# Patient Record
Sex: FEMALE | Race: WHITE | NOT HISPANIC OR LATINO | Employment: FULL TIME | URBAN - METROPOLITAN AREA
[De-identification: names, ages, dates, MRNs, and addresses within clinical notes are randomized per-mention and may not be internally consistent; named-entity substitution may affect disease eponyms.]

---

## 2017-08-28 ENCOUNTER — OFFICE VISIT (OUTPATIENT)
Dept: FAMILY MEDICINE | Facility: CLINIC | Age: 32
End: 2017-08-28
Payer: COMMERCIAL

## 2017-08-28 VITALS
DIASTOLIC BLOOD PRESSURE: 80 MMHG | HEIGHT: 69 IN | SYSTOLIC BLOOD PRESSURE: 119 MMHG | WEIGHT: 197 LBS | BODY MASS INDEX: 29.18 KG/M2 | TEMPERATURE: 98.1 F | OXYGEN SATURATION: 98 % | HEART RATE: 59 BPM

## 2017-08-28 DIAGNOSIS — E66.3 OVERWEIGHT (BMI 25.0-29.9): Primary | ICD-10-CM

## 2017-08-28 DIAGNOSIS — F51.02 ADJUSTMENT INSOMNIA: ICD-10-CM

## 2017-08-28 LAB — TSH SERPL DL<=0.005 MIU/L-ACNC: 2.24 MU/L (ref 0.4–4)

## 2017-08-28 PROCEDURE — 99203 OFFICE O/P NEW LOW 30 MIN: CPT | Performed by: FAMILY MEDICINE

## 2017-08-28 PROCEDURE — 36415 COLL VENOUS BLD VENIPUNCTURE: CPT | Performed by: FAMILY MEDICINE

## 2017-08-28 PROCEDURE — 84443 ASSAY THYROID STIM HORMONE: CPT | Performed by: FAMILY MEDICINE

## 2017-08-28 RX ORDER — TRAZODONE HYDROCHLORIDE 50 MG/1
50 TABLET, FILM COATED ORAL
Qty: 90 TABLET | Refills: 1 | Status: SHIPPED | OUTPATIENT
Start: 2017-08-28 | End: 2018-03-02

## 2017-08-28 RX ORDER — TRAZODONE HYDROCHLORIDE 50 MG/1
TABLET, FILM COATED ORAL AT BEDTIME
COMMUNITY
End: 2017-09-28

## 2017-08-28 ASSESSMENT — PAIN SCALES - GENERAL: PAINLEVEL: NO PAIN (0)

## 2017-08-28 NOTE — NURSING NOTE
"Chief Complaint   Patient presents with     Patient Request     Weight loss, discuss medication       Initial /80 (BP Location: Left arm, Patient Position: Chair, Cuff Size: Adult Large)  Pulse 59  Temp 98.1  F (36.7  C) (Oral)  Ht 5' 9\" (1.753 m)  Wt 197 lb (89.4 kg)  LMP 08/25/2017 (Approximate)  SpO2 98%  BMI 29.09 kg/m2 Estimated body mass index is 29.09 kg/(m^2) as calculated from the following:    Height as of this encounter: 5' 9\" (1.753 m).    Weight as of this encounter: 197 lb (89.4 kg).  Medication Reconciliation: complete   Elizabeth Parr MA      "

## 2017-08-28 NOTE — MR AVS SNAPSHOT
After Visit Summary   8/28/2017    Scott Stafford    MRN: [id number]           Patient Information     Date Of Birth          1985        Visit Information        Provider Department      8/28/2017 3:20 PM Engelmann, Lauren Anneliese, MD [facility]        Today's Diagnoses     Overweight (BMI 25.0-29.9)    -  1    Adjustment insomnia          Care Instructions    Come back in 3 months to check in with your weight, or sooner if needed. I will call you with your lab results.          Follow-ups after your visit        Who to contact     If you have questions or need follow up information about today's clinic visit or your schedule please contact [facility] directly at [phone].  Normal or non-critical lab and imaging results will be communicated to you by MyChart, letter or phone within 4 business days after the clinic has received the results. If you do not hear from us within 7 days, please contact the clinic through [facility] or phone. If you have a critical or abnormal lab result, we will notify you by phone as soon as possible.  Submit refill requests through [employer] or call your pharmacy and they will forward the refill request to us. Please allow 3 business days for your refill to be completed.          Additional Information About Your Visit        MyChart Information     [employer] gives you secure access to your electronic health record. If you see a primary care provider, you can also send messages to your care team and make appointments. If you have questions, please call your primary care clinic.  If you do not have a primary care provider, please call [phone] and they will assist you.        Care EveryWhere ID     This is your Care EveryWhere ID. This could be used by other organizations to access your [facility] medical records  [id number]        Your Vitals Were     Pulse Temperature Height Last Period Pulse Oximetry BMI  "(Body Mass Index)    59 98.1  F (36.7  C) (Oral) 5' 9\" (1.753 m) 08/25/2017 (Approximate) 98% 29.09 kg/m2       Blood Pressure from Last 3 Encounters:   08/28/17 119/80    Weight from Last 3 Encounters:   08/28/17 197 lb (89.4 kg)              We Performed the Following     TSH with free T4 reflex          Today's Medication Changes          These changes are accurate as of: 8/28/17  4:19 PM.  If you have any questions, ask your nurse or doctor.               These medicines have changed or have updated prescriptions.        Dose/Directions    * traZODone 50 MG tablet   Commonly known as:  DESYREL   This may have changed:  Another medication with the same name was added. Make sure you understand how and when to take each.   Changed by:  Engelmann, Lauren Anneliese, MD        Take by mouth At Bedtime   Refills:  0       * traZODone 50 MG tablet   Commonly known as:  DESYREL   This may have changed:  You were already taking a medication with the same name, and this prescription was added. Make sure you understand how and when to take each.   Used for:  Adjustment insomnia   Changed by:  Engelmann, Lauren Anneliese, MD        Dose:  50 mg   Take 1 tablet (50 mg) by mouth nightly as needed for sleep   Quantity:  90 tablet   Refills:  1       * Notice:  This list has 2 medication(s) that are the same as other medications prescribed for you. Read the directions carefully, and ask your doctor or other care provider to review them with you.         Where to get your medicines      These medications were sent to [facility] #[id number] - [place], [place]  [address] [place], [place] [address] [facility] [id number]     Phone:  [phone]     traZODone 50 MG tablet                Primary Care Provider    None Specified       No primary provider on file.        Equal Access to Services     [facility] AH: Wayne Bolanos, wabetsyda luaryaadaha, qaybta kaalmaannita naranjo, saad hood. So " [facility] [phone].    ATENCIÓN: Si tomy rosas, tiene a jeffries disposición servicios gratuitos de asistencia lingüística. Angel martinez [phone].    We comply with applicable federal civil rights laws and [place] laws. We do not discriminate on the basis of race, color, national origin, age, disability sex, sexual orientation or gender identity.            Thank you!     Thank you for choosing [facility]  for your care. Our goal is always to provide you with excellent care. Hearing back from our patients is one way we can continue to improve our services. Please take a few minutes to complete the written survey that you may receive in the mail after your visit with us. Thank you!             Your Updated Medication List - Protect others around you: Learn how to safely use, store and throw away your medicines at www.disposemymeds.org.          This list is accurate as of: 8/28/17  4:19 PM.  Always use your most recent med list.                   Brand Name Dispense Instructions for use Diagnosis    ALLEGRA PO           fluticasone 27.5 MCG/SPRAY spray    VERAMYST     Spray 2 sprays into both nostrils daily        * traZODone 50 MG tablet    DESYREL     Take by mouth At Bedtime        * traZODone 50 MG tablet    DESYREL    90 tablet    Take 1 tablet (50 mg) by mouth nightly as needed for sleep    Adjustment insomnia       * Notice:  This list has 2 medication(s) that are the same as other medications prescribed for you. Read the directions carefully, and ask your doctor or other care provider to review them with you.

## 2017-08-28 NOTE — PROGRESS NOTES
SUBJECTIVE:   Scott Stafford is a 32 year old female who presents to clinic today for the following health issues:    Weight Issue      Duration: June 2015 started weight watchers    Description (location/character/radiation): Almost to goal weight    Intensity: n/a    Accompanying signs and symptoms: None    History (similar episodes/previous evaluation): None    Precipitating or alleviating factors: None    Therapies tried and outcome: None    Wants to know what a goal weight should be for her.     Patient is here to establish care and to discuss her weight loss. She has lost about 50lbs (20% of body weight) since starting weight watchers 2 years ago. She has his a plateau and wants to know what an ideal body weight is for her.   Had body fat assessment a few months ago which showed 34%.   Very active, does roller derby and runs.   Mom has a history of thyroid disease, as do several other female maternal relatives.     She also notes a history of insomnia, well controlled on trazodone. She is requesting refills of that.       Problem list and histories reviewed & adjusted, as indicated.  Additional history: as documented    Patient Active Problem List   Diagnosis     Adjustment insomnia     Overweight (BMI 25.0-29.9)     History reviewed. No pertinent surgical history.    Social History   Substance Use Topics     Smoking status: Current Some Day Smoker     Smokeless tobacco: Not on file     Alcohol use Yes     History reviewed. No pertinent family history.          Reviewed and updated as needed this visit by clinical staffTobacco  Allergies  Meds  Med Hx  Surg Hx  Fam Hx  Soc Hx      Reviewed and updated as needed this visit by Provider         ROS:  Constitutional, HEENT, cardiovascular, pulmonary, gi and gu systems are negative, except as otherwise noted.      OBJECTIVE:   /80 (BP Location: Left arm, Patient Position: Chair, Cuff Size: Adult Large)  Pulse 59  Temp 98.1  F (36.7  C)  "(Oral)  Ht 5' 9\" (1.753 m)  Wt 197 lb (89.4 kg)  LMP 08/25/2017 (Approximate)  SpO2 98%  BMI 29.09 kg/m2  Body mass index is 29.09 kg/(m^2).  GENERAL: healthy, alert, no distress, fit and over weight  EYES: Eyes grossly normal to inspection, PERRL and conjunctivae and sclerae normal  NECK: no adenopathy, no asymmetry, masses, or scars and thyroid normal to palpation  RESP: lungs clear to auscultation - no rales, rhonchi or wheezes  CV: regular rate and rhythm, normal S1 S2, no S3 or S4, no murmur, click or rub, no peripheral edema and peripheral pulses strong  ABDOMEN: soft, nontender, no hepatosplenomegaly, no masses and bowel sounds normal  MS: no gross musculoskeletal defects noted, no edema  NEURO: Normal strength and tone, mentation intact and speech normal  PSYCH: mentation appears normal, affect normal/bright    Diagnostic Test Results:  No results found for this or any previous visit (from the past 24 hour(s)).    ASSESSMENT/PLAN:       ICD-10-CM    1. Overweight (BMI 25.0-29.9) E66.3 TSH with free T4 reflex   2. Adjustment insomnia F51.02 traZODone (DESYREL) 50 MG tablet     Discussed BMI, body fat percentage, and level of fitness in terms of goal weight.   Advised that a weight of about 170lbs would put her at a BMI in the normal range, and also stressed healthy eating, decreased beer intake.   Also discussed level of fitness and addition of weight-bearing exercise to her regimen.   return to clinic in 3 months for weight check, or as needed.     See Patient Instructions    Lauren A. Engelmann, MD  [facility]    "

## 2017-08-28 NOTE — PATIENT INSTRUCTIONS
Come back in 3 months to check in with your weight, or sooner if needed. I will call you with your lab results.

## 2017-08-30 PROBLEM — F51.02 ADJUSTMENT INSOMNIA: Status: ACTIVE | Noted: 2017-08-30

## 2017-08-30 PROBLEM — E66.3 OVERWEIGHT (BMI 25.0-29.9): Status: ACTIVE | Noted: 2017-08-30

## 2017-09-02 ENCOUNTER — HEALTH MAINTENANCE LETTER (OUTPATIENT)
Age: 32
End: 2017-09-02

## 2017-09-27 ENCOUNTER — NURSE TRIAGE (OUTPATIENT)
Dept: NURSING | Facility: CLINIC | Age: 32
End: 2017-09-27

## 2017-09-27 NOTE — TELEPHONE ENCOUNTER
"  Reason for Disposition    [1] After 72 hours AND [2] headache persists    Additional Information    Negative: [1] ACUTE NEURO SYMPTOM AND [2] present now  (DEFINITION: difficult to awaken OR confused thinking and talking OR slurred speech OR weakness of arms OR unsteady walking)    Negative: Knocked out (unconscious) > 1 minute    Negative: Seizure (convulsion) occurred  (Exception: prior history of seizures and now alert and without Acute Neuro Symptoms)    Negative: Penetrating head injury (e.g., knife, gun shot wound, metal object)    Negative: [1] Major bleeding (e.g., actively dripping or spurting) AND [2] can't be stopped    Negative: [1] Dangerous mechanism of injury (e.g., MVA, diving, trampoline, contact sports, fall > 10 feet or 3 meters) AND [2] NECK pain AND [3] began < 1 hour after injury    Negative: Sounds like a life-threatening emergency to the triager    Negative: [1] Recently examined and diagnosed with a concussion by a healthcare provider AND [2] questions about concussion symptoms    Negative: Can't remember what happened (amnesia)    Negative: Vomiting once or more    Negative: [1] Loss of vision or double vision AND [2] present now    Negative: Watery or blood-tinged fluid dripping from the NOSE or EARS now  (Exception: tears from crying or nosebleed from nasal trauma)    Negative: One or two \"black eyes\" (bruising, purple color of eyelids)    Negative: [1] Large swelling AND [2] size > palm of person's hand    Negative: Skin is split open or gaping  (or length > 1/2 inch or 12 mm)    Negative: [1] Bleeding AND [2] won't stop after 10 minutes of direct pressure (using correct technique)    Negative: Sounds like a serious injury to the triager    Negative: [1] ACUTE NEURO SYMPTOM AND [2] now fine  (DEFINITION: difficult to awaken OR confused thinking and talking OR slurred speech OR weakness of arms OR unsteady walking)    Negative: [1] Knocked out (unconscious) < 1 minute AND [2] now " fine    Negative: [1] SEVERE headache AND [2]  not improved 2 hours after pain medicine/ice packs    Negative: Dangerous injury (e.g., MVA, diving, trampoline, contact sports, fall > 10 feet or 3 meters) or severe blow from hard object (e.g., golf club or baseball bat)    Negative: Taking Coumadin (warfarin) or other strong blood thinner, or known bleeding disorder (e.g., thrombocytopenia)    Negative: Suspicious history for the injury    Negative: Patient is confused or is an unreliable provider of information (e.g., dementia, profound mental retardation, alcohol intoxication)    Negative: [1] Last tetanus shot > 5 years ago AND [2] DIRTY cut or scrape    Protocols used: HEAD INJURY-ADULT-      Advised that patient be seen by a primary care provider within 3 days per guideline.  Transferred caller to scheduling to obtain an appointment.    Karlee Salas RN  [facility]

## 2017-09-28 ENCOUNTER — OFFICE VISIT (OUTPATIENT)
Dept: FAMILY MEDICINE | Facility: CLINIC | Age: 32
End: 2017-09-28
Payer: COMMERCIAL

## 2017-09-28 VITALS
OXYGEN SATURATION: 98 % | SYSTOLIC BLOOD PRESSURE: 134 MMHG | WEIGHT: 197 LBS | BODY MASS INDEX: 29.09 KG/M2 | TEMPERATURE: 98.3 F | HEART RATE: 77 BPM | DIASTOLIC BLOOD PRESSURE: 85 MMHG

## 2017-09-28 DIAGNOSIS — S06.0X0A CONCUSSION WITHOUT LOSS OF CONSCIOUSNESS, INITIAL ENCOUNTER: Primary | ICD-10-CM

## 2017-09-28 PROCEDURE — 99213 OFFICE O/P EST LOW 20 MIN: CPT | Performed by: NURSE PRACTITIONER

## 2017-09-28 ASSESSMENT — PAIN SCALES - GENERAL: PAINLEVEL: MILD PAIN (3)

## 2017-09-28 NOTE — PROGRESS NOTES
SUBJECTIVE:   Scott Stafford is a 32 year old female who presents to clinic today for the following health issues:      Concern - Poss. Concussion    Onset: Last Monday she was a practice for roller derby,     Description:     fell and whiplashed hereself, heads daily, but no dizziness, vomiting.      Intensity: moderate headaches    Progression of Symptoms:  same    Accompanying Signs & Symptoms:  none    Previous history of similar problem:   Has had concussions in the past.    Precipitating factors:   Worsened by:     Alleviating factors:  Improved by:     Therapies Tried and outcome:     Whiplash injury 4 days ago playing roller derby  Did not hit head  Did not lose consciousness  Complains of headaches  Right temporal headaches  Worsened with activity  Worse with screen time  Denies vision change, dizziness, nausea  Rates at 3/10  She does have history of migraines  Has not taken anything for headache  Does have neck stiffness  Denies upper extremity weakness, paresthesias  Does have sensitivity to lights and sounds though it's not as bad as when she has a migraine  She works at the [employer]  Has been at work this past week which she mostly tolerated  Headache is unchanged since initial injury  Headache worsened with screen time, exercise  Denies nausea, dizziness, confusion, drowsiness, fatigue    She has been at work  Worked 6 hours today which she tolerated      Problem list and histories reviewed & adjusted, as indicated.  Additional history: none    Patient Active Problem List   Diagnosis     Adjustment insomnia     Overweight (BMI 25.0-29.9)     History reviewed. No pertinent surgical history.    Social History   Substance Use Topics     Smoking status: Current Some Day Smoker     Smokeless tobacco: Never Used     Alcohol use Yes     History reviewed. No pertinent family history.          Reviewed and updated as needed this visit by clinical staffTobacco  Allergies  Meds  Med Hx  Surg Hx   Fam Hx  Soc Hx      Reviewed and updated as needed this visit by Provider         ROS:  Constitutional, HEENT, cardiovascular, pulmonary, gi and gu systems are negative, except as otherwise noted.      OBJECTIVE:   /85 (BP Location: Right arm, Patient Position: Chair, Cuff Size: Adult Large)  Pulse 77  Temp 98.3  F (36.8  C) (Oral)  Wt 197 lb (89.4 kg)  LMP 09/22/2017  SpO2 98%  Breastfeeding? No  BMI 29.09 kg/m2  Body mass index is 29.09 kg/(m^2).  GENERAL: healthy, alert and no distress  EYES: Eyes grossly normal to inspection, PERRL and conjunctivae and sclerae normal  RESP: lungs clear to auscultation - no rales, rhonchi or wheezes  CV: regular rate and rhythm, normal S1 S2, no S3 or S4, no murmur, click or rub, no peripheral edema and peripheral pulses strong  MS: No pain to palpation cervical spinous processes or paraspinal region. Full ROM cervical spine without pain. Negative spurling bilateral. Upper extremity strength 5/5 and symmetric  SKIN: no suspicious lesions or rashes  NEURO: Normal strength and tone, mentation intact and speech normal, sensation intact, CN II-XII intact    Diagnostic Test Results:  none     ASSESSMENT/PLAN:       ICD-10-CM    1. Concussion without loss of consciousness, initial encounter S06.0X0A      No neuro or MSK symptoms to warrant imaging  Recommend NSAID as needed for pain. Advised that while brain rest is important, it's also important to be participating in regular daily activities. Recommend continue working, but did write for reduced hours for 1 week. If symptoms worsening, notify clinic and consider referral to our concussion clinic  Handout reviewed on lifestyle modifications    ELIN Herndon [facility]

## 2017-09-28 NOTE — LETTER
[address] [address]  Phone: [phone]  Fax: [phone]    September 28, 2017        Scott Stafford  [facility] [address]          To whom it may concern:    RE: Scott Stafford    Patient was seen and treated today at our clinic. I recommend limiting work hours to 6 hours/week through 10/6/17 and trying to limit screen time.     Please contact me for questions or concerns.      Sincerely,        ELIN Herndon CNP

## 2017-09-28 NOTE — MR AVS SNAPSHOT
"              After Visit Summary   9/28/2017    Scott Stafford    MRN: [id number]           Patient Information     Date Of Birth          1985        Visit Information        Provider Department      9/28/2017 3:00 PM Flor Church APRN [facility]        Care Instructions    If your symptoms are worsening, or you don't see improvement by next week, let me know and I'll refer you to our concussion clinic  Concussion    A concussion can be caused by a direct blow to the head, neck, face, or somewhere else on the body with the force being transmitted to the head. This may cause you to lose consciousness - be \"knocked out\" - but not always. Depending on the severity of the blow, it will take from a few hours up to a few days to get better. Sometimes symptoms may last a few months or longer. This is called post-concussion syndrome.  At first, you may have a headache, nausea, vomiting, or dizziness. You may also have problems concentrating or remembering things. This is normal.  Symptoms should get better as the hours and days go by. Symptoms that get worse could be a sign of a more serious injury. This might be a bruise or bleeding in the brain. That s why it s important to watch for the warning signs listed below.  Home care  If your injury is mild and there are no serious signs or symptoms, your healthcare provider may recommend that you be monitored at home. If there is evidence that the injury is more serious, you will be monitored in the hospital. Follow these tips to help care for yourself at home:    After a concussion, your healthcare provider may recommend that a family member or friend monitor you for 12 to 24 hours. They may be told to wake you every few hours during sleep to check for the signs below.    If your face or scalp swells, apply an ice pack for 20 minutes every 1 to 2 hours. Do this until the swelling starts to go down. You can make an ice " pack by putting ice cubes in a plastic bag and wrapping the bag in a towel.    You may use acetaminophen to control pain, unless another pain medicine was prescribed. Do not use aspirin or ibuprofen after a head injury. If you have chronic liver or kidney disease, talk with your doctor before using these medicines. Also talk with your doctor if you ever had a stomach ulcer or gastrointestinal bleeding.    For the next 24 hours:    Don t drink alcohol or take sedatives or medicines that make you sleepy.    Don t drive or operate machinery.    Avoid doing anything strenuous. Don t lift or strain.    Don t return to sports or any activity that could cause you to hit your head until all symptoms are gone and you have been cleared by your doctor. A second head injury before fully recovering from the first one can lead to serious brain injury.    Avoid doing activities that require a lot of concentration or a lot of attention. This will allow your brain to rest and heal quicker.  Follow-up care  Follow up with your doctor in 1 week, or as directed.  Note: A radiologist will review any X-rays or CT scans that were taken. You will be told of any new findings that may affect your care.  When to seek medical advice  Call your healthcare provider right away if any of these occur:    Repeated vomiting    Headache or dizziness that is severe or gets worse    Loss of consciousness    Unusual drowsiness, or unable to wake up as usual    Weakness or decreased ability to walk or move any limb    Confusion, agitation, or change in behavior or speech, or memory loss    Blurred vision    Convulsion (seizure)    Swelling on the scalp or face that gets worse    Changes in pupil size (the black part of the eye)    Redness, warmth, or pus from the swollen area    Fluid draining from or bleeding from the nose or ears     Date Last Reviewed: 8/14/2015 2000-2017 [employer]. [address] [place] [address], [place] [address]. All  rights reserved. This information is not intended as a substitute for professional medical care. Always follow your healthcare professional's instructions.        Treatment for Mild Traumatic Brain Injury (Concussion)  A mild traumatic brain injury (TBI) is a sudden jolt to your head that causes a temporary change in the way your brain works. It could be caused by a blow to your head, a blast, or a sudden and severe movement of your head that bounces your brain inside your skull. Falls, fights, sports, and car accidents also can cause TBIs.  Treatment of mild TBI   Having a mild TBI can change the way you feel, act, move, and think. Even though you may look fine, a mild TBI can have a big impact on many areas of your life. A mild TBI can cause headaches, fatigue, memory problems, mood swings, and inability to focus your thoughts.  Treatment for mild TBI may be different, depending on symptoms and other unrelated medical issues; therefore, no 2 TBIs are the same. You may need to work with a TBI team -- a group of healthcare providers who help people recover from TBI. For example, you might work with a physical therapist to help with your balance and movement problems. Or you might work with an occupational therapist to help you function better at home and at work. Other medical experts may help you with emotional and thinking problems.  In some cases, your doctor may use medicine to ease symptoms while you recover. These may include pain relievers, antidepressants, antianxiety medicine, sleep aids, and muscle relaxants. Although medicines can help, they are not a main part of treatment. You should not take any medicines unless discussed and approved by your healthcare provider. Things that you can do for yourself are usually as important as the medicines you are prescribed. This part of your treatment is called self-management.  Self-management for mild TBI  Most people with mild TBI recover completely, but it may  take weeks or months. For some people, symptoms may continue for years. Because of this, self-management may continue long after you leave the hospital. Many lifestyle changes that help your brain recover are good habits that you should keep up even after you have recovered. Here are some tips:      Learn as much as you can about TBI. Share what you learn with friends and family.    Let your health care team know about all your symptoms.    Eat a healthy diet and drink plenty of fluids.    Get plenty of sleep.    Don t overexert yourself mentally or physically.    Don t smoke, take drugs, or drink alcohol.    Don t use caffeine or energy drinks as a way to make you feel less tired.    Avoid activities that could cause another jolt to your head. Don't return to sports or any activity that could cause you to hit your head until all symptoms are gone and you have been cleared by your doctor. A second head injury before fully recovering from the first one can lead to serious brain injury. Ask your health care provider what types of activities are safe.    Avoid mental stress. Learn some relaxation techniques like deep breathing.    Keep a pad and pencil handy. Write things down if you are having trouble concentrating or remembering.  Let your healthcare provider know if your symptoms are getting worse. And look out for other important mental symptoms. These include memory loss, having a hard time thinking clearly, having trouble controlling your emotions, and depression. Also be sure to report physical symptoms such as worsening headaches, loss of balance, changes in vision, seizures, and vomiting.  Recovery from a mild TBI takes time. Be patient and give your brain time to heal. Be sure to rely on your support system, which includes friends and family members who understand what you are going through. You might also want to join a support group and share your feelings with others who have had a TBI.    Date Last  Reviewed: 8/17/2015 2000-2017 [employer]. [address] [place] [address], [place] [address]. All rights reserved. This information is not intended as a substitute for professional medical care. Always follow your healthcare professional's instructions.                Follow-ups after your visit        Who to contact     If you have questions or need follow up information about today's clinic visit or your schedule please contact [facility] directly at [phone].  Normal or non-critical lab and imaging results will be communicated to you by Fluoresentrichart, letter or phone within 4 business days after the clinic has received the results. If you do not hear from us within 7 days, please contact the clinic through Fluoresentrichart or phone. If you have a critical or abnormal lab result, we will notify you by phone as soon as possible.  Submit refill requests through [employer] or call your pharmacy and they will forward the refill request to us. Please allow 3 business days for your refill to be completed.          Additional Information About Your Visit        [facility] Medical Information     [employer] gives you secure access to your electronic health record. If you see a primary care provider, you can also send messages to your care team and make appointments. If you have questions, please call your primary care clinic.  If you do not have a primary care provider, please call [phone] and they will assist you.        Care EveryWhere ID     This is your Care EveryWhere ID. This could be used by other organizations to access your [place] medical records  [id number]        Your Vitals Were     Pulse Temperature Last Period Pulse Oximetry Breastfeeding? BMI (Body Mass Index)    77 98.3  F (36.8  C) (Oral) 09/22/2017 98% No 29.09 kg/m2       Blood Pressure from Last 3 Encounters:   09/28/17 134/85   08/28/17 119/80    Weight from Last 3 Encounters:   09/28/17 197 lb (89.4 kg)   08/28/17 197 lb (89.4 kg)               Today, you had the following     No orders found for display       Primary Care Provider    None Specified       No primary provider on file.        Equal Access to Services     ABNER DELEON : Hadii aad ku hadbarryhilary Bolanos, eliseannita cravennickha, hermelindo ryliejuddannita ortizumuannita, saad barkley zaydaenrique fuchsrenaldorachel hood. So [facility] [phone].    ATENCIÓN: Si habla español, tiene a jeffries disposición servicios gratuitos de asistencia lingüística. Llame al [phone].    We comply with applicable federal civil rights laws and [place] laws. We do not discriminate on the basis of race, color, national origin, age, disability sex, sexual orientation or gender identity.            Thank you!     Thank you for choosing [facility]  for your care. Our goal is always to provide you with excellent care. Hearing back from our patients is one way we can continue to improve our services. Please take a few minutes to complete the written survey that you may receive in the mail after your visit with us. Thank you!             Your Updated Medication List - Protect others around you: Learn how to safely use, store and throw away your medicines at www.disposemymeds.org.          This list is accurate as of: 9/28/17  3:38 PM.  Always use your most recent med list.                   Brand Name Dispense Instructions for use Diagnosis    ALLEGRA PO           fluticasone 27.5 MCG/SPRAY spray    VERAMYST     Spray 2 sprays into both nostrils daily        traZODone 50 MG tablet    DESYREL    90 tablet    Take 1 tablet (50 mg) by mouth nightly as needed for sleep    Adjustment insomnia

## 2017-09-28 NOTE — PATIENT INSTRUCTIONS
"If your symptoms are worsening, or you don't see improvement by next week, let me know and I'll refer you to our concussion clinic  Concussion    A concussion can be caused by a direct blow to the head, neck, face, or somewhere else on the body with the force being transmitted to the head. This may cause you to lose consciousness - be \"knocked out\" - but not always. Depending on the severity of the blow, it will take from a few hours up to a few days to get better. Sometimes symptoms may last a few months or longer. This is called post-concussion syndrome.  At first, you may have a headache, nausea, vomiting, or dizziness. You may also have problems concentrating or remembering things. This is normal.  Symptoms should get better as the hours and days go by. Symptoms that get worse could be a sign of a more serious injury. This might be a bruise or bleeding in the brain. That s why it s important to watch for the warning signs listed below.  Home care  If your injury is mild and there are no serious signs or symptoms, your healthcare provider may recommend that you be monitored at home. If there is evidence that the injury is more serious, you will be monitored in the hospital. Follow these tips to help care for yourself at home:    After a concussion, your healthcare provider may recommend that a family member or friend monitor you for 12 to 24 hours. They may be told to wake you every few hours during sleep to check for the signs below.    If your face or scalp swells, apply an ice pack for 20 minutes every 1 to 2 hours. Do this until the swelling starts to go down. You can make an ice pack by putting ice cubes in a plastic bag and wrapping the bag in a towel.    You may use acetaminophen to control pain, unless another pain medicine was prescribed. Do not use aspirin or ibuprofen after a head injury. If you have chronic liver or kidney disease, talk with your doctor before using these medicines. Also talk with " your doctor if you ever had a stomach ulcer or gastrointestinal bleeding.    For the next 24 hours:    Don t drink alcohol or take sedatives or medicines that make you sleepy.    Don t drive or operate machinery.    Avoid doing anything strenuous. Don t lift or strain.    Don t return to sports or any activity that could cause you to hit your head until all symptoms are gone and you have been cleared by your doctor. A second head injury before fully recovering from the first one can lead to serious brain injury.    Avoid doing activities that require a lot of concentration or a lot of attention. This will allow your brain to rest and heal quicker.  Follow-up care  Follow up with your doctor in 1 week, or as directed.  Note: A radiologist will review any X-rays or CT scans that were taken. You will be told of any new findings that may affect your care.  When to seek medical advice  Call your healthcare provider right away if any of these occur:    Repeated vomiting    Headache or dizziness that is severe or gets worse    Loss of consciousness    Unusual drowsiness, or unable to wake up as usual    Weakness or decreased ability to walk or move any limb    Confusion, agitation, or change in behavior or speech, or memory loss    Blurred vision    Convulsion (seizure)    Swelling on the scalp or face that gets worse    Changes in pupil size (the black part of the eye)    Redness, warmth, or pus from the swollen area    Fluid draining from or bleeding from the nose or ears     Date Last Reviewed: 8/14/2015 2000-2017 [employer]. [address] [place], [place] [id number]. All rights reserved. This information is not intended as a substitute for professional medical care. Always follow your healthcare professional's instructions.        Treatment for Mild Traumatic Brain Injury (Concussion)  A mild traumatic brain injury (TBI) is a sudden jolt to your head that causes a temporary change in the way your  brain works. It could be caused by a blow to your head, a blast, or a sudden and severe movement of your head that bounces your brain inside your skull. Falls, fights, sports, and car accidents also can cause TBIs.  Treatment of mild TBI   Having a mild TBI can change the way you feel, act, move, and think. Even though you may look fine, a mild TBI can have a big impact on many areas of your life. A mild TBI can cause headaches, fatigue, memory problems, mood swings, and inability to focus your thoughts.  Treatment for mild TBI may be different, depending on symptoms and other unrelated medical issues; therefore, no 2 TBIs are the same. You may need to work with a TBI team -- a group of healthcare providers who help people recover from TBI. For example, you might work with a physical therapist to help with your balance and movement problems. Or you might work with an occupational therapist to help you function better at home and at work. Other medical experts may help you with emotional and thinking problems.  In some cases, your doctor may use medicine to ease symptoms while you recover. These may include pain relievers, antidepressants, antianxiety medicine, sleep aids, and muscle relaxants. Although medicines can help, they are not a main part of treatment. You should not take any medicines unless discussed and approved by your healthcare provider. Things that you can do for yourself are usually as important as the medicines you are prescribed. This part of your treatment is called self-management.  Self-management for mild TBI  Most people with mild TBI recover completely, but it may take weeks or months. For some people, symptoms may continue for years. Because of this, self-management may continue long after you leave the hospital. Many lifestyle changes that help your brain recover are good habits that you should keep up even after you have recovered. Here are some tips:      Learn as much as you can about  TBI. Share what you learn with friends and family.    Let your health care team know about all your symptoms.    Eat a healthy diet and drink plenty of fluids.    Get plenty of sleep.    Don t overexert yourself mentally or physically.    Don t smoke, take drugs, or drink alcohol.    Don t use caffeine or energy drinks as a way to make you feel less tired.    Avoid activities that could cause another jolt to your head. Don't return to sports or any activity that could cause you to hit your head until all symptoms are gone and you have been cleared by your doctor. A second head injury before fully recovering from the first one can lead to serious brain injury. Ask your health care provider what types of activities are safe.    Avoid mental stress. Learn some relaxation techniques like deep breathing.    Keep a pad and pencil handy. Write things down if you are having trouble concentrating or remembering.  Let your healthcare provider know if your symptoms are getting worse. And look out for other important mental symptoms. These include memory loss, having a hard time thinking clearly, having trouble controlling your emotions, and depression. Also be sure to report physical symptoms such as worsening headaches, loss of balance, changes in vision, seizures, and vomiting.  Recovery from a mild TBI takes time. Be patient and give your brain time to heal. Be sure to rely on your support system, which includes friends and family members who understand what you are going through. You might also want to join a support group and share your feelings with others who have had a TBI.    Date Last Reviewed: 8/17/2015 2000-2017 [employer]. [address] [place] [address], [place] [address]. All rights reserved. This information is not intended as a substitute for professional medical care. Always follow your healthcare professional's instructions.

## 2017-12-15 ENCOUNTER — OFFICE VISIT (OUTPATIENT)
Dept: FAMILY MEDICINE | Facility: CLINIC | Age: 32
End: 2017-12-15
Payer: COMMERCIAL

## 2017-12-15 VITALS
OXYGEN SATURATION: 96 % | WEIGHT: 199 LBS | HEART RATE: 70 BPM | SYSTOLIC BLOOD PRESSURE: 117 MMHG | DIASTOLIC BLOOD PRESSURE: 75 MMHG | TEMPERATURE: 98.1 F | BODY MASS INDEX: 29.39 KG/M2

## 2017-12-15 DIAGNOSIS — J06.9 VIRAL URI WITH COUGH: Primary | ICD-10-CM

## 2017-12-15 PROCEDURE — 99213 OFFICE O/P EST LOW 20 MIN: CPT | Performed by: FAMILY MEDICINE

## 2017-12-15 RX ORDER — CODEINE PHOSPHATE AND GUAIFENESIN 10; 100 MG/5ML; MG/5ML
1-2 SOLUTION ORAL EVERY 6 HOURS PRN
Qty: 120 ML | Refills: 0 | Status: SHIPPED | OUTPATIENT
Start: 2017-12-15 | End: 2018-03-19

## 2017-12-15 NOTE — MR AVS SNAPSHOT
After Visit Summary   12/15/2017    Scott Stafford    MRN: [id number]           Patient Information     Date Of Birth          1985        Visit Information        Provider Department      12/15/2017 1:40 PM Lisandro Stroud MD [facility]        Today's Diagnoses     Viral URI with cough    -  1       Follow-ups after your visit        Follow-up notes from your care team     Return if symptoms worsen or fail to improve.      Who to contact     If you have questions or need follow up information about today's clinic visit or your schedule please contact [facility] directly at [phone].  Normal or non-critical lab and imaging results will be communicated to you by IGAWorkshart, letter or phone within 4 business days after the clinic has received the results. If you do not hear from us within 7 days, please contact the clinic through [employer]GAWorkshart or phone. If you have a critical or abnormal lab result, we will notify you by phone as soon as possible.  Submit refill requests through [employer] or call your pharmacy and they will forward the refill request to us. Please allow 3 business days for your refill to be completed.          Additional Information About Your Visit        MyChart Information     [employer] gives you secure access to your electronic health record. If you see a primary care provider, you can also send messages to your care team and make appointments. If you have questions, please call your primary care clinic.  If you do not have a primary care provider, please call [phone] and they will assist you.        Care EveryWhere ID     This is your Care EveryWhere ID. This could be used by other organizations to access your [place] medical records  [id number]        Your Vitals Were     Pulse Temperature Pulse Oximetry BMI (Body Mass Index)          70 98.1  F (36.7  C) (Oral) 96% 29.39 kg/m2         Blood Pressure from Last 3  Encounters:   12/15/17 117/75   09/28/17 134/85   08/28/17 119/80    Weight from Last 3 Encounters:   12/15/17 199 lb (90.3 kg)   09/28/17 197 lb (89.4 kg)   08/28/17 197 lb (89.4 kg)              Today, you had the following     No orders found for display         Today's Medication Changes          These changes are accurate as of: 12/15/17  2:10 PM.  If you have any questions, ask your nurse or doctor.               Start taking these medicines.        Dose/Directions    guaiFENesin-codeine 100-10 MG/5ML Soln solution   Commonly known as:  ROBITUSSIN AC   Used for:  Viral URI with cough   Started by:  Lisandro Stroud MD        Dose:  1-2 tsp.   Take 5-10 mLs by mouth every 6 hours as needed   Quantity:  120 mL   Refills:  0            Where to get your medicines      Some of these will need a paper prescription and others can be bought over the counter.  Ask your nurse if you have questions.     Bring a paper prescription for each of these medications     guaiFENesin-codeine 100-10 MG/5ML Soln solution                Primary Care Provider Fax #    Physician No Ref-Primary [phone]       No address on file        Equal Access to Services     ABNER DELEON AH: Wayne carias Sobernadine, waaxda luqadaha, qaybta kaalmada adeegyada, saad hood. So [facility] [phone].    ATENCIÓN: Si habla español, tiene a jeffries disposición servicios gratuitos de asistencia lingüística. Llame al [phone].    We comply with applicable federal civil rights laws and [place] laws. We do not discriminate on the basis of race, color, national origin, age, disability, sex, sexual orientation, or gender identity.            Thank you!     Thank you for choosing [facility]  for your care. Our goal is always to provide you with excellent care. Hearing back from our patients is one way we can continue to improve our services. Please take a few minutes to complete the written survey  that you may receive in the mail after your visit with us. Thank you!             Your Updated Medication List - Protect others around you: Learn how to safely use, store and throw away your medicines at www.disposemymeds.org.          This list is accurate as of: 12/15/17  2:10 PM.  Always use your most recent med list.                   Brand Name Dispense Instructions for use Diagnosis    ALLEGRA PO           fluticasone 27.5 MCG/SPRAY spray    VERAMYST     Spray 2 sprays into both nostrils daily        guaiFENesin-codeine 100-10 MG/5ML Soln solution    ROBITUSSIN AC    120 mL    Take 5-10 mLs by mouth every 6 hours as needed    Viral URI with cough       traZODone 50 MG tablet    DESYREL    90 tablet    Take 1 tablet (50 mg) by mouth nightly as needed for sleep    Adjustment insomnia

## 2017-12-15 NOTE — NURSING NOTE
"Chief Complaint   Patient presents with     Cough     x 10 days     Health Maintenance     Pap, Flu shot,        Initial /75 (BP Location: Right arm, Patient Position: Chair, Cuff Size: Adult Regular)  Pulse 70  Temp 98.1  F (36.7  C) (Oral)  Wt 199 lb (90.3 kg)  SpO2 96%  BMI 29.39 kg/m2 Estimated body mass index is 29.39 kg/(m^2) as calculated from the following:    Height as of 8/28/17: 5' 9\" (1.753 m).    Weight as of this encounter: 199 lb (90.3 kg).  Medication Reconciliation: complete   She will schedule her pap.  Zaynab Burdick CMA       "

## 2017-12-15 NOTE — PROGRESS NOTES
SUBJECTIVE:   Scott Stafford is a 32 year old female who presents to clinic today for the following health issues:      Acute Illness   Acute illness concerns: Cough  Onset: Early last week    Fever: no    Chills/Sweats: no    Headache (location?): no    Sinus Pressure:no    Conjunctivitis:  no    Ear Pain: no    Rhinorrhea: YES- Little bit    Congestion: YES- Little bit    Sore Throat: YES     Cough: YES-non-productive, with shortness of breath    Wheeze: no    Decreased Appetite: YES- Little bit    Nausea: no    Vomiting: no    Diarrhea:  no    Dysuria/Freq.: no    Fatigue/Achiness: YES    Sick/Strep Exposure: no     Therapies Tried and outcome: Day-quil, Tussin DM      She has a light smoker, but does not smoke regularly.  She has had no known fever.  The cough is especially bothersome at night.  She is not sleeping well because of it.  There    Problem list and histories reviewed & adjusted, as indicated.  Additional history: as documented    Patient Active Problem List   Diagnosis     Adjustment insomnia     Overweight (BMI 25.0-29.9)     History reviewed. No pertinent surgical history.    Social History   Substance Use Topics     Smoking status: Current Some Day Smoker     Smokeless tobacco: Never Used     Alcohol use Yes     History reviewed. No pertinent family history.      Current Outpatient Prescriptions   Medication Sig Dispense Refill       0     Fexofenadine HCl (ALLEGRA PO)        fluticasone (VERAMYST) 27.5 MCG/SPRAY spray Spray 2 sprays into both nostrils daily       traZODone (DESYREL) 50 MG tablet Take 1 tablet (50 mg) by mouth nightly as needed for sleep 90 tablet 1     No Known Allergies      Reviewed and updated as needed this visit by clinical staffTobacco  Allergies  Meds  Med Hx  Surg Hx  Fam Hx  Soc Hx      Reviewed and updated as needed this visit by Provider         ROS:  Mainly significant for the above    OBJECTIVE:     /75 (BP Location: Right arm, Patient  Position: Chair, Cuff Size: Adult Regular)  Pulse 70  Temp 98.1  F (36.7  C) (Oral)  Wt 199 lb (90.3 kg)  SpO2 96%  BMI 29.39 kg/m2  Body mass index is 29.39 kg/(m^2).  GENERAL: healthy, alert and no distress  EYES: Eyes grossly normal to inspection, PERRL and conjunctivae and sclerae normal  HENT: ear canals and TM's normal, nose and mouth without ulcers or lesions  NECK: no adenopathy, no asymmetry, masses, or scars and thyroid normal to palpation  RESP: lungs clear to auscultation - no rales, rhonchi or wheezes    Diagnostic Test Results:  none     ASSESSMENT/PLAN:       ICD-10-CM    1. Viral URI with cough J06.9 guaiFENesin-codeine (ROBITUSSIN AC) 100-10 MG/5ML SOLN solution    B97.89      She appears to have a viral upper respiratory infection  We discussed symptomatic treatment, including Robitussin-AC prn (to be used mainly at night to help with sleep)  Expectant management    If new, worsening or persistent symptoms, the patient is to call or return for a recheck.      Lisandro Stroud MD  [facility]

## 2017-12-21 ENCOUNTER — TELEPHONE (OUTPATIENT)
Dept: FAMILY MEDICINE | Facility: CLINIC | Age: 32
End: 2017-12-21

## 2017-12-21 NOTE — TELEPHONE ENCOUNTER
Panel Management Review      Patient has the following on her problem list: None      Composite cancer screening  Chart review shows that this patient is due/due soon for the following Pap Smear  Summary:    Patient is due/failing the following:   PAP    Action needed:   Patient was just in recently and MA then offered to schedule her physical with pap screen, patient declined and will schedule on her own later.    Type of outreach:    see above message    Questions for provider review:    None                                                                                                                                    HUGO Forbes MA       Chart routed to closing chart .

## 2018-03-02 DIAGNOSIS — F51.02 ADJUSTMENT INSOMNIA: ICD-10-CM

## 2018-03-02 RX ORDER — TRAZODONE HYDROCHLORIDE 50 MG/1
TABLET, FILM COATED ORAL
Qty: 90 TABLET | Refills: 1 | Status: SHIPPED | OUTPATIENT
Start: 2018-03-02 | End: 2018-09-08

## 2018-03-02 NOTE — TELEPHONE ENCOUNTER
"Requested Prescriptions   Pending Prescriptions Disp Refills     traZODone (DESYREL) 50 MG tablet [Pharmacy Med Name: TRAZODONE 50 MG TABLET] 90 tablet 1    Last Written Prescription Date:  8-28-17  Last Fill Quantity: 90,  # refills: 1   Last office visit: 12/15/2017 with prescribing provider:     Future Office Visit:     Sig: TAKE 1 TABLET (50 MG) BY MOUTH NIGHTLY AS NEEDED FOR SLEEP    Serotonin Modulators Passed    3/2/2018  1:14 AM       Passed - Recent or future visit with authorizing provider's specialty    Patient had office visit in the last year or has a visit in the next 30 days with authorizing provider.  See \"Patient Info\" tab in inbasket, or \"Choose Columns\" in Meds & Orders section of the refill encounter.            Passed - Patient is age 18 or older       Passed - No active pregnancy on record       Passed - No positive pregnancy test in past 12 months          "

## 2018-03-19 ENCOUNTER — OFFICE VISIT (OUTPATIENT)
Dept: FAMILY MEDICINE | Facility: CLINIC | Age: 33
End: 2018-03-19
Payer: COMMERCIAL

## 2018-03-19 VITALS
TEMPERATURE: 97.4 F | DIASTOLIC BLOOD PRESSURE: 85 MMHG | WEIGHT: 200.2 LBS | SYSTOLIC BLOOD PRESSURE: 131 MMHG | HEART RATE: 71 BPM | BODY MASS INDEX: 29.56 KG/M2

## 2018-03-19 DIAGNOSIS — Z00.00 ROUTINE GENERAL MEDICAL EXAMINATION AT A HEALTH CARE FACILITY: Primary | ICD-10-CM

## 2018-03-19 DIAGNOSIS — M79.644 THUMB PAIN, RIGHT: ICD-10-CM

## 2018-03-19 DIAGNOSIS — E66.3 OVERWEIGHT (BMI 25.0-29.9): ICD-10-CM

## 2018-03-19 DIAGNOSIS — Z12.4 SCREENING FOR MALIGNANT NEOPLASM OF CERVIX: ICD-10-CM

## 2018-03-19 PROCEDURE — 87624 HPV HI-RISK TYP POOLED RSLT: CPT | Performed by: PHYSICIAN ASSISTANT

## 2018-03-19 PROCEDURE — G0145 SCR C/V CYTO,THINLAYER,RESCR: HCPCS | Performed by: PHYSICIAN ASSISTANT

## 2018-03-19 PROCEDURE — 99395 PREV VISIT EST AGE 18-39: CPT | Performed by: PHYSICIAN ASSISTANT

## 2018-03-19 RX ORDER — COPPER 313.4 MG/1
1 INTRAUTERINE DEVICE INTRAUTERINE ONCE
COMMUNITY
End: 2019-10-02

## 2018-03-19 NOTE — PROGRESS NOTES
SUBJECTIVE:   CC: Scott Stafford is an 32 year old woman who presents for preventive health visit.     Physical   Annual:     Getting at least 3 servings of Calcium per day::  Yes    Bi-annual eye exam::  NO    Dental care twice a year::  Yes    Sleep apnea or symptoms of sleep apnea::  None    Diet::  Regular (no restrictions)    Frequency of exercise::  6-7 days/week    Duration of exercise::  45-60 minutes    Taking medications regularly::  Yes    Medication side effects::  None    Additional concerns today::  No            Patient plays roller derby and has wrist guards she has to wear.   Happened about 7 weeks ago. Feels like it might have got jammed. Ocassional when she hits it, it hurts. No pain with rest.   Initially it bruised. Now it brusies and swelling sometimes.   No numbness.tingling.     Today's PHQ-2 Score:   PHQ-2 ( 1999 [employer]) 3/19/2018   Q1: Little interest or pleasure in doing things 0   Q2: Feeling down, depressed or hopeless 0   PHQ-2 Score 0   Q1: Little interest or pleasure in doing things Not at all   Q2: Feeling down, depressed or hopeless Not at all   PHQ-2 Score 0       Abuse: Current or Past(Physical, Sexual or Emotional)- No  Do you feel safe in your environment - Yes    Social History   Substance Use Topics     Smoking status: Current Some Day Smoker     Smokeless tobacco: Never Used     Alcohol use Yes     Alcohol Use 3/19/2018   If you drink alcohol do you typically have greater than 3 drinks per day OR greater than 7 drinks per week? No     Reviewed orders with patient.  Reviewed health maintenance and updated orders accordingly - Yes  Labs reviewed in EPIC    Mammogram not appropriate for this patient based on age.    Pertinent mammograms are reviewed under the imaging tab.  History of abnormal Pap smear: NO - age 30-65 PAP every 5 years with negative HPV co-testing recommended    Reviewed and updated as needed this visit by clinical staff  Tobacco  Allergies   Meds  Problems  Med Hx  Surg Hx  Fam Hx  Soc Hx          Reviewed and updated as needed this visit by Provider  Allergies  Meds  Problems            Review of Systems  C: NEGATIVE for fever, chills, change in weight  I: NEGATIVE for worrisome rashes, moles or lesions  E: NEGATIVE for vision changes or irritation  ENT: NEGATIVE for ear, mouth and throat problems  R: NEGATIVE for significant cough or SOB  B: NEGATIVE for masses, tenderness or discharge  CV: NEGATIVE for chest pain, palpitations or peripheral edema  GI: NEGATIVE for nausea, abdominal pain, heartburn, or change in bowel habits  : NEGATIVE for unusual urinary or vaginal symptoms. Periods are regular.  M: NEGATIVE for significant arthralgias or myalgia  N: NEGATIVE for weakness, dizziness or paresthesias  P: NEGATIVE for changes in mood or affect     OBJECTIVE:   /85 (BP Location: Left arm, Patient Position: Chair, Cuff Size: Adult Large)  Pulse 71  Temp 97.4  F (36.3  C) (Oral)  Wt 200 lb 3.2 oz (90.8 kg)  LMP 03/04/2018  Breastfeeding? No  BMI 29.56 kg/m2  Physical Exam  GENERAL: healthy, alert and no distress  EYES: Eyes grossly normal to inspection, PERRL and conjunctivae and sclerae normal  HENT: ear canals and TM's normal, nose and mouth without ulcers or lesions  NECK: no adenopathy, no asymmetry, masses, or scars and thyroid normal to palpation  RESP: lungs clear to auscultation - no rales, rhonchi or wheezes  BREAST: normal without masses, tenderness or nipple discharge and no palpable axillary masses or adenopathy  CV: regular rate and rhythm, normal S1 S2, no S3 or S4, no murmur, click or rub, no peripheral edema and peripheral pulses strong  ABDOMEN: soft, nontender, no hepatosplenomegaly, no masses and bowel sounds normal   (female): normal female external genitalia, normal urethral meatus, vaginal mucosa pink, moist, well rugated, and normal cervix/adnexa/uterus without masses or discharge- IUD string in place.   MS:  "no gross musculoskeletal defects noted, no edema- full range of motion of the right thumb. No pain with palpation. Slight swelling noted at the DIP. No bruising.   SKIN: no suspicious lesions or rashes  NEURO: Normal strength and tone, mentation intact and speech normal  PSYCH: mentation appears normal, affect normal/bright    ASSESSMENT/PLAN:       ICD-10-CM    1. Routine general medical examination at a health care facility Z00.00    2. Screening for malignant neoplasm of cervix Z12.4 Pap imaged thin layer screen with HPV - recommended age 30 - 65 years (select HPV order below)     HPV High Risk Types DNA Cervical   3. Overweight (BMI 25.0-29.9) E66.3    4. Thumb pain, right M79.644    Thumb pain is improving, patient will monitor for now.   Pap smear completed.     COUNSELING:  Reviewed preventive health counseling, as reflected in patient instructions       Regular exercise       Healthy diet/nutrition       Contraception       Safe sex practices/STD prevention    BP Screening:   Last 3 BP Readings:    BP Readings from Last 3 Encounters:   03/19/18 131/85   12/15/17 117/75   09/28/17 134/85       The following was recommended to the patient:  Re-screen BP within a year and recommended lifestyle modifications     reports that she has been smoking.  She has never used smokeless tobacco.  Tobacco Cessation Action Plan: Information offered: Patient not interested at this time  Estimated body mass index is 29.56 kg/(m^2) as calculated from the following:    Height as of 8/28/17: 5' 9\" (1.753 m).    Weight as of this encounter: 200 lb 3.2 oz (90.8 kg).   Weight management plan: Discussed healthy diet and exercise guidelines and patient will follow up in 12 months in clinic to re-evaluate.    Counseling Resources:  ATP IV Guidelines  Pooled Cohorts Equation Calculator  Breast Cancer Risk Calculator  FRAX Risk Assessment  ICSI Preventive Guidelines  Dietary Guidelines for Americans, 2010  [employer]'s MyPlate  ASA " Prophylaxis  Lung CA Screening    Codie Murray PA-C  [facility]  Answers for HPI/ROS submitted by the patient on 3/19/2018   PHQ-2 Score: 0

## 2018-03-19 NOTE — MR AVS SNAPSHOT
After Visit Summary   3/19/2018    Scott Stafford    MRN: [id number]           Patient Information     Date Of Birth          1985        Visit Information        Provider Department      3/19/2018 4:20 PM Codie Murray PA-C [facility]        Today's Diagnoses     Routine general medical examination at a health care facility    -  1    Screening for malignant neoplasm of cervix        Overweight (BMI 25.0-29.9)          Care Instructions      Preventive Health Recommendations  Female Ages 26 - 39  Yearly exam:   See your health care provider every year in order to    Review health changes.     Discuss preventive care.      Review your medicines if you your doctor has prescribed any.    Until age 30: Get a Pap test every three years (more often if you have had an abnormal result).    After age 30: Talk to your doctor about whether you should have a Pap test every 3 years or have a Pap test with HPV screening every 5 years.   You do not need a Pap test if your uterus was removed (hysterectomy) and you have not had cancer.  You should be tested each year for STDs (sexually transmitted diseases), if you're at risk.   Talk to your provider about how often to have your cholesterol checked.  If you are at risk for diabetes, you should have a diabetes test (fasting glucose).  Shots: Get a flu shot each year. Get a tetanus shot every 10 years.   Nutrition:     Eat at least 5 servings of fruits and vegetables each day.    Eat whole-grain bread, whole-wheat pasta and brown rice instead of white grains and rice.    Talk to your provider about Calcium and Vitamin D.     Lifestyle    Exercise at least 150 minutes a week (30 minutes a day, 5 days of the week). This will help you control your weight and prevent disease.    Limit alcohol to one drink per day.    No smoking.     Wear sunscreen to prevent skin cancer.    See your dentist every six months for an exam and  cleaning.            Follow-ups after your visit        Who to contact     If you have questions or need follow up information about today's clinic visit or your schedule please contact [facility] directly at [phone].  Normal or non-critical lab and imaging results will be communicated to you by MyChart, letter or phone within 4 business days after the clinic has received the results. If you do not hear from us within 7 days, please contact the clinic through MyChart or phone. If you have a critical or abnormal lab result, we will notify you by phone as soon as possible.  Submit refill requests through [employer] or call your pharmacy and they will forward the refill request to us. Please allow 3 business days for your refill to be completed.          Additional Information About Your Visit        Kiddie Kisthart Information     [employer] gives you secure access to your electronic health record. If you see a primary care provider, you can also send messages to your care team and make appointments. If you have questions, please call your primary care clinic.  If you do not have a primary care provider, please call [phone] and they will assist you.        Care EveryWhere ID     This is your Care EveryWhere ID. This could be used by other organizations to access your [place] medical records  [id number]        Your Vitals Were     Pulse Temperature Last Period Breastfeeding? BMI (Body Mass Index)       71 97.4  F (36.3  C) (Oral) 03/04/2018 No 29.56 kg/m2        Blood Pressure from Last 3 Encounters:   03/19/18 131/85   12/15/17 117/75   09/28/17 134/85    Weight from Last 3 Encounters:   03/19/18 200 lb 3.2 oz (90.8 kg)   12/15/17 199 lb (90.3 kg)   09/28/17 197 lb (89.4 kg)              We Performed the Following     HPV High Risk Types DNA Cervical     Pap imaged thin layer screen with HPV - recommended age 30 - 65 years (select HPV order below)        Primary Care Provider Fax #    Physician  No Ref-Primary [phone]       No address on file        Equal Access to Services     ABNER PANCHO : Hadii aad ku hadbarryhilary Lucybernadine, wabetsyda herbernickha, katharinata ryliejuddannita ortizumuannita, saad barkley zaydaenrique fuchsrenaldorachel hood. So [facility] [phone].    ATENCIÓN: Si habla español, tiene a jeffries disposición servicios gratuitos de asistencia lingüística. Llame al [phone].    We comply with applicable federal civil rights laws and [place] laws. We do not discriminate on the basis of race, color, national origin, age, disability, sex, sexual orientation, or gender identity.            Thank you!     Thank you for choosing [facility]  for your care. Our goal is always to provide you with excellent care. Hearing back from our patients is one way we can continue to improve our services. Please take a few minutes to complete the written survey that you may receive in the mail after your visit with us. Thank you!             Your Updated Medication List - Protect others around you: Learn how to safely use, store and throw away your medicines at www.disposemymeds.org.          This list is accurate as of 3/19/18  4:50 PM.  Always use your most recent med list.                   Brand Name Dispense Instructions for use Diagnosis    ALLEGRA PO           fluticasone 27.5 MCG/SPRAY spray    VERAMYST     Spray 2 sprays into both nostrils daily        paragard intrauterine copper      1 each by Intrauterine route once        traZODone 50 MG tablet    DESYREL    90 tablet    TAKE 1 TABLET (50 MG) BY MOUTH NIGHTLY AS NEEDED FOR SLEEP    Adjustment insomnia

## 2018-03-19 NOTE — NURSING NOTE
"Chief Complaint   Patient presents with     Physical     Health Maintenance     pap       Initial /85 (BP Location: Left arm, Patient Position: Chair, Cuff Size: Adult Large)  Pulse 71  Temp 97.4  F (36.3  C) (Oral)  Wt 200 lb 3.2 oz (90.8 kg)  LMP 03/04/2018  Breastfeeding? No  BMI 29.56 kg/m2 Estimated body mass index is 29.56 kg/(m^2) as calculated from the following:    Height as of 8/28/17: 5' 9\" (1.753 m).    Weight as of this encounter: 200 lb 3.2 oz (90.8 kg).  Medication Reconciliation: complete   CHRIS Salter MA      "

## 2018-03-21 LAB
COPATH REPORT: NORMAL
PAP: NORMAL

## 2018-03-23 LAB
FINAL DIAGNOSIS: NORMAL
HPV HR 12 DNA CVX QL NAA+PROBE: NEGATIVE
HPV16 DNA SPEC QL NAA+PROBE: NEGATIVE
HPV18 DNA SPEC QL NAA+PROBE: NEGATIVE
SPECIMEN DESCRIPTION: NORMAL
SPECIMEN SOURCE CVX/VAG CYTO: NORMAL

## 2018-09-07 ENCOUNTER — OFFICE VISIT (OUTPATIENT)
Dept: FAMILY MEDICINE | Facility: CLINIC | Age: 33
End: 2018-09-07
Payer: COMMERCIAL

## 2018-09-07 VITALS
HEART RATE: 76 BPM | SYSTOLIC BLOOD PRESSURE: 122 MMHG | OXYGEN SATURATION: 98 % | DIASTOLIC BLOOD PRESSURE: 77 MMHG | TEMPERATURE: 98.2 F | BODY MASS INDEX: 31.16 KG/M2 | WEIGHT: 211 LBS

## 2018-09-07 DIAGNOSIS — M25.461 KNEE EFFUSION, RIGHT: Primary | ICD-10-CM

## 2018-09-07 DIAGNOSIS — M25.561 ACUTE PAIN OF RIGHT KNEE: ICD-10-CM

## 2018-09-07 PROCEDURE — 99213 OFFICE O/P EST LOW 20 MIN: CPT | Performed by: FAMILY MEDICINE

## 2018-09-07 ASSESSMENT — PAIN SCALES - GENERAL: PAINLEVEL: SEVERE PAIN (6)

## 2018-09-07 NOTE — PROGRESS NOTES
SUBJECTIVE:   Scott Stafford is a 33 year old female who presents to clinic today for the following health issues:    Musculoskeletal problem/pain      Duration: 7/30/18    Description  Location: Right knee    Intensity:  6/10    Accompanying signs and symptoms: radiation of pain to lower leg and swelling    History  Previous similar problem: no   Previous evaluation:  none    Precipitating or alleviating factors:  Trauma or overuse: YES- Roller derby, she fell and hit the top of knee on the ground  Aggravating factors include: Bending it and turning it, crossing legs    Therapies tried and outcome: Ibuprofen, helps some    Fell while doing roller derby. Anterior patella.  Has had a palpable hematoma (gets these a lot with derby) superior to patella and swelling.   Now doing a lot of exercise and knee pain is bad when doing loaded exercise like squats.     Problem list and histories reviewed & adjusted, as indicated.  Additional history: as documented    Patient Active Problem List   Diagnosis     Adjustment insomnia     Overweight (BMI 25.0-29.9)     History reviewed. No pertinent surgical history.    Social History   Substance Use Topics     Smoking status: Current Some Day Smoker     Smokeless tobacco: Never Used     Alcohol use Yes     Family History   Problem Relation Age of Onset     Breast Cancer Paternal Grandmother      stage 1 early 50's.            Reviewed and updated as needed this visit by clinical staff  Tobacco  Allergies  Meds  Med Hx  Surg Hx  Fam Hx  Soc Hx      Reviewed and updated as needed this visit by Provider         ROS:  Constitutional, HEENT, cardiovascular, pulmonary, gi and gu systems are negative, except as otherwise noted.    OBJECTIVE:     /77 (BP Location: Right arm, Patient Position: Chair, Cuff Size: Adult Regular)  Pulse 76  Temp 98.2  F (36.8  C) (Oral)  Wt 211 lb (95.7 kg)  LMP 08/19/2018  SpO2 98%  BMI 31.16 kg/m2  Body mass index is 31.16  kg/(m^2).  GENERAL: healthy, alert and no distress  NECK: no adenopathy, no asymmetry, masses, or scars and thyroid normal to palpation  RESP: lungs clear to auscultation - no rales, rhonchi or wheezes  CV: regular rate and rhythm, normal S1 S2, no S3 or S4, no murmur, click or rub, no peripheral edema and peripheral pulses strong  MS: palpable hematoma superior to patella. Anterior knee effusion over joint line. Pain with active flexion. No patella laxity.     Diagnostic Test Results:  none     ASSESSMENT/PLAN:       ICD-10-CM    1. Knee effusion, right M25.461 MR Knee Right w/o Contrast     [facility]     order for DME   2. Acute pain of right knee M25.561 MR Knee Right w/o Contrast     [facility]     order for DME     Low utility of x-ray given chronicity of pain, exam.   She will consider MRI.   [facility] for targeted exercise.     CONSULTATION/REFERRAL to ARELI   Regular exercise  See Patient Instructions    Lauren A. Engelmann, MD  [facility]

## 2018-09-07 NOTE — MR AVS SNAPSHOT
After Visit Summary   9/7/2018    Scott Stafford    MRN: [id number]           Patient Information     Date Of Birth          1985        Visit Information        Provider Department      9/7/2018 9:20 AM Engelmann, Lauren Anneliese, MD [facility]        Today's Diagnoses     Knee effusion, right    -  1    Acute pain of right knee           Follow-ups after your visit        Additional Services     [facility] PT, HAND, AND CHIROPRACTIC REFERRAL       **This order will print in the [facility] Scheduling Office**    Physical Therapy, Hand Therapy and Chiropractic Care are available through:    *[facility]  *[facility]  *[facility]    Call one number to schedule at any of the above locations: [phone].    Your provider has referred you to: Physical Therapy at [facility] or [facility]    Indication/Reason for Referral: Knee Pain  Onset of Illness: 7/30/18  Therapy Orders: Evaluate and Treat  Special Programs: None  Special Request: None    Manpreet Case      Additional Comments for the Therapist or Chiropractor: None    Please be aware that coverage of these services is subject to the terms and limitations of your health insurance plan.  Call member services at your health plan with any benefit or coverage questions.      Please bring the following to your appointment:    *Your personal calendar for scheduling future appointments  *Comfortable clothing                  Your next 10 appointments already scheduled     Sep 13, 2018  5:00 PM CDT   MR KNEE RIGHT W/O CONTRAST with UUMR2   [facility] [place], MRI ([facility] [place])    [address] [id number] [phone]           Take your medicines as usual, unless your doctor tells you not to. Bring a list of your current medicines to your exam (including vitamins, minerals and over-the-counter drugs). Also bring the  results of similar scans you may have had.  Please remove any body piercings and hair extensions before you arrive.  Follow your doctor s orders. If you do not, we may have to postpone your exam.  You may or may not receive IV contrast for this exam pending the discretion of the Radiologist.  You do not need to do anything special to prepare.  The MRI machine uses a strong magnet. Please wear clothes without metal (snaps, zippers). A sweatsuit works well, or we may give you a hospital gown.   **IMPORTANT** THE INSTRUCTIONS BELOW ARE ONLY FOR THOSE PATIENTS WHO HAVE BEEN PRESCRIBED SEDATION OR GENERAL ANESTHESIA DURING THEIR MRI PROCEDURE:  IF YOUR DOCTOR PRESCRIBED ORAL SEDATION (take medicine to help you relax during your exam):   You must get the medicine from your doctor (oral medication) before you arrive. Bring the medicine to the exam. Do not take it at home. You ll be told when to take it upon arriving for your exam.   Arrive one hour early. Bring someone who can take you home after the test. Your medicine will make you sleepy. After the exam, you may not drive, take a bus or take a taxi by yourself.  IF YOUR DOCTOR PRESCRIBED IV SEDATION:   Arrive one hour early. Bring someone who can take you home after the test. Your medicine will make you sleepy. After the exam, you may not drive, take a bus or take a taxi by yourself.   No eating 6 hours before your exam. You may have clear liquids up until 4 hours before your exam. (Clear liquids include water, clear tea, black coffee and fruit juice without pulp.)  IF YOUR DOCTOR PRESCRIBED ANESTHESIA (be asleep for your exam):   Arrive 1 1/2 hours early. Bring someone who can take you home after the test. You may not drive, take a bus or take a taxi by yourself.   No eating 8 hours before your exam. You may have clear liquids up until 4 hours before your exam. (Clear liquids include water, clear tea, black coffee and fruit juice without pulp.)   You will spend four to  five hours in the recovery room.  Please call the Imaging Department at your exam site with any questions.              Future tests that were ordered for you today     Open Future Orders        Priority Expected Expires Ordered    MR Knee Right w/o Contrast Routine  9/7/2019 9/7/2018            Who to contact     If you have questions or need follow up information about today's clinic visit or your schedule please contact [facility] directly at [phone].  Normal or non-critical lab and imaging results will be communicated to you by StreetFirehart, letter or phone within 4 business days after the clinic has received the results. If you do not hear from us within 7 days, please contact the clinic through [employer] or phone. If you have a critical or abnormal lab result, we will notify you by phone as soon as possible.  Submit refill requests through [employer] or call your pharmacy and they will forward the refill request to us. Please allow 3 business days for your refill to be completed.          Additional Information About Your Visit        [employer] Information     [employer] gives you secure access to your electronic health record. If you see a primary care provider, you can also send messages to your care team and make appointments. If you have questions, please call your primary care clinic.  If you do not have a primary care provider, please call [phone] and they will assist you.        Care EveryWhere ID     This is your Care EveryWhere ID. This could be used by other organizations to access your [place] medical records  [id number]        Your Vitals Were     Pulse Temperature Last Period Pulse Oximetry BMI (Body Mass Index)       76 98.2  F (36.8  C) (Oral) 08/19/2018 98% 31.16 kg/m2        Blood Pressure from Last 3 Encounters:   09/07/18 122/77   03/19/18 131/85   12/15/17 117/75    Weight from Last 3 Encounters:   09/07/18 211 lb (95.7 kg)   03/19/18 200 lb 3.2 oz (90.8 kg)   12/15/17  199 lb (90.3 kg)              We Performed the Following     [facility] PT, HAND, AND CHIROPRACTIC REFERRAL          Today's Medication Changes          These changes are accurate as of 9/7/18 10:13 AM.  If you have any questions, ask your nurse or doctor.               Start taking these medicines.        Dose/Directions    order for DME   Used for:  Acute pain of right knee, Knee effusion, right   Started by:  Engelmann, Lauren Anneliese, MD        Equipment being ordered: R knee compression brace   Quantity:  1 each   Refills:  0            Where to get your medicines      Some of these will need a paper prescription and others can be bought over the counter.  Ask your nurse if you have questions.     Bring a paper prescription for each of these medications     order for DME                Primary Care Provider Fax #    Physician No Ref-Primary [phone]       No address on file        Equal Access to Services     ABNER DELEON : Wayne carias Sobernadine, waaxda luqadaha, qaybta kaalmada adesonyayaannita, saad case . So [facility] [phone].    ATENCIÓN: Si habla español, tiene a jeffries disposición servicios gratuitos de asistencia lingüística. Llame al [phone].    We comply with applicable federal civil rights laws and [place] laws. We do not discriminate on the basis of race, color, national origin, age, disability, sex, sexual orientation, or gender identity.            Thank you!     Thank you for choosing [facility]  for your care. Our goal is always to provide you with excellent care. Hearing back from our patients is one way we can continue to improve our services. Please take a few minutes to complete the written survey that you may receive in the mail after your visit with us. Thank you!             Your Updated Medication List - Protect others around you: Learn how to safely use, store and throw away your medicines at www.disposemymeds.org.          This  list is accurate as of 9/7/18 10:13 AM.  Always use your most recent med list.                   Brand Name Dispense Instructions for use Diagnosis    ALLEGRA PO           fluticasone 27.5 MCG/SPRAY spray    VERAMYST     Spray 2 sprays into both nostrils daily        order for DME     1 each    Equipment being ordered: R knee compression brace    Acute pain of right knee, Knee effusion, right       paragard intrauterine copper      1 each by Intrauterine route once        traZODone 50 MG tablet    DESYREL    90 tablet    TAKE 1 TABLET (50 MG) BY MOUTH NIGHTLY AS NEEDED FOR SLEEP    Adjustment insomnia

## 2018-09-08 DIAGNOSIS — F51.02 ADJUSTMENT INSOMNIA: ICD-10-CM

## 2018-09-10 NOTE — TELEPHONE ENCOUNTER
"Requested Prescriptions   Pending Prescriptions Disp Refills     traZODone (DESYREL) 50 MG tablet [Pharmacy Med Name: TRAZODONE 50 MG TABLET] 90 tablet 1    Last Written Prescription Date:  3/2/18  Last Fill Quantity: 90,  # refills: 1   Last office visit: 9/7/2018 with prescribing provider:     Future Office Visit:     Sig: TAKE 1 TABLET (50 MG) BY MOUTH NIGHTLY AS NEEDED FOR SLEEP    Serotonin Modulators Passed    9/8/2018  1:42 AM       Passed - Recent (12 mo) or future (30 days) visit within the authorizing provider's specialty    Patient had office visit in the last 12 months or has a visit in the next 30 days with authorizing provider or within the authorizing provider's specialty.  See \"Patient Info\" tab in inbasket, or \"Choose Columns\" in Meds & Orders section of the refill encounter.           Passed - Patient is age 18 or older       Passed - No active pregnancy on record       Passed - No positive pregnancy test in past 12 months          "

## 2018-09-11 RX ORDER — TRAZODONE HYDROCHLORIDE 50 MG/1
TABLET, FILM COATED ORAL
Qty: 90 TABLET | Refills: 1 | Status: SHIPPED | OUTPATIENT
Start: 2018-09-11 | End: 2019-03-16

## 2018-09-11 NOTE — TELEPHONE ENCOUNTER
Prescription approved per [facility]ill Protocol.    Radha Phillips RN  [facility]

## 2018-09-13 ENCOUNTER — HOSPITAL ENCOUNTER (OUTPATIENT)
Dept: MRI IMAGING | Facility: CLINIC | Age: 33
Discharge: HOME | End: 2018-09-13
Attending: FAMILY MEDICINE | Admitting: FAMILY MEDICINE
Payer: COMMERCIAL

## 2018-09-13 DIAGNOSIS — M25.561 ACUTE PAIN OF RIGHT KNEE: ICD-10-CM

## 2018-09-13 DIAGNOSIS — M25.461 KNEE EFFUSION, RIGHT: ICD-10-CM

## 2018-09-13 PROCEDURE — 73721 MRI JNT OF LWR EXTRE W/O DYE: CPT | Mod: RT

## 2018-09-14 ENCOUNTER — THERAPY VISIT (OUTPATIENT)
Dept: PHYSICAL THERAPY | Facility: CLINIC | Age: 33
End: 2018-09-14
Payer: COMMERCIAL

## 2018-09-14 DIAGNOSIS — M25.561 ACUTE PAIN OF RIGHT KNEE: Primary | ICD-10-CM

## 2018-09-14 PROCEDURE — 97161 PT EVAL LOW COMPLEX 20 MIN: CPT | Mod: GP | Performed by: PHYSICAL THERAPIST

## 2018-09-14 PROCEDURE — 97110 THERAPEUTIC EXERCISES: CPT | Mod: GP | Performed by: PHYSICAL THERAPIST

## 2018-09-14 ASSESSMENT — ACTIVITIES OF DAILY LIVING (ADL)
RAW_SCORE: 45
WEAKNESS: I DO NOT HAVE THE SYMPTOM
WALK: ACTIVITY IS SOMEWHAT DIFFICULT
KNEE_ACTIVITY_OF_DAILY_LIVING_SUM: 45
KNEEL ON THE FRONT OF YOUR KNEE: ACTIVITY IS MINIMALLY DIFFICULT
SQUAT: ACTIVITY IS SOMEWHAT DIFFICULT
AS_A_RESULT_OF_YOUR_KNEE_INJURY,_HOW_WOULD_YOU_RATE_YOUR_CURRENT_LEVEL_OF_DAILY_ACTIVITY?: SEVERELY ABNORMAL
GIVING WAY, BUCKLING OR SHIFTING OF KNEE: I DO NOT HAVE THE SYMPTOM
GO DOWN STAIRS: ACTIVITY IS FAIRLY DIFFICULT
STIFFNESS: I HAVE THE SYMPTOM BUT IT DOES NOT AFFECT MY ACTIVITY
KNEE_ACTIVITY_OF_DAILY_LIVING_SCORE: 64.29
STAND: ACTIVITY IS VERY DIFFICULT
LIMPING: I DO NOT HAVE THE SYMPTOM
PAIN: THE SYMPTOM AFFECTS MY ACTIVITY SEVERELY
SWELLING: I HAVE THE SYMPTOM BUT IT DOES NOT AFFECT MY ACTIVITY
SIT WITH YOUR KNEE BENT: ACTIVITY IS FAIRLY DIFFICULT
RISE FROM A CHAIR: ACTIVITY IS MINIMALLY DIFFICULT
HOW_WOULD_YOU_RATE_THE_OVERALL_FUNCTION_OF_YOUR_KNEE_DURING_YOUR_USUAL_DAILY_ACTIVITIES?: ABNORMAL
GO UP STAIRS: ACTIVITY IS FAIRLY DIFFICULT
HOW_WOULD_YOU_RATE_THE_CURRENT_FUNCTION_OF_YOUR_KNEE_DURING_YOUR_USUAL_DAILY_ACTIVITIES_ON_A_SCALE_FROM_0_TO_100_WITH_100_BEING_YOUR_LEVEL_OF_KNEE_FUNCTION_PRIOR_TO_YOUR_INJURY_AND_0_BEING_THE_INABILITY_TO_PERFORM_ANY_OF_YOUR_USUAL_DAILY_ACTIVITIES?: 50

## 2018-09-14 NOTE — PROGRESS NOTES
[facility] Initial Evaluation  Subjective:  Patient is a 33 year old female presenting with rehab right knee hpi. The history is provided by the patient.   Scott Stafford is a 33 year old female with a right knee condition.  Condition occurred with:  A fall/slip.  Condition occurred: during recreation/sport (while playing roller derby).  This is a new condition  Fall ocurred 6-7 weeks ago. PT referral on 9/7/18..    Patient reports pain:  Anterior and sub patellar.    Pain is described as aching and is intermittent and reported as 6/10.  Associated symptoms:  Loss of strength. Worse during: no pattern.  Symptoms are exacerbated by bending/squatting, kneeling, sitting, descending stairs and ascending stairs and relieved by bracing/immobilizing and ice.  Since onset symptoms are gradually improving.  Special tests:  MRI (evidence of patellar maltracking with patella juan).      General health as reported by patient is good.  Pertinent medical history includes:  Smoking and overweight.  Medical allergies: yes (Latex, adhesive).  Other surgeries include:  None reported.  Current medications:  Sleep medication.  Current occupation is . 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  Patient is working in normal job without restrictions.  Primary job tasks include:  Prolonged sitting and lifting.    Barriers include:  None as reported by the patient.    Red flags:  None as reported by the patient.                        Objective:    Gait:    Gait Type:  Antalgic   Weight Bearing Status:  WBAT   Assistive Devices:  Brace      Flexibility/Screens:       Lower Extremity:      Decreased right lower extremity flexibility:  IT Band                                                 Hip Evaluation  HIP AROM:  AROM:    Left Hip:     Normal    Right Hip:   Normal                    Hip Strength:  Hip Strength:   Left:    Normal  Right:     Flexion:   Right: 4/5    Pain: weak/pain free                     Extension:  Right: 5/5    Pain:    Abduction:  Right: 5/5    Pain:  Adduction:  Right: 5/5   Pain:  Internal Rotation:  Right: 5/5   Pain:  External Rotation:  Right: 5/5   Pain:  Knee Flexion:  Right: 5/5   Pain:  Knee Extension:  Right: 4+/5    Pain:                 Knee Evaluation:  ROM:  AROM: normal            Strength:         Quad Set Left: WNL    Pain:   Quad Set Right: Fair and delayed    Pain:  Ligament Testing:  Normal                Special Tests:     Right knee positive for the following tests:  Patellar Compression; Patellar Tracking-Abduction Lateral and Letty's  Right knee negative for the following special tests:  Plica; Meniscal and IT Band Friction  Palpation:  Normal      Edema:  Normal    Mobility Testing:      Patellofemoral Medial:  Left: normal    Right: hypomobile  Patellofemoral Lateral:  Left: normal    Right: hypomobile      Functional Testing:          Quad:    Single Leg Squat:  Left:      Right:        Bilateral Leg Squat:   Normal control              General     ROS    Assessment/Plan:    Patient is a 33 year old female with right side knee complaints.    Patient has the following significant findings with corresponding treatment plan.                Diagnosis 1:  R knee pain  Pain -  hot/cold therapy, manual therapy, self management, education and home program  Decreased ROM/flexibility - manual therapy, therapeutic exercise and home program  Decreased strength - therapeutic exercise, therapeutic activities and home program    Therapy Evaluation Codes:   1) History comprised of:   Personal factors that impact the plan of care:      None.    Comorbidity factors that impact the plan of care are:      None.     Medications impacting care: None.  2) Examination of Body Systems comprised of:   Body structures and functions that impact the plan of care:      Ankle, Hip, Knee and Lumbar spine.   Activity limitations that impact the plan of care are:      Sitting, Sports,  Squatting/kneeling and Stairs.  3) Clinical presentation characteristics are:   Stable/Uncomplicated.  4) Decision-Making    Low complexity using standardized patient assessment instrument and/or measureable assessment of functional outcome.  Cumulative Therapy Evaluation is: Low complexity.    Previous and current functional limitations:  (See Goal Flow Sheet for this information)    Short term and Long term goals: (See Goal Flow Sheet for this information)     Communication ability:  Patient appears to be able to clearly communicate and understand verbal and written communication and follow directions correctly.  Treatment Explanation - The following has been discussed with the patient:   RX ordered/plan of care  Anticipated outcomes  Possible risks and side effects  This patient would benefit from PT intervention to resume normal activities.   Rehab potential is good.    Frequency:  1 X week, once daily  Duration:  for 8 weeks  Discharge Plan:  Achieve all LTG.  Independent in home treatment program.  Reach maximal therapeutic benefit.    Please refer to the daily flowsheet for treatment today, total treatment time and time spent performing 1:1 timed codes.

## 2018-09-14 NOTE — MR AVS SNAPSHOT
After Visit Summary   9/14/2018    Scott Stafford    MRN: [id number]           Patient Information     Date Of Birth          1985        Visit Information        Provider Department      9/14/2018 8:10 AM Nicolette Gilmore, PT [facility]        Today's Diagnoses     Acute pain of right knee    -  1       Follow-ups after your visit        Your next 10 appointments already scheduled     Sep 20, 2018  7:30 AM CDT   ARELI Extremity with Mejia Finney PT   [facility])    [address]  [facility] [address]   [phone]            Sep 27, 2018  4:40 PM CDT   ARELI Extremity with Nicolette Gilmore PT   [facility]    [address] [facility] [address] [phone]              Who to contact     If you have questions or need follow up information about today's clinic visit or your schedule please contact [facility] directly at [phone].  Normal or non-critical lab and imaging results will be communicated to you by [employer], letter or phone within 4 business days after the clinic has received the results. If you do not hear from us within 7 days, please contact the clinic through [employer] or phone. If you have a critical or abnormal lab result, we will notify you by phone as soon as possible.  Submit refill requests through [employer] or call your pharmacy and they will forward the refill request to us. Please allow 3 business days for your refill to be completed.          Additional Information About Your Visit        [employer] Information     [employer] gives you secure access to your electronic health record. If you see a primary care provider, you can also send messages to your care team and make appointments. If you have questions, please call your primary care  clinic.  If you do not have a primary care provider, please call [phone] and they will assist you.        Care EveryWhere ID     This is your Care EveryWhere ID. This could be used by other organizations to access your [place] medical records  [id number]        Your Vitals Were     Last Period                   08/19/2018            Blood Pressure from Last 3 Encounters:   09/07/18 122/77   03/19/18 131/85   12/15/17 117/75    Weight from Last 3 Encounters:   09/07/18 95.7 kg (211 lb)   03/19/18 90.8 kg (200 lb 3.2 oz)   12/15/17 90.3 kg (199 lb)              We Performed the Following     HC PT EVAL, LOW COMPLEXITY     THERAPEUTIC EXERCISES        Primary Care Provider Fax #    Physician No Ref-Primary [phone]       No address on file        Equal Access to Services     ABNER DELEON : Wayne Bolanos, waaxannita gomez, qaybelle kaaljoanna naranjo, saad case . So [facility] [phone].    ATENCIÓN: Si habla español, tiene a jeffries disposición servicios gratuitos de asistencia lingüística. Angel al [phone].    We comply with applicable federal civil rights laws and [place] laws. We do not discriminate on the basis of race, color, national origin, age, disability, sex, sexual orientation, or gender identity.            Thank you!     Thank you for choosing [facility]  for your care. Our goal is always to provide you with excellent care. Hearing back from our patients is one way we can continue to improve our services. Please take a few minutes to complete the written survey that you may receive in the mail after your visit with us. Thank you!             Your Updated Medication List - Protect others around you: Learn how to safely use, store and throw away your medicines at www.disposemymeds.org.          This list is accurate as of 9/14/18 11:59 PM.  Always use your most recent med list.                   Brand Name  Dispense Instructions for use Diagnosis    ALLEGRA PO           fluticasone 27.5 MCG/SPRAY spray    VERAMYST     Spray 2 sprays into both nostrils daily        order for DME     1 each    Equipment being ordered: R knee compression brace    Acute pain of right knee, Knee effusion, right       paragard intrauterine copper      1 each by Intrauterine route once        traZODone 50 MG tablet    DESYREL    90 tablet    TAKE 1 TABLET (50 MG) BY MOUTH NIGHTLY AS NEEDED FOR SLEEP    Adjustment insomnia

## 2018-09-18 PROBLEM — M25.561 ACUTE PAIN OF RIGHT KNEE: Status: ACTIVE | Noted: 2018-09-18

## 2018-09-20 ENCOUNTER — THERAPY VISIT (OUTPATIENT)
Dept: PHYSICAL THERAPY | Facility: CLINIC | Age: 33
End: 2018-09-20
Payer: COMMERCIAL

## 2018-09-20 DIAGNOSIS — M25.561 ACUTE PAIN OF RIGHT KNEE: ICD-10-CM

## 2018-09-20 PROCEDURE — 97110 THERAPEUTIC EXERCISES: CPT | Mod: GP | Performed by: PHYSICAL THERAPIST

## 2018-09-27 ENCOUNTER — THERAPY VISIT (OUTPATIENT)
Dept: PHYSICAL THERAPY | Facility: CLINIC | Age: 33
End: 2018-09-27
Payer: COMMERCIAL

## 2018-09-27 DIAGNOSIS — M25.561 ACUTE PAIN OF RIGHT KNEE: ICD-10-CM

## 2018-09-27 PROCEDURE — 97110 THERAPEUTIC EXERCISES: CPT | Mod: GP | Performed by: PHYSICAL THERAPIST

## 2018-09-27 PROCEDURE — 97112 NEUROMUSCULAR REEDUCATION: CPT | Mod: GP | Performed by: PHYSICAL THERAPIST

## 2018-10-11 ENCOUNTER — THERAPY VISIT (OUTPATIENT)
Dept: PHYSICAL THERAPY | Facility: CLINIC | Age: 33
End: 2018-10-11
Payer: COMMERCIAL

## 2018-10-11 DIAGNOSIS — M25.561 ACUTE PAIN OF RIGHT KNEE: ICD-10-CM

## 2018-10-11 PROCEDURE — 97110 THERAPEUTIC EXERCISES: CPT | Mod: GP | Performed by: PHYSICAL THERAPIST

## 2018-10-11 PROCEDURE — 97112 NEUROMUSCULAR REEDUCATION: CPT | Mod: GP | Performed by: PHYSICAL THERAPIST

## 2018-10-11 NOTE — MR AVS SNAPSHOT
After Visit Summary   10/11/2018    Scott Stafford    MRN: [id number]           Patient Information     Date Of Birth          1985        Visit Information        Provider Department      10/11/2018 5:20 PM Nicolette Gilmore, PT [facility]        Today's Diagnoses     Acute pain of right knee           Follow-ups after your visit        Who to contact     If you have questions or need follow up information about today's clinic visit or your schedule please contact [facility] directly at [phone].  Normal or non-critical lab and imaging results will be communicated to you by [employer], letter or phone within 4 business days after the clinic has received the results. If you do not hear from us within 7 days, please contact the clinic through [employer] or phone. If you have a critical or abnormal lab result, we will notify you by phone as soon as possible.  Submit refill requests through [employer] or call your pharmacy and they will forward the refill request to us. Please allow 3 business days for your refill to be completed.          Additional Information About Your Visit        MyChart Information     [employer] gives you secure access to your electronic health record. If you see a primary care provider, you can also send messages to your care team and make appointments. If you have questions, please call your primary care clinic.  If you do not have a primary care provider, please call [phone] and they will assist you.        Care EveryWhere ID     This is your Care EveryWhere ID. This could be used by other organizations to access your [place] medical records  [id number]         Blood Pressure from Last 3 Encounters:   09/07/18 122/77   03/19/18 131/85   12/15/17 117/75    Weight from Last 3 Encounters:   09/07/18 95.7 kg (211 lb)   03/19/18 90.8 kg (200 lb 3.2 oz)   12/15/17 90.3 kg (199 lb)               We Performed the Following     NEUROMUSCULAR RE-EDUCATION     THERAPEUTIC EXERCISES        Primary Care Provider Fax #    Physician No Ref-Primary [phone]       No address on file        Equal Access to Services     ABNER DELEON : Hadpaulino aad ku hadkatharina Bolanos, jerri herbernabor, hermelindo naranjo, saad hood. So [facility] [phone].    ATENCIÓN: Si habla español, tiene a jeffries disposición servicios gratuitos de asistencia lingüística. Llame al [phone].    We comply with applicable federal civil rights laws and [place] laws. We do not discriminate on the basis of race, color, national origin, age, disability, sex, sexual orientation, or gender identity.            Thank you!     Thank you for choosing [facility]  for your care. Our goal is always to provide you with excellent care. Hearing back from our patients is one way we can continue to improve our services. Please take a few minutes to complete the written survey that you may receive in the mail after your visit with us. Thank you!             Your Updated Medication List - Protect others around you: Learn how to safely use, store and throw away your medicines at www.disposemymeds.org.          This list is accurate as of 10/11/18 11:59 PM.  Always use your most recent med list.                   Brand Name Dispense Instructions for use Diagnosis    ALLEGRA PO           fluticasone 27.5 MCG/SPRAY spray    VERAMYST     Spray 2 sprays into both nostrils daily        order for DME     1 each    Equipment being ordered: R knee compression brace    Acute pain of right knee, Knee effusion, right       paragard intrauterine copper      1 each by Intrauterine route once        traZODone 50 MG tablet    DESYREL    90 tablet    TAKE 1 TABLET (50 MG) BY MOUTH NIGHTLY AS NEEDED FOR SLEEP    Adjustment insomnia

## 2018-12-06 DIAGNOSIS — F51.02 ADJUSTMENT INSOMNIA: ICD-10-CM

## 2018-12-06 RX ORDER — TRAZODONE HYDROCHLORIDE 50 MG/1
TABLET, FILM COATED ORAL
Qty: 90 TABLET | Refills: 1 | Status: CANCELLED | OUTPATIENT
Start: 2018-12-06

## 2018-12-06 NOTE — TELEPHONE ENCOUNTER
Requested Prescriptions   Pending Prescriptions Disp Refills     traZODone (DESYREL) 50 MG tablet 90 tablet 1    There is no refill protocol information for this order   Last Written Prescription Date:  9-11-18  Last Fill Quantity: 90,  # refills: 1   Last office visit: 9/7/2018 with prescribing provider:  9-7-18   Future Office Visit:

## 2018-12-07 NOTE — TELEPHONE ENCOUNTER
"Requested Prescriptions   Pending Prescriptions Disp Refills     traZODone (DESYREL) 50 MG tablet 90 tablet 1    Serotonin Modulators Passed    12/6/2018  8:19 AM       Passed - Recent (12 mo) or future (30 days) visit within the authorizing provider's specialty    Patient had office visit in the last 12 months or has a visit in the next 30 days with authorizing provider or within the authorizing provider's specialty.  See \"Patient Info\" tab in inbasket, or \"Choose Columns\" in Meds & Orders section of the refill encounter.             Passed - Patient is age 18 or older       Passed - No active pregnancy on record       Passed - No positive pregnancy test in past 12 months        It appears that 90 tabs with 1 refill was sent to [facility] on 9/11/18.  Should have a 90 day refill left.    I called [facility], they have a refill available.    Will fill for patient.   Can disregard request.    Radha Phillips RN  [facility]      "

## 2019-03-16 DIAGNOSIS — F51.02 ADJUSTMENT INSOMNIA: ICD-10-CM

## 2019-03-18 NOTE — TELEPHONE ENCOUNTER
"Requested Prescriptions   Pending Prescriptions Disp Refills     traZODone (DESYREL) 50 MG tablet [Pharmacy Med Name: TRAZODONE 50 MG TABLET] 90 tablet 1    Last Written Prescription Date:  9/11/18  Last Fill Quantity: 90,  # refills: 1   Last office visit: 9/7/2018 with prescribing provider:     Future Office Visit:     Sig: TAKE 1 TABLET (50 MG) BY MOUTH NIGHTLY AS NEEDED FOR SLEEP    Serotonin Modulators Passed - 3/16/2019 12:23 AM       Passed - Recent (12 mo) or future (30 days) visit within the authorizing provider's specialty    Patient had office visit in the last 12 months or has a visit in the next 30 days with authorizing provider or within the authorizing provider's specialty.  See \"Patient Info\" tab in inbasket, or \"Choose Columns\" in Meds & Orders section of the refill encounter.             Passed - Medication is active on med list       Passed - Patient is age 18 or older       Passed - No active pregnancy on record       Passed - No positive pregnancy test in past 12 months          "

## 2019-03-19 RX ORDER — TRAZODONE HYDROCHLORIDE 50 MG/1
TABLET, FILM COATED ORAL
Qty: 90 TABLET | Refills: 1 | Status: SHIPPED | OUTPATIENT
Start: 2019-03-19 | End: 2019-09-16

## 2019-08-19 PROBLEM — M25.561 ACUTE PAIN OF RIGHT KNEE: Status: RESOLVED | Noted: 2018-09-18 | Resolved: 2019-08-19

## 2019-08-19 NOTE — PROGRESS NOTES
Subjective:  HPI                    Objective:  System    Physical Exam    General     ROS    Assessment/Plan:    DISCHARGE REPORT    Progress reporting period is from eval date to 10/11/18.     SUBJECTIVE  Subjective: Did not do exercises this week. Feels about the same.    Current Pain level: 3/10   Initial Pain level: 8/10(at worst)   Changes in function: No changes noted in function since last SOAP note   Adverse reactions: None;   ,     Patient has failed to return to therapy so current objective findings are unknown.  The subjective and objective information are from the last SOAP note on this patient.    OBJECTIVE  Objective: No dynamic valgus during session today      ASSESSMENT/PLAN  Diagnosis 1:  R knee pain  Pain -  hot/cold therapy, manual therapy, self management, education and home program  Decreased ROM/flexibility - manual therapy, therapeutic exercise and home program  Decreased strength - therapeutic exercise, therapeutic activities and home program     STG/LTGs have been met or progress has been made towards goals:  Yes (See Goal flow sheet completed today.)  Assessment of Progress: The patient has not returned to therapy. Current status is unknown.  Self Management Plans:  Patient has been instructed in a home treatment program.  Patient  has been instructed in self management of symptoms.  Scott continues to require the following intervention to meet STG and LTG's: PT intervention is no longer required to meet STG/LTG.   The patient failed to complete scheduled/ordered appointments so current information is unknown.  We will discharge this patient from PT.    Recommendations:  This patient is ready to be discharged from therapy and continue their home treatment program.    Please refer to the daily flowsheet for treatment today, total treatment time and time spent performing 1:1 timed codes.

## 2019-09-12 ENCOUNTER — OFFICE VISIT (OUTPATIENT)
Dept: MIDWIFE SERVICES | Facility: CLINIC | Age: 34
End: 2019-09-12
Payer: COMMERCIAL

## 2019-09-12 VITALS — SYSTOLIC BLOOD PRESSURE: 129 MMHG | DIASTOLIC BLOOD PRESSURE: 83 MMHG | HEART RATE: 74 BPM

## 2019-09-12 DIAGNOSIS — Z30.432 ENCOUNTER FOR IUD REMOVAL: Primary | ICD-10-CM

## 2019-09-12 PROCEDURE — 58301 REMOVE INTRAUTERINE DEVICE: CPT | Performed by: ADVANCED PRACTICE MIDWIFE

## 2019-09-12 NOTE — PROGRESS NOTES
S:  Scott Dobbs is a 34 year old who presents today for IUD removal. She has the copper IUD. She has liked the way it worked and feels like it did not affect her menses. She is planning to have a child so wants to out to start trying for a pregnancy. She has no questions or concerns today. First blood pressure was slightly elevated today. Recheck after the visit was better. Patient reports she just came from the bus and feels a bit anxious about the visit. Has never had issues with blood pressure before.     O:  /83   Pulse 74   LMP 09/04/2019   IUD removed today. Patient in lithotomy position. Speculum used to visualize strings of IUD. Ring forceps used to grab remove IUD without complications. Patient tolerated procedure well. Minimal to no bleeding during procedure. Discussed with patient possibility of having some spotting or bleeding with the IUD removal.     A:  IUD removal    P:  (Z30.432) Encounter for IUD removal  (primary encounter diagnosis)  Plan: REMOVE INTRAUTERINE DEVICE  Start taking prenatal vitamins   Follow up PRN or with a pregnancy.     ELIN Messer CNM

## 2019-09-12 NOTE — NURSING NOTE
"Chief Complaint   Patient presents with     Consult       Initial BP (!) 131/91   Pulse 74   LMP 09/04/2019  Estimated body mass index is 31.16 kg/m  as calculated from the following:    Height as of 8/28/17: 1.753 m (5' 9\").    Weight as of 9/7/18: 95.7 kg (211 lb).  BP completed using cuff size: large    Questioned patient about current smoking habits.  Pt. currently smokes.  Advised about smoking cessation.      No obstetric history on file.    The following HM Due: NONE      The following patient reported/Care Every where data was sent to:  P ABSTRACT QUALITY INITIATIVES [70786]        N/a        Leticia Bill MA    "

## 2019-09-16 DIAGNOSIS — F51.02 ADJUSTMENT INSOMNIA: ICD-10-CM

## 2019-09-16 NOTE — TELEPHONE ENCOUNTER
"Requested Prescriptions   Pending Prescriptions Disp Refills     traZODone (DESYREL) 50 MG tablet [Pharmacy Med Name: TRAZODONE 50 MG TABLET] 90 tablet 1     Sig: TAKE 1 TABLET (50 MG) BY MOUTH NIGHTLY AS NEEDED FOR SLEEP   Last Written Prescription Date:  3/19/19  Last Fill Quantity: 90,  # refills: 1   Last office visit: 9/7/2018 with prescribing provider:     Future Office Visit:        Serotonin Modulators Failed - 9/16/2019  1:34 AM        Failed - Recent (12 mo) or future (30 days) visit within the authorizing provider's specialty     Patient had office visit in the last 12 months or has a visit in the next 30 days with authorizing provider or within the authorizing provider's specialty.  See \"Patient Info\" tab in inbasket, or \"Choose Columns\" in Meds & Orders section of the refill encounter.              Passed - Medication is active on med list        Passed - Patient is age 18 or older        Passed - No active pregnancy on record        Passed - No positive pregnancy test in past 12 months          "

## 2019-09-17 RX ORDER — TRAZODONE HYDROCHLORIDE 50 MG/1
TABLET, FILM COATED ORAL
Qty: 30 TABLET | Refills: 0 | Status: SHIPPED | OUTPATIENT
Start: 2019-09-17 | End: 2019-10-02

## 2019-09-17 NOTE — TELEPHONE ENCOUNTER
Patient due for fasting office visit- 30 days supply given.  Routing to team to schedule appointment     Patti GRAHAM RN  [facility]  [phone]

## 2019-09-29 ENCOUNTER — HEALTH MAINTENANCE LETTER (OUTPATIENT)
Age: 34
End: 2019-09-29

## 2019-10-02 ENCOUNTER — OFFICE VISIT (OUTPATIENT)
Dept: FAMILY MEDICINE | Facility: CLINIC | Age: 34
End: 2019-10-02
Payer: COMMERCIAL

## 2019-10-02 VITALS
HEART RATE: 76 BPM | DIASTOLIC BLOOD PRESSURE: 77 MMHG | TEMPERATURE: 98.5 F | HEIGHT: 69 IN | SYSTOLIC BLOOD PRESSURE: 113 MMHG | WEIGHT: 243 LBS | BODY MASS INDEX: 35.99 KG/M2

## 2019-10-02 DIAGNOSIS — Z00.00 ROUTINE GENERAL MEDICAL EXAMINATION AT A HEALTH CARE FACILITY: Primary | ICD-10-CM

## 2019-10-02 DIAGNOSIS — J30.2 SEASONAL ALLERGIC RHINITIS, UNSPECIFIED TRIGGER: ICD-10-CM

## 2019-10-02 DIAGNOSIS — F51.02 ADJUSTMENT INSOMNIA: ICD-10-CM

## 2019-10-02 LAB
CHOLEST SERPL-MCNC: 164 MG/DL
HDLC SERPL-MCNC: 64 MG/DL
HIV 1+2 AB+HIV1 P24 AG SERPL QL IA: NONREACTIVE
LDLC SERPL CALC-MCNC: 89 MG/DL
NONHDLC SERPL-MCNC: 100 MG/DL
TRIGL SERPL-MCNC: 57 MG/DL

## 2019-10-02 PROCEDURE — 80061 LIPID PANEL: CPT | Performed by: PHYSICIAN ASSISTANT

## 2019-10-02 PROCEDURE — 99395 PREV VISIT EST AGE 18-39: CPT | Performed by: PHYSICIAN ASSISTANT

## 2019-10-02 PROCEDURE — 87389 HIV-1 AG W/HIV-1&-2 AB AG IA: CPT | Performed by: PHYSICIAN ASSISTANT

## 2019-10-02 PROCEDURE — 36415 COLL VENOUS BLD VENIPUNCTURE: CPT | Performed by: PHYSICIAN ASSISTANT

## 2019-10-02 RX ORDER — PRENATAL VIT/IRON FUM/FOLIC AC 27MG-0.8MG
1 TABLET ORAL DAILY
COMMUNITY

## 2019-10-02 RX ORDER — TRAZODONE HYDROCHLORIDE 50 MG/1
TABLET, FILM COATED ORAL
Qty: 90 TABLET | Refills: 1 | Status: SHIPPED | OUTPATIENT
Start: 2019-10-02 | End: 2020-03-10

## 2019-10-02 ASSESSMENT — MIFFLIN-ST. JEOR: SCORE: 1875.58

## 2019-10-02 ASSESSMENT — ENCOUNTER SYMPTOMS
FREQUENCY: 0
SORE THROAT: 0
MYALGIAS: 1
EYE PAIN: 0
FEVER: 0
DYSURIA: 0
NERVOUS/ANXIOUS: 1
NAUSEA: 0
CHILLS: 0
HEMATURIA: 0
COUGH: 0
DIZZINESS: 0
DIARRHEA: 0
JOINT SWELLING: 0
ARTHRALGIAS: 0
HEARTBURN: 0
HEADACHES: 0
PARESTHESIAS: 0
ABDOMINAL PAIN: 0
PALPITATIONS: 0
BREAST MASS: 0
WEAKNESS: 0
HEMATOCHEZIA: 0
SHORTNESS OF BREATH: 0
CONSTIPATION: 0

## 2019-10-02 NOTE — PROGRESS NOTES
SUBJECTIVE:   CC: Scott Dobbs is an 34 year old woman who presents for preventive health visit.     Healthy Habits:     Getting at least 3 servings of Calcium per day:  Yes    Bi-annual eye exam:  Yes    Dental care twice a year:  Yes    Sleep apnea or symptoms of sleep apnea:  None    Diet:  Regular (no restrictions)    Frequency of exercise:  6-7 days/week    Duration of exercise:  15-30 minutes    Taking medications regularly:  Yes    Medication side effects:  None    PHQ-2 Total Score: 1    Additional concerns today:  Yes        Lab work -fasting and allergy testing discussion   Gets seasonal allergies.  Using over the counter medications and it helps but sometimes not enough.        Today's PHQ-2 Score:   PHQ-2 ( 1999 Pfizer) 10/2/2019   Q1: Little interest or pleasure in doing things 0   Q2: Feeling down, depressed or hopeless 0   PHQ-2 Score 0   Q1: Little interest or pleasure in doing things Several days   Q2: Feeling down, depressed or hopeless Not at all   PHQ-2 Score 1       Abuse: Current or Past(Physical, Sexual or Emotional)- No  Do you feel safe in your environment? Yes    Social History     Tobacco Use     Smoking status: Current Some Day Smoker     Packs/day: 0.00     Smokeless tobacco: Never Used   Substance Use Topics     Alcohol use: Yes     If you drink alcohol do you typically have >3 drinks per day or >7 drinks per week? No    Alcohol Use 10/2/2019   Prescreen: >3 drinks/day or >7 drinks/week? Yes   Prescreen: >3 drinks/day or >7 drinks/week? -   AUDIT SCORE  5       Reviewed orders with patient.  Reviewed health maintenance and updated orders accordingly - Yes  Labs reviewed in EPIC    Mammogram not appropriate for this patient based on age.    Pertinent mammograms are reviewed under the imaging tab.  History of abnormal Pap smear: NO - age 30-65 PAP every 5 years with negative HPV co-testing recommended  PAP / HPV Latest Ref Rng & Units 3/19/2018   PAP - NIL   HPV 16 DNA  "NEG:Negative Negative   HPV 18 DNA NEG:Negative Negative   OTHER HR HPV NEG:Negative Negative     Reviewed and updated as needed this visit by clinical staff  Tobacco  Allergies  Meds  Med Hx  Surg Hx  Fam Hx  Soc Hx        Reviewed and updated as needed this visit by Provider  Allergies  Meds            Review of Systems   Constitutional: Negative for chills and fever.   HENT: Positive for congestion. Negative for ear pain, hearing loss and sore throat.    Eyes: Negative for pain and visual disturbance.   Respiratory: Negative for cough and shortness of breath.    Cardiovascular: Negative for chest pain, palpitations and peripheral edema.   Gastrointestinal: Negative for abdominal pain, constipation, diarrhea, heartburn, hematochezia and nausea.   Breasts:  Negative for tenderness, breast mass and discharge.   Genitourinary: Negative for dysuria, frequency, genital sores, hematuria, pelvic pain, urgency, vaginal bleeding and vaginal discharge.   Musculoskeletal: Positive for myalgias (from working out ). Negative for arthralgias and joint swelling.   Skin: Negative for rash.   Neurological: Negative for dizziness, weakness, headaches and paresthesias.   Psychiatric/Behavioral: Negative for mood changes. The patient is nervous/anxious (ongoing ).           OBJECTIVE:   /77   Pulse 76   Temp 98.5  F (36.9  C) (Oral)   Ht 1.759 m (5' 9.25\")   Wt 110.2 kg (243 lb)   LMP 09/04/2019   BMI 35.63 kg/m    Physical Exam  Constitutional:       Appearance: She is well-developed.   HENT:      Right Ear: External ear normal.      Left Ear: External ear normal.      Mouth/Throat:      Pharynx: No oropharyngeal exudate.   Eyes:      Pupils: Pupils are equal, round, and reactive to light.   Neck:      Thyroid: No thyromegaly.   Cardiovascular:      Rate and Rhythm: Normal rate and regular rhythm.      Heart sounds: Normal heart sounds.   Pulmonary:      Effort: Pulmonary effort is normal.      Breath sounds: " "Normal breath sounds.   Chest:      Breasts:         Right: No mass, nipple discharge or skin change.         Left: No mass, nipple discharge or skin change.   Abdominal:      General: Bowel sounds are normal.      Palpations: Abdomen is soft.      Tenderness: There is no tenderness.   Lymphadenopathy:      Cervical: No cervical adenopathy.   Skin:     General: Skin is warm and dry.      Findings: No rash.   Neurological:      Mental Status: She is alert and oriented to person, place, and time.   Psychiatric:         Behavior: Behavior normal.           Diagnostic Test Results:  Labs reviewed in Epic    ASSESSMENT/PLAN:   1. Routine general medical examination at a health care facility  Follow up as needed  - Lipid panel reflex to direct LDL Fasting  - HIV Antigen Antibody Combo    2. Seasonal allergic rhinitis, unspecified trigger    - ALLERGY/ASTHMA ADULT REFERRAL    3. Adjustment insomnia  Ok to continue trazodone for now if needed.  She is trying to get pregnant so should try to wean off but ok to continue if not sleeping due to stopping the medication.    - traZODone (DESYREL) 50 MG tablet; TAKE 1 TABLET (50 MG) BY MOUTH NIGHTLY AS NEEDED FOR SLEEP  Dispense: 90 tablet; Refill: 1    COUNSELING:  Reviewed preventive health counseling, as reflected in patient instructions       Regular exercise       Healthy diet/nutrition       Family planning    Estimated body mass index is 35.63 kg/m  as calculated from the following:    Height as of this encounter: 1.759 m (5' 9.25\").    Weight as of this encounter: 110.2 kg (243 lb).         reports that she has been smoking. She has been smoking about 0.00 packs per day. She has never used smokeless tobacco.  Tobacco Cessation Action Plan: Information offered: Patient not interested at this time    Counseling Resources:  ATP IV Guidelines  Pooled Cohorts Equation Calculator  Breast Cancer Risk Calculator  FRAX Risk Assessment  ICSI Preventive Guidelines  Dietary Guidelines " for Americans, 2010  USDA's MyPlate  ASA Prophylaxis  Lung CA Screening    Anushka Canales PA-C  [facility]

## 2020-03-10 ENCOUNTER — PRENATAL OFFICE VISIT (OUTPATIENT)
Dept: NURSING | Facility: CLINIC | Age: 35
End: 2020-03-10
Payer: COMMERCIAL

## 2020-03-10 VITALS
HEART RATE: 78 BPM | DIASTOLIC BLOOD PRESSURE: 78 MMHG | SYSTOLIC BLOOD PRESSURE: 128 MMHG | BODY MASS INDEX: 39.1 KG/M2 | HEIGHT: 69 IN | WEIGHT: 264 LBS

## 2020-03-10 DIAGNOSIS — Z34.00 SUPERVISION OF NORMAL FIRST PREGNANCY: Primary | ICD-10-CM

## 2020-03-10 DIAGNOSIS — Z23 NEED FOR TDAP VACCINATION: ICD-10-CM

## 2020-03-10 LAB
ABO + RH BLD: NORMAL
ABO + RH BLD: NORMAL
ALBUMIN UR-MCNC: NEGATIVE MG/DL
APPEARANCE UR: CLEAR
BILIRUB UR QL STRIP: NEGATIVE
BLD GP AB SCN SERPL QL: NORMAL
BLOOD BANK CMNT PATIENT-IMP: NORMAL
COLOR UR AUTO: YELLOW
ERYTHROCYTE [DISTWIDTH] IN BLOOD BY AUTOMATED COUNT: 12.1 % (ref 10–15)
GLUCOSE UR STRIP-MCNC: NEGATIVE MG/DL
HCT VFR BLD AUTO: 42 % (ref 35–47)
HGB BLD-MCNC: 14.2 G/DL (ref 11.7–15.7)
HGB UR QL STRIP: ABNORMAL
KETONES UR STRIP-MCNC: NEGATIVE MG/DL
LEUKOCYTE ESTERASE UR QL STRIP: ABNORMAL
MCH RBC QN AUTO: 29.6 PG (ref 26.5–33)
MCHC RBC AUTO-ENTMCNC: 33.8 G/DL (ref 31.5–36.5)
MCV RBC AUTO: 88 FL (ref 78–100)
NITRATE UR QL: NEGATIVE
PH UR STRIP: 6.5 PH (ref 5–7)
PLATELET # BLD AUTO: 286 10E9/L (ref 150–450)
RBC # BLD AUTO: 4.79 10E12/L (ref 3.8–5.2)
SOURCE: ABNORMAL
SP GR UR STRIP: <=1.005 (ref 1–1.03)
SPECIMEN EXP DATE BLD: NORMAL
UROBILINOGEN UR STRIP-ACNC: 0.2 EU/DL (ref 0.2–1)
WBC # BLD AUTO: 8.6 10E9/L (ref 4–11)

## 2020-03-10 PROCEDURE — 87340 HEPATITIS B SURFACE AG IA: CPT | Performed by: OBSTETRICS & GYNECOLOGY

## 2020-03-10 PROCEDURE — 86850 RBC ANTIBODY SCREEN: CPT | Performed by: OBSTETRICS & GYNECOLOGY

## 2020-03-10 PROCEDURE — 87389 HIV-1 AG W/HIV-1&-2 AB AG IA: CPT | Performed by: OBSTETRICS & GYNECOLOGY

## 2020-03-10 PROCEDURE — 86900 BLOOD TYPING SEROLOGIC ABO: CPT | Performed by: OBSTETRICS & GYNECOLOGY

## 2020-03-10 PROCEDURE — 81003 URINALYSIS AUTO W/O SCOPE: CPT | Performed by: OBSTETRICS & GYNECOLOGY

## 2020-03-10 PROCEDURE — 85027 COMPLETE CBC AUTOMATED: CPT | Performed by: OBSTETRICS & GYNECOLOGY

## 2020-03-10 PROCEDURE — 86901 BLOOD TYPING SEROLOGIC RH(D): CPT | Performed by: OBSTETRICS & GYNECOLOGY

## 2020-03-10 PROCEDURE — 86780 TREPONEMA PALLIDUM: CPT | Performed by: OBSTETRICS & GYNECOLOGY

## 2020-03-10 PROCEDURE — 87086 URINE CULTURE/COLONY COUNT: CPT | Performed by: OBSTETRICS & GYNECOLOGY

## 2020-03-10 PROCEDURE — 86762 RUBELLA ANTIBODY: CPT | Performed by: OBSTETRICS & GYNECOLOGY

## 2020-03-10 PROCEDURE — 99207 ZZC NO CHARGE NURSE ONLY: CPT

## 2020-03-10 PROCEDURE — 84443 ASSAY THYROID STIM HORMONE: CPT | Performed by: OBSTETRICS & GYNECOLOGY

## 2020-03-10 PROCEDURE — 36415 COLL VENOUS BLD VENIPUNCTURE: CPT | Performed by: OBSTETRICS & GYNECOLOGY

## 2020-03-10 RX ORDER — TRAZODONE HYDROCHLORIDE 50 MG/1
50 TABLET, FILM COATED ORAL AT BEDTIME
COMMUNITY
End: 2020-04-06

## 2020-03-10 SDOH — HEALTH STABILITY: MENTAL HEALTH
STRESS IS WHEN SOMEONE FEELS TENSE, NERVOUS, ANXIOUS, OR CAN'T SLEEP AT NIGHT BECAUSE THEIR MIND IS TROUBLED. HOW STRESSED ARE YOU?: NOT AT ALL

## 2020-03-10 SDOH — SOCIAL STABILITY: SOCIAL INSECURITY: WITHIN THE LAST YEAR, HAVE YOU BEEN AFRAID OF YOUR PARTNER OR EX-PARTNER?: NO

## 2020-03-10 SDOH — SOCIAL STABILITY: SOCIAL INSECURITY
WITHIN THE LAST YEAR, HAVE TO BEEN RAPED OR FORCED TO HAVE ANY KIND OF SEXUAL ACTIVITY BY YOUR PARTNER OR EX-PARTNER?: NO

## 2020-03-10 SDOH — SOCIAL STABILITY: SOCIAL INSECURITY: WITHIN THE LAST YEAR, HAVE YOU BEEN HUMILIATED OR EMOTIONALLY ABUSED IN OTHER WAYS BY YOUR PARTNER OR EX-PARTNER?: NO

## 2020-03-10 SDOH — SOCIAL STABILITY: SOCIAL INSECURITY
WITHIN THE LAST YEAR, HAVE YOU BEEN KICKED, HIT, SLAPPED, OR OTHERWISE PHYSICALLY HURT BY YOUR PARTNER OR EX-PARTNER?: NO

## 2020-03-10 ASSESSMENT — MIFFLIN-ST. JEOR: SCORE: 1966.88

## 2020-03-10 NOTE — PROGRESS NOTES
Important Information for Provider:     Patient presents for new ob teaching and labs, first pregnancy. Discussed first trimester screening. Handouts reviewed and given. IUD(glenys) was removed in 9/2019. Patient states her LMP very light. Ultrasound scheduled for 3/24/2020 with Dr Lima to review. NOB is 4/09/2020    Caffeine intake/servings daily - 0  Calcium intake/servings daily - 3  Exercise 5 times weekly - describe ; cardio, precautions given  Sunscreen used - Yes  Seatbelts used - Yes  Guns stored in the home - Yes  Self Breast Exam - Yes  Pap test up to date -  Yes  Eye exam up to date -  Yes  Dental exam up to date -  Yes  Immunizations reviewed and up to date - Yes  Abuse: Current or Past (Physical, Sexual or Emotional) - No  Do you feel safe in your environment - Yes  Do you cope well with stress - Yes  Do you suffer from insomnia - Yes      Prenatal OB Questionnaire  Patient supplied answers from flow sheet for:  Prenatal OB Questionnaire.  Past Medical History  Diabetes?: No  Hypertension : No  Heart disease, mitral valve prolapse or rheumatic fever?: No  An autoimmune disease such as lupus or rheumatoid arthritis?: No  Kidney disease or urinary tract infection?: UTI's in the past  Epilepsy, seizures or spells?: No  Migraine headaches?: Yes  A stroke or loss of function or sensation?: No  Any other neurological problems?: No  Have you ever been treated for depression?: (!) Yes anxiety  Are you having problems with crying spells or loss of self-esteem?: No  Have you ever required psychiatric care?: No  Have you ever had hepatitis, liver disease or jaundice?: No  Have you been treated for blood clots in your veins, deep vein thromosis, inflammation in the veins, thrombosis, phlebitis, pulmonary embolism or varicosities?: No  Have you had excessive bleeding after surgery or dental work?: No  Do you bleed more than other women after a cut or scratch?: No  Do you have a history of anemia?: No  Have you  ever had thyroid problems or taken thyroid medication?: No   Do you have any endocrine problems?: No  Have you ever been in a major accident or suffered serious trauma?: No  Within the last year, has anyone hit, slapped, kicked or otherwise hurt you?: No  In the last year, has anyone forced you to have sex when you didn't want to?: No    Past Medical History 2   Have you ever received a blood transfusion?: No  Would you refuse a blood transfusion if a doctor judged it to be medically necessary?: No   If you answered Yes, would you rather die than receive a blood transfusion?: No  If you answered Yes, is this for Anglican reasons?: No  Does anyone in your home smoke?: No  Do you use tobacco products?: No  Do you drink beer, wine or hard liquor?: No  Do you use any of the following: marijuana, speed, cocaine, heroin, hallucinogens or other drugs?: No   Is your blood type Rh negative?: No  Have you ever had abnormal antibodies in your blood?: No  Have you ever had asthma?: Yes  Have you ever had tuberculosis?: No  Do you have any allergies to drugs or over-the-counter medications?: No  Allergies: Dust Mites, Aspartame, Ethanol, Venlafaxine, Hydrochloride, Sertraline: No  Have you had any breast problems?: No  Have you ever ?: No  Have you had any gynecological surgical procedures such as cervical conization, a LEEP procedure, laser treatment, cryosurgery of the cervix or a dilation and curettage, etc?: No  Have you ever had any other surgical procedures?: No  Have you been hospitalized for a nonsurgical reason excluding normal delivery?: No  Have you ever had any anesthetic complications?: No  Have you ever had an abnormal pap smear?: No    Past Medical History (Continued)  Do you have a history of abnormalities of the uterus?: No  Did your mother take CASSIE or any other hormones when she was pregnant with you?: No  Did it take you more than a year to become pregnant?: No  Have you ever been evaluated or  treated for infertility?: No  Is there a history of medical problems in your family, which you feel may be important to this pregnancy?: No  Do you have any other problems we have not asked about which you feel may be important to this pregnancy?: No    Symptoms since last menstrual period  Do you have any of the following symptoms: abdominal pain, blood in stools or urine, chest pain, shortness of breath, coughing or vomiting up blood, your heart racing or skipping beats, nausea and vomiting, pain on urination or vaginal discharge or bleed: (!) Yes  Will the patient be 35 years old or older at the time of delivery?: No    Has the patient, baby's father or anyone in either family had:  Thalassemia (Italian, Greek, Mediterranean or  background only) and an MCV result less than 80?: No  Neural tube defect such as meningomyelocele, spina bifida or anencephaly?: No  Congenital heart defect?: No  Down's Syndrome?: No  Gene-Sachs disease (Synagogue, Cajun, Nepali-Pacific)?: No  Sickle cell disease or trait ()?: No  Hemophilia or other inherited problems of blood?: No  Muscular dystrophy?: No  Cystic fibrosis?: No  Houston's chorea?: No  Mental retardation/autism?: No  If yes, was the person tested for fragile X?: No  Any other inherited genetic or chromosomal disorder?: No  Maternal metabolic disorder (e.g Insulin-dependent diabetes, PKU)?: No  A child with birth defects not listed above?: No  Recurrent pregnancy loss or stillbirth?: No   Has the patient had any medications/street drugs/alcohol since her last menstrual period?: No  Does the patient or baby's father have any other genetic risks?: No    Infection History   Do you object to being tested for Hepatitis B?: No  Do you object to being tested for HIV?: No   Do you feel that you are at high risk for coming in contact with the AIDS virus?: No  Have you ever been treated for tuberculosis?: No  Have you ever had a positive skin test for tuberculosis?:  No  Do you live with someone who has tuberculosis?: No  Have you ever been exposed to tuberculosis?: No  Do you have genital herpes?: No  Does your partner have genital herpes?: No  Have you had a viral illness since your last period?: No  Have you ever had gonorrhea, chlamydia, syphilis, venereal warts, trichomoniasis, pelvic inflammatory disease or any other sexually transmitted disease?: No  Do you know if you are a genital group B streptococcus carrier?: No  Have you had chicken pox/varicella?: (!) Yes   Have you been vaccinated against chicken Pox?: No  Have you had any other infectious diseases?: No      Allergies as of 3/10/2020:    Allergies as of 03/10/2020     (No Known Allergies)       Current medications are:  Current Outpatient Medications   Medication Sig Dispense Refill     Prenatal Vit-Fe Fumarate-FA (PRENATAL MULTIVITAMIN W/IRON) 27-0.8 MG tablet Take 1 tablet by mouth daily           Early ultrasound screening tool:    Does patient have irregular periods?  No  Did patient use hormonal birth control in the three months prior to positive urine pregnancy test? No  Is the patient breastfeeding?  No  Is the patient 10 weeks or greater at time of education visit?  No

## 2020-03-11 LAB
BACTERIA SPEC CULT: NORMAL
HBV SURFACE AG SERPL QL IA: NONREACTIVE
HIV 1+2 AB+HIV1 P24 AG SERPL QL IA: NONREACTIVE
RUBV IGG SERPL IA-ACNC: 30 IU/ML
SPECIMEN SOURCE: NORMAL
T PALLIDUM AB SER QL: NONREACTIVE
TSH SERPL DL<=0.005 MIU/L-ACNC: 2.33 MU/L (ref 0.4–4)

## 2020-03-23 ENCOUNTER — TELEPHONE (OUTPATIENT)
Dept: OBGYN | Facility: CLINIC | Age: 35
End: 2020-03-23

## 2020-03-23 NOTE — TELEPHONE ENCOUNTER
Patient called back and stated that she does not want to wait 2 more weeks for her viability ultrasound - feels that it would create too much anxiety between now and then. Adamant that it stays scheduled as is for tomorrow 3/24. Are you okay for her to still come to this appt or just do US and then call patient with results?   Alaina Bermudez RN

## 2020-03-30 ENCOUNTER — OFFICE VISIT (OUTPATIENT)
Dept: OBGYN | Facility: CLINIC | Age: 35
End: 2020-03-30
Attending: ADVANCED PRACTICE MIDWIFE
Payer: COMMERCIAL

## 2020-03-30 ENCOUNTER — ANCILLARY PROCEDURE (OUTPATIENT)
Dept: ULTRASOUND IMAGING | Facility: CLINIC | Age: 35
End: 2020-03-30
Attending: ADVANCED PRACTICE MIDWIFE
Payer: COMMERCIAL

## 2020-03-30 VITALS
HEART RATE: 88 BPM | BODY MASS INDEX: 39.37 KG/M2 | WEIGHT: 266.6 LBS | TEMPERATURE: 98 F | SYSTOLIC BLOOD PRESSURE: 132 MMHG | DIASTOLIC BLOOD PRESSURE: 82 MMHG

## 2020-03-30 DIAGNOSIS — Z34.01 ENCOUNTER FOR SUPERVISION OF NORMAL FIRST PREGNANCY IN FIRST TRIMESTER: Primary | ICD-10-CM

## 2020-03-30 DIAGNOSIS — Z34.00 SUPERVISION OF NORMAL FIRST PREGNANCY: ICD-10-CM

## 2020-03-30 PROCEDURE — 99207 ZZC FIRST OB VISIT: CPT | Performed by: OBSTETRICS & GYNECOLOGY

## 2020-03-30 PROCEDURE — 76815 OB US LIMITED FETUS(S): CPT | Performed by: OBSTETRICS & GYNECOLOGY

## 2020-03-30 RX ORDER — FLUTICASONE PROPIONATE 50 MCG
1 SPRAY, SUSPENSION (ML) NASAL DAILY
COMMUNITY

## 2020-03-30 NOTE — PROGRESS NOTES
CC: New Ob visit  HPI: Scott Dobbs is a 34 year old  here for new Ob visit.  Patient's last menstrual period was 2020..  She is 9w5d by LMP, giving her an EDC of 10/28/20.  The pregnancy was planned and she and her  are feeling excited.    This far the pregnancy has been uneventful other than mild nausea.  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    Past Medical History:   Diagnosis Date     Anxiety      Obesity (BMI 30-39.9)        Past Surgical History:   Procedure Laterality Date     PE TUBES       OB History    Para Term  AB Living   1 0 0 0 0 0   SAB TAB Ectopic Multiple Live Births   0 0 0 0 0      # Outcome Date GA Lbr Lele/2nd Weight Sex Delivery Anes PTL Lv   1 Current                    Current Outpatient Medications:      Fexofenadine HCl (ALLEGRA PO), , Disp: , Rfl:      fluticasone (FLONASE) 50 MCG/ACT nasal spray, Spray 1 spray into both nostrils daily, Disp: , Rfl:      Prenatal Vit-Fe Fumarate-FA (PRENATAL MULTIVITAMIN W/IRON) 27-0.8 MG tablet, Take 1 tablet by mouth daily, Disp: , Rfl:      traZODone (DESYREL) 50 MG tablet, Take 50 mg by mouth At Bedtime, Disp: , Rfl:     No Known Allergies    Family History   Problem Relation Age of Onset     Thyroid Disease Mother      Breast Cancer Paternal Grandmother         stage 1 early 50's.        Past medical, social, surgical and family history were reviewed and updated in EPIC.    ROS: No TIA's or unusual headaches, no dysphagia.  No prolonged cough. No dyspnea or chest pain on exertion.  No abdominal pain, change in bowel habits, black or bloody stools.  No urinary tract symptoms.  No new or unusual musculoskeletal symptoms.  Normal menses, no abnormal vaginal bleeding, discharge or unexpected pelvic pain. No new breast lumps, breast pain or nipple discharge.    PE: BP (!) 145/96   Pulse 88   Temp 98  F (36.7  C) (Oral)   Wt 120.9 kg (266 lb 9.6 oz)    LMP 01/27/2020   BMI 39.37 kg/m      Gen: Healthy appearing female in no acute distress  Heart: RRR  Lungs: CTAB  Abd: +BS, SNT  Ex: No C/C/E, no suspicious rashes or lesions    Us: single live IUP, CRL 10w 3d,  bpm    A/P:  1) IUP at 9w5d         PNL wnl, pap UTD        Reviewed anticipated course of prenatal care        Reviewed recommendations for weight gain, activity and diet        We discussed options for genetic screening and diagnosis including CVS/amnio, first trimester screen and quad screen.  We discussed a fetal survey will be performed around 18-20 weeks. She desires genetic screening, will order.        Discussed MD call schedule as well as role of residents and med students both in clinic and hospital.  She is ok with resident care          We discussed COVID risk in pregnancy and plans for altered prenatal care as appropriate.  We discussed continued work from home if feasible and letter can be given if needed.  Discussed new MD schedules and hospital rotation. We discussed current hospital visitor policy.         Will order FTS, plan for phone visit here in 5 weeks, then in office visit to coordinate with anatomy scan. Will order level 2 given obesity, but explained they may recommend doing survey here to limit multiple site visits.  Questions answered.       RTC 4 weeks    Alexa Pedroza MD

## 2020-03-31 ENCOUNTER — TRANSCRIBE ORDERS (OUTPATIENT)
Dept: MATERNAL FETAL MEDICINE | Facility: CLINIC | Age: 35
End: 2020-03-31

## 2020-03-31 DIAGNOSIS — O26.90 PREGNANCY RELATED CONDITION, ANTEPARTUM: Primary | ICD-10-CM

## 2020-04-02 ENCOUNTER — TELEPHONE (OUTPATIENT)
Dept: FAMILY MEDICINE | Facility: CLINIC | Age: 35
End: 2020-04-02

## 2020-04-02 NOTE — TELEPHONE ENCOUNTER
Reason for Call:  Other     Detailed comments: please call the patient to make an appointment for a med check was unable to make apt due to Epic messing up     Phone Number Patient can be reached at: Home number on file [phone] (home)    Best Time: any    Can we leave a detailed message on this number? YES    Call taken on 4/2/2020 at 1:23 PM by Beatriz Lima

## 2020-04-02 NOTE — TELEPHONE ENCOUNTER
Called and talked to pt. I was unable to schedule pt for telephone visit with Anushka Canales PA-C for a f/u on her Trazodone medication. I told pt that someone will be calling her back to schedule an appointment.       CHRIS Salter MA

## 2020-04-06 ENCOUNTER — VIRTUAL VISIT (OUTPATIENT)
Dept: FAMILY MEDICINE | Facility: CLINIC | Age: 35
End: 2020-04-06
Payer: COMMERCIAL

## 2020-04-06 DIAGNOSIS — F51.02 ADJUSTMENT INSOMNIA: Primary | ICD-10-CM

## 2020-04-06 PROCEDURE — 99213 OFFICE O/P EST LOW 20 MIN: CPT | Mod: 95 | Performed by: PHYSICIAN ASSISTANT

## 2020-04-06 NOTE — PROGRESS NOTES
"Subjective     Scott Dobbs is a 34 year old female who is being evaluated via a billable telephone visit.      The patient has been notified of following:     \"This telephone visit will be conducted via a call between you and your physician/provider. We have found that certain health care needs can be provided without the need for a physical exam.  This service lets us provide the care you need with a short phone conversation.  If a prescription is necessary we can send it directly to your pharmacy.  If lab work is needed we can place an order for that and you can then stop by our lab to have the test done at a later time.    If during the course of the call the physician/provider feels a telephone visit is not appropriate, you will not be charged for this service.\"     Patient has given verbal consent for Telephone visit?  Yes    Scott Dobbs complains of   Chief Complaint   Patient presents with     Recheck Medication     Trazodone        ALLERGIES  Patient has no known allergies.    Medication Followup of Trazodone -patient is pregnant and wants to stop taking     Taking Medication as prescribed: yes    Side Effects:  None    Medication Helping Symptoms:  Yes    Sleeping ok but waking up earlier than expected.  Is still taking the medications.  Is falling asleep ok.  Before that was the trouble.  Is going to be about 9:30-10pm.    OB wants her off it.  Morning nausea has improved.  Thinking about using unisom with b6.      Anxiety level has gone up but feeling she is managing it ok.                Patient Active Problem List   Diagnosis     Adjustment insomnia     Overweight (BMI 25.0-29.9)     Situational anxiety     Situational mixed anxiety and depressive disorder     Need for Tdap vaccination     Past Surgical History:   Procedure Laterality Date     PE TUBES         Social History     Tobacco Use     Smoking status: Former Smoker     Packs/day: 0.00     Smokeless tobacco: Never Used   Substance " Use Topics     Alcohol use: Not Currently     Family History   Problem Relation Age of Onset     Thyroid Disease Mother      Breast Cancer Paternal Grandmother         stage 1 early 50's.            Reviewed and updated as needed this visit by Provider         Review of Systems   As above       Objective   Reported vitals:  LMP 01/22/2020    healthy, alert and no distress  Psych: Alert and oriented times 3; coherent speech, normal   rate and volume, able to articulate logical thoughts, able   to abstract reason, no tangential thoughts, no hallucinations   or delusions  Her affect is bright     Diagnostic Test Results:  none         Assessment/Plan:  1. Adjustment insomnia  Ok to stop trazodone.  No need to taper.  Can try Unisom if she needs.  Reach out if she has more trouble with anxiety or worsening insomnia.        No follow-ups on file.      Phone call duration:  9:12-9:21  9 minutes    Anushka Canales PA-C

## 2020-04-08 ENCOUNTER — VIRTUAL VISIT (OUTPATIENT)
Dept: MATERNAL FETAL MEDICINE | Facility: CLINIC | Age: 35
End: 2020-04-08
Attending: OBSTETRICS & GYNECOLOGY
Payer: COMMERCIAL

## 2020-04-08 DIAGNOSIS — Z36.9 ENCOUNTER FOR ANTENATAL SCREENING: Primary | ICD-10-CM

## 2020-04-08 DIAGNOSIS — O26.90 PREGNANCY RELATED CONDITION, ANTEPARTUM: ICD-10-CM

## 2020-04-08 DIAGNOSIS — Z31.430 ENCOUNTER OF FEMALE FOR TESTING FOR GENETIC DISEASE CARRIER STATUS FOR PROCREATIVE MANAGEMENT: ICD-10-CM

## 2020-04-08 PROCEDURE — 40000072 ZZH STATISTIC GENETIC COUNSELING, < 16 MIN: Mod: TEL,ZF | Performed by: GENETIC COUNSELOR, MS

## 2020-04-08 NOTE — PROGRESS NOTES
[facility]  Genetic Counseling Consult    Patient: Scott Dobbs YOB: 1985   Date of Service: 4/08/20      Scott Dobbs was evaluated via a billable telephone visit at [facility] for genetic consultation for the indication of discuss the options for routine screening for fetal chromosome abnormalities. Scott's partner, Santiago was present on the phone for the entire visit.     The patient has been notified of the following:    This telephone visit will be conducted via a call between you and your physician/provider. We have found that certain health care needs can be provided without the need for a physical exam. This service lets us provide the care you need with a short phone conversation. If a prescription is necessary we can send it directly to your pharmacy. If lab work is needed we can place an order for that and you can then stop by our lab to have the test done at a later time.     If during the course of the call the provider feels a telephone visit is not appropriate, you will not be charged for this service.       Impression/Plan:   1.  Scott had a genetic counseling consultation today. She has elected to pursue NIPT (Innatal test through [facility]).  Results are expected within 7-10 days from draw, and will be available in [employer].  We will contact her to discuss the results, and a copy will be forwarded to the office of the referring OB provider.    2. Maternal serum AFP (single marker screen) is recommended after 15 weeks to screen for open neural tube defects. A quad screen should not be performed.    3. Scott and her partner, Santiago have elected to pursue expanded carrier screening (Foresight through [facility]). Results are expected within 7-14 days from draw, and will be available in [employer].  We will contact the couple to discuss the results, and a copy will be forwarded to the office  of the referring OB provider.    Pregnancy History:   /Parity:    Age at Delivery: 35 year old  CHRIS: 10/28/2020, by Last Menstrual Period  Gestational Age: 11w0d    No significant complications were reported in the current pregnancy.    Scott stated she has recently discontinued trazodone at the instruction of her primary OB.     Medical History:   Scott s reported medical history is not expected to impact pregnancy management or risks to fetal development.       Family History:   A three-generation pedigree was obtained, and is scanned under the  Media  tab.   The following significant findings were reported by Scott:    Scott's partner, Santiago is 42 and healthy.   It was reported that Scott's father was found to have head and neck cancer. Scott's paternal grandfather passed away in his 80's and had a history of what Scott believes was prostate cancer. Scott's paternal grandmother was reported to have a history of breast cancer. We discussed how most cancer seen in families occurs sporadically, but about 5-10% may be due to an underlying genetic etiology. Scott was encouraged to share this family history information with her primary care providers to ensure appropriate screening. She was also made aware of the [facility]'s cancer risk management clinic if they or their family members are interested in more information.  Scott's maternal first cousin was reported to have Autism. He does not have other health concerns and was described to look similar to other family members. Autism is a spectrum of developmental disorders characterized by impaired social interaction, problems with verbal and nonverbal communication, and unusual, repetitive, or severely limited activities and interests.  Autism is a heterogeneous disorder, including genetic and non-genetic causes. In a small percentage of individuals with ASD, it is a feature of a certain genetic condition such as Down syndrome,  fragile X syndrome, or Rett syndrome. However, in most isolated cases the cause may be multifactorial, meaning a combination of genetic and environmental factors. Carrier screening for fragile X syndrome is included in expanded carrier screening which Scott has elected to pursue.   It was reported that Santiago's mother was found to have Alzheimer disease first diagnosed in her 50's and passed away at age 67. Santiago's maternal uncle and maternal grandparents were reported to also have Alzheimer disease at an earlier age. We discussed that early Alzheimer disease can be passed through families in an autosomal dominant fashion. Santiago does not believe anyone is his family has undergone genetic testing. Santiago and Scott shared that Santiago has considered the option of genetic testing, however at this time is unsure of if or when he may want to pursue this. We reviewed that there are many important considerations when deciding to pursue predictive testing. He was made aware of our neurology clinic where we have a genetic counselor who specializes in this setting. He was encouraged to reach out if he is interested in additional information regarding this clinic.     Otherwise, the reported family history is negative for multiple miscarriages, stillbirths, birth defects, intellectual disability, known genetic conditions, and consanguinity.       Carrier Screening:   The patient reports that she and the father of the pregnancy have  ancestry:      Cystic fibrosis is an autosomal recessive genetic condition that occurs with increased frequency in individuals of  ancestry and carrier screening for this condition is available.  In addition,  screening in the [facility] includes cystic fibrosis.      Expanded carrier screening for mutations in a large panel of genes associated with autosomal recessive conditions including cystic fibrosis, spinal muscular atrophy, and others, is now  bladimir      Scott and her partner, Santiago have elected to pursue expanded carrier screening (Foresight through [facility]). Results are expected within 7-14 days from draw, and will be available in [place].  We will contact the couple to discuss the results, and a copy will be forwarded to the office of the referring OB provider. Sample ID: [id number]. We discussed that if the couple is both found to be carriers of the same condition, diagnostic testing would be necessary to determine if the pregnancy has the condition.        Risk Assessment for Chromosome Conditions:   We explained that the risk for fetal chromosome abnormalities increases with maternal age. We discussed specific features of common chromosome abnormalities, including Down syndrome, trisomy 13, trisomy 18, and sex chromosome trisomies.      - At age 35 at midtrimester, the risk to have a baby with Down syndrome is 1 in 342.     - At age 35 at midtrimester, the risk to have a baby with any chromosome abnormality is 1 in  172.     Testing Options:   We discussed the following options:   Non-invasive Prenatal Testing (NIPT)    Maternal plasma cell-free DNA testing; first trimester ultrasound with nuchal translucency and nasal bone assessment is recommended, when appropriate    Screens for fetal trisomy 21, trisomy 13, trisomy 18, and sex chromosome aneuploidy    Cannot screen for open neural tube defects; maternal serum AFP after 15 weeks is recommended     Chorionic villus sampling (CVS)    Invasive procedure typically performed in the first trimester by which placental villi are obtained for the purpose of chromosome analysis and/or other prenatal genetic analysis    Diagnostic results; >99% sensitivity for fetal chromosome abnormalities    Cannot test for open neural tube defects; maternal serum AFP after 15 weeks is recommended     Quad screen:     Maternal plasma AFP, hCG, estriol, and inhibin measurement between 15-24 weeks  gestation    Provides risk assessment for fetal Down syndrome, trisomy 18, and neural tube defects     Genetic Amniocentesis    Invasive procedure typically performed in the second trimester by which amniotic fluid is obtained for the purpose of chromosome analysis and/or other prenatal genetic analysis    Diagnostic results; >99% sensitivity for fetal chromosome abnormalities    AFAFP measurement tests for open neural tube defects     Comprehensive (Level II) ultrasound: Detailed ultrasound performed between 18-22 weeks gestation to screen for major birth defects and markers for aneuploidy.      We reviewed the benefits and limitations of this testing.  Screening tests provide a risk assessment specific to the pregnancy for certain fetal chromosome abnormalities, but cannot definitively diagnose or exclude a fetal chromosome abnormality.  Follow-up genetic counseling and consideration of diagnostic testing is recommended with any abnormal screening result.      It was a pleasure to be involved with Scott s care.     Phone call contact time  Call started at 1:30  Call ended at 2:27    Anusha Morales MS, [facility]  Maternal Fetal Medicine  [facility]  Ph: [phone]  [email or web address]

## 2020-04-09 ENCOUNTER — TELEPHONE (OUTPATIENT)
Dept: MATERNAL FETAL MEDICINE | Facility: CLINIC | Age: 35
End: 2020-04-09

## 2020-04-09 DIAGNOSIS — O26.90 PREGNANCY RELATED CONDITION, ANTEPARTUM: ICD-10-CM

## 2020-04-09 DIAGNOSIS — Z36.9 ENCOUNTER FOR ANTENATAL SCREENING: ICD-10-CM

## 2020-04-09 DIAGNOSIS — Z31.430 ENCOUNTER OF FEMALE FOR TESTING FOR GENETIC DISEASE CARRIER STATUS FOR PROCREATIVE MANAGEMENT: ICD-10-CM

## 2020-04-09 PROCEDURE — 40000791 ZZHCL STATISTIC VERIFI PRENATAL TRISOMY 21,18,13: Performed by: OBSTETRICS & GYNECOLOGY

## 2020-04-09 PROCEDURE — 36415 COLL VENOUS BLD VENIPUNCTURE: CPT | Performed by: OBSTETRICS & GYNECOLOGY

## 2020-04-09 PROCEDURE — 40000954 ZZHCL STATISTIC COUNSYL FAMILY PREP: Performed by: OBSTETRICS & GYNECOLOGY

## 2020-04-09 NOTE — TELEPHONE ENCOUNTER
April 9, 2020    Spoke with Scott regarding plan for testing. NIPT and carrier screen were drawn today. Informed pt that she will be receiving text message from [facility] with information regarding estimated cost and payment options. If she does not respond to this message, default is to run through insurance and she is aware. She requested that we contact her with all results, but that we do not leave them in her voicemail.     Anusha Morales MS, [facility]  Maternal Fetal Medicine  [facility]  Ph: [phone]  [email or web address]

## 2020-04-20 ENCOUNTER — TELEPHONE (OUTPATIENT)
Dept: MATERNAL FETAL MEDICINE | Facility: CLINIC | Age: 35
End: 2020-04-20

## 2020-04-20 LAB — LAB SCANNED RESULT: NORMAL

## 2020-04-20 NOTE — TELEPHONE ENCOUNTER
April 20, 2020    I spoke with Scott and Santiago to discuss the couple's expanded carrier screening results. Scott was found to be a carrier for one condition on the panel of 176 conditions, described below. Santiago was not found to be a carrier for any of the conditions on the panel. Overall, Scott and Santiago were not found to be a carrier for the same condition.     Most of the conditions on the carrier screening panel are inherited in an autosomal recessive fashion. Every individual has two copies of the gene that is responsible for this condition, and if someone has a change or mutation that impacts how one copy of the gene functions, they are called a carrier for the condition.  If someone has two copies of the gene that have a harmful change, they are affected with the condition.  If two people who are carriers for the same condition have children, there is a chance for their children to be affected.  Each parent has a 50% chance for passing on their copy of the gene with a mutation, so there is a 25% chance for each pregnancy to get two copies of the mutation and be affected, a 50% chance for each pregnancy to be an unaffected carrier, and a 25% chance to be unaffected with two normal copies of the gene.    For each condition, [employer] provides a reproductive risk. This risk is based on Scott's carrier status, Santiago's carrier status, and the 1 in 4 (25%) chance of both parents passing on the allele with a mutation or deletion.     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     No further screening or testing for the couple or a future pregnancy is indicated.  Again, while the chances of the  aforementioned conditions are low, because the detection rate of testing is not 100%, if there is ever concern for symptoms in the couple's children in the future, referral to an appropriate practitioner for evaluation is recommended.       The couple's children will have a 50% chance of being an unaffected carrier of the aforementioned conditions. Genetic counseling for the couple's children is recommended when they are of reproductive age. The couple's family members are at risk to be carriers of the aforementioned conditions.  It is recommended that screen results be shared with other family members so that they can be offered carrier screening.     The couple provided verbal permission for results to be released to her from [facility] via e-mail.     NIPT is still pending.     Anusha Morales MS, [facility]  Maternal Fetal Medicine  [facility]  Ph: [phone]  [email or web address]

## 2020-04-22 LAB — LAB SCANNED RESULT: NORMAL

## 2020-04-22 NOTE — TELEPHONE ENCOUNTER
April 22, 2020   I spoke with Scott and Santiago regarding NIPT results.     Results indicate NO ANEUPLOIDY DETECTED for chromosomes 21, 18, 13, or sex chromosomes (XX).     This puts her current pregnancy at low risk for Down syndrome, trisomy 18, trisomy 13 and sex chromosome abnormalities. This test is reported to have the following sensitivities: Down syndrome- 99%, trisomy 18- 98%, and trisomy 13- 98%. Although these results are reassuring, this does not replace a standard chromosome analysis from a chorionic villus sampling or amniocentesis.     MSAFP is the appropriate second trimester screening test for open neural tube defects; the maternal quad screen is not recommended.    Her results are available in her Epic chart for her primary OB to review.    The couple requested a copy of their expanded carrier screening results be sent to Scott's email.      Anusha Morales MS, [facility]  Genetic Counselor  Phone: [phone]  Pager: [phone]

## 2020-05-04 ENCOUNTER — PRENATAL OFFICE VISIT (OUTPATIENT)
Dept: OBGYN | Facility: CLINIC | Age: 35
End: 2020-05-04
Payer: COMMERCIAL

## 2020-05-04 VITALS
BODY MASS INDEX: 39.9 KG/M2 | WEIGHT: 269.4 LBS | DIASTOLIC BLOOD PRESSURE: 87 MMHG | SYSTOLIC BLOOD PRESSURE: 134 MMHG | HEIGHT: 69 IN

## 2020-05-04 DIAGNOSIS — Z34.02 ENCOUNTER FOR SUPERVISION OF NORMAL FIRST PREGNANCY IN SECOND TRIMESTER: Primary | ICD-10-CM

## 2020-05-04 DIAGNOSIS — E66.3 OVERWEIGHT (BMI 25.0-29.9): ICD-10-CM

## 2020-05-04 PROCEDURE — 99207 ZZC PRENATAL VISIT: CPT | Performed by: OBSTETRICS & GYNECOLOGY

## 2020-05-04 ASSESSMENT — MIFFLIN-ST. JEOR: SCORE: 1991.37

## 2020-05-04 NOTE — PROGRESS NOTES
Doing well.   Feeling pretty well overall.   Working from home still.  works for a [employer] firm, also working from home.   NIPT normal. Knows fetal sex but not telling anyone.  Fetal survey scheduled with [facility]. Will make office appointment for same day.   Answered questions about COVID, current hospital and clinic practices.   Exercising regularly. Recommended low dose aspirin for pre-eclampsia risk reduction. Sister had bad pre-eclampsia.  Discussed typical second trimester pregnancy questions.  Phone visit for modified prenatal care for COVID.  Time 27 minutes.

## 2020-05-20 DIAGNOSIS — R39.9 UTI SYMPTOMS: ICD-10-CM

## 2020-05-20 LAB
ALBUMIN UR-MCNC: NEGATIVE MG/DL
APPEARANCE UR: CLEAR
BILIRUB UR QL STRIP: NEGATIVE
COLOR UR AUTO: YELLOW
GLUCOSE UR STRIP-MCNC: NEGATIVE MG/DL
HGB UR QL STRIP: ABNORMAL
KETONES UR STRIP-MCNC: NEGATIVE MG/DL
LEUKOCYTE ESTERASE UR QL STRIP: NEGATIVE
NITRATE UR QL: NEGATIVE
NON-SQ EPI CELLS #/AREA URNS LPF: ABNORMAL /LPF
PH UR STRIP: 7 PH (ref 5–7)
RBC #/AREA URNS AUTO: ABNORMAL /HPF
SOURCE: ABNORMAL
SP GR UR STRIP: 1.02 (ref 1–1.03)
UROBILINOGEN UR STRIP-ACNC: 0.2 EU/DL (ref 0.2–1)
WBC #/AREA URNS AUTO: ABNORMAL /HPF

## 2020-05-20 PROCEDURE — 87086 URINE CULTURE/COLONY COUNT: CPT | Performed by: OBSTETRICS & GYNECOLOGY

## 2020-05-20 PROCEDURE — 81001 URINALYSIS AUTO W/SCOPE: CPT | Performed by: OBSTETRICS & GYNECOLOGY

## 2020-05-21 LAB
BACTERIA SPEC CULT: NORMAL
SPECIMEN SOURCE: NORMAL

## 2020-05-26 ENCOUNTER — PRENATAL OFFICE VISIT (OUTPATIENT)
Dept: OBGYN | Facility: CLINIC | Age: 35
End: 2020-05-26
Payer: COMMERCIAL

## 2020-05-26 DIAGNOSIS — F41.9 ANXIETY: ICD-10-CM

## 2020-05-26 DIAGNOSIS — Z34.02 ENCOUNTER FOR SUPERVISION OF NORMAL FIRST PREGNANCY IN SECOND TRIMESTER: Primary | ICD-10-CM

## 2020-05-26 PROCEDURE — 99207 ZZC PRENATAL VISIT: CPT | Performed by: OBSTETRICS & GYNECOLOGY

## 2020-05-26 NOTE — PROGRESS NOTES
Telephone Encounter     Phone visit for modified prenatal care for COVID.    Start time:1516  Stop time:1525    [facility]    CC: phone prenatal visit.    S:  Patient reports feeling anxious about not having an in person clinic visit.  She hasn't felt the baby move and hasn't had her pregnancy symptoms as much recently.  Patient denies any cramping or vaginal bleeding.      A&P: Scott Dobbs is a 34 year old  at 17w6d by LMP c/w 10w3d us who presents today for routine phone prenatal visit.    (Z34.02) Encounter for supervision of normal first pregnancy in second trimester  (primary encounter diagnosis)  Comment: Patient very anxious about only having phone visit today.  Is tearful over the phone and feels the anxiety of not hearing baby's heart beat is getting overwhelming.  Plan: in person clinic visit with sabrina this week.    (F41.9) Anxiety  Comment: Patient with anxiety and multiple stressors including pregnancy and COVID19.  Has previously seen counselor but that individual has moved.   Plan: Referred to Dawn Franks [facility].    Elisabeth Lima MD

## 2020-05-27 ENCOUNTER — PRENATAL OFFICE VISIT (OUTPATIENT)
Dept: MIDWIFE SERVICES | Facility: CLINIC | Age: 35
End: 2020-05-27
Payer: COMMERCIAL

## 2020-05-27 VITALS
WEIGHT: 284.3 LBS | HEART RATE: 80 BPM | TEMPERATURE: 98.1 F | SYSTOLIC BLOOD PRESSURE: 120 MMHG | DIASTOLIC BLOOD PRESSURE: 80 MMHG | BODY MASS INDEX: 41.98 KG/M2

## 2020-05-27 DIAGNOSIS — Z34.02 ENCOUNTER FOR SUPERVISION OF NORMAL FIRST PREGNANCY IN SECOND TRIMESTER: ICD-10-CM

## 2020-05-27 PROCEDURE — 99207 ZZC PRENATAL VISIT: CPT | Performed by: ADVANCED PRACTICE MIDWIFE

## 2020-05-27 NOTE — PROGRESS NOTES
18w0d  MD patient anxious about hearing FHT's.  Consider that patient not appropriate for telephone visits.  Patient very relieved to hear FHT's today, Christian () on phone, patient taking BP weekly at home usually 130/low 80's.  Enc. Watching weight and 6 fruits/veg daily, 8 glasses of water, as pt has some elevated BP enc. Slowing down weight gain.   rtc in 2  Weeks for MFM fetal survey U/S and prenatal appt.   mrb

## 2020-06-01 ENCOUNTER — VIRTUAL VISIT (OUTPATIENT)
Dept: PSYCHOLOGY | Facility: CLINIC | Age: 35
End: 2020-06-01
Payer: COMMERCIAL

## 2020-06-01 DIAGNOSIS — F41.1 GAD (GENERALIZED ANXIETY DISORDER): Primary | ICD-10-CM

## 2020-06-01 PROCEDURE — 90791 PSYCH DIAGNOSTIC EVALUATION: CPT | Mod: GT | Performed by: SOCIAL WORKER

## 2020-06-01 ASSESSMENT — COLUMBIA-SUICIDE SEVERITY RATING SCALE - C-SSRS
6. HAVE YOU EVER DONE ANYTHING, STARTED TO DO ANYTHING, OR PREPARED TO DO ANYTHING TO END YOUR LIFE?: NO
TOTAL  NUMBER OF ABORTED OR SELF INTERRUPTED ATTEMPTS PAST LIFETIME: NO
ATTEMPT LIFETIME: NO
TOTAL  NUMBER OF INTERRUPTED ATTEMPTS LIFETIME: NO
2. HAVE YOU ACTUALLY HAD ANY THOUGHTS OF KILLING YOURSELF LIFETIME?: NO
1. IN THE PAST MONTH, HAVE YOU WISHED YOU WERE DEAD OR WISHED YOU COULD GO TO SLEEP AND NOT WAKE UP?: NO

## 2020-06-01 NOTE — Clinical Note
"Hello!  I \"met\" with Scott yesterday, and she definitely has baseline anxiety, and has experienced notable anxiety, primarily related to pregnancy, and pregnancy during COVID. She has really good insight about her anxiety and her symptoms, and is highly motivated to participate in therapy to help learn more strategies and techniques to help improve symptoms. Please let me know if you have any questions! Thanks again for involving me in Scott's care!    Dawn"

## 2020-06-02 NOTE — PROGRESS NOTES
[facility]  Integrated Behavioral Health Services   Diagnostic Assessment      PATIENT'S NAME: Scott Dobbs  MRN:   [id number]  :   1985  DATE OF SERVICE: 2020  Visit Activities: [facility] Only    Telemedicine Visit: The patient's condition can be safely assessed and treated via synchronous audio and visual telemedicine encounter.      Reason for Telemedicine Visit: Patient has requested telehealth visit and Services only offered telehealth due to COVID-19    Originating Site (Patient Location): Patient's place of employment    Distant Site (Provider Location): Provider Remote Setting    Consent:  The patient/guardian has verbally consented to: the potential risks and benefits of telemedicine (video visit) versus in person care; bill my insurance or make self-payment for services provided; and responsibility for payment of non-covered services.     Mode of Communication:  Video Conference via [employer]    As the provider I attest to compliance with applicable laws and regulations related to telemedicine.    Start time 2:40 pm  End time: 3:30 pm    Identifying Information:  Patient is a 35 year old year old, ,  female.  Patient attended the session alone.      Referral:  Patient was referred for an assessment by [facility].   Reason for referral: clarify behavioral health diagnosis and determine behavioral health treatment options.       Patient's Statement of Presenting Concern:  Patient reports the following reason(s) for seeking an assessment at this time: Patient reported that she has a history of anxiety, and has noted anxiety and other mental health symptoms during pregnancy. She stated that she knows that many symptoms could also be due to pregnancy, and she is requesting feedback on what symptoms could be attributed to pregnancy and/or her mental health.  Patient stated that her symptoms have resulted in the following functional  "impairments: home life with  and management of the household and or completion of tasks      History of Presenting Concern:  Patient reports that these problem(s) began: Patient reported history of anxiety stemming from childhood. She stated that she recalls experiencing anxiety at school.  Patient reported that as an adult, she has experienced more intense symptoms of anxiety in 2015 than she is currently experiencing, but continues to note her anxiety and is interested in learning more strategies and techniques to help her manage symptoms.     Patient stated that she feels the baby is the source of many of her anxieties. She reported that since she is not yet able to detect fetal movement, she worries about how the baby is doing, and shared that she feels reassured during in-person prenatal appointments when she can hear the heart with the doppler. Patient stated that she e sometimes does not worry about something happening to the baby on a daily basis. She stated that she continues to worry about making preparations, how COVID-19 will impact her 's presence at appointments, her desire to have a , and what  may look like. She stated that she is beginning to note changes in how she feels about her body as it changes, and shared that it can be difficult time times since her body is changing in ways out of her control. She stated her anxiety is primarily cognitive, and she feels like her thoughts can go \"round and round, and then spiral\". Patient stated that she can replay past social situations, and then will worry about events in the future. She discussed muscle tension, feeling restless, and difficulties relaxing when she feels like there are tasks that need to be completed.     Patient stated that she feels tired \"all the time\". She stated that she has noted more apathy about work tasks and activities around the house.  Patient stated that she feels \"run down\" and low energy, but denied " "concerns about sleep, appetite. Denied feeling hopeless, helpless, worthless. Denied SI, denied history of SI, denied history of SIB.     . Patient has attempted to resolve these concerns in the past through: counseling. Patient reports that other professional(s) are not involved in providing support / services.       Social History:  Patient [place] lived in [place], [place] for 8 years. Patient stated that her parents  when she was 12 years old.  She stated that she has 4 older step siblings, 1 biological sibling, and 3 half siblings.   Patient reported that her childhood was \"interesting\" due to the frequent moves and attending numerous schools.  Patient reported a history of 1 committed relationships or marriages. Patient has been  for 5 years. Patient reported that she is currently 18 weeks pregnant, and stated that this is her first pregnancy.  Patient identified extensive stable and meaningful social connections.     Patient lives with her .  Patient is currently employed full time and reports they are able to function appropriately at work..  Patient stated that she currently works for the [employer], and has been working for them since July 2016. 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 She stated that she has been able to work from home during the pandemic, but occasionally goes to work. Patient stated that her  is also working from home, and they are adjusting to both being at home at the same time all the time.     Patient reported that she has not been involved with the legal system.  Patient's highest education level was college graduate. Patient did not identify any learning problems. There are no ethnic, cultural or Adventist factors that may be relevant for therapy.  Patient did not serve in the .     Mental Health History:  Patient reported the following biological family members or relatives with mental health " issues: Sister experienced Anxiety. Patient has received the following mental health services in the past: counseling. Patient is not currently receiving any mental health services.    Chemical Health History:  Patient reported the following biological family members or relatives with chemical health issues: Aunt reportedly used alcohol . Patient has not received chemical dependency treatment in the past. Patient is not currently receiving any chemical dependency treatment. Patient reports no problems as a result of their drinking / drug use.    Cage-AID score is: 0.  Based on Cage-Aid score and clinical interview there  are not indications of drug or alcohol abuse.      Discussed the general effects of drugs and alcohol on health and well-being.      Significant Losses / Trauma / Abuse / Neglect Issues:  There are no indications or report of: significant losses, trauma, abuse or neglect.    Issues of possible neglect are not present.      Medical History:   Patient Active Problem List   Diagnosis     Adjustment insomnia     Overweight (BMI 25.0-29.9)     Situational anxiety     Situational mixed anxiety and depressive disorder     Need for Tdap vaccination     Encounter for supervision of normal first pregnancy in second trimester       Medication Review:  Current Outpatient Medications   Medication     ASPIRIN PO     Fexofenadine HCl (ALLEGRA PO)     FIBER PO     fluticasone (FLONASE) 50 MCG/ACT nasal spray     Prenatal Vit-Fe Fumarate-FA (PRENATAL MULTIVITAMIN W/IRON) 27-0.8 MG tablet     No current facility-administered medications for this visit.        Patient was provided recommendation to follow-up with physician.    Mental Status Assessment:  Appearance:   Appropriate   Eye Contact:   Good   Psychomotor Behavior: Normal   Attitude:   Cooperative   Orientation:   All  Speech   Rate / Production: Normal    Volume:  Normal   Mood:    Normal  Affect:    Appropriate   Thought Content:  Clear   Thought  Form:  Coherent  Logical   Insight:    Good       Safety Assessment:    Patient denies a history of suicidal ideation, suicide attempts, self-injurious behavior, homicidal ideation, homicidal behavior and and other safety concerns  Patient denies current or recent suicidal ideation or behaviors.  Patient denies current or recent homicidal ideation or behaviors.  Patient denies current or recent self injurious behavior or ideation.  Patient denies other safety concerns.  Patient reports there are no firearms in the house  Protective Factors Sense of responsibility to family, Pregnancy, Life Satisfaction, Positive coping skills, Positive problem-solving skills and Positive social support   Risk Factors Rise or drop in anxiety that is sudden in nature      Plan for Safety and Risk Management:  A safety and risk management plan has not been developed at this time, however patient was encouraged to call [place] [phone] should there be a change in any of these risk factors.      Review of Symptoms:  Depression: Interest Energy  Suri:  No symptoms  Psychosis: No symptoms  Anxiety: Worries Nervousness  Panic:  No symptoms  Post Traumatic Stress Disorder: No symptoms  Obsessive Compulsive Disorder: No symptoms  Eating Disorder: No symptoms  Oppositional Defiant Disorder: No symptoms  ADD / ADHD: No symptoms  Conduct Disorder: No symptoms    Patient's Strengths and Limitations:  Patient identified the following strengths or resources that will help her succeed in counseling: commitment to health and well being, exercise routine, friends / good social support, family support, insight, intelligence, positive work environment, motivation and strong social skills. Patient identified the following supports: family and friends. Things that may interfere with the patien'ts success in behavioral health services include:no barriers identified.    Diagnostic Criteria:  A. Excessive anxiety and worry about a number of events or  activities (such as work or school performance).   B. The person finds it difficult to control the worry.  C. Select 3 or more symptoms (required for diagnosis). Only one item is required in children.   - Restlessness or feeling keyed up or on edge.    - Being easily fatigued.    - Muscle tension.   D. The focus of the anxiety and worry is not confined to features of an Axis I disorder.  E. The anxiety, worry, or physical symptoms cause clinically significant distress or impairment in social, occupational, or other important areas of functioning.   F. The disturbance is not due to the direct physiological effects of a substance (e.g., a drug of abuse, a medication) or a general medical condition (e.g., hyperthyroidism) and does not occur exclusively during a Mood Disorder, a Psychotic Disorder, or a Pervasive Developmental Disorder.      Functional Status:  Patient's symptoms are causing reduced functional status in the following areas: Activities of Daily Living - completing tasks, quality of life      DSM5 Diagnoses: (Sustained by DSM5 Criteria Listed Above)  Diagnoses: 300.02 (F41.1) Generalized Anxiety Disorder  Psychosocial & Contextual Factors: first pregnancy during COVID-19  WHODAS Score: deferred due to virtual visit    Preliminary Treatment Plan:    The client reports no currently identified Adventism, ethnic or cultural issues relevant to therapy.    Initial Treatment will focus on: Anxiety -  anxiety.    Chemical dependency recommendations: No indications of CD issues    As a preliminary treatment goal, patient will experience a reduction in anxiety, will develop more effective coping skills to manage anxiety symptoms, will develop healthy cognitive patterns and beliefs and will increase ability to function adaptively.    The focus of initial interventions will be to teach CBT skills, teach DBT skills and teach mindfulness skills.    The patient is receiving treatment / structured support from  the following professional(s) / service and treatment. Collaboration will be initiated with: OBGYN.    Referral to another professional/service is not indicated at this time. Symptoms of fatigue and low energy seem likely contributed to pregnancy as there are no other indications of depression at this time.  Patient does present with anxiety related to the pregnancy and the pandemic, along with baseline generalized anxiety. Patient was provided with education on anxiety, treatment of anxiety, and evidenced based practices that can help to improve symptoms. Patient expressed motivation and interest to participate in therapy with this provider to treat symptoms.     A Release of Information is not needed at this time.    Report to child or adult protection services was NA.    Dawn Franks [facility], Behavioral Health Clinician

## 2020-06-09 ENCOUNTER — PRE VISIT (OUTPATIENT)
Dept: MATERNAL FETAL MEDICINE | Facility: CLINIC | Age: 35
End: 2020-06-09

## 2020-06-09 ENCOUNTER — VIRTUAL VISIT (OUTPATIENT)
Dept: PSYCHOLOGY | Facility: CLINIC | Age: 35
End: 2020-06-09
Payer: COMMERCIAL

## 2020-06-09 DIAGNOSIS — F41.1 GAD (GENERALIZED ANXIETY DISORDER): Primary | ICD-10-CM

## 2020-06-09 PROCEDURE — 90834 PSYTX W PT 45 MINUTES: CPT | Mod: GT | Performed by: SOCIAL WORKER

## 2020-06-09 NOTE — PROGRESS NOTES
[facility]- Integrated Behavioral Health Services  June 9, 2020      Behavioral Health Clinician Progress Note    Patient Name: Scott Dobbs      Telemedicine Visit: The patient's condition can be safely assessed and treated via synchronous audio and visual telemedicine encounter.      Reason for Telemedicine Visit: Patient has requested telehealth visit and Services only offered telehealth due to COVID-19    Originating Site (Patient Location): Patient's home    Distant Site (Provider Location): Provider Remote Setting    Consent:  The patient/guardian has verbally consented to: the potential risks and benefits of telemedicine (video visit) versus in person care; bill my insurance or make self-payment for services provided; and responsibility for payment of non-covered services.     Mode of Communication:  Video Conference via [employer]    As the provider I attest to compliance with applicable laws and regulations related to telemedicine.         Service Type:  Individual     Session Start Time: 2:30 pm  Session End Time:       Session Length: 38 - 52      Attendees: Patient    Visit Activities (Refresh list every visit): [facility] Only    Diagnostic Assessment Date: 6/1/20  Treatment Plan Review Date: 6/9/20  See Flowsheets for today's PHQ-9 and BALA-7 results  Previous PHQ-9: No flowsheet data found.  Previous BALA-7: No flowsheet data found.    ED LEVEL:  No flowsheet data found.    DATA  Extended Session (60+ minutes): No  Interactive Complexity: No  Crisis: No  [facility] Patient: No    Treatment Objective(s) Addressed in This Session:  Target Behavior(s): Mental health management    Anxiety: will experience a reduction in anxiety, will develop more effective coping skills to manage anxiety symptoms, will develop healthy cognitive patterns and beliefs and will increase ability to function adaptively    Current Stressors / Issues:  Patient reported overall her anxiety has been well managed.  She stated that she has been experiencing some anxiety on social media as she is engaging in conversations with family members with different opinions about the Black Lives Matter. She stated that she can feel nervous about people's perceptions/reactions of her comments, but on the other hand, knows that it is important to have these difficult conversations with others, and it is okay if her family does not agree with her.     Explored with patient ability to detect her level of anxiety, including identifying low levels, medium levels, and high levels. Patient stated that she does not regularly identify the anxiety until she acts more irritable. She was introduced to the concept of a body scan, ways to incorporate into her daily routine, and then learning how to identify the early signs and symptoms, and matching regulation skills to the intensity of emotions. Identified how this information can be used to improve communication with , and try to defer difficult conversations until feeling better.  Introduced patient to a grounding technique through sensory engagement to help ruminating over the past and worrying about the future.     Progress on Treatment Objective(s) / Homework:  New Objective established this session - PREPARATION (Decided to change - considering how); Intervened by negotiating a change plan and determining options / strategies for behavior change, identifying triggers, exploring social supports, and working towards setting a date to begin behavior change    Motivational Interviewing    MI Intervention: Expressed Empathy/Understanding, Supported Autonomy, Collaboration, Evocation, Permission to raise concern or advise, Open-ended questions, Change talk (evoked) and Reframe     Change Talk Expressed by the Patient: Desire to change Ability to change Reasons to change Need to change Committment to change Activation    Provider Response to Change Talk: E - Evoked more info from patient about  behavior change, A - Affirmed patient's thoughts, decisions, or attempts at behavior change, R - Reflected patient's change talk and S - Summarized patient's change talk statements     Mindfulness-Based Strategies : Discussed skills based on development and application of mindfulness    Also provided psychoeducation about behavioral health condition, symptoms, and treatment options    Care Plan review completed: Yes    Medication Review:  No current psychiatric medications prescribed    Medication Compliance:  NA    Changes in Health Issues:   None reported    Chemical Use Review:   Substance Use: Chemical use reviewed, no active concerns identified      Tobacco Use: No current tobacco use.      Assessment: Current Emotional / Mental Status (status of significant symptoms):  Risk status (Self / Other harm or suicidal ideation)  Patient denies a history of suicidal ideation, suicide attempts, self-injurious behavior, homicidal ideation, homicidal behavior and and other safety concerns  Patient denies current fears or concerns for personal safety.  Patient denies current or recent suicidal ideation or behaviors.  Patient denies current or recent homicidal ideation or behaviors.  Patient denies current or recent self injurious behavior or ideation.  Patient denies other safety concerns.  A safety and risk management plan has not been developed at this time, however patient was encouraged to call [place] [phone] should there be a change in any of these risk factors.    Appearance:   Appropriate   Eye Contact:   Good   Psychomotor Behavior: Normal   Attitude:   Cooperative   Orientation:   All  Speech   Rate / Production: Normal    Volume:  Normal   Mood:    Normal  Affect:    Appropriate   Thought Content:  Clear   Thought Form:  Coherent  Logical   Insight:    Good     Diagnoses:  1. BALA (generalized anxiety disorder)        Collateral Reports Completed:  Not Applicable    Plan: (Homework, other):  Patient was  given information about behavioral services and encouraged to schedule a follow up appointment with the clinic [facility] [place] in 2 weeks.  She was also given a grounding technique and recommendation to incorporate a body scan into routine.  CD Recommendations: No indications of CD issues.  GEO Perez, [facility] [place]      ______________________________________________________________________    Integrated Primary Care Behavioral Health Treatment Plan    Patient's Name: Scott Dobbs  YOB: 1985    Date: 6/9/20    DSM-V Diagnoses: 300.02 (F41.1) Generalized Anxiety Disorder  Psychosocial / Contextual Factors: first pregnancy during COVID-19  WHODAS: collection in progress     Referral / Collaboration:  Referral to another professional/service is not indicated at this time..    Anticipated number of session or this episode of care: 10-12      MeasurableTreatment Goal(s) related to diagnosis / functional impairment(s)  Goal 1: Patient will reduce symptoms of anxiety as evidenced by decreased score on BALA 7.     I will know I've met my goal when I'm less irritable and it is easier to stop the spiral      Objective #A (Patient Action)    Patient will identify three distraction and diversion activities and use those activities to decrease level of anxiety  .  Status: New - Date: 6/9/20     Intervention(s)  [facility] will teach distress tolerance, mindfulness, and relaxation techniques.    Objective #B  Patient will use cognitive strategies identified in therapy to challenge anxious thoughts.  Status: New - Date: 6/9/20     Intervention(s)  [facility] will assign homework to practice cognitive restructuring and defusion techniques.    Objective #C  Patient will use relaxation strategies 2-3 times per day to reduce the physical symptoms of anxiety.  Status: New - Date: 6/9/20     Intervention(s)  [facility] will teach relaxation techniques.    Patient has reviewed and agreed to the above plan.      GEO Perez  June 9,  2020

## 2020-06-10 ENCOUNTER — PRENATAL OFFICE VISIT (OUTPATIENT)
Dept: OBGYN | Facility: CLINIC | Age: 35
End: 2020-06-10
Payer: COMMERCIAL

## 2020-06-10 ENCOUNTER — OFFICE VISIT (OUTPATIENT)
Dept: MATERNAL FETAL MEDICINE | Facility: CLINIC | Age: 35
End: 2020-06-10
Attending: OBSTETRICS & GYNECOLOGY
Payer: COMMERCIAL

## 2020-06-10 ENCOUNTER — HOSPITAL ENCOUNTER (OUTPATIENT)
Dept: ULTRASOUND IMAGING | Facility: CLINIC | Age: 35
End: 2020-06-10
Attending: OBSTETRICS & GYNECOLOGY
Payer: COMMERCIAL

## 2020-06-10 DIAGNOSIS — O26.90 PREGNANCY RELATED CONDITION, ANTEPARTUM: ICD-10-CM

## 2020-06-10 DIAGNOSIS — Z34.02 ENCOUNTER FOR SUPERVISION OF NORMAL FIRST PREGNANCY IN SECOND TRIMESTER: ICD-10-CM

## 2020-06-10 DIAGNOSIS — O99.210 OBESITY AFFECTING PREGNANCY, ANTEPARTUM: ICD-10-CM

## 2020-06-10 DIAGNOSIS — Z36.89 ENCOUNTER FOR FETAL ANATOMIC SURVEY: Primary | ICD-10-CM

## 2020-06-10 PROCEDURE — 99207 ZZC PRENATAL VISIT: CPT | Performed by: OBSTETRICS & GYNECOLOGY

## 2020-06-10 PROCEDURE — 76811 OB US DETAILED SNGL FETUS: CPT

## 2020-06-10 NOTE — PROGRESS NOTES
Phone visit for modified prenatal care for COVID.  Call time 10 minutes.  Had MFM ultrasound today, cardiac and other anatomy not well imaged, has followup in 3 weeks.  Will plan same day office visit with us.  Has been monitoring blood pressure at home, normal levels.  Looking into childbirth classes.  RTC 3 weeks.  AM

## 2020-06-26 ENCOUNTER — VIRTUAL VISIT (OUTPATIENT)
Dept: PSYCHOLOGY | Facility: CLINIC | Age: 35
End: 2020-06-26
Payer: COMMERCIAL

## 2020-06-26 DIAGNOSIS — F41.1 GAD (GENERALIZED ANXIETY DISORDER): Primary | ICD-10-CM

## 2020-06-26 PROCEDURE — 90834 PSYTX W PT 45 MINUTES: CPT | Mod: GT | Performed by: SOCIAL WORKER

## 2020-06-27 NOTE — PROGRESS NOTES
[facility]- Integrated Behavioral Health Services  June 26, 2020      Behavioral Health Clinician Progress Note    Patient Name: Scott Dobbs      Telemedicine Visit: The patient's condition can be safely assessed and treated via synchronous audio and visual telemedicine encounter.      Reason for Telemedicine Visit: Patient has requested telehealth visit and Services only offered telehealth due to COVID-19    Originating Site (Patient Location): Patient's home    Distant Site (Provider Location): Provider Remote Setting    Consent:  The patient/guardian has verbally consented to: the potential risks and benefits of telemedicine (video visit) versus in person care; bill my insurance or make self-payment for services provided; and responsibility for payment of non-covered services.     Mode of Communication:  Video Conference via [employer]    As the provider I attest to compliance with applicable laws and regulations related to telemedicine.         Service Type:  Individual     Session Start Time:  3:36 pm  Session End Time: 4: 28 pm      Session Length: 38 - 52      Attendees: Patient    Visit Activities (Refresh list every visit): [facility] Only    Diagnostic Assessment Date: 6/1/20  Treatment Plan Review Date: 6/9/20  See Flowsheets for today's PHQ-9 and BALA-7 results  Previous PHQ-9:   PHQ-9 SCORE 6/11/2020   PHQ-9 Total Score MyChart 3 (Minimal depression)   PHQ-9 Total Score 3     Previous BALA-7:   BALA-7 SCORE 6/11/2020   Total Score 5 (mild anxiety)   Total Score 5       ED LEVEL:  No flowsheet data found.    DATA  Extended Session (60+ minutes): No  Interactive Complexity: No  Crisis: No  [facility] Patient: No    Treatment Objective(s) Addressed in This Session:  Target Behavior(s): Mental health management    Anxiety: will experience a reduction in anxiety, will develop more effective coping skills to manage anxiety symptoms, will develop healthy cognitive patterns and beliefs and will  "increase ability to function adaptively    Current Stressors / Issues:  Patient reported overall she is doing \"okay\". She stated that the last few weeks have been \"up and down\". Patient reported heightened anxiety after their 20 week ultrasound due to needing a repeat ultrasound as a result of poor image/view of the baby's brain, spine, and heart. She stated that she is reminding herself that if there was something wrong, they would not allow her to wait as long as they are for the next appointment, and that they want to confirm that there are no concerns.      Patient stated that she is starting to feel better about her body image during this pregnancy. Patient reported that prior to pregnancy, she felt negatively about herself, in particular her weight. She stated that she is feeling better about the lack of control with her body than she anticipated, but has found herself feeling emotions about not have the traditional \"pregnancy belly\" that others have. Patient shared that people are not likely to know that she is pregnant by looking at her. She stated on one had she appreciates being not knowing since she does not have random people coming up to and talking to her about it, but on the other hand is sad because she had this vision for what she would look like at this point in her pregnancy.  [facility] and patient explored how to provide affirmations and respect to her body for what is doing, and continued to identify how to engage in body positivity.     Progress on Treatment Objective(s) / Homework:  Satisfactory progress - ACTION (Actively working towards change); Intervened by reinforcing change plan / affirming steps taken    Motivational Interviewing    MI Intervention: Expressed Empathy/Understanding, Supported Autonomy, Collaboration, Evocation, Permission to raise concern or advise, Open-ended questions, Change talk (evoked) and Reframe     Change Talk Expressed by the Patient: Desire to change Ability to " change Reasons to change Need to change Committment to change Activation    Provider Response to Change Talk: E - Evoked more info from patient about behavior change, A - Affirmed patient's thoughts, decisions, or attempts at behavior change, R - Reflected patient's change talk and S - Summarized patient's change talk statements     Mindfulness-Based Strategies : Discussed skills based on development and application of mindfulness    Also provided psychoeducation about behavioral health condition, symptoms, and treatment options    Care Plan review completed: Yes    Medication Review:  No current psychiatric medications prescribed    Medication Compliance:  NA    Changes in Health Issues:   None reported    Chemical Use Review:   Substance Use: Chemical use reviewed, no active concerns identified      Tobacco Use: No current tobacco use.      Assessment: Current Emotional / Mental Status (status of significant symptoms):  Risk status (Self / Other harm or suicidal ideation)  Patient denies a history of suicidal ideation, suicide attempts, self-injurious behavior, homicidal ideation, homicidal behavior and and other safety concerns  Patient denies current fears or concerns for personal safety.  Patient denies current or recent suicidal ideation or behaviors.  Patient denies current or recent homicidal ideation or behaviors.  Patient denies current or recent self injurious behavior or ideation.  Patient denies other safety concerns.  A safety and risk management plan has not been developed at this time, however patient was encouraged to call [place] [phone] should there be a change in any of these risk factors.    Appearance:   Appropriate   Eye Contact:   Good   Psychomotor Behavior: Normal   Attitude:   Cooperative   Orientation:   All  Speech   Rate / Production: Normal    Volume:  Normal   Mood:    Normal  Affect:    Appropriate   Thought Content:  Clear   Thought Form:  Coherent  Logical   Insight:    Good      Diagnoses:  1. BALA (generalized anxiety disorder)        Collateral Reports Completed:  Not Applicable    Plan: (Homework, other):  Patient was given information about behavioral services and encouraged to schedule a follow up appointment with the clinic [facility] in 2 weeks.  She was also given strategies to help with body positivity.  CD Recommendations: No indications of CD issues.  Dawn Franks, [facility], [facility]      ______________________________________________________________________    Integrated Primary Care Behavioral Health Treatment Plan    Patient's Name: Scott Dobbs  YOB: 1985    Date: 6/9/20    DSM-V Diagnoses: 300.02 (F41.1) Generalized Anxiety Disorder  Psychosocial / Contextual Factors: first pregnancy during COVID-19  WHODAS 2.0 Total Score 6/11/2020   Total Score 22   Total Score MyChart 22         Referral / Collaboration:  Referral to another professional/service is not indicated at this time..    Anticipated number of session or this episode of care: 10-12      MeasurableTreatment Goal(s) related to diagnosis / functional impairment(s)  Goal 1: Patient will reduce symptoms of anxiety as evidenced by decreased score on BALA 7.     I will know I've met my goal when I'm less irritable and it is easier to stop the spiral      Objective #A (Patient Action)    Patient will identify three distraction and diversion activities and use those activities to decrease level of anxiety  .  Status: New - Date: 6/9/20     Intervention(s)  [facility] will teach distress tolerance, mindfulness, and relaxation techniques.    Objective #B  Patient will use cognitive strategies identified in therapy to challenge anxious thoughts.  Status: New - Date: 6/9/20     Intervention(s)  [facility] will assign homework to practice cognitive restructuring and defusion techniques.    Objective #C  Patient will use relaxation strategies 2-3 times per day to reduce the physical symptoms of anxiety.  Status: New - Date: 6/9/20      Intervention(s)  [facility] will teach relaxation techniques.    Patient has reviewed and agreed to the above plan.      Dawn Franks, [facility]  June 9, 2020

## 2020-07-01 ENCOUNTER — PRENATAL OFFICE VISIT (OUTPATIENT)
Dept: OBGYN | Facility: CLINIC | Age: 35
End: 2020-07-01
Payer: COMMERCIAL

## 2020-07-01 ENCOUNTER — OFFICE VISIT (OUTPATIENT)
Dept: MATERNAL FETAL MEDICINE | Facility: CLINIC | Age: 35
End: 2020-07-01
Attending: OBSTETRICS & GYNECOLOGY
Payer: COMMERCIAL

## 2020-07-01 ENCOUNTER — HOSPITAL ENCOUNTER (OUTPATIENT)
Dept: ULTRASOUND IMAGING | Facility: CLINIC | Age: 35
End: 2020-07-01
Attending: OBSTETRICS & GYNECOLOGY
Payer: COMMERCIAL

## 2020-07-01 VITALS
TEMPERATURE: 98.1 F | HEART RATE: 75 BPM | SYSTOLIC BLOOD PRESSURE: 124 MMHG | DIASTOLIC BLOOD PRESSURE: 82 MMHG | BODY MASS INDEX: 42.68 KG/M2 | WEIGHT: 289 LBS

## 2020-07-01 DIAGNOSIS — O99.210 OBESITY AFFECTING PREGNANCY, ANTEPARTUM: ICD-10-CM

## 2020-07-01 DIAGNOSIS — Z34.02 ENCOUNTER FOR SUPERVISION OF NORMAL FIRST PREGNANCY IN SECOND TRIMESTER: Primary | ICD-10-CM

## 2020-07-01 DIAGNOSIS — O35.9XX0 SUSPECTED FETAL ANOMALY, ANTEPARTUM, SINGLE OR UNSPECIFIED FETUS: Primary | ICD-10-CM

## 2020-07-01 DIAGNOSIS — R03.0 ELEVATED BLOOD PRESSURE READING WITHOUT DIAGNOSIS OF HYPERTENSION: ICD-10-CM

## 2020-07-01 LAB
ALT SERPL W P-5'-P-CCNC: 20 U/L (ref 0–50)
AST SERPL W P-5'-P-CCNC: 13 U/L (ref 0–45)
CREAT SERPL-MCNC: 0.75 MG/DL (ref 0.52–1.04)
CREAT UR-MCNC: 33 MG/DL
ERYTHROCYTE [DISTWIDTH] IN BLOOD BY AUTOMATED COUNT: 12.8 % (ref 10–15)
GFR SERPL CREATININE-BSD FRML MDRD: >90 ML/MIN/{1.73_M2}
HCT VFR BLD AUTO: 39.2 % (ref 35–47)
HGB BLD-MCNC: 13.4 G/DL (ref 11.7–15.7)
MCH RBC QN AUTO: 28.8 PG (ref 26.5–33)
MCHC RBC AUTO-ENTMCNC: 34.2 G/DL (ref 31.5–36.5)
MCV RBC AUTO: 84 FL (ref 78–100)
PLATELET # BLD AUTO: 279 10E9/L (ref 150–450)
PROT UR-MCNC: <0.05 G/L
PROT/CREAT 24H UR: NORMAL G/G CR (ref 0–0.2)
RBC # BLD AUTO: 4.65 10E12/L (ref 3.8–5.2)
WBC # BLD AUTO: 12.3 10E9/L (ref 4–11)

## 2020-07-01 PROCEDURE — 76816 OB US FOLLOW-UP PER FETUS: CPT

## 2020-07-01 PROCEDURE — 36415 COLL VENOUS BLD VENIPUNCTURE: CPT | Performed by: OBSTETRICS & GYNECOLOGY

## 2020-07-01 PROCEDURE — 84460 ALANINE AMINO (ALT) (SGPT): CPT | Performed by: OBSTETRICS & GYNECOLOGY

## 2020-07-01 PROCEDURE — 99207 ZZC PRENATAL VISIT: CPT | Performed by: OBSTETRICS & GYNECOLOGY

## 2020-07-01 PROCEDURE — 84156 ASSAY OF PROTEIN URINE: CPT | Performed by: OBSTETRICS & GYNECOLOGY

## 2020-07-01 PROCEDURE — 85027 COMPLETE CBC AUTOMATED: CPT | Performed by: OBSTETRICS & GYNECOLOGY

## 2020-07-01 PROCEDURE — 84450 TRANSFERASE (AST) (SGOT): CPT | Performed by: OBSTETRICS & GYNECOLOGY

## 2020-07-01 PROCEDURE — 82565 ASSAY OF CREATININE: CPT | Performed by: OBSTETRICS & GYNECOLOGY

## 2020-07-01 NOTE — PROGRESS NOTES
"Please see \"Imaging\" tab under Chart Review for full details.    Macy Jacobson MD  Maternal Fetal Medicine    "

## 2020-07-01 NOTE — PROGRESS NOTES
23w0d  BP elevated today. Possible chronic htn. Will check baseline UPC and HELLP labs.   BPs at home 120s-135/80s.  +fetal movement. No contractions, no bleeding.   US today normal growth and anatomy but suboptimal view of LVOT, repeat US in 3 weeks.   RTC 3 weeks, sooner PRN.  Dawn Grant MD

## 2020-07-09 ENCOUNTER — VIRTUAL VISIT (OUTPATIENT)
Dept: PSYCHOLOGY | Facility: CLINIC | Age: 35
End: 2020-07-09
Payer: COMMERCIAL

## 2020-07-09 DIAGNOSIS — F41.1 GAD (GENERALIZED ANXIETY DISORDER): Primary | ICD-10-CM

## 2020-07-09 PROCEDURE — 90834 PSYTX W PT 45 MINUTES: CPT | Mod: GT | Performed by: SOCIAL WORKER

## 2020-07-09 NOTE — PROGRESS NOTES
[facility]- Integrated Behavioral Health Services  July 9, 2020      Behavioral Health Clinician Progress Note    Patient Name: Scott Dobbs      Telemedicine Visit: The patient's condition can be safely assessed and treated via synchronous audio and visual telemedicine encounter.      Reason for Telemedicine Visit: Patient has requested telehealth visit and Services only offered telehealth due to COVID-19    Originating Site (Patient Location): Patient's place of employment    Distant Site (Provider Location): Provider Remote Setting    Consent:  The patient/guardian has verbally consented to: the potential risks and benefits of telemedicine (video visit) versus in person care; bill my insurance or make self-payment for services provided; and responsibility for payment of non-covered services.     Mode of Communication:  Video Conference via [employer]    As the provider I attest to compliance with applicable laws and regulations related to telemedicine.         Service Type:  Individual     Session Start Time:  2:36 pm  Session End Time: 3: 28 pm      Session Length: 38 - 52      Attendees: Patient    Visit Activities (Refresh list every visit): [facility] Only    Diagnostic Assessment Date: 6/1/20  Treatment Plan Review Date: 6/9/20  See Flowsheets for today's PHQ-9 and BALA-7 results  Previous PHQ-9:   PHQ-9 SCORE 6/11/2020   PHQ-9 Total Score MyChart 3 (Minimal depression)   PHQ-9 Total Score 3     Previous BALA-7:   BALA-7 SCORE 6/11/2020   Total Score 5 (mild anxiety)   Total Score 5       ED LEVEL:  No flowsheet data found.    DATA  Extended Session (60+ minutes): No  Interactive Complexity: No  Crisis: No  [facility] Patient: No    Treatment Objective(s) Addressed in This Session:  Target Behavior(s): Mental health management    Anxiety: will experience a reduction in anxiety, will develop more effective coping skills to manage anxiety symptoms, will develop healthy cognitive patterns and  "beliefs and will increase ability to function adaptively    Current Stressors / Issues:  Patient discussed heightened stress this past week due to a co-worker related stressor. She stated that she has been trying to focus on self-care, sleep, and quiet time in the evenings to help her process how she feels and figure out how to best move forward. 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 Patient was also receptive to exploring how this could be a single event that may or may not indicate how her job tasks will be handled when she is on leave, and explored potential other outcomes based on the majority of other experiences with her co-worker.     Patient discussed additional stress and anxiety as she begins to prepare her home for the baby. She stated that there is a lot of cleaning and organizing to occur, and she is starting to feel overwhelmed by the amount of \"stuff\" that comes with a . Patient stated that she is trying to put what is actually needed in perspective, and giving herself permission to approach the preparations differently than others. Explored how to increase and maintain motivation to complete tasks that are less than desired related to home preparations. Patient stated that she is also starting to think about the previous vision she had for her maternity leave in comparison to the realty of what it may look like because of the pandemic.     Progress on Treatment Objective(s) / Homework:  Stable - MAINTENANCE (Working to maintain change, with risk of relapse); Intervened by continuing to positively reinforce healthy behavior choice     Motivational Interviewing    MI Intervention: Expressed Empathy/Understanding, Supported Autonomy, Collaboration, Evocation, Permission to raise concern or advise, Open-ended questions, Change talk (evoked) and Reframe     Change Talk Expressed by the Patient: Desire to change " Ability to change Reasons to change Need to change Committment to change Activation    Provider Response to Change Talk: E - Evoked more info from patient about behavior change, A - Affirmed patient's thoughts, decisions, or attempts at behavior change, R - Reflected patient's change talk and S - Summarized patient's change talk statements     Mindfulness-Based Strategies : Discussed skills based on development and application of mindfulness    Also provided psychoeducation about behavioral health condition, symptoms, and treatment options    Care Plan review completed: Yes    Medication Review:  No current psychiatric medications prescribed    Medication Compliance:  NA    Changes in Health Issues:   None reported    Chemical Use Review:   Substance Use: Chemical use reviewed, no active concerns identified      Tobacco Use: No current tobacco use.      Assessment: Current Emotional / Mental Status (status of significant symptoms):  Risk status (Self / Other harm or suicidal ideation)  Patient denies a history of suicidal ideation, suicide attempts, self-injurious behavior, homicidal ideation, homicidal behavior and and other safety concerns  Patient denies current fears or concerns for personal safety.  Patient denies current or recent suicidal ideation or behaviors.  Patient denies current or recent homicidal ideation or behaviors.  Patient denies current or recent self injurious behavior or ideation.  Patient denies other safety concerns.  A safety and risk management plan has not been developed at this time, however patient was encouraged to call [place] [phone] should there be a change in any of these risk factors.    Appearance:   Appropriate   Eye Contact:   Good   Psychomotor Behavior: Normal   Attitude:   Cooperative   Orientation:   All  Speech   Rate / Production: Normal    Volume:  Normal   Mood:    Normal  Affect:    Appropriate   Thought Content:  Clear   Thought Form:  Coherent  Logical    Insight:    Good     Diagnoses:  1. BALA (generalized anxiety disorder)        Collateral Reports Completed:  Not Applicable    Plan: (Homework, other):  Patient was given information about behavioral services and encouraged to schedule a follow up appointment with the clinic [facility] in 2 weeks.  She was also given strategies to increase interest and motivation to complete tasks at home.  CD Recommendations: No indications of CD issues.  Dawn Franks, [facility], [facility]      ______________________________________________________________________    Integrated Primary Care Behavioral Health Treatment Plan    Patient's Name: Scott Dobbs  YOB: 1985    Date: 6/9/20    DSM-V Diagnoses: 300.02 (F41.1) Generalized Anxiety Disorder  Psychosocial / Contextual Factors: first pregnancy during COVID-19  WHODAS 2.0 Total Score 6/11/2020   Total Score 22   Total Score MyChart 22         Referral / Collaboration:  Referral to another professional/service is not indicated at this time..    Anticipated number of session or this episode of care: 10-12      MeasurableTreatment Goal(s) related to diagnosis / functional impairment(s)  Goal 1: Patient will reduce symptoms of anxiety as evidenced by decreased score on BALA 7.     I will know I've met my goal when I'm less irritable and it is easier to stop the spiral      Objective #A (Patient Action)    Patient will identify three distraction and diversion activities and use those activities to decrease level of anxiety  .  Status: New - Date: 6/9/20     Intervention(s)  [facility] will teach distress tolerance, mindfulness, and relaxation techniques.    Objective #B  Patient will use cognitive strategies identified in therapy to challenge anxious thoughts.  Status: New - Date: 6/9/20     Intervention(s)  [facility] will assign homework to practice cognitive restructuring and defusion techniques.    Objective #C  Patient will use relaxation strategies 2-3 times per day to reduce the physical  symptoms of anxiety.  Status: New - Date: 6/9/20     Intervention(s)  [facility] will teach relaxation techniques.    Patient has reviewed and agreed to the above plan.      Dawn Franks, [employer]  June 9, 2020

## 2020-07-23 ENCOUNTER — PRENATAL OFFICE VISIT (OUTPATIENT)
Dept: OBGYN | Facility: CLINIC | Age: 35
End: 2020-07-23
Payer: COMMERCIAL

## 2020-07-23 ENCOUNTER — OFFICE VISIT (OUTPATIENT)
Dept: MATERNAL FETAL MEDICINE | Facility: CLINIC | Age: 35
End: 2020-07-23
Attending: OBSTETRICS & GYNECOLOGY
Payer: COMMERCIAL

## 2020-07-23 ENCOUNTER — HOSPITAL ENCOUNTER (OUTPATIENT)
Dept: ULTRASOUND IMAGING | Facility: CLINIC | Age: 35
End: 2020-07-23
Attending: OBSTETRICS & GYNECOLOGY
Payer: COMMERCIAL

## 2020-07-23 VITALS
DIASTOLIC BLOOD PRESSURE: 82 MMHG | BODY MASS INDEX: 42.97 KG/M2 | WEIGHT: 291 LBS | SYSTOLIC BLOOD PRESSURE: 130 MMHG | HEART RATE: 86 BPM | TEMPERATURE: 97.2 F

## 2020-07-23 DIAGNOSIS — O35.9XX0 SUSPECTED FETAL ANOMALY, ANTEPARTUM, SINGLE OR UNSPECIFIED FETUS: ICD-10-CM

## 2020-07-23 DIAGNOSIS — O40.9XX0 POLYHYDRAMNIOS AFFECTING PREGNANCY: Primary | ICD-10-CM

## 2020-07-23 DIAGNOSIS — Z34.02 ENCOUNTER FOR SUPERVISION OF NORMAL FIRST PREGNANCY IN SECOND TRIMESTER: Primary | ICD-10-CM

## 2020-07-23 DIAGNOSIS — F41.9 ANXIETY: ICD-10-CM

## 2020-07-23 DIAGNOSIS — R03.0 ELEVATED BLOOD PRESSURE READING WITHOUT DIAGNOSIS OF HYPERTENSION: ICD-10-CM

## 2020-07-23 DIAGNOSIS — O99.210 OBESITY AFFECTING PREGNANCY, ANTEPARTUM: ICD-10-CM

## 2020-07-23 LAB
GLUCOSE 1H P 50 G GLC PO SERPL-MCNC: 126 MG/DL (ref 60–129)
HGB BLD-MCNC: 12.9 G/DL (ref 11.7–15.7)

## 2020-07-23 PROCEDURE — 82950 GLUCOSE TEST: CPT | Performed by: OBSTETRICS & GYNECOLOGY

## 2020-07-23 PROCEDURE — 00000218 ZZHCL STATISTIC OBHBG - HEMOGLOBIN: Performed by: OBSTETRICS & GYNECOLOGY

## 2020-07-23 PROCEDURE — 99207 ZZC PRENATAL VISIT: CPT | Performed by: OBSTETRICS & GYNECOLOGY

## 2020-07-23 PROCEDURE — 36415 COLL VENOUS BLD VENIPUNCTURE: CPT | Performed by: OBSTETRICS & GYNECOLOGY

## 2020-07-23 PROCEDURE — 76816 OB US FOLLOW-UP PER FETUS: CPT

## 2020-07-23 ASSESSMENT — ANXIETY QUESTIONNAIRES
GAD7 TOTAL SCORE: 5
6. BECOMING EASILY ANNOYED OR IRRITABLE: MORE THAN HALF THE DAYS
3. WORRYING TOO MUCH ABOUT DIFFERENT THINGS: NOT AT ALL
1. FEELING NERVOUS, ANXIOUS, OR ON EDGE: SEVERAL DAYS
5. BEING SO RESTLESS THAT IT IS HARD TO SIT STILL: NOT AT ALL
7. FEELING AFRAID AS IF SOMETHING AWFUL MIGHT HAPPEN: SEVERAL DAYS
IF YOU CHECKED OFF ANY PROBLEMS ON THIS QUESTIONNAIRE, HOW DIFFICULT HAVE THESE PROBLEMS MADE IT FOR YOU TO DO YOUR WORK, TAKE CARE OF THINGS AT HOME, OR GET ALONG WITH OTHER PEOPLE: SOMEWHAT DIFFICULT
2. NOT BEING ABLE TO STOP OR CONTROL WORRYING: NOT AT ALL

## 2020-07-23 ASSESSMENT — PATIENT HEALTH QUESTIONNAIRE - PHQ9
5. POOR APPETITE OR OVEREATING: SEVERAL DAYS
SUM OF ALL RESPONSES TO PHQ QUESTIONS 1-9: 8

## 2020-07-23 NOTE — PROGRESS NOTES
Please see full imaging report from [employer] program under imaging tab.    Mild poly.   Having GCT today.   Follow up US 2 weeks.     Isma Sung MD  Maternal Fetal Medicine

## 2020-07-23 NOTE — PATIENT INSTRUCTIONS
Online resources and birth classes    Anju: [email or web address]  Birthly: [email or web address]  Motherboard: [email or web address]          Patient Education     Understanding Preeclampsia  Preeclampsia is high blood pressure (hypertension) that happens during pregnancy. It often shows up around the 20th week of pregnancy. It often goes back to normal by the 12th week after you give birth. It can lead to serious health risks for you and your baby. During your pregnancy, your healthcare provider will watch your blood pressure.    Symptoms  A common symptom of preeclampsia is high blood pressure. Other symptoms may include:    Rapid weight gain    Protein in your urine    Headache    Belly (abdominal) pain on your right side    Vision problems. These include flashes or spots.    Swelling (edema) in your face or hands. This also often happens near the end of normal pregnancies, even without preeclampsia.  Tests you may have  Your healthcare provider will want to check your blood pressure throughout your pregnancy. If your blood pressure is high, you may have the following tests:    Urine tests to look for protein    Blood tests to confirm preeclampsia    Fetal monitoring to make sure that your baby is healthy  Treating preeclampsia  You may need to take a daily low dose of aspirin if you are at risk for preeclampsia. Preeclampsia almost always ends soon after you give birth. Until then, your healthcare provider can help manage your condition. If your symptoms are mild, you may need activity limits at home, including bed rest and no heavy lifting. If your symptoms are severe, you will stay in the hospital. Hospital treatment includes:    Activity limits to help control blood pressure. This means no heavy lifting and 8 hours per day lying down with the feet up.    Magnesium IV (intravenous) drip during labor to prevent seizures    Induced labor or surgical delivery by   section. Delivery is considered the cure for preeclampsia.  When to call your healthcare provider  Call your healthcare provider if swelling, weight gain, or other symptoms come on quickly or are severe. Some cases of preeclampsia are more severe than others. Your symptoms also may change or get worse as you get closer to your due date.  Who s at risk?  No one knows what causes preeclampsia. Preeclampsia can happen in any pregnant woman. But it is more common in first-time pregnancies. Things that increase the risk include:    Previous pregnancies. You are at risk if you had preeclampsia, intrauterine growth retardation (IUGR),  birth, placental abruption, or fetal death in a past pregnancy.    Health history of mother. You are at risk if you have diabetes, high blood pressure, obesity, kidney disease, autoimmune disease such as lupus, or a family history of preeclampsia.    Current pregnancy. You are at risk if this is your first pregnancy, or if you have multiple fetuses, are younger than age 18 or older than 40, or used in vitro fertilization.    Race. You are at risk if you are black.  Dangers of preeclampsia  If not treated, preeclampsia can cause problems for you and your baby. The placenta is the organ that nourishes your baby. It may tear away from the uterine wall. This can put the baby at risk for health problems (fetal distress) and premature birth. Preeclampsia can also cause these health problems:    Kidney failure or other organ damage    Seizures    Stroke  Once you give birth  In most cases, preeclampsia goes away on its own soon after you give birth. This is often by the 12th week after you give deliver. Within days of delivery, your blood pressure, swelling, and other symptoms should get better. For some women, problems from preeclampsia can continue after delivery.   Postpartum preeclampsia that develops within the first 48 hours after delivery is rare. Another type of postpartum  preeclampsia that develops more than 48 hours after delivery is called late-onset preeclampsia. It is also rare. Contact your healthcare provider right away if you have symptoms of preeclampsia after you deliver.  Date Last Reviewed: 6/1/2016 2000-2019 [employer]. [address] [place] [address], [place] [address]. All rights reserved. This information is not intended as a substitute for professional medical care. Always follow your healthcare professional's instructions.

## 2020-07-23 NOTE — PROGRESS NOTES
26w1d  1h gct: 126  Hgb: 12.9  + fetal movement.   US today. Polyhydramnios. MVP 10.1 cm. LEE 26.8 cm. EFW 78%ile.  Checked BP at home yesterday 131/77.   BP initially at new OB visit was 145/96. Usually initial BP is elevated then repeat wnl.   Reviewed pre-pregnancy BPs  10/2/19: 113/77  9/12/19: 131/91  9/7/18: 122/77  3/19/18: 131/85    Discussed it is unclear/borderline if she has chronic htn.   HELLP labs and UPC done last week - wnl.  Discussed that if chronic htn we recommend growth US q4-6w, weekly BPP at 32wks, delivery at 38-39w. Today's US showed Polyhydramnios, discussed that monitoring and delivery recommendations are the same for chronic htn. Discussed if develops pre-eclampsia IOL would be indicated at 37 weeks, potentially earlier if severe.     Childbirth classes? Info given  Plan on breastfeeding? Yes  Birthcontrol? Undecided, had IUD before  Sex on ultrasound? girl  Circumsion? NA  Peds doc? 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 Many protocols and PPE in place. 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 Plans to work remote for last few weeks of pregnancy.     RTC 2 weeks.  Dawn Grant MD

## 2020-07-24 ASSESSMENT — ANXIETY QUESTIONNAIRES: GAD7 TOTAL SCORE: 5

## 2020-07-28 ENCOUNTER — VIRTUAL VISIT (OUTPATIENT)
Dept: PSYCHOLOGY | Facility: CLINIC | Age: 35
End: 2020-07-28
Payer: COMMERCIAL

## 2020-07-28 DIAGNOSIS — F41.1 GAD (GENERALIZED ANXIETY DISORDER): Primary | ICD-10-CM

## 2020-07-28 PROCEDURE — 90834 PSYTX W PT 45 MINUTES: CPT | Mod: GT | Performed by: SOCIAL WORKER

## 2020-07-28 NOTE — PROGRESS NOTES
[facility]  July 28, 2020      Behavioral Health Clinician Progress Note    Patient Name: Scott Dobbs      Telemedicine Visit: The patient's condition can be safely assessed and treated via synchronous audio and visual telemedicine encounter.      Reason for Telemedicine Visit: Patient has requested telehealth visit and Services only offered telehealth due to COVID-19    Originating Site (Patient Location): Patient's home    Distant Site (Provider Location): Provider Remote Setting    Consent:  The patient/guardian has verbally consented to: the potential risks and benefits of telemedicine (video visit) versus in person care; bill my insurance or make self-payment for services provided; and responsibility for payment of non-covered services.     Mode of Communication:  Video Conference via [employer]    As the provider I attest to compliance with applicable laws and regulations related to telemedicine.         Service Type:  Individual     Session Start Time:  2:09 pm  Session End Time: 2:56 pm      Session Length: 38 - 52      Attendees: Patient    Visit Activities (Refresh list every visit): [facility] [place] Only    Diagnostic Assessment Date: 6/1/20  Treatment Plan Review Date: 6/9/20  See Flowsheets for today's PHQ-9 and BALA-7 results  Previous PHQ-9:   PHQ-9 SCORE 6/11/2020 7/23/2020   PHQ-9 Total Score MyChart 3 (Minimal depression) -   PHQ-9 Total Score 3 8     Previous BLAA-7:   BALA-7 SCORE 6/11/2020 7/23/2020   Total Score 5 (mild anxiety) -   Total Score 5 5       ED LEVEL:  No flowsheet data found.    DATA  Extended Session (60+ minutes): No  Interactive Complexity: No  Crisis: No  [facility] Patient: No    Treatment Objective(s) Addressed in This Session:  Target Behavior(s): Mental health management    Anxiety: will experience a reduction in anxiety, will develop more effective coping skills to manage anxiety symptoms, will develop healthy cognitive  "patterns and beliefs and will increase ability to function adaptively    Current Stressors / Issues:  Patient reported that it has been a \"crazy\" couple of weeks. She stated that she has to have a crown placed, but was originally told that the dentist would prefer to wait until after the baby is born. She stated that the pain became intolerable, and requested that it be done sooner. Patient shared that the pain has impacted her sleep, her energy level, and amplified other pregnancy symptoms.  Patient shared that she is hopeful that she will be feeling better once the pain improves.     Patient stated that she was also told that she will need to be monitored for preeclampsia due to high blood pressure, and she was also diagnosed with mild polyhydraminos.   She shared that she received this information in back to back appointments, and discussed feeling overwhelmed as there is not yet enough information to know what to anticipate and expect. She stated that she will now have appointments every two weeks, and hopes to have more information next week. Patient shared that she may have to be induced, and discussed the feeling of loss associated with a chance of not being able to go into labor spontaneously. She stated that she is surprised by this emotion as she tends to plan and anticipated that she would have appreciated knowing when the induction would be scheduled.  Continued to explore management of anxiety related to these stressors as patient expressed concern about \"downward spiral of doom\".  Reviewed and applied CBT techniques.     Progress on Treatment Objective(s) / Homework:  Worsening - PREPARATION (Decided to change - considering how); Intervened by negotiating a change plan and determining options / strategies for behavior change, identifying triggers, exploring social supports, and working towards setting a date to begin behavior change    Motivational Interviewing    MI Intervention: Expressed " Empathy/Understanding, Supported Autonomy, Collaboration, Evocation, Permission to raise concern or advise, Open-ended questions, Change talk (evoked) and Reframe     Change Talk Expressed by the Patient: Desire to change Ability to change Reasons to change Need to change Committment to change Activation    Provider Response to Change Talk: E - Evoked more info from patient about behavior change, A - Affirmed patient's thoughts, decisions, or attempts at behavior change, R - Reflected patient's change talk and S - Summarized patient's change talk statements     Mindfulness-Based Strategies : Discussed skills based on development and application of mindfulness    Also provided psychoeducation about behavioral health condition, symptoms, and treatment options    Care Plan review completed: Yes    Medication Review:  No current psychiatric medications prescribed    Medication Compliance:  NA    Changes in Health Issues:   None reported    Chemical Use Review:   Substance Use: Chemical use reviewed, no active concerns identified      Tobacco Use: No current tobacco use.      Assessment: Current Emotional / Mental Status (status of significant symptoms):  Risk status (Self / Other harm or suicidal ideation)  Patient denies a history of suicidal ideation, suicide attempts, self-injurious behavior, homicidal ideation, homicidal behavior and and other safety concerns  Patient denies current fears or concerns for personal safety.  Patient denies current or recent suicidal ideation or behaviors.  Patient denies current or recent homicidal ideation or behaviors.  Patient denies current or recent self injurious behavior or ideation.  Patient denies other safety concerns.  A safety and risk management plan has not been developed at this time, however patient was encouraged to [facility] should there be a change in any of these risk factors.    Appearance:   Appropriate   Eye Contact:   Good   Psychomotor  Behavior: Normal   Attitude:   Cooperative   Orientation:   All  Speech   Rate / Production: Normal    Volume:  Normal   Mood:    Normal  Affect:    Appropriate   Thought Content:  Clear   Thought Form:  Coherent  Logical   Insight:    Good     Diagnoses:  1. BALA (generalized anxiety disorder)        Collateral Reports Completed:  Not Applicable    Plan: (Homework, other):  Patient was given information about behavioral services and encouraged to schedule a follow up appointment with the clinic [facility] in 2 weeks.  She was also given Cognitive Behavioral Therapy skills to practice when experiencing anxiety.  CD Recommendations: No indications of CD issues.  Dawn Franks, [facility], [facility] [place]      ______________________________________________________________________    Integrated Primary Care Behavioral Health Treatment Plan    Patient's Name: Scott Dobbs  YOB: 1985    Date: 6/9/20    DSM-V Diagnoses: 300.02 (F41.1) Generalized Anxiety Disorder  Psychosocial / Contextual Factors: first pregnancy during COVID-19  WHODAS 2.0 Total Score 6/11/2020   Total Score 22   Total Score MyChart 22         Referral / Collaboration:  Referral to another professional/service is not indicated at this time..    Anticipated number of session or this episode of care: 10-12      MeasurableTreatment Goal(s) related to diagnosis / functional impairment(s)  Goal 1: Patient will reduce symptoms of anxiety as evidenced by decreased score on BALA 7.     I will know I've met my goal when I'm less irritable and it is easier to stop the spiral      Objective #A (Patient Action)    Patient will identify three distraction and diversion activities and use those activities to decrease level of anxiety  .  Status: New - Date: 6/9/20     Intervention(s)  [facility] will teach distress tolerance, mindfulness, and relaxation techniques.    Objective #B  Patient will use cognitive strategies identified in therapy to challenge anxious thoughts.  Status:  New - Date: 6/9/20     Intervention(s)  [facility] will assign homework to practice cognitive restructuring and defusion techniques.    Objective #C  Patient will use relaxation strategies 2-3 times per day to reduce the physical symptoms of anxiety.  Status: New - Date: 6/9/20     Intervention(s)  [facility] will teach relaxation techniques.    Patient has reviewed and agreed to the above plan.      Dawn Franks, [facility]  June 9, 2020

## 2020-08-06 ENCOUNTER — OFFICE VISIT (OUTPATIENT)
Dept: MATERNAL FETAL MEDICINE | Facility: CLINIC | Age: 35
End: 2020-08-06
Attending: OBSTETRICS & GYNECOLOGY
Payer: COMMERCIAL

## 2020-08-06 ENCOUNTER — PRENATAL OFFICE VISIT (OUTPATIENT)
Dept: OBGYN | Facility: CLINIC | Age: 35
End: 2020-08-06
Payer: COMMERCIAL

## 2020-08-06 ENCOUNTER — HOSPITAL ENCOUNTER (OUTPATIENT)
Dept: ULTRASOUND IMAGING | Facility: CLINIC | Age: 35
End: 2020-08-06
Attending: OBSTETRICS & GYNECOLOGY
Payer: COMMERCIAL

## 2020-08-06 VITALS
SYSTOLIC BLOOD PRESSURE: 130 MMHG | WEIGHT: 292.9 LBS | TEMPERATURE: 98.3 F | BODY MASS INDEX: 43.25 KG/M2 | DIASTOLIC BLOOD PRESSURE: 72 MMHG

## 2020-08-06 DIAGNOSIS — Z23 NEED FOR TDAP VACCINATION: ICD-10-CM

## 2020-08-06 DIAGNOSIS — Z34.03 ENCOUNTER FOR SUPERVISION OF NORMAL FIRST PREGNANCY IN THIRD TRIMESTER: Primary | ICD-10-CM

## 2020-08-06 DIAGNOSIS — O40.9XX0 POLYHYDRAMNIOS AFFECTING PREGNANCY: Primary | ICD-10-CM

## 2020-08-06 DIAGNOSIS — O40.9XX0 POLYHYDRAMNIOS AFFECTING PREGNANCY: ICD-10-CM

## 2020-08-06 DIAGNOSIS — O99.210 OBESITY AFFECTING PREGNANCY, ANTEPARTUM: ICD-10-CM

## 2020-08-06 PROCEDURE — 90471 IMMUNIZATION ADMIN: CPT | Performed by: OBSTETRICS & GYNECOLOGY

## 2020-08-06 PROCEDURE — 90715 TDAP VACCINE 7 YRS/> IM: CPT | Performed by: OBSTETRICS & GYNECOLOGY

## 2020-08-06 PROCEDURE — 99207 ZZC PRENATAL VISIT: CPT | Performed by: OBSTETRICS & GYNECOLOGY

## 2020-08-06 PROCEDURE — 76815 OB US LIMITED FETUS(S): CPT

## 2020-08-06 NOTE — PROGRESS NOTES
"Please see \"Imaging\" tab under \"Chart Review\" for details of today's visit.    Juliann Huynh    "

## 2020-08-06 NOTE — PROGRESS NOTES
[facility]    CC: routine prenatal visit.    S:  Patient reports feeling some BH ctx's.  She also has tooth pain where she had a temporary crown placed, but she is working with her dentist to improve this.  Patient denies any vaginal bleeding, leakage of fluid.  She states she is feeling normal fetal movement.  She feels her mood is generally good.      Pregnancy notable for:  -polyhydramnios   -chronic hypertension    O:   Patient Vitals for the past 24 hrs:   BP Temp Temp src Weight   20 1155 130/72 98.3  F (36.8  C) Oral 132.9 kg (292 lb 14.4 oz)   ]  Exam:  General- awake, alert, answering questions appropriately, appears comfortable  CV- regular rate  Lung- breathing comfortably on room air  Ext- bilateral lower extremities with  edema    Imaging and Labs:  [facility] ultrasound   GENERAL EVALUATION  ---------------------------------------------------------------------------------------------------------  Cardiac activity present.  bpm.  Fetal movements present.  Presentation cephalic.  Placenta  Posterior, No Previa  Umbilical cord previously studied.  Amniotic fluid MVP 9.7 cm. LEE 26.4 cm. Q1 9.8 cm, Q2 4.2 cm, Q3 7.4 cm, Q4 4.9 cm.  IMPRESSION  ---------------------------------------------------------------------------------------------------------  1) Intrauterine pregnancy at 28 1/7 weeks gestational age.  2) Mild polyhydramios was again noted.         A&P: Scott Lu Merhar is a 35 year old  at 28w1d by LMP c/w 10w3d us who presents today for routine prenatal visit.    (Z34.03) Encounter for supervision of normal first pregnancy in third trimester  (primary encounter diagnosis)  Comment: Doing well.  Discussed Tdap in pregnancy.  Patient's mood is good and working with Dawn Franks.  BP normal today.  Persistent polyhydramnios.    Plan: Next apt by phone in 2 weeks.  In person in 4 weeks   Repeat MFM ultrasound scheduled for     (Z23) Need for Tdap  vaccination  Comment: due for Tdap  Plan: TDAP VACCINE, VACCINE ADMINISTRATION, INITIAL    Elisabeth Lima MD

## 2020-08-06 NOTE — PATIENT INSTRUCTIONS
Patient Education     Kick Counts    It s normal to worry about your baby s health. One way you can know your baby s doing well is to record the baby s movements once a day. This is called a kick count. Remember to take your kick count records to all your appointments with your healthcare provider.  How to count kicks  Time how long it takes you to feel 10 kicks, flutters, swishes, or rolls. Ideally, you want to feel at least 10 movements within 2 hours. You will likely feel 10 movements in less time than that.  Starting at 28 weeks, count your baby's movements daily. Follow your healthcare provider's instructions for kick counting. Here are tips for counting kicks:    Choose a time when the baby is active, such as after a meal.     Sit comfortably or lie on your side.     The first time the baby moves, write down the time.     Count each movement until the baby has moved 10 times. This can take from 20 minutes to 2 hours.     If you have not felt 10 kicks by the end of the second hour, wait a few hours. Then try again.    Try to do it at the same time each day.  When to call your healthcare provider  Call your healthcare provider right away if:    You do a couple sets of kick counts during the day and your baby moves fewer than 10 times in 2 hours    Your baby moves much less often than on the days before.    You have not felt your baby move all day.  Date Last Reviewed: 12/1/2017 2000-2019 [employer]. [address] [place] [address]. All rights reserved. This information is not intended as a substitute for professional medical care. Always follow your healthcare professional's instructions.           Patient Education     Pregnancy: Your Third Trimester Changes  As the baby grows, your body changes too. You may also see signs that your body is getting ready for labor. Be patient. Within a few more weeks, your baby will be born.  How you are changing  Your body is preparing for the  birth of your baby. Some of the most common changes are listed below. If you have any questions or concerns, ask your healthcare provider:    You ll gain more weight from fluids, extra blood, and fat deposits.    Your breasts will grow as your body gets ready to feed the baby. They may be more tender. You may also notice a slight yellow or white discharge from the nipples.    Discharge from your vagina may increase. This is normal.    You might see some skin color changes on your forehead, cheeks, or nose. Most of these will go away after you deliver.  How your baby is growing       Month 7  Your baby can open and close his or her eyes and weighs around 4 pounds. If born prematurely (too early), your baby would likely survive with special care. Month 8  Your baby is building up body fat and weighs around 6 pounds. Month 9  Your baby weighs nearly 7 pounds and is about 19 to 21 inches long. In other words, any day now...   Date Last Reviewed: 2/1/2018 2000-2019 [employer]. [address] [place] [address]. All rights reserved. This information is not intended as a substitute for professional medical care. Always follow your healthcare professional's instructions.           Patient Education     Adapting to Pregnancy: Third Trimester    Although common during pregnancy, some discomforts may seem worse in the final weeks. Simple lifestyle changes can help. Take care of yourself. And ask your partner to help out with small tasks.  Limiting leg problems  Ways to combat leg issues:    Wear support hose all day.    Avoid snug shoes and clothes that bind, like tight pants and socks with elastic tops.    Sit with your feet and legs raised often.  Caring for your breasts  Tips to follow include:    Wash with plain water. Avoid using harsh soaps or rubbing alcohol. They may cause dryness.    Wear a nursing bra for extra support. It can also hide any leaks from your nipples.  Controlling  hemorrhoids  Ways to avoid hemorrhoids include:    Eat foods that are high in fiber. Also, exercise and drink enough fluids. This will reduce constipation and hemorrhoids.    Sleep and nap on your side. This limits pressure on the veins of your rectum.    Try not to stand or sit for long periods.  Controlling back pain  As your body changes during pregnancy, your back must work in new ways. Back pain is due to many causes. Physical changes in your body can strain your back and its supporting muscles. Also, hormones (chemicals that carry messages throughout the body) increase during pregnancy. This can affect how your muscles and joints work together. All of these changes can lead to pain. Pain may be felt in the upper or lower back. Pain is also common in the pelvis. Some pregnant women have sciatica. This is pain caused by pressure on the sciatic nerve running down the back of the leg. Ask your healthcare provider for specific tips and exercises to help control your back pain.  Tips to help you rest  Good rest and sleep will help you feel better. Here are some ideas:    Ask your partner to massage your shoulders, neck, or back.    Limit the errands you do each day.    Lie down in the afternoon or after work for a few minutes.    Take a warm bath before you go to sleep.    Drink warm milk or teas without caffeine.    Avoid coffee, black tea, and cola.  Stopping heartburn    Avoid spicy, greasy, fried, or acidic foods.    Eat small amounts more often. Eat slowly.   Wait 2 hours after eating before lying down.    Sleep with your upper body raised 6 inches.   Managing mood swings  Ways to manage mood swings include:    Know that mood changes are normal.    Exercise often, but get plenty of rest.    Address any concerns and limit stress. Talking to your partner, other women, or your healthcare provider may help.  Dealing with urinary frequency  Tips to deal with having to urinate often include:    Drink plenty of water  all day. If you drink a lot in the evening, though, you may have to get up more in the night.    Limit coffee, black tea, and cola.  Date Last Reviewed: 2/1/2018 2000-2019 [employer]. [address] [place] [address], [place] [address]. All rights reserved. This information is not intended as a substitute for professional medical care. Always follow your healthcare professional's instructions.

## 2020-08-11 ENCOUNTER — VIRTUAL VISIT (OUTPATIENT)
Dept: PSYCHOLOGY | Facility: CLINIC | Age: 35
End: 2020-08-11
Payer: COMMERCIAL

## 2020-08-11 DIAGNOSIS — F41.1 GAD (GENERALIZED ANXIETY DISORDER): Primary | ICD-10-CM

## 2020-08-11 PROCEDURE — 90834 PSYTX W PT 45 MINUTES: CPT | Mod: GT | Performed by: SOCIAL WORKER

## 2020-08-11 NOTE — PROGRESS NOTES
[facility]  August 11, 2020    Behavioral Health Clinician Progress Note    Patient Name: Scott Dobbs      Telemedicine Visit: The patient's condition can be safely assessed and treated via synchronous audio and visual telemedicine encounter.      Reason for Telemedicine Visit: Patient has requested telehealth visit and Services only offered telehealth due to COVID-19    Originating Site (Patient Location): Patient's home    Distant Site (Provider Location): Provider Remote Setting    Consent:  The patient/guardian has verbally consented to: the potential risks and benefits of telemedicine (video visit) versus in person care; bill my insurance or make self-payment for services provided; and responsibility for payment of non-covered services.     Mode of Communication:  Video Conference via [employer]    As the provider I attest to compliance with applicable laws and regulations related to telemedicine.         Service Type:  Individual     Session Start Time:  3:31 pm  Session End Time: 4:23  pm      Session Length: 38 - 52      Attendees: Patient    Visit Activities (Refresh list every visit): [facility] Only    Diagnostic Assessment Date: 6/1/20  Treatment Plan Review Date: 6/9/20  See Flowsheets for today's PHQ-9 and BALA-7 results  Previous PHQ-9:   PHQ-9 SCORE 6/11/2020 7/23/2020   PHQ-9 Total Score MyChart 3 (Minimal depression) -   PHQ-9 Total Score 3 8     Previous BALA-7:   BALA-7 SCORE 6/11/2020 7/23/2020   Total Score 5 (mild anxiety) -   Total Score 5 5       ED LEVEL:  No flowsheet data found.    DATA  Extended Session (60+ minutes): No  Interactive Complexity: No  Crisis: No  [facility] Patient: No    Treatment Objective(s) Addressed in This Session:  Target Behavior(s): Mental health management    Anxiety: will experience a reduction in anxiety, will develop more effective coping skills to manage anxiety symptoms, will develop healthy cognitive  "patterns and beliefs and will increase ability to function adaptively    Current Stressors / Issues:  Patient reported that it has been a busy couple of weeks. She discussed concerns for recent insomnia, but insomnia that does not appear related to anxiety. Reviewed CBT-I strategies to assist with improving sleep.  Patient stated that work has been a significant source of stress as she continues to try to manage a co-worker that does not appear to be pulling her weight, and then holds up her work and projects. She stated that she feels minimally heard by her boss, and worries about how the final weeks before her leave unfold. She continued to discuss anxiety about being on her leave and people realizing that \"I'm not needed\".  Continued to explore these anxieties, the helpfulness and accuracy of thoughts, and how to continue to approach work as she starts to feel the burn out and the recognition that some of the co-workers behaviors may not change.     Progress on Treatment Objective(s) / Homework:  No improvement - PREPARATION (Decided to change - considering how); Intervened by negotiating a change plan and determining options / strategies for behavior change, identifying triggers, exploring social supports, and working towards setting a date to begin behavior change    Motivational Interviewing    MI Intervention: Expressed Empathy/Understanding, Supported Autonomy, Collaboration, Evocation, Permission to raise concern or advise, Open-ended questions, Change talk (evoked) and Reframe     Change Talk Expressed by the Patient: Desire to change Ability to change Reasons to change Need to change Committment to change Activation    Provider Response to Change Talk: E - Evoked more info from patient about behavior change, A - Affirmed patient's thoughts, decisions, or attempts at behavior change, R - Reflected patient's change talk and S - Summarized patient's change talk statements     Mindfulness-Based Strategies : " Discussed skills based on development and application of mindfulness    Also provided psychoeducation about behavioral health condition, symptoms, and treatment options    Care Plan review completed: Yes    Medication Review:  No current psychiatric medications prescribed    Medication Compliance:  NA    Changes in Health Issues:   None reported    Chemical Use Review:   Substance Use: Chemical use reviewed, no active concerns identified      Tobacco Use: No current tobacco use.      Assessment: Current Emotional / Mental Status (status of significant symptoms):  Risk status (Self / Other harm or suicidal ideation)  Patient denies a history of suicidal ideation, suicide attempts, self-injurious behavior, homicidal ideation, homicidal behavior and and other safety concerns  Patient denies current fears or concerns for personal safety.  Patient denies current or recent suicidal ideation or behaviors.  Patient denies current or recent homicidal ideation or behaviors.  Patient denies current or recent self injurious behavior or ideation.  Patient denies other safety concerns.  A safety and risk management plan has not been developed at this time, however patient was encouraged to call [facility] should there be a change in any of these risk factors.    Appearance:   Appropriate   Eye Contact:   Good   Psychomotor Behavior: Normal   Attitude:   Cooperative   Orientation:   All  Speech   Rate / Production: Normal    Volume:  Normal   Mood:    Normal  Affect:    Appropriate   Thought Content:  Clear   Thought Form:  Coherent  Logical   Insight:    Good     Diagnoses:  1. BALA (generalized anxiety disorder)        Collateral Reports Completed:  Not Applicable    Plan: (Homework, other):  Patient was given information about behavioral services and encouraged to schedule a follow up appointment with the clinic [facility] in 2 weeks.  She was also given Cognitive Behavioral Therapy skills to practice when experiencing  anxiety.  CD Recommendations: No indications of CD issues.  GEO Perez, [facility]      ______________________________________________________________________    Integrated Primary Care Behavioral Health Treatment Plan    Patient's Name: Scott Dobbs  YOB: 1985    Date: 6/9/20    DSM-V Diagnoses: 300.02 (F41.1) Generalized Anxiety Disorder  Psychosocial / Contextual Factors: first pregnancy during COVID-19  WHODAS 2.0 Total Score 6/11/2020   Total Score 22   Total Score MyChart 22         Referral / Collaboration:  Referral to another professional/service is not indicated at this time..    Anticipated number of session or this episode of care: 10-12      MeasurableTreatment Goal(s) related to diagnosis / functional impairment(s)  Goal 1: Patient will reduce symptoms of anxiety as evidenced by decreased score on BALA 7.     I will know I've met my goal when I'm less irritable and it is easier to stop the spiral      Objective #A (Patient Action)    Patient will identify three distraction and diversion activities and use those activities to decrease level of anxiety  .  Status: New - Date: 6/9/20     Intervention(s)  [facility] will teach distress tolerance, mindfulness, and relaxation techniques.    Objective #B  Patient will use cognitive strategies identified in therapy to challenge anxious thoughts.  Status: New - Date: 6/9/20     Intervention(s)  [facility] will assign homework to practice cognitive restructuring and defusion techniques.    Objective #C  Patient will use relaxation strategies 2-3 times per day to reduce the physical symptoms of anxiety.  Status: New - Date: 6/9/20     Intervention(s)  [facility] will teach relaxation techniques.    Patient has reviewed and agreed to the above plan.      GEO Perez  June 9, 2020

## 2020-08-21 ENCOUNTER — OFFICE VISIT (OUTPATIENT)
Dept: MATERNAL FETAL MEDICINE | Facility: CLINIC | Age: 35
End: 2020-08-21
Attending: OBSTETRICS & GYNECOLOGY
Payer: COMMERCIAL

## 2020-08-21 ENCOUNTER — HOSPITAL ENCOUNTER (OUTPATIENT)
Dept: ULTRASOUND IMAGING | Facility: CLINIC | Age: 35
End: 2020-08-21
Attending: OBSTETRICS & GYNECOLOGY
Payer: COMMERCIAL

## 2020-08-21 ENCOUNTER — PRENATAL OFFICE VISIT (OUTPATIENT)
Dept: OBGYN | Facility: CLINIC | Age: 35
End: 2020-08-21
Payer: COMMERCIAL

## 2020-08-21 VITALS
WEIGHT: 293 LBS | DIASTOLIC BLOOD PRESSURE: 82 MMHG | HEART RATE: 88 BPM | TEMPERATURE: 97.7 F | BODY MASS INDEX: 43.56 KG/M2 | SYSTOLIC BLOOD PRESSURE: 130 MMHG

## 2020-08-21 DIAGNOSIS — O40.9XX0 POLYHYDRAMNIOS AFFECTING PREGNANCY: ICD-10-CM

## 2020-08-21 DIAGNOSIS — O40.9XX0 POLYHYDRAMNIOS AFFECTING PREGNANCY: Primary | ICD-10-CM

## 2020-08-21 DIAGNOSIS — Z34.03 ENCOUNTER FOR SUPERVISION OF NORMAL FIRST PREGNANCY IN THIRD TRIMESTER: Primary | ICD-10-CM

## 2020-08-21 DIAGNOSIS — O99.213 OBESITY AFFECTING PREGNANCY IN THIRD TRIMESTER: ICD-10-CM

## 2020-08-21 PROCEDURE — 99207 ZZC PRENATAL VISIT: CPT | Performed by: OBSTETRICS & GYNECOLOGY

## 2020-08-21 PROCEDURE — 76816 OB US FOLLOW-UP PER FETUS: CPT

## 2020-08-21 NOTE — PROGRESS NOTES
Doing well.   Good fetal movement.   BPs normal at home.   Last US on 8/21:  EFW                                       1,913                  g                                     76%         Cole  EFW (lb,oz)                             4 lb 3                  oz  LEE 23 cm.   Cephalic.     Has US again on 10/2.  RTC 2 weeks

## 2020-08-21 NOTE — PROGRESS NOTES
Please see the imaging tab for details of the ultrasound performed today.    Kristen Smith MD  Specialist in Maternal-Fetal Medicine

## 2020-08-25 ENCOUNTER — VIRTUAL VISIT (OUTPATIENT)
Dept: PSYCHOLOGY | Facility: CLINIC | Age: 35
End: 2020-08-25
Payer: COMMERCIAL

## 2020-08-25 DIAGNOSIS — F41.1 GAD (GENERALIZED ANXIETY DISORDER): Primary | ICD-10-CM

## 2020-08-25 PROCEDURE — 90834 PSYTX W PT 45 MINUTES: CPT | Mod: GT | Performed by: SOCIAL WORKER

## 2020-08-25 NOTE — PROGRESS NOTES
[facility]  August 25, 2020    Behavioral Health Clinician Progress Note    Patient Name: Scott Dobbs      Telemedicine Visit: The patient's condition can be safely assessed and treated via synchronous audio and visual telemedicine encounter.      Reason for Telemedicine Visit: Patient has requested telehealth visit and Services only offered telehealth due to COVID-19    Originating Site (Patient Location): Patient's home    Distant Site (Provider Location): Provider Remote Setting    Consent:  The patient/guardian has verbally consented to: the potential risks and benefits of telemedicine (video visit) versus in person care; bill my insurance or make self-payment for services provided; and responsibility for payment of non-covered services.     Mode of Communication:  Video Conference via [employer]    As the provider I attest to compliance with applicable laws and regulations related to telemedicine.         Service Type:  Individual     Session Start Time:  3:36 pm  Session End Time: 4:28 pm      Session Length: 38 - 52      Attendees: Patient    Visit Activities (Refresh list every visit): [facility] Only    Diagnostic Assessment Date: 6/1/20  Treatment Plan Review Date: 6/9/20  See Flowsheets for today's PHQ-9 and BALA-7 results  Previous PHQ-9:   PHQ-9 SCORE 6/11/2020 7/23/2020   PHQ-9 Total Score MyChart 3 (Minimal depression) -   PHQ-9 Total Score 3 8     Previous BALA-7:   BALA-7 SCORE 6/11/2020 7/23/2020   Total Score 5 (mild anxiety) -   Total Score 5 5       ED LEVEL:  No flowsheet data found.    DATA  Extended Session (60+ minutes): No  Interactive Complexity: No  Crisis: No  [facility] Patient: No    Treatment Objective(s) Addressed in This Session:  Target Behavior(s): Mental health management    Anxiety: will experience a reduction in anxiety, will develop more effective coping skills to manage anxiety symptoms, will develop healthy cognitive  "patterns and beliefs and will increase ability to function adaptively    Current Stressors / Issues:  Patient reported ongoing heightened stress related to work. She stated that she had one co-worker that she thought she had good rapport with, recently confronted her with concerns about her creating a hostile work environment, that her postures were intimidating, and that she was \"too confrontational\". Patient processed emotions related to these accusations, and frustrations she feels because evidence was not provided to support their claims. She also discussed how she feels hurt because she has been actively trying to be flexible and taking on more work during the pandemic because she knows that her co-workers are balance small children at home. Patient stated that it is not the first time these accusations have been made about her, and she is actively working on adapting her language and approach. Patient discussed stress associated with an upcoming meeting with her boss to discuss these concerns with him.     Progress on Treatment Objective(s) / Homework:  No improvement - PREPARATION (Decided to change - considering how); Intervened by negotiating a change plan and determining options / strategies for behavior change, identifying triggers, exploring social supports, and working towards setting a date to begin behavior change    Motivational Interviewing    MI Intervention: Expressed Empathy/Understanding, Supported Autonomy, Collaboration, Evocation, Permission to raise concern or advise, Open-ended questions, Change talk (evoked) and Reframe     Change Talk Expressed by the Patient: Desire to change Ability to change Reasons to change Need to change Committment to change Activation    Provider Response to Change Talk: E - Evoked more info from patient about behavior change, A - Affirmed patient's thoughts, decisions, or attempts at behavior change, R - Reflected patient's change talk and S - Summarized " patient's change talk statements     Mindfulness-Based Strategies : Discussed skills based on development and application of mindfulness    Also provided psychoeducation about behavioral health condition, symptoms, and treatment options    Care Plan review completed: Yes    Medication Review:  No current psychiatric medications prescribed    Medication Compliance:  NA    Changes in Health Issues:   None reported    Chemical Use Review:   Substance Use: Chemical use reviewed, no active concerns identified      Tobacco Use: No current tobacco use.      Assessment: Current Emotional / Mental Status (status of significant symptoms):  Risk status (Self / Other harm or suicidal ideation)  Patient denies a history of suicidal ideation, suicide attempts, self-injurious behavior, homicidal ideation, homicidal behavior and and other safety concerns  Patient denies current fears or concerns for personal safety.  Patient denies current or recent suicidal ideation or behaviors.  Patient denies current or recent homicidal ideation or behaviors.  Patient denies current or recent self injurious behavior or ideation.  Patient denies other safety concerns.  A safety and risk management plan has not been developed at this time, however patient was encouraged to call [place] [phone] should there be a change in any of these risk factors.    Appearance:   Appropriate   Eye Contact:   Good   Psychomotor Behavior: Normal   Attitude:   Cooperative   Orientation:   All  Speech   Rate / Production: Normal    Volume:  Normal   Mood:    Normal  Affect:    Appropriate   Thought Content:  Clear   Thought Form:  Coherent  Logical   Insight:    Good     Diagnoses:  1. BALA (generalized anxiety disorder)        Collateral Reports Completed:  Not Applicable    Plan: (Homework, other):  Patient was given information about behavioral services and encouraged to schedule a follow up appointment with the clinic [facility] in 2 weeks.  She was also given  Cognitive Behavioral Therapy skills to practice when experiencing anxiety.  CD Recommendations: No indications of CD issues.  GEO Perez, [facility]      ______________________________________________________________________    Integrated Primary Care Behavioral Health Treatment Plan    Patient's Name: Scott Dobbs  YOB: 1985    Date: 6/9/20    DSM-V Diagnoses: 300.02 (F41.1) Generalized Anxiety Disorder  Psychosocial / Contextual Factors: first pregnancy during COVID-19  WHODAS 2.0 Total Score 6/11/2020   Total Score 22   Total Score MyChart 22         Referral / Collaboration:  Referral to another professional/service is not indicated at this time..    Anticipated number of session or this episode of care: 10-12      MeasurableTreatment Goal(s) related to diagnosis / functional impairment(s)  Goal 1: Patient will reduce symptoms of anxiety as evidenced by decreased score on BALA 7.     I will know I've met my goal when I'm less irritable and it is easier to stop the spiral      Objective #A (Patient Action)    Patient will identify three distraction and diversion activities and use those activities to decrease level of anxiety  .  Status: New - Date: 6/9/20     Intervention(s)  [facility] will teach distress tolerance, mindfulness, and relaxation techniques.    Objective #B  Patient will use cognitive strategies identified in therapy to challenge anxious thoughts.  Status: New - Date: 6/9/20     Intervention(s)  [facility] will assign homework to practice cognitive restructuring and defusion techniques.    Objective #C  Patient will use relaxation strategies 2-3 times per day to reduce the physical symptoms of anxiety.  Status: New - Date: 6/9/20     Intervention(s)  [facility] will teach relaxation techniques.    Patient has reviewed and agreed to the above plan.      GEO Perez  June 9, 2020

## 2020-09-04 ENCOUNTER — PRENATAL OFFICE VISIT (OUTPATIENT)
Dept: OBGYN | Facility: CLINIC | Age: 35
End: 2020-09-04
Payer: COMMERCIAL

## 2020-09-04 VITALS
SYSTOLIC BLOOD PRESSURE: 120 MMHG | TEMPERATURE: 98.2 F | BODY MASS INDEX: 43.71 KG/M2 | HEART RATE: 88 BPM | WEIGHT: 293 LBS | DIASTOLIC BLOOD PRESSURE: 74 MMHG

## 2020-09-04 DIAGNOSIS — Z34.03 ENCOUNTER FOR SUPERVISION OF NORMAL FIRST PREGNANCY IN THIRD TRIMESTER: Primary | ICD-10-CM

## 2020-09-04 PROCEDURE — 99207 ZZC PRENATAL VISIT: CPT | Performed by: OBSTETRICS & GYNECOLOGY

## 2020-09-04 NOTE — PROGRESS NOTES
32w2d feeling ok, no complaints.  Good fm.  Lots of normal pregnancy/labor/postpartum questions.  She is inquiring about waterbirth and I explained all rooms have tub/shower if she desires use during labor and appropriate.  If she desires actual waterbirth, she would need to consult with CNM team to see if she is appropriate candidate and then TRACI.  She is going to consider.  RTC 2 weeks  louisp

## 2020-09-08 ENCOUNTER — VIRTUAL VISIT (OUTPATIENT)
Dept: PSYCHOLOGY | Facility: CLINIC | Age: 35
End: 2020-09-08
Payer: COMMERCIAL

## 2020-09-08 DIAGNOSIS — F41.1 GAD (GENERALIZED ANXIETY DISORDER): Primary | ICD-10-CM

## 2020-09-08 PROCEDURE — 90834 PSYTX W PT 45 MINUTES: CPT | Mod: TEL | Performed by: SOCIAL WORKER

## 2020-09-08 NOTE — PROGRESS NOTES
[facility]- Integrated Behavioral Health Services  September 8, 2020    Behavioral Health Clinician Progress Note    Patient Name: Scott oDbbs      Telemedicine Visit: The patient's condition can be safely assessed and treated via synchronous audio and visual telemedicine encounter.      Reason for Telemedicine Visit: Patient has requested telehealth visit and Services only offered telehealth due to COVID-19    Originating Site (Patient Location): Patient's home    Distant Site (Provider Location): Provider Remote Setting    Consent:  The patient/guardian has verbally consented to: the potential risks and benefits of telemedicine (video visit) versus in person care; bill my insurance or make self-payment for services provided; and responsibility for payment of non-covered services.     Mode of Communication:  Video Conference via [email or web address]    As the provider I attest to compliance with applicable laws and regulations related to telemedicine.         Service Type:  Individual     Session Start Time:  3:33 pm  Session End Time: 4:25 pm      Session Length: 38 - 52      Attendees: Patient    Visit Activities (Refresh list every visit): [facility] Only    Diagnostic Assessment Date: 6/1/20  Treatment Plan Review Date: 6/9/20  See Flowsheets for today's PHQ-9 and BALA-7 results  Previous PHQ-9:   PHQ-9 SCORE 6/11/2020 7/23/2020   PHQ-9 Total Score MyChart 3 (Minimal depression) -   PHQ-9 Total Score 3 8     Previous BALA-7:   BALA-7 SCORE 6/11/2020 7/23/2020   Total Score 5 (mild anxiety) -   Total Score 5 5       ED LEVEL:  No flowsheet data found.    DATA  Extended Session (60+ minutes): No  Interactive Complexity: No  Crisis: No  [facility] Patient: No    Treatment Objective(s) Addressed in This Session:  Target Behavior(s): Mental health management    Anxiety: will experience a reduction in anxiety, will develop more effective coping skills to manage anxiety symptoms, will develop healthy cognitive  patterns and beliefs and will increase ability to function adaptively    Current Stressors / Issues:  Patient reported decreased anxiety about work. She stated that she felt validated and heard by a recent meeting at work with her manager. Patient expressed increased confidence in recognizing her role that she fulfills and what she can provide. She stated that it was also helpful for her to hear from her manager his recognition of all that she provides and does, and he does not have the same concerns that her co-workers have discussed. Patient discussed stress associated with other people projecting onto her how she will approach postpartum, and feeling like if she was not pregnant, these work related issues would not be present. Patient was encouraged to think about other explanations versus blaming herself/her pregnancy.  Processed through how patient feels as she approaches the 33rd week or pregnancy, including preparations, childbirth, transition postpartum. Patient discussed goal of flexible thinking, and expressed recognition that she and her partner will be able to problem sole through situations as they evolve.     Progress on Treatment Objective(s) / Homework:  Satisfactory progress - ACTION (Actively working towards change); Intervened by reinforcing change plan / affirming steps taken    Motivational Interviewing    MI Intervention: Expressed Empathy/Understanding, Supported Autonomy, Collaboration, Evocation, Permission to raise concern or advise, Open-ended questions, Change talk (evoked) and Reframe     Change Talk Expressed by the Patient: Desire to change Ability to change Reasons to change Need to change Committment to change Activation    Provider Response to Change Talk: E - Evoked more info from patient about behavior change, A - Affirmed patient's thoughts, decisions, or attempts at behavior change, R - Reflected patient's change talk and S - Summarized patient's change talk statements      Mindfulness-Based Strategies : Discussed skills based on development and application of mindfulness    Also provided psychoeducation about behavioral health condition, symptoms, and treatment options    Care Plan review completed: Yes    Medication Review:  No current psychiatric medications prescribed    Medication Compliance:  NA    Changes in Health Issues:   None reported    Chemical Use Review:   Substance Use: Chemical use reviewed, no active concerns identified      Tobacco Use: No current tobacco use.      Assessment: Current Emotional / Mental Status (status of significant symptoms):  Risk status (Self / Other harm or suicidal ideation)  Patient denies a history of suicidal ideation, suicide attempts, self-injurious behavior, homicidal ideation, homicidal behavior and and other safety concerns  Patient denies current fears or concerns for personal safety.  Patient denies current or recent suicidal ideation or behaviors.  Patient denies current or recent homicidal ideation or behaviors.  Patient denies current or recent self injurious behavior or ideation.  Patient denies other safety concerns.  A safety and risk management plan has not been developed at this time, however patient was encouraged to call [place] [phone] should there be a change in any of these risk factors.    Appearance:   Appropriate   Eye Contact:   Good   Psychomotor Behavior: Normal   Attitude:   Cooperative   Orientation:   All  Speech   Rate / Production: Normal    Volume:  Normal   Mood:    Normal  Affect:    Appropriate   Thought Content:  Clear   Thought Form:  Coherent  Logical   Insight:    Good     Diagnoses:  1. BALA (generalized anxiety disorder)        Collateral Reports Completed:  Not Applicable    Plan: (Homework, other):  Patient was given information about behavioral services and encouraged to schedule a follow up appointment with the clinic [facility] in 3 weeks.  She was also given Cognitive Behavioral Therapy skills to  practice when experiencing anxiety.  CD Recommendations: No indications of CD issues.  GEO Perez, [facility]      ______________________________________________________________________    Integrated Primary Care Behavioral Health Treatment Plan    Patient's Name: Scott Dobbs  YOB: 1985    Date: 6/9/20    DSM-V Diagnoses: 300.02 (F41.1) Generalized Anxiety Disorder  Psychosocial / Contextual Factors: first pregnancy during COVID-19  WHODAS 2.0 Total Score 6/11/2020   Total Score 22   Total Score MyChart 22         Referral / Collaboration:  Referral to another professional/service is not indicated at this time..    Anticipated number of session or this episode of care: 10-12      MeasurableTreatment Goal(s) related to diagnosis / functional impairment(s)  Goal 1: Patient will reduce symptoms of anxiety as evidenced by decreased score on BALA 7.     I will know I've met my goal when I'm less irritable and it is easier to stop the spiral      Objective #A (Patient Action)    Patient will identify three distraction and diversion activities and use those activities to decrease level of anxiety  .  Status: New - Date: 6/9/20     Intervention(s)  [facility] will teach distress tolerance, mindfulness, and relaxation techniques.    Objective #B  Patient will use cognitive strategies identified in therapy to challenge anxious thoughts.  Status: New - Date: 6/9/20     Intervention(s)  [facility] will assign homework to practice cognitive restructuring and defusion techniques.    Objective #C  Patient will use relaxation strategies 2-3 times per day to reduce the physical symptoms of anxiety.  Status: New - Date: 6/9/20     Intervention(s)  [facility] will teach relaxation techniques.    Patient has reviewed and agreed to the above plan.      GEO Perez  June 9, 2020

## 2020-09-18 ENCOUNTER — PRENATAL OFFICE VISIT (OUTPATIENT)
Dept: OBGYN | Facility: CLINIC | Age: 35
End: 2020-09-18
Payer: COMMERCIAL

## 2020-09-18 VITALS
TEMPERATURE: 98.3 F | HEART RATE: 97 BPM | BODY MASS INDEX: 43.4 KG/M2 | HEIGHT: 69 IN | DIASTOLIC BLOOD PRESSURE: 80 MMHG | WEIGHT: 293 LBS | SYSTOLIC BLOOD PRESSURE: 136 MMHG

## 2020-09-18 DIAGNOSIS — O09.90 SUPERVISION OF HIGH RISK PREGNANCY, ANTEPARTUM: Primary | ICD-10-CM

## 2020-09-18 PROCEDURE — 90471 IMMUNIZATION ADMIN: CPT | Performed by: OBSTETRICS & GYNECOLOGY

## 2020-09-18 PROCEDURE — 99207 ZZC PRENATAL VISIT: CPT | Performed by: OBSTETRICS & GYNECOLOGY

## 2020-09-18 PROCEDURE — 90686 IIV4 VACC NO PRSV 0.5 ML IM: CPT | Performed by: OBSTETRICS & GYNECOLOGY

## 2020-09-18 ASSESSMENT — PAIN SCALES - GENERAL: PAINLEVEL: NO PAIN (0)

## 2020-09-18 ASSESSMENT — MIFFLIN-ST. JEOR: SCORE: 2130.16

## 2020-09-18 NOTE — NURSING NOTE
"Chief Complaint   Patient presents with     Consult     ob viability check       Initial BP (!) 145/96   Pulse 88   Temp 98  F (36.7  C) (Oral)   Wt 120.9 kg (266 lb 9.6 oz)   LMP 2020   BMI 39.37 kg/m   Estimated body mass index is 39.37 kg/m  as calculated from the following:    Height as of 3/10/20: 1.753 m (5' 9\").    Weight as of this encounter: 120.9 kg (266 lb 9.6 oz).  BP completed using cuff size: large    Questioned patient about current smoking habits.  Pt. has never smoked.          The following HM Due: NONE      The following patient reported/Care Every where data was sent to:  P ABSTRACT QUALITY INITIATIVES [[id number]]        patient has appointment for today  Anjali Perez                "
Self

## 2020-09-18 NOTE — NURSING NOTE
Clinic Administered Medication Documentation      Injectable Medication Documentation    Patient was given Flu shot. Prior to medication administration, verified patients identity using patient s name and date of birth. Please see MAR and medication order for additional information. Patient instructed to remain in clinic for 15 minutes and report any adverse reaction to staff immediately .      Was entire vial of medication used? Yes  Vial/Syringe: Syringe  Expiration Date:  30JUN21  Was this medication supplied by the patient? No     Elda AREVALO

## 2020-09-18 NOTE — PROGRESS NOTES
Initial blood pressure elevated, repeat normal.  She continues to check at home, runs 130s/80s.  Feels well, no headache, s/sx pre-eclampsia, is well aware of warning signs.  Will call prn.  RTC 2 weeks, has MFM ultrasound that day.  AM

## 2020-09-29 ENCOUNTER — VIRTUAL VISIT (OUTPATIENT)
Dept: PSYCHOLOGY | Facility: CLINIC | Age: 35
End: 2020-09-29
Payer: COMMERCIAL

## 2020-09-29 DIAGNOSIS — F41.1 GAD (GENERALIZED ANXIETY DISORDER): Primary | ICD-10-CM

## 2020-09-29 PROCEDURE — 90834 PSYTX W PT 45 MINUTES: CPT | Mod: 95 | Performed by: SOCIAL WORKER

## 2020-09-29 NOTE — PROGRESS NOTES
[facility]- Integrated Behavioral Health Services  September 29, 2020    Behavioral Health Clinician Progress Note    Patient Name: Scott Dobbs      Telemedicine Visit: The patient's condition can be safely assessed and treated via synchronous audio and visual telemedicine encounter.      Reason for Telemedicine Visit: Patient has requested telehealth visit and Services only offered telehealth due to COVID-19    Originating Site (Patient Location): Patient's home    Distant Site (Provider Location): Provider Remote Setting    Consent:  The patient/guardian has verbally consented to: the potential risks and benefits of telemedicine (video visit) versus in person care; bill my insurance or make self-payment for services provided; and responsibility for payment of non-covered services.     Mode of Communication:  Video Conference via [employer]    As the provider I attest to compliance with applicable laws and regulations related to telemedicine.         Service Type:  Individual     Session Start Time:  3:33 pm  Session End Time: 4:25 pm      Session Length: 38 - 52      Attendees: Patient    Visit Activities (Refresh list every visit): [facility] Only    Diagnostic Assessment Date: 6/1/20  Treatment Plan Review Date: 9/29/20  See Flowsheets for today's PHQ-9 and BALA-7 results  Previous PHQ-9:   PHQ-9 SCORE 6/11/2020 7/23/2020   PHQ-9 Total Score MyChart 3 (Minimal depression) -   PHQ-9 Total Score 3 8     Previous BALA-7:   BALA-7 SCORE 6/11/2020 7/23/2020   Total Score 5 (mild anxiety) -   Total Score 5 5       ED LEVEL:  No flowsheet data found.    DATA  Extended Session (60+ minutes): No  Interactive Complexity: No  Crisis: No  [facility] Patient: No    Treatment Objective(s) Addressed in This Session:  Target Behavior(s): Mental health management    Anxiety: will experience a reduction in anxiety, will develop more effective coping skills to manage anxiety symptoms, will develop healthy  "cognitive patterns and beliefs and will increase ability to function adaptively    Current Stressors / Issues:  Patient reported she is doing well.  She stated that her last day of in person work is this week, and will be working 100% remotely October 13. Patient stated that she is accepting that there are going to be limits to what she can accomplish and achieve before her leave since it is contingent on others, and she is accepting that others will need to problem solve through work when she is off. Patient expressed belief that there will be minimal work for her to do once she is at home, and she is concerned about having \"too much time to think\". Patient stated that she is trying to not over think childbirth, birth plans, and the outcomes that may occur, and expressed goal to be focusing on finishing the baby's room, etc, but finds this difficult to do. Patient stated that she is recognizing her own pandemic fatigue in addition to the final months of pregnancy, but is trying to maintain her same work out routine and other activities. Patient expressed concern about ability to work out postpartum since it is her best way to cope with stress, but she also knows that it will be difficult to anticipate how she will feel until she is in the moment. Patient shared that she is trying her best to continue to be flexible in regards to her outcomes.     Progress on Treatment Objective(s) / Homework:  Stable - ACTION (Actively working towards change); Intervened by reinforcing change plan / affirming steps taken    Motivational Interviewing    MI Intervention: Expressed Empathy/Understanding, Supported Autonomy, Collaboration, Evocation, Permission to raise concern or advise, Open-ended questions, Change talk (evoked) and Reframe     Change Talk Expressed by the Patient: Desire to change Ability to change Reasons to change Need to change Committment to change Activation    Provider Response to Change Talk: E - Evoked more " info from patient about behavior change, A - Affirmed patient's thoughts, decisions, or attempts at behavior change, R - Reflected patient's change talk and S - Summarized patient's change talk statements     Mindfulness-Based Strategies : Discussed skills based on development and application of mindfulness    Also provided psychoeducation about behavioral health condition, symptoms, and treatment options    Care Plan review completed: Yes    Medication Review:  No current psychiatric medications prescribed    Medication Compliance:  NA    Changes in Health Issues:   None reported    Chemical Use Review:   Substance Use: Chemical use reviewed, no active concerns identified      Tobacco Use: No current tobacco use.      Assessment: Current Emotional / Mental Status (status of significant symptoms):  Risk status (Self / Other harm or suicidal ideation)  Patient denies a history of suicidal ideation, suicide attempts, self-injurious behavior, homicidal ideation, homicidal behavior and and other safety concerns  Patient denies current fears or concerns for personal safety.  Patient denies current or recent suicidal ideation or behaviors.  Patient denies current or recent homicidal ideation or behaviors.  Patient denies current or recent self injurious behavior or ideation.  Patient denies other safety concerns.  A safety and risk management plan has not been developed at this time, however patient was encouraged to call [place] [phone] should there be a change in any of these risk factors.    Appearance:   Appropriate   Eye Contact:   Good   Psychomotor Behavior: Normal   Attitude:   Cooperative   Orientation:   All  Speech   Rate / Production: Normal    Volume:  Normal   Mood:    Normal  Affect:    Appropriate   Thought Content:  Clear   Thought Form:  Coherent  Logical   Insight:    Good     Diagnoses:  1. BALA (generalized anxiety disorder)        Collateral Reports Completed:  Not Applicable    Plan: (Homework,  other):  Patient was given information about behavioral services and encouraged to schedule a follow up appointment with the clinic [facility] in 2 weeks.  She was also given Cognitive Behavioral Therapy skills to practice when experiencing anxiety.  CD Recommendations: No indications of CD issues.  Dawn Franks, GEO, [facility]      ______________________________________________________________________    Integrated Primary Care Behavioral Health Treatment Plan    Patient's Name: Scott Dobbs  YOB: 1985    Date: 9/29/20    DSM-V Diagnoses: 300.02 (F41.1) Generalized Anxiety Disorder  Psychosocial / Contextual Factors: first pregnancy during COVID-19  WHODAS 2.0 Total Score 6/11/2020   Total Score 22   Total Score MyChart 22         Referral / Collaboration:  Referral to another professional/service is not indicated at this time..    Anticipated number of session or this episode of care: 10-12      MeasurableTreatment Goal(s) related to diagnosis / functional impairment(s)  Goal 1: Patient will reduce symptoms of anxiety as evidenced by decreased score on BALA 7.     I will know I've met my goal when I'm less irritable and it is easier to stop the spiral      Objective #A (Patient Action)    Patient will identify three distraction and diversion activities and use those activities to decrease level of anxiety  .  Status: Continued - Date(s):9/29/20     Intervention(s)  [facility] will teach distress tolerance, mindfulness, and relaxation techniques.    Objective #B  Patient will use cognitive strategies identified in therapy to challenge anxious thoughts.  Status: Continued - Date(s):9/29/20     Intervention(s)  [facility] will assign homework to practice cognitive restructuring and defusion techniques.    Objective #C  Patient will use relaxation strategies 2-3 times per day to reduce the physical symptoms of anxiety.  Status: Continued - Date(s):9/29/20     Intervention(s)  [facility] will teach relaxation  techniques.    Patient has reviewed and agreed to the above plan.      Dawn Franks, [facility]  September 29, 2020

## 2020-09-30 NOTE — PROGRESS NOTES
36w2d   Doing fairly well since last visit.  Occ has COLEMAN, but it resolves quickly without meds.  No vision changes.  No contractions, vaginal bleeding or leakage of fluid.  +FM  BP mildly elevated on first check today.  Reports at home, she gets 130s/mid 80s.  When not pregnant, gets 120s/80s.  Multiple mildly elevated BP this pregnancy, so I would treat her as chronic hypertensive, no meds.  HELLP labs ordered today.  Discussed if abnormal, would indicate pre-eclampsia, thus delivery would be recommended at 37w.  Otherwise, I'd personally recommend delivery 39w-39w6d if BP doesn't worsen prior.  RTC weekly until delivery.   Dawn Tomas MD

## 2020-10-02 ENCOUNTER — PRENATAL OFFICE VISIT (OUTPATIENT)
Dept: OBGYN | Facility: CLINIC | Age: 35
End: 2020-10-02
Payer: COMMERCIAL

## 2020-10-02 ENCOUNTER — OFFICE VISIT (OUTPATIENT)
Dept: MATERNAL FETAL MEDICINE | Facility: CLINIC | Age: 35
End: 2020-10-02
Attending: OBSTETRICS & GYNECOLOGY
Payer: COMMERCIAL

## 2020-10-02 ENCOUNTER — HOSPITAL ENCOUNTER (OUTPATIENT)
Dept: ULTRASOUND IMAGING | Facility: CLINIC | Age: 35
End: 2020-10-02
Attending: OBSTETRICS & GYNECOLOGY
Payer: COMMERCIAL

## 2020-10-02 VITALS
WEIGHT: 293 LBS | DIASTOLIC BLOOD PRESSURE: 88 MMHG | TEMPERATURE: 97.9 F | HEART RATE: 84 BPM | SYSTOLIC BLOOD PRESSURE: 132 MMHG | BODY MASS INDEX: 44.15 KG/M2

## 2020-10-02 DIAGNOSIS — O99.210 OBESITY AFFECTING PREGNANCY, ANTEPARTUM: Primary | ICD-10-CM

## 2020-10-02 DIAGNOSIS — O40.9XX0 POLYHYDRAMNIOS AFFECTING PREGNANCY: ICD-10-CM

## 2020-10-02 DIAGNOSIS — O10.919 CHRONIC HYPERTENSION AFFECTING PREGNANCY: ICD-10-CM

## 2020-10-02 DIAGNOSIS — O09.90 SUPERVISION OF HIGH RISK PREGNANCY, ANTEPARTUM: Primary | ICD-10-CM

## 2020-10-02 LAB
ALT SERPL W P-5'-P-CCNC: 14 U/L (ref 0–50)
AST SERPL W P-5'-P-CCNC: 17 U/L (ref 0–45)
CREAT SERPL-MCNC: 0.77 MG/DL (ref 0.52–1.04)
CREAT UR-MCNC: 54 MG/DL
ERYTHROCYTE [DISTWIDTH] IN BLOOD BY AUTOMATED COUNT: 13.3 % (ref 10–15)
GFR SERPL CREATININE-BSD FRML MDRD: >90 ML/MIN/{1.73_M2}
HCT VFR BLD AUTO: 41.1 % (ref 35–47)
HGB BLD-MCNC: 14 G/DL (ref 11.7–15.7)
MCH RBC QN AUTO: 28.9 PG (ref 26.5–33)
MCHC RBC AUTO-ENTMCNC: 34.1 G/DL (ref 31.5–36.5)
MCV RBC AUTO: 85 FL (ref 78–100)
PLATELET # BLD AUTO: 268 10E9/L (ref 150–450)
PROT UR-MCNC: 0.09 G/L
PROT/CREAT 24H UR: 0.17 G/G CR (ref 0–0.2)
RBC # BLD AUTO: 4.85 10E12/L (ref 3.8–5.2)
WBC # BLD AUTO: 9.7 10E9/L (ref 4–11)

## 2020-10-02 PROCEDURE — 84450 TRANSFERASE (AST) (SGOT): CPT | Performed by: OBSTETRICS & GYNECOLOGY

## 2020-10-02 PROCEDURE — 84156 ASSAY OF PROTEIN URINE: CPT | Performed by: OBSTETRICS & GYNECOLOGY

## 2020-10-02 PROCEDURE — 82565 ASSAY OF CREATININE: CPT | Performed by: OBSTETRICS & GYNECOLOGY

## 2020-10-02 PROCEDURE — 87653 STREP B DNA AMP PROBE: CPT | Performed by: OBSTETRICS & GYNECOLOGY

## 2020-10-02 PROCEDURE — 36415 COLL VENOUS BLD VENIPUNCTURE: CPT | Performed by: OBSTETRICS & GYNECOLOGY

## 2020-10-02 PROCEDURE — 99207 PR PRENATAL VISIT: CPT | Performed by: OBSTETRICS & GYNECOLOGY

## 2020-10-02 PROCEDURE — 84460 ALANINE AMINO (ALT) (SGPT): CPT | Performed by: OBSTETRICS & GYNECOLOGY

## 2020-10-02 PROCEDURE — 76816 OB US FOLLOW-UP PER FETUS: CPT | Mod: 26 | Performed by: OBSTETRICS & GYNECOLOGY

## 2020-10-02 PROCEDURE — 85027 COMPLETE CBC AUTOMATED: CPT | Performed by: OBSTETRICS & GYNECOLOGY

## 2020-10-02 PROCEDURE — 76816 OB US FOLLOW-UP PER FETUS: CPT

## 2020-10-02 ASSESSMENT — ANXIETY QUESTIONNAIRES
7. FEELING AFRAID AS IF SOMETHING AWFUL MIGHT HAPPEN: SEVERAL DAYS
6. BECOMING EASILY ANNOYED OR IRRITABLE: SEVERAL DAYS
GAD7 TOTAL SCORE: 4
IF YOU CHECKED OFF ANY PROBLEMS ON THIS QUESTIONNAIRE, HOW DIFFICULT HAVE THESE PROBLEMS MADE IT FOR YOU TO DO YOUR WORK, TAKE CARE OF THINGS AT HOME, OR GET ALONG WITH OTHER PEOPLE: NOT DIFFICULT AT ALL
2. NOT BEING ABLE TO STOP OR CONTROL WORRYING: NOT AT ALL
1. FEELING NERVOUS, ANXIOUS, OR ON EDGE: SEVERAL DAYS
5. BEING SO RESTLESS THAT IT IS HARD TO SIT STILL: SEVERAL DAYS
3. WORRYING TOO MUCH ABOUT DIFFERENT THINGS: NOT AT ALL

## 2020-10-02 ASSESSMENT — PATIENT HEALTH QUESTIONNAIRE - PHQ9
5. POOR APPETITE OR OVEREATING: NOT AT ALL
SUM OF ALL RESPONSES TO PHQ QUESTIONS 1-9: 4

## 2020-10-02 NOTE — PROGRESS NOTES
"Please see \"Imaging\" tab under \"Chart Review\" for details of today's US.    Rosario Santillan, DO    "

## 2020-10-03 LAB
GP B STREP DNA SPEC QL NAA+PROBE: NEGATIVE
SPECIMEN SOURCE: NORMAL

## 2020-10-03 ASSESSMENT — ANXIETY QUESTIONNAIRES: GAD7 TOTAL SCORE: 4

## 2020-10-09 ENCOUNTER — MYC MEDICAL ADVICE (OUTPATIENT)
Dept: OBGYN | Facility: CLINIC | Age: 35
End: 2020-10-09

## 2020-10-09 ENCOUNTER — PRENATAL OFFICE VISIT (OUTPATIENT)
Dept: OBGYN | Facility: CLINIC | Age: 35
End: 2020-10-09
Payer: COMMERCIAL

## 2020-10-09 ENCOUNTER — TELEPHONE (OUTPATIENT)
Dept: OBGYN | Facility: CLINIC | Age: 35
End: 2020-10-09

## 2020-10-09 VITALS
HEIGHT: 69 IN | DIASTOLIC BLOOD PRESSURE: 94 MMHG | BODY MASS INDEX: 43.4 KG/M2 | HEART RATE: 104 BPM | WEIGHT: 293 LBS | SYSTOLIC BLOOD PRESSURE: 131 MMHG

## 2020-10-09 DIAGNOSIS — O09.90 SUPERVISION OF HIGH RISK PREGNANCY, ANTEPARTUM: ICD-10-CM

## 2020-10-09 DIAGNOSIS — O10.919 CHRONIC HYPERTENSION AFFECTING PREGNANCY: Primary | ICD-10-CM

## 2020-10-09 LAB
CREAT UR-MCNC: 155 MG/DL
PROT UR-MCNC: 0.32 G/L
PROT/CREAT 24H UR: 0.21 G/G CR (ref 0–0.2)

## 2020-10-09 PROCEDURE — 84156 ASSAY OF PROTEIN URINE: CPT | Performed by: OBSTETRICS & GYNECOLOGY

## 2020-10-09 PROCEDURE — 99207 PR PRENATAL VISIT: CPT | Performed by: OBSTETRICS & GYNECOLOGY

## 2020-10-09 ASSESSMENT — MIFFLIN-ST. JEOR: SCORE: 2127.44

## 2020-10-09 ASSESSMENT — PAIN SCALES - GENERAL: PAINLEVEL: NO PAIN (0)

## 2020-10-09 NOTE — PROGRESS NOTES
"Chief Complaint   Patient presents with     Prenatal Care     37+2       Initial LMP 2020  Estimated body mass index is 44.15 kg/m  as calculated from the following:    Height as of 20: 1.753 m (5' 9\").    Weight as of 10/2/20: 135.6 kg (299 lb).  BP completed using cuff size: large    Questioned patient about current smoking habits.  Pt. quit smoking some time ago.          The following HM Due: NONE      The following patient reported/Care Every where data was sent to:  P ABSTRACT QUALITY INITIATIVES [[id number]]  n/a      patient has appointment for today                "

## 2020-10-09 NOTE — TELEPHONE ENCOUNTER
Reason for call:  Other   Patient called regarding (reason for call): call back  Additional comments:     Patient is wanting to schedule an induction. Patient was advised to call today to make appointment.    Phone number to reach patient:  Cell number on file:    Telephone Information:   Mobile [phone]       Best Time:  Anytime    Can we leave a detailed message on this number?  YES    Travel screening: Not Applicable

## 2020-10-09 NOTE — TELEPHONE ENCOUNTER
Routing to SL. Pt would like to schedule IOL for the 19th or 20th. Thanks.   Osiris Baird RN-BSN

## 2020-10-09 NOTE — TELEPHONE ENCOUNTER
Pt sent [employer] message. Routed to  to schedule IOL. Will reply to [employer] message.   Osiris Baird RN-BSN

## 2020-10-09 NOTE — PROGRESS NOTES
37w2d  Active fetal movement. No leaking or bleeding. No contractions. Wrapping stuff up at work.  Discussed likely chronic htn. No s/sx pre-eclampsia.  UPC today 0.21  Plan BPP next week and IOL at 38-39 weeks - scheduled 10/19 at 5pm.   Discussed potential long process with likely need for cervical ripening. Discussed that bc of htn she cannot do outpatient jauregui cervical ripening.   Patient planning on having a  and her partner with her.   Reviewed s/sx pre-eclampsia, labor and kick counts.  Dawn Grant MD

## 2020-10-12 ENCOUNTER — MYC MEDICAL ADVICE (OUTPATIENT)
Dept: OBGYN | Facility: CLINIC | Age: 35
End: 2020-10-12

## 2020-10-12 ENCOUNTER — VIRTUAL VISIT (OUTPATIENT)
Dept: FAMILY MEDICINE | Facility: CLINIC | Age: 35
End: 2020-10-12
Payer: COMMERCIAL

## 2020-10-12 DIAGNOSIS — Z76.81 EXPECTANT PARENT PREBIRTH PEDIATRICIAN VISIT: Primary | ICD-10-CM

## 2020-10-12 PROCEDURE — 99207 PR NO BILLABLE SERVICE THIS VISIT: CPT | Performed by: INTERNAL MEDICINE

## 2020-10-12 NOTE — TELEPHONE ENCOUNTER
I called Scott to discuss induction.  Questions about pre-eclampsia and magnesium.  Her sister was induced at 35w for pre-eclampsia with severe features and needed magnesium.   Discussed induction process - misoprostol, jauregui balloon for cervical ripening. Once cervix is ready/favorable, plan to augment labor with pitocin or AROM. Question about if cervical ripening is not going well if she can go home. Discussed we don't typically recommend it.     Discussed timing of delivery for chronic htn, no meds as she asks if she can push IOL off to 10/26 Estimated Date of Delivery: Oct 28, 2020 . If BP wnl or mild on 10/16 and no proteinuria and no sx it would be an option. Discussed if proteinuria then IOL would be recommended at 37+ weeks for superimposed pre-eclampsia.     Dawn Grant MD

## 2020-10-12 NOTE — TELEPHONE ENCOUNTER
Patient sent a [employer] message. She has follow-up questions regarding the induction process. Patient said it was discussed that she would be starting with misoprostol. Would have to stay in hospital because of her HTN. She wants to get a general idea of steps involved in the induction process and also how frequent her monitoring would be. Would like to be mobile for at least the early part of the process. Osiris Fernandes RN

## 2020-10-12 NOTE — PROGRESS NOTES
"Scott Dobbs is a 35 year old female who is being evaluated via a billable telephone visit.      The patient has been notified of following:     \"This telephone visit will be conducted via a call between you and your physician/provider. We have found that certain health care needs can be provided without the need for a physical exam.  This service lets us provide the care you need with a short phone conversation.  If a prescription is necessary we can send it directly to your pharmacy.  If lab work is needed we can place an order for that and you can then stop by our lab to have the test done at a later time.    Telephone visits are billed at different rates depending on your insurance coverage. During this emergency period, for some insurers they may be billed the same as an in-person visit.  Please reach out to your insurance provider with any questions.    If during the course of the call the physician/provider feels a telephone visit is not appropriate, you will not be charged for this service.\"    Patient has given verbal consent for Telephone visit?  Yes    What phone number would you like to be contacted at? [phone]    How would you like to obtain your AVS? Tejindert    Subjective     Scott Dobbs is a 35 year old female who presents via phone visit today for the following health issues:    HPI     Meet and Greet. She is having a baby next week   having baby next week.  Things going well.  28th October   Slightly elevated.  [place].          20 minutes of introductions and answering questions                           "

## 2020-10-13 ENCOUNTER — VIRTUAL VISIT (OUTPATIENT)
Dept: PSYCHOLOGY | Facility: CLINIC | Age: 35
End: 2020-10-13
Payer: COMMERCIAL

## 2020-10-13 DIAGNOSIS — F41.1 GAD (GENERALIZED ANXIETY DISORDER): Primary | ICD-10-CM

## 2020-10-13 PROCEDURE — 90834 PSYTX W PT 45 MINUTES: CPT | Mod: 95 | Performed by: SOCIAL WORKER

## 2020-10-13 NOTE — PROGRESS NOTES
[facility]- Integrated Behavioral Health Services  October 13, 2020    Behavioral Health Clinician Progress Note    Patient Name: Scott Dobbs      Telemedicine Visit: The patient's condition can be safely assessed and treated via synchronous audio and visual telemedicine encounter.      Reason for Telemedicine Visit: Patient has requested telehealth visit and Services only offered telehealth due to COVID-19    Originating Site (Patient Location): Patient's home    Distant Site (Provider Location): Provider Remote Setting    Consent:  The patient/guardian has verbally consented to: the potential risks and benefits of telemedicine (video visit) versus in person care; bill my insurance or make self-payment for services provided; and responsibility for payment of non-covered services.     Mode of Communication:  Video Conference via [employer]    As the provider I attest to compliance with applicable laws and regulations related to telemedicine.         Service Type:  Individual     Session Start Time:  3:32 pm  Session End Time: 4:22 pm      Session Length: 38 - 52      Attendees: Patient    Visit Activities (Refresh list every visit): [facility] Only    Diagnostic Assessment Date: 6/1/20  Treatment Plan Review Date: 9/29/20  See Flowsheets for today's PHQ-9 and BALA-7 results  Previous PHQ-9:   PHQ-9 SCORE 6/11/2020 7/23/2020 10/2/2020   PHQ-9 Total Score MyChart 3 (Minimal depression) - -   PHQ-9 Total Score 3 8 4     Previous BALA-7:   BALA-7 SCORE 6/11/2020 7/23/2020 10/2/2020   Total Score 5 (mild anxiety) - -   Total Score 5 5 4       ED LEVEL:  No flowsheet data found.    DATA  Extended Session (60+ minutes): No  Interactive Complexity: No  Crisis: No  [facility] Patient: No    Treatment Objective(s) Addressed in This Session:  Target Behavior(s): Mental health management    Anxiety: will experience a reduction in anxiety, will develop more effective coping skills to manage anxiety symptoms,  will develop healthy cognitive patterns and beliefs and will increase ability to function adaptively    Current Stressors / Issues:  Patient reported she is doing well.  Patient stated that she has been experiencing heightened stress related to a potential induction due to borderline high blood pressure.  She shared that she originally scheduled the induction for the 19th after her most recent prenatal appointment, but is now considering the 26th depending on the outcome of her lab work. Patient expressed appreciation for knowing her risk of preeclampsia and wanting to induce to avoid potential complications, but she also discussed anxiety associated with an induction. Patient discussed fears of how it may impact the labor process, but also recognized that a spontaneous labor may lead to same fears and outcomes. Patient shared that it has been difficult for her to make a decision since there are many unknowns. Patient discussed additional challenges as she feels like it is acceptable and a good option for other women to have an induction, but she is realizing that she is having a more difficult time accepting that as an outcome for herself. Continued to normalize patient's recent experiences, and ways to remain grounded, focused on what matters to her, etc in regards to the childbirth experience.      Progress on Treatment Objective(s) / Homework:  Stable - MAINTENANCE (Working to maintain change, with risk of relapse); Intervened by continuing to positively reinforce healthy behavior choice     Motivational Interviewing    MI Intervention: Expressed Empathy/Understanding, Supported Autonomy, Collaboration, Evocation, Permission to raise concern or advise, Open-ended questions, Change talk (evoked) and Reframe     Change Talk Expressed by the Patient: Desire to change Ability to change Reasons to change Need to change Committment to change Activation    Provider Response to Change Talk: E - Evoked more info from  patient about behavior change, A - Affirmed patient's thoughts, decisions, or attempts at behavior change, R - Reflected patient's change talk and S - Summarized patient's change talk statements     Mindfulness-Based Strategies : Discussed skills based on development and application of mindfulness    Also provided psychoeducation about behavioral health condition, symptoms, and treatment options    Care Plan review completed: Yes    Medication Review:  No current psychiatric medications prescribed    Medication Compliance:  NA    Changes in Health Issues:   None reported    Chemical Use Review:   Substance Use: Chemical use reviewed, no active concerns identified      Tobacco Use: No current tobacco use.      Assessment: Current Emotional / Mental Status (status of significant symptoms):  Risk status (Self / Other harm or suicidal ideation)  Patient denies a history of suicidal ideation, suicide attempts, self-injurious behavior, homicidal ideation, homicidal behavior and and other safety concerns  Patient denies current fears or concerns for personal safety.  Patient denies current or recent suicidal ideation or behaviors.  Patient denies current or recent homicidal ideation or behaviors.  Patient denies current or recent self injurious behavior or ideation.  Patient denies other safety concerns.  A safety and risk management plan has not been developed at this time, however patient was encouraged to call [place] [phone] should there be a change in any of these risk factors.    Appearance:   Appropriate   Eye Contact:   Good   Psychomotor Behavior: Normal   Attitude:   Cooperative   Orientation:   All  Speech   Rate / Production: Normal    Volume:  Normal   Mood:    Normal  Affect:    Appropriate   Thought Content:  Clear   Thought Form:  Coherent  Logical   Insight:    Good     Diagnoses:  1. BALA (generalized anxiety disorder)        Collateral Reports Completed:  Not Applicable    Plan: (Homework,  other):  Patient was given information about behavioral services and encouraged to schedule a follow up appointment with the clinic [facility] 2-3 weeks postpartum. .  She was also given Cognitive Behavioral Therapy skills to practice when experiencing anxiety.  CD Recommendations: No indications of CD issues.  Dawn Franks, GEO, [facility]      ______________________________________________________________________    Integrated Primary Care Behavioral Health Treatment Plan    Patient's Name: Scott Dobbs  YOB: 1985    Date: 9/29/20    DSM-V Diagnoses: 300.02 (F41.1) Generalized Anxiety Disorder  Psychosocial / Contextual Factors: first pregnancy during COVID-19  WHODAS 2.0 Total Score 6/11/2020   Total Score 22   Total Score MyChart 22         Referral / Collaboration:  Referral to another professional/service is not indicated at this time..    Anticipated number of session or this episode of care: 10-12      MeasurableTreatment Goal(s) related to diagnosis / functional impairment(s)  Goal 1: Patient will reduce symptoms of anxiety as evidenced by decreased score on BALA 7.     I will know I've met my goal when I'm less irritable and it is easier to stop the spiral      Objective #A (Patient Action)    Patient will identify three distraction and diversion activities and use those activities to decrease level of anxiety  .  Status: Continued - Date(s):9/29/20     Intervention(s)  [facility] will teach distress tolerance, mindfulness, and relaxation techniques.    Objective #B  Patient will use cognitive strategies identified in therapy to challenge anxious thoughts.  Status: Continued - Date(s):9/29/20     Intervention(s)  [facility] will assign homework to practice cognitive restructuring and defusion techniques.    Objective #C  Patient will use relaxation strategies 2-3 times per day to reduce the physical symptoms of anxiety.  Status: Continued - Date(s):9/29/20     Intervention(s)  [facility] will teach relaxation  techniques.    Patient has reviewed and agreed to the above plan.      Dawn Franks, [facility]  September 29, 2020

## 2020-10-14 ENCOUNTER — HOSPITAL ENCOUNTER (OUTPATIENT)
Dept: ULTRASOUND IMAGING | Facility: CLINIC | Age: 35
End: 2020-10-14
Attending: OBSTETRICS & GYNECOLOGY
Payer: COMMERCIAL

## 2020-10-14 ENCOUNTER — OFFICE VISIT (OUTPATIENT)
Dept: MATERNAL FETAL MEDICINE | Facility: CLINIC | Age: 35
End: 2020-10-14
Attending: OBSTETRICS & GYNECOLOGY
Payer: COMMERCIAL

## 2020-10-14 DIAGNOSIS — O40.9XX0 POLYHYDRAMNIOS AFFECTING PREGNANCY: ICD-10-CM

## 2020-10-14 DIAGNOSIS — O26.90 PREGNANCY RELATED CONDITION, ANTEPARTUM: ICD-10-CM

## 2020-10-14 DIAGNOSIS — O10.919 CHRONIC HYPERTENSION DURING PREGNANCY, ANTEPARTUM: Primary | ICD-10-CM

## 2020-10-14 PROCEDURE — 76819 FETAL BIOPHYS PROFIL W/O NST: CPT | Mod: 26 | Performed by: OBSTETRICS & GYNECOLOGY

## 2020-10-14 PROCEDURE — 76819 FETAL BIOPHYS PROFIL W/O NST: CPT

## 2020-10-14 NOTE — PROGRESS NOTES
"Please see \"Imaging\" tab under \"Chart Review\" for details of today's visit.    Dawn Manley MD PhD  Maternal Fetal Medicine     "

## 2020-10-16 ENCOUNTER — PRENATAL OFFICE VISIT (OUTPATIENT)
Dept: OBGYN | Facility: CLINIC | Age: 35
End: 2020-10-16
Payer: COMMERCIAL

## 2020-10-16 VITALS
HEIGHT: 69 IN | HEART RATE: 89 BPM | SYSTOLIC BLOOD PRESSURE: 148 MMHG | DIASTOLIC BLOOD PRESSURE: 98 MMHG | BODY MASS INDEX: 44.38 KG/M2

## 2020-10-16 DIAGNOSIS — O09.90 SUPERVISION OF HIGH RISK PREGNANCY, ANTEPARTUM: ICD-10-CM

## 2020-10-16 LAB
LABORATORY COMMENT REPORT: NORMAL
SARS-COV-2 RNA SPEC QL NAA+PROBE: NEGATIVE
SARS-COV-2 RNA SPEC QL NAA+PROBE: NORMAL
SPECIMEN SOURCE: NORMAL
SPECIMEN SOURCE: NORMAL

## 2020-10-16 PROCEDURE — U0003 INFECTIOUS AGENT DETECTION BY NUCLEIC ACID (DNA OR RNA); SEVERE ACUTE RESPIRATORY SYNDROME CORONAVIRUS 2 (SARS-COV-2) (CORONAVIRUS DISEASE [COVID-19]), AMPLIFIED PROBE TECHNIQUE, MAKING USE OF HIGH THROUGHPUT TECHNOLOGIES AS DESCRIBED BY CMS-2020-01-R: HCPCS | Performed by: OBSTETRICS & GYNECOLOGY

## 2020-10-16 PROCEDURE — 99207 PR PRENATAL VISIT: CPT | Performed by: OBSTETRICS & GYNECOLOGY

## 2020-10-16 ASSESSMENT — PAIN SCALES - GENERAL: PAINLEVEL: NO PAIN (0)

## 2020-10-16 NOTE — PROGRESS NOTES
"Chief Complaint   Patient presents with     Prenatal Care     38+2       Initial BP (!) 148/98 (BP Location: Left arm, Patient Position: Sitting, Cuff Size: Adult Large)   Pulse 89   Ht 1.753 m (5' 9\")   LMP 2020   Breastfeeding No   BMI 44.38 kg/m   Estimated body mass index is 44.38 kg/m  as calculated from the following:    Height as of this encounter: 1.753 m (5' 9\").    Weight as of 10/9/20: 136.3 kg (300 lb 8 oz).  BP completed using cuff size: large    Questioned patient about current smoking habits.  Pt. has never smoked.          The following HM Due: NONE      The following patient reported/Care Every where data was sent to:  P ABSTRACT QUALITY INITIATIVES [20888]  n/a      patient has appointment for today              "

## 2020-10-17 NOTE — PROGRESS NOTES
Doing well.   BP stable x 2 today.   Previously was hoping to delay induction but I recommend IOL at scheduled time (in three days) due to chronic hypertension. Asymptomatic. Labons done earlier this week. BPP 8/8.   COVID test today.   Discussed possible methods of induction, timeline.  Precautions reviewed for the weekend.

## 2020-10-19 ENCOUNTER — HOSPITAL ENCOUNTER (INPATIENT)
Facility: CLINIC | Age: 35
LOS: 4 days | Discharge: HOME | End: 2020-10-23
Attending: OBSTETRICS & GYNECOLOGY | Admitting: OBSTETRICS & GYNECOLOGY
Payer: COMMERCIAL

## 2020-10-19 DIAGNOSIS — Z98.891 STATUS POST CESAREAN SECTION: Primary | ICD-10-CM

## 2020-10-19 PROBLEM — Z34.90 ENCOUNTER FOR INDUCTION OF LABOR: Status: ACTIVE | Noted: 2020-10-19

## 2020-10-19 LAB
ABO + RH BLD: NORMAL
ABO + RH BLD: NORMAL
ALT SERPL W P-5'-P-CCNC: 14 U/L (ref 0–50)
AST SERPL W P-5'-P-CCNC: 14 U/L (ref 0–45)
BLD GP AB SCN SERPL QL: NORMAL
BLOOD BANK CMNT PATIENT-IMP: NORMAL
CREAT SERPL-MCNC: 0.88 MG/DL (ref 0.52–1.04)
CREAT UR-MCNC: 51 MG/DL
GFR SERPL CREATININE-BSD FRML MDRD: 85 ML/MIN/{1.73_M2}
HGB BLD-MCNC: 14.3 G/DL (ref 11.7–15.7)
PLATELET # BLD AUTO: 308 10E9/L (ref 150–450)
PROT UR-MCNC: 0.06 G/L
PROT/CREAT 24H UR: 0.13 G/G CR (ref 0–0.2)
SPECIMEN EXP DATE BLD: NORMAL

## 2020-10-19 PROCEDURE — 99214 OFFICE O/P EST MOD 30 MIN: CPT | Mod: 25 | Performed by: OBSTETRICS & GYNECOLOGY

## 2020-10-19 PROCEDURE — 84450 TRANSFERASE (AST) (SGOT): CPT | Performed by: STUDENT IN AN ORGANIZED HEALTH CARE EDUCATION/TRAINING PROGRAM

## 2020-10-19 PROCEDURE — 59200 INSERT CERVICAL DILATOR: CPT | Mod: GC | Performed by: OBSTETRICS & GYNECOLOGY

## 2020-10-19 PROCEDURE — 86780 TREPONEMA PALLIDUM: CPT | Performed by: STUDENT IN AN ORGANIZED HEALTH CARE EDUCATION/TRAINING PROGRAM

## 2020-10-19 PROCEDURE — 250N000013 HC RX MED GY IP 250 OP 250 PS 637: Performed by: STUDENT IN AN ORGANIZED HEALTH CARE EDUCATION/TRAINING PROGRAM

## 2020-10-19 PROCEDURE — 82565 ASSAY OF CREATININE: CPT | Performed by: STUDENT IN AN ORGANIZED HEALTH CARE EDUCATION/TRAINING PROGRAM

## 2020-10-19 PROCEDURE — 84156 ASSAY OF PROTEIN URINE: CPT | Performed by: STUDENT IN AN ORGANIZED HEALTH CARE EDUCATION/TRAINING PROGRAM

## 2020-10-19 PROCEDURE — 85049 AUTOMATED PLATELET COUNT: CPT | Performed by: STUDENT IN AN ORGANIZED HEALTH CARE EDUCATION/TRAINING PROGRAM

## 2020-10-19 PROCEDURE — 86901 BLOOD TYPING SEROLOGIC RH(D): CPT | Performed by: STUDENT IN AN ORGANIZED HEALTH CARE EDUCATION/TRAINING PROGRAM

## 2020-10-19 PROCEDURE — 36415 COLL VENOUS BLD VENIPUNCTURE: CPT | Performed by: STUDENT IN AN ORGANIZED HEALTH CARE EDUCATION/TRAINING PROGRAM

## 2020-10-19 PROCEDURE — 85018 HEMOGLOBIN: CPT | Performed by: STUDENT IN AN ORGANIZED HEALTH CARE EDUCATION/TRAINING PROGRAM

## 2020-10-19 PROCEDURE — 120N000002 HC R&B MED SURG/OB UMMC

## 2020-10-19 PROCEDURE — 86900 BLOOD TYPING SEROLOGIC ABO: CPT | Performed by: STUDENT IN AN ORGANIZED HEALTH CARE EDUCATION/TRAINING PROGRAM

## 2020-10-19 PROCEDURE — 84460 ALANINE AMINO (ALT) (SGPT): CPT | Performed by: STUDENT IN AN ORGANIZED HEALTH CARE EDUCATION/TRAINING PROGRAM

## 2020-10-19 PROCEDURE — 3E0P7VZ INTRODUCTION OF HORMONE INTO FEMALE REPRODUCTIVE, VIA NATURAL OR ARTIFICIAL OPENING: ICD-10-PCS | Performed by: OBSTETRICS & GYNECOLOGY

## 2020-10-19 PROCEDURE — 59025 FETAL NON-STRESS TEST: CPT | Mod: 26 | Performed by: OBSTETRICS & GYNECOLOGY

## 2020-10-19 PROCEDURE — 86850 RBC ANTIBODY SCREEN: CPT | Performed by: STUDENT IN AN ORGANIZED HEALTH CARE EDUCATION/TRAINING PROGRAM

## 2020-10-19 RX ORDER — CARBOPROST TROMETHAMINE 250 UG/ML
250 INJECTION, SOLUTION INTRAMUSCULAR
Status: DISCONTINUED | OUTPATIENT
Start: 2020-10-19 | End: 2020-10-21

## 2020-10-19 RX ORDER — METHYLERGONOVINE MALEATE 0.2 MG/ML
200 INJECTION INTRAVENOUS
Status: DISCONTINUED | OUTPATIENT
Start: 2020-10-19 | End: 2020-10-21

## 2020-10-19 RX ORDER — OXYTOCIN 10 [USP'U]/ML
10 INJECTION, SOLUTION INTRAMUSCULAR; INTRAVENOUS
Status: DISCONTINUED | OUTPATIENT
Start: 2020-10-19 | End: 2020-10-21

## 2020-10-19 RX ORDER — NALOXONE HYDROCHLORIDE 0.4 MG/ML
.1-.4 INJECTION, SOLUTION INTRAMUSCULAR; INTRAVENOUS; SUBCUTANEOUS
Status: DISCONTINUED | OUTPATIENT
Start: 2020-10-19 | End: 2020-10-21

## 2020-10-19 RX ORDER — FLUTICASONE PROPIONATE 50 MCG
1 SPRAY, SUSPENSION (ML) NASAL DAILY
Status: DISCONTINUED | OUTPATIENT
Start: 2020-10-20 | End: 2020-10-21

## 2020-10-19 RX ORDER — NALOXONE HYDROCHLORIDE 0.4 MG/ML
.1-.4 INJECTION, SOLUTION INTRAMUSCULAR; INTRAVENOUS; SUBCUTANEOUS
Status: CANCELLED | OUTPATIENT
Start: 2020-10-19

## 2020-10-19 RX ORDER — MODIFIED LANOLIN
OINTMENT (GRAM) TOPICAL
Status: CANCELLED | OUTPATIENT
Start: 2020-10-19

## 2020-10-19 RX ORDER — OXYCODONE AND ACETAMINOPHEN 5; 325 MG/1; MG/1
1 TABLET ORAL
Status: DISCONTINUED | OUTPATIENT
Start: 2020-10-19 | End: 2020-10-21

## 2020-10-19 RX ORDER — OXYTOCIN/0.9 % SODIUM CHLORIDE 30/500 ML
100-340 PLASTIC BAG, INJECTION (ML) INTRAVENOUS CONTINUOUS PRN
Status: DISCONTINUED | OUTPATIENT
Start: 2020-10-19 | End: 2020-10-21

## 2020-10-19 RX ORDER — ONDANSETRON 2 MG/ML
4 INJECTION INTRAMUSCULAR; INTRAVENOUS EVERY 6 HOURS PRN
Status: DISCONTINUED | OUTPATIENT
Start: 2020-10-19 | End: 2020-10-21

## 2020-10-19 RX ORDER — OXYTOCIN/0.9 % SODIUM CHLORIDE 30/500 ML
340 PLASTIC BAG, INJECTION (ML) INTRAVENOUS CONTINUOUS PRN
Status: CANCELLED | OUTPATIENT
Start: 2020-10-19

## 2020-10-19 RX ORDER — SODIUM CHLORIDE, SODIUM LACTATE, POTASSIUM CHLORIDE, CALCIUM CHLORIDE 600; 310; 30; 20 MG/100ML; MG/100ML; MG/100ML; MG/100ML
INJECTION, SOLUTION INTRAVENOUS CONTINUOUS
Status: DISCONTINUED | OUTPATIENT
Start: 2020-10-19 | End: 2020-10-21

## 2020-10-19 RX ORDER — BISACODYL 10 MG
10 SUPPOSITORY, RECTAL RECTAL DAILY PRN
Status: CANCELLED | OUTPATIENT
Start: 2020-10-21

## 2020-10-19 RX ORDER — AMOXICILLIN 250 MG
2 CAPSULE ORAL 2 TIMES DAILY
Status: CANCELLED | OUTPATIENT
Start: 2020-10-20

## 2020-10-19 RX ORDER — HYDROCORTISONE 2.5 %
CREAM (GRAM) TOPICAL 3 TIMES DAILY PRN
Status: CANCELLED | OUTPATIENT
Start: 2020-10-19

## 2020-10-19 RX ORDER — AMOXICILLIN 250 MG
1 CAPSULE ORAL 2 TIMES DAILY
Status: CANCELLED | OUTPATIENT
Start: 2020-10-20

## 2020-10-19 RX ORDER — ACETAMINOPHEN 325 MG/1
650 TABLET ORAL EVERY 4 HOURS PRN
Status: DISCONTINUED | OUTPATIENT
Start: 2020-10-19 | End: 2020-10-21

## 2020-10-19 RX ORDER — OXYTOCIN/0.9 % SODIUM CHLORIDE 30/500 ML
100 PLASTIC BAG, INJECTION (ML) INTRAVENOUS CONTINUOUS
Status: CANCELLED | OUTPATIENT
Start: 2020-10-20

## 2020-10-19 RX ORDER — MISOPROSTOL 100 UG/1
25 TABLET ORAL EVERY 4 HOURS PRN
Status: DISCONTINUED | OUTPATIENT
Start: 2020-10-19 | End: 2020-10-20

## 2020-10-19 RX ORDER — OXYTOCIN 10 [USP'U]/ML
10 INJECTION, SOLUTION INTRAMUSCULAR; INTRAVENOUS
Status: CANCELLED | OUTPATIENT
Start: 2020-10-19

## 2020-10-19 RX ORDER — FENTANYL CITRATE 50 UG/ML
50-100 INJECTION, SOLUTION INTRAMUSCULAR; INTRAVENOUS
Status: DISCONTINUED | OUTPATIENT
Start: 2020-10-19 | End: 2020-10-21

## 2020-10-19 RX ORDER — IBUPROFEN 800 MG/1
800 TABLET, FILM COATED ORAL
Status: DISCONTINUED | OUTPATIENT
Start: 2020-10-19 | End: 2020-10-21

## 2020-10-19 RX ORDER — ACETAMINOPHEN 325 MG/1
650 TABLET ORAL EVERY 4 HOURS PRN
Status: CANCELLED | OUTPATIENT
Start: 2020-10-19

## 2020-10-19 RX ORDER — IBUPROFEN 800 MG/1
800 TABLET, FILM COATED ORAL EVERY 6 HOURS PRN
Status: CANCELLED | OUTPATIENT
Start: 2020-10-19

## 2020-10-19 RX ADMIN — Medication 25 MCG: at 20:09

## 2020-10-19 ASSESSMENT — ACTIVITIES OF DAILY LIVING (ADL)
FALL_HISTORY_WITHIN_LAST_SIX_MONTHS: NO
TOILETING_ISSUES: NO

## 2020-10-19 NOTE — PLAN OF CARE
Pt presents to L&D for planned induction for GHTN. BP currently 138/98. Dr Bates notified of arrival and blood pressure. Continue to monitor, await plan of induction.

## 2020-10-19 NOTE — H&P
L&D History and Physical   2020  Scott Dobbs  [id number]      HPI:   Scott Dobbs is a 35 year old  at 38w5d by LMP c/w 10w3d US who presents for IOL for cHTN. Pregnancy otherwise notable for mild polyhydramnios, obesity, asthma.    She states that she is feeling well today.  She denies headache, vision changes, chest pain, shortness of breath, RUQ abdominal pain, fever, chills, nausea, vomiting or other systemic complaints. She denies vaginal bleeding or loss of fluid and is feeling normal fetal movement. She is not feeling  regular contractions.      Her pregnancy has been complicated by:  - CHTN on ASA, no other meds, HELLP labs wnl  - Obesity, BMI 44  - Mild polyhydramnios, LEE 26.2 on 10/14  - Asthma, on fluticasone, allegra  - Anxiety,  no meds    ROS: No headaches, vision changes, nausea, vomiting, fevers, chills, chest pain, SOB,  abdominal pain, constipation, diarrhea, or edema in extremities noted.     OBHX:   OB History    Para Term  AB Living   1 0 0 0 0 0   SAB TAB Ectopic Multiple Live Births   0 0 0 0 0      # Outcome Date GA Lbr Lele/2nd Weight Sex Delivery Anes PTL Lv   1 Current                Past Medical History:   Diagnosis Date     Anxiety      Obesity (BMI 30-39.9)        Past Surgical History:   Procedure Laterality Date     PE TUBES         Medications:   No current facility-administered medications on file prior to encounter.        ASPIRIN PO, Take 81 mg by mouth       Fexofenadine HCl (ALLEGRA PO),        FIBER PO,        fluticasone (FLONASE) 50 MCG/ACT nasal spray, Spray 1 spray into both nostrils daily       Prenatal Vit-Fe Fumarate-FA (PRENATAL MULTIVITAMIN W/IRON) 27-0.8 MG tablet, Take 1 tablet by mouth daily        No Known Allergies    Family History   Problem Relation Age of Onset     Thyroid Disease Mother      Breast Cancer Paternal Grandmother         stage 1 early 50's.        SocialHX:   Social History     Tobacco Use     Smoking  "status: Former Smoker     Packs/day: 0.00     Smokeless tobacco: Never Used   Substance Use Topics     Alcohol use: Not Currently     Drug use: Never       ROS: 10-point ROS negative except as indicated in HPI.    Physical Exam:  Vitals:    10/19/20 1722 10/19/20 1730 10/19/20 1800   BP: (!) 139/98 (!) 139/98 132/87   BP Location: Right arm     Resp: 20     Temp: 98  F (36.7  C)     TempSrc: Oral     Height: 1.753 m (5' 9\")       General: alert, oriented female, resting in bed in NAD  CV: regular rate  Lungs: no increased work of breathing   Abdomen: soft, gravid, non-tender, Cephalic by BSUS, 8.5 lbs by [place]  Extremities: bilateral lower extremities non-tender with 1+ edema    SVE: 50/-3  Membranes: intact     Presentation: cephalic by BSUS    FHT: baseline 135, moderate variability, + accelerations, no decelerations, reassuring NST  [place]: 2-3 in 10 minutes, spontaneous ctx's that are mild    Prenatal ultrasounds:  10/14  Cephalic  Placenta posterior  LEE 26.2    10/2  Cephalic  EFW 86%ile    Prenatal Labs:   Lab Results   Component Value Date    ABO O 10/19/2020    RH Pos 10/19/2020    AS Neg 10/19/2020    HEPBANG Nonreactive 03/10/2020    HGB 14.3 10/19/2020       GBS Status:   Lab Results   Component Value Date    GBS Negative 10/02/2020       Lab Results   Component Value Date    PAP NIL 2018       Labs:   Results for orders placed or performed during the hospital encounter of 10/19/20 (from the past 24 hour(s))   ABO/Rh type and screen   Result Value Ref Range    ABO O     RH(D) Pos     Antibody Screen Neg     Test Valid Only At          [facility]    Specimen Expires 10/22/2020    Hemoglobin   Result Value Ref Range    Hemoglobin 14.3 11.7 - 15.7 g/dL   Platelet count   Result Value Ref Range    Platelet Count 308 150 - 450 10e9/L       Assessment:   Scott Dobbs is a 35 year old  at 38w5d by LMP c/w 10w3d US who presents for IOL for cHTN. " Pregnancy otherwise complicated as below:  - CHTN on ASA, no other meds, HELLP labs wnl  - Obesity, BMI 44  - Mild polyhydramnios, LEE 26.2 on 10/14  - Asthma, on fluticasone, allegra  - Anxiety,  no meds    Plan:    #IOL  - Admit to labor and delivery  - Cervix 1/50/-3  - GBS neg  - Pain management: Aware of options  - Plan: Cervical ripening with misoprostol PV q4h     #cHTN  - BP mild range on admission  - No signs/sxs of preeclampsia   - On ASA, no other meds  - Serial BP monitoring in labor, postpartum  - HELLP labs on admission  - Baseline HELLP labs wnl x2 (UPC x3)  - Fast acting anti-hypertensives for BP elevated over 160/110 sustained    #Asthma  - Fluticasone, Allegra,   - Avoid hemabate i/s/o of PPH    #Mild Polyhydramnios  - LEE 26.2    #Fetal well-being  - Category 1 FHT  - EFW 86%ile at 36w2d growth US     #PNC  - Posterior placenta  - BMI 44  - GCT wnl  - Rh pos- rhogam not indicated; Rubella immune    Patient seen and care plan discussed under supervision of Dr. Bates.    Joanne Lopez MD  Obstetrics & Gynecology, PGY-2  10/19/20 5:46 PM       Physician Attestation   I, Anamaria Bates MD, personally examined and evaluated this patient along with the resident.  I discussed the patient with the resident and care team, and agree with the assessment and plan of care as documented in the note above.   I personally reviewed vital signs, medications, labs, fetal heart tones and toco.  Key findings: Patient is here for scheduled IOL for chtn. Fetal status is reassuring with a reactive NST.   Spontaneous contractions noted but she is not feeling them. discussed IOL process with normal expectations and all questions answered.   Procedure:   Cervix as noted above.    cytotec placed in posterior fornix by the resident under my direct supervision at ~ 20:10  without complication.  Patient tolerated it well.      Anamaria Bates MD   October 20, 2020

## 2020-10-20 ENCOUNTER — ANESTHESIA EVENT (OUTPATIENT)
Dept: OBGYN | Facility: CLINIC | Age: 35
End: 2020-10-20

## 2020-10-20 ENCOUNTER — ANESTHESIA (OUTPATIENT)
Dept: OBGYN | Facility: CLINIC | Age: 35
End: 2020-10-20

## 2020-10-20 LAB — T PALLIDUM AB SER QL: NONREACTIVE

## 2020-10-20 PROCEDURE — 120N000002 HC R&B MED SURG/OB UMMC

## 2020-10-20 PROCEDURE — 258N000003 HC RX IP 258 OP 636: Performed by: STUDENT IN AN ORGANIZED HEALTH CARE EDUCATION/TRAINING PROGRAM

## 2020-10-20 PROCEDURE — 250N000011 HC RX IP 250 OP 636: Performed by: STUDENT IN AN ORGANIZED HEALTH CARE EDUCATION/TRAINING PROGRAM

## 2020-10-20 PROCEDURE — 250N000013 HC RX MED GY IP 250 OP 250 PS 637: Performed by: STUDENT IN AN ORGANIZED HEALTH CARE EDUCATION/TRAINING PROGRAM

## 2020-10-20 PROCEDURE — 59200 INSERT CERVICAL DILATOR: CPT | Mod: GC | Performed by: OBSTETRICS & GYNECOLOGY

## 2020-10-20 PROCEDURE — 250N000009 HC RX 250: Performed by: STUDENT IN AN ORGANIZED HEALTH CARE EDUCATION/TRAINING PROGRAM

## 2020-10-20 RX ORDER — HYDROXYZINE HYDROCHLORIDE 50 MG/1
200 TABLET, FILM COATED ORAL ONCE
Status: COMPLETED | OUTPATIENT
Start: 2020-10-20 | End: 2020-10-20

## 2020-10-20 RX ORDER — HYDROXYZINE HYDROCHLORIDE 50 MG/1
50 TABLET, FILM COATED ORAL
Status: COMPLETED | OUTPATIENT
Start: 2020-10-20 | End: 2020-10-20

## 2020-10-20 RX ORDER — MISOPROSTOL 200 UG/1
TABLET ORAL
Status: DISCONTINUED
Start: 2020-10-20 | End: 2020-10-21 | Stop reason: WASHOUT

## 2020-10-20 RX ORDER — OXYTOCIN 10 [USP'U]/ML
INJECTION, SOLUTION INTRAMUSCULAR; INTRAVENOUS
Status: DISCONTINUED
Start: 2020-10-20 | End: 2020-10-21 | Stop reason: WASHOUT

## 2020-10-20 RX ORDER — OXYTOCIN/0.9 % SODIUM CHLORIDE 30/500 ML
PLASTIC BAG, INJECTION (ML) INTRAVENOUS
Status: DISCONTINUED
Start: 2020-10-20 | End: 2020-10-21 | Stop reason: HOSPADM

## 2020-10-20 RX ORDER — OXYTOCIN/0.9 % SODIUM CHLORIDE 30/500 ML
1-24 PLASTIC BAG, INJECTION (ML) INTRAVENOUS CONTINUOUS
Status: DISCONTINUED | OUTPATIENT
Start: 2020-10-20 | End: 2020-10-21

## 2020-10-20 RX ORDER — LIDOCAINE HYDROCHLORIDE 10 MG/ML
INJECTION, SOLUTION EPIDURAL; INFILTRATION; INTRACAUDAL; PERINEURAL
Status: DISCONTINUED
Start: 2020-10-20 | End: 2020-10-21 | Stop reason: WASHOUT

## 2020-10-20 RX ORDER — TERBUTALINE SULFATE 1 MG/ML
0.25 INJECTION, SOLUTION SUBCUTANEOUS
Status: DISCONTINUED | OUTPATIENT
Start: 2020-10-20 | End: 2020-10-21

## 2020-10-20 RX ORDER — MORPHINE SULFATE 10 MG/ML
10 INJECTION, SOLUTION INTRAMUSCULAR; INTRAVENOUS
Status: DISCONTINUED | OUTPATIENT
Start: 2020-10-20 | End: 2020-10-21

## 2020-10-20 RX ADMIN — SODIUM CHLORIDE, POTASSIUM CHLORIDE, SODIUM LACTATE AND CALCIUM CHLORIDE: 600; 310; 30; 20 INJECTION, SOLUTION INTRAVENOUS at 17:30

## 2020-10-20 RX ADMIN — SODIUM CHLORIDE, POTASSIUM CHLORIDE, SODIUM LACTATE AND CALCIUM CHLORIDE 500 ML: 600; 310; 30; 20 INJECTION, SOLUTION INTRAVENOUS at 03:18

## 2020-10-20 RX ADMIN — SODIUM CHLORIDE, POTASSIUM CHLORIDE, SODIUM LACTATE AND CALCIUM CHLORIDE: 600; 310; 30; 20 INJECTION, SOLUTION INTRAVENOUS at 09:16

## 2020-10-20 RX ADMIN — SODIUM CHLORIDE, POTASSIUM CHLORIDE, SODIUM LACTATE AND CALCIUM CHLORIDE 500 ML: 600; 310; 30; 20 INJECTION, SOLUTION INTRAVENOUS at 08:41

## 2020-10-20 RX ADMIN — ONDANSETRON 4 MG: 2 INJECTION INTRAMUSCULAR; INTRAVENOUS at 17:23

## 2020-10-20 RX ADMIN — Medication 2 MILLI-UNITS/MIN: at 20:22

## 2020-10-20 RX ADMIN — FENTANYL CITRATE 50 MCG: 50 INJECTION, SOLUTION INTRAMUSCULAR; INTRAVENOUS at 17:32

## 2020-10-20 RX ADMIN — HYDROXYZINE HYDROCHLORIDE 50 MG: 50 TABLET, FILM COATED ORAL at 22:59

## 2020-10-20 RX ADMIN — HYDROXYZINE HYDROCHLORIDE 200 MG: 50 TABLET, FILM COATED ORAL at 00:34

## 2020-10-20 RX ADMIN — FLUTICASONE PROPIONATE 1 SPRAY: 50 SPRAY, METERED NASAL at 11:05

## 2020-10-20 RX ADMIN — Medication 25 MCG: at 00:19

## 2020-10-20 NOTE — PROVIDER NOTIFICATION
10/20/20 0510   Provider Notification   Provider Name/Title Dr. Bates   Method of Notification Phone   Notification Reason Labor Status;Uterine Activity;Status Update   Reviewed strip with Dr. Bates over phone. No more decels present, still minimal variability with no accels present. Pt is adequately eating, drinking, and voiding. Sleeping intermittently, but feeling more cramping in her lower abdomen. Per provider, hold miso dose at this time and consider possible jauregui bulb placement this AM if indicated. Will continue to monitor closely.

## 2020-10-20 NOTE — PLAN OF CARE
Labor is not progressing as expected. See flowsheet for fetal and uterine monitoring. FHR category two tracing. Provider aware of labor status. Patient still comfortable with jauregui bulb. Will give report to the next nurse for care continue.

## 2020-10-20 NOTE — PROGRESS NOTES
"Intrapartum Progress Note    S: Patient states she is doing ok. Having occasional contractions. Eating jello.    O:   Patient Vitals for the past 24 hrs:   BP Temp Temp src Resp Height   10/20/20 1122 (!) 140/90 -- -- -- --   10/20/20 1100 (!) 166/83 97.2  F (36.2  C) -- -- --   10/20/20 0606 (!) 145/86 97.9  F (36.6  C) Oral 16 --   10/20/20 0205 123/75 97.8  F (36.6  C) Oral 16 --   10/19/20 2157 (!) 142/99 -- -- 16 --   10/19/20 2000 -- 97.6  F (36.4  C) Oral -- --   10/19/20 1800 132/87 -- -- -- --   10/19/20 1730 (!) 139/98 -- -- -- --   10/19/20 1722 (!) 139/98 98  F (36.7  C) Oral 20 1.753 m (5' 9\")     Gen: awake, alert, answering questions appropriately, appears comfortable  Cervix: 1025 1/50/-3/ soft/ anterior  Jauregui balloon placed at 1025 with 60cc in balloon     FHT:   Baseline 130 bpm  Variability minimal  Accels absent  Decels: absent  [place]: 0 to 1 contractions in 10 min, with rare stretches of 5 in 10min    Membranes: intact    A/P: Scott Dobbs is a 35 year old  at 38w6d by LMP c/w 10w3d ultrasound who is being induced for chronic hypertension. Labor progress has been slow, with an intermittently Cat 2 tracing.    Induction of labor:  Patient is now s/p 2 doses of PV misoprostol.  Last dose was 0019.    Now with jauregui balloon in place with 60cc in balloon    Fetal Well Being:  Intermittently Cat 2  - Mod variability or ctxs: NO > Significant decels w/ 50% of ctx for 30min: NO > Will continue to observe for 1 hour  Reviewed fetal status and if Category 2 or repeat prolonged deceleration will assess for safety of continued IOL.    Chronic hypertension:  Single SR BP, otherwise BPs normal to mild range  Avoid methergine if PPH    Asthma:  Avoid hemabate if PPH    Ashwin Ambrosio, MD MPH  OB/Gyn PGY-2  10/20/20 2:44 PM  "

## 2020-10-20 NOTE — PROGRESS NOTES
S: I was notified by labor RN of persistent category II fetal heart rate tracing for 30 minutes.  Strip reviewed on unit.  I came to the the unit to review with the RN.     O: Baseline rate 130, normal  Variability moderate  Accelerations present  Decelerations present, deceleration type: late , deceleration frequency: intermittent    Assessment: EFM interpretation suggests absence of concern for fetal metabolic acidemia at this time due to moderate variability and accelerations present    Uterine Activity normal/regular.     The interventions currently taken to improve the fetal heart rate tracing and fetal oxygenation are: maternal positioning and IV fluid bolus     A: Category II tracing, now resolved with resolution of late decelerations.     P: Per the category II algorithm, the plan is to continue observe/conservative management. Reevaluate in 30 minutes.     Joanne Lopez MD  Obstetrics & Gynecology, PGY-2  10/20/20 3:42 AM

## 2020-10-20 NOTE — PROGRESS NOTES
Intrapartum Progress Note    S: Patient states that she continues to feel cramping.  After the fentanyl dose her pain is improved.      O:   Patient Vitals for the past 24 hrs:   BP Temp Temp src Pulse Resp   10/20/20 1600 (!) 138/95 98.6  F (37  C) Oral 79 20   10/20/20 1509 139/85 -- -- -- --   10/20/20 1122 (!) 140/90 -- -- -- --   10/20/20 1100 (!) 166/83 97.2  F (36.2  C) -- -- --   10/20/20 0606 (!) 145/86 97.9  F (36.6  C) Oral -- 16   10/20/20 0205 123/75 97.8  F (36.6  C) Oral -- 16   10/19/20 2157 (!) 142/99 -- -- -- 16   10/19/20 2000 -- 97.6  F (36.4  C) Oral -- --   ]   Gen: awake, alert, answering questions appropriately, appears comfortable  Cervix: 1025 1/50/-3/ soft/ anterior  Jauregui balloon placed at 1025 with 60cc in balloon     FHT:   Baseline 130 bpm  Variability minimal,  Prior to IV fentanyl moderate variability  Accels present   Decels: rare variable   [place]: 3 contractions in 10 min    Membranes: intact    A/P: Scott Dobbs is a 35 year old  at 38w6d by LMP c/w 10w3d ultrasound who presents today for induction of for chronic hypertension, currently with pain control s/p IV fentanyl    Induction of labor:  Patient is now s/p 2 doses of PV misoprostol.  Last dose was 0019.    Now with jauregui balloon in place with 60cc in balloon, placed at 1030.  On last check did not come out with downward traction.    -Plan to continue jauregui catheter for up to 24 hours per L&D mechanical cervical ripening protocol    Fetal Well Being:  Intermittent category 2 FHT, currently category 2 due to minimal variability and rare variable deceleration.  The variability decreased to minima following IV fentanyl administration.  Currently infusing 500ml of IV fluids.    Per Category 2 Algorith:  Moderate variability or accelerations: NO  Significant decelerations with >=50% of contractions for 30 minutes: NO  =Observe for 1 hour    Chronic hypertension:  BPs normal to mild range  Avoid methergine if  PPH    Asthma:  Avoid hemabate if PPH    Elisabeth Lima MD  [id number]

## 2020-10-20 NOTE — PROGRESS NOTES
[facility]  Labor Progress Note    S: The patient is feeling occasional cramping.     O:   Patient Vitals for the past 4 hrs:   BP Temp Temp src Resp   10/20/20 0205 123/75 97.8  F (36.6  C) Oral 16       Gen:  NAD    FHT: Baseline 130, minimal to moderate variability, periodic variable decelerations, no accelerations in last hour   [place]: Irregular, 2-4 contractions in 10 minutes    Assessment:   Scott Dobbs is a 35 year old  at 38w5d by LMP c/w 10w3d US who presents for IOL for cHTN. Currently having category 2 FHT.      Plan:     #IOL: Undergoing cervical ripening.   - Pain management: Aware of options; s/p vistaril 200 mg x 1 for sleep   - Plan: Hold misoprostol until strip returns to category 1      #Fetal well-being  - Category 2 due to minimal variability, variable deceleration, and lack of reactivity over the last 1 hour   - s/p IV bolus 500 mL, repositioning    - Given additional 500 mL IV fluid bolus    - Reevaluate in 30 minutes   - EFW 86%ile at 36w2d growth US   - GBS neg    #cHTN  - BP normal to mild range since admission   - No signs/sxs of preeclampsia   - Serial BP monitoring in labor, postpartum  - HELLP labs on admission WNL, UPC 0.13  - Fast acting anti-hypertensives for BP elevated over 160/110 sustained     #Asthma  - Avoid hemabate i/s/o of PPH  - Fluticasone, Allegra     #Mild Polyhydramnios  - LEE 26.2       Joanne Lopez MD  OBGYN PGY-2  10/20/2020 4:39 AM

## 2020-10-20 NOTE — PROGRESS NOTES
"Intrapartum Progress Note    S: Patient states that her cramping has decreased in intensity.     O:   Patient Vitals for the past 24 hrs:   BP Temp Temp src Resp Height   10/20/20 0606 (!) 145/86 97.9  F (36.6  C) Oral 16 --   10/20/20 0205 123/75 97.8  F (36.6  C) Oral 16 --   10/19/20 2157 (!) 142/99 -- -- 16 --   10/19/20 2000 -- 97.6  F (36.4  C) Oral -- --   10/19/20 1800 132/87 -- -- -- --   10/19/20 1730 (!) 139/98 -- -- -- --   10/19/20 1722 (!) 139/98 98  F (36.7  C) Oral 20 1.753 m (5' 9\")   ]   Gen: awake, alert, answering questions appropriately, appears comfortable  Cervix: 1025 1/50/-3/ soft/ anterior  Jauregui balloon placed at 1025 with 60cc in balloon.  I personally placed jauregui balloon at that time by palpation.  Patient tolerated procedure well.     FHT:   Baseline 140 bpm  Variability moderate  Accels present   Decels: rare variable deceleration, rare late deceleration  [place]: 3 contractions in 10 min    Membranes: intact    A/P: Scott Dobbs is a 35 year old  at 38w6d by LMP c/w 10w3d ultrasound who presents today for induction of for chronic hypertension, now with reassuring FHT.     Induction of labor:  Patient is now s/p 2 doses of PV misoprostol.  Last dose was 0019.    Currently with reassuring FHT.    Now with jauregui balloon in place with 60cc in balloon    Fetal Well Being:  Overall category 1, reactive at this time.  Again reviewed fetal status and if Category 2 or repeat prolonged deceleration will assess for safety of continued IOL.    Chronic hypertension:  BPs normal to mild range  Avoid methergine if PPH    Asthma:  Avoid hemabate if PPH    Elisabeth Lima MD  [id number]    "

## 2020-10-20 NOTE — PROVIDER NOTIFICATION
10/20/20 0612   Provider Notification   Provider Name/Title Dr. Bates   Method of Notification Phone   Per provider, continue to let patient rest at this time. Will encourage to have breakfast and get up this AM, and then assess for potential jauregui balloon placement. Will continue to monitor fetal heart rate strip closely, and notify providers if decelerations are noted.

## 2020-10-20 NOTE — PLAN OF CARE
VSS, afebrile. Mildly elevated blood pressures noted overnight, pt denies signs and symptoms of pre-e including headache, blurry vision, and RUQ pain. Blood pressure labs WNL. Vistaril given for early labor cramping and sleep. Laboring with vag miso, 2 doses given, last at 0020. Held 0420 dose due to minimal variability, no accelerations, and a couple late decelerations noted in fetal heart rate. 500mL fluid bolus administered, patient repositioned, and physicians notified. 0420 SVE per physician, unchanged (1/50%/-3). Fetal monitoring strip reviewed extensively with providers in department. Per Dr. Bates, plan this AM to have patient get up, eat breakfast, and then assess for potential jauregui balloon placement. Pt updated and educated about labor progress and concern in fetal heart rate at this time. Pt and spouse with many relevant questions concerning labor progress, they verbalize understanding with plan of care. Will continue to monitor closely.

## 2020-10-20 NOTE — PLAN OF CARE
Ambulating in room, using birth-ball and coping well with labor. See flow sheet for FHR and contraction pattern.  Intermittent Cat 2 tracing, huddle with Dr Lima and Dr Ambrosio. 1:1 RN care provided .Will continue to monitor and will notify provider if there is a change in status.

## 2020-10-20 NOTE — PROGRESS NOTES
"Intrapartum Progress Note    In to patient's room due to a decel, lasting ~1.5min.    S: Patient states she is doing ok. Found bouncing on ball.  Continues to have occasional contractions.    O:   Patient Vitals for the past 24 hrs:   BP Temp Temp src Pulse Resp Height   10/20/20 1600 (!) 138/95 98.6  F (37  C) Oral 79 20 --   10/20/20 1509 139/85 -- -- -- -- --   10/20/20 1122 (!) 140/90 -- -- -- -- --   10/20/20 1100 (!) 166/83 97.2  F (36.2  C) -- -- -- --   10/20/20 0606 (!) 145/86 97.9  F (36.6  C) Oral -- 16 --   10/20/20 0205 123/75 97.8  F (36.6  C) Oral -- 16 --   10/19/20 2157 (!) 142/99 -- -- -- 16 --   10/19/20 2000 -- 97.6  F (36.4  C) Oral -- -- --   10/19/20 1800 132/87 -- -- -- -- --   10/19/20 1730 (!) 139/98 -- -- -- -- --   10/19/20 1722 (!) 139/98 98  F (36.7  C) Oral -- 20 1.753 m (5' 9\")     Gen: awake, alert, answering questions appropriately, appears comfortable  Cervix: 1025 1/50/-3/ soft/ anterior  Jauregui balloon placed at 1025 with 60cc in balloon     FHT:   Baseline 140 bpm  Variability minimal with periods of moderate flanking  Accels absent  Decels: single decel lasting 1.5min while bouncing on ball  [place]: 2 to 3 contractions in 10 min    Membranes: intact    A/P: Scott Dobbs is a 35 year old  at 38w6d by LMP c/w 10w3d ultrasound who is being induced for chronic hypertension. Labor progress has been slow, with an intermittent Cat 2 tracing.    Induction of labor:  Patient is now s/p 2 doses of PV misoprostol.  Last dose was 0019.    Now with jauregui balloon in place with 60cc in balloon    Fetal Well Being:  Intermittently Cat 2  - Mod variability or ctxs: NO > Significant decels w/ 50% of ctx for 30min: NO > Will continue to observe for 1 hour  Reviewed fetal status and if Category 2 or repeat prolonged deceleration will assess for safety of continued IOL.    Chronic hypertension:  Single SR BP, otherwise BPs normal to mild range  Avoid methergine if PPH    Asthma:  Avoid hemabate " if PPH    Ashwin Ambrosio MD MPH  OB/Gyn PGY-2  10/20/20 5:04 PM

## 2020-10-20 NOTE — PROVIDER NOTIFICATION
10/20/20 0433   Provider Notification   Provider Name/Title Dr. Lopez    Method of Notification Phone   per provider, hold miso at this time and continue to monitor strip. Will watch closely.

## 2020-10-20 NOTE — PROVIDER NOTIFICATION
10/20/20 0325   Provider Notification   Provider Name/Title Dr. Lopez    Method of Notification In Department   reviewed strip with provider in department including late decelerations. Notified of interventions of repositioning and IV fluid bolus started. Plan per provider to continue to monitor closely and notify for signs of fetal distress.

## 2020-10-20 NOTE — PROGRESS NOTES
[facility]  Labor Progress Note    S: The patient is feeling occasional tightening, but no painful contractions. Otherwise feeling well.     O:   Patient Vitals for the past 4 hrs:   BP Resp   10/19/20 2157 (!) 142/99 16       Gen: NAD    FHT: Baseline 140, moderate variability, + accelerations, one spontaneous deceleration since admission  [place]: Irregular, 2-3 contractions in 10 minutes, palpate mild    Assessment:   Scott Dobbs is a 35 year old  at 38w5d by LMP c/w 10w3d US who presents for IOL for cHTN.      Plan:     #IOL: Undergoing cervical ripening.   - Pain management: Aware of options; plan for vistaril for sleep, morphine if begins to feel more uncomfortable.   - Plan: Continue cervical ripening with misoprostol PV q4h      #cHTN  - BP mild range since admission   - No signs/sxs of preeclampsia   - Serial BP monitoring in labor, postpartum  - HELLP labs on admission WNL, UPC 0.13  - Fast acting anti-hypertensives for BP elevated over 160/110 sustained     #Asthma  - Avoid hemabate i/s/o of PPH  - Fluticasone, Allegra     #Mild Polyhydramnios  - LEE 26.2     #Fetal well-being  - Predominately Category 1 FHT, reactive and reassuring   - EFW 86%ile at 36w2d growth US   - GBS neg     #PNC  - Posterior placenta  - BMI 44  - GCT wnl  - Rh pos- rhogam not indicated; Rubella immune    Joanne Lopez MD  OBGYN PGY-2  10/20/2020 12:58 AM

## 2020-10-20 NOTE — PLAN OF CARE
Pt alert and oriented this morning, supported by . Late decelerations noted on EFM. See flowsheet for fetal and uterine monitoring. Provider notified and assessed pt, discussed plan of care with pt. 500 mL bolus LR administered. IVF infusing at 125 mL/hr continuous following bolus. Rosa placed back on pt to allow for activity. Will continue to monitor.

## 2020-10-21 ENCOUNTER — ANESTHESIA (OUTPATIENT)
Dept: OBGYN | Facility: CLINIC | Age: 35
End: 2020-10-21
Payer: COMMERCIAL

## 2020-10-21 ENCOUNTER — ANCILLARY PROCEDURE (OUTPATIENT)
Dept: ULTRASOUND IMAGING | Facility: CLINIC | Age: 35
End: 2020-10-21
Attending: ANESTHESIOLOGY
Payer: COMMERCIAL

## 2020-10-21 ENCOUNTER — ANESTHESIA EVENT (OUTPATIENT)
Dept: OBGYN | Facility: CLINIC | Age: 35
End: 2020-10-21
Payer: COMMERCIAL

## 2020-10-21 PROBLEM — Z98.891 STATUS POST CESAREAN SECTION: Status: ACTIVE | Noted: 2020-10-21

## 2020-10-21 LAB
ALT SERPL W P-5'-P-CCNC: 13 U/L (ref 0–50)
AST SERPL W P-5'-P-CCNC: 19 U/L (ref 0–45)
CREAT SERPL-MCNC: 0.88 MG/DL (ref 0.52–1.04)
GFR SERPL CREATININE-BSD FRML MDRD: 86 ML/MIN/{1.73_M2}
HGB BLD-MCNC: 12.1 G/DL (ref 11.7–15.7)
PLATELET # BLD AUTO: 218 10E9/L (ref 150–450)

## 2020-10-21 PROCEDURE — 120N000002 HC R&B MED SURG/OB UMMC

## 2020-10-21 PROCEDURE — 250N000011 HC RX IP 250 OP 636: Performed by: STUDENT IN AN ORGANIZED HEALTH CARE EDUCATION/TRAINING PROGRAM

## 2020-10-21 PROCEDURE — 82565 ASSAY OF CREATININE: CPT | Performed by: STUDENT IN AN ORGANIZED HEALTH CARE EDUCATION/TRAINING PROGRAM

## 2020-10-21 PROCEDURE — 250N000011 HC RX IP 250 OP 636: Performed by: ANESTHESIOLOGY

## 2020-10-21 PROCEDURE — 250N000013 HC RX MED GY IP 250 OP 250 PS 637: Performed by: STUDENT IN AN ORGANIZED HEALTH CARE EDUCATION/TRAINING PROGRAM

## 2020-10-21 PROCEDURE — 360N000022 HC SURGERY LEVEL 3 1ST 30 MIN - UMMC: Performed by: OBSTETRICS & GYNECOLOGY

## 2020-10-21 PROCEDURE — 84460 ALANINE AMINO (ALT) (SGPT): CPT | Performed by: STUDENT IN AN ORGANIZED HEALTH CARE EDUCATION/TRAINING PROGRAM

## 2020-10-21 PROCEDURE — 250N000009 HC RX 250: Performed by: STUDENT IN AN ORGANIZED HEALTH CARE EDUCATION/TRAINING PROGRAM

## 2020-10-21 PROCEDURE — 370N000002 HC ANESTHESIA TECHNICAL FEE, EACH ADDTL 15 MIN: Performed by: OBSTETRICS & GYNECOLOGY

## 2020-10-21 PROCEDURE — 761N000003 HC RECOVERY PHASE 1 LEVEL 2 FIRST HR: Performed by: OBSTETRICS & GYNECOLOGY

## 2020-10-21 PROCEDURE — 258N000003 HC RX IP 258 OP 636: Performed by: STUDENT IN AN ORGANIZED HEALTH CARE EDUCATION/TRAINING PROGRAM

## 2020-10-21 PROCEDURE — 88307 TISSUE EXAM BY PATHOLOGIST: CPT | Mod: TC | Performed by: STUDENT IN AN ORGANIZED HEALTH CARE EDUCATION/TRAINING PROGRAM

## 2020-10-21 PROCEDURE — 84450 TRANSFERASE (AST) (SGOT): CPT | Performed by: STUDENT IN AN ORGANIZED HEALTH CARE EDUCATION/TRAINING PROGRAM

## 2020-10-21 PROCEDURE — 271N000001 HC OR GENERAL SUPPLY NON-STERILE: Performed by: OBSTETRICS & GYNECOLOGY

## 2020-10-21 PROCEDURE — 360N000023 HC SURGERY LEVEL 3 EA 15 ADDTL MIN UMMC: Performed by: OBSTETRICS & GYNECOLOGY

## 2020-10-21 PROCEDURE — 59510 CESAREAN DELIVERY: CPT | Mod: GC | Performed by: OBSTETRICS & GYNECOLOGY

## 2020-10-21 PROCEDURE — 85018 HEMOGLOBIN: CPT | Performed by: STUDENT IN AN ORGANIZED HEALTH CARE EDUCATION/TRAINING PROGRAM

## 2020-10-21 PROCEDURE — 36415 COLL VENOUS BLD VENIPUNCTURE: CPT | Performed by: STUDENT IN AN ORGANIZED HEALTH CARE EDUCATION/TRAINING PROGRAM

## 2020-10-21 PROCEDURE — 85049 AUTOMATED PLATELET COUNT: CPT | Performed by: STUDENT IN AN ORGANIZED HEALTH CARE EDUCATION/TRAINING PROGRAM

## 2020-10-21 PROCEDURE — 88307 TISSUE EXAM BY PATHOLOGIST: CPT | Mod: 26 | Performed by: PATHOLOGY

## 2020-10-21 PROCEDURE — 999N000139 HC STATISTIC PRE-PROCEDURE ASSESSMENT II: Performed by: OBSTETRICS & GYNECOLOGY

## 2020-10-21 PROCEDURE — 370N000001 HC ANESTHESIA TECHNICAL FEE, 1ST 30 MIN: Performed by: OBSTETRICS & GYNECOLOGY

## 2020-10-21 PROCEDURE — C9290 INJ, BUPIVACAINE LIPOSOME: HCPCS | Performed by: ANESTHESIOLOGY

## 2020-10-21 PROCEDURE — 272N000001 HC OR GENERAL SUPPLY STERILE: Performed by: OBSTETRICS & GYNECOLOGY

## 2020-10-21 RX ORDER — MISOPROSTOL 200 UG/1
TABLET ORAL
Status: DISCONTINUED
Start: 2020-10-21 | End: 2020-10-21 | Stop reason: WASHOUT

## 2020-10-21 RX ORDER — ONDANSETRON 2 MG/ML
INJECTION INTRAMUSCULAR; INTRAVENOUS PRN
Status: DISCONTINUED | OUTPATIENT
Start: 2020-10-21 | End: 2020-10-21

## 2020-10-21 RX ORDER — SODIUM CHLORIDE, SODIUM LACTATE, POTASSIUM CHLORIDE, CALCIUM CHLORIDE 600; 310; 30; 20 MG/100ML; MG/100ML; MG/100ML; MG/100ML
INJECTION, SOLUTION INTRAVENOUS CONTINUOUS
Status: DISCONTINUED | OUTPATIENT
Start: 2020-10-21 | End: 2020-10-21

## 2020-10-21 RX ORDER — FENTANYL CITRATE 50 UG/ML
INJECTION, SOLUTION INTRAMUSCULAR; INTRAVENOUS PRN
Status: DISCONTINUED | OUTPATIENT
Start: 2020-10-21 | End: 2020-10-21

## 2020-10-21 RX ORDER — ACETAMINOPHEN 325 MG/1
975 TABLET ORAL EVERY 6 HOURS
Status: DISCONTINUED | OUTPATIENT
Start: 2020-10-21 | End: 2020-10-23 | Stop reason: HOSPADM

## 2020-10-21 RX ORDER — AMOXICILLIN 250 MG
2 CAPSULE ORAL 2 TIMES DAILY
Status: DISCONTINUED | OUTPATIENT
Start: 2020-10-21 | End: 2020-10-23 | Stop reason: HOSPADM

## 2020-10-21 RX ORDER — NALOXONE HYDROCHLORIDE 0.4 MG/ML
.1-.4 INJECTION, SOLUTION INTRAMUSCULAR; INTRAVENOUS; SUBCUTANEOUS
Status: DISCONTINUED | OUTPATIENT
Start: 2020-10-21 | End: 2020-10-23 | Stop reason: HOSPADM

## 2020-10-21 RX ORDER — BUPIVACAINE HYDROCHLORIDE 2.5 MG/ML
INJECTION, SOLUTION EPIDURAL; INFILTRATION; INTRACAUDAL PRN
Status: DISCONTINUED | OUTPATIENT
Start: 2020-10-21 | End: 2020-10-21

## 2020-10-21 RX ORDER — AMOXICILLIN 250 MG
1 CAPSULE ORAL 2 TIMES DAILY
Status: DISCONTINUED | OUTPATIENT
Start: 2020-10-21 | End: 2020-10-23 | Stop reason: HOSPADM

## 2020-10-21 RX ORDER — MODIFIED LANOLIN
OINTMENT (GRAM) TOPICAL
Status: DISCONTINUED | OUTPATIENT
Start: 2020-10-21 | End: 2020-10-23 | Stop reason: HOSPADM

## 2020-10-21 RX ORDER — FENTANYL CITRATE-0.9 % NACL/PF 10 MCG/ML
PLASTIC BAG, INJECTION (ML) INTRAVENOUS CONTINUOUS PRN
Status: DISCONTINUED | OUTPATIENT
Start: 2020-10-21 | End: 2020-10-21

## 2020-10-21 RX ORDER — MORPHINE SULFATE 1 MG/ML
INJECTION, SOLUTION EPIDURAL; INTRATHECAL; INTRAVENOUS PRN
Status: DISCONTINUED | OUTPATIENT
Start: 2020-10-21 | End: 2020-10-21

## 2020-10-21 RX ORDER — DEXTROSE, SODIUM CHLORIDE, SODIUM LACTATE, POTASSIUM CHLORIDE, AND CALCIUM CHLORIDE 5; .6; .31; .03; .02 G/100ML; G/100ML; G/100ML; G/100ML; G/100ML
INJECTION, SOLUTION INTRAVENOUS CONTINUOUS
Status: DISCONTINUED | OUTPATIENT
Start: 2020-10-21 | End: 2020-10-23 | Stop reason: HOSPADM

## 2020-10-21 RX ORDER — FEXOFENADINE HCL 60 MG/1
60 TABLET, FILM COATED ORAL DAILY
Status: DISCONTINUED | OUTPATIENT
Start: 2020-10-21 | End: 2020-10-23 | Stop reason: HOSPADM

## 2020-10-21 RX ORDER — IBUPROFEN 800 MG/1
800 TABLET, FILM COATED ORAL EVERY 6 HOURS
Status: DISCONTINUED | OUTPATIENT
Start: 2020-10-22 | End: 2020-10-23 | Stop reason: HOSPADM

## 2020-10-21 RX ORDER — CEFAZOLIN SODIUM 1 G/3ML
1 INJECTION, POWDER, FOR SOLUTION INTRAMUSCULAR; INTRAVENOUS SEE ADMIN INSTRUCTIONS
Status: DISCONTINUED | OUTPATIENT
Start: 2020-10-21 | End: 2020-10-21

## 2020-10-21 RX ORDER — KETOROLAC TROMETHAMINE 30 MG/ML
30 INJECTION, SOLUTION INTRAMUSCULAR; INTRAVENOUS EVERY 6 HOURS
Status: COMPLETED | OUTPATIENT
Start: 2020-10-21 | End: 2020-10-21

## 2020-10-21 RX ORDER — HYDROCORTISONE 2.5 %
CREAM (GRAM) TOPICAL 3 TIMES DAILY PRN
Status: DISCONTINUED | OUTPATIENT
Start: 2020-10-21 | End: 2020-10-23 | Stop reason: HOSPADM

## 2020-10-21 RX ORDER — AZITHROMYCIN 500 MG/5ML
500 INJECTION, POWDER, LYOPHILIZED, FOR SOLUTION INTRAVENOUS
Status: COMPLETED | OUTPATIENT
Start: 2020-10-21 | End: 2020-10-21

## 2020-10-21 RX ORDER — SIMETHICONE 80 MG
80 TABLET,CHEWABLE ORAL 4 TIMES DAILY PRN
Status: DISCONTINUED | OUTPATIENT
Start: 2020-10-21 | End: 2020-10-23 | Stop reason: HOSPADM

## 2020-10-21 RX ORDER — OXYTOCIN 10 [USP'U]/ML
10 INJECTION, SOLUTION INTRAMUSCULAR; INTRAVENOUS
Status: DISCONTINUED | OUTPATIENT
Start: 2020-10-21 | End: 2020-10-23 | Stop reason: HOSPADM

## 2020-10-21 RX ORDER — OXYTOCIN/0.9 % SODIUM CHLORIDE 30/500 ML
100 PLASTIC BAG, INJECTION (ML) INTRAVENOUS CONTINUOUS
Status: DISCONTINUED | OUTPATIENT
Start: 2020-10-21 | End: 2020-10-23 | Stop reason: HOSPADM

## 2020-10-21 RX ORDER — CITRIC ACID/SODIUM CITRATE 334-500MG
30 SOLUTION, ORAL ORAL
Status: COMPLETED | OUTPATIENT
Start: 2020-10-21 | End: 2020-10-21

## 2020-10-21 RX ORDER — SODIUM CHLORIDE, SODIUM LACTATE, POTASSIUM CHLORIDE, CALCIUM CHLORIDE 600; 310; 30; 20 MG/100ML; MG/100ML; MG/100ML; MG/100ML
INJECTION, SOLUTION INTRAVENOUS CONTINUOUS PRN
Status: DISCONTINUED | OUTPATIENT
Start: 2020-10-21 | End: 2020-10-21

## 2020-10-21 RX ORDER — LIDOCAINE 40 MG/G
CREAM TOPICAL
Status: DISCONTINUED | OUTPATIENT
Start: 2020-10-21 | End: 2020-10-23 | Stop reason: HOSPADM

## 2020-10-21 RX ORDER — CEFAZOLIN SODIUM 1 G/3ML
INJECTION, POWDER, FOR SOLUTION INTRAMUSCULAR; INTRAVENOUS PRN
Status: DISCONTINUED | OUTPATIENT
Start: 2020-10-21 | End: 2020-10-21

## 2020-10-21 RX ORDER — OXYTOCIN/0.9 % SODIUM CHLORIDE 30/500 ML
340 PLASTIC BAG, INJECTION (ML) INTRAVENOUS CONTINUOUS PRN
Status: DISCONTINUED | OUTPATIENT
Start: 2020-10-21 | End: 2020-10-23 | Stop reason: HOSPADM

## 2020-10-21 RX ORDER — KETOROLAC TROMETHAMINE 30 MG/ML
INJECTION, SOLUTION INTRAMUSCULAR; INTRAVENOUS PRN
Status: DISCONTINUED | OUTPATIENT
Start: 2020-10-21 | End: 2020-10-21

## 2020-10-21 RX ORDER — BUPIVACAINE HYDROCHLORIDE 7.5 MG/ML
INJECTION, SOLUTION INTRASPINAL PRN
Status: DISCONTINUED | OUTPATIENT
Start: 2020-10-21 | End: 2020-10-21

## 2020-10-21 RX ORDER — OXYTOCIN/0.9 % SODIUM CHLORIDE 30/500 ML
PLASTIC BAG, INJECTION (ML) INTRAVENOUS CONTINUOUS PRN
Status: DISCONTINUED | OUTPATIENT
Start: 2020-10-21 | End: 2020-10-21

## 2020-10-21 RX ORDER — FLUTICASONE PROPIONATE 50 MCG
2 SPRAY, SUSPENSION (ML) NASAL DAILY
Status: DISCONTINUED | OUTPATIENT
Start: 2020-10-21 | End: 2020-10-23 | Stop reason: HOSPADM

## 2020-10-21 RX ORDER — ONDANSETRON 2 MG/ML
4 INJECTION INTRAMUSCULAR; INTRAVENOUS EVERY 6 HOURS PRN
Status: DISCONTINUED | OUTPATIENT
Start: 2020-10-21 | End: 2020-10-23 | Stop reason: HOSPADM

## 2020-10-21 RX ORDER — OXYCODONE HYDROCHLORIDE 5 MG/1
5 TABLET ORAL EVERY 4 HOURS PRN
Status: DISCONTINUED | OUTPATIENT
Start: 2020-10-21 | End: 2020-10-23 | Stop reason: HOSPADM

## 2020-10-21 RX ORDER — BISACODYL 10 MG
10 SUPPOSITORY, RECTAL RECTAL DAILY PRN
Status: DISCONTINUED | OUTPATIENT
Start: 2020-10-23 | End: 2020-10-23 | Stop reason: HOSPADM

## 2020-10-21 RX ORDER — CEFAZOLIN SODIUM IN 0.9 % NACL 3 G/100 ML
3 INTRAVENOUS SOLUTION, PIGGYBACK (ML) INTRAVENOUS
Status: DISCONTINUED | OUTPATIENT
Start: 2020-10-21 | End: 2020-10-21

## 2020-10-21 RX ADMIN — ENOXAPARIN SODIUM 40 MG: 40 INJECTION SUBCUTANEOUS at 11:51

## 2020-10-21 RX ADMIN — CEFAZOLIN 3 G: 1 INJECTION, POWDER, FOR SOLUTION INTRAMUSCULAR; INTRAVENOUS at 01:04

## 2020-10-21 RX ADMIN — BUPIVACAINE 20 ML: 13.3 INJECTION, SUSPENSION, LIPOSOMAL INFILTRATION at 02:46

## 2020-10-21 RX ADMIN — Medication 50 MCG/MIN: at 01:01

## 2020-10-21 RX ADMIN — Medication 500 MG: at 01:11

## 2020-10-21 RX ADMIN — SODIUM CHLORIDE, POTASSIUM CHLORIDE, SODIUM LACTATE AND CALCIUM CHLORIDE: 600; 310; 30; 20 INJECTION, SOLUTION INTRAVENOUS at 00:00

## 2020-10-21 RX ADMIN — ACETAMINOPHEN 975 MG: 325 TABLET, FILM COATED ORAL at 18:12

## 2020-10-21 RX ADMIN — DOCUSATE SODIUM AND SENNOSIDES 2 TABLET: 8.6; 5 TABLET ORAL at 07:32

## 2020-10-21 RX ADMIN — MORPHINE SULFATE 0.1 MG: 1 INJECTION EPIDURAL; INTRATHECAL; INTRAVENOUS at 01:01

## 2020-10-21 RX ADMIN — Medication 100 ML/HR: at 03:31

## 2020-10-21 RX ADMIN — KETOROLAC TROMETHAMINE 30 MG: 30 INJECTION, SOLUTION INTRAMUSCULAR at 13:47

## 2020-10-21 RX ADMIN — OXYTOCIN-SODIUM CHLORIDE 0.9% IV SOLN 30 UNIT/500ML 600 ML/HR: 30-0.9/5 SOLUTION at 01:22

## 2020-10-21 RX ADMIN — KETOROLAC TROMETHAMINE 30 MG: 30 INJECTION, SOLUTION INTRAMUSCULAR at 02:05

## 2020-10-21 RX ADMIN — ONDANSETRON 4 MG: 2 INJECTION INTRAMUSCULAR; INTRAVENOUS at 01:55

## 2020-10-21 RX ADMIN — BUPIVACAINE HYDROCHLORIDE 20 ML: 2.5 INJECTION, SOLUTION EPIDURAL; INFILTRATION; INTRACAUDAL at 02:46

## 2020-10-21 RX ADMIN — Medication 50 MCG/MIN: at 01:07

## 2020-10-21 RX ADMIN — FENTANYL CITRATE 15 MCG: 50 INJECTION, SOLUTION INTRAMUSCULAR; INTRAVENOUS at 01:01

## 2020-10-21 RX ADMIN — DOCUSATE SODIUM AND SENNOSIDES 2 TABLET: 8.6; 5 TABLET ORAL at 20:00

## 2020-10-21 RX ADMIN — BUPIVACAINE HYDROCHLORIDE IN DEXTROSE 1.6 ML: 7.5 INJECTION, SOLUTION SUBARACHNOID at 01:01

## 2020-10-21 RX ADMIN — KETOROLAC TROMETHAMINE 30 MG: 30 INJECTION, SOLUTION INTRAMUSCULAR at 07:32

## 2020-10-21 RX ADMIN — SODIUM CHLORIDE, POTASSIUM CHLORIDE, SODIUM LACTATE AND CALCIUM CHLORIDE: 600; 310; 30; 20 INJECTION, SOLUTION INTRAVENOUS at 00:50

## 2020-10-21 RX ADMIN — SODIUM CITRATE AND CITRIC ACID MONOHYDRATE 30 ML: 500; 334 SOLUTION ORAL at 00:33

## 2020-10-21 RX ADMIN — SODIUM CHLORIDE, SODIUM LACTATE, POTASSIUM CHLORIDE, CALCIUM CHLORIDE AND DEXTROSE MONOHYDRATE: 5; 600; 310; 30; 20 INJECTION, SOLUTION INTRAVENOUS at 08:55

## 2020-10-21 RX ADMIN — ACETAMINOPHEN 975 MG: 325 TABLET, FILM COATED ORAL at 11:51

## 2020-10-21 RX ADMIN — ACETAMINOPHEN 975 MG: 325 TABLET, FILM COATED ORAL at 05:30

## 2020-10-21 RX ADMIN — KETOROLAC TROMETHAMINE 30 MG: 30 INJECTION, SOLUTION INTRAMUSCULAR at 20:00

## 2020-10-21 NOTE — PLAN OF CARE
Patient arrived to [facility] unit via zoom cart at 0445 ,with belongings, accompanied by spouse/ significant other, with infant in arms. Received report from  MARANDA Leigh  and checked bands. Unit and room orientation completd. Call light given; no concerns present at this time. Continue with plan of care.

## 2020-10-21 NOTE — ANESTHESIA PROCEDURE NOTES
Peripheral Nerve Block Procedure Note      Staff -   Anesthesiologist:  Dallin López MD  Resident/Fellow: Kade Legih MD  Performed By: resident  Location: Pre-op  Procedure Start/Stop TImes:     patient identified, IV checked, site marked, risks and benefits discussed, informed consent, monitors and equipment checked, pre-op evaluation, at physician/surgeon's request and post-op pain management      Correct Patient: Yes      Correct Position: Yes      Correct Site: Yes      Correct Procedure: Yes      Correct Laterality:  Yes    Site Marked:  Yes  Procedure details:     Procedure:  TAP    ASA:  2    Diagnosis:  Post operative pain    Laterality:  Bilateral    Position:  Supine    Sterile Prep: chloraprep, mask and sterile gloves      Local skin infiltration:  None    Needle:  Short bevel    Needle gauge:  21    Needle length (mm):  110    Ultrasound: Yes      Ultrasound used to identify targeted nerve, plexus, or vascular structure and placed a needle adjacent to it      Permanent Image entered into patiient's record      Abnormal pain on injection: No      Blood Aspirated: No      Paresthesias:  No    Bleeding at site: No      Bolus via:  Needle    Infusion Method:  Single Shot    Complications:  None  Assessment/Narrative:     Injection made incrementally with aspirations every (mL):  5

## 2020-10-21 NOTE — PROGRESS NOTES
OR to PACU Transfer Note  Data: Pt to OB PACU via cart. PIV infusing without complications, jauregui with urine to gravity, temp 97.9, VSS, pt does not complain of pain and/or nausea.   Interventions: IV to pump, monitors and alarms on, SCD on.  Response: stable.  Plan: Patient instructed to notify RN for pain or nausea, routine post op cares, initiate breastfeeding/pumping as soon as patient/infant able.

## 2020-10-21 NOTE — OP NOTE
[facility]  Full Operative Progress Note     Surgery Date:  10/21/2020    Surgeon:  Elisabeth Lima MD    Assistants:  Joanne Lopez MD, PGY-2    Tono Olmos MS3    Pre-op Diagnosis:  1. Intrauterine pregnancy at 39w0d     2. Category 2 FHT RFD     3. Chronic HTN     4. Obesity     5. H/o asthma      Post-op Diagnosis:  1. Same      2. Liveborn female infant     Procedure:  Primary low-transverse  section with double layer uterine closure via pfannenstiel incision    Anesthesia: Spinal    EBL:  1270 cc    IVF:  500 mL crystalloid    UOP:  100 mL clear urine at the end of the case    Drains: Jauregui Catheter     Specimens:  Placenta, cord segment, cord blood, cord gases    Complications:  None apparent    Indications:   Scott Dobbs is a 35 year old  at 39w0d admitted for IOL for chronic HTN.  She underwent cervical ripening with misoprostol and jauregui balloon and was transitioned to pitocin after jauregui balloon fell out. Over the 24 hour course of cervical ripening and labor augmentation the patient continued to have intermittent category 2 FHT due to sporadic late and variable decelerations and periods of minimal variability. Prior to recommendation for  delivery the patient had a category 2 FHT due to late decelerations and over an hour of minimal variability that did not respond to the usual intrauterine resuscitative measures. A  delivery was recommended at this time. The risks, benefits, and alternatives of  section were discussed with the patient, and she agreed to proceed.     Findings:   1. No intraabdominal adhesions  2. Clear amniotic fluid  3. Liveborn female infant in VIJI cepahlic presentation. Apgars 7 at 1 minute & 9 at 5 minutes. Weight 7lbs 1.0oz.  4. Arterial cord pH 7.17, base deficit 7.5. Venous cord pH 7.31, base deficit 3.  5. Normal appearing uterus, normal appearing bilateral fallopian tubes, and normal appearing bilateral   ovaries.     Procedure Details:   The patient was brought to the OR, where adequate spinal anesthesia was administered.  She was placed in the dorsal supine position with a slight leftward tilt. She was prepped and draped in the usual sterile fashion. A surgical time out was performed. A pfannenstiel skin incision was made with the scalpel, and carried down to the underlying fascia with sharp and blunt dissection. The fascia was incised in the midline, and the incision was extended laterally bluntly. The superior aspect of the fascia was grasped with the Kocher clamps and dissected off of the underlying rectus muscles with blunt and sharp dissection. Attention was then turned to the inferior aspect of the fascia, which was similarly dissected off of the underlying rectus muscles. The rectus muscles were  in the midline, and the peritoneum was entered bluntly, and the opening was extended with digital pressure. The bladder blade was placed. A transverse hysterotomy was made with the scalpel in the lower uterine segment, and the incision was extended with digital pressure. The infant was noted to be in the cephalic VIJI position, and was delivered atraumatically. The shoulders delivered easily.  Single nuchal cord was noted. The cord was doubly clamped and cut after 60 seconds, and the infant was handed off to the awaiting NICU staff. A segment of cord was cut and sent. The placenta was delivered with gentle traction on the umbilical cord and uterine massage. The uterus was exteriorized and cleared of all clots and debris. Uterine tone was noted to be firm with pitocin given through the running IV and uterine massage.  The hysterotomy was closed with a running locked suture of 0 Vicryl.  The hysterotomy was then imbricated using an 0 Monocryl suture. An additional figure of X with 4-0 Monocryl was placed over an area of left lateral serosal oozing with improvement in hemostasis. The hysterotomy was noted to be  hemostatic. The posterior cul-de-sac was cleared of all clots and debris. The uterus was returned to the abdomen. The pericolic gutters were cleared of all clots and debris. The hysterotomy was reexamined and noted to be hemostatic. The fascia and rectus muscles were examined and areas of oozing were controlled with electrocautery. The fascia was closed with a running 0 Vicryl suture. The subcutaneous tissue was irrigated and areas of oozing were controlled with electrocautery. The subcutaneous tissue was greater than 2 cm in thickness, and was therefore close with running 2-0 plain gut. The skin was closed with 4-0 Monocryl and steri-strips and covered with a sterile dressing.    All sponge, needle, and instrument counts were correct. The patient tolerated the procedure well, and was transferred to recovery in stable condition. Dr. Elisabeth Lima was present and scrubbed for the entirety of the procedure.     Joanne Lopez MD  Obstetrics & Gynecology, PGY-2  10/21/20 2:36 AM     Physician Attestation   I spent a total of 90 minutes with the patient, personally assisting as the resident performed PLTCS.     Elisabeth Lima  Date of Service (when I saw the patient): 10/21/20

## 2020-10-21 NOTE — PROGRESS NOTES
Data: Scott Dobbs transferred to 7130 via wheelchair at 0440. Baby transferred via parent's arms.  Action: Receiving unit notified of transfer: Yes. Patient and family notified of room change. Report given to MARANDA Edwards at 0445. Belongings sent to receiving unit. Accompanied by Registered Nurse. Oriented patient to surroundings. Call light within reach. ID bands double-checked with receiving RN.  Response: Patient tolerated transfer and is stable.

## 2020-10-21 NOTE — LACTATION NOTE
This note was copied from a baby's chart.  Consult for: First time breastfeeding.     Delivery Information: Infant was born at 39.0 weeks via c/s for decels this morning at 0121.     Maternal Health History: Scott has a history of obesity, anxiety, depression and asthma. Her surgical QBL was 1270.?    Maternal Breast Exam: Scott noted some breast growth in early pregnancy. Her breasts are soft and symmetrical with bilateral intact, everted nipples. ?    Infant information: Infant was just born this morning. She was AGA at birth at 7lb 1.9 oz. ?    Oral exam of baby:  Deferred as infant disinterested in sucking on my finger. Scott denies discomfort when infant at the breast.     Feeding Assessment: Scott shares that infant latches well on the right breast but either doesn't latch or latches without sucking on the left breast. Left nipple slightly shorter than right but no significant difference. Scott was able to position infant and attempt to latch on the left breast. Infant would latch and come off the breast or latch and not suck. When moved to the right breast, infant would latch and suck. She was heard swallowing frequently during the duration of the feeding.  Scott was encouraged to continue attempting to latch on the left with RN support as needed.     Education: early feeding cues, benefits of feeding on cue, breastfeeding positions, signs breastfeeding is going well (comfortable latch, age appropriate output and weight loss, swallowing heard at the breast), satiety cues, expected  output,  weight loss, nutritive vs non-nutritive sucking, benefits of skin to skin, the Second Night, benefits of breast massage and hand expression of colostrum, Infant Feeding Log handout, inpatient lactation support and outpatient lactation resources.     Plan: Continue breastfeeding on cue with RN support as needed with a goal of 8-12 feedings per day. Encourage frequent skin to skin, breast massage and hand  expression of colostrum after each feeding.    If weight loss concerning or infant showing signs of dehydration or needing supplementation add pumping 4-6 times per day in addition to hand expression.    At discharge, family plans to [facility] at [facility]. They were given the  Breastfeeding Resource List. At discharge, encourage family to follow up with outpatient LC as needed.

## 2020-10-21 NOTE — PLAN OF CARE
[id number]: VSS, Pt up ad surinder to bathroom and voiding WDL. Eating and drinking. Breast feeding baby with more independence. Reports good pain management at this time. No concerns.

## 2020-10-21 NOTE — PROVIDER NOTIFICATION
10/21/20 0849   Provider Notification   Provider Name/Title Dr. Ambrosio   Method of Notification Electronic Page   Request Evaluate-Remote   Notification Reason Vital Signs Change     FYI Pt having higher BPS: 138/90 at 0730 and 146/92 at 0840. Pt denies s/s of pre-e, all other VSS.

## 2020-10-21 NOTE — ANESTHESIA PREPROCEDURE EVALUATION
"Anesthesia Pre-Procedure Evaluation    Patient: Scott Dobbs   MRN:     [id number] Gender:   female   Age:    35 year old :      1985        Preoperative Diagnosis: * No pre-op diagnosis entered *   Procedure(s):   SECTION     LABS:  CBC:   Lab Results   Component Value Date    WBC 9.7 10/02/2020    WBC 12.3 (H) 2020    HGB 14.3 10/19/2020    HGB 14.0 10/02/2020    HCT 41.1 10/02/2020    HCT 39.2 2020     10/19/2020     10/02/2020     BMP:   Lab Results   Component Value Date    CR 0.88 10/19/2020    CR 0.77 10/02/2020     COAGS: No results found for: PTT, INR, FIBR  POC: No results found for: BGM, HCG, HCGS  OTHER:   Lab Results   Component Value Date    ALT 14 10/19/2020    AST 14 10/19/2020    TSH 2.33 03/10/2020        Preop Vitals    BP Readings from Last 3 Encounters:   10/20/20 (!) 137/90   10/16/20 (!) 148/98   10/09/20 (!) 131/94    Pulse Readings from Last 3 Encounters:   10/20/20 79   10/16/20 89   10/09/20 104      Resp Readings from Last 3 Encounters:   10/20/20 16    SpO2 Readings from Last 3 Encounters:   18 98%   12/15/17 96%   17 98%      Temp Readings from Last 1 Encounters:   10/20/20 37  C (98.6  F) (Oral)    Ht Readings from Last 1 Encounters:   10/19/20 1.753 m (5' 9\")      Wt Readings from Last 1 Encounters:   10/09/20 136.3 kg (300 lb 8 oz)    Estimated body mass index is 44.38 kg/m  as calculated from the following:    Height as of this encounter: 1.753 m (5' 9\").    Weight as of 10/9/20: 136.3 kg (300 lb 8 oz).     LDA:  Peripheral IV 10/20/20 Anterior;Left Upper forearm (Active)   Site Assessment WDL 10/20/20 1600   Line Status Saline locked 10/20/20 1600   Dressing Intervention New dressing  10/20/20 0843   Phlebitis Scale 0-->no symptoms 10/20/20 1600   Infiltration Scale 0 10/20/20 1600   Number of days: 1       Peripheral IV 10/21/20 Anterior;Right Upper forearm (Active)   Number of days: 0       Intrathecal/Epidural Catheter " 10/20/20 (Active)   Number of days: 1       Urethral Catheter 14 fr (Active)   Number of days: 0        Past Medical History:   Diagnosis Date     Anxiety      Obesity (BMI 30-39.9)       Past Surgical History:   Procedure Laterality Date     PE TUBES        No Known Allergies     Anesthesia Evaluation     .             ROS/MED HX    ENT/Pulmonary:     (+)asthma , . .    Neurologic:       Cardiovascular:     (+) hypertension----. : . . . :. .       METS/Exercise Tolerance:     Hematologic:         Musculoskeletal:         GI/Hepatic:         Renal/Genitourinary:         Endo:     (+) Obesity, .      Psychiatric:     (+) psychiatric history anxiety      Infectious Disease:         Malignancy:         Other:                         PHYSICAL EXAM:   Mental Status/Neuro: A/A/O   Airway: Facies: Feasible  Mallampati: II  Mouth/Opening: Full   Respiratory: Auscultation: CTAB      CV: Rhythm: Regular   Comments:                      Assessment:   ASA SCORE: 3    H&P: History and physical reviewed and following examination; no interval change.   Smoking Status:  Non-Smoker/Unknown   NPO Status: NPO Appropriate     Plan:   Anes. Type:  MAC; Spinal; Peripheral Nerve Block; For Post-op pain in coordination with surgeon     Block Details: TAP; Exparel; Single Shot   Pre-Medication: None   Induction:  N/a   Airway: Native Airway   Access/Monitoring: PIV   Maintenance: N/a     Postop Plan:   Postop Pain: Regional  Postop Sedation/Airway: Not planned  Disposition: Inpatient/Admit     PONV Management:   Adult Risk Factors: Female, Non-Smoker   Prevention: Ondansetron     CONSENT: Direct conversation   Plan and risks discussed with: Patient          Comments for Plan/Consent:  Urgent c/section. Atony plan includes oxytocin first line, TXA, misoprostol. Relative contraindications to both methergine and hemabate.                  Dallin López MD

## 2020-10-21 NOTE — PROGRESS NOTES
"[facility]     Obstetrics Post-Op / Progress Note    Assessment & Plan   Assessment:  -Day of Surgery  Procedure(s):   SECTION    Doing well.  No immediate surgical complications identified.  Pain well-controlled.    Plan:  Ambulation encouraged  Hemoglobin in AM  Anticipate discharge in 2 days    Diana Lopez     Interval History   Doing well.  Pain is well-controlled.  No fevers.  No history of wound drainage, warmth or significant erythema.  Good appetite.  Denies chest pain, shortness of breath, nausea or vomiting.  Ambulatory.  Breastfeeding well.    Medications     bupivacaine liposome (EXPAREL) LONG ACTING injection was administered into the infiltration site to produce postsurgical analgesia. Duration of action is up to 72 hours, and other \"artie\" medications should not be given for 96 hours with the exception of the lidocaine 5% patch (LIDODERM) and the lidocaine 10mg in potassium infusions. This entry is for INFORMATION ONLY.       dextrose 5% lactated ringers 125 mL/hr at 10/21/20 0850     - MEDICATION INSTRUCTIONS -       NO Rho (D) immune globulin (RhoGam) needed - mother Rh POSITIVE       - MEDICATION INSTRUCTIONS -       oxytocin in 0.9% NaCl 100 mL/hr (10/21/20 0331)     oxytocin in 0.9% NaCl         acetaminophen  975 mg Oral Q6H     enoxaparin ANTICOAGULANT  40 mg Subcutaneous Q24H     [START ON 10/22/2020] ibuprofen  800 mg Oral Q6H     ketorolac  30 mg Intravenous Q6H     senna-docusate  1 tablet Oral BID    Or     senna-docusate  2 tablet Oral BID     sodium chloride (PF)  3 mL Intracatheter Q8H       Physical Exam   Temp: 97.6  F (36.4  C) Temp src: Oral BP: (!) 146/92 Pulse: 72   Resp: 18 SpO2: 99 % O2 Device: None (Room air)    There were no vitals filed for this visit.  Vital Signs with Ranges  Temp:  [97.2  F (36.2  C)-98.6  F (37  C)] 97.6  F (36.4  C)  Pulse:  [62-84] 72  Resp:  [13-23] 18  BP: (124-166)/() " 146/92  SpO2:  [96 %-99 %] 99 %  I/O last 3 completed shifts:  In: 1850 [P.O.:1350; I.V.:500]  Out: 1530 [Urine:260; Blood:1270]    Uterine fundus is firm, non-tender and at the level of the umbilicus      Data   Recent Labs   Lab Test 10/19/20  1815   ABO O   RH Pos   AS Neg     Recent Labs   Lab Test 10/21/20  0841 10/19/20  1815   HGB 12.1 14.3     Recent Labs   Lab Test 03/10/20  1249   RUQIGG 30

## 2020-10-21 NOTE — PLAN OF CARE
Pt trying to rest, vistaril given, pitocin infusing. Cat II strip, pt repositioned, IV infiltrated. MD's notified, another RN at bedside to insert another IV, pitocin stopped at this time. Dr. Lima and Dr. Lopez to bedside to recommend . Pt agreed. Planned for urgent C/S at 0022.

## 2020-10-21 NOTE — PROGRESS NOTES
"Subjective:   Patient reports that she is not feeling much increase in contraction intensity since pitocin started.     Patient Vitals for the past 4 hrs:   BP   10/20/20 2237 (!) 137/90   ]    [place] for Category  2 FHT Review:    Baseline: 140 bpm, minimal variability, no accelerations, intermittent late decelerations  4-5 contractions/10 minutes    Category 2, Not reactive    Per  Algorithm:  Moderate variability or accelerations: NO  Significant decelerations ith >=50% of contractions for 30 minutes: NO  =Observe for 1 hour    Following hour observation this pattern was persistent, thus  section is recommended.  Interventions included repositioning without improvement in variability.  Pitocin discontinued at 2335 (at which time IV stopped working) and IV fluid bolus given when IV access re-established at 0000.  Reviewed concerning fetal status given persistent minimal variability with intermittent late decelerations, along with inability to safely continue pitocin, and fact that we are still early in the induction with the patient.  Discussed recommendation for  section given category 2 FHT, remote from delivery.  Following discussion patient is agreeable to move forward with .  Called Anesthesia team and informed of  plan and  category of \"Urgent\" to be done  In <=30 minutes.  Please see Dr. Lopez's note for further details of  discussion and consent.    Elisabeth Lima MD  "

## 2020-10-21 NOTE — PROGRESS NOTES
Intrapartum Progress Note    S: The patient states she is feeling similar to last night, with occasional cramping.   During the day she had significant pain and tightening that resolved with IV fentanyl.       O:   Patient Vitals for the past 24 hrs:   BP Temp Temp src Pulse Resp   10/20/20 1925 (!) 140/88 98.6  F (37  C) Oral -- 16   10/20/20 1600 (!) 138/95 98.6  F (37  C) Oral 79 20   10/20/20 1509 139/85 -- -- -- --   10/20/20 1122 (!) 140/90 -- -- -- --   10/20/20 1100 (!) 166/83 97.2  F (36.2  C) -- -- --   10/20/20 0606 (!) 145/86 97.9  F (36.6  C) Oral -- 16   10/20/20 0205 123/75 97.8  F (36.6  C) Oral -- 16   10/19/20 2157 (!) 142/99 -- -- -- 16     Gen: awake, alert, answering questions appropriately, appears comfortable  Cervix: 4/60/-3    FHT:   Baseline 130 bpm  Variability moderate  Accels present   Decels: rare variable > 20 minutes ago   [place]: 1-2 contractions in 10 min    Membranes: intact    A/P: Scott Dobbs is a 35 year old  at 38w6d by LMP c/w 10w3d ultrasound who presents today for induction of for chronic hypertension.     Induction of labor: Patient is now s/p 2 doses of PV misoprostol and 60 cc jauregui catheter.   - [place] core 8, plan to begin labor augmentation with IV pitocin   - Pain management: s/p fentanyl x 1; patient aware of options     Fetal Well Being:  Intermittent category 2 FHT due to rare variable decelerations, currently category 1 over last 20 minutes. Variability improved after 500 ml bolus after last check   - Intrauterine resuscitative measures PRN  - Continuous EFM     Chronic hypertension:  - BPs normal to mild range; one non-sustained severe range today   - IV antihypertensives for sustained severe range blood pressures   - Avoid methergine if PPH    Asthma:  Avoid hemabate if PPH    Joanne Lopez MD  Obstetrics & Gynecology, PGY-2  10/20/20 8:23 PM

## 2020-10-21 NOTE — PLAN OF CARE
Data: VSS, postpartum assessments WNL. Urine output adequate in jauregui catheter, has yet to void since catheter removed at 1440, has been out of bed and ambulated, not passing gas yet, eating and drinking normally. Incision dressing is clean dry and intact, lochia WNL, no s/s infection.  Breastfeeding infant with assistance. Lactation consult done today. Taking tylenol and toradol for pain. Reports good pain management.   Action: Education provided on discharge goals, breast feeding,  recovery.  Response: Pt is agreeable with her plan of care. Positive attachment behaviors observed with infant. Support person present.

## 2020-10-21 NOTE — ANESTHESIA PROCEDURE NOTES
Spinal/LP Procedure Note    Spinal Block      Staff -   Anesthesiologist:  Dallin López MD  Resident/Fellow: Kade Leigh MD  Performed By: resident  Location: In OR BEFORE Induction  Procedure Start/Stop Times:     Correct Patient: Yes      Correct Position: Yes      Correct Site: Yes      Correct Procedure: Yes      Correct Laterality:  Yes    Site Marked:  Yes  Procedure:     Procedure:  Intrathecal    ASA:  2    Position:  Sitting    Insertion site:  L3-4    Approach:  Midline    Local Skin Infiltration:  1% lidocaine    amount (ml):  5    CSF:  Clear    Paresthesias:  No

## 2020-10-21 NOTE — ANESTHESIA POSTPROCEDURE EVALUATION
Anesthesia POST Procedure Evaluation    Patient: Scott Dobbs   MRN:     [id number] Gender:   female   Age:    35 year old :      1985        Preoperative Diagnosis: * No pre-op diagnosis entered *   Procedure(s):   SECTION   Postop Comments: No value filed.     Anesthesia Type: Spinal, Peripheral Nerve Block       Disposition: Admission   Postop Pain Control: Uneventful            Sign Out: Well controlled pain   PONV: No   Neuro/Psych: Uneventful            Sign Out: Acceptable/Baseline neuro status   Airway/Respiratory: Uneventful            Sign Out: Acceptable/Baseline resp. status   CV/Hemodynamics: Uneventful            Sign Out: Acceptable CV status   Other NRE: NONE   DID A NON-ROUTINE EVENT OCCUR? No         Last Anesthesia Record Vitals:  CRNA VITALS  10/21/2020 0219 - 10/21/2020 0319      10/21/2020             Ht Rate:  70          Last PACU Vitals:  Vitals Value Taken Time   /81 10/21/20 0425   Temp 36.4  C (97.6  F) 10/21/20 0425   Pulse 80 10/21/20 0428   Resp 47 10/21/20 0429   SpO2 98 % 10/21/20 0429   Temp src     NIBP     Pulse     SpO2     Resp     Temp     Ht Rate     Temp 2     Vitals shown include unvalidated device data.      Electronically Signed By: Dallin López MD, 2020, 6:24 AM

## 2020-10-21 NOTE — DISCHARGE SUMMARY
[facility] Discharge Summary    Scott Dobbs MRN# [id number]   Age: 35 year old YOB: 1985     Date of Admission:  10/19/2020  Date of Discharge::  10/23/20  Admitting Physician:  Anamaria Bates MD  Discharge Physician:  Anamaria Bates MD             Admission Diagnoses:   Intrauterine pregnancy at 39w0d  CHTN   Obesity, BMI 44  Mild polyhydramnios  Asthma  Anxiety          Discharge Diagnosis:     Same, delivered   Category II FHT remote from delivery  Postpartum hemorrhage           Procedures:     Procedure(s): Primary low transverse  section with double layer closure via Pfannenstiel skin incision  Spinal  TAP block                 Medications Prior to Admission:     No medications prior to admission.             Discharge Medications:        Review of your medicines      START taking      Dose / Directions   acetaminophen 325 MG tablet  Commonly known as: TYLENOL  Used for: Status post  section      Dose: 650 mg  Take 2 tablets (650 mg) by mouth every 6 hours as needed for mild pain Start after Delivery.  Quantity: 100 tablet  Refills: 0     ibuprofen 200 MG tablet  Commonly known as: ADVIL/MOTRIN  Used for: Status post  section      Dose: 600 mg  Take 3 tablets (600 mg) by mouth every 6 hours as needed for moderate pain Start after delivery  Refills: 0     oxyCODONE 5 MG tablet  Commonly known as: ROXICODONE  Used for: Status post  section      Dose: 5 mg  Take 1 tablet (5 mg) by mouth every 6 hours as needed for pain  Quantity: 12 tablet  Refills: 0     senna-docusate 8.6-50 MG tablet  Commonly known as: SENOKOT-S/PERICOLACE  Used for: Status post  section      Dose: 1 tablet  Take 1 tablet by mouth daily Start after delivery.  Quantity: 100 tablet  Refills: 0        CONTINUE these medicines which have NOT CHANGED      Dose / Directions   ALLEGRA PO      Refills: 0     ASPIRIN PO      Dose: 81 mg  Take 81 mg by mouth  Refills: 0      FIBER PO      Refills: 0     fluticasone 50 MCG/ACT nasal spray  Commonly known as: FLONASE      Dose: 1 spray  Spray 1 spray into both nostrils daily  Refills: 0     prenatal multivitamin w/iron 27-0.8 MG tablet      Dose: 1 tablet  Take 1 tablet by mouth daily  Refills: 0           Where to get your medicines      Some of these will need a paper prescription and others can be bought over the counter. Ask your nurse if you have questions.    Bring a paper prescription for each of these medications    oxyCODONE 5 MG tablet  You don't need a prescription for these medications    acetaminophen 325 MG tablet    ibuprofen 200 MG tablet    senna-docusate 8.6-50 MG tablet               Consultations:   Anesthesia  Lactation           Brief Admission History:     Scott Dobbs is a 35 year old  at 39w0d admitted for IOL for gestational HTN.  She underwent cervical ripening with misoprostol and jauregui balloon and was transitioned to pitocin after jauregui balloon fell out. Over the 24 hour course of cervical ripening and labor augmentation the patient continued to have intermittent category 2 FHT due to sporadic late and variable decelerations and periods of minimal variability. Prior to recommendation for  delivery the patient had a category 2 FHT due to later decelerations and 1 hour of minimal variability that did not respond to the usual intrauterine resuscitative measures. A  delivery was recommended at this time. The risks, benefits, and alternatives of  section were discussed with the patient, and she agreed to proceed.        Intraoperative course   The procedure was complicated by postpartum hemorrhage with EBL 1270 mL.  Otherwise uncomplicated.  See operative report for details.        Postpartum Course   The patient's hospital course was unremarkable.  She recovered as anticipated and experienced no post-operative complications. Her blood pressures remained in mild range after  delivery and she did not require medication.  On discharge, her pain was well controlled. Vaginal bleeding is similar to peak menstrual flow.  Voiding without difficulty.  Ambulating well and tolerating a normal diet.  No fever or significant wound drainage.  Breastfeeding well.  Infant is stable.  No bowel movement yet.  She was discharged on post-partum day #3.    Post-partum hemoglobin:   Hemoglobin   Date Value Ref Range Status   10/21/2020 12.1 11.7 - 15.7 g/dL Final             Discharge Instructions and Follow-Up:     Discharge diet: Regular   Discharge activity: No lifting greater than 20 lbs, pushing, pulling, or other strenuous activity for 6 weeks. Pelvic rest for 6 weeks including no sexual intercourse, tampons, or douching. No driving until you can slam on the breaks without pain or while on narcotic pain medications.    Discharge follow-up: Follow up with primary OB for routine postpartum visit in 6 weeks  Follow up for 1 week blood pressure check.    Wound care: Keep incision clean and dry           Discharge Disposition:     Discharged to home         Joanne Lopez MD  Obstetrics & Gynecology, PGY-2  10/23/20 6:36 AM       Physician Attestation   I, Anamarai Bates, have seen and examined this patient.  I have reviewed the above note and agree with discharge plan as documented in the above note.     Anamaria Bates MD  Date of Service (when I saw the patient): 10/23/20

## 2020-10-21 NOTE — PROGRESS NOTES
Transfer to OR & C/S Delivery Note  Patient to OR at 0048 via ambulation.   Delivered viable Female via primary  section by Dr. Lima.  To warmer, stimulated and dried by NICU team.    Brought to mother after bundling for bonding.

## 2020-10-21 NOTE — PROGRESS NOTES
Intrapartum Progress Note    S: After discussion with her partner, the patient is agreeable to recommendation for  delivery. Still no feeling significant contractions.      O:   Patient Vitals for the past 24 hrs:   BP Temp Temp src Pulse Resp   10/20/20 2237 (!) 137/90 -- -- -- --   10/20/20 1925 (!) 140/88 98.6  F (37  C) Oral -- 16   10/20/20 1600 (!) 138/95 98.6  F (37  C) Oral 79 20   10/20/20 1509 139/85 -- -- -- --   10/20/20 1122 (!) 140/90 -- -- -- --   10/20/20 1100 (!) 166/83 97.2  F (36.2  C) -- -- --   10/20/20 0606 (!) 145/86 97.9  F (36.6  C) Oral -- 16   10/20/20 0205 123/75 97.8  F (36.6  C) Oral -- 16     Gen: awake, alert, answering questions appropriately, appears comfortable  Cervix: 4/60/-3 ()    FHT:   Baseline 140 bpm  Variability minimal  Accels absent  Decels: two late decelerations over last 1.5 hours  [place]: 1-2 irregular contractions in 10 min    Membranes: intact    A/P: Scott Dobbs is a 35 year old  at 39w0dd by LMP c/w 10w3d ultrasound who presents today for induction of for chronic hypertension.     Induction of labor: Patient is now s/p 2 doses of PV misoprostol and 60 cc jauregui catheter with IV pitocin discontinued. The patient has had an intermittent category 2 tracing for >12 hours due to intermittent periods of minimal variability and periodic variable and late decelerations. Most recently the patient has had 1 hour of minimal variability with a non-persistent late decelerations. Given this continued periods of minimal variability so remote from delivery an expedited delivery via  section was recommended. The risks, benefits, and alternatives of  section were discussed, including the risks of bleeding, infection, injury to surrounding organs, fetal injury, and remote risks of hysterectomy. She consented to a blood transfusion in the event of a life threatening amount of bleeding. She had time to ask questions and agreed to proceed. Surgical  consent was signed.  - Preop Ancef   - Case request placed     Fetal Well Being:  - Category 2 due to minimal variability and late decelerations [see above]   - continuous EFM     Chronic hypertension:  - BPs normal to mild range; one non-sustained severe range today   - IV antihypertensives for sustained severe range blood pressures   - Avoid methergine if PPH    Asthma: well controlled, reports only present in childhood and does not use an inhaler  - OK to use hemabate PRN for postpartum hemorrhage     Joanne Lopez MD  Obstetrics & Gynecology, PGY-2  10/20/20 8:23 PM

## 2020-10-22 PROCEDURE — 250N000011 HC RX IP 250 OP 636: Performed by: STUDENT IN AN ORGANIZED HEALTH CARE EDUCATION/TRAINING PROGRAM

## 2020-10-22 PROCEDURE — 250N000013 HC RX MED GY IP 250 OP 250 PS 637: Performed by: STUDENT IN AN ORGANIZED HEALTH CARE EDUCATION/TRAINING PROGRAM

## 2020-10-22 PROCEDURE — 120N000002 HC R&B MED SURG/OB UMMC

## 2020-10-22 RX ADMIN — IBUPROFEN 800 MG: 800 TABLET ORAL at 08:33

## 2020-10-22 RX ADMIN — IBUPROFEN 800 MG: 800 TABLET ORAL at 02:38

## 2020-10-22 RX ADMIN — ACETAMINOPHEN 975 MG: 325 TABLET, FILM COATED ORAL at 18:15

## 2020-10-22 RX ADMIN — OXYCODONE HYDROCHLORIDE 5 MG: 5 TABLET ORAL at 05:52

## 2020-10-22 RX ADMIN — DOCUSATE SODIUM AND SENNOSIDES 2 TABLET: 8.6; 5 TABLET ORAL at 19:36

## 2020-10-22 RX ADMIN — FLUTICASONE PROPIONATE 2 SPRAY: 50 SPRAY, METERED NASAL at 12:28

## 2020-10-22 RX ADMIN — SIMETHICONE 80 MG: 80 TABLET, CHEWABLE ORAL at 21:11

## 2020-10-22 RX ADMIN — IBUPROFEN 800 MG: 800 TABLET ORAL at 21:03

## 2020-10-22 RX ADMIN — ACETAMINOPHEN 975 MG: 325 TABLET, FILM COATED ORAL at 00:04

## 2020-10-22 RX ADMIN — OXYCODONE HYDROCHLORIDE 5 MG: 5 TABLET ORAL at 19:54

## 2020-10-22 RX ADMIN — DOCUSATE SODIUM AND SENNOSIDES 2 TABLET: 8.6; 5 TABLET ORAL at 08:33

## 2020-10-22 RX ADMIN — FEXOFENADINE HCL 60 MG: 60 TABLET, FILM COATED ORAL at 08:33

## 2020-10-22 RX ADMIN — OXYCODONE HYDROCHLORIDE 5 MG: 5 TABLET ORAL at 00:11

## 2020-10-22 RX ADMIN — OXYCODONE HYDROCHLORIDE 5 MG: 5 TABLET ORAL at 14:57

## 2020-10-22 RX ADMIN — ACETAMINOPHEN 975 MG: 325 TABLET, FILM COATED ORAL at 12:27

## 2020-10-22 RX ADMIN — ACETAMINOPHEN 975 MG: 325 TABLET, FILM COATED ORAL at 05:47

## 2020-10-22 RX ADMIN — IBUPROFEN 800 MG: 800 TABLET ORAL at 14:44

## 2020-10-22 RX ADMIN — ENOXAPARIN SODIUM 40 MG: 40 INJECTION SUBCUTANEOUS at 12:27

## 2020-10-22 NOTE — PROGRESS NOTES
[facility] Obstetrics   Postpartum Progress Note    Name: Scott Dobbs  : 1985  MRN: [id number]    POD#1 s/p PLTCS    S: Patient is resting comfortably. Did not sleep much overnight. Pain is improving and well controlled. Feels like she has some tugging deep to her incision. Bleeding is light.  Passing flatus and voiding spontaneously.  Ambulating without dizziness.  Tolerating a regular diet without nausea/vomiting.  Denies fevers/chills, headache, vision changes, CP, SOB.  She is breastfeeding.    O:  Patient Vitals for the past 24 hrs:   BP Temp Temp src Pulse Resp SpO2   10/22/20 0430 (!) 139/90 97.9  F (36.6  C) Oral 90 18 --   10/21/20 2200 139/86 98.4  F (36.9  C) Oral 68 18 --   10/21/20 1729 (!) 144/86 98.5  F (36.9  C) Oral 79 18 --   10/21/20 1352 (!) 148/91 97.5  F (36.4  C) Oral 69 18 --   10/21/20 0947 (!) 144/87 98  F (36.7  C) Oral 75 18 99 %   10/21/20 0845 (!) 146/92 97.6  F (36.4  C) Oral 72 18 99 %       Gen: NAD. Alert, oriented. Resting comfortably in bed.  CV: regular rate  Resp:  CTAB, no increased work of breathing  Abd: Soft, appropriately tender, fundus firm at 1 cm below the umbilicus, non-distended  Incision: Bandage saturated and removed. Steri-strips saturated but underlying incision is well-approximated and no drainage is expressed.   Ext: 1+ lower extremity edema bilaterally     Labs:   HGB  Recent Labs   Lab 10/21/20  0841 10/19/20  1815   HGB 12.1 14.3       A/P: Scott Dobbs is a 35 year old  female, now POD#1 s/p PLTCS for category 2 RFD.  Pregnancy was complicated by chronic HTN, asthma, and obesity.  Stable in the postoperative period.      PostpartumCare  - Continue routine postoperative management  - Rhpositive, Rubella immune, Hep BSAg NR   - Pain: Continue ibuprofen/Tylenol scheduled.  Roxicodone PRN for breakthrough.  - ABLA: HGB 14.3> EBL 1270 mL> 12.1, asymptomatic.  - Incision: Replace steri-strips that were saturated, otherwise incision intact   -  FEN/GI: regular diet, stool softeners PRN  - [place] out, voiding spontaneously  - Lovenox ppx   - Contraception: need to discuss  - Breastfeeding  - Baby doing well    Chronic hypertension:  - BPs: mild range since delivery   - IV antihypertensives for sustained severe range blood pressures      Asthma: well controlled, reports only present in childhood and does not use an inhaler    Dispo: anticipate discharge to home on POD#2-3.     Joanne Lopez MD  Obstetrics & Gynecology, PGY-2  10/22/20 7:59 AM        Patient seen and examined by me, agree with above resident note. Overall doing well.  Pain under better control since adding oxy.  Working on breastfeeding.  Ambulation encouraged.  Cont routine cares    SUZANNE ARGUETA MD

## 2020-10-22 NOTE — PLAN OF CARE
Data: VSS, postpartum assessments WNL. She is voiding without difficulty, up ad surinder, passing gas, eating and drinking normally. Incision appears to be healing well, lochia WNL, no s/s infection. Old steri strips removed due to saturation/moisture and new ones reapplied this morning. Breastfeeding infant with minimal assistance and is more independent today. Taking tylenol and ibuprofen for pain and using abdominal binder. Reports good pain management, but feels like incisional pain is worse today.   Action: Education provided on discharge goals. Encouraged more ambulation.  Response: Pt is agreeable with her plan of care. Positive attachment behaviors observed with infant. Support person present.

## 2020-10-22 NOTE — PROGRESS NOTES
"Post  Anesthesia Follow Up Note    Patient: Scott Dobbs    Chief complaint: Acute postoperative pain managment    Procedure(s) Performed:   section    Anesthesia type: Spinal and transversus abdominus plane (TAP) nerve block        Subjective:   The patient reports good pain control.  Pain Intensity: mild.  Patient reports no weakness, paresthesia, headache, or pain at neuraxial site.  The patient states her lower extremities feel back to normal.     Objective:  Last Vitals: BP (!) 146/95   Pulse 87   Temp 36.8  C (98.2  F) (Oral)   Resp 18   Ht 1.753 m (5' 9\")   LMP 2020   SpO2 99%   Breastfeeding Unknown   BMI 44.38 kg/m      Respiratory Function (RR / SpO2 / Airway Patency): Satisfactory    Cardiac Function (HR / Rhythm / BP): Satisfactory    Neurologic: grossly intact    Strength and sensation lower extremities: Strength 5/5 and grossly symmetric bilateral LE      Assessment and Plan:   Scott Dobbs is a 35 year old female POD #1 s/p   section with intrathecal local anesthetics with opioids and single shot bilateral  TAP nerve block injection with long acting Exparel (liposomal bupivacaine) for postoperative analgesia.      She has no weakness or paresthesias.  No evidence of adverse side effects associated with anesthesia or nerve block injection. I anticipate up to 72 hours of incisional pain control, but that the patient will require opioid/nonopioid analgesics for visceral and muscle pain not controlled with local anesthetic.      - NO other local anesthetic use within 96 hours of liposome bupivacaine (Exparel) long acting  - discussed with patient to expect increase in pain with increased activity initially  - please call if questions or concerns      Kei \"Liborio\" Yolanda STREET MD  Anesthesia Resident  10/22/2020  11:41 AM    If questions or concerns, please contact OB anesthesia resident at [phone] or OB anesthesiologist at [phone] if resident unavailable. "

## 2020-10-22 NOTE — PLAN OF CARE
"Patient attempting to rest in between feedings overnight. VSS, afebrile. 1 mild range blood pressure. Requested Oxycodone for breakthrough pain, which she stated was helpful. Noted some intermittent tenderness above c/s incision. Decline interventions, stated that it hurts when she is \"moving around\". C/s dressing marked d/t dried sanguinous drainage. Fundus is firm, but pt states checks are uncomfortable. Latch assessed and is adequate. Requires minimal assistance from staff. Partner is present and supportive. Scant rubra lochia noted. I&O is adequate. Pt and partner asking appropriate questions. Continue with plan of care.   "

## 2020-10-23 VITALS
SYSTOLIC BLOOD PRESSURE: 130 MMHG | OXYGEN SATURATION: 99 % | BODY MASS INDEX: 44.38 KG/M2 | TEMPERATURE: 98.1 F | RESPIRATION RATE: 20 BRPM | DIASTOLIC BLOOD PRESSURE: 89 MMHG | HEART RATE: 84 BPM | HEIGHT: 69 IN

## 2020-10-23 PROCEDURE — 250N000013 HC RX MED GY IP 250 OP 250 PS 637: Performed by: STUDENT IN AN ORGANIZED HEALTH CARE EDUCATION/TRAINING PROGRAM

## 2020-10-23 PROCEDURE — 250N000011 HC RX IP 250 OP 636: Performed by: STUDENT IN AN ORGANIZED HEALTH CARE EDUCATION/TRAINING PROGRAM

## 2020-10-23 RX ORDER — ACETAMINOPHEN 325 MG/1
650 TABLET ORAL EVERY 6 HOURS PRN
Qty: 100 TABLET | Refills: 0 | COMMUNITY
Start: 2020-10-23 | End: 2020-12-14

## 2020-10-23 RX ORDER — IBUPROFEN 200 MG
600 TABLET ORAL EVERY 6 HOURS PRN
COMMUNITY
Start: 2020-10-23

## 2020-10-23 RX ORDER — AMOXICILLIN 250 MG
1 CAPSULE ORAL DAILY
Qty: 100 TABLET | Refills: 0 | COMMUNITY
Start: 2020-10-23 | End: 2020-12-14

## 2020-10-23 RX ORDER — OXYCODONE HYDROCHLORIDE 5 MG/1
5 TABLET ORAL EVERY 6 HOURS PRN
Qty: 12 TABLET | Refills: 0 | Status: SHIPPED | OUTPATIENT
Start: 2020-10-23 | End: 2020-12-02

## 2020-10-23 RX ADMIN — OXYCODONE HYDROCHLORIDE 5 MG: 5 TABLET ORAL at 10:41

## 2020-10-23 RX ADMIN — ACETAMINOPHEN 975 MG: 325 TABLET, FILM COATED ORAL at 12:05

## 2020-10-23 RX ADMIN — IBUPROFEN 800 MG: 800 TABLET ORAL at 10:08

## 2020-10-23 RX ADMIN — FLUTICASONE PROPIONATE 2 SPRAY: 50 SPRAY, METERED NASAL at 08:51

## 2020-10-23 RX ADMIN — FEXOFENADINE HCL 60 MG: 60 TABLET, FILM COATED ORAL at 08:50

## 2020-10-23 RX ADMIN — ENOXAPARIN SODIUM 40 MG: 40 INJECTION SUBCUTANEOUS at 12:05

## 2020-10-23 RX ADMIN — ACETAMINOPHEN 975 MG: 325 TABLET, FILM COATED ORAL at 00:05

## 2020-10-23 RX ADMIN — OXYCODONE HYDROCHLORIDE 5 MG: 5 TABLET ORAL at 00:05

## 2020-10-23 RX ADMIN — OXYCODONE HYDROCHLORIDE 5 MG: 5 TABLET ORAL at 06:26

## 2020-10-23 RX ADMIN — DOCUSATE SODIUM AND SENNOSIDES 2 TABLET: 8.6; 5 TABLET ORAL at 08:49

## 2020-10-23 RX ADMIN — ACETAMINOPHEN 975 MG: 325 TABLET, FILM COATED ORAL at 06:26

## 2020-10-23 RX ADMIN — IBUPROFEN 800 MG: 800 TABLET ORAL at 03:24

## 2020-10-23 NOTE — PROGRESS NOTES
[facility] Obstetrics   Postpartum Progress Note    Name: Scott Dobbs  : 1985  MRN: [id number]    POD#2 s/p PLTCS    S: Patient is resting comfortably. Did not sleep much overnight. Pain is improving and well controlled. Abdominal binder helped with tugging sensation at her incision. Bleeding is light.  Passing flatus and voiding spontaneously.  Ambulating without dizziness.  Tolerating a regular diet without nausea/vomiting.  Denies fevers/chills, headache, vision changes, CP, SOB.  She is breastfeeding. Feels ready to go home today.      O:  Patient Vitals for the past 24 hrs:   BP Temp Temp src Pulse Resp   10/23/20 0625 -- 98  F (36.7  C) Oral -- 18   10/22/20 2112 (!) 130/96 98.3  F (36.8  C) Oral 77 18   10/22/20 1447 (!) 145/87 -- -- 78 17   10/22/20 0824 (!) 146/95 98.2  F (36.8  C) Oral 87 18       Gen: NAD. Alert, oriented. Resting comfortably in bed breastfeeding baby.   CV: regular rate  Resp:  no increased work of breathing  Abd: Soft, appropriately tender, unable to palpate fundus 2/2 body habitus   Incision: C/D/I  Ext: 1+ lower extremity edema bilaterally     Labs:   HGB  Recent Labs   Lab 10/21/20  0841 10/19/20  1815   HGB 12.1 14.3       A/P: Scott Dobbs is a 35 year old  female, now POD#2 s/p PLTCS for category 2 RFD.  Pregnancy was complicated by chronic HTN, asthma, and obesity.  Stable in the postoperative period.      PostpartumCare  - Continue routine postoperative management  - Rhpositive, Rubella immune, Hep BSAg NR   - Pain: Continue ibuprofen/Tylenol scheduled.  Roxicodone PRN for breakthrough.  - ABLA: HGB 14.3> EBL 1270 mL> 12.1, asymptomatic.  - Incision: C/D/I    - FEN/GI: regular diet, stool softeners PRN  - [place] out, voiding spontaneously  - Lovenox ppx, to be discontinued at discharge   - Contraception: Copper IUD at 6 wk postpartum visit    - Breastfeeding  - Baby doing well    Chronic hypertension:  - BPs: mild range since delivery   - IV  antihypertensives PRN for sustained severe range blood pressures   - Asymptomatic   - Blood pressure cuff at home, comfortable using it to check     Asthma: well controlled, reports only present in childhood and does not use an inhaler    Dispo: anticipate discharge to home today.      Joanne Lopez MD  Obstetrics & Gynecology, PGY-2  10/23/20 6:32 AM        Physician Attestation   I, Anamaria Bates, personally saw and evaluated Sctot Dobbs.  I have reviewed the above note and agree with details and plan for discharge. BP's mild range. Will have home care see patient after discharge, and patient to monitor BP's at home.  She will contact us within one week with home BP's.  If elevated, will need office visit. Discussed postpartum instructions/restrictions and follow up.     Anamaria Bates MD  October 23, 2020

## 2020-10-23 NOTE — PLAN OF CARE
Pt discharged to home with baby. Instructions & prescriptions given/reviewed.  ID bands double checked.   Pt had no further questions & verbalized understanding her plan. Instructed to follow up at clinic within 1 week for BP check & within 6 weeks for PP check.

## 2020-10-23 NOTE — LACTATION NOTE
This note was copied from a baby's chart.  Follow up visit on day of discharge, Dotty has a strong, organized suck on finger. Sleeping on arrival but woke and latched readily in laid back position with no assist on right, minimal assist on left to get deeper and maintain latch. Excellent feeding with frequent swallows, show mom how to do breast compression prn and difference between nutritive and non nutritive sucking. Pacifier at bedside, discouraged use in first couple weeks and discussed pros, cons of pacifier and alternative comfort measures or extra feedings.     Education about positioning and comfort for mom and Dotty with nose to nipple, baby hug the breast and mom's fingers parallel to baby's lips to sandwich nipple. Reviewed feeding log, how to tell if getting enough and who, when to call if not meeting goals or concerns, [facility] pump cleaning handout, breastfeeding resources help identify [place] option for lactation follow up within 1 week if any concerns about milk transfer or supply or any difficulty with latch, concerns. Add in additional online support options including [employer] and [employer] for video only on review of laid back feedings. Offer support and encouragement, answered questions. Parents share they've been hand expressing into spoon and feeding back results, encouraged to continue this after most feedings until milk is in, use prn with primary engorgement and option to pump up to 3 times/day for first week also to maximize supply (maternal risk factors C/S after induction with pitocin, PPH, cHTN, BMI 44).

## 2020-10-23 NOTE — PLAN OF CARE
Data: Vital signs within normal limits. -140/90s. Postpartum checks within normal limits - see flow record. Patient eating and drinking normally. Patient able to empty bladder independently and is up ambulating. No apparent signs of infection. Incision healing well. Patient performing self cares and is able to care for infant. Breastfeeding with minimal assist for positioning and latch. EDS 5. BC turned in.   Action: Patient medicated during the shift for pain. See MAR. Patient reassessed within 1 hour after each medication and pain was improved - patient stated she was comfortable. Patient education done about breastfeeding and self cares. See flow record.  Response: Positive attachment behaviors observed with infant. Support person Christian present.   Plan: Anticipate discharge today 10/23.

## 2020-10-27 LAB — COPATH REPORT: NORMAL

## 2020-10-29 ENCOUNTER — OFFICE VISIT (OUTPATIENT)
Dept: OBGYN | Facility: CLINIC | Age: 35
End: 2020-10-29
Payer: COMMERCIAL

## 2020-10-29 DIAGNOSIS — O10.919 CHRONIC HYPERTENSION AFFECTING PREGNANCY: Primary | ICD-10-CM

## 2020-10-29 PROCEDURE — 99213 OFFICE O/P EST LOW 20 MIN: CPT | Mod: 24 | Performed by: OBSTETRICS & GYNECOLOGY

## 2020-10-29 NOTE — PROGRESS NOTES
Postpartum Blood Pressure Follow Up    Scott Dobbs [id number] delivered on 10/21/2020 and was discharged from the hospital on 10/23/2020.  Her pregnancy was complicated by gestational vs chronic hypertension.  She was not discharged with any antihypertensives.  She presents today for BP check.    She denies any symptoms of preeclampsia today.  She had a headache 2 days after discharge, but it was relieved with Tylenol.  Denies vision changes, chest pain, shortness of breath, and right upper quadrant pain.  Has no other concerns today.    She was scheduled on the nurse schedule for blood pressure check, but added to my schedule when her blood pressure was elevated.  Both inital BP checks were done with the automatic cuff.    Initial 157/92  Repeat 160/102  Repeat with manual 150/86    Gen:  NAD  CV:  Pulse normal.  No cyanosis  Pulm: No increased work of breathing or audible wheezing.  MSK:  1+ pitting edema    Chronic hypertension:    We will repeat help labs today, but not urine protein creatinine ratio, this that would be elevated without a cath specimen.    We will have her return for repeat blood pressure check early next week.  Again reviewed signs and symptoms of severe preeclampsia and instructed her to call the clinic if she develops any of these.    Dawn Tomas MD

## 2020-10-30 ENCOUNTER — TELEPHONE (OUTPATIENT)
Dept: OBGYN | Facility: CLINIC | Age: 35
End: 2020-10-30

## 2020-10-30 NOTE — TELEPHONE ENCOUNTER
Forms received and filled out. Placed in provider basket to be reviewed and signed.  Noelle Mariscal, Surgery Coordinator

## 2020-10-31 VITALS — DIASTOLIC BLOOD PRESSURE: 86 MMHG | SYSTOLIC BLOOD PRESSURE: 150 MMHG

## 2020-11-03 ENCOUNTER — ALLIED HEALTH/NURSE VISIT (OUTPATIENT)
Dept: NURSING | Facility: CLINIC | Age: 35
End: 2020-11-03
Payer: COMMERCIAL

## 2020-11-03 VITALS — DIASTOLIC BLOOD PRESSURE: 102 MMHG | OXYGEN SATURATION: 99 % | SYSTOLIC BLOOD PRESSURE: 154 MMHG | HEART RATE: 81 BPM

## 2020-11-03 DIAGNOSIS — Z87.59 HISTORY OF GESTATIONAL HYPERTENSION: Primary | ICD-10-CM

## 2020-11-03 PROCEDURE — 99207 PR NO CHARGE NURSE ONLY: CPT

## 2020-11-05 NOTE — TELEPHONE ENCOUNTER
Forms signed and returned. Faxed to number provided and sent to HIM.  Noelle Mariscal, Surgery Coordinator

## 2020-12-01 RX ORDER — TRAZODONE HYDROCHLORIDE 50 MG/1
1 TABLET, FILM COATED ORAL
COMMUNITY
Start: 2020-01-16 | End: 2020-12-02

## 2020-12-01 NOTE — PROGRESS NOTES
"Assessment:   1.  Six week old infant with poor growth:  minimal weight gain over discharge weight;  Loss of 1 oz over last month  2.  Good latch at breast, but weak suck.  Milk transfer difficult to determine due to infant's distress:  Fluctuating weight on scale  3.  Mother with low milk supply  4.  Mother with chronic hypertension  5.  Mother with anxiety    Plan:   1.  Dotty needs about 22-24 oz of milk each day to grow well. If she nurses at home as she did in the office today, about 8 times/day, she should get about this amount in supplementation, using your breastmilk as your first choice and formula when the supply of pumped milk runs out. You can give this after feedings, or distributed throughout the day according to her/his feeding cues.  2.  Discussed that as infant gains weight, she will gain strength for feeding at the breast, be better able to empty the breast, and this will in turn help improve milk supply.  Increasing supplementation will assist baby into positive feedign cycle.   3.  To continue to nurse baby on cue, at least 8-12 times each day.  Feed on one side until baby finishes swallowing.  Once swallowing slows, use breast compression to encourage more swallowing, but once there is no more active swallowing, and baby is either sleeping, coming off the breast, or just \"nibbling,\" it is OK to use a finger to take baby off the breast and move to the other breast.  Do the same on the other side.  Offer both breasts at each feeding.   4.  To increase frequency of breast pumping, to stimulate supply while baby not nursing well:  Add pumping as many times each day as reasonable, ideally 8 times if that is manageable.  Try massage and heat to breasts while pumping to help bring more milk and stimulate your breasts to make more.  Given written info on pumping/feeding techniques for increasing supply.  5.  Continue taking supplements with moringa to help with milk supply as well.  Given written " info.  6.  This writer will notify Dr. Pedroza of blood pressure today, and forward note to pediatric provider.   7.  To see therapist as planned next week.  8.  See pediatric provider as planned next week, and lactation follow up after that visit.    Subjective: Scott is here today because of poor weight gain in baby Dotty, who is now 6 weeks old.  She shares that a home health visit in first postpartum days indicated that baby was gaining poorly, so Scott added supplemention with pumped milk about once daily as recommended.  She continued pumping and nursing, giving some supplement and freezing remainder of pumped milk.  However at pediatric visit last week it was found that baby had lost weight from previous visit, so further supplementation was recommended, and she is now concerned about how to help baby with gain. She has started adding two more supplementary bottles/day from her frozen milk store. Scott reports that Dotty will nurse well for about 5 min, but is often fussy with nursing,  then falls asleep after about 5 minutes.  She does use a nipple shield for some or part of feeding to encourage Dotty to remain latched to breast.  She is tearful and stressed today about her difficult breastfeeding.      Of note, Scott continues to have hypertension that she monitors at home--reports best result was 137/89, taken on 20.  She has occasional headaches and some vision flashes, but none persistent, and admits she is sleeping irregularly due to baby's needs.      Hospital Course: Scott had labor induced for hypertension, and gave birth by  for non-reassuring fetal status before entering active labor. Had about 3 hrs of Pitocin before delivery.  Seen by inpatient [facility] for routine lactation assistance.      Mother's Relevant Med/Surg History:  Chronic hypertension;  obesity    Breast Surgery: none    Breastfeeding Goals: exclusive direct breastfeeding    Previous Breastfeeding Experience: first  baby    Infant's name: Dotty Chin bday: [birth year]   Gestational age: 39 weeks  Infant's birth weight: 7# 1.9 oz     Mode of delivery:    Infant's MD: Dr. Landin, [facility].    Discharge weight: 6# 10.2 oz     Frequency and duration of feedings: every 2 hours, has 5-hr stretch of sleep at night  Swallows audible per mother: yes, for first few minutes  Numbers of feedings in 24 hours: 6  Number urines per day: 7 (one day last week had just 3)  Number of stools per day and their color: 0-2, sometimes will go up to 3 days without stooling    Supplementation: with about 3 bottles/day of pumped milk (fresh or from freezer), 2-4 oz each bottle   Pumping: 3 times/day, yielding up to 4 oz at first morning pump, about 1 oz in the evenings (average of about 2 oz/session, around 3 1/2 - 6 oz/day).     Objective/Physical exam:   Mother: Noticed breasts grew larger and areolas darkened during pregnancy and she thinks she noticed primary engorgement when her milk came in, but it was not an intense sensation  Her nipples are everted, the areola is compressible, the breast is soft and full.     Sore nipples: no   EPDS: 10.  Scott reports she is having significant stress over breastfeeding difficulties.  Does have history of anxiety;  Has appointment next week with therapist. Has not been on any antidepressants or anxiety medications--has used Trazadone for sleep difficulties due to anxiety in the past.  She does report that she is sleeping now, between baby cares, and is able to eat without difficulty.    Assessment of infant: 0.32% Weight for age percentile   Age today: 6 weeks  Today's weight: 6# 14.6 oz  Amount of milk transferred from LEFT side: 0.2 oz  Amount of milk transferred from RIGHT side: 1.4 oz maximum; difficult to get accurate weight due to baby's distress    Baby has full flexion of arms and legs, normal tone, behavior is alert and active, respirations are normal, skin is normal,  hydration is normal, jaw is normal size and alignment, palate is normal, frenulum is normal, baby can lateralize tongue, has adequate tongue lift, and tongue can protrude past bottom gum line.    On suck exam, baby able to cup finger with tongue, suck is coordinated but somewhat weak    Baby thrush: none    Jaundice: none      Feeding assessment: Baby can hold suction with tongue while at the breast, both with and without nipple shield.  She was frequently restless and crying at the breast and had difficulty maintaining latch.  Much of suckling was non-nutritive.    Alignment: The baby was flex relaxed. Encouraged Scott to keep baby's body was aligned with her trunk. Baby did face mother. Baby was in cross cradle position today.     Areolar Grasp: Baby was able to open mouth wide. Baby's lips were not pursed. Baby's lips did flange outward. Tongue was visible just barely over bottom lip. Baby had complete seal.     Areolar Compression: Baby made short periods of rhythmic motion. There were no clicking or smacking sounds. There was no severe nipple discomfort. Nipples appeared rounded after feeding.    Audible swallowing: Baby made few quiet sounds of swallowing: There was a minimal increase in frequency after milk ejection reflex. The milk ejection reflex is difficult to determine and milk supply is low.      No milk was noted in nipple shield after feeding.    BP (!) 151/96   Pulse 96   LMP 01/22/2020      Repeat BP at end of visit:  139/98      Current Outpatient Medications:      acetaminophen (TYLENOL) 325 MG tablet, Take 2 tablets (650 mg) by mouth every 6 hours as needed for mild pain Start after Delivery., Disp: 100 tablet, Rfl: 0     ASPIRIN PO, Take 81 mg by mouth, Disp: , Rfl:      Fexofenadine HCl (ALLEGRA PO), , Disp: , Rfl:      FIBER PO, , Disp: , Rfl:      fluticasone (FLONASE) 50 MCG/ACT nasal spray, Spray 1 spray into both nostrils daily, Disp: , Rfl:      ibuprofen (ADVIL/MOTRIN) 200 MG tablet,  Take 3 tablets (600 mg) by mouth every 6 hours as needed for moderate pain Start after delivery, Disp: , Rfl:      Prenatal Vit-Fe Fumarate-FA (PRENATAL MULTIVITAMIN W/IRON) 27-0.8 MG tablet, Take 1 tablet by mouth daily, Disp: , Rfl:      senna-docusate (SENOKOT-S/PERICOLACE) 8.6-50 MG tablet, Take 1 tablet by mouth daily Start after delivery., Disp: 100 tablet, Rfl: 0  Past Medical History:   Diagnosis Date     Anxiety      Obesity (BMI 30-39.9)      Past Surgical History:   Procedure Laterality Date      SECTION N/A 10/21/2020    Procedure:  SECTION;  Surgeon: Elisabeth Lima MD;  Location: [facility] L+D     PE TUBES       Family History   Problem Relation Age of Onset     Thyroid Disease Mother      Breast Cancer Paternal Grandmother         stage 1 early 50's.        TT 60 min >50% counseling and education  Dottie Shannon, ELIN, CNM, IBCLC

## 2020-12-02 ENCOUNTER — OFFICE VISIT (OUTPATIENT)
Dept: OBGYN | Facility: CLINIC | Age: 35
End: 2020-12-02
Payer: COMMERCIAL

## 2020-12-02 VITALS — SYSTOLIC BLOOD PRESSURE: 139 MMHG | HEART RATE: 96 BPM | DIASTOLIC BLOOD PRESSURE: 98 MMHG

## 2020-12-02 DIAGNOSIS — O92.79 INSUFFICIENT LACTATION: Primary | ICD-10-CM

## 2020-12-02 DIAGNOSIS — I10 ESSENTIAL HYPERTENSION: ICD-10-CM

## 2020-12-02 PROCEDURE — 99215 OFFICE O/P EST HI 40 MIN: CPT | Mod: 24 | Performed by: ADVANCED PRACTICE MIDWIFE

## 2020-12-02 ASSESSMENT — EDINBURGH POSTNATAL DEPRESSION SCALE (EPDS)
I HAVE FELT SCARED OR PANICKY FOR NO GOOD REASON: NO, NOT AT ALL
I HAVE BEEN SO UNHAPPY THAT I HAVE HAD DIFFICULTY SLEEPING: NOT AT ALL
I HAVE BEEN ABLE TO LAUGH AND SEE THE FUNNY SIDE OF THINGS: NOT QUITE SO MUCH NOW
I HAVE BEEN SO UNHAPPY THAT I HAVE BEEN CRYING: YES, QUITE OFTEN
I HAVE FELT SAD OR MISERABLE: NOT VERY OFTEN
THINGS HAVE BEEN GETTING ON TOP OF ME: YES, SOMETIMES I HAVEN'T BEEN COPING AS WELL AS USUAL
I HAVE BLAMED MYSELF UNNECESSARILY WHEN THINGS WENT WRONG: YES, SOME OF THE TIME
I HAVE BEEN ANXIOUS OR WORRIED FOR NO GOOD REASON: HARDLY EVER
TOTAL SCORE: 10
I HAVE LOOKED FORWARD WITH ENJOYMENT TO THINGS: RATHER LESS THAN I USED TO
THE THOUGHT OF HARMING MYSELF HAS OCCURRED TO ME: NEVER

## 2020-12-02 NOTE — PATIENT INSTRUCTIONS
"1.  Dotty needs about 22-24 oz of milk each day to grow well. If she nurses at home as she did in the office today, about 8 times/day, she should get about this amount in supplementation, using your breastmilk as your first choice and formula when the supply of pumped milk runs out. You can give this after feedings, or distributed throughout the day according to her/his feeding cues. Use paced feeding technique when giving bottles.  Also could try at-breast tube supplementer or finger feeding if desired.  2.  To continue to nurse baby on cue, 8-12 times each day.  Feed on one side until baby finishes swallowing.  Once swallowing slows, use breast compression to encourage more swallowing, but once there is no more active swallowing, and baby is either sleeping, coming off the breast, or just \"nibbling,\" it is OK to use a finger to take baby off the breast and move to the other breast.  Do the same on the other side.  Offer both breasts at each feeding.   3.  Add pumping as many times each day as reasonable, ideally 8 times if that is manageable.  Try massage and heat to breasts while pumping to help bring more milk and stimulate your breasts to make more.  4.  Continue taking supplements with moringa to help with milk supply as well.  5.  Dottie will notify Dr. Pedroza of blood pressure today, and pediatric provider of visit today.    6.  See pediatric provider as planned next week, and lactation follow up after that visit.          For an excellent video on paced bottle feeding:  [email or web address].org-paced-feeding/      ____________________    -------------------------------------------------------------------------------------------------  Information for breastfeeding families on Increasing breastmilk supply     Frequent stimulation of the breasts, by breastfeeding or by using a breast pump, during the first few days and weeks, is essential to establish an abundant breastmilk supply. If you find your milk " "supply is low, try the following recommendations. If you are consistent you will likely see an improvement within a few days. Although it may take a month or more to bring your supply up to meet your baby's needs, you will see steady, gradual improvement. You will be glad that you put the time and effort into breastfeeding and so will your baby.     More breast stimulation:  the most important thing!  --Breastfeed more often, at least 8-12 times per 24 hours.   --Discontinue the use of a pacifier (so that when the baby wants to suck, they are stimulating the breasts for milk production)  --Try to get in \"one more feeding\" before you go to sleep, even if you have to wake the baby.  --Offer both breasts at each feeding  --\"Switch nursing:\" using each breast twice or three times in a feeding, and using different positions  --\"Top up feeds\" give a short feeding in 10-20 minutes if baby seems hungry  -- Remove milk well by massaging breasts while the baby is feeding  --Try breast compression - pushing milk to baby during a feeding    Avoid these things that are known to reduce breastmilk supply  --Smoking  --Caffeine  --Birth control pills and injections  --Decongestants, antihistamines  --Severe weight loss diets  --Mints, parsley, kiarra in excessive amounts    Use a breast pump  --Consider use of a hospital grade breast pump with a double kit  --Pump after feedings or between feedings.  Remember that shorter, more frequent pumping sessions are more helpful than longer sessions that happen less frequently.  Anytime you can squeeze a little time in is helpful!  --Rest 10-15 minutes prior to pumping, eat and drink something.  Be nice to yourself!  During this time trying applying warmth to your breasts and massaging them gently before beginning to pump  --Do hand expression after pumping. This can help bring out more milk, as well as offer extra breast stimulation.  --Try \"power pumping\" for 2 or 3 days. Pumping 12 x a day " "after feedings, even for a short time. Or, try an hour of pumping for 10min, resting for 10 min, then pumping for another 10 min, etc.,  for a few times a day.     Condition your let-down reflex  --Play relaxing music  --Imagine your baby, look at pictures of your baby, smell baby clothing or baby powder  --Watch videos of your baby  --Alternatively, if thinking about your baby and their need for milk is stressful instead of relaxing, do something completely different!  Call a friend, play a game, listen to a podcast or a meditation  --Always pump in the same quiet, relaxed place, set up a routine  --Do slow, deep, relaxed breathing, relax your shoulders    Mother care  --Reduce stress and activity, get help  --Increase fluid intake, but just to thirst.  More water doesn't magically turn into more milk!  --Eat nutritious meals, continue to take prenatal vitamins  --Back rubs stimulate nerves that serve the breasts (central part of the spine)  --Increase skin-to-skin holding time with your baby, relax together  --Take a warm, bath, read, meditate, and empty your mind of tasks that need to be done    Herbs, food and supplements   --These may offer help to some women, but frequent milk removal helps much more!  Supplements are not a substitute.  --Weldon's yeast: 3 Tablespoons daily, increase by 1/2 teaspoon daily until results are seen  --Moringa (also called malunggay):  this is a leaf that is commonly eaten in [place], and has been shown in a number of studies to increase milk supply.  In [place] it is found in powder form, often sold in capsules.  It is sold under a number of brand names. A common dose is 250-350 mg three times daily.  --Sources for moringa/malunggay for helping with milk supply:    Brands containing or comprised of moringa:  Go-Lacta  Motherlove Herbal Tincture  [employer] \"Adoptive Lactation Milk-in\"  [employer] \"Milk A-Plenty\" ([place])  Milkapalooza or [employer] by " Legendairy  Lush Leche or Milk Machine by E[employer]horic Herbal  (Or can purchase just moringa itself from [employer] or [facility] [employer] [employer])     --Fenugreek:  Doses of 3-5 capsules (580-610 mg each) three times per day are commonly recommended. Avoid fenugreek if you are diabetic, hypoglycemic, asthmatic or allergic to peanuts or other legumes or beans. Taken as directed, it may cause a faint maple body odor. That is to be expected and means that the herb is doing it's job.   --Torbangun:  a leaf used in [place] for helping with milk supply.  A few studies have found this to be helpful.  Several companies sell this in compounds for increasing mother's milk.  --Shatavari:  a type of asparagus found in [place], also found to help with milk supply.  Available in several compounds made by different companies.  --Oatmeal:  try a bowl daily.  Barley and quinoa have also been found to be helpful.  --Calcium supplement:  this seems to be particularly helpful for those women who note a decrease in milk supply around their menstrual cycles.  Take 1500 mg of calcium with 750 mg of magnesium daily from midcycle through your period.  --Blessed thistle, goat's rue, or other herbs or beverages such as Mother's Milk Tea taken as directed on the package. Reliable sources of herbs and herbal blends are Motherlove Herbals, Christy Her[employer] and Legendairy Milk.  --Lactation cookies:  these are very expensive (often around $20 for one package of mix) and many do not contain anything more special than oatmeal, flaxseed and chung's yeast, along with sugar and chocolate.  Probably not really worth the money.    --Keep records  --It is important to keep a daily log with the number of pumping sessions, amount obtained amount you are having to supplement your baby and 24 hour totals, this amount is more important that the pumped amount at each session. This will help you see your progress over the days.   --Keep in touch with your health care provider  so he/she can monitor your progress over the days and modify advice if necessary.     Retained placenta  If you are not seeing improvement and you are having any heavy bleeding, discuss the possibility of retained placental fragments with your MD. Small bits of the placenta can secrete enough hormones to prevent the milk from coming in.    Low thyroid  Have you health care provider check your thyroid levels. Low thyroid can affect ilk supply. If you have been taking thyroid medication, have your levels checked after delivery, you may need your medication adjusted.     Other resources: http://www.lowmilksupply.org    [place] Video demonstrating hand expression (demo begins at about minute 2:00)  [email or web address]-[email or web address]    [place] Maximizing Milk Production Video:  [email or web address]/[email or web address]-[email or web address]-production.[email or web address]    ________________  How to use a lactation aid/nursing supplementer, from  Dr. Otto Hernandez's website:      Https://www.you1EQube.com/watch?v=ezGIkIkhC_o      ____________________________

## 2020-12-02 NOTE — Clinical Note
"Dr. Yovany Pina:  I saw Scott for lactation consultation today, and noted that her blood pressure continued to be elevated.  It was 151/96 at beginning of the visit and 139/98 at end of visit.  She does say she is checking it at home, and \"at best\" it is 137/89.  She has a postpartum visit scheduled with you in about two weeks.      Dottie Shannon CNM, IBCLC"

## 2020-12-07 ENCOUNTER — VIRTUAL VISIT (OUTPATIENT)
Dept: PSYCHOLOGY | Facility: CLINIC | Age: 35
End: 2020-12-07
Payer: COMMERCIAL

## 2020-12-07 DIAGNOSIS — F41.1 GAD (GENERALIZED ANXIETY DISORDER): Primary | ICD-10-CM

## 2020-12-07 PROCEDURE — 90834 PSYTX W PT 45 MINUTES: CPT | Mod: 95 | Performed by: SOCIAL WORKER

## 2020-12-07 NOTE — PROGRESS NOTES
[facility]- Integrated Behavioral Health Services  December 7, 2020    Behavioral Health Clinician Progress Note    Patient Name: Scott Dobbs      Telemedicine Visit: The patient's condition can be safely assessed and treated via synchronous audio and visual telemedicine encounter.      Reason for Telemedicine Visit: Patient has requested telehealth visit and Services only offered telehealth due to COVID-19    Originating Site (Patient Location): Patient's home    Distant Site (Provider Location): Provider Remote Setting    Consent:  The patient/guardian has verbally consented to: the potential risks and benefits of telemedicine (video visit) versus in person care; bill my insurance or make self-payment for services provided; and responsibility for payment of non-covered services.     Mode of Communication:  Video Conference via hetras    As the provider I attest to compliance with applicable laws and regulations related to telemedicine.         Service Type:  Individual     Session Start Time:  9: 01 am  Session End Time: 9:53 am     Session Length: 38 - 52      Attendees: Patient and infant     Visit Activities (Refresh list every visit): [facility] Only    Diagnostic Assessment Date: 6/1/20  Treatment Plan Review Date: 9/29/20  See Flowsheets for today's PHQ-9 and BALA-7 results  Previous PHQ-9:   PHQ-9 SCORE 6/11/2020 7/23/2020 10/2/2020   PHQ-9 Total Score MyChart 3 (Minimal depression) - -   PHQ-9 Total Score 3 8 4     Previous BALA-7:   BALA-7 SCORE 6/11/2020 7/23/2020 10/2/2020   Total Score 5 (mild anxiety) - -   Total Score 5 5 4       ED LEVEL:  No flowsheet data found.    DATA  Extended Session (60+ minutes): No  Interactive Complexity: No  Crisis: No  [facility] Patient: No    Treatment Objective(s) Addressed in This Session:  Target Behavior(s): Mental health management    Anxiety: will experience a reduction in anxiety, will develop more effective coping skills to manage anxiety  symptoms, will develop healthy cognitive patterns and beliefs and will increase ability to function adaptively    Current Stressors / Issues:  Patient reported that her daughter was born in mid-October. She discussed unsuccessful induction that led to . Patient discussed how the transition has been for her so far postpartum, including the challenges she has felt and experienced with her milk supply, the slow growth of her daughter, and her body.  Patient discussed feelings of sadness, grief, and inadequacy as she discussed how pregnancy, childbirth, and now feeding has not been what she had envisioned. She stated that she knew prenatally that it is difficult to plan for this all, but she had been hoping she would have had one aspect that would have been what she would prefer. Patient stated at times she can focus on the logic of what she knows about the situation, can provider herself with self-compassion, but other times the emotions can be make it difficult. Continued to review the cognitive coping skills to utilize in these moments.     Progress on Treatment Objective(s) / Homework:  Worsening - PREPARATION (Decided to change - considering how); Intervened by negotiating a change plan and determining options / strategies for behavior change, identifying triggers, exploring social supports, and working towards setting a date to begin behavior change    Motivational Interviewing    MI Intervention: Expressed Empathy/Understanding, Supported Autonomy, Collaboration, Evocation, Permission to raise concern or advise, Open-ended questions, Change talk (evoked) and Reframe     Change Talk Expressed by the Patient: Desire to change Ability to change Reasons to change Need to change Committment to change Activation    Provider Response to Change Talk: E - Evoked more info from patient about behavior change, A - Affirmed patient's thoughts, decisions, or attempts at behavior change, R - Reflected patient's change  talk and S - Summarized patient's change talk statements     Mindfulness-Based Strategies : Discussed skills based on development and application of mindfulness    Also provided psychoeducation about behavioral health condition, symptoms, and treatment options    Care Plan review completed: Yes    Medication Review:  No current psychiatric medications prescribed    Medication Compliance:  NA    Changes in Health Issues:   Yes: Posptartum- 7 weeks ago    Chemical Use Review:   Substance Use: Chemical use reviewed, no active concerns identified      Tobacco Use: No current tobacco use.      Assessment: Current Emotional / Mental Status (status of significant symptoms):  Risk status (Self / Other harm or suicidal ideation)  Patient denies a history of suicidal ideation, suicide attempts, self-injurious behavior, homicidal ideation, homicidal behavior and and other safety concerns  Patient denies current fears or concerns for personal safety.  Patient denies current or recent suicidal ideation or behaviors.  Patient denies current or recent homicidal ideation or behaviors.  Patient denies current or recent self injurious behavior or ideation.  Patient denies other safety concerns.  A safety and risk management plan has not been developed at this time, however patient was encouraged to call [place] [phone] should there be a change in any of these risk factors.    Appearance:   Appropriate   Eye Contact:   Good   Psychomotor Behavior: Normal   Attitude:   Cooperative   Orientation:   All  Speech   Rate / Production: Normal    Volume:  Normal   Mood:    Sad   Affect:    Tearful  Thought Content:  Clear   Thought Form:  Coherent  Logical   Insight:    Good     Diagnoses:  1. BALA (generalized anxiety disorder)        Collateral Reports Completed:  Not Applicable    Plan: (Homework, other):  Patient was given information about behavioral services and encouraged to schedule a follow up appointment with the clinic [facility] in 2  weeks.  She was also given Cognitive Behavioral Therapy skills to practice when experiencing anxiety.  CD Recommendations: No indications of CD issues.  GEO Perez, [facility]      ______________________________________________________________________    Integrated Primary Care Behavioral Health Treatment Plan    Patient's Name: Scott Dobbs  YOB: 1985    Date: 9/29/20    DSM-V Diagnoses: 300.02 (F41.1) Generalized Anxiety Disorder  Psychosocial / Contextual Factors: first pregnancy during COVID-19  WHODAS 2.0 Total Score 6/11/2020   Total Score 22   Total Score MyChart 22         Referral / Collaboration:  Referral to another professional/service is not indicated at this time..    Anticipated number of session or this episode of care: 10-12      MeasurableTreatment Goal(s) related to diagnosis / functional impairment(s)  Goal 1: Patient will reduce symptoms of anxiety as evidenced by decreased score on BALA 7.     I will know I've met my goal when I'm less irritable and it is easier to stop the spiral      Objective #A (Patient Action)    Patient will identify three distraction and diversion activities and use those activities to decrease level of anxiety  .  Status: Continued - Date(s):9/29/20     Intervention(s)  [facility] will teach distress tolerance, mindfulness, and relaxation techniques.    Objective #B  Patient will use cognitive strategies identified in therapy to challenge anxious thoughts.  Status: Continued - Date(s):9/29/20     Intervention(s)  [facility] will assign homework to practice cognitive restructuring and defusion techniques.    Objective #C  Patient will use relaxation strategies 2-3 times per day to reduce the physical symptoms of anxiety.  Status: Continued - Date(s):9/29/20     Intervention(s)  [facility] will teach relaxation techniques.    Patient has reviewed and agreed to the above plan.      GEO Perez  September 29, 2020

## 2020-12-10 PROBLEM — Z34.02 ENCOUNTER FOR SUPERVISION OF NORMAL FIRST PREGNANCY IN SECOND TRIMESTER: Status: RESOLVED | Noted: 2020-05-27 | Resolved: 2020-12-10

## 2020-12-14 ENCOUNTER — PRENATAL OFFICE VISIT (OUTPATIENT)
Dept: OBGYN | Facility: CLINIC | Age: 35
End: 2020-12-14
Payer: COMMERCIAL

## 2020-12-14 VITALS
BODY MASS INDEX: 45.42 KG/M2 | DIASTOLIC BLOOD PRESSURE: 76 MMHG | TEMPERATURE: 97.5 F | SYSTOLIC BLOOD PRESSURE: 130 MMHG | WEIGHT: 293 LBS

## 2020-12-14 DIAGNOSIS — Z30.430 ENCOUNTER FOR IUD INSERTION: ICD-10-CM

## 2020-12-14 DIAGNOSIS — Z30.430 ENCOUNTER FOR INSERTION OF INTRAUTERINE CONTRACEPTIVE DEVICE: ICD-10-CM

## 2020-12-14 PROBLEM — Z23 NEED FOR TDAP VACCINATION: Status: RESOLVED | Noted: 2020-03-10 | Resolved: 2020-12-14

## 2020-12-14 PROBLEM — Z98.891 STATUS POST CESAREAN SECTION: Status: RESOLVED | Noted: 2020-10-21 | Resolved: 2020-12-14

## 2020-12-14 PROBLEM — O40.9XX0 POLYHYDRAMNIOS AFFECTING PREGNANCY: Status: RESOLVED | Noted: 2020-07-23 | Resolved: 2020-12-14

## 2020-12-14 LAB — HCG UR QL: NEGATIVE

## 2020-12-14 PROCEDURE — 99207 PR POST PARTUM EXAM: CPT | Performed by: OBSTETRICS & GYNECOLOGY

## 2020-12-14 PROCEDURE — 81025 URINE PREGNANCY TEST: CPT | Performed by: OBSTETRICS & GYNECOLOGY

## 2020-12-14 PROCEDURE — 58300 INSERT INTRAUTERINE DEVICE: CPT | Performed by: OBSTETRICS & GYNECOLOGY

## 2020-12-14 RX ORDER — COPPER 313.4 MG/1
1 INTRAUTERINE DEVICE INTRAUTERINE ONCE
Status: COMPLETED
Start: 2020-12-14 | End: 2020-12-14

## 2020-12-14 RX ORDER — COPPER 313.4 MG/1
1 INTRAUTERINE DEVICE INTRAUTERINE ONCE
COMMUNITY

## 2020-12-14 RX ADMIN — COPPER 1 EACH: 313.4 INTRAUTERINE DEVICE INTRAUTERINE at 12:27

## 2020-12-14 NOTE — NURSING NOTE
"Chief Complaint   Patient presents with     Post Partum Exam     IUD     iud insertion lester NDC: [address]-6, LOT: [id number], EXP: 2026       Initial /76   Temp 97.5  F (36.4  C) (Oral)   Wt 139.5 kg (307 lb 9.6 oz)   LMP 2020   Breastfeeding Yes   BMI 45.42 kg/m   Estimated body mass index is 45.42 kg/m  as calculated from the following:    Height as of 10/19/20: 1.753 m (5' 9\").    Weight as of this encounter: 139.5 kg (307 lb 9.6 oz).  BP completed using cuff size: X-large    Questioned patient about current smoking habits.  Pt. has never smoked.          The following HM Due: NONE      The following patient reported/Care Every where data was sent to:  P ABSTRACT QUALITY INITIATIVES [[id number]]        patient has appointment for today  Anjali Perez                "

## 2020-12-14 NOTE — PROGRESS NOTES
Scott is here for a 6-week postpartum checkup.    She had a c/s for abnormal FHT of a viable girl, weight 7 pounds 2 oz., with gestational HTN complications.  Since delivery, she has been breast feeding, but supply is low so she is also pumping and supplementing with formula.  She has no signs of infection, bleeding or other complications.  She is not pregnant.  We discussed contraceptions and she has chosen Parguard IUD.  She is stressed about the feeding, but overall denies feeling sad, depressed or too overwhelmed.    EXAM:    HEENT: grossly normal.  NECK: no lymphadenopathy or thyroidomegaly.  LUNGS: CTA X 2, no rales or crackles.  BACK: No spinal or CVA tenderness.  HEART: RRR without murmurs clicks or gallops.  ABDOMEN: soft, non tender, good bowel sounds, without masses rebound, guarding or tenderness. Incision well healed.    PELVIC:    External genitalia: normal without lesion.                            Vagina: normal mucosa and rugae, no discharge.  Cervix: multiparous, well healed, without lesion.  Uterus: non pregnant in size, firm , mobile, no lesions.  Adnexa: non tender, without masses    EXTREMITIES: Warm to touch, good pulses, no ankle edema or calf tenderness.  NEUROLOGIC: grossly normal.    ASSESSMENT:   Normal 8-week postpartum exam after c/s for abnormal FHT.    PLAN:  Pap smear utd and Parguard IUD for contraception.  Ok to resume normal activities.  RTC yearly or prn.    SUZANNE ARGUETA MD      CC:  IUD insertion  HPI:  Scott Dobbs is a 35 year old female Patient's last menstrual period was 01/22/2020.  Menses are absent since she is breastfeeding her 8 week old daughter.  No other c/o.  She is here for an IUD insertion. Patient has verbalized understanding of risks and benefits and has signed the consent form.    Past GYN history:  No STD history       Last PAP smear:  Normal    The patient's past medical history, social history and family history is reviewed at our visit and  updated in EPIC.    Allergies: Patient has no known allergies.    Current Outpatient Medications   Medication Sig Dispense Refill     ASPIRIN PO Take 81 mg by mouth       Fexofenadine HCl (ALLEGRA PO)        fluticasone (FLONASE) 50 MCG/ACT nasal spray Spray 1 spray into both nostrils daily       ibuprofen (ADVIL/MOTRIN) 200 MG tablet Take 3 tablets (600 mg) by mouth every 6 hours as needed for moderate pain Start after delivery       Prenatal Vit-Fe Fumarate-FA (PRENATAL MULTIVITAMIN W/IRON) 27-0.8 MG tablet Take 1 tablet by mouth daily           ROS:  C: NEGATIVE for fever, chills, change in weight  R: NEGATIVE for significant cough or SOB  CV: NEGATIVE for chest pain, palpitations or peripheral edema  GI: NEGATIVE for nausea, abdominal pain, heartburn, or change in bowel habits  : NEGATIVE for frequency, dysuria, hematuria, vaginal discharge, or irregular bleeding      EXAM:  Blood pressure 130/76, temperature 97.5  F (36.4  C), temperature source Oral, weight 139.5 kg (307 lb 9.6 oz), last menstrual period 01/22/2020, currently breastfeeding.  General - pleasant female in no acute distress.  Pelvic - EG: normal adult female, BUS: within normal limits, Vagina: well rugated, no discharge, Cervix: no lesions or CMT, Uterus: firm, normal sized and nontender, Adnexae: no masses or tenderness.    PROCEDURE:  After informed consent was obtained from the patient, a speculum was placed in the vagina to visualized the cervix.  The cervix was then swabbed with a betadine prep.  Tenaculum was placed at the 12 o'clock position on the cervix and the uterus sounded to 7.5cm.  The Paragard T-380  IUD was then placed in the usual fashion under sterile technique.  Strings were clipped about 3-4 cm from the cervical os.  Tenaculum was removed and cervix was hemostatic. There were no complications. The patient tolerated the procedure well.      ASSESSMENT/PLAN:  1) contraception, successful IUD placement   The patient should feel  for the IUD strings after her next menses.  If unable to locate them, she should return to clinic for a speculum examination for confirmation that the IUD is in place. Bleeding pattern of this particular IUD was discussed with the patient. She is aware that the IUD will need to be removed in 10 years or PRN.  She is to return to clinic for her next annual or PRN.      Alexa Pedroza MD

## 2020-12-14 NOTE — PATIENT INSTRUCTIONS
What Paragard Users May Expect    What to watch for right after Paragard is placed  Some women may experience uterine cramps, bleeding, and/or dizziness during and right after Paragard is placed. To help minimize the cramps, you may taken ibuprofen 600 mg with food prior to your appointment. These symptoms should improve over the next 24 hours.  Mild cramping may be present for a few days after your placement  As a follow up, you should check your strings in 4 weeks, or visit your clinic once in the first 4 to 12 weeks after Paragard is placed to make sure it is in the right position. After that, Paragard can be checked once a year as part of your routine exam.    Please use a back-up method (abstinence or condoms) for 5 days after placement.    Your periods may change  Some women may have heavier and longer periods with cramping for a while; some may have spotting between periods. These side effects usually lessen after a few months. However, if your menstrual flow is severe or prolonged, call your healthcare professional. You should also call your healthcare professional if you miss a period.    Paragard Strings  You may check your own Paragard strings by inserting a finger into the vagina and feeling the strings as they exit the cervix.  The strings will initially feel firm, but will soften over a few weeks.  After the strings have softened, you or your partner should not be able to feel the strings during intercourse.  If you can feel the IUD, see your healthcare provider to have the position confirmed.  You may continue to use tampons with the IUD in place.    Paragard does not protect against HIV or STDs.  Paragard does not prevent the formation of ovarian cysts.  Paragard does not typically reduce acne or cause weight gain or mood changes.    Please call [facility] at [phone] if you have questions or concerns.    For more information:  [email or web address]

## 2020-12-15 NOTE — PROGRESS NOTES
"Assessment:   1.  Eight week old infant with history of poor growth:  Now gaining very well on breastfeeding with supplementation, with gain of 2.2 oz/day over last 2 weeks  2.  Continues to have difficulty with latch and poorly coordinated suck, with tongue thrusting rather than drawing.  No milk transfer in office today.  3.  Mother with low milk supply, possibly due to minimal milk removal in early weeks  4.  Mother with hypertension    Plan:   1.  Congratulated Scott on excellent work with supplementation and achieving good weight gain in baby Dotty! Continue to offer Dotty the breast on cue;  Do not need to wake her to feed anymore.  2.  Can offer baby the bottle for supplement either before or after nursing, depending on how patient she is.  3.  Dotty needs about 24 oz of milk each day to grow well.  This is around 3 oz/feeding.  So if she nurses well, consider offering around 2 oz;  If not, then offer 3 oz.    4.  Continue pumping as you have been to stimulate your milk supply, using heat and/or massage to help increase milk removal and have more milk to offer to Dotty.  5.  Consider a visit with speech/suck therapist to help work with Dotty's suck.  Explained pediatric provider will need to refer for this.  Can also do some suck exercises on your own at home--see handout.  6.  Know that for people who have breastfeeding challenges, although some families pursue all avenues and some wean to formula entirely, there are many other options for coping. These can include decreasing pumping efforts and using formula for supplementation, setting a time boundary on continuing to \"triple feed,\" or moving to exclusive pumping. You can consider what works best for your family.  Scott voices that for the time being she can manage triple feeding and is willing to continue.  7. See pediatrician as planned next month, lactation as needed, and consider speech/suck therapy.      Subjective: Scott is here today for a follow " up visit.  She was first seen two weeks ago for poor weight gain in baby Dotty.  At that visit Dotty had lost weight from her prior visit and was just 3 oz over birthweight at 6 weeks.  A plan was made for Scott to increase her pumping and supplement Dotty to her full needs, using pumped milk and/or formula.  She has been doing this, and baby has had rapid weight gain.  Is offering breast with every feeding, which Dotty takes successfully about 30% of the time.  Is supplementing after each feeding, with Dotty receiving about half formula and half with pumped milk.  Scott has been ensuring baby wakes at least every 3 hours for feedings, and notes that she is waking on her own most of the time recently.    Hospital Course: Scott had labor induced for hypertension, and gave birth by  for non-reassuring fetal status before entering active labor. Had about 3 hrs of Pitocin before delivery.  Seen by inpatient [facility] for routine lactation assistance.  Scott did continue to have elevated BPs postpartum.      Mother's Relevant Med/Surg History:  Chronic hypertension;  obesity    Breast Surgery: none    Breastfeeding Goals: exclusive direct breastfeeding    Previous Breastfeeding Experience: first baby    Infant's name: Dotty   Infant's bday: [birth year]   Gestational age: 39 weeks  Infant's birth weight: 7# 1.9 oz     Mode of delivery:  for fetal distress  Infant's MD: Dr. Landin, [facility].    Discharge weight: 6# 10.2 oz   Last weight on this scale 2 weeks ago:  6# 14.6 oz  Pediatric visit 20:  8# 2 oz    Frequency and duration of feedings: every 2-3 hours, about 15 min, successful latch to breast about 30% of feedings  Swallows audible per mother: yes  Numbers of feedings in 24 hours: 8  Number urines per day:13  Number of stools per day and their color: 1-4, sometimes will go up to 3 days without stooling    Supplementation: with formula and pumped milk, offering about an ounce at  a time, average of around 18 oz/day  Pumpin-8 times/day, yielding 1-2 oz each sessin.  Total daily 9-10 oz    Objective/Physical exam:   Mother: Noticed breasts grew larger and areolas darkened during pregnancy and she thinks she noticed primary engorgement when her milk came in, but it was not an intense sensation    Her nipples are everted, the areola is compressible, the breast is soft and full.     Sore nipples: no   EPDS:  Not completed today;  Feels stressed on & off, but generally feels she is coping    Assessment of infant: 5.48% Weight for age percentile   Age today: 8 weeks  Today's weight: 8 # 14 oz  Amount of milk transferred from LEFT side: none measurable  Amount of milk transferred from RIGHT side: none measured      Baby has full flexion of arms and legs, normal tone, behavior is alert and active, respirations are normal, skin is normal, hydration is normal, jaw is normal size and alignment, palate is high-arched, frenulum is normal, baby can lateralize tongue, has adequate tongue lift, and tongue can protrude past bottom gum line.    On suck exam, did not have organized suck today, and consistently made thrusting motions with tongue rather than drawing motions.    Baby thrush: none    Jaundice: none      Feeding assessment: Baby can hold suction with tongue while at the breast without nipple shield today, but only very briefly, and latch was shallow.  Very few deep drawing motions noted, and suckling was primarily non-nutritive.      Alignment: The baby was flex relaxed. Baby was in cross cradle and sidelying positions today.     Areolar Grasp: Baby was able to open mouth wide. Baby's lips were not pursed. Baby's lips did flange outward. Tongue was not easily visible just barely over bottom lip. Baby had inconsistent seal.     Areolar Compression: Baby made short periods of rhythmic motion. There were no clicking or smacking sounds. There was no severe nipple discomfort. Nipples appeared rounded  after feeding.    Audible swallowing: Baby made few quiet sounds of swallowing: There was a minimal increase in frequency after milk ejection reflex. The milk ejection reflex is difficult to determine and milk supply is low.        Initial BP (!) 151/99   Pulse 96   LMP 2020      Repeat BP at end of visit:  137/97      Current Outpatient Medications:      ASPIRIN PO, Take 81 mg by mouth, Disp: , Rfl:      Fexofenadine HCl (ALLEGRA PO), , Disp: , Rfl:      fluticasone (FLONASE) 50 MCG/ACT nasal spray, Spray 1 spray into both nostrils daily, Disp: , Rfl:      ibuprofen (ADVIL/MOTRIN) 200 MG tablet, Take 3 tablets (600 mg) by mouth every 6 hours as needed for moderate pain Start after delivery, Disp: , Rfl:      paragard intrauterine copper device, 1 each by Intrauterine route once, Disp:  , Rfl:      Prenatal Vit-Fe Fumarate-FA (PRENATAL MULTIVITAMIN W/IRON) 27-0.8 MG tablet, Take 1 tablet by mouth daily, Disp: , Rfl:   Past Medical History:   Diagnosis Date     Anxiety      Obesity (BMI 30-39.9)      Past Surgical History:   Procedure Laterality Date      SECTION N/A 10/21/2020    Procedure:  SECTION;  Surgeon: Elisabeth Lima MD;  Location: [facility] L[facility]     PE TUBES       Family History   Problem Relation Age of Onset     Thyroid Disease Mother      Breast Cancer Paternal Grandmother         stage 1 early 50's.        TT 60 min >50% counseling and education  Dottie Shannon, ELIN, CNM, IBCLC

## 2020-12-16 ENCOUNTER — OFFICE VISIT (OUTPATIENT)
Dept: OBGYN | Facility: CLINIC | Age: 35
End: 2020-12-16
Payer: COMMERCIAL

## 2020-12-16 VITALS
DIASTOLIC BLOOD PRESSURE: 97 MMHG | WEIGHT: 293 LBS | SYSTOLIC BLOOD PRESSURE: 137 MMHG | HEART RATE: 89 BPM | BODY MASS INDEX: 45.48 KG/M2

## 2020-12-16 DIAGNOSIS — O92.79 INSUFFICIENT LACTATION: Primary | ICD-10-CM

## 2020-12-16 DIAGNOSIS — I10 HYPERTENSION, UNSPECIFIED TYPE: ICD-10-CM

## 2020-12-16 DIAGNOSIS — O92.79 OTHER DISORDERS OF LACTATION: ICD-10-CM

## 2020-12-16 PROCEDURE — 99215 OFFICE O/P EST HI 40 MIN: CPT | Performed by: ADVANCED PRACTICE MIDWIFE

## 2020-12-16 NOTE — PATIENT INSTRUCTIONS
"Plan:   1.  Continue to offer Dotty the breast on cue;  Do not need to wake her to feed anymore.    2.  Can offer her the bottle for supplement either before or after nursing, depending on how patient she is.  3.  Dotty needs about 24 oz of milk each day to grow well.  This is around 3 oz/feeding.  So if she nurses well, consider offering around 2 oz;  If not, then offer 3 oz.    4.  Continue pumping as you have been to stimulate your milk supply, using heat and/or massage to help increase milk removal and have more milk to offer to Dotty.  5.  Consider a visit with speech/suck therapist to help work with oDtty's suck.  You can also do some suck exercises on your own at home--see handout.  6.  Know that for people who have breastfeeding challenges, although some families pursue all avenues and some wean to formula entirely, there are many other options for coping. These can include decreasing pumping efforts and using formula for supplementation, setting a time boundary on continuing to \"triple feed,\" or moving to exclusive pumping. You can consider what works best for your family.    7. See pediatrician as planned next month, lactation as needed, and consider speech/suck therapy.        Speech/Suck therapy:    [email or web address]  "

## 2020-12-16 NOTE — Clinical Note
Dr. Yovany Pina:    I saw your patient, Scott Dobbs,with her baby Dotty or [facility] Lact[facility] services at the [facility] today.  I know you are following her blood pressures:  her initial BP was 151/99, and second measurement at the end of the visit was 137/97.  She reported that this is fairly usual for her, according to the measurements she gets at home.  Please find the chart attached with my note.  Please feel free to contact me with any questions.  I look forward to following this pleasant family along with you as needed.    Dottie Shannon, ELIN, CNM, IBCLC

## 2020-12-21 ENCOUNTER — VIRTUAL VISIT (OUTPATIENT)
Dept: PSYCHOLOGY | Facility: CLINIC | Age: 35
End: 2020-12-21
Payer: COMMERCIAL

## 2020-12-21 DIAGNOSIS — F41.1 GAD (GENERALIZED ANXIETY DISORDER): Primary | ICD-10-CM

## 2020-12-21 PROCEDURE — 90834 PSYTX W PT 45 MINUTES: CPT | Mod: 95 | Performed by: SOCIAL WORKER

## 2020-12-21 NOTE — PROGRESS NOTES
[facility]- Integrated Behavioral Health Services  December 21, 2020    Behavioral Health Clinician Progress Note    Patient Name: Scott Dobbs      Telemedicine Visit: The patient's condition can be safely assessed and treated via synchronous audio and visual telemedicine encounter.      Reason for Telemedicine Visit: Patient has requested telehealth visit and Services only offered telehealth due to COVID-19    Originating Site (Patient Location): Patient's home    Distant Site (Provider Location): Provider Remote Setting    Consent:  The patient/guardian has verbally consented to: the potential risks and benefits of telemedicine (video visit) versus in person care; bill my insurance or make self-payment for services provided; and responsibility for payment of non-covered services.     Mode of Communication:  Video Conference via FONU2    As the provider I attest to compliance with applicable laws and regulations related to telemedicine.         Service Type:  Individual     Session Start Time:  9: 01 am  Session End Time: 9:53 am     Session Length: 38 - 52      Attendees: Patient and infant     Visit Activities (Refresh list every visit): [facility] Only    Diagnostic Assessment Date: 6/1/20  Treatment Plan Review Date: 9/29/20  See Flowsheets for today's PHQ-9 and BALA-7 results  Previous PHQ-9:   PHQ-9 SCORE 6/11/2020 7/23/2020 10/2/2020   PHQ-9 Total Score MyChart 3 (Minimal depression) - -   PHQ-9 Total Score 3 8 4     Previous BALA-7:   BALA-7 SCORE 6/11/2020 7/23/2020 10/2/2020   Total Score 5 (mild anxiety) - -   Total Score 5 5 4       ED LEVEL:  No flowsheet data found.    DATA  Extended Session (60+ minutes): No  Interactive Complexity: No  Crisis: No  [facility] Patient: No    Treatment Objective(s) Addressed in This Session:  Target Behavior(s): Mental health management    Anxiety: will experience a reduction in anxiety, will develop more effective coping skills to manage anxiety  symptoms, will develop healthy cognitive patterns and beliefs and will increase ability to function adaptively    Current Stressors / Issues:  Patient discussed ongoing stress related to feeding her infant. She stated that her daughter's weight has improved, but she continues to focus on pumping, breastfeeding, and supplementing with formula. She discussed ongoing guilt as she looks back at pictures from one month ago when her daughter's weight was lower as she feels like she could not give her daughter what she needed.  Patient stated that she and her  continue to adjust to their new routine as parents, in particular, now that he is back working. She discussed difficulties with communication that they have recently experienced. Explored communication strategies that may assist this adjustment, with patient openly considering couples therapy.  Reviewed recent anxiety, no significant concerns for increase in anxiety postpartum outside of infant's feedings.     Progress on Treatment Objective(s) / Homework:  No improvement - PREPARATION (Decided to change - considering how); Intervened by negotiating a change plan and determining options / strategies for behavior change, identifying triggers, exploring social supports, and working towards setting a date to begin behavior change    Motivational Interviewing    MI Intervention: Expressed Empathy/Understanding, Supported Autonomy, Collaboration, Evocation, Permission to raise concern or advise, Open-ended questions, Change talk (evoked) and Reframe     Change Talk Expressed by the Patient: Desire to change Ability to change Reasons to change Need to change Committment to change Activation    Provider Response to Change Talk: E - Evoked more info from patient about behavior change, A - Affirmed patient's thoughts, decisions, or attempts at behavior change, R - Reflected patient's change talk and S - Summarized patient's change talk statements     Mindfulness-Based  Strategies : Discussed skills based on development and application of mindfulness    Also provided psychoeducation about behavioral health condition, symptoms, and treatment options    Care Plan review completed: Yes    Medication Review:  No current psychiatric medications prescribed    Medication Compliance:  NA    Changes in Health Issues:   Yes: Posptartum- 7 weeks ago    Chemical Use Review:   Substance Use: Chemical use reviewed, no active concerns identified      Tobacco Use: No current tobacco use.      Assessment: Current Emotional / Mental Status (status of significant symptoms):  Risk status (Self / Other harm or suicidal ideation)  Patient denies a history of suicidal ideation, suicide attempts, self-injurious behavior, homicidal ideation, homicidal behavior and and other safety concerns  Patient denies current fears or concerns for personal safety.  Patient denies current or recent suicidal ideation or behaviors.  Patient denies current or recent homicidal ideation or behaviors.  Patient denies current or recent self injurious behavior or ideation.  Patient denies other safety concerns.  A safety and risk management plan has not been developed at this time, however patient was encouraged to call [place] [phone] should there be a change in any of these risk factors.    Appearance:   Appropriate   Eye Contact:   Good   Psychomotor Behavior: Normal   Attitude:   Cooperative   Orientation:   All  Speech   Rate / Production: Normal    Volume:  Normal   Mood:    Sad   Affect:    Tearful  Thought Content:  Clear   Thought Form:  Coherent  Logical   Insight:    Good     Diagnoses:  1. BALA (generalized anxiety disorder)        Collateral Reports Completed:  Not Applicable    Plan: (Homework, other):  Patient was given information about behavioral services and encouraged to schedule a follow up appointment with the clinic [facility] in 2 weeks.  She was also given Cognitive Behavioral Therapy skills to practice when  experiencing anxiety. Patient informed of this writer's medical leave at end of February 2021. Patient expressed interest in transfer to a therapist as the leave will begin at the same time she transitions back to work from her maternity leave.  [facility] to provide referral and transfer information to [facility].  CD Recommendations: No indications of CD issues.  Dawn Franks, [facility], [facility]      ______________________________________________________________________    Integrated Primary Care Behavioral Health Treatment Plan    Patient's Name: Scott Dobbs  YOB: 1985    Date: 9/29/20    DSM-V Diagnoses: 300.02 (F41.1) Generalized Anxiety Disorder  Psychosocial / Contextual Factors: first pregnancy during COVID-19  WHODAS 2.0 Total Score 6/11/2020   Total Score 22   Total Score MyChart 22         Referral / Collaboration:  Referral to another professional/service is not indicated at this time..    Anticipated number of session or this episode of care: 10-12      MeasurableTreatment Goal(s) related to diagnosis / functional impairment(s)  Goal 1: Patient will reduce symptoms of anxiety as evidenced by decreased score on BALA 7.     I will know I've met my goal when I'm less irritable and it is easier to stop the spiral      Objective #A (Patient Action)    Patient will identify three distraction and diversion activities and use those activities to decrease level of anxiety  .  Status: Continued - Date(s):9/29/20     Intervention(s)  [facility] will teach distress tolerance, mindfulness, and relaxation techniques.    Objective #B  Patient will use cognitive strategies identified in therapy to challenge anxious thoughts.  Status: Continued - Date(s):9/29/20     Intervention(s)  [facility] will assign homework to practice cognitive restructuring and defusion techniques.    Objective #C  Patient will use relaxation strategies 2-3 times per day to reduce the physical symptoms of anxiety.  Status: Continued  - Date(s):9/29/20     Intervention(s)  [facility] will teach relaxation techniques.    Patient has reviewed and agreed to the above plan.      Dawn Franks, LICSW  September 29, 2020

## 2020-12-22 DIAGNOSIS — F41.1 GAD (GENERALIZED ANXIETY DISORDER): Primary | ICD-10-CM

## 2020-12-31 ENCOUNTER — MEDICAL CORRESPONDENCE (OUTPATIENT)
Dept: HEALTH INFORMATION MANAGEMENT | Facility: CLINIC | Age: 35
End: 2020-12-31

## 2021-01-04 ENCOUNTER — VIRTUAL VISIT (OUTPATIENT)
Dept: PSYCHOLOGY | Facility: CLINIC | Age: 36
End: 2021-01-04
Payer: COMMERCIAL

## 2021-01-04 DIAGNOSIS — F41.1 GAD (GENERALIZED ANXIETY DISORDER): Primary | ICD-10-CM

## 2021-01-04 PROCEDURE — 90834 PSYTX W PT 45 MINUTES: CPT | Mod: 95 | Performed by: SOCIAL WORKER

## 2021-01-04 NOTE — PROGRESS NOTES
[facility]- Integrated Behavioral Health Services  January 4, 2021    Behavioral Health Clinician Progress Note    Patient Name: Scott Dobbs      Telemedicine Visit: The patient's condition can be safely assessed and treated via synchronous audio and visual telemedicine encounter.      Reason for Telemedicine Visit: Patient has requested telehealth visit and Services only offered telehealth due to COVID-19    Originating Site (Patient Location): Patient's home    Distant Site (Provider Location): Provider Remote Setting    Consent:  The patient/guardian has verbally consented to: the potential risks and benefits of telemedicine (video visit) versus in person care; bill my insurance or make self-payment for services provided; and responsibility for payment of non-covered services.     Mode of Communication:  Video Conference via [employer]    As the provider I attest to compliance with applicable laws and regulations related to telemedicine.         Service Type:  Individual     Session Start Time:  11: 34 am  Session End Time: 12;26 pm     Session Length: 38 - 52      Attendees: Patient and infant     Visit Activities (Refresh list every visit): [facility] Only    Diagnostic Assessment Date: 6/1/20  Treatment Plan Review Date: 9/29/20  See Flowsheets for today's PHQ-9 and BALA-7 results  Previous PHQ-9:   PHQ-9 SCORE 6/11/2020 7/23/2020 10/2/2020   PHQ-9 Total Score MyChart 3 (Minimal depression) - -   PHQ-9 Total Score 3 8 4     Previous BALA-7:   BALA-7 SCORE 6/11/2020 7/23/2020 10/2/2020   Total Score 5 (mild anxiety) - -   Total Score 5 5 4       ED LEVEL:  No flowsheet data found.    DATA  Extended Session (60+ minutes): No  Interactive Complexity: No  Crisis: No  [facility] Patient: No    Treatment Objective(s) Addressed in This Session:  Target Behavior(s): Mental health management    Anxiety: will experience a reduction in anxiety, will develop more effective coping skills to manage anxiety  "symptoms, will develop healthy cognitive patterns and beliefs and will increase ability to function adaptively    Current Stressors / Issues:  Patient reported \"highs and lows\" over the past couple of weeks. She stated that she learned that her daughter's weight is on track which helps to reduce anxiety, but discussed range of emotions associated with triple feeding. She shared that it does not feel sustainable, but is hopeful that she soon will be able to reduce/drop the pumping sessions to increase her supply. Patient discussed sadness associated with thinking of the future and her return to work, as she feels like she has not been able to appreciate and enjoy her leave as it has been focused on increasing her supply, worrying about her daughter's health. Continued to explore with patient how to help her feel more satisfied with her leave, feelings related to feedings, and ongoing goal of matching of aligning logic with emotions.     Progress on Treatment Objective(s) / Homework:  Satisfactory progress - ACTION (Actively working towards change); Intervened by reinforcing change plan / affirming steps taken    Motivational Interviewing    MI Intervention: Expressed Empathy/Understanding, Supported Autonomy, Collaboration, Evocation, Permission to raise concern or advise, Open-ended questions, Change talk (evoked) and Reframe     Change Talk Expressed by the Patient: Desire to change Ability to change Reasons to change Need to change Committment to change Activation    Provider Response to Change Talk: E - Evoked more info from patient about behavior change, A - Affirmed patient's thoughts, decisions, or attempts at behavior change, R - Reflected patient's change talk and S - Summarized patient's change talk statements     Mindfulness-Based Strategies : Discussed skills based on development and application of mindfulness    Also provided psychoeducation about behavioral health condition, symptoms, and treatment " options    Care Plan review completed: Yes    Medication Review:  No current psychiatric medications prescribed    Medication Compliance:  NA    Changes in Health Issues:   Yes: Posptartum- 7 weeks ago    Chemical Use Review:   Substance Use: Chemical use reviewed, no active concerns identified      Tobacco Use: No current tobacco use.      Assessment: Current Emotional / Mental Status (status of significant symptoms):  Risk status (Self / Other harm or suicidal ideation)  Patient denies a history of suicidal ideation, suicide attempts, self-injurious behavior, homicidal ideation, homicidal behavior and and other safety concerns  Patient denies current fears or concerns for personal safety.  Patient denies current or recent suicidal ideation or behaviors.  Patient denies current or recent homicidal ideation or behaviors.  Patient denies current or recent self injurious behavior or ideation.  Patient denies other safety concerns.  A safety and risk management plan has not been developed at this time, however patient was encouraged to call [place] [phone] should there be a change in any of these risk factors.    Appearance:   Appropriate   Eye Contact:   Good   Psychomotor Behavior: Normal   Attitude:   Cooperative   Orientation:   All  Speech   Rate / Production: Normal    Volume:  Normal   Mood:    Sad   Affect:    Tearful  Thought Content:  Clear   Thought Form:  Coherent  Logical   Insight:    Good     Diagnoses:  1. BALA (generalized anxiety disorder)        Collateral Reports Completed:  Not Applicable    Plan: (Homework, other):  Patient was given information about behavioral services and encouraged to schedule a follow up appointment with the clinic [facility] in 2 weeks.  She was also given Cognitive Behavioral Therapy skills to practice when experiencing anxiety. Patient continues to express interest in transfer to [facility] therapist. Provided patient with phone number to call to schedule as  patient stated that she was not contacted by schedulers.  CD Recommendations: No indications of CD issues.  GEO Perez, [facility]      ______________________________________________________________________    Integrated Primary Care Behavioral Health Treatment Plan    Patient's Name: Scott Dobbs  YOB: 1985    Date: 9/29/20    DSM-V Diagnoses: 300.02 (F41.1) Generalized Anxiety Disorder  Psychosocial / Contextual Factors: first pregnancy during COVID-19  WHODAS 2.0 Total Score 6/11/2020   Total Score 22   Total Score MyChart 22         Referral / Collaboration:  Referral to another professional/service is not indicated at this time..    Anticipated number of session or this episode of care: 10-12      MeasurableTreatment Goal(s) related to diagnosis / functional impairment(s)  Goal 1: Patient will reduce symptoms of anxiety as evidenced by decreased score on BALA 7.     I will know I've met my goal when I'm less irritable and it is easier to stop the spiral      Objective #A (Patient Action)    Patient will identify three distraction and diversion activities and use those activities to decrease level of anxiety  .  Status: Continued - Date(s):9/29/20     Intervention(s)  [facility] will teach distress tolerance, mindfulness, and relaxation techniques.    Objective #B  Patient will use cognitive strategies identified in therapy to challenge anxious thoughts.  Status: Continued - Date(s):9/29/20     Intervention(s)  [facility] will assign homework to practice cognitive restructuring and defusion techniques.    Objective #C  Patient will use relaxation strategies 2-3 times per day to reduce the physical symptoms of anxiety.  Status: Continued - Date(s):9/29/20     Intervention(s)  [facility] will teach relaxation techniques.    Patient has reviewed and agreed to the above plan.      GEO Perez  September 29, 2020

## 2021-01-18 ENCOUNTER — VIRTUAL VISIT (OUTPATIENT)
Dept: BEHAVIORAL HEALTH | Facility: CLINIC | Age: 36
End: 2021-01-18
Payer: COMMERCIAL

## 2021-01-18 DIAGNOSIS — F41.1 GENERALIZED ANXIETY DISORDER: Primary | ICD-10-CM

## 2021-01-18 PROCEDURE — 90834 PSYTX W PT 45 MINUTES: CPT | Mod: 95 | Performed by: SOCIAL WORKER

## 2021-01-18 NOTE — PROGRESS NOTES
[facility]- Integrated Behavioral Health Services  January 18, 2021    Behavioral Health Clinician Progress Note    Patient Name: Scott Dobbs      Telemedicine Visit: The patient's condition can be safely assessed and treated via synchronous audio and visual telemedicine encounter.      Reason for Telemedicine Visit: Patient has requested telehealth visit and Services only offered telehealth due to COVID-19    Originating Site (Patient Location): Patient's home    Distant Site (Provider Location): Provider Remote Setting    Consent:  The patient/guardian has verbally consented to: the potential risks and benefits of telemedicine (video visit) versus in person care; bill my insurance or make self-payment for services provided; and responsibility for payment of non-covered services.     Mode of Communication:  Video Conference via [employer]    As the provider I attest to compliance with applicable laws and regulations related to telemedicine.         Service Type:  Individual     Session Start Time:  11: 36 am  Session End Time: 12: 22 pm     Session Length: 38 - 52      Attendees: Patient and infant     Visit Activities (Refresh list every visit): [facility] Only    Diagnostic Assessment Date: 6/1/20  Treatment Plan Review Date: 1/18/21  See Flowsheets for today's PHQ-9 and BALA-7 results  Previous PHQ-9:   PHQ-9 SCORE 6/11/2020 7/23/2020 10/2/2020   PHQ-9 Total Score MyChart 3 (Minimal depression) - -   PHQ-9 Total Score 3 8 4     Previous BALA-7:   BALA-7 SCORE 6/11/2020 7/23/2020 10/2/2020   Total Score 5 (mild anxiety) - -   Total Score 5 5 4       ED LEVEL:  No flowsheet data found.    DATA  Extended Session (60+ minutes): No  Interactive Complexity: No  Crisis: No  [facility] Patient: No    Treatment Objective(s) Addressed in This Session:  Target Behavior(s): Mental health management    Anxiety: will experience a reduction in anxiety, will develop more effective coping skills to manage  anxiety symptoms, will develop healthy cognitive patterns and beliefs and will increase ability to function adaptively    Current Stressors / Issues:  Patient reported that she continues to triple feed, which has continued to have feedings be the primary focus of her day. She stated that she is getting ready to eliminate one pump session, but is nervous about how it may impact the rest of her supply. Patient stated that she has asked questions to her LC to receive guidance, but continues to feel uncertain on how to best proceed.  Patient continues to feel need to find feeding options that feel sustainable. Patient continued to discuss anxiety related to the end of her maternity leave, and continuing to not have concrete plans for childcare. Patient stated that she and her  are currently trying to identify the best ways to proceed with , if they care for their infant while they both work, or if they feel comfortable with  during a pandemic. Patient discussed ongoing fears about her daughter's weight gain, and trying to balance her observations of what she needs versus the doctor's recommendations.  Reviewed and explored small steps and strategies to help prepare patient transition back to work and finding sustainable feeding supply through the use of cognitive restructuring techniques.     Progress on Treatment Objective(s) / Homework:  Satisfactory progress - ACTION (Actively working towards change); Intervened by reinforcing change plan / affirming steps taken    Motivational Interviewing    MI Intervention: Expressed Empathy/Understanding, Supported Autonomy, Collaboration, Evocation, Permission to raise concern or advise, Open-ended questions, Change talk (evoked) and Reframe     Change Talk Expressed by the Patient: Desire to change Ability to change Reasons to change Need to change Committment to change Activation    Provider Response to Change Talk: E - Evoked more info from patient  about behavior change, A - Affirmed patient's thoughts, decisions, or attempts at behavior change, R - Reflected patient's change talk and S - Summarized patient's change talk statements     Mindfulness-Based Strategies : Discussed skills based on development and application of mindfulness    Also provided psychoeducation about behavioral health condition, symptoms, and treatment options    Care Plan review completed: Yes    Medication Review:  No current psychiatric medications prescribed    Medication Compliance:  NA    Changes in Health Issues:   None reported    Chemical Use Review:   Substance Use: Chemical use reviewed, no active concerns identified      Tobacco Use: No current tobacco use.      Assessment: Current Emotional / Mental Status (status of significant symptoms):  Risk status (Self / Other harm or suicidal ideation)  Patient denies a history of suicidal ideation, suicide attempts, self-injurious behavior, homicidal ideation, homicidal behavior and and other safety concerns  Patient denies current fears or concerns for personal safety.  Patient denies current or recent suicidal ideation or behaviors.  Patient denies current or recent homicidal ideation or behaviors.  Patient denies current or recent self injurious behavior or ideation.  Patient denies other safety concerns.  A safety and risk management plan has not been developed at this time, however patient was encouraged to call [facility] should there be a change in any of these risk factors.    Appearance:   Appropriate   Eye Contact:   Good   Psychomotor Behavior: Normal   Attitude:   Cooperative   Orientation:   All  Speech   Rate / Production: Normal    Volume:  Normal   Mood:    Normal  Affect:    Appropriate   Thought Content:  Clear   Thought Form:  Coherent  Logical   Insight:    Good     Diagnoses:  1. Generalized anxiety disorder        Collateral Reports Completed:  Not Applicable    Plan: (Homework, other):  Patient was  given information about behavioral services and encouraged to schedule a follow up appointment with the clinic [facility] in 2 weeks.  She was also given Cognitive Behavioral Therapy skills to practice when experiencing anxiety. Patient continues to express interest in transfer to [facility] therapist, and has plans to call today to schedule transfer.  CD Recommendations: No indications of CD issues.  Dawn Franks, [facility], [facility]      ______________________________________________________________________    Integrated Primary Care Behavioral Health Treatment Plan    Patient's Name: Scott Dobbs  YOB: 1985    Date: 1/18/21    DSM-V Diagnoses: 300.02 (F41.1) Generalized Anxiety Disorder  Psychosocial / Contextual Factors: first pregnancy during COVID-19  WHODAS 2.0 Total Score 6/11/2020   Total Score 22   Total Score MyChart 22         Referral / Collaboration:  Referral to another professional/service is not indicated at this time..    Anticipated number of session or this episode of care: 10-12      MeasurableTreatment Goal(s) related to diagnosis / functional impairment(s)  Goal 1: Patient will reduce symptoms of anxiety as evidenced by decreased score on BALA 7.     I will know I've met my goal when I'm less irritable and it is easier to stop the spiral      Objective #A (Patient Action)    Patient will identify three distraction and diversion activities and use those activities to decrease level of anxiety  .  Status: Continued - Date(s): 1/18/21    Intervention(s)  [facility] will teach distress tolerance, mindfulness, and relaxation techniques.    Objective #B  Patient will use cognitive strategies identified in therapy to challenge anxious thoughts.  Status: Continued - Date(s):1/18/21    Intervention(s)  [facility] will assign homework to practice cognitive restructuring and defusion techniques.    Objective #C  Patient will use relaxation strategies 2-3 times per day to reduce the physical  symptoms of anxiety.  Status: Continued - Date(s): 1/18/21    Intervention(s)  [facility] will teach relaxation techniques.    Patient has reviewed and agreed to the above plan.      Dawn Franks, GEO  January 18, 2021

## 2021-01-20 ENCOUNTER — OFFICE VISIT (OUTPATIENT)
Dept: OBGYN | Facility: CLINIC | Age: 36
End: 2021-01-20
Payer: COMMERCIAL

## 2021-01-20 VITALS
SYSTOLIC BLOOD PRESSURE: 146 MMHG | HEART RATE: 108 BPM | WEIGHT: 293 LBS | BODY MASS INDEX: 47.26 KG/M2 | DIASTOLIC BLOOD PRESSURE: 95 MMHG | OXYGEN SATURATION: 95 %

## 2021-01-20 DIAGNOSIS — O92.79 INSUFFICIENT LACTATION: Primary | ICD-10-CM

## 2021-01-20 DIAGNOSIS — I10 ESSENTIAL HYPERTENSION: ICD-10-CM

## 2021-01-20 PROCEDURE — 99417 PROLNG OP E/M EACH 15 MIN: CPT | Performed by: ADVANCED PRACTICE MIDWIFE

## 2021-01-20 PROCEDURE — 99215 OFFICE O/P EST HI 40 MIN: CPT | Performed by: ADVANCED PRACTICE MIDWIFE

## 2021-01-20 NOTE — PROGRESS NOTES
Assessment:   1.  3 month old baby with fair weight gain on breastfeeding with formula and pumped milk supplementation:  about 0.75 oz/day over last 2 weeks  2.  Sudden onset of breast refusal since yesterday:  Unable to successfully latch baby to breast in office today  3.  Mother with continued low milk supply, becoming exhausted with frequent triple feeding  4.  Mother with ongoing hypertension      Plan:   1.  Continue to feed Dotty on cue, aiming for 8 times/day.  Consider offering up to 2 oz in supplements to help with weight gain, especially if she doesn't nurse 8 times/day.  2.  Dotty needs around 25-30 oz/day, so if you estimate she is taking about 1.5 oz from you based on home scale, then she needs about 2 oz in supplement.  You can try this either before or after giving the breast, whichever seems to work better.  3.  Recommend pediatric visit to evaluate for causes of breast refusal, such as URI or severe reflux  4.  To coax Dotty back to the breast, you can consider trying to feed when she is very sleepy, or in the tub (with another adult nearby), or after she has taken some supplement and is calm.  Given written info on nursing strikes.  5.  If daytime pumpings are minimally productive for you, it is OK to eliminate some of them in order to have more time for rest and breastfeeding.  She may need more formula to make up for the loss of pumped milk, but this may be a worthwhile tradeoff for you at this point.  Given support for whatever decisions are made.  6.  Recommended follow-up with PCP re: hypertension  6.  Follow up with lactation if needed      Subjective: Scott is here today for a follow up visit.  She has been followed for low milk supply and poor weight gain in baby Dotty.  Baby's gain has been improving with breastfeeding and supplementation.  Today she is here wanting to evaluate Dotty's milk transfer, and with continued concern about milk supply.  Pumping 5-6 times/day, along with nursing  and supplementing, is becoming very tiring, and Scott states she would prefer to spend her limited time nursing baby Dotty rather than pumping frequently.  She would like to determine how to get a good balance going forward.  Additionally, Scott reports that during the last day Dotty has been refusing to nurse at the breast at all, crying and becoming very distressed whenever she is brought near the breast.  Has not had signs of URI or other illness, other than being more fussy than usual over the last day.    Previous Course: Scott had labor induced for hypertension, and gave birth by  for non-reassuring fetal status before entering active labor. Had about 3 hrs of Pitocin before delivery.  Seen by inpatient [facility] for routine lactation assistance.  Scott did continue to have elevated BPs postpartum.  Baby Dotty had poor weight gain over initial months, not regaining birthweight until about 7 weeks of age.  She has been gaining since addition of supplements, and also was seen by speech therapy for help with suck, which Scott found helpful.    Mother's Relevant Med/Surg History:  Chronic hypertension;  obesity    Breast Surgery: none    Breastfeeding Goals: continued breastfeeding;  Possibly decreasing pumping efforts    Previous Breastfeeding Experience: first baby    Infant's name: Dotty   Infant's bday: [birth year]   Gestational age: 39 weeks  Infant's birth weight: 7# 1.9 oz     Mode of delivery:  for fetal distress  Infant's MD: Dr. Landin, [facility].    Discharge weight: 6# 10.2 oz   Last weight on this scale 2 weeks ago:  6# 14.6 oz  Pediatric visit 20:  8# 2 oz  20:  8 # 14 oz  20:  9 # 13 oz    Frequency and duration of feedings: 3 hours during the day, with long stretch of sleep at night for about 11 hours.    Swallows audible per mother: yes  Numbers of feedings in 24 hours: 6  Number urines per day: 13  Number of stools per day and their color:  2-6    Supplementation: with formula and pumped milk, offering about 1 - 1 /2 oz after each feeding, average of around 9-13 oz/day  Pumpin times/day, after feedings yielding 1 oz each session.  Around 2-3 oz if baby has not recently fed    Objective/Physical exam:   Mother: Noticed breasts grew larger and areolas darkened during pregnancy and she thinks she noticed primary engorgement when her milk came in, but it was not an intense sensation    Her nipples are everted, the areola is compressible, the breast is soft and full.     Sore nipples: no   EPDS:  Not completed today;  Reports being stressed and tired by triple feeding routine    Assessment of infant: 7.89% Weight for age percentile   Age today: 3 months  Today's weight: 10# 12.2 oz   Amount of milk transferred:  Not measurable--baby very restless, crying hard and unable to latch for more than about 3-5 seconds    Baby has full flexion of arms and legs, normal tone, behavior is alert and active, respirations are normal, skin is normal, hydration is normal, jaw is normal size and alignment, palate is high-arched, frenulum is normal, baby can lateralize tongue, has adequate tongue lift, and tongue can protrude past bottom gum line.    Suck exam not done today due to baby's distress    Baby thrush: none    Jaundice: none      Feeding assessment: Baby did not latch to breast successfully today--whenever baby brought near breast, would cry strongly and not latch.  Attempted in several positions, both before and after offering supplement of pumped milk, and breastfeeding was not successful.      Areolar Grasp/Compression: Baby did not grasp the breast today.    Audible swallowing: Baby did not nurse at the breast.  Did successfully take about 4 oz of pumped milk from bottle, in 1 - 1 1/2 oz increments, and then appeared to be satisfied and fell asleep.    Initial BP (!) 151/99   Pulse 96   LMP 2020      Repeat BP at end of visit:  146/95      Current  Outpatient Medications:      ASPIRIN PO, Take 81 mg by mouth, Disp: , Rfl:      Fexofenadine HCl (ALLEGRA PO), , Disp: , Rfl:      fluticasone (FLONASE) 50 MCG/ACT nasal spray, Spray 1 spray into both nostrils daily, Disp: , Rfl:      ibuprofen (ADVIL/MOTRIN) 200 MG tablet, Take 3 tablets (600 mg) by mouth every 6 hours as needed for moderate pain Start after delivery, Disp: , Rfl:      paragard intrauterine copper device, 1 each by Intrauterine route once, Disp:  , Rfl:      Prenatal Vit-Fe Fumarate-FA (PRENATAL MULTIVITAMIN W/IRON) 27-0.8 MG tablet, Take 1 tablet by mouth daily, Disp: , Rfl:   Past Medical History:   Diagnosis Date     Anxiety      Obesity (BMI 30-39.9)      Past Surgical History:   Procedure Laterality Date      SECTION N/A 10/21/2020    Procedure:  SECTION;  Surgeon: Elisabeth Lima MD;  Location:  L+D     PE TUBES       Family History   Problem Relation Age of Onset     Thyroid Disease Mother      Breast Cancer Paternal Grandmother         stage 1 early 50's.      Time spent:  Face-to-face visit:  67 minutes  Documentation:  20 min  Total time spent on day of service:  87 minutes    Dottie Shannon, ELIN, CNM, IBCLC

## 2021-01-20 NOTE — PATIENT INSTRUCTIONS
1.  Continue to feed Dotty on cue, aiming for 8 times/day.  Consider offering up to 2 oz in supplements to help with weight gain, especially if she doesn't nurse 8 times/day.  2.  She needs around 25-30 oz/day, so if you estimate she is taking about 1.5 oz from you, then she needs about 2 oz in supplement.  You can try this either before or after giving the breast, whichever seems to work better.  3.  Recommend pediatric visit to evaluate for causes of breast refusal, such as URI or severe reflux  4.  To coax Dotty back to the breast, you can consider trying to feed when she is very sleepy, or in the tub (with another adult nearby), or after she has taken some supplement and is calm.  5.  If daytime pumpings are minimally productive for you, it is OK to eliminate some of them in order to have more time for rest and breastfeeding.  She may need more formula to make up for the loss of pumped milk, but this may be a worthwhile tradeoff for you at this point.  6.  Follow up with lactation if needed      Information on nursing strikes:     https://www.llli.org/breastfeeding-info/nursing-strikes/      https://www.[employer].com/blog/7-tips-for-ending-a-nursing-strike/

## 2021-02-03 ENCOUNTER — VIRTUAL VISIT (OUTPATIENT)
Dept: BEHAVIORAL HEALTH | Facility: CLINIC | Age: 36
End: 2021-02-03
Payer: COMMERCIAL

## 2021-02-03 DIAGNOSIS — F41.1 GENERALIZED ANXIETY DISORDER: Primary | ICD-10-CM

## 2021-02-03 PROCEDURE — 90834 PSYTX W PT 45 MINUTES: CPT | Mod: 95 | Performed by: SOCIAL WORKER

## 2021-02-03 NOTE — PROGRESS NOTES
[facility]- Integrated Behavioral Health Services  February 3, 2021    Behavioral Health Clinician Progress Note    Patient Name: Scott Dobbs      Telemedicine Visit: The patient's condition can be safely assessed and treated via synchronous audio and visual telemedicine encounter.      Reason for Telemedicine Visit: Patient has requested telehealth visit and Services only offered telehealth due to COVID-19    Originating Site (Patient Location): Patient's home    Distant Site (Provider Location): Provider Remote Setting    Consent:  The patient/guardian has verbally consented to: the potential risks and benefits of telemedicine (video visit) versus in person care; bill my insurance or make self-payment for services provided; and responsibility for payment of non-covered services.     Mode of Communication:  Video Conference via [employer]    As the provider I attest to compliance with applicable laws and regulations related to telemedicine.         Service Type:  Individual     Session Start Time:  11: 05 am  Session End Time: 11:57 am      Session Length: 38 - 52      Attendees: Patient    Visit Activities (Refresh list every visit): [facility] [place] Only    Diagnostic Assessment Date: 6/1/20  Treatment Plan Review Date: 1/18/21  See Flowsheets for today's PHQ-9 and BALA-7 results  Previous PHQ-9:   PHQ-9 SCORE 6/11/2020 7/23/2020 10/2/2020   PHQ-9 Total Score MyChart 3 (Minimal depression) - -   PHQ-9 Total Score 3 8 4     Previous BALA-7:   BALA-7 SCORE 6/11/2020 7/23/2020 10/2/2020   Total Score 5 (mild anxiety) - -   Total Score 5 5 4       ED LEVEL:  No flowsheet data found.    DATA  Extended Session (60+ minutes): No  Interactive Complexity: No  Crisis: No  [facility] Patient: No    Treatment Objective(s) Addressed in This Session:  Target Behavior(s): Mental health management    Anxiety: will experience a reduction in anxiety, will develop more effective coping skills to manage anxiety symptoms,  "will develop healthy cognitive patterns and beliefs and will increase ability to function adaptively    Current Stressors / Issues:  Patient reported noticing \"ups and downs\" over the last couple of weeks. She stated that she has stopped pumping during the day, and she has found relief in having time to do other activities and to feel like there is more time to focus on areas other than feeding her daughter.  She stated that she has not had a reduced milk supply as a result of fewer pump sessions as she feared, and feels like she made the best decision for her. She stated that she is now preparing for her return to work in two weeks, and discussed anticipatory feelings related to working from home while her  takes care of their daughter. Patient stated that she continues to feel overwhelmed by looking into day care. She stated that she feels nervous to start because of answers she may receive, but is recognizing that if she avoids looking for day care, her fears about availability will likely occur. Patient created specific plan to begin to research day care in her journal / planner.  Patient stated that she is also trying to navigate how to find time for herself to work out. She discussed feelings related to her body and body image, and while she is trying to focus on what her body has done to create and sustain her daughter, is struggling with her appearance, how she feels within her body, and how clothes fit her.     Progress on Treatment Objective(s) / Homework:  Satisfactory progress - ACTION (Actively working towards change); Intervened by reinforcing change plan / affirming steps taken    Motivational Interviewing    MI Intervention: Expressed Empathy/Understanding, Supported Autonomy, Collaboration, Evocation, Permission to raise concern or advise, Open-ended questions, Change talk (evoked) and Reframe     Change Talk Expressed by the Patient: Desire to change Ability to change Reasons to change Need " to change Committment to change Activation    Provider Response to Change Talk: E - Evoked more info from patient about behavior change, A - Affirmed patient's thoughts, decisions, or attempts at behavior change, R - Reflected patient's change talk and S - Summarized patient's change talk statements     Mindfulness-Based Strategies : Discussed skills based on development and application of mindfulness    Also provided psychoeducation about behavioral health condition, symptoms, and treatment options    Care Plan review completed: Yes    Medication Review:  No current psychiatric medications prescribed    Medication Compliance:  NA    Changes in Health Issues:   None reported    Chemical Use Review:   Substance Use: Chemical use reviewed, no active concerns identified      Tobacco Use: No current tobacco use.      Assessment: Current Emotional / Mental Status (status of significant symptoms):  Risk status (Self / Other harm or suicidal ideation)  Patient denies a history of suicidal ideation, suicide attempts, self-injurious behavior, homicidal ideation, homicidal behavior and and other safety concerns  Patient denies current fears or concerns for personal safety.  Patient denies current or recent suicidal ideation or behaviors.  Patient denies current or recent homicidal ideation or behaviors.  Patient denies current or recent self injurious behavior or ideation.  Patient denies other safety concerns.  A safety and risk management plan has not been developed at this time, however patient was encouraged to call [place] [phone] should there be a change in any of these risk factors.    Appearance:   Appropriate   Eye Contact:   Good   Psychomotor Behavior: Normal   Attitude:   Cooperative   Orientation:   All  Speech   Rate / Production: Normal    Volume:  Normal   Mood:    Normal  Affect:    Appropriate   Thought Content:  Clear   Thought Form:  Coherent  Logical   Insight:    Good     Diagnoses:  1. Generalized  anxiety disorder        Collateral Reports Completed:  Communicated with: GEO Haynes to coordiate transfer    Plan: (Homework, other):  Patient was given information about behavioral services and encouraged to schedule a follow up appointment with the clinic [facility] [place]: no additional session scheduled. Patient scheduled to transfer to [facility]O [facility] on 2/11 with [facility]. She was also given Cognitive Behavioral Therapy skills to practice when experiencing anxiety. .  CD Recommendations: No indications of CD issues.  GEO Perez, [facility] [place]      ______________________________________________________________________    Integrated Primary Care Behavioral Health Treatment Plan    Patient's Name: Scott Dobbs  YOB: 1985    Date: 1/18/21    DSM-V Diagnoses: 300.02 (F41.1) Generalized Anxiety Disorder  Psychosocial / Contextual Factors: first pregnancy during COVID-19  WHODAS 2.0 Total Score 6/11/2020   Total Score 22   Total Score MyChart 22         Referral / Collaboration:  Referred to [facility] for ongoing outpatient therapy. Scheduled to transfer 2/11/21.    Anticipated number of session or this episode of care: 10-12      MeasurableTreatment Goal(s) related to diagnosis / functional impairment(s)  Goal 1: Patient will reduce symptoms of anxiety as evidenced by decreased score on BALA 7.     I will know I've met my goal when I'm less irritable and it is easier to stop the spiral      Objective #A (Patient Action)    Patient will identify three distraction and diversion activities and use those activities to decrease level of anxiety  .  Status: Continued - Date(s): 1/18/21    Intervention(s)  [facility] [place] will teach distress tolerance, mindfulness, and relaxation techniques.    Objective #B  Patient will use cognitive strategies identified in therapy to challenge anxious thoughts.  Status: Continued - Date(s):1/18/21    Intervention(s)  [facility] will assign  homework to practice cognitive restructuring and defusion techniques.    Objective #C  Patient will use relaxation strategies 2-3 times per day to reduce the physical symptoms of anxiety.  Status: Continued - Date(s): 1/18/21    Intervention(s)  [facility] will teach relaxation techniques.    Patient has reviewed and agreed to the above plan.      GEO Perez  January 18, 2021

## 2021-02-11 ENCOUNTER — VIRTUAL VISIT (OUTPATIENT)
Dept: PSYCHOLOGY | Facility: CLINIC | Age: 36
End: 2021-02-11
Payer: COMMERCIAL

## 2021-02-11 DIAGNOSIS — F41.1 GAD (GENERALIZED ANXIETY DISORDER): Primary | ICD-10-CM

## 2021-02-11 PROCEDURE — 90834 PSYTX W PT 45 MINUTES: CPT | Mod: 95 | Performed by: SOCIAL WORKER

## 2021-02-11 ASSESSMENT — ANXIETY QUESTIONNAIRES
7. FEELING AFRAID AS IF SOMETHING AWFUL MIGHT HAPPEN: NOT AT ALL
4. TROUBLE RELAXING: NOT AT ALL
GAD7 TOTAL SCORE: 3
2. NOT BEING ABLE TO STOP OR CONTROL WORRYING: NOT AT ALL
5. BEING SO RESTLESS THAT IT IS HARD TO SIT STILL: NOT AT ALL
1. FEELING NERVOUS, ANXIOUS, OR ON EDGE: SEVERAL DAYS
GAD7 TOTAL SCORE: 3
3. WORRYING TOO MUCH ABOUT DIFFERENT THINGS: NOT AT ALL
GAD7 TOTAL SCORE: 3
6. BECOMING EASILY ANNOYED OR IRRITABLE: MORE THAN HALF THE DAYS
7. FEELING AFRAID AS IF SOMETHING AWFUL MIGHT HAPPEN: NOT AT ALL

## 2021-02-11 ASSESSMENT — PATIENT HEALTH QUESTIONNAIRE - PHQ9
SUM OF ALL RESPONSES TO PHQ QUESTIONS 1-9: 13
10. IF YOU CHECKED OFF ANY PROBLEMS, HOW DIFFICULT HAVE THESE PROBLEMS MADE IT FOR YOU TO DO YOUR WORK, TAKE CARE OF THINGS AT HOME, OR GET ALONG WITH OTHER PEOPLE: SOMEWHAT DIFFICULT
SUM OF ALL RESPONSES TO PHQ QUESTIONS 1-9: 13

## 2021-02-11 NOTE — PROGRESS NOTES
Progress Note    Patient Name: Scott Dobbs  Date: 2021         Service Type: Individual      Session Start Time: 3:05 PM  Session End Time: 3:46 PM     Session Length: 38-52 minutes    Session #: 1 (previous with [facility])    Attendees: Client attended alone    Service Modality:  Video Visit:      Provider verified identity through the following two step process.  Patient provided:  Patient  and Patient address    Telemedicine Visit: The patient's condition can be safely assessed and treated via synchronous audio and visual telemedicine encounter.      Reason for Telemedicine Visit: Services only offered telehealth and current pandemic    Originating Site (Patient Location): Patient's home    Distant Site (Provider Location): Provider Remote Setting    Consent:  The patient/guardian has verbally consented to: the potential risks and benefits of telemedicine (video visit) versus in person care; bill my insurance or make self-payment for services provided; and responsibility for payment of non-covered services.     Patient would like the video invitation sent by:  My Chart    Mode of Communication:  Video Conference via [employer]    As the provider I attest to compliance with applicable laws and regulations related to telemedicine.     Treatment Plan Last Reviewed: 2021  PHQ-9 / BALA-7 :Answers for HPI/ROS submitted by the patient on 2021   If you checked off any problems, how difficult have these problems made it for you to do your work, take care of things at home, or get along with other people?: Somewhat difficult  PHQ9 TOTAL SCORE: 13  BALA 7 TOTAL SCORE: 3      DATA  Interactive Complexity: No  Crisis: No       Progress Since Last Session (Related to Symptoms / Goals / Homework):   Symptoms: fairly stable anxiety, difficulty falling asleep     Homework: initial session      Episode of Care Goals: initial session     Current / Ongoing Stressors and  Concerns:   Identifying identity postpartum, social anxiety    Returning to work next week       Treatment Objective(s) Addressed in This Session:   identify 1 fears / thoughts that contribute to feeling anxious    will ruminate about past conversations and how others perceived her), specifically with new people  Fear of going to social events alone     Intervention:   reviewed treatment plan    Building rapport   Provided education on grounding techniques  Explored level of functioning  CBT: identified feelings, cognitions and behaviors, guided discovery  Motivational interviewing: expressed empathy/understanding, use of reflective listening and open-ended questions      ASSESSMENT: Current Emotional / Mental Status (status of significant symptoms):   Risk status (Self / Other harm or suicidal ideation)   Patient denies current fears or concerns for personal safety.   Patient denies current or recent suicidal ideation or behaviors.   Patient denies current or recent homicidal ideation or behaviors.   Patient denies current or recent self injurious behavior or ideation.   Patient denies other safety concerns.   Patient reports there has been no change in risk factors since their last session.     Patient reports there has been no change in protective factors since their last session.     Recommended that patient call 911 or go to the local ED should there be a change in any of these risk factors.     Appearance:   Appropriate    Eye Contact:   Good    Psychomotor Behavior: Normal    Attitude:   Cooperative  Pleasant   Orientation:   All   Speech    Rate / Production: Normal     Volume:  Normal    Mood:    Anxious    Affect:    Appropriate    Thought Content:  Clear    Thought Form:  Coherent  Goal Directed  Logical    Insight:    Good      Medication Review:   No current psychiatric medications prescribed     Medication Compliance:   NA     Changes in Health Issues:   None reported     Chemical Use  Review:   Substance Use: Chemical use reviewed, no active concerns identified      Tobacco Use: No current tobacco use.      Diagnosis:  Generalized Anxiety Disorder    Collateral Reports Completed:   Communicated with: Dawn Franks [facility]  reviewed medical record    PLAN: (Patient Tasks / Therapist Tasks / Other)  Client's share goal to improve diet and increase exercise.  Therapist sent list of grounding techniques.    Yvette Quintero [facility] 2/11/2021                                                       ______________________________________________________________________    Treatment Plan    Patient's Name: Scott Dobbs  YOB: 1985    Date: 2/11/2021    DSM5 Diagnoses: generalized anxiety disorder  Psychosocial / Contextual Factors: returning to work, new parent  WHODAS: 19    Referral / Collaboration:  Referral to another professional/service is not indicated at this time.    Anticipated number of session or this episode of care: will review in 90 days      MeasurableTreatment Goal(s) related to diagnosis / functional impairment(s)  Goal 1: Patient will reduce symptoms of anxiety as evidenced by decreased score on BALA 7.     I will know I've met my goal when I'm less irritable and it is easier to stop the spiral      Objective #A (Patient Action)    Patient will identify three distraction and diversion activities and use those activities to decrease level of anxiety  .  Status: Continued - Date(s): 2/11/2021    Intervention(s)  Therapist will teach distress tolerance, mindfulness, and relaxation techniques.    Objective #B  Patient will use cognitive strategies identified in therapy to challenge anxious thoughts.  Status: Continued - Date(s):  2/11/2021    Intervention(s)  Thearpist will assign homework to practice cognitive restructuring and defusion techniques.    Objective #C  Patient will use relaxation strategies 2-3 times per day to reduce the physical symptoms of anxiety.  Status:  Continued - Date(s): 2/11/2021    Intervention(s)  Therapist will teach relaxation techniques.    Patient has reviewed and agreed to the above plan.      Yvette Quintero, [facility]  2/11/2021

## 2021-02-12 ASSESSMENT — ANXIETY QUESTIONNAIRES: GAD7 TOTAL SCORE: 3

## 2021-02-12 ASSESSMENT — PATIENT HEALTH QUESTIONNAIRE - PHQ9: SUM OF ALL RESPONSES TO PHQ QUESTIONS 1-9: 13

## 2021-02-22 ENCOUNTER — OFFICE VISIT (OUTPATIENT)
Dept: FAMILY MEDICINE | Facility: CLINIC | Age: 36
End: 2021-02-22
Payer: COMMERCIAL

## 2021-02-22 ENCOUNTER — MEDICAL CORRESPONDENCE (OUTPATIENT)
Dept: HEALTH INFORMATION MANAGEMENT | Facility: CLINIC | Age: 36
End: 2021-02-22

## 2021-02-22 VITALS
TEMPERATURE: 98.8 F | HEART RATE: 81 BPM | DIASTOLIC BLOOD PRESSURE: 78 MMHG | SYSTOLIC BLOOD PRESSURE: 113 MMHG | HEIGHT: 69 IN | WEIGHT: 293 LBS | BODY MASS INDEX: 43.4 KG/M2

## 2021-02-22 DIAGNOSIS — Z00.00 ROUTINE GENERAL MEDICAL EXAMINATION AT A HEALTH CARE FACILITY: Primary | ICD-10-CM

## 2021-02-22 PROCEDURE — 99395 PREV VISIT EST AGE 18-39: CPT | Performed by: PHYSICIAN ASSISTANT

## 2021-02-22 ASSESSMENT — ENCOUNTER SYMPTOMS
CHILLS: 0
DIZZINESS: 0
NERVOUS/ANXIOUS: 0
PALPITATIONS: 0
SORE THROAT: 0
BREAST MASS: 0
WEAKNESS: 0
DIARRHEA: 1
ABDOMINAL PAIN: 0
HEARTBURN: 0
COUGH: 0
NAUSEA: 0
SHORTNESS OF BREATH: 0
HEADACHES: 0
FEVER: 0
FREQUENCY: 0
HEMATURIA: 0
CONSTIPATION: 0
DYSURIA: 0
PARESTHESIAS: 0
JOINT SWELLING: 0
EYE PAIN: 0
ARTHRALGIAS: 0
HEMATOCHEZIA: 0
MYALGIAS: 0

## 2021-02-22 ASSESSMENT — MIFFLIN-ST. JEOR: SCORE: 2274.28

## 2021-02-22 NOTE — PROGRESS NOTES
"   SUBJECTIVE:   CC: Scott Dobbs is an 35 year old woman who presents for preventive health visit.     {Split Bill scripting  The purpose of this visit is to discuss your medical history and prevent health problems before you are sick. You may be responsible for a co-pay, coinsurance, or deductible if your visit today includes services such as checking on a sore throat, having an x-ray or lab test, or treating and evaluating a new or existing condition :459037}  Patient has been advised of split billing requirements and indicates understanding: {Yes and No:258426}  Healthy Habits:    Do you get at least three servings of calcium containing foods daily (dairy, green leafy vegetables, etc.)? { :655198::\"yes\"}    Amount of exercise or daily activities, outside of work: { :537517}    Problems taking medications regularly { :855965::\"No\"}    Medication side effects: { :013231::\"No\"}    Have you had an eye exam in the past two years? { :968786}    Do you see a dentist twice per year? { :679370}    Do you have sleep apnea, excessive snoring or daytime drowsiness?{ :291932}  {Outside tests to abstract? :836610}    {additional problems to add (Optional):047142}    Today's PHQ-2 Score:   PHQ-2 ( 1999 [employer]) 2/22/2021 12/13/2020   Q1: Little interest or pleasure in doing things 0 0   Q2: Feeling down, depressed or hopeless 0 1   PHQ-2 Score 0 1   Q1: Little interest or pleasure in doing things Not at all Not at all   Q2: Feeling down, depressed or hopeless Not at all Several days   PHQ-2 Score 0 1     {PHQ-2 LOOK IN ASSESSMENTS (Optional) :968733}  Abuse: Current or Past(Physical, Sexual or Emotional)- {YES/NO/NA:651205}  Do you feel safe in your environment? {YES/NO/NA:460069}    Have you ever done Advance Care Planning? (For example, a Health Directive, POLST, or a discussion with a medical provider or your loved ones about your wishes): { :505089}    Social History     Tobacco Use     Smoking status: Former Smoker " "    Packs/day: 0.00     Smokeless tobacco: Never Used   Substance Use Topics     Alcohol use: Not Currently     If you drink alcohol do you typically have >3 drinks per day or >7 drinks per week? {ETOH :731694}                     Reviewed orders with patient.  Reviewed health maintenance and updated orders accordingly - {Yes/No:273083::\"Yes\"}  {Chronicprobdata (Optional):303001}    Breast CA Risk Screening:  No flowsheet data found.  {Pull in link for FHS-7 answers if completed after opening note (Optional) :482472}  {If any of the questions to the BCRA (FHS-7) are answered yes, consider ordering referral for genetic counseling (Optional) :454353::\"click delete button to remove this line now\"}  {AMB Mammogram Decision Support (Optional) :620691}  Pertinent mammograms are reviewed under the imaging tab.    Pertinent mammograms are reviewed under the imaging tab.  History of abnormal Pap smear: {PAP HX:325573}  PAP / HPV Latest Ref Rng & Units 3/19/2018   PAP - NIL   HPV 16 DNA NEG:Negative Negative   HPV 18 DNA NEG:Negative Negative   OTHER HR HPV NEG:Negative Negative     Reviewed and updated as needed this visit by clinical staff                 Reviewed and updated as needed this visit by Provider                {HISTORY OPTIONS (Optional):429010}    ROS:  { :530027}    OBJECTIVE:   There were no vitals taken for this visit.  EXAM:  {Exam Choices:447719}    {Diagnostic Test Results (Optional):486376::\"Diagnostic Test Results:\",\"Labs reviewed in Epic\"}    ASSESSMENT/PLAN:   {Diag Picklist:093114}    Patient has been advised of split billing requirements and indicates understanding: {YES / NO:665411::\"Yes\"}  COUNSELING:   {FEMALE COUNSELING MESSAGES:313661::\"Reviewed preventive health counseling, as reflected in patient instructions\"}    Estimated body mass index is 47.26 kg/m  as calculated from the following:    Height as of 10/19/20: 1.753 m (5' 9\").    Weight as of 1/20/21: 145.2 kg (320 lb).    {Weight " Management Plan (ACO) Complete if BMI is abnormal-  Ages 18-64  BMI >24.9.  Age 65+ with BMI <23 or >30 (Optional):646792}    She reports that she has quit smoking. She smoked 0.00 packs per day. She has never used smokeless tobacco.      Counseling Resources:  ATP IV Guidelines  Pooled Cohorts Equation Calculator  Breast Cancer Risk Calculator  BRCA-Related Cancer Risk Assessment: FHS-7 Tool  FRAX Risk Assessment  ICSI Preventive Guidelines  Dietary Guidelines for Americans, 2010  USDA's MyPlate  ASA Prophylaxis  Lung CA Screening    RAFAEL [facility]

## 2021-02-22 NOTE — PATIENT INSTRUCTIONS
Continue to monitor blood pressure.  Let me know if going high   Preventive Health Recommendations  Female Ages 26 - 39  Yearly exam:   See your health care provider every year in order to    Review health changes.     Discuss preventive care.      Review your medicines if you your doctor has prescribed any.    Until age 30: Get a Pap test every three years (more often if you have had an abnormal result).    After age 30: Talk to your doctor about whether you should have a Pap test every 3 years or have a Pap test with HPV screening every 5 years.   You do not need a Pap test if your uterus was removed (hysterectomy) and you have not had cancer.  You should be tested each year for STDs (sexually transmitted diseases), if you're at risk.   Talk to your provider about how often to have your cholesterol checked.  If you are at risk for diabetes, you should have a diabetes test (fasting glucose).  Shots: Get a flu shot each year. Get a tetanus shot every 10 years.   Nutrition:     Eat at least 5 servings of fruits and vegetables each day.    Eat whole-grain bread, whole-wheat pasta and brown rice instead of white grains and rice.    Get adequate Calcium and Vitamin D.     Lifestyle    Exercise at least 150 minutes a week (30 minutes a day, 5 days of the week). This will help you control your weight and prevent disease.    Limit alcohol to one drink per day.    No smoking.     Wear sunscreen to prevent skin cancer.    See your dentist every six months for an exam and cleaning.

## 2021-02-22 NOTE — PROGRESS NOTES
SUBJECTIVE:   CC: Scott Dobbs is an 35 year old woman who presents for preventive health visit.       Patient has been advised of split billing requirements and indicates understanding: Yes  Healthy Habits:     Getting at least 3 servings of Calcium per day:  Yes    Bi-annual eye exam:  Yes    Dental care twice a year:  Yes    Sleep apnea or symptoms of sleep apnea:  None    Diet:  Regular (no restrictions)    Frequency of exercise:  2-3 days/week    Duration of exercise:  15-30 minutes    Taking medications regularly:  Yes    Medication side effects:  Not applicable    PHQ-2 Total Score: 0    Additional concerns today:  Yes      Blood Pressure discussion   Hasn't come down since pregnancy.  Baby was born in Oct.  Had to have a  due to HTN.    blood pressure at home was 130s.    No symptoms when blood pressure is high.        Today's PHQ-2 Score:   PHQ-2 (  [employer]) 2021   Q1: Little interest or pleasure in doing things 0   Q2: Feeling down, depressed or hopeless 0   PHQ-2 Score 0   Q1: Little interest or pleasure in doing things Not at all   Q2: Feeling down, depressed or hopeless Not at all   PHQ-2 Score 0       Abuse: Current or Past (Physical, Sexual or Emotional) - No  Do you feel safe in your environment? Yes    Have you ever done Advance Care Planning? (For example, a Health Directive, POLST, or a discussion with a medical provider or your loved ones about your wishes): No, advance care planning information given to patient to review.  Patient plans to discuss their wishes with loved ones or provider.      Social History     Tobacco Use     Smoking status: Former Smoker     Packs/day: 0.00     Smokeless tobacco: Never Used   Substance Use Topics     Alcohol use: Not Currently     If you drink alcohol do you typically have >3 drinks per day or >7 drinks per week? No    Alcohol Use 2021   Prescreen: >3 drinks/day or >7 drinks/week? No   Prescreen: >3 drinks/day or >7 drinks/week?  -   AUDIT SCORE  -     AUDIT - Alcohol Use Disorders Identification Test - Reproduced from the World Health Organization Audit 2001 (Second Edition) 10/2/2019   1.  How often do you have a drink containing alcohol? 2 to 3 times a week   2.  How many drinks containing alcohol do you have on a typical day when you are drinking? 3 or 4   3.  How often do you have five or more drinks on one occasion? Less than monthly   4.  How often during the last year have you found that you were not able to stop drinking once you had started? Never   5.  How often during the last year have you failed to do what was normally expected of you because of drinking? Never   6.  How often during the last year have you needed a first drink in the morning to get yourself going after a heavy drinking session? Never   7.  How often during the last year have you had a feeling of guilt or remorse after drinking? Never   8.  How often during the last year have you been unable to remember what happened the night before because of your drinking? Never   9.  Have you or someone else been injured because of your drinking? No   10. Has a relative, friend, doctor or other health care worker been concerned about your drinking or suggested you cut down? No   TOTAL SCORE 5       Any new diagnosis of family breast, ovarian, or bowel cancer? No     Reviewed orders with patient.  Reviewed health maintenance and updated orders accordingly - Yes  Labs reviewed in [facility]    Breast CA Risk Screening:  No flowsheet data found.    click delete button to remove this line now  Patient under 40 years of age: Routine Mammogram Screening not recommended.   Pertinent mammograms are reviewed under the imaging tab.    History of abnormal Pap smear: NO - age 30-65 PAP every 5 years with negative HPV co-testing recommended  PAP / HPV Latest Ref Rng & Units 3/19/2018   PAP - NIL   HPV 16 DNA NEG:Negative Negative   HPV 18 DNA NEG:Negative Negative   OTHER HR HPV NEG:Negative  "Negative     Reviewed and updated as needed this visit by clinical staff  Tobacco  Allergies  Meds   Med Hx  Surg Hx  Fam Hx  Soc Hx        Reviewed and updated as needed this visit by Provider   Allergies  Meds     Fam Hx             Review of Systems   Constitutional: Negative for chills and fever.   HENT: Negative for congestion, ear pain, hearing loss and sore throat.    Eyes: Negative for pain and visual disturbance.   Respiratory: Negative for cough and shortness of breath.    Cardiovascular: Negative for chest pain, palpitations and peripheral edema.   Gastrointestinal: Positive for diarrhea (since Nov-not solid stools with some more urgency ). Negative for abdominal pain, constipation, heartburn, hematochezia and nausea.   Breasts:  Negative for tenderness, breast mass and discharge.   Genitourinary: Negative for dysuria, frequency, genital sores, hematuria, pelvic pain, urgency, vaginal bleeding and vaginal discharge.   Musculoskeletal: Negative for arthralgias, joint swelling and myalgias.   Skin: Negative for rash.   Neurological: Negative for dizziness, weakness, headaches and paresthesias.   Psychiatric/Behavioral: Negative for mood changes. The patient is not nervous/anxious.           OBJECTIVE:   /78   Pulse 81   Temp 98.8  F (37.1  C) (Tympanic)   Ht 1.759 m (5' 9.25\")   Wt (!) 150.6 kg (332 lb)   BMI 48.67 kg/m    Physical Exam  Constitutional:       Appearance: She is well-developed. She is obese.   HENT:      Right Ear: Tympanic membrane, ear canal and external ear normal.      Left Ear: Tympanic membrane, ear canal and external ear normal.      Mouth/Throat:      Pharynx: No oropharyngeal exudate.   Eyes:      Pupils: Pupils are equal, round, and reactive to light.   Neck:      Thyroid: No thyromegaly.   Cardiovascular:      Rate and Rhythm: Normal rate and regular rhythm.      Heart sounds: Normal heart sounds.   Pulmonary:      Effort: Pulmonary effort is normal.      " "Breath sounds: Normal breath sounds.   Abdominal:      General: Bowel sounds are normal.      Palpations: Abdomen is soft.      Tenderness: There is no abdominal tenderness.   Lymphadenopathy:      Cervical: No cervical adenopathy.   Skin:     General: Skin is warm and dry.      Findings: No rash.   Neurological:      Mental Status: She is alert and oriented to person, place, and time.   Psychiatric:         Behavior: Behavior normal.           Diagnostic Test Results:  Labs reviewed in Epic    ASSESSMENT/PLAN:   1. Routine general medical examination at a health care facility  Overall healthy       Patient has been advised of split billing requirements and indicates understanding: Yes  COUNSELING:  Reviewed preventive health counseling, as reflected in patient instructions       Regular exercise       Healthy diet/nutrition    Estimated body mass index is 48.67 kg/m  as calculated from the following:    Height as of this encounter: 1.759 m (5' 9.25\").    Weight as of this encounter: 150.6 kg (332 lb).    Weight management plan: pt is 4 months postpartum.  continue exercise     She reports that she has quit smoking. She smoked 0.00 packs per day. She has never used smokeless tobacco.      Counseling Resources:  ATP IV Guidelines  Pooled Cohorts Equation Calculator  Breast Cancer Risk Calculator  BRCA-Related Cancer Risk Assessment: FHS-7 Tool  FRAX Risk Assessment  ICSI Preventive Guidelines  Dietary Guidelines for Americans, 2010  USDA's MyPlate  ASA Prophylaxis  Lung CA Screening    RAFAEL Garnica [facility]  "

## 2021-02-25 ENCOUNTER — VIRTUAL VISIT (OUTPATIENT)
Dept: PSYCHOLOGY | Facility: CLINIC | Age: 36
End: 2021-02-25
Payer: COMMERCIAL

## 2021-02-25 DIAGNOSIS — F41.1 GAD (GENERALIZED ANXIETY DISORDER): Primary | ICD-10-CM

## 2021-02-25 PROCEDURE — 90834 PSYTX W PT 45 MINUTES: CPT | Mod: 95 | Performed by: SOCIAL WORKER

## 2021-02-25 NOTE — PROGRESS NOTES
Progress Note    Patient Name: Scott Dobbs  Date: 2/25/2021         Service Type: Individual      Session Start Time:  2:01 PM  Session End Time:  2:51 PM     Session Length: 38-52 minutes    Session #: 2 (previous with [facility])    Attendees: Client attended alone    Service Modality:  Video Visit:    Telemedicine Visit: The patient's condition can be safely assessed and treated via synchronous audio and visual telemedicine encounter.      Reason for Telemedicine Visit: Services only offered telehealth and current pandemic    Originating Site (Patient Location): Patient's home    Distant Site (Provider Location): Provider Remote Setting    Consent:  The patient/guardian has verbally consented to: the potential risks and benefits of telemedicine (video visit) versus in person care; bill my insurance or make self-payment for services provided; and responsibility for payment of non-covered services.     Patient would like the video invitation sent by:  My Chart    Mode of Communication:  Video Conference via [employer]    As the provider I attest to compliance with applicable laws and regulations related to telemedicine.     Treatment Plan Last Reviewed: 2/11/2021  PHQ-9 / BALA-7 :Answers for HPI/ROS submitted by the patient on 2/11/2021   If you checked off any problems, how difficult have these problems made it for you to do your work, take care of things at home, or get along with other people?: Somewhat difficult  PHQ9 TOTAL SCORE: 13  BALA 7 TOTAL SCORE: 3      DATA  Interactive Complexity: No  Crisis: No       Progress Since Last Session (Related to Symptoms / Goals / Homework):   Symptoms: increase in social anxiety      Homework: Did not complete   Client's share goal to improve diet and increase exercise.  Therapist sent list of grounding techniques.      Episode of Care Goals: No improvement - PREPARATION (Decided to change - considering how); Intervened by negotiating  a change plan and determining options / strategies for behavior change, identifying triggers, exploring social supports, and working towards setting a date to begin behavior change     Current / Ongoing Stressors and Concerns:   Identifying identity postpartum, social anxiety    Recently returned to work from maternity leave       Treatment Objective(s) Addressed in This Session:   identify 1 fears / thoughts that contribute to feeling anxious     Intervention:   supported client processing about work stressors   CBT: identified feelings, cognitions and behaviors, guided discovery  Motivational interviewing: expressed empathy/understanding, use of reflective listening and open-ended questions, supported autonomy      ASSESSMENT: Current Emotional / Mental Status (status of significant symptoms):   Risk status (Self / Other harm or suicidal ideation)   Patient denies current fears or concerns for personal safety.   Patient denies current or recent suicidal ideation or behaviors.   Patient denies current or recent homicidal ideation or behaviors.   Patient denies current or recent self injurious behavior or ideation.   Patient denies other safety concerns.   Patient reports there has been no change in risk factors since their last session.     Patient reports there has been no change in protective factors since their last session.     Recommended that patient call 911 or go to the local ED should there be a change in any of these risk factors.     Appearance:   Appropriate    Eye Contact:   Good    Psychomotor Behavior: Normal    Attitude:   Cooperative  Pleasant   Orientation:   All   Speech    Rate / Production: Normal     Volume:  Normal    Mood:    Anxious , minimally sad   Affect:    Appropriate minimally tearful   Thought Content:  Clear    Thought Form:  Coherent  Logical    Insight:    Good      Medication Review:   No current psychiatric medications prescribed     Medication Compliance:   NA     Changes in  Health Issues:   None reported     Chemical Use Review:   Substance Use: Chemical use reviewed, no active concerns identified      Tobacco Use: No current tobacco use.      Diagnosis:  Generalized Anxiety Disorder    Collateral Reports Completed:   Not Applicable    PLAN: (Patient Tasks / Therapist Tasks / Other)   Client uses thought stopping and re-framing to manage anxiety.    Yvette Quintero, [facility] 2/25/2021                                                     ______________________________________________________________________    Treatment Plan    Patient's Name: Scott Dobbs  YOB: 1985    Date: 2/11/2021    DSM5 Diagnoses: generalized anxiety disorder  Psychosocial / Contextual Factors: returning to work, new parent  WHODAS: 19    Referral / Collaboration:  Referral to another professional/service is not indicated at this time.    Anticipated number of session or this episode of care: will review in 90 days      MeasurableTreatment Goal(s) related to diagnosis / functional impairment(s)  Goal 1: Patient will reduce symptoms of anxiety as evidenced by decreased score on BALA 7.     I will know I've met my goal when I'm less irritable and it is easier to stop the spiral      Objective #A (Patient Action)    Patient will identify three distraction and diversion activities and use those activities to decrease level of anxiety  .  Status: Continued - Date(s): 2/11/2021    Intervention(s)  Therapist will teach distress tolerance, mindfulness, and relaxation techniques.    Objective #B  Patient will use cognitive strategies identified in therapy to challenge anxious thoughts.  Status: Continued - Date(s):  2/11/2021    Intervention(s)  Thearpist will assign homework to practice cognitive restructuring and defusion techniques.    Objective #C  Patient will use relaxation strategies 2-3 times per day to reduce the physical symptoms of anxiety.  Status: Continued - Date(s):  2/11/2021    Intervention(s)  Therapist will teach relaxation techniques.    Patient has reviewed and agreed to the above plan.      Yvette Quintero, [facility]  2/11/2021

## 2021-03-11 ENCOUNTER — VIRTUAL VISIT (OUTPATIENT)
Dept: PSYCHOLOGY | Facility: CLINIC | Age: 36
End: 2021-03-11
Payer: COMMERCIAL

## 2021-03-11 DIAGNOSIS — F41.1 GAD (GENERALIZED ANXIETY DISORDER): Primary | ICD-10-CM

## 2021-03-11 PROCEDURE — 90834 PSYTX W PT 45 MINUTES: CPT | Mod: 95 | Performed by: SOCIAL WORKER

## 2021-03-11 NOTE — PROGRESS NOTES
"                                           Progress Note    Patient Name: Scott Dobbs  Date: 3/11/2021         Service Type: Individual      Session Start Time: 10:31 AM  Session End Time: 11:17 AM     Session Length: 38-52 minutes    Session #: 3 (previous with [facility])    Attendees: Client attended alone    Service Modality:  Video Visit:    Telemedicine Visit: The patient's condition can be safely assessed and treated via synchronous audio and visual telemedicine encounter.      Reason for Telemedicine Visit: Services only offered telehealth and current pandemic    Originating Site (Patient Location): Patient's place of employment    Distant Site (Provider Location): Provider Remote Setting    Consent:  The patient/guardian has verbally consented to: the potential risks and benefits of telemedicine (video visit) versus in person care; bill my insurance or make self-payment for services provided; and responsibility for payment of non-covered services.     Patient would like the video invitation sent by:  My Chart    Mode of Communication:  Video Conference via [employer]    As the provider I attest to compliance with applicable laws and regulations related to telemedicine.     Treatment Plan Last Reviewed: 2/11/2021  PHQ-9 / BALA-7 : 3/11/2021      DATA  Interactive Complexity: No  Crisis: No       Progress Since Last Session (Related to Symptoms / Goals / Homework):   Symptoms: stable, client reported \"overall doing better\"      Homework: Achieved / completed to satisfaction   Exercising, improving diet     Episode of Care Goals: Minimal progress - ACTION (Actively working towards change); Intervened by reinforcing change plan / affirming steps taken     Current / Ongoing Stressors and Concerns:   Identifying identity postpartum, social anxiety    Recently returned to work from maternity leave   Daughter starts  next week   Body changes since pregnancy       Treatment Objective(s) Addressed in This " Session:   use at least 2 coping skills for anxiety management in the next 2 weeks  Identify negative self-talk and behaviors: challenge core beliefs, myths, and actions     Intervention:   supported client processing about work stressors   CBT: identified feelings, cognitions and behaviors, guided discovery  Motivational interviewing: expressed empathy/understanding, use of reflective listening and open-ended questions, supported autonomy  Offered validation and support      ASSESSMENT: Current Emotional / Mental Status (status of significant symptoms):   Risk status (Self / Other harm or suicidal ideation)   Patient denies current fears or concerns for personal safety.   Patient denies current or recent suicidal ideation or behaviors.   Patient denies current or recent homicidal ideation or behaviors.   Patient denies current or recent self injurious behavior or ideation.   Patient denies other safety concerns.   Patient reports there has been no change in risk factors since their last session.     Patient reports there has been no change in protective factors since their last session.     Recommended that patient call 911 or go to the local ED should there be a change in any of these risk factors.     Appearance:   Appropriate    Eye Contact:   Good    Psychomotor Behavior: Normal    Attitude:   Cooperative  Pleasant   Orientation:   All   Speech    Rate / Production: Talkative Normal     Volume:  Normal    Mood:    Stable   Affect:    Appropriate    Thought Content:  Clear    Thought Form:  Coherent  Goal Directed  Logical    Insight:    Good      Medication Review:   No current psychiatric medications prescribed     Medication Compliance:   NA     Changes in Health Issues:   None reported     Chemical Use Review:   Substance Use: Chemical use reviewed, no active concerns identified      Tobacco Use: No current tobacco use.      Diagnosis:  Generalized Anxiety Disorder    Collateral Reports Completed:   Not  Applicable    PLAN: (Patient Tasks / Therapist Tasks / Other)   Client plans to continue engagement in exercise and improve diet.      Yvette Leon, [facility] 3/11/2021                                                     ______________________________________________________________________    Treatment Plan    Patient's Name: Scott Dobbs  YOB: 1985    Date: 2/11/2021    DSM5 Diagnoses: generalized anxiety disorder  Psychosocial / Contextual Factors: returning to work, new parent  WHODAS: 19    Referral / Collaboration:  Referral to another professional/service is not indicated at this time.    Anticipated number of session or this episode of care: will review in 90 days      MeasurableTreatment Goal(s) related to diagnosis / functional impairment(s)  Goal 1: Patient will reduce symptoms of anxiety as evidenced by decreased score on BALA 7.     I will know I've met my goal when I'm less irritable and it is easier to stop the spiral      Objective #A (Patient Action)    Patient will identify three distraction and diversion activities and use those activities to decrease level of anxiety  .  Status: Continued - Date(s): 2/11/2021    Intervention(s)  Therapist will teach distress tolerance, mindfulness, and relaxation techniques.    Objective #B  Patient will use cognitive strategies identified in therapy to challenge anxious thoughts.  Status: Continued - Date(s):  2/11/2021    Intervention(s)  Thearpist will assign homework to practice cognitive restructuring and defusion techniques.    Objective #C  Patient will use relaxation strategies 2-3 times per day to reduce the physical symptoms of anxiety.  Status: Continued - Date(s): 2/11/2021    Intervention(s)  Therapist will teach relaxation techniques.    Patient has reviewed and agreed to the above plan.      Yvette Leon, [facility]  2/11/2021

## 2021-03-22 ENCOUNTER — E-VISIT (OUTPATIENT)
Dept: URGENT CARE | Facility: URGENT CARE | Age: 36
End: 2021-03-22
Payer: COMMERCIAL

## 2021-03-22 DIAGNOSIS — J02.9 SORE THROAT: ICD-10-CM

## 2021-03-22 DIAGNOSIS — Z20.822 SUSPECTED COVID-19 VIRUS INFECTION: ICD-10-CM

## 2021-03-22 LAB
DEPRECATED S PYO AG THROAT QL EIA: NEGATIVE
SPECIMEN SOURCE: NORMAL

## 2021-03-22 PROCEDURE — 87651 STREP A DNA AMP PROBE: CPT | Performed by: PHYSICIAN ASSISTANT

## 2021-03-22 PROCEDURE — 99N1174 PR STATISTIC STREP A RAPID: Performed by: PHYSICIAN ASSISTANT

## 2021-03-22 PROCEDURE — 99421 OL DIG E/M SVC 5-10 MIN: CPT | Performed by: PHYSICIAN ASSISTANT

## 2021-03-22 PROCEDURE — U0005 INFEC AGEN DETEC AMPLI PROBE: HCPCS | Performed by: PHYSICIAN ASSISTANT

## 2021-03-22 PROCEDURE — U0003 INFECTIOUS AGENT DETECTION BY NUCLEIC ACID (DNA OR RNA); SEVERE ACUTE RESPIRATORY SYNDROME CORONAVIRUS 2 (SARS-COV-2) (CORONAVIRUS DISEASE [COVID-19]), AMPLIFIED PROBE TECHNIQUE, MAKING USE OF HIGH THROUGHPUT TECHNOLOGIES AS DESCRIBED BY CMS-2020-01-R: HCPCS | Performed by: PHYSICIAN ASSISTANT

## 2021-03-22 NOTE — PATIENT INSTRUCTIONS
Dear Scott Dobbs,    Your symptoms show that you may have coronavirus (COVID-19). This illness can cause fever, cough and trouble breathing. Many people get a mild case and get better on their own. Some people can get very sick.    Because you also reported sore throat I would like to also test you for Strep Throat to determine if we need to treat you for that as well.    What should I do?  We would like to test you for Covid-19 virus and Strep Throat. I have placed orders for these tests.   To schedule: go to your [facility] home page and scroll down to the section that says  You have an appointment that needs to be scheduled  and click the large green button that says  Schedule Now  and follow the steps to find the next available openings. It is important that when you are asked what the reason for your appointment is that you mention you need BOTH Covid and Strep tests.    If you are unable to complete these [facility] scheduling steps, please call [phone] to schedule your testing.     Return to work/school/ guidance:   Please let your workplace manager and staffing office know when your quarantine ends     We can t give you an exact date as it depends on the above. You can calculate this on your own or work with your manager/staffing office to calculate this. (For example if you were exposed on 10/4, you would have to quarantine for 14 full days. That would be through 10/18. You could return on 10/19.)      If you receive a positive COVID-19 test result, follow the guidance of the those who are giving you the results. Usually the return to work is 10 (or in some cases 20 days from symptom onset.) If you work at [employer], you must also be cleared by Employee Occupational Health and Safety to return to work.        If you receive a negative COVID-19 test result and did not have a high risk exposure to someone with a known positive COVID-19 test, you can return to work once you're free of  fever for 24 hours without fever-reducing medication and your symptoms are improving or resolved.      If you receive a negative COVID-19 test and If you had a high risk exposure to someone who has tested positive for COVID-19 then you can return to work 14 days after your last contact with the positive individual    Note: If you have ongoing exposure to the covid positive person, this quarantine period may be more than 14 days. (For example, if you are continued to be exposed to your child who tested positive and cannot isolate from them, then the quarantine of 7-14 days can't start until your child is no longer contagious. This is typically 10 days from onset of the child's symptoms. So the total duration may be 17-24 days in this case.)    Sign up for [employer].   We know it's scary to hear that you might have COVID-19. We want to track your symptoms to make sure you're okay over the next 2 weeks. Please look for an email from [employer]--this is a free, online program that we'll use to keep in touch. To sign up, follow the link in the email you will receive. Learn more at [email or web address]    How can I take care of myself?    Get lots of rest. Drink extra fluids (unless a doctor has told you not to)    Take Tylenol (acetaminophen) or ibuprofen for fever or pain. If you have liver or kidney problems, ask your family doctor if it's okay to take Tylenol o ibuprofen    If you have other health problems (like cancer, heart failure, an organ transplant or severe kidney disease): Call your specialty clinic if you don't feel better in the next 2 days.    Know when to call 911. Emergency warning signs include:  o Trouble breathing or shortness of breath  o Pain or pressure in the chest that doesn't go away  o Feeling confused like you haven't felt before, or not being able to wake up  o Bluish-colored lips or face    Where can I get more information?  [facility] - About COVID-19:    [email or web address]    CDC - What to Do If You're Sick:   www.cdc.gov/coronavirus/2019-ncov/about/steps-when-sick.html

## 2021-03-23 ENCOUNTER — E-VISIT (OUTPATIENT)
Dept: URGENT CARE | Facility: URGENT CARE | Age: 36
End: 2021-03-23
Payer: COMMERCIAL

## 2021-03-23 DIAGNOSIS — J01.90 ACUTE BACTERIAL SINUSITIS: Primary | ICD-10-CM

## 2021-03-23 DIAGNOSIS — B96.89 ACUTE BACTERIAL SINUSITIS: Primary | ICD-10-CM

## 2021-03-23 LAB
LABORATORY COMMENT REPORT: NORMAL
SARS-COV-2 RNA RESP QL NAA+PROBE: NEGATIVE
SARS-COV-2 RNA RESP QL NAA+PROBE: NORMAL
SPECIMEN SOURCE: NORMAL
STREP GROUP A PCR: NOT DETECTED

## 2021-03-23 PROCEDURE — 99422 OL DIG E/M SVC 11-20 MIN: CPT | Performed by: PREVENTIVE MEDICINE

## 2021-03-23 NOTE — PATIENT INSTRUCTIONS
Dear Scott Dobbs    After reviewing your responses, I've been able to diagnose you with?a sinus infection caused by bacteria.?     Based on your responses and diagnosis, I have prescribed augmentin to treat your symptoms. I have sent this to your pharmacy.?     It is also important to stay well hydrated, get lots of rest and take over-the-counter decongestants,?tylenol?or ibuprofen if you?are able to?take those medications per your primary care provider to help relieve discomfort.?     It is important that you take?all of?your prescribed medication even if your symptoms are improving after a few doses.? Taking?all of?your medicine helps prevent the symptoms from returning.?     If your symptoms worsen, you develop severe headache, vomiting, high fever (>102), or are not improving in 7 days, please contact your primary care provider for an appointment or visit any of our convenient Walk-in Care or Urgent Care Centers to be seen which can be found on our website?here.?     Thanks again for choosing?us?as your health care partner,?   ?  Braydon Mariscal MD, MD?   You may want to try a nasal lavage (also known as nasal irrigation). You can find over-the-counter products, such as Neti-Pot, at retail locations or make your own at home. Instructions for homemade nasal lavage and more information on the process are available online at http://[email or web address].org/[email or web address].

## 2021-03-30 ENCOUNTER — VIRTUAL VISIT (OUTPATIENT)
Dept: PSYCHOLOGY | Facility: CLINIC | Age: 36
End: 2021-03-30
Payer: COMMERCIAL

## 2021-03-30 DIAGNOSIS — F41.1 GAD (GENERALIZED ANXIETY DISORDER): Primary | ICD-10-CM

## 2021-03-30 PROCEDURE — 90834 PSYTX W PT 45 MINUTES: CPT | Mod: 95 | Performed by: SOCIAL WORKER

## 2021-03-30 NOTE — PROGRESS NOTES
Progress Note    Patient Name: Scott Dobbs  Date: 3/30/2021           Service Type: Individual      Session Start Time: 8:35 AM  Session End Time: 9:22 AM     Session Length: 38-52 minutes    Session #: 4 (previous with [facility])    Attendees: Client attended alone    Service Modality:  Video Visit:    Telemedicine Visit: The patient's condition can be safely assessed and treated via synchronous audio and visual telemedicine encounter.      Reason for Telemedicine Visit: Services only offered telehealth and current pandemic    Originating Site (Patient Location): Patient's home    Distant Site (Provider Location): Provider Remote Setting    Consent:  The patient/guardian has verbally consented to: the potential risks and benefits of telemedicine (video visit) versus in person care; bill my insurance or make self-payment for services provided; and responsibility for payment of non-covered services.     Patient would like the video invitation sent by:  My Chart    Mode of Communication:  Video Conference via [employer]    As the provider I attest to compliance with applicable laws and regulations related to telemedicine.     Treatment Plan Last Reviewed: 2/11/2021  PHQ-9 / BALA-7 : 3/11/2021      DATA  Interactive Complexity: No  Crisis: No       Progress Since Last Session (Related to Symptoms / Goals / Homework):   Symptoms: stable, client reported continuing to feel better overall      Homework: Achieved / completed to satisfaction      Episode of Care Goals: Satisfactory progress - ACTION (Actively working towards change); Intervened by reinforcing change plan / affirming steps taken     Current / Ongoing Stressors and Concerns:   Identifying identity postpartum, social anxiety    Recently returned to work from maternity leave   Daughter started     Body changes since pregnancy       Treatment Objective(s) Addressed in This Session:   use at least 2 coping skills for  anxiety management in the next 2 weeks  Improve quantity and quality of night time sleep / decrease daytime naps     Increased difficulty falling asleep Sunday night     Intervention:   supported client processing about parenting and recent transitions   CBT: identified feelings, cognitions and behaviors, guided discovery  Motivational interviewing: expressed empathy/understanding, use of reflective listening and open-ended questions, supported autonomy  Offered validation and support  Provided education on grounding techniques      ASSESSMENT: Current Emotional / Mental Status (status of significant symptoms):   Risk status (Self / Other harm or suicidal ideation)   Patient denies current fears or concerns for personal safety.   Patient denies current or recent suicidal ideation or behaviors.   Patient denies current or recent homicidal ideation or behaviors.   Patient denies current or recent self injurious behavior or ideation.   Patient denies other safety concerns.   Patient reports there has been no change in risk factors since their last session.     Patient reports there has been no change in protective factors since their last session.     Recommended that patient call 911 or go to the local ED should there be a change in any of these risk factors.     Appearance:   Appropriate    Eye Contact:   Good    Psychomotor Behavior: Normal    Attitude:   Cooperative  Friendly Pleasant   Orientation:   All   Speech    Rate / Production: Normal     Volume:  Normal    Mood:    Stable   Affect:    Appropriate , minimally tearful   Thought Content:  Clear    Thought Form:  Coherent  Goal Directed  Logical    Insight:    Good      Medication Review:   No current psychiatric medications prescribed     Medication Compliance:   NA     Changes in Health Issues:   None reported     Chemical Use Review:   Substance Use: Chemical use reviewed, no active concerns identified      Tobacco Use: No current tobacco use.       Diagnosis:  Generalized Anxiety Disorder    Collateral Reports Completed:   Not Applicable    PLAN: (Patient Tasks / Therapist Tasks / Other)  Client will continue to address issues with sleep.  Discussed use of grounding techniques and podcast.    GEO Haynes 3/30/2021                                                       ______________________________________________________________________    Treatment Plan    Patient's Name: Scott Dobbs  YOB: 1985    Date: 2/11/2021    DSM5 Diagnoses: generalized anxiety disorder  Psychosocial / Contextual Factors: returning to work, new parent  WHODAS: 19    Referral / Collaboration:  Referral to another professional/service is not indicated at this time.    Anticipated number of session or this episode of care: will review in 90 days      MeasurableTreatment Goal(s) related to diagnosis / functional impairment(s)  Goal 1: Patient will reduce symptoms of anxiety as evidenced by decreased score on BALA 7.     I will know I've met my goal when I'm less irritable and it is easier to stop the spiral      Objective #A (Patient Action)    Patient will identify three distraction and diversion activities and use those activities to decrease level of anxiety  .  Status: Continued - Date(s): 2/11/2021    Intervention(s)  Therapist will teach distress tolerance, mindfulness, and relaxation techniques.    Objective #B  Patient will use cognitive strategies identified in therapy to challenge anxious thoughts.  Status: Continued - Date(s):  2/11/2021    Intervention(s)  Thearpist will assign homework to practice cognitive restructuring and defusion techniques.    Objective #C  Patient will use relaxation strategies 2-3 times per day to reduce the physical symptoms of anxiety.  Status: Continued - Date(s): 2/11/2021    Intervention(s)  Therapist will teach relaxation techniques.    Patient has reviewed and agreed to the above plan.      Yvette Quintero  LICSW  2/11/2021

## 2021-04-14 ENCOUNTER — VIRTUAL VISIT (OUTPATIENT)
Dept: PSYCHOLOGY | Facility: CLINIC | Age: 36
End: 2021-04-14
Payer: COMMERCIAL

## 2021-04-14 DIAGNOSIS — F41.1 GAD (GENERALIZED ANXIETY DISORDER): Primary | ICD-10-CM

## 2021-04-14 PROCEDURE — 90834 PSYTX W PT 45 MINUTES: CPT | Mod: 95 | Performed by: SOCIAL WORKER

## 2021-04-14 NOTE — PROGRESS NOTES
Progress Note    Patient Name: Scott Dobbs  Date: 4/14/2021         Service Type: Individual      Session Start Time: 1:08 PM  Session End Time:  2:00 PM  (client joined session late)     Session Length: 38-52 minutes    Session #: 5 (previous with [facility])    Attendees: Client attended alone    Service Modality:  Video Visit:    Telemedicine Visit: The patient's condition can be safely assessed and treated via synchronous audio and visual telemedicine encounter.      Reason for Telemedicine Visit: Services only offered telehealth and current pandemic    Originating Site (Patient Location): Patient's home    Distant Site (Provider Location): Provider Remote Setting    Consent:  The patient/guardian has verbally consented to: the potential risks and benefits of telemedicine (video visit) versus in person care; bill my insurance or make self-payment for services provided; and responsibility for payment of non-covered services.     Patient would like the video invitation sent by:  My Chart    Mode of Communication:  Video Conference via [employer]    As the provider I attest to compliance with applicable laws and regulations related to telemedicine.     Treatment Plan Last Reviewed: 2/11/2021  PHQ-9 / BALA-7 : 4/14/2021      DATA  Interactive Complexity: No  Crisis: No       Progress Since Last Session (Related to Symptoms / Goals / Homework):   Symptoms: stable, anxiety due to work related stressors      Homework: Achieved / completed to satisfaction      Episode of Care Goals: Satisfactory progress - ACTION (Actively working towards change); Intervened by reinforcing change plan / affirming steps taken     Current / Ongoing Stressors and Concerns:   Identifying identity postpartum, social anxiety    Recently returned to work from maternity leave   Daughter started     Body changes since pregnancy    Worry about losing job due to past experiences  Feeling left out at  work       Treatment Objective(s) Addressed in This Session:   use cognitive strategies identified in therapy to challenge anxious thoughts  utilize at least 2 assertive communication skills weekly      Intervention:   supported client processing about work related stressors   CBT: identified feelings, cognitions and behaviors, guided discovery, engaged client in cognitive restructuring  Motivational interviewing: expressed empathy/understanding, use of reflective listening and open-ended questions, supported autonomy  Offered validation     ASSESSMENT: Current Emotional / Mental Status (status of significant symptoms):   Risk status (Self / Other harm or suicidal ideation)   Patient denies current fears or concerns for personal safety.   Patient denies current or recent suicidal ideation or behaviors.   Patient denies current or recent homicidal ideation or behaviors.   Patient denies current or recent self injurious behavior or ideation.   Patient denies other safety concerns.   Patient reports there has been no change in risk factors since their last session.     Patient reports there has been no change in protective factors since their last session.     Recommended that patient call 911 or go to the local ED should there be a change in any of these risk factors.     Appearance:   Appropriate    Eye Contact:   Good    Psychomotor Behavior: Normal    Attitude:   Cooperative  Friendly   Orientation:   All   Speech    Rate / Production: Talkative Normal     Volume:  Normal    Mood:    Stable Anxious   Affect:    Appropriate    Thought Content:  Clear    Thought Form:  Coherent  Circumstantial   Insight:    Good      Medication Review:   No current psychiatric medications prescribed     Medication Compliance:   NA     Changes in Health Issues:   None reported     Chemical Use Review:   Substance Use: Chemical use reviewed, no active concerns identified      Tobacco Use: No current tobacco use.       Diagnosis:  Generalized Anxiety Disorder    Collateral Reports Completed:   Not Applicable    PLAN: (Patient Tasks / Therapist Tasks / Other)  Client shared goal to not always assume co-workers' behavior is malicious.    Yvette Quintero, [facility] 4/14/2021                                                       ______________________________________________________________________    Treatment Plan    Patient's Name: Scott Dobbs  YOB: 1985    Date: 2/11/2021    DSM5 Diagnoses: generalized anxiety disorder  Psychosocial / Contextual Factors: returning to work, new parent  WHODAS: 19    Referral / Collaboration:  Referral to another professional/service is not indicated at this time.    Anticipated number of session or this episode of care: will review in 90 days      MeasurableTreatment Goal(s) related to diagnosis / functional impairment(s)  Goal 1: Patient will reduce symptoms of anxiety as evidenced by decreased score on BALA 7.     I will know I've met my goal when I'm less irritable and it is easier to stop the spiral      Objective #A (Patient Action)    Patient will identify three distraction and diversion activities and use those activities to decrease level of anxiety  .  Status: Continued - Date(s): 2/11/2021    Intervention(s)  Therapist will teach distress tolerance, mindfulness, and relaxation techniques.    Objective #B  Patient will use cognitive strategies identified in therapy to challenge anxious thoughts.  Status: Continued - Date(s):  2/11/2021    Intervention(s)  Thearpist will assign homework to practice cognitive restructuring and defusion techniques.    Objective #C  Patient will use relaxation strategies 2-3 times per day to reduce the physical symptoms of anxiety.  Status: Continued - Date(s): 2/11/2021    Intervention(s)  Therapist will teach relaxation techniques.    Patient has reviewed and agreed to the above plan.      Yvette Quintero, [facility]  2/11/2021

## 2021-04-26 ENCOUNTER — TELEPHONE (OUTPATIENT)
Dept: PSYCHOLOGY | Facility: CLINIC | Age: 36
End: 2021-04-26

## 2021-04-26 NOTE — TELEPHONE ENCOUNTER
Therapist left client a voice message, notifying need to cancel appointment.  Requested a returned call to reschedule.

## 2021-04-30 ENCOUNTER — VIRTUAL VISIT (OUTPATIENT)
Dept: PSYCHOLOGY | Facility: CLINIC | Age: 36
End: 2021-04-30
Payer: COMMERCIAL

## 2021-04-30 DIAGNOSIS — F41.1 GAD (GENERALIZED ANXIETY DISORDER): Primary | ICD-10-CM

## 2021-04-30 PROCEDURE — 90834 PSYTX W PT 45 MINUTES: CPT | Mod: 95 | Performed by: SOCIAL WORKER

## 2021-04-30 NOTE — PROGRESS NOTES
Progress Note    Patient Name: Scott Dobbs  Date: 4/30/2021         Service Type: Individual      Session Start Time: 3:00 PM  Session End Time:  3:45 PM       Session Length: 38-52 minutes    Session #: 6 (previous with [facility])    Attendees: Client attended alone    Service Modality:  Video Visit:    Telemedicine Visit: The patient's condition can be safely assessed and treated via synchronous audio and visual telemedicine encounter.      Reason for Telemedicine Visit: Services only offered telehealth and current pandemic    Originating Site (Patient Location): Patient's place of employment    Distant Site (Provider Location): Provider Remote Setting    Consent:  The patient/guardian has verbally consented to: the potential risks and benefits of telemedicine (video visit) versus in person care; bill my insurance or make self-payment for services provided; and responsibility for payment of non-covered services.     Patient would like the video invitation sent by:  My Chart    Mode of Communication:  Video Conference via [employer]well    As the provider I attest to compliance with applicable laws and regulations related to telemedicine.     Treatment Plan Last Reviewed: 2/11/2021  PHQ-9 / BALA-7 : 4/14/2021      DATA  Interactive Complexity: No  Crisis: No       Progress Since Last Session (Related to Symptoms / Goals / Homework):   Symptoms: stable, some anxiety related to fortune telling and mindreading      Homework: Achieved / completed to satisfaction      Episode of Care Goals: Satisfactory progress - ACTION (Actively working towards change); Intervened by reinforcing change plan / affirming steps taken     Current / Ongoing Stressors and Concerns:   Identifying identity postpartum, social anxiety    Body changes since pregnancy     Pandemic   Work related stressors       Treatment Objective(s) Addressed in This Session:   use cognitive strategies identified in therapy to  challenge anxious thoughts   Identify 2 fears/thoughts that contribute to anxiety     Client used cognitive restructuring independently in session     Intervention:   supported client processing about work related stressors and pandemic   CBT: identified feelings, cognitions and behaviors, guided discovery  Motivational interviewing: expressed empathy/understanding, use of reflective listening and open-ended questions, supported autonomy  Offered validation     ASSESSMENT: Current Emotional / Mental Status (status of significant symptoms):   Risk status (Self / Other harm or suicidal ideation)   Patient denies current fears or concerns for personal safety.   Patient denies current or recent suicidal ideation or behaviors.   Patient denies current or recent homicidal ideation or behaviors.   Patient denies current or recent self injurious behavior or ideation.   Patient denies other safety concerns.   Patient reports there has been no change in risk factors since their last session.     Patient reports there has been no change in protective factors since their last session.     Recommended that patient call 911 or go to the local ED should there be a change in any of these risk factors.     Appearance:   Appropriate    Eye Contact:   Good    Psychomotor Behavior: Normal    Attitude:   Cooperative  Friendly Pleasant   Orientation:   All   Speech    Rate / Production: Talkative Normal     Volume:  Normal    Mood:    Stable   Affect:    Appropriate    Thought Content:  Clear    Thought Form:  Coherent  Goal Directed  Logical    Insight:    Good      Medication Review:   No current psychiatric medications prescribed     Medication Compliance:   NA     Changes in Health Issues:   None reported     Chemical Use Review:   Substance Use: Chemical use reviewed, no active concerns identified      Tobacco Use: No current tobacco use.      Diagnosis:  Generalized Anxiety Disorder    Collateral Reports Completed:   Not  Applicable    PLAN: (Patient Tasks / Therapist Tasks / Other)  Client to continue to use strategies such as cognitive restructuring to manage anxiety.    Yvette Quintero, [facility] 4/30/2021                                                         ______________________________________________________________________    Treatment Plan    Patient's Name: Scott Dobbs  YOB: 1985    Date: 2/11/2021    DSM5 Diagnoses: generalized anxiety disorder  Psychosocial / Contextual Factors: returning to work, new parent  WHODAS: 19    Referral / Collaboration:  Referral to another professional/service is not indicated at this time.    Anticipated number of session or this episode of care: will review in 90 days      MeasurableTreatment Goal(s) related to diagnosis / functional impairment(s)  Goal 1: Patient will reduce symptoms of anxiety as evidenced by decreased score on BALA 7.     I will know I've met my goal when I'm less irritable and it is easier to stop the spiral      Objective #A (Patient Action)    Patient will identify three distraction and diversion activities and use those activities to decrease level of anxiety  .  Status: Continued - Date(s): 2/11/2021    Intervention(s)  Therapist will teach distress tolerance, mindfulness, and relaxation techniques.    Objective #B  Patient will use cognitive strategies identified in therapy to challenge anxious thoughts.  Status: Continued - Date(s):  2/11/2021    Intervention(s)  Thearpist will assign homework to practice cognitive restructuring and defusion techniques.    Objective #C  Patient will use relaxation strategies 2-3 times per day to reduce the physical symptoms of anxiety.  Status: Continued - Date(s): 2/11/2021    Intervention(s)  Therapist will teach relaxation techniques.    Patient has reviewed and agreed to the above plan.      Yvette Quintero, [facility]  2/11/2021

## 2021-05-21 ENCOUNTER — VIRTUAL VISIT (OUTPATIENT)
Dept: PSYCHOLOGY | Facility: CLINIC | Age: 36
End: 2021-05-21
Payer: COMMERCIAL

## 2021-05-21 DIAGNOSIS — F41.1 GAD (GENERALIZED ANXIETY DISORDER): Primary | ICD-10-CM

## 2021-05-21 PROCEDURE — 90834 PSYTX W PT 45 MINUTES: CPT | Mod: GT | Performed by: SOCIAL WORKER

## 2021-05-21 NOTE — PROGRESS NOTES
Progress Note    Patient Name: Scott Dobbs  Date: 5/21/2021         Service Type: Individual      Session Start Time: 2:02 PM  Session End Time:   2:50 PM       Session Length: 38-52 minutes    Session #: 7 (previous with [facility])    Attendees: Client attended alone    Service Modality:  Video Visit:    Telemedicine Visit: The patient's condition can be safely assessed and treated via synchronous audio and visual telemedicine encounter.      Reason for Telemedicine Visit: Services only offered telehealth and current pandemic    Originating Site (Patient Location): Patient's place of employment    Distant Site (Provider Location): Provider Remote Setting    Consent:  The patient/guardian has verbally consented to: the potential risks and benefits of telemedicine (video visit) versus in person care; bill my insurance or make self-payment for services provided; and responsibility for payment of non-covered services.     Patient would like the video invitation sent by:  My Chart    Mode of Communication:  Video Conference via Amwell    As the provider I attest to compliance with applicable laws and regulations related to telemedicine.     Treatment Plan Last Reviewed: 5/21/2021  PHQ-9 / BALA-7 : 5/21/2021      DATA  Interactive Complexity: No  Crisis: No       Progress Since Last Session (Related to Symptoms / Goals / Homework):   Symptoms: stable     Homework: Achieved / completed to satisfaction      Episode of Care Goals: Satisfactory progress - ACTION (Actively working towards change); Intervened by reinforcing change plan / affirming steps taken     Current / Ongoing Stressors and Concerns:   Identifying identity postpartum, social anxiety    Body changes since pregnancy   Pandemic   Work related stressors   Family of origin stressors, specifically with sister, impacted by her choosing not to be vaccinated   Financial stressors related to waiting on money from [facility]         Treatment Objective(s) Addressed in This Session:   use cognitive strategies identified in therapy to challenge anxious thoughts   Identify 2 fears/thoughts that contribute to anxiety      Intervention:   offered validation and support around current stressors   CBT: identified feelings, cognitions and behaviors, guided discovery, reinforced behavior changes, modeled thought stopping and cognitive restructuring  Motivational interviewing: expressed empathy/understanding, use of reflective listening and open-ended questions, supported autonomy  Reviewed treatment plan    ASSESSMENT: Current Emotional / Mental Status (status of significant symptoms):   Risk status (Self / Other harm or suicidal ideation)   Patient denies current fears or concerns for personal safety.   Patient denies current or recent suicidal ideation or behaviors.   Patient denies current or recent homicidal ideation or behaviors.   Patient denies current or recent self injurious behavior or ideation.   Patient denies other safety concerns.   Patient reports there has been no change in risk factors since their last session.     Patient reports there has been no change in protective factors since their last session.     Recommended that patient call 911 or go to the local ED should there be a change in any of these risk factors.     Appearance:   Appropriate    Eye Contact:   Good    Psychomotor Behavior: Normal    Attitude:   Cooperative  Pleasant   Orientation:   All   Speech    Rate / Production: Talkative    Volume:  Normal    Mood:    Stable, Anxiety    Affect:    Appropriate    Thought Content:  Clear    Thought Form:  Coherent  Logical    Insight:    Good      Medication Review:   No current psychiatric medications prescribed     Medication Compliance:   NA     Changes in Health Issues:   None reported     Chemical Use Review:   Substance Use: Chemical use reviewed, no active concerns identified      Tobacco Use: No current tobacco use.       Diagnosis:  Generalized Anxiety Disorder    Collateral Reports Completed:   Not Applicable    PLAN: (Patient Tasks / Therapist Tasks / Other)  Therapist to work on increase in probing questions; per client request  Client shared plan to increase engagement in areas of interest.    Yvette Quintero, [facility] 5/21/2021                                                     ______________________________________________________________________    Treatment Plan    Patient's Name: Scott Dobbs  YOB: 1985    Date: 5/21/2021    DSM5 Diagnoses: generalized anxiety disorder  Psychosocial / Contextual Factors: pandemic, work related stressors, new parent  WHODAS: 19    Referral / Collaboration:  Referral to another professional/service is not indicated at this time.    Anticipated number of session or this episode of care: will review in 90 days      MeasurableTreatment Goal(s) related to diagnosis / functional impairment(s)  Goal 1: Patient will reduce symptoms of anxiety as evidenced by decreased score on BALA 7.     I will know I've met my goal when I'm less irritable and it is easier to stop the spiral      Objective #A (Patient Action)    Patient will identify three distraction and diversion activities and use those activities to decrease level of anxiety  .  Status: Continued - Date(s): 5/21/2021    Intervention(s)  Therapist will teach distress tolerance, mindfulness, and relaxation techniques.    Objective #B  Patient will use cognitive strategies identified in therapy to challenge anxious thoughts.  Status: Continued - Date(s):  5/21/2021    Intervention(s)  Thearpist will assign homework to practice cognitive restructuring and defusion techniques.    Objective #C  Patient will use relaxation strategies 2-3 times per day to reduce the physical symptoms of anxiety.  Status: Continued - Date(s): 5/21/2021    Intervention(s)  Therapist will teach relaxation techniques.    Patient has reviewed and  agreed to the above plan.      Yvette Quintero, [facility]  2/11/2021  Yvette Quintero, [facility]  5/21/2021

## 2021-06-10 ENCOUNTER — VIRTUAL VISIT (OUTPATIENT)
Dept: PSYCHOLOGY | Facility: CLINIC | Age: 36
End: 2021-06-10
Payer: COMMERCIAL

## 2021-06-10 DIAGNOSIS — F41.1 GAD (GENERALIZED ANXIETY DISORDER): Primary | ICD-10-CM

## 2021-06-10 PROCEDURE — 90834 PSYTX W PT 45 MINUTES: CPT | Mod: GT | Performed by: SOCIAL WORKER

## 2021-06-10 NOTE — PROGRESS NOTES
Progress Note    Patient Name: Scott Dobbs  Date: 6/10/2021         Service Type: Individual      Session Start Time:  8:01 AM  Session End Time:  8:52 AM     Session Length: 38-52 minutes    Session #: 8 (previous with [facility])    Attendees: Client attended alone    Service Modality:  Video Visit:    Telemedicine Visit: The patient's condition can be safely assessed and treated via synchronous audio and visual telemedicine encounter.      Reason for Telemedicine Visit: Services only offered telehealth and current pandemic    Originating Site (Patient Location): Patient's home    Distant Site (Provider Location): Provider Remote Setting    Consent:  The patient/guardian has verbally consented to: the potential risks and benefits of telemedicine (video visit) versus in person care; bill my insurance or make self-payment for services provided; and responsibility for payment of non-covered services.     Patient would like the video invitation sent by:  My Chart    Mode of Communication:  Video Conference via Amwell    As the provider I attest to compliance with applicable laws and regulations related to telemedicine.     Treatment Plan Last Reviewed: 5/21/2021  PHQ-9 / BALA-7 : 5/21/2021      DATA  Interactive Complexity: No  Crisis: No       Progress Since Last Session (Related to Symptoms / Goals / Homework):   Symptoms: anxiety related to work    Homework: Achieved / completed to satisfaction      Episode of Care Goals: Satisfactory progress - ACTION (Actively working towards change); Intervened by reinforcing change plan / affirming steps taken     Current / Ongoing Stressors and Concerns:   Identifying identity postpartum, social anxiety    Body changes since pregnancy   Pandemic   Work related stressors   Family of origin stressors, specifically with sister, impacted by her choosing not to be vaccinated   Financial stressors related to waiting on money from  [employer]     Work relationships      Treatment Objective(s) Addressed in This Session:   identify 1 fears / thoughts that contribute to feeling anxious     Worry about miscommunication with co-workers due to past experiences.  Client reported she can overthink how she communicates as a result     Intervention:   offered validation and support around current stressors   CBT: identified feelings, cognitions and behaviors, guided discovery  Motivational interviewing: expressed empathy/understanding, use of reflective listening and open-ended questions, supported autonomy    ASSESSMENT: Current Emotional / Mental Status (status of significant symptoms):   Risk status (Self / Other harm or suicidal ideation)   Patient denies current fears or concerns for personal safety.   Patient denies current or recent suicidal ideation or behaviors.   Patient denies current or recent homicidal ideation or behaviors.   Patient denies current or recent self injurious behavior or ideation.   Patient denies other safety concerns.   Patient reports there has been no change in risk factors since their last session.     Patient reports there has been no change in protective factors since their last session.     Recommended that patient call 911 or go to the local ED should there be a change in any of these risk factors.     Appearance:   Appropriate    Eye Contact:   Good    Psychomotor Behavior: Normal    Attitude:   Cooperative    Orientation:   All   Speech    Rate / Production: Talkative    Volume:  Normal    Mood:    Anxious , Frustrated    Affect:    Appropriate    Thought Content:  Clear  Rumination    Thought Form:  Coherent  Circumstantial   Insight:    Good      Medication Review:   No current psychiatric medications prescribed     Medication Compliance:   NA     Changes in Health Issues:   None reported     Chemical Use Review:   Substance Use: no active concerns identified      Tobacco Use: No current tobacco use.       Diagnosis:  Generalized Anxiety Disorder    Collateral Reports Completed:   Not Applicable    PLAN: (Patient Tasks / Therapist Tasks / Other)  Client shared goal to have more karissa toward others.    Yvette Quintero, [facility] 6/10/2021                                                     ______________________________________________________________________    Treatment Plan    Patient's Name: Scott Dobbs  YOB: 1985    Date: 5/21/2021    DSM5 Diagnoses: generalized anxiety disorder  Psychosocial / Contextual Factors: pandemic, work related stressors, new parent  WHODAS: 19    Referral / Collaboration:  Referral to another professional/service is not indicated at this time.    Anticipated number of session or this episode of care: will review in 90 days      MeasurableTreatment Goal(s) related to diagnosis / functional impairment(s)  Goal 1: Patient will reduce symptoms of anxiety as evidenced by decreased score on BALA 7.     I will know I've met my goal when I'm less irritable and it is easier to stop the spiral      Objective #A (Patient Action)    Patient will identify three distraction and diversion activities and use those activities to decrease level of anxiety  .  Status: Continued - Date(s): 5/21/2021    Intervention(s)  Therapist will teach distress tolerance, mindfulness, and relaxation techniques.    Objective #B  Patient will use cognitive strategies identified in therapy to challenge anxious thoughts.  Status: Continued - Date(s):  5/21/2021    Intervention(s)  Thearpist will assign homework to practice cognitive restructuring and defusion techniques.    Objective #C  Patient will use relaxation strategies 2-3 times per day to reduce the physical symptoms of anxiety.  Status: Continued - Date(s): 5/21/2021    Intervention(s)  Therapist will teach relaxation techniques.    Patient has reviewed and agreed to the above plan.      Yvette Quintero, [facility]  2/11/2021  Yvette Quintero,  LICSW  5/21/2021

## 2021-07-01 ENCOUNTER — VIRTUAL VISIT (OUTPATIENT)
Dept: PSYCHOLOGY | Facility: CLINIC | Age: 36
End: 2021-07-01
Payer: COMMERCIAL

## 2021-07-01 DIAGNOSIS — F41.1 GAD (GENERALIZED ANXIETY DISORDER): Primary | ICD-10-CM

## 2021-07-01 PROCEDURE — 90834 PSYTX W PT 45 MINUTES: CPT | Mod: GT | Performed by: SOCIAL WORKER

## 2021-07-01 NOTE — PROGRESS NOTES
Progress Note    Patient Name: Scott Dobbs  Date: 7/1/2021       Service Type: Individual      Session Start Time:  3:03 PM  Session End Time:   3:54 PM     Session Length: 38-52 minutes    Session #: 9 (previous with [facility])    Attendees: Client attended alone    Service Modality:  Video Visit:    Telemedicine Visit: The patient's condition can be safely assessed and treated via synchronous audio and visual telemedicine encounter.      Reason for Telemedicine Visit: Services only offered telehealth and current pandemic    Originating Site (Patient Location): Patient's home    Distant Site (Provider Location): Provider Remote Setting    Consent:  The patient/guardian has verbally consented to: the potential risks and benefits of telemedicine (video visit) versus in person care; bill my insurance or make self-payment for services provided; and responsibility for payment of non-covered services.     Patient would like the video invitation sent by:  My Chart    Mode of Communication:  Video Conference via Amwell    As the provider I attest to compliance with applicable laws and regulations related to telemedicine.     Treatment Plan Last Reviewed: 5/21/2021  PHQ-9 / BALA-7 : 6/30/2021      DATA  Interactive Complexity: No  Crisis: No       Progress Since Last Session (Related to Symptoms / Goals / Homework):   Symptoms: difficulty with motivation, forgetful, client reported she questions whether she has ADHD, client denied feeling depressed     Homework: Completed in session      Episode of Care Goals: Satisfactory progress - ACTION (Actively working towards change); Intervened by reinforcing change plan / affirming steps taken     Current / Ongoing Stressors and Concerns:   Identifying identity postpartum, social anxiety    Body changes since pregnancy   Pandemic   Work related stressors   Family of origin stressors, specifically with sister, impacted by her choosing not  to be vaccinated   Financial stressors related to waiting on money from [employer]     Work relationships/has received feedback to be more mindful of how she communicates to others.  Client reported she has also gotten this feedback in her personal life      Treatment Objective(s) Addressed in This Session:   identify and use 1 strategies to improve organization  Identify negative self-talk and behaviors: challenge core beliefs, myths, and actions     Client uses timers and bullet journaling to stay organized  Client reported she engages in tasks, such as knitting, when listening to a speaker to stay focused  Negative self-talk if she does not complete tasks as planned  Client reported lack of motivation can make it difficult to complete a task, which can lead to negative self talk and feeling down if she does not complete the task     Intervention:   offered validation and support around current stressors   CBT: identified feelings, cognitions and behaviors, guided discovery, reinforced use of coping strategies  Motivational interviewing: expressed empathy/understanding, use of reflective listening and open-ended questions, supported autonomy  Reviewed flowsheets    ASSESSMENT: Current Emotional / Mental Status (status of significant symptoms):   Risk status (Self / Other harm or suicidal ideation)   Patient denies current fears or concerns for personal safety.   Patient denies current or recent suicidal ideation or behaviors.   Patient denies current or recent homicidal ideation or behaviors.   Patient denies current or recent self injurious behavior or ideation.   Patient denies other safety concerns.   Patient reports there has been no change in risk factors since their last session.     Patient reports there has been no change in protective factors since their last session.     Recommended that patient call 911 or go to the local ED should there be a change in any of these risk factors.     Appearance:   Appropriate     Eye Contact:   Good    Psychomotor Behavior: Normal    Attitude:   Cooperative  Pleasant   Orientation:   All   Speech    Rate / Production: Talkative    Volume:  Normal    Mood:    Anxious -minimal    Affect:    Appropriate Smiling   Thought Content:  Clear  Rumination    Thought Form:  Coherent  Goal Directed    Insight:    Good      Medication Review:   No current psychiatric medications prescribed     Medication Compliance:   NA     Changes in Health Issues:   None reported     Chemical Use Review:   Substance Use: no active concerns identified      Tobacco Use: No current tobacco use.      Diagnosis:  Generalized Anxiety Disorder    Collateral Reports Completed:   Not Applicable    PLAN: (Patient Tasks / Therapist Tasks / Other)  Client uses a bullet journal to stay organized and as a reminder of her tasks.    Client requested ADHD referral due to reported symptoms; referral made.    Yvette Quintero, [facility] 7/1/2021                                                     ______________________________________________________________________    Treatment Plan    Patient's Name: Scott Dobbs  YOB: 1985    Date: 5/21/2021    DSM5 Diagnoses: generalized anxiety disorder  Psychosocial / Contextual Factors: pandemic, work related stressors, new parent  WHODAS: 19    Referral / Collaboration:  Referral to another professional/service is not indicated at this time.    Anticipated number of session or this episode of care: will review in 90 days      MeasurableTreatment Goal(s) related to diagnosis / functional impairment(s)  Goal 1: Patient will reduce symptoms of anxiety as evidenced by decreased score on BALA 7.     I will know I've met my goal when I'm less irritable and it is easier to stop the spiral      Objective #A (Patient Action)    Patient will identify three distraction and diversion activities and use those activities to decrease level of anxiety  .  Status: Continued - Date(s):  5/21/2021    Intervention(s)  Therapist will teach distress tolerance, mindfulness, and relaxation techniques.    Objective #B  Patient will use cognitive strategies identified in therapy to challenge anxious thoughts.  Status: Continued - Date(s):  5/21/2021    Intervention(s)  Thearpist will assign homework to practice cognitive restructuring and defusion techniques.    Objective #C  Patient will use relaxation strategies 2-3 times per day to reduce the physical symptoms of anxiety.  Status: Continued - Date(s): 5/21/2021    Intervention(s)  Therapist will teach relaxation techniques.    Patient has reviewed and agreed to the above plan.      Yvette Quintero, [facility]  2/11/2021  Yvette Quintero, [facility]  5/21/2021

## 2021-07-12 ENCOUNTER — VIRTUAL VISIT (OUTPATIENT)
Dept: BEHAVIORAL HEALTH | Facility: CLINIC | Age: 36
End: 2021-07-12
Payer: COMMERCIAL

## 2021-07-12 DIAGNOSIS — F41.1 GENERALIZED ANXIETY DISORDER: Primary | ICD-10-CM

## 2021-07-12 PROCEDURE — 90791 PSYCH DIAGNOSTIC EVALUATION: CPT | Mod: 95 | Performed by: SOCIAL WORKER

## 2021-07-12 NOTE — PROGRESS NOTES
Adult Diagnostic Assessment Update    [facility]  Provider Name:  Dawn Golden     Credentials:  MSW, GEO    PATIENT'S NAME: Scott Dobbs  PREFERRED NAME: Scott  PRONOUNS:   she, her, hers     MRN:   [id number]  :   1985   ACCT. NUMBER: [id number]  DATE OF SERVICE: 21  START TIME: 2:32 pm  END TIME: 3:34 pm  PREFERRED PHONE: [phone]  May we leave a program related message: Yes  SERVICE MODALITY:  Video Visit:      Provider verified identity through the following two step process.  Patient provided:  Patient is known previously to provider    Telemedicine Visit: The patient's condition can be safely assessed and treated via synchronous audio and visual telemedicine encounter.      Reason for Telemedicine Visit: Services only offered telehealth    Originating Site (Patient Location): Patient's [place]    [place] [facility] [place]ent:  The patient/guardian has verbally consented to: the potential risks and benefits of telemedicine (video visit) versus in person care; bill my insurance or make self-payment for services provided; and responsibility for payment of non-covered services.     Patient would like the video invitation sent by:  My Chart    Mode of Communication:  Video Conference via Amwell    As the provider I attest to compliance with applicable laws and regulations related to telemedicine.    Provider reviewed initial DA dated:  21    Chief Complaint:   The reason for seeking services at this time is: Patient reported situational anxiety as she bret with transition to parenthood, and stress associated with managing work place dynamics. Patient is also wondering if she has ADHD as she is noticing more difficulties with organization and managing small tasks at home. She stated that these concerns have been present for a significant period of time, but have been more notable  since she became a mother. Patient has attempted to resolve these concerns in the past through therapy, and has a scheduled evaluation for ADHD.    Patient does not want family members involved in their care.    Current Stressors/Losses/Concerns: Recently postpartum, transition back to work 2021, coping with pandemic    Patient cannot supply information needed to verify current medications    Medication Adherence:  Patient reports taking prescribed medications as prescribed    Patient Allergies:  No Known Allergies    Medical History:    Past Medical History:   Diagnosis Date     Anxiety      Obesity (BMI 30-39.9)        Since the initial DA, Scott:  reports changes in her medical history: baby born via  in 2020.  denies changes in her living situation.    denies changes in her employment. Returned to work in 2021, with return to working in person/on site.   denies changes regarding financial concerns or gambling behavior.   denies  changes in education status.   denies ability to meet her basic needs.   Since the initial DA, the Scott denies changes with her relationships/support system.     Significant Losses / Trauma / Abuse / Neglect Issues:   Since the initial DA, Scott denies new losses/trauma/abuse/neglect issues.     Substance Use History:   Patient does not report use of substances since the initial DA.       Based on the negative CAGE score and clinical interview there  are not indications of drug or alcohol abuse.    Current Mental Status Exam:   Appearance:  Appropriate    Eye Contact:  Good   Psychomotor:  Normal       Gait / station:  Unable to assess due to video visit  Attitude / Demeanor: Cooperative  Interested Pleasant  Speech      Rate / Production: Normal/ Responsive      Volume:  Normal  volume      Language:  intact  Mood:   Euthymic  Affect:   Appropriate    Thought Content: Clear   Thought Process: Coherent  Goal Directed  Logical       Associations: No  loosening of associations  Insight:   Good   Judgment:  Intact   Orientation:  All  Attention/concentration: Good    Rating Scales:    PHQ9:    PHQ-9 SCORE 4/14/2021 5/21/2021 6/30/2021   PHQ-9 Total Score MyChart 5 (Mild depression) 3 (Minimal depression) 10 (Moderate depression)   PHQ-9 Total Score 5 3 10   ;    GAD7:    BALA-7 SCORE 4/14/2021 5/21/2021 6/30/2021   Total Score 4 (minimal anxiety) 6 (mild anxiety) 2 (minimal anxiety)   Total Score 4 6 2     CGI:     First:Considering your total clinical experience with this particular patient population, how severe are the patient's symptoms at this time?: 3 (7/13/2021 12:59 PM)  ;    Most recentCompared to the patient's condition at the START of treatment, this patient's condition is: 3 (7/13/2021 12:59 PM)      Safety Assessment:  Current Safety Concerns:  [place] Suicide Severity Rating Scale (Lifetime/Recent)  [place] Suicide Severity Rating (Lifetime/Recent) 6/1/2020   1. Wish to be Dead (Lifetime) No   2. Non-Specific Active Suicidal Thoughts (Lifetime) No   Actual Attempt (Lifetime) No   Has subject engaged in non-suicidal self-injurious behavior? (Lifetime) No   Interrupted Attempts (Lifetime) No   Aborted or Self-Interrupted Attempt (Lifetime) No   Preparatory Acts or Behavior (Lifetime) No     [place] Suicide Severity Rating Scale (Short Version)  [place] Suicide Severity Rating (Short Version) 10/19/2020 7/13/2021   Over the past 2 weeks have you felt down, depressed, or hopeless? no no   Over the past 2 weeks have you had thoughts of killing yourself? no no   Have you ever attempted to kill yourself? no no       Patient reports the following protective factors: forward/future oriented thinking, dedication to family/friends, safe and stable environment, regular sleep, secure attachment, living with other people, daily obligations, structured day, sense of meaning, positive social skills and sense of personal control or determination    See Preliminary  Treatment Plan for Safety and Risk Management Plan    Review of Symptoms per patient report:  Depression: Lack of interest, Change in energy level, Difficulties concentrating, Change in appetite and Low self-worth  Suri:  No Symptoms  Psychosis: No Symptoms  Anxiety: Nervousness, Ruminations and Poor concentration  Panic:  No symptoms  Post Traumatic Stress Disorder:  No Symptoms   Eating Disorder: No Symptoms  ADD / ADHD:  Poor task completion, Poor organizational skills, Distractibility and Forgetful  Conduct Disorder: No symptoms  Autism Spectrum Disorder: No symptoms  Obsessive Compulsive Disorder: No Symptoms    Patient reports the following compulsive behaviors and treatment history: No symptoms.      Diagnostic Criteria:   A. Excessive anxiety and worry about a number of events or activities (such as work or school performance).   B. The person finds it difficult to control the worry.  C. Select 3 or more symptoms (required for diagnosis). Only one item is required in children.   - Restlessness or feeling keyed up or on edge.    - Being easily fatigued.    - Difficulty concentrating or mind going blank.   D. The focus of the anxiety and worry is not confined to features of an Axis I disorder.  E. The anxiety, worry, or physical symptoms cause clinically significant distress or impairment in social, occupational, or other important areas of functioning.   F. The disturbance is not due to the direct physiological effects of a substance (e.g., a drug of abuse, a medication) or a general medical condition (e.g., hyperthyroidism) and does not occur exclusively during a Mood Disorder, a Psychotic Disorder, or a Pervasive Developmental Disorder.      Functional Status:  Patient reports the following functional impairments: management of the household and or completion of tasks and self-care.     WHODAS:   WHODAS 2.0 Total Score 2/11/2021 5/21/2021   Total Score 19 19   Total Score MyChart 19 19     Nonprogrammatic  care:  Patient is requesting basic services to address current mental health concerns.      Clinical Summary:  1. Reason for assessment: Patient reported situational anxiety as she bret with transition to parenthood, and stress associated with managing work place dynamics. Patient is also wondering if she has ADHD as she is noticing more difficulties with organization and managing small tasks at home.  .  2. Psychosocial, Cultural and Contextual Factors: postpartum within past year, transition back to work in February, coping with pandemic  3. Principal DSM5 Diagnoses  (Sustained by DSM5 Criteria Listed Above):   300.02 (F41.1) Generalized Anxiety Disorder.  4. Other Diagnoses that is relevant to services: None identified   5. Provisional Diagnosis: None identified   6. Prognosis: Maintain Current Status / Prevent Deterioration.  7. Likely consequences of symptoms if not treated: Worsening symptoms of anxiety impacting ability to care for self and family, work  8. Patient's strengths/skills/abilities include:  employed, goal-focused, good listener, has a previous history of therapy, insightful, intelligent, motivated, open to learning, open to suggestions / feedback, support of family, friends and providers, wants to learn and willing to ask questions .   9. Patient's resources for support: Partner, friends, family    Recommendations:     1. Plan for Safety and Risk Management:   Recommended that patient call 911 or go to the local ED should there be a change in any of these risk factors.. Report to child / adult protection services was NA.     2. Patient's identified cultural concerns will be addressed by ongoing impact that culture has on current symptoms.     3. Initial Treatment will focus on:    Anxiety - related to transition to parenting, managing household activities, tasks.     4. Resources/Service Plan:    services are not indicated.   Modifications to assist communication are not  indicated.   Additional disability accommodations are not indicated.      5. Collaboration:   Collaboration / coordination of treatment will be initiated with the following  support professionals: N/A.      6.  Referrals:   The following referral(s) will be initiated (list in order of priority or patient preference): Therapy. Next Scheduled Appointment: 7/26 with GEO Perez. Patient is scheduled for ADHD evaluation with [facility] to rule out ADHD diagnosis.     A Release of Information has been obtained for the following: no HERNAN needed.    7.  MICHELL:  Discussed the general effects of drugs and alcohol on health and well-being. Provider gave patient printed information about the effects of chemical use on their health and well being. Recommendations: No additional recommendations needed    8. Records:   They were reviewed at time of assessment.   Information in this assessment was obtained from the medical record and provided by patient who is a good historian. Patient will have open access to their mental health medical record.

## 2021-07-26 ENCOUNTER — VIRTUAL VISIT (OUTPATIENT)
Dept: BEHAVIORAL HEALTH | Facility: CLINIC | Age: 36
End: 2021-07-26
Payer: COMMERCIAL

## 2021-07-26 DIAGNOSIS — F41.1 GENERALIZED ANXIETY DISORDER: Primary | ICD-10-CM

## 2021-07-26 PROCEDURE — 90834 PSYTX W PT 45 MINUTES: CPT | Mod: GT | Performed by: SOCIAL WORKER

## 2021-07-28 NOTE — PROGRESS NOTES
[facility]  July 26, 2021    Behavioral Health Clinician Progress Note    Patient Name: Sctot Dobbs      Telemedicine Visit: The patient's condition can be safely assessed and treated via synchronous audio and visual telemedicine encounter.      Reason for Telemedicine Visit: Patient has requested telehealth visit and Services only offered telehealth due to COVID-19    Originating Site (Patient Location): Patient's home    Distant Site (Provider Location): Provider Remote Setting    Consent:  The patient/guardian has verbally consented to: the potential risks and benefits of telemedicine (video visit) versus in person care; bill my insurance or make self-payment for services provided; and responsibility for payment of non-covered services.     Mode of Communication:  Video Conference via AwayFind    As the provider I attest to compliance with applicable laws and regulations related to telemedicine.         Service Type:  Individual     Session Start Time:  3:03 pm  Session End Time: 3:55 pm     Session Length: 38 - 52      Attendees: Patient    Visit Activities (Refresh list every visit): [facility] Only    Diagnostic Assessment Date: 7/12/21  Treatment Plan Review Date: 7/26/21  See Flowsheets for today's PHQ-9 and BALA-7 results  Previous PHQ-9:   PHQ-9 SCORE 4/14/2021 5/21/2021 6/30/2021   PHQ-9 Total Score MyChart 5 (Mild depression) 3 (Minimal depression) 10 (Moderate depression)   PHQ-9 Total Score 5 3 10     Previous BALA-7:   BALA-7 SCORE 4/14/2021 5/21/2021 6/30/2021   Total Score 4 (minimal anxiety) 6 (mild anxiety) 2 (minimal anxiety)   Total Score 4 6 2       ED LEVEL:  No flowsheet data found.    DATA  Extended Session (60+ minutes): No  Interactive Complexity: No  Crisis: No  [facility] Patient: No    Treatment Objective(s) Addressed in This Session:  Target Behavior(s): Mental health management    Anxiety: will experience a reduction in  anxiety, will develop more effective coping skills to manage anxiety symptoms, will develop healthy cognitive patterns and beliefs and will increase ability to function adaptively    Current Stressors / Issues:  Patient reflected upon the past couple of weeks, including her daughter becoming sick. She stated that if her illness would have occurred outside the pandemic, it would have been different, but because it had an impact on their ability to see family as planned, it triggered a reminder of all that has been lost as a result of COVID. Patient discussed her sister's wedding and bachelorette party, and her attempts to navigate these events as the Delta variant surges and her own comfort level of being in public spaces with others of an unknown vaccination status. Patient stated that it can be challenging to communicate with her sister based on how her sister may perceive her comments. Patient discussed ongoing challenges finding balance between work, parenting, and managing their home. She stated that she and her partner have differences in how they prefer to manage the home, and while she is trying to identify solutions that could save them time, her partner is not always in agreement.    Progress on Treatment Objective(s) / Homework:  Satisfactory progress - ACTION (Actively working towards change); Intervened by reinforcing change plan / affirming steps taken    Motivational Interviewing    MI Intervention: Expressed Empathy/Understanding, Supported Autonomy, Collaboration, Evocation, Permission to raise concern or advise, Open-ended questions, Change talk (evoked) and Reframe     Change Talk Expressed by the Patient: Desire to change Ability to change Reasons to change Need to change Committment to change Activation    Provider Response to Change Talk: E - Evoked more info from patient about behavior change, A - Affirmed patient's thoughts, decisions, or attempts at behavior change, R - Reflected patient's  change talk and S - Summarized patient's change talk statements     Cognitive Behavioral Therapy: Discussed connection between thoughts, feelings, and behaviors. Taught patient how to identify cognitive distortions, and assisted patient to identify own cognitive distortions. Taught and engaged patient in cognitive restructuring techniques.    Mindfulness-Based Strategies : Discussed skills based on development and application of mindfulness    Also provided psychoeducation about behavioral health condition, symptoms, and treatment options    Care Plan review completed: Yes    Medication Review:  No current psychiatric medications prescribed    Medication Compliance:  NA    Changes in Health Issues:   None reported    Chemical Use Review:   Substance Use: Chemical use reviewed, no active concerns identified      Tobacco Use: No current tobacco use.      Assessment: Current Emotional / Mental Status (status of significant symptoms):  Risk status (Self / Other harm or suicidal ideation)  Patient denies a history of suicidal ideation, suicide attempts, self-injurious behavior, homicidal ideation, homicidal behavior and and other safety concerns  Patient denies current fears or concerns for personal safety.  Patient denies current or recent suicidal ideation or behaviors.  Patient denies current or recent homicidal ideation or behaviors.  Patient denies current or recent self injurious behavior or ideation.  Patient denies other safety concerns.  A safety and risk management plan has not been developed at this time, however patient was encouraged to call [facility] should there be a change in any of these risk factors.    Appearance:   Appropriate   Eye Contact:   Good   Psychomotor Behavior: Normal   Attitude:   Cooperative   Orientation:   All  Speech   Rate / Production: Normal    Volume:  Normal   Mood:    Normal  Affect:    Appropriate   Thought Content:  Clear   Thought Form:  Coherent  Logical    Insight:    Good     Diagnoses:  1. Generalized anxiety disorder        Collateral Reports Completed:  Communicated with: Yvette Quintero [facility] to coordiate transfer    Plan: (Homework, other):  Patient was given information about behavioral services and encouraged to schedule a follow up appointment with the clinic [facility] in two weeks. She was also given Cognitive Behavioral Therapy skills to practice when experiencing anxiety. .  CD Recommendations: No indications of CD issues.  Dawn Franks [facility], [facility]      ______________________________________________________________________    Integrated Primary Care Behavioral Health Treatment Plan    Patient's Name: Scott Dobbs  YOB: 1985    Date: 7/26/21    DSM-V Diagnoses: 300.02 (F41.1) Generalized Anxiety Disorder  Psychosocial / Contextual Factors:  postpartum within past year, transition back to work in February, coping with pandemic  WHODAS 2.0 Total Score 2/11/2021 5/21/2021   Total Score 19 19   Total Score MyChart 19 19         Referral / Collaboration:  Referral to another professional/service is not indicated at this time..     Anticipated number of session or this episode of care: 10-12      MeasurableTreatment Goal(s) related to diagnosis / functional impairment(s)  Goal 1: Patient will reduce symptoms of anxiety as evidenced by decreased score on BALA 7.     I will know I've met my goal when I'm less irritable and it is easier to stop the spiral      Objective #A (Patient Action)    Patient will identify three distraction and diversion activities and use those activities to decrease level of anxiety  .  Status: Continued - Date(s): 7/26/21    Intervention(s)  [facility] will teach distress tolerance, mindfulness, and relaxation techniques.    Objective #B  Patient will use cognitive strategies identified in therapy to challenge anxious thoughts.  Status: Continued - Date(s):7/26/21    Intervention(s)  [facility] will assign homework to practice  cognitive restructuring and defusion techniques.    Objective #C  Patient will use relaxation strategies 2-3 times per day to reduce the physical symptoms of anxiety.  Status: Continued - Date(s): 7/26/21    Intervention(s)  [facility] will teach relaxation techniques.    Patient has reviewed and agreed to the above plan.      GEO Perez  July 26, 2021

## 2021-08-09 ENCOUNTER — VIRTUAL VISIT (OUTPATIENT)
Dept: BEHAVIORAL HEALTH | Facility: CLINIC | Age: 36
End: 2021-08-09
Payer: COMMERCIAL

## 2021-08-09 DIAGNOSIS — F41.1 GENERALIZED ANXIETY DISORDER: Primary | ICD-10-CM

## 2021-08-09 PROCEDURE — 90834 PSYTX W PT 45 MINUTES: CPT | Mod: GT | Performed by: SOCIAL WORKER

## 2021-08-09 NOTE — PROGRESS NOTES
[facility]- Integrated Behavioral Health Services  August 9, 2021    Behavioral Health Clinician Progress Note    Patient Name: Scott Dobbs      Telemedicine Visit: The patient's condition can be safely assessed and treated via synchronous audio and visual telemedicine encounter.      Reason for Telemedicine Visit: Patient has requested telehealth visit and Services only offered telehealth due to COVID-19    Originating Site (Patient Location): Patient's home    Distant Site (Provider Location): [facility] Clinics: Maternal Fetal Medicine    Consent:  The patient/guardian has verbally consented to: the potential risks and benefits of telemedicine (video visit) versus in person care; bill my insurance or make self-payment for services provided; and responsibility for payment of non-covered services.     Mode of Communication:  Video Conference via [employer]    As the provider I attest to compliance with applicable laws and regulations related to telemedicine.         Service Type:  Individual     Session Start Time:  3:09  pm  Session End Time:  4:00 pm     Session Length: 38 - 52      Attendees: Patient    Visit Activities (Refresh list every visit): [facility] Only    Diagnostic Assessment Date: 7/12/21  Treatment Plan Review Date: 7/26/21  See Flowsheets for today's PHQ-9 and BALA-7 results  Previous PHQ-9:   PHQ-9 SCORE 4/14/2021 5/21/2021 6/30/2021   PHQ-9 Total Score MyChart 5 (Mild depression) 3 (Minimal depression) 10 (Moderate depression)   PHQ-9 Total Score 5 3 10     Previous BALA-7:   BALA-7 SCORE 4/14/2021 5/21/2021 6/30/2021   Total Score 4 (minimal anxiety) 6 (mild anxiety) 2 (minimal anxiety)   Total Score 4 6 2       ED LEVEL:  No flowsheet data found.    DATA  Extended Session (60+ minutes): No  Interactive Complexity: No  Crisis: No  [facility] Patient: No    Treatment Objective(s) Addressed in This Session:  Target Behavior(s): Mental health management    Anxiety: will  experience a reduction in anxiety, will develop more effective coping skills to manage anxiety symptoms, will develop healthy cognitive patterns and beliefs and will increase ability to function adaptively    Current Stressors / Issues:  Explored patient's sense of self and managing multiple roles since becoming a mother. Patient discussed ongoing work stress as she manages frustrations with her boss, his communication, and leadership style. She stated that she continues to have same frustrations as she did before her maternity leave.  While frustrated, she stated that she is not feeling need to adjust/change jobs as this job provides her with what she needs for this phase of life. Patient denied concerns with her sense of self within her career or how she feels about her career since becoming a mother. Patient discussed challenges finding time to connect with her partner, but she wonders if it is more as a result of the pandemic versus them becoming parents. Patient shared that she is uncertain how her partner feels about their relationship, best way to approach it,etc during these early years of parenthood, but feels like it would be helpful to discuss with him. Patient discussed challenges with adjustment  In her relationship with her best friend as a result of her becoming a mother and her friend experiencing long COVID. Patient discussed sadness in how the relationship has changed and the uncertainty of how the relationship may be in the future.     Progress on Treatment Objective(s) / Homework:  Satisfactory progress - ACTION (Actively working towards change); Intervened by reinforcing change plan / affirming steps taken    Motivational Interviewing    MI Intervention: Expressed Empathy/Understanding, Supported Autonomy, Collaboration, Evocation, Permission to raise concern or advise, Open-ended questions, Change talk (evoked) and Reframe     Change Talk Expressed by the Patient: Desire to change Ability to  change Reasons to change Need to change Committment to change Activation    Provider Response to Change Talk: E - Evoked more info from patient about behavior change, A - Affirmed patient's thoughts, decisions, or attempts at behavior change, R - Reflected patient's change talk and S - Summarized patient's change talk statements     Cognitive Behavioral Therapy: Discussed connection between thoughts, feelings, and behaviors. Taught patient how to identify cognitive distortions, and assisted patient to identify own cognitive distortions. Taught and engaged patient in cognitive restructuring techniques.    Mindfulness-Based Strategies : Discussed skills based on development and application of mindfulness    Also provided psychoeducation about behavioral health condition, symptoms, and treatment options    Care Plan review completed: Yes    Medication Review:  No current psychiatric medications prescribed    Medication Compliance:  NA    Changes in Health Issues:   None reported    Chemical Use Review:   Substance Use: Chemical use reviewed, no active concerns identified      Tobacco Use: No current tobacco use.      Assessment: Current Emotional / Mental Status (status of significant symptoms):  Risk status (Self / Other harm or suicidal ideation)  Patient denies a history of suicidal ideation, suicide attempts, self-injurious behavior, homicidal ideation, homicidal behavior and and other safety concerns  Patient denies current fears or concerns for personal safety.  Patient denies current or recent suicidal ideation or behaviors.  Patient denies current or recent homicidal ideation or behaviors.  Patient denies current or recent self injurious behavior or ideation.  Patient denies other safety concerns.  A safety and risk management plan has not been developed at this time, however patient was encouraged to call [facility] should there be a change in any of these risk factors.    Appearance:   Appropriate    Eye Contact:   Good   Psychomotor Behavior: Normal   Attitude:   Cooperative   Orientation:   All  Speech   Rate / Production: Normal    Volume:  Normal   Mood:    Normal  Affect:    Appropriate   Thought Content:  Clear   Thought Form:  Coherent  Logical   Insight:    Good     Diagnoses:  1. Generalized anxiety disorder        Collateral Reports Completed:  Not Applicable    Plan: (Homework, other):  Patient was given information about behavioral services and encouraged to schedule a follow up appointment with the clinic [facility] in two weeks. She was also given Cognitive Behavioral Therapy skills to practice when experiencing anxiety, strategies to assist with communication with her partner.  CD Recommendations: No indications of CD issues.  Dawn Franks, [facility], [facility]      ______________________________________________________________________    Integrated Primary Care Behavioral Health Treatment Plan    Patient's Name: Scott Dobbs  YOB: 1985    Date: 7/26/21    DSM-V Diagnoses: 300.02 (F41.1) Generalized Anxiety Disorder  Psychosocial / Contextual Factors:  postpartum within past year, transition back to work in February, coping with pandemic  WHODAS 2.0 Total Score 2/11/2021 5/21/2021   Total Score 19 19   Total Score MyChart 19 19         Referral / Collaboration:  Referral to another professional/service is not indicated at this time..     Anticipated number of session or this episode of care: 10-12      MeasurableTreatment Goal(s) related to diagnosis / functional impairment(s)  Goal 1: Patient will reduce symptoms of anxiety as evidenced by decreased score on BALA 7.     I will know I've met my goal when I'm less irritable and it is easier to stop the spiral      Objective #A (Patient Action)    Patient will identify three distraction and diversion activities and use those activities to decrease level of anxiety  .  Status: Continued - Date(s): 7/26/21    Intervention(s)  [facility] will teach  distress tolerance, mindfulness, and relaxation techniques.    Objective #B  Patient will use cognitive strategies identified in therapy to challenge anxious thoughts.  Status: Continued - Date(s):7/26/21    Intervention(s)  [facility] will assign homework to practice cognitive restructuring and defusion techniques.    Objective #C  Patient will use relaxation strategies 2-3 times per day to reduce the physical symptoms of anxiety.  Status: Continued - Date(s): 7/26/21    Intervention(s)  [facility] will teach relaxation techniques.    Patient has reviewed and agreed to the above plan.      Dawn Franks, [employer]  July 26, 2021

## 2021-08-23 ENCOUNTER — VIRTUAL VISIT (OUTPATIENT)
Dept: BEHAVIORAL HEALTH | Facility: CLINIC | Age: 36
End: 2021-08-23
Payer: COMMERCIAL

## 2021-08-23 DIAGNOSIS — F41.1 GENERALIZED ANXIETY DISORDER: Primary | ICD-10-CM

## 2021-08-23 PROCEDURE — 90834 PSYTX W PT 45 MINUTES: CPT | Mod: GT | Performed by: SOCIAL WORKER

## 2021-08-23 NOTE — PROGRESS NOTES
[facility]- Integrated Behavioral Health Services  August 23, 2021    Behavioral Health Clinician Progress Note    Patient Name: Scott Dobbs      Telemedicine Visit: The patient's condition can be safely assessed and treated via synchronous audio and visual telemedicine encounter.      Reason for Telemedicine Visit: Patient has requested telehealth visit and Services only offered telehealth due to COVID-19    Originating Site (Patient Location): Patient's home    Distant Site (Provider Location): [facility] Clinics: Maternal Fetal Medicine    Consent:  The patient/guardian has verbally consented to: the potential risks and benefits of telemedicine (video visit) versus in person care; bill my insurance or make self-payment for services provided; and responsibility for payment of non-covered services.     Mode of Communication:  Video Conference via [employer]    As the provider I attest to compliance with applicable laws and regulations related to telemedicine.         Service Type:  Individual     Session Start Time:  3:02  pm  Session End Time:  3:54 pm     Session Length: 38 - 52      Attendees: Patient    Visit Activities (Refresh list every visit): [facility] Only    Diagnostic Assessment Date: 7/12/21  Treatment Plan Review Date: 7/26/21  See Flowsheets for today's PHQ-9 and BALA-7 results  Previous PHQ-9:   PHQ-9 SCORE 4/14/2021 5/21/2021 6/30/2021   PHQ-9 Total Score MyChart 5 (Mild depression) 3 (Minimal depression) 10 (Moderate depression)   PHQ-9 Total Score 5 3 10     Previous BALA-7:   BALA-7 SCORE 4/14/2021 5/21/2021 6/30/2021   Total Score 4 (minimal anxiety) 6 (mild anxiety) 2 (minimal anxiety)   Total Score 4 6 2       ED LEVEL:  No flowsheet data found.    DATA  Extended Session (60+ minutes): No  Interactive Complexity: No  Crisis: No  [facility] Patient: No    Treatment Objective(s) Addressed in This Session:  Target Behavior(s): Mental health management    Anxiety: will  experience a reduction in anxiety, will develop more effective coping skills to manage anxiety symptoms, will develop healthy cognitive patterns and beliefs and will increase ability to function adaptively    Current Stressors / Issues:  Patient reported overall she is doing well. She stated that she has been finding it more difficult to discuss her anxiety and stress with her partner. Patient shared that overall she feels like her anxiety is well managed, only area of concern with anxiety is related to COVID. Patient discussed two bachelorette parties that she has been invited to, and feeling uncertain about how to best approach them. In particular, she discussed anxiety about the timing of one in relation to her daughter's first birthday and how she would prefer to approach it. Patient described anxiety about how it may impact her  and how to best clean and prepare.  Patient continued to discuss anticipatory anxiety about the holidays in the context of the pandemic, and how she feels about people making decisions to remain unvaccinated.    Progress on Treatment Objective(s) / Homework:  Stable - ACTION (Actively working towards change); Intervened by reinforcing change plan / affirming steps taken    Motivational Interviewing    MI Intervention: Expressed Empathy/Understanding, Supported Autonomy, Collaboration, Evocation, Permission to raise concern or advise, Open-ended questions, Change talk (evoked) and Reframe     Change Talk Expressed by the Patient: Desire to change Ability to change Reasons to change Need to change Committment to change Activation    Provider Response to Change Talk: E - Evoked more info from patient about behavior change, A - Affirmed patient's thoughts, decisions, or attempts at behavior change, R - Reflected patient's change talk and S - Summarized patient's change talk statements     Cognitive Behavioral Therapy: Discussed connection between thoughts, feelings, and behaviors.  Taught patient how to identify cognitive distortions, and assisted patient to identify own cognitive distortions. Taught and engaged patient in cognitive restructuring techniques.    Mindfulness-Based Strategies : Discussed skills based on development and application of mindfulness    Also provided psychoeducation about behavioral health condition, symptoms, and treatment options    Care Plan review completed: Yes    Medication Review:  No current psychiatric medications prescribed    Medication Compliance:  NA    Changes in Health Issues:   None reported    Chemical Use Review:   Substance Use: Chemical use reviewed, no active concerns identified      Tobacco Use: No current tobacco use.      Assessment: Current Emotional / Mental Status (status of significant symptoms):  Risk status (Self / Other harm or suicidal ideation)  Patient denies a history of suicidal ideation, suicide attempts, self-injurious behavior, homicidal ideation, homicidal behavior and and other safety concerns  Patient denies current fears or concerns for personal safety.  Patient denies current or recent suicidal ideation or behaviors.  Patient denies current or recent homicidal ideation or behaviors.  Patient denies current or recent self injurious behavior or ideation.  Patient denies other safety concerns.  A safety and risk management plan has not been developed at this time, however patient was encouraged to call [place] [phone] should there be a change in any of these risk factors.    Appearance:   Appropriate   Eye Contact:   Good   Psychomotor Behavior: Normal   Attitude:   Cooperative   Orientation:   All  Speech   Rate / Production: Normal    Volume:  Normal   Mood:    Normal  Affect:    Appropriate   Thought Content:  Clear   Thought Form:  Coherent  Logical   Insight:    Good     Diagnoses:  1. Generalized anxiety disorder        Collateral Reports Completed:  Not Applicable    Plan: (Homework, other):  Patient was given  information about behavioral services and encouraged to schedule a follow up appointment with the clinic [facility] in two weeks. She was also given Cognitive Behavioral Therapy skills to practice when experiencing anxiety.  CD Recommendations: No indications of CD issues.  Dawn Franks, [employer], [facility]      ______________________________________________________________________    Integrated Primary Care Behavioral Health Treatment Plan    Patient's Name: Scott Dobbs  YOB: 1985    Date: 7/26/21    DSM-V Diagnoses: 300.02 (F41.1) Generalized Anxiety Disorder  Psychosocial / Contextual Factors:  postpartum within past year, transition back to work in February, coping with pandemic  WHODAS 2.0 Total Score 2/11/2021 5/21/2021   Total Score 19 19   Total Score MyChart 19 19         Referral / Collaboration:  Referral to another professional/service is not indicated at this time..     Anticipated number of session or this episode of care: 10-12      MeasurableTreatment Goal(s) related to diagnosis / functional impairment(s)  Goal 1: Patient will reduce symptoms of anxiety as evidenced by decreased score on BALA 7.     I will know I've met my goal when I'm less irritable and it is easier to stop the spiral      Objective #A (Patient Action)    Patient will identify three distraction and diversion activities and use those activities to decrease level of anxiety  .  Status: Continued - Date(s): 7/26/21    Intervention(s)  [facility] will teach distress tolerance, mindfulness, and relaxation techniques.    Objective #B  Patient will use cognitive strategies identified in therapy to challenge anxious thoughts.  Status: Continued - Date(s):7/26/21    Intervention(s)  [facility] will assign homework to practice cognitive restructuring and defusion techniques.    Objective #C  Patient will use relaxation strategies 2-3 times per day to reduce the physical symptoms of anxiety.  Status: Continued - Date(s):  7/26/21    Intervention(s)  [facility] will teach relaxation techniques.    Patient has reviewed and agreed to the above plan.      Dawn Franks, [employer]  July 26, 2021

## 2021-09-08 ENCOUNTER — VIRTUAL VISIT (OUTPATIENT)
Dept: BEHAVIORAL HEALTH | Facility: CLINIC | Age: 36
End: 2021-09-08
Payer: COMMERCIAL

## 2021-09-08 DIAGNOSIS — F41.1 GENERALIZED ANXIETY DISORDER: Primary | ICD-10-CM

## 2021-09-08 PROCEDURE — 90834 PSYTX W PT 45 MINUTES: CPT | Mod: GT | Performed by: SOCIAL WORKER

## 2021-09-08 NOTE — PROGRESS NOTES
[facility]- Integrated Behavioral Health Services  September 8, 2021    Behavioral Health Clinician Progress Note    Patient Name: Scott Dobbs      Telemedicine Visit: The patient's condition can be safely assessed and treated via synchronous audio and visual telemedicine encounter.      Reason for Telemedicine Visit: Patient has requested telehealth visit and Services only offered telehealth due to COVID-19    Originating Site (Patient Location): Patient's home    Distant Site (Provider Location): [facility]ics: [facility]    Consent:  The patient/guardian has verbally consented to: the potential risks and benefits of telemedicine (video visit) versus in person care; bill my insurance or make self-payment for services provided; and responsibility for payment of non-covered services.     Mode of Communication:  Video Conference via [employer]    As the provider I attest to compliance with applicable laws and regulations related to telemedicine.         Service Type:  Individual     Session Start Time:  3:04  pm  Session End Time:  3:54 pm     Session Length: 38 - 52      Attendees: Patient    Visit Activities (Refresh list every visit): [facility] Only    Diagnostic Assessment Date: 7/12/21  Treatment Plan Review Date: 7/26/21  See Flowsheets for today's PHQ-9 and BALA-7 results  Previous PHQ-9:   PHQ-9 SCORE 4/14/2021 5/21/2021 6/30/2021   PHQ-9 Total Score MyChart 5 (Mild depression) 3 (Minimal depression) 10 (Moderate depression)   PHQ-9 Total Score 5 3 10     Previous BALA-7:   BALA-7 SCORE 4/14/2021 5/21/2021 6/30/2021   Total Score 4 (minimal anxiety) 6 (mild anxiety) 2 (minimal anxiety)   Total Score 4 6 2       ED LEVEL:  No flowsheet data found.    DATA  Extended Session (60+ minutes): No  Interactive Complexity: No  Crisis: No  [facility] Patient: No    Treatment Objective(s) Addressed in This Session:  Target Behavior(s): Mental health  management    Anxiety: will experience a reduction in anxiety, will develop more effective coping skills to manage anxiety symptoms, will develop healthy cognitive patterns and beliefs and will increase ability to function adaptively    Current Stressors / Issues:  Patient discussed increase in stress and anxiety as she and her partner continue to balance work, household chores, and parenting. She reflected upon challenges with limited time in the day, wanting to spend time with her daughter after work, but also feeling the need to get household chores done to prevent the ongoing build up of unknown tasks.  Patient stated that she and her  have differing ideas of what chores are a priority, and she is working on letting go of some tasks that used to matter.  Patient expressed desire to create a plan with her partner that would prioritize certain household chores each night to help them stay focused and on track with regular household cleaning. Patient discussed challenges with finding motivation to complete these tasks, but was open to beginning to explore how to execute plans.     Patient stated that she was recently listening to a podcast that highlighted to her unresolved feelings about her daughter's childbirth, including feelings that she has about a  and a future pregnancy. Patient expressed interest in offer to engage in narrative therapy to assist with childbirth processing.     Progress on Treatment Objective(s) / Homework:  Stable - ACTION (Actively working towards change); Intervened by reinforcing change plan / affirming steps taken    Motivational Interviewing    MI Intervention: Expressed Empathy/Understanding, Supported Autonomy, Collaboration, Evocation, Permission to raise concern or advise, Open-ended questions, Change talk (evoked) and Reframe     Change Talk Expressed by the Patient: Desire to change Ability to change Reasons to change Need to change Committment to change  Activation    Provider Response to Change Talk: E - Evoked more info from patient about behavior change, A - Affirmed patient's thoughts, decisions, or attempts at behavior change, R - Reflected patient's change talk and S - Summarized patient's change talk statements     Cognitive Behavioral Therapy: Discussed connection between thoughts, feelings, and behaviors. Taught patient how to identify cognitive distortions, and assisted patient to identify own cognitive distortions. Taught and engaged patient in cognitive restructuring techniques.    Mindfulness-Based Strategies : Discussed skills based on development and application of mindfulness    Also provided psychoeducation about behavioral health condition, symptoms, and treatment options    Care Plan review completed: Yes    Medication Review:  No current psychiatric medications prescribed    Medication Compliance:  NA    Changes in Health Issues:   None reported    Chemical Use Review:   Substance Use: Chemical use reviewed, no active concerns identified      Tobacco Use: No current tobacco use.      Assessment: Current Emotional / Mental Status (status of significant symptoms):  Risk status (Self / Other harm or suicidal ideation)  Patient denies a history of suicidal ideation, suicide attempts, self-injurious behavior, homicidal ideation, homicidal behavior and and other safety concerns  Patient denies current fears or concerns for personal safety.  Patient denies current or recent suicidal ideation or behaviors.  Patient denies current or recent homicidal ideation or behaviors.  Patient denies current or recent self injurious behavior or ideation.  Patient denies other safety concerns.  A safety and risk management plan has not been developed at this time, however patient was encouraged to call [facility] should there be a change in any of these risk factors.    Appearance:   Appropriate   Eye Contact:   Good   Psychomotor Behavior: Normal    Attitude:   Cooperative   Orientation:   All  Speech   Rate / Production: Normal    Volume:  Normal   Mood:    Normal  Affect:    Appropriate   Thought Content:  Clear   Thought Form:  Coherent  Logical   Insight:    Good     Diagnoses:  1. Generalized anxiety disorder        Collateral Reports Completed:  Not Applicable    Plan: (Homework, other):  Patient was given information about behavioral services and encouraged to schedule a follow up appointment with the clinic [facility] in two weeks. She was also given Cognitive Behavioral Therapy skills to practice when experiencing anxiety.  CD Recommendations: No indications of CD issues.  Dawn Franks, GEO, [facility]      ______________________________________________________________________    Integrated Primary Care Behavioral Health Treatment Plan    Patient's Name: Scott Dobbs  YOB: 1985    Date: 7/26/21    DSM-V Diagnoses: 300.02 (F41.1) Generalized Anxiety Disorder  Psychosocial / Contextual Factors:  postpartum within past year, transition back to work in February, coping with pandemic  WHODAS 2.0 Total Score 2/11/2021 5/21/2021   Total Score 19 19   Total Score MyChart 19 19         Referral / Collaboration:  Referral to another professional/service is not indicated at this time..     Anticipated number of session or this episode of care: 10-12      MeasurableTreatment Goal(s) related to diagnosis / functional impairment(s)  Goal 1: Patient will reduce symptoms of anxiety as evidenced by decreased score on BALA 7.     I will know I've met my goal when I'm less irritable and it is easier to stop the spiral      Objective #A (Patient Action)    Patient will identify three distraction and diversion activities and use those activities to decrease level of anxiety  .  Status: Continued - Date(s): 7/26/21    Intervention(s)  [facility] will teach distress tolerance, mindfulness, and relaxation techniques.    Objective #B  Patient will use cognitive strategies  identified in therapy to challenge anxious thoughts.  Status: Continued - Date(s):7/26/21    Intervention(s)  [facility] will assign homework to practice cognitive restructuring and defusion techniques.    Objective #C  Patient will use relaxation strategies 2-3 times per day to reduce the physical symptoms of anxiety.  Status: Continued - Date(s): 7/26/21    Intervention(s)  [facility] will teach relaxation techniques.    Patient has reviewed and agreed to the above plan.      Dawn Franks, [facility]  July 26, 2021

## 2021-09-20 ENCOUNTER — VIRTUAL VISIT (OUTPATIENT)
Dept: BEHAVIORAL HEALTH | Facility: CLINIC | Age: 36
End: 2021-09-20
Payer: COMMERCIAL

## 2021-09-20 DIAGNOSIS — F41.1 GENERALIZED ANXIETY DISORDER: Primary | ICD-10-CM

## 2021-09-20 PROCEDURE — 90834 PSYTX W PT 45 MINUTES: CPT | Mod: GT | Performed by: SOCIAL WORKER

## 2021-09-20 NOTE — PROGRESS NOTES
[facility]- Integrated Behavioral Health Services  September 20, 2021    Behavioral Health Clinician Progress Note    Patient Name: Scott Dobbs      Telemedicine Visit: The patient's condition can be safely assessed and treated via synchronous audio and visual telemedicine encounter.      Reason for Telemedicine Visit: Patient has requested telehealth visit and Services only offered telehealth due to COVID-19    Originating Site (Patient Location): Patient's home    Distant Site (Provider Location): [facility] Clinics: [facility]    Consent:  The patient/guardian has verbally consented to: the potential risks and benefits of telemedicine (video visit) versus in person care; bill my insurance or make self-payment for services provided; and responsibility for payment of non-covered services.     Mode of Communication:  Video Conference via [employer]    As the provider I attest to compliance with applicable laws and regulations related to telemedicine.         Service Type:  Individual     Session Start Time:  3:06  pm  Session End Time:  3: 58 pm     Session Length: 38 - 52      Attendees: Patient    Visit Activities (Refresh list every visit): [facility] Only    Diagnostic Assessment Date: 7/12/21  Treatment Plan Review Date: 7/26/21  See Flowsheets for today's PHQ-9 and BALA-7 results  Previous PHQ-9:   PHQ-9 SCORE 4/14/2021 5/21/2021 6/30/2021   PHQ-9 Total Score MyChart 5 (Mild depression) 3 (Minimal depression) 10 (Moderate depression)   PHQ-9 Total Score 5 3 10     Previous BALA-7:   BALA-7 SCORE 4/14/2021 5/21/2021 6/30/2021   Total Score 4 (minimal anxiety) 6 (mild anxiety) 2 (minimal anxiety)   Total Score 4 6 2       ED LEVEL:  No flowsheet data found.    DATA  Extended Session (60+ minutes): No  Interactive Complexity: No  Crisis: No  [facility] Patient: No    Treatment Objective(s) Addressed in This Session:  Target Behavior(s): Mental health management    Anxiety: will  "experience a reduction in anxiety, will develop more effective coping skills to manage anxiety symptoms, will develop healthy cognitive patterns and beliefs and will increase ability to function adaptively    Current Stressors / Issues:  Patient discussed anxiety related to COVID-19 and her daughter. Patient reflected upon how she feels as she needs to continue to make ongoing decisions about what social interactions and community activities are comfortable for her and her family. Patient in agreement that she is experiencing decision fatigue as she makes these risk assessments. Patient stated that she can experience both anxiety and confidence in her decision when she thinks about what others are doing around her. Patient reflected upon recent decision to go to an outdoor event that she felt comfortable taking her infant daughter to, but then after learning about how COVID has impacted those who were vaccinated, she can question her decision. Patient stated that she continues to feel that COVID is \"not acceptable\" for her daughter, discussing how she experiences strong anxiety when she thinks about her daughter having an exposure pr testing positive for it. Patient stated that this belief causes heightened anxiety as she and her family knows that she needs to send her daughter to childcare each day in order for her and her partner to work. Patient is able to acknowledge the link between her beliefs about COVID and her anxiety, but is unsure of alternative/different ways to view COVID. She stated that many of her friends continue to have heightened anxiety about their children being exposed to COVID, and is not sure of an alternative belief that may be helpful for her. Patient to reflect and consider.    Patient stated that she has not been working out recently, but acknowledged how this may assist with management of anxiety. Patient with concerns about how her decision to work out may impact her partner since she " does not want her decision to work out to have negative outcomes for other household chores. Patient to discuss these concerns with her partner, and also receptive to exploring how to work out on occasion instead of every day since she wants to be able to continue complete her household activities and chores.    Progress on Treatment Objective(s) / Homework:  Stable - ACTION (Actively working towards change); Intervened by reinforcing change plan / affirming steps taken    Motivational Interviewing    MI Intervention: Expressed Empathy/Understanding, Supported Autonomy, Collaboration, Evocation, Permission to raise concern or advise, Open-ended questions, Change talk (evoked) and Reframe     Change Talk Expressed by the Patient: Desire to change Ability to change Reasons to change Need to change Committment to change Activation    Provider Response to Change Talk: E - Evoked more info from patient about behavior change, A - Affirmed patient's thoughts, decisions, or attempts at behavior change, R - Reflected patient's change talk and S - Summarized patient's change talk statements     Cognitive Behavioral Therapy: Discussed connection between thoughts, feelings, and behaviors. Taught patient how to identify cognitive distortions, and assisted patient to identify own cognitive distortions. Taught and engaged patient in cognitive restructuring techniques.    Mindfulness-Based Strategies : Discussed skills based on development and application of mindfulness    Also provided psychoeducation about behavioral health condition, symptoms, and treatment options    Care Plan review completed: Yes    Medication Review:  No current psychiatric medications prescribed    Medication Compliance:  NA    Changes in Health Issues:   None reported    Chemical Use Review:   Substance Use: Chemical use reviewed, no active concerns identified      Tobacco Use: No current tobacco use.      Assessment: Current Emotional / Mental Status  (status of significant symptoms):  Risk status (Self / Other harm or suicidal ideation)  Patient denies a history of suicidal ideation, suicide attempts, self-injurious behavior, homicidal ideation, homicidal behavior and and other safety concerns  Patient denies current fears or concerns for personal safety.  Patient denies current or recent suicidal ideation or behaviors.  Patient denies current or recent homicidal ideation or behaviors.  Patient denies current or recent self injurious behavior or ideation.  Patient denies other safety concerns.  A safety and risk management plan has not been developed at this time, however patient was encouraged to call [place] [id number] should there be a change in any of these risk factors.    Appearance:   Appropriate   Eye Contact:   Good   Psychomotor Behavior: Normal   Attitude:   Cooperative   Orientation:   All  Speech   Rate / Production: Normal    Volume:  Normal   Mood:    Normal  Affect:    Appropriate   Thought Content:  Clear   Thought Form:  Coherent  Logical   Insight:    Good     Diagnoses:  1. Generalized anxiety disorder        Collateral Reports Completed:  Not Applicable    Plan: (Homework, other):  Patient was given information about behavioral services and encouraged to schedule a follow up appointment with the clinic [facility] in two weeks. She was also given Cognitive Behavioral Therapy skills to practice when experiencing anxiety.  CD Recommendations: No indications of CD issues.  Dawn Franks, GEO, [facility]      ______________________________________________________________________    Integrated Primary Care Behavioral Health Treatment Plan    Patient's Name: Scott Dobbs  YOB: 1985    Date: 7/26/21    DSM-V Diagnoses: 300.02 (F41.1) Generalized Anxiety Disorder  Psychosocial / Contextual Factors:  postpartum within past year, transition back to work in February, coping with pandemic  WHODAS 2.0 Total Score 2/11/2021 5/21/2021   Total  Score 19 19   Total Score MyChart 19 19         Referral / Collaboration:  Referral to another professional/service is not indicated at this time..     Anticipated number of session or this episode of care: 10-12      MeasurableTreatment Goal(s) related to diagnosis / functional impairment(s)  Goal 1: Patient will reduce symptoms of anxiety as evidenced by decreased score on BALA 7.     I will know I've met my goal when I'm less irritable and it is easier to stop the spiral      Objective #A (Patient Action)    Patient will identify three distraction and diversion activities and use those activities to decrease level of anxiety  .  Status: Continued - Date(s): 7/26/21    Intervention(s)  [facility] will teach distress tolerance, mindfulness, and relaxation techniques.    Objective #B  Patient will use cognitive strategies identified in therapy to challenge anxious thoughts.  Status: Continued - Date(s):7/26/21    Intervention(s)  [facility] will assign homework to practice cognitive restructuring and defusion techniques.    Objective #C  Patient will use relaxation strategies 2-3 times per day to reduce the physical symptoms of anxiety.  Status: Continued - Date(s): 7/26/21    Intervention(s)  [facility] will teach relaxation techniques.    Patient has reviewed and agreed to the above plan.      Dawn Franks, GEO  July 26, 2021

## 2021-10-05 ENCOUNTER — LAB (OUTPATIENT)
Dept: URGENT CARE | Facility: URGENT CARE | Age: 36
End: 2021-10-05
Attending: EMERGENCY MEDICINE
Payer: COMMERCIAL

## 2021-10-05 ENCOUNTER — E-VISIT (OUTPATIENT)
Dept: URGENT CARE | Facility: CLINIC | Age: 36
End: 2021-10-05
Payer: COMMERCIAL

## 2021-10-05 DIAGNOSIS — Z20.822 SUSPECTED COVID-19 VIRUS INFECTION: Primary | ICD-10-CM

## 2021-10-05 DIAGNOSIS — Z20.822 SUSPECTED COVID-19 VIRUS INFECTION: ICD-10-CM

## 2021-10-05 PROCEDURE — 99421 OL DIG E/M SVC 5-10 MIN: CPT | Performed by: EMERGENCY MEDICINE

## 2021-10-05 PROCEDURE — U0003 INFECTIOUS AGENT DETECTION BY NUCLEIC ACID (DNA OR RNA); SEVERE ACUTE RESPIRATORY SYNDROME CORONAVIRUS 2 (SARS-COV-2) (CORONAVIRUS DISEASE [COVID-19]), AMPLIFIED PROBE TECHNIQUE, MAKING USE OF HIGH THROUGHPUT TECHNOLOGIES AS DESCRIBED BY CMS-2020-01-R: HCPCS

## 2021-10-05 PROCEDURE — U0005 INFEC AGEN DETEC AMPLI PROBE: HCPCS

## 2021-10-05 NOTE — PATIENT INSTRUCTIONS
Dear Scott Dobbs,    Your symptoms show that you may have coronavirus (COVID-19). This illness can cause fever, cough and trouble breathing. Many people get a mild case and get better on their own. Some people can get very sick.    Will I be tested for COVID-19?  We would like to test you for Covid-19 virus. I have placed orders for this test.     To schedule: go to your [employer] home page and scroll down to the section that says  You have an appointment that needs to be scheduled  and click the large green button that says  Schedule Now  and follow the steps to find the next available openings.    If you are unable to complete these [employer] scheduling steps, please call [phone] to schedule your testing.     Return to work/school/ guidance:  Please let your workplace manager and staffing office know when your quarantine ends     We can t give you an exact date as it depends on the above. You can calculate this on your own or work with your manager/staffing office to calculate this. (For example if you were exposed on 10/4, you would have to quarantine for 14 full days. That would be through 10/18. You could return on 10/19.)      If you receive a positive COVID-19 test result, follow the guidance of the those who are giving you the results. Usually the return to work is 10 (or in some cases 20 days from symptom onset.) If you work at [facility], you must also be cleared by Employee Occupational Health and Safety to return to work.        If you receive a negative COVID-19 test result and did not have a high risk exposure to someone with a known positive COVID-19 test, you can return to work once you're free of fever for 24 hours without fever-reducing medication and your symptoms are improving or resolved.      If you receive a negative COVID-19 test and If you had a high risk exposure to someone who has tested positive for COVID-19 then you can return to work 14 days after your last  contact with the positive individual    Note: If you have ongoing exposure to the covid positive person, this quarantine period may be more than 14 days. (For example, if you are continued to be exposed to your child who tested positive and cannot isolate from them, then the quarantine of 7-14 days can't start until your child is no longer contagious. This is typically 10 days from onset of the child's symptoms. So the total duration may be 17-24 days in this case.)    Sign up for [employer].   We know it's scary to hear that you might have COVID-19. We want to track your symptoms to make sure you're okay over the next 2 weeks. Please look for an email from [employer]--this is a free, online program that we'll use to keep in touch. To sign up, follow the link in the email you will receive. Learn more at [email or web address]    How can I take care of myself?    Get lots of rest. Drink extra fluids (unless a doctor has told you not to)    Take Tylenol (acetaminophen) or ibuprofen for fever or pain. If you have liver or kidney problems, ask your family doctor if it's okay to take Tylenol o ibuprofen    If you have other health problems (like cancer, heart failure, an organ transplant or severe kidney disease): Call your specialty clinic if you don't feel better in the next 2 days.    Know when to call 911. Emergency warning signs include:  o Trouble breathing or shortness of breath  o Pain or pressure in the chest that doesn't go away  o Feeling confused like you haven't felt before, or not being able to wake up  o Bluish-colored lips or face    Where can I get more information?  [facility] - About COVID-19:   [email or web address]    CDC - What to Do If You're Sick:   www.cdc.gov/coronavirus/2019-ncov/about/steps-when-sick.html    October 5, 2021  RE:  Scott Dobbs                                                                                                                   [address] [address]      To whom it may concern:    I evaluated Scott Dobbs on October 5, 2021. Scott Dobbs should be excused from work/school.     They should let their workplace manager and staffing office know when their quarantine ends.    We can not give an exact date as it depends on the information below. They can calculate this on their own or work with their manager/staffing office to calculate this. (For example if they were exposed on 10/04, they would have to quarantine for 14 full days. That would be through 10/18. They could return on 10/19.)    Quarantine Guidelines:      If patient receives a positive COVID-19 test result, they should follow the guidance of those who are giving the results. Usually the return to work is 10 (or in some cases 20 days from symptom onset.) If they work at [employer], they must be cleared by Employee Occupational Health and Safety to return to work.        If patient receives a negative COVID-19 test result and did not have a high risk exposure to someone with a known positive COVID-19 test, they can return to work once they're free of fever for 24 hours without fever-reducing medication and their symptoms are improving or resolved.      If patient receives a negative COVID-19 test and if they had a high risk exposure to someone who has tested positive for COVID-19 then they can return to work 14 days after their last contact with the positive individual    Note: If there is ongoing exposure to the covid positive person, this quarantine period may be longer than 14 days. (For example, if they are continually exposed to their child, who tested positive and cannot isolate from them, then the quarantine of 7-14 days can't start until their child is no longer contagious. This is typically 10 days from onset to the child's symptoms. So the total duration may be 17-24 days in this case.)     Sincerely,  Juan José Mirza MD

## 2021-10-06 ENCOUNTER — VIRTUAL VISIT (OUTPATIENT)
Dept: BEHAVIORAL HEALTH | Facility: CLINIC | Age: 36
End: 2021-10-06
Payer: COMMERCIAL

## 2021-10-06 DIAGNOSIS — F41.1 GENERALIZED ANXIETY DISORDER: Primary | ICD-10-CM

## 2021-10-06 LAB — SARS-COV-2 RNA RESP QL NAA+PROBE: NEGATIVE

## 2021-10-06 PROCEDURE — 90834 PSYTX W PT 45 MINUTES: CPT | Mod: GT | Performed by: SOCIAL WORKER

## 2021-10-06 NOTE — PROGRESS NOTES
[facility]- Integrated Behavioral Health Services  October 6, 2021    Behavioral Health Clinician Progress Note    Patient Name: Scott Dobbs      Telemedicine Visit: The patient's condition can be safely assessed and treated via synchronous audio and visual telemedicine encounter.      Reason for Telemedicine Visit: Patient has requested telehealth visit and Services only offered telehealth due to COVID-19    Originating Site (Patient Location): Patient's home    Distant Site (Provider Location): [facility]: [facility]    Consent:  The patient/guardian has verbally consented to: the potential risks and benefits of telemedicine (video visit) versus in person care; bill my insurance or make self-payment for services provided; and responsibility for payment of non-covered services.     Mode of Communication:  Video Conference via [employer]    As the provider I attest to compliance with applicable laws and regulations related to telemedicine.         Service Type:  Individual     Session Start Time:  3:02  pm  Session End Time:  3: 54 pm     Session Length: 38 - 52      Attendees: Patient    Visit Activities (Refresh list every visit): [facility] Only    Diagnostic Assessment Date: 7/12/21  Treatment Plan Review Date: 7/26/21  See Flowsheets for today's PHQ-9 and BALA-7 results  Previous PHQ-9:   PHQ-9 SCORE 4/14/2021 5/21/2021 6/30/2021   PHQ-9 Total Score MyChart 5 (Mild depression) 3 (Minimal depression) 10 (Moderate depression)   PHQ-9 Total Score 5 3 10     Previous BALA-7:   BALA-7 SCORE 4/14/2021 5/21/2021 6/30/2021   Total Score 4 (minimal anxiety) 6 (mild anxiety) 2 (minimal anxiety)   Total Score 4 6 2       ED LEVEL:  No flowsheet data found.    DATA  Extended Session (60+ minutes): No  Interactive Complexity: No  Crisis: No  [facility] Patient: No    Treatment Objective(s) Addressed in This Session:  Target Behavior(s): Mental health management    Anxiety: will  experience a reduction in anxiety, will develop more effective coping skills to manage anxiety symptoms, will develop healthy cognitive patterns and beliefs and will increase ability to function adaptively    Current Stressors / Issues:  Patient reported heightened stress and feeling overwhelmed.  She shared that her grandmother  yesterday, work is busy, and she is currently in quarantine as she and her daughter are awaiting COVID tests due to physical symptoms. Patient is beginning to process through grief and feelings of guilt as her grandmother was never able to meet her daughter as she wishes she would have prioritized scheduling a visit. Patient shared that she is also trying to reduce the amount of household activities and chores that she does in the evenings in order to help her rest and/or be present with her daughter. She stated that she has enjoyed this time with her daughter, but discussed feeling guilt about missing work and not being able to help out. Continued to explore beliefs surrounding COVID as discussed in previous session, with patient continuing to think and consider a more helpful belief about COVID that may be more helpful for her mental health. Patient approaching her daughter's first birthday, and discussed potential range of emotions that may accompany this time as she is approaching the anniversary of her birth trauma.    Progress on Treatment Objective(s) / Homework:  Satisfactory progress - ACTION (Actively working towards change); Intervened by reinforcing change plan / affirming steps taken    Motivational Interviewing    MI Intervention: Expressed Empathy/Understanding, Supported Autonomy, Collaboration, Evocation, Permission to raise concern or advise, Open-ended questions, Change talk (evoked) and Reframe     Change Talk Expressed by the Patient: Desire to change Ability to change Reasons to change Need to change Committment to change Activation    Provider Response to Change  Talk: E - Evoked more info from patient about behavior change, A - Affirmed patient's thoughts, decisions, or attempts at behavior change, R - Reflected patient's change talk and S - Summarized patient's change talk statements     Cognitive Behavioral Therapy: Discussed connection between thoughts, feelings, and behaviors. Taught patient how to identify cognitive distortions, and assisted patient to identify own cognitive distortions. Taught and engaged patient in cognitive restructuring techniques.    Mindfulness-Based Strategies : Discussed skills based on development and application of mindfulness    Also provided psychoeducation about behavioral health condition, symptoms, and treatment options    Care Plan review completed: Yes    Medication Review:  No current psychiatric medications prescribed    Medication Compliance:  NA    Changes in Health Issues:   None reported    Chemical Use Review:   Substance Use: Chemical use reviewed, no active concerns identified      Tobacco Use: No current tobacco use.      Assessment: Current Emotional / Mental Status (status of significant symptoms):  Risk status (Self / Other harm or suicidal ideation)  Patient denies a history of suicidal ideation, suicide attempts, self-injurious behavior, homicidal ideation, homicidal behavior and and other safety concerns  Patient denies current fears or concerns for personal safety.  Patient denies current or recent suicidal ideation or behaviors.  Patient denies current or recent homicidal ideation or behaviors.  Patient denies current or recent self injurious behavior or ideation.  Patient denies other safety concerns.  A safety and risk management plan has not been developed at this time, however patient was encouraged to call [facility] should there be a change in any of these risk factors.    Appearance:   Appropriate   Eye Contact:   Good   Psychomotor Behavior: Normal   Attitude:   Cooperative    Orientation:   All  Speech   Rate / Production: Normal    Volume:  Normal   Mood:    Normal  Affect:    Appropriate   Thought Content:  Clear   Thought Form:  Coherent  Logical   Insight:    Good     Diagnoses:  1. Generalized anxiety disorder        Collateral Reports Completed:  Not Applicable    Plan: (Homework, other):  Patient was given information about behavioral services and encouraged to schedule a follow up appointment with the clinic [facility] in two weeks. She was also given Cognitive Behavioral Therapy skills to practice when experiencing anxiety.  CD Recommendations: No indications of CD issues.  Dawn Franks, [facility], [facility]      ______________________________________________________________________    Integrated Primary Care Behavioral Health Treatment Plan    Patient's Name: Scott Dobbs  YOB: 1985    Date: 7/26/21    DSM-V Diagnoses: 300.02 (F41.1) Generalized Anxiety Disorder  Psychosocial / Contextual Factors:  postpartum within past year, transition back to work in February, coping with pandemic  WHODAS 2.0 Total Score 2/11/2021 5/21/2021   Total Score 19 19   Total Score MyChart 19 19         Referral / Collaboration:  Referral to another professional/service is not indicated at this time..     Anticipated number of session or this episode of care: 10-12      MeasurableTreatment Goal(s) related to diagnosis / functional impairment(s)  Goal 1: Patient will reduce symptoms of anxiety as evidenced by decreased score on BALA 7.     I will know I've met my goal when I'm less irritable and it is easier to stop the spiral      Objective #A (Patient Action)    Patient will identify three distraction and diversion activities and use those activities to decrease level of anxiety  .  Status: Continued - Date(s): 7/26/21    Intervention(s)  [facility] will teach distress tolerance, mindfulness, and relaxation techniques.    Objective #B  Patient will use cognitive strategies identified in therapy to  challenge anxious thoughts.  Status: Continued - Date(s):7/26/21    Intervention(s)  [facility] will assign homework to practice cognitive restructuring and defusion techniques.    Objective #C  Patient will use relaxation strategies 2-3 times per day to reduce the physical symptoms of anxiety.  Status: Continued - Date(s): 7/26/21    Intervention(s)  [facility] will teach relaxation techniques.    Patient has reviewed and agreed to the above plan.      Dawn Franks, [employer]  July 26, 2021

## 2021-10-18 ENCOUNTER — VIRTUAL VISIT (OUTPATIENT)
Dept: BEHAVIORAL HEALTH | Facility: CLINIC | Age: 36
End: 2021-10-18
Payer: COMMERCIAL

## 2021-10-18 DIAGNOSIS — F41.1 GENERALIZED ANXIETY DISORDER: Primary | ICD-10-CM

## 2021-10-18 PROCEDURE — 90834 PSYTX W PT 45 MINUTES: CPT | Mod: GT | Performed by: SOCIAL WORKER

## 2021-10-18 NOTE — PROGRESS NOTES
[facility]- Integrated Behavioral Health Services  October 18, 2021    Behavioral Health Clinician Progress Note    Patient Name: Scott Dobbs      Telemedicine Visit: The patient's condition can be safely assessed and treated via synchronous audio and visual telemedicine encounter.      Reason for Telemedicine Visit: Patient has requested telehealth visit and Services only offered telehealth due to COVID-19    Originating Site (Patient Location): Patient's home    Distant Site (Provider Location): [facility]: [facility]    Consent:  The patient/guardian has verbally consented to: the potential risks and benefits of telemedicine (video visit) versus in person care; bill my insurance or make self-payment for services provided; and responsibility for payment of non-covered services.     Mode of Communication:  Video Conference via [employer]    As the provider I attest to compliance with applicable laws and regulations related to telemedicine.         Service Type:  Individual     Session Start Time:  3:06  pm  Session End Time:  3:58 pm      Session Length: 38 - 52      Attendees: Patient    Visit Activities (Refresh list every visit): [facility] Only    Diagnostic Assessment Date: 7/12/21  Treatment Plan Review Date: 7/26/21  See Flowsheets for today's PHQ-9 and BALA-7 results  Previous PHQ-9:   PHQ-9 SCORE 4/14/2021 5/21/2021 6/30/2021   PHQ-9 Total Score MyChart 5 (Mild depression) 3 (Minimal depression) 10 (Moderate depression)   PHQ-9 Total Score 5 3 10     Previous BALA-7:   BALA-7 SCORE 4/14/2021 5/21/2021 6/30/2021   Total Score 4 (minimal anxiety) 6 (mild anxiety) 2 (minimal anxiety)   Total Score 4 6 2       ED LEVEL:  No flowsheet data found.    DATA  Extended Session (60+ minutes): No  Interactive Complexity: No  Crisis: No  [facility] Patient: No    Treatment Objective(s) Addressed in This Session:  Target Behavior(s): Mental health management    Anxiety: will  "experience a reduction in anxiety, will develop more effective coping skills to manage anxiety symptoms, will develop healthy cognitive patterns and beliefs and will increase ability to function adaptively    Current Stressors / Issues:  Patient reported that she and her family have been busy over the last few weeks as patient manages work stress, her health, and other plans with family and friends. Patient discussed her grandmother's recent service, and feeling burnt out as they continue to see a busy schedule in their future. Patient shared that she knows that her schedule will be less demanding in upcoming weeks, and feels like all has been delegated that is possible for her daughter's upcoming first birthday party. Patient is beginning to reflect on her daughter's first birthday. She shared that she is enjoying and celebrating her daughter's developmental milestones. Patient shared that as she reflects on childbirth and feeding her daughter, she is beginning to see the shifts in what mattered to her then and what matters to her now. She reflected upon how her feelings about breastfeeding have changed. Patient shared that there can be moments when she feels like she missed out because she did not have a vaginal birth, and feels that she \"should\" have been able to have a vaginal birth since her body was \"made for it\". Continued to explore origins and accuracy of these beliefs. Patient shared that she is able to see unhelpful cognitions in these moments, and can now more strongly believe that she made the best decision she could with the information she had to be induced.  Patient shared that she has recent out her birth story previously, primarily writing down simple facts of what occurred. Patient expressing interest in reviewing it, exploring themes, and beginning to incorporate thoughts and feelings. Discussed additional components that can be included, including themes of strength and gratitude.     Progress " on Treatment Objective(s) / Homework:  Satisfactory progress - ACTION (Actively working towards change); Intervened by reinforcing change plan / affirming steps taken    Motivational Interviewing    MI Intervention: Expressed Empathy/Understanding, Supported Autonomy, Collaboration, Evocation, Permission to raise concern or advise, Open-ended questions, Change talk (evoked) and Reframe     Change Talk Expressed by the Patient: Desire to change Ability to change Reasons to change Need to change Committment to change Activation    Provider Response to Change Talk: E - Evoked more info from patient about behavior change, A - Affirmed patient's thoughts, decisions, or attempts at behavior change, R - Reflected patient's change talk and S - Summarized patient's change talk statements     Cognitive Behavioral Therapy: Discussed connection between thoughts, feelings, and behaviors. Taught patient how to identify cognitive distortions, and assisted patient to identify own cognitive distortions. Taught and engaged patient in cognitive restructuring techniques.    Mindfulness-Based Strategies : Discussed skills based on development and application of mindfulness    Also provided psychoeducation about behavioral health condition, symptoms, and treatment options    Care Plan review completed: Yes    Medication Review:  No current psychiatric medications prescribed    Medication Compliance:  NA    Changes in Health Issues:   None reported    Chemical Use Review:   Substance Use: Chemical use reviewed, no active concerns identified      Tobacco Use: No current tobacco use.      Assessment: Current Emotional / Mental Status (status of significant symptoms):  Risk status (Self / Other harm or suicidal ideation)  Patient denies a history of suicidal ideation, suicide attempts, self-injurious behavior, homicidal ideation, homicidal behavior and and other safety concerns  Patient denies current fears or concerns for personal  safety.  Patient denies current or recent suicidal ideation or behaviors.  Patient denies current or recent homicidal ideation or behaviors.  Patient denies current or recent self injurious behavior or ideation.  Patient denies other safety concerns.  A safety and risk management plan has not been developed at this time, however patient was encouraged to call [place] [phone] should there be a change in any of these risk factors.    Appearance:   Appropriate   Eye Contact:   Good   Psychomotor Behavior: Normal   Attitude:   Cooperative   Orientation:   All  Speech   Rate / Production: Normal    Volume:  Normal   Mood:    Normal  Affect:    Appropriate   Thought Content:  Clear   Thought Form:  Coherent  Logical   Insight:    Good     Diagnoses:  1. Generalized anxiety disorder        Collateral Reports Completed:  Not Applicable    Plan: (Homework, other):  Patient was given information about behavioral services and encouraged to schedule a follow up appointment with the clinic [facility] in two weeks. She was also given Cognitive Behavioral Therapy skills to practice when experiencing anxiety, recommendations to assist with ongoing processing of childbirth.  CD Recommendations: No indications of CD issues.  Dawn Franks, [facility], [facility]      ______________________________________________________________________    Integrated Primary Care Behavioral Health Treatment Plan    Patient's Name: Scott Dobbs  YOB: 1985    Date: 7/26/21    DSM-V Diagnoses: 300.02 (F41.1) Generalized Anxiety Disorder  Psychosocial / Contextual Factors:  postpartum within past year, transition back to work in February, coping with pandemic  WHODAS 2.0 Total Score 2/11/2021 5/21/2021   Total Score 19 19   Total Score MyChart 19 19         Referral / Collaboration:  Referral to another professional/service is not indicated at this time..     Anticipated number of session or this episode of care: 10-12      MeasurableTreatment  Goal(s) related to diagnosis / functional impairment(s)  Goal 1: Patient will reduce symptoms of anxiety as evidenced by decreased score on BALA 7.     I will know I've met my goal when I'm less irritable and it is easier to stop the spiral      Objective #A (Patient Action)    Patient will identify three distraction and diversion activities and use those activities to decrease level of anxiety  .  Status: Continued - Date(s): 7/26/21    Intervention(s)  [facility] will teach distress tolerance, mindfulness, and relaxation techniques.    Objective #B  Patient will use cognitive strategies identified in therapy to challenge anxious thoughts.  Status: Continued - Date(s):7/26/21    Intervention(s)  [facility] will assign homework to practice cognitive restructuring and defusion techniques.    Objective #C  Patient will use relaxation strategies 2-3 times per day to reduce the physical symptoms of anxiety.  Status: Continued - Date(s): 7/26/21    Intervention(s)  [facility] will teach relaxation techniques.    Patient has reviewed and agreed to the above plan.      Dawn Franks, [employer]  July 26, 2021

## 2021-10-22 ENCOUNTER — OFFICE VISIT (OUTPATIENT)
Dept: FAMILY MEDICINE | Facility: CLINIC | Age: 36
End: 2021-10-22
Payer: COMMERCIAL

## 2021-10-22 VITALS
HEART RATE: 78 BPM | TEMPERATURE: 98 F | OXYGEN SATURATION: 97 % | WEIGHT: 293 LBS | SYSTOLIC BLOOD PRESSURE: 148 MMHG | BODY MASS INDEX: 49.41 KG/M2 | DIASTOLIC BLOOD PRESSURE: 86 MMHG

## 2021-10-22 DIAGNOSIS — Z23 NEED FOR PROPHYLACTIC VACCINATION AND INOCULATION AGAINST INFLUENZA: ICD-10-CM

## 2021-10-22 DIAGNOSIS — I10 ESSENTIAL HYPERTENSION: Primary | ICD-10-CM

## 2021-10-22 DIAGNOSIS — E66.01 MORBID OBESITY (H): ICD-10-CM

## 2021-10-22 PROCEDURE — 99214 OFFICE O/P EST MOD 30 MIN: CPT | Mod: 25 | Performed by: PHYSICIAN ASSISTANT

## 2021-10-22 PROCEDURE — 90686 IIV4 VACC NO PRSV 0.5 ML IM: CPT | Performed by: PHYSICIAN ASSISTANT

## 2021-10-22 PROCEDURE — 90471 IMMUNIZATION ADMIN: CPT | Performed by: PHYSICIAN ASSISTANT

## 2021-10-22 RX ORDER — LABETALOL 100 MG/1
100 TABLET, FILM COATED ORAL 2 TIMES DAILY
Qty: 90 TABLET | Refills: 0 | Status: SHIPPED | OUTPATIENT
Start: 2021-10-22 | End: 2021-12-01

## 2021-10-22 ASSESSMENT — ENCOUNTER SYMPTOMS
CHILLS: 0
PARESTHESIAS: 0
FREQUENCY: 0
BREAST MASS: 0
HEARTBURN: 0
EYE PAIN: 0
PALPITATIONS: 0
ARTHRALGIAS: 0
DIARRHEA: 1
COUGH: 0
SORE THROAT: 0
FEVER: 0
HEMATOCHEZIA: 0
JOINT SWELLING: 0
SHORTNESS OF BREATH: 0
HEADACHES: 0
CONSTIPATION: 0
ABDOMINAL PAIN: 0
NERVOUS/ANXIOUS: 0
NAUSEA: 0
DYSURIA: 0
DIZZINESS: 0
WEAKNESS: 0
HEMATURIA: 0
MYALGIAS: 0

## 2021-10-22 NOTE — PROGRESS NOTES
"  Assessment & Plan     Essential hypertension  Suggest starting blood pressure medication at this time to have BP within goal for her next pregnancy. She is interested in starting something at this time. Discussed low salt diet, weight loss, decreasing stress to try to help keep blood pressure within goal. She'll send a [employer] message in a few weeks with blood pressure measurements from her home cuff to ensure this is an adequate dose. She has no further questions at this time. I discussed with the patient risks and benefits of the new medication prescribed including potential side effects.  The patient had opportunity to ask questions and is comfortable with and interested in medications as prescribed.   - labetalol (NORMODYNE) 100 MG tablet; Take 1 tablet (100 mg) by mouth 2 times daily    Morbid obesity (H)  Discussed diet, exercise.     Need for prophylactic vaccination and inoculation against influenza  Vaccinated today.  - INFLUENZA VACCINE IM > 6 MONTHS VALENT IIV4 (AFLURIA/FLUZONE)             BMI:   Estimated body mass index is 49.41 kg/m  as calculated from the following:    Height as of 2/22/21: 1.759 m (5' 9.25\").    Weight as of this encounter: 152.9 kg (337 lb).           No follow-ups on file.    Iesha Arteaga PA-C  [facility] SAUL Chavez is a 36 year old who presents for the following health issues     HPI     Patient is here today to discuss ongoing elevated blood pressure. She recently had a baby in Oct 2020 - she needed to be induced due to persistently elevated blood pressures during her pregnancy. She continues to have elevated blood pressure. Of note, her mom and half sister have high blood pressure as well as her paternal grandparents. She is planning to try to conceive again in the next year or so.   She tries to maintain a low salt diet, eats healthy. Her work does add some stress which could be contributing to blood pressure elevation.            Review " of Systems   Constitutional, HEENT, cardiovascular, pulmonary, gi and gu systems are negative, except as otherwise noted.      Objective    BP (!) 148/86 (BP Location: Right arm, Patient Position: Chair, Cuff Size: Adult Large)   Pulse 78   Temp 98  F (36.7  C) (Oral)   Wt (!) 152.9 kg (337 lb)   LMP 10/01/2021   SpO2 97%   BMI 49.41 kg/m    Body mass index is 49.41 kg/m .  Physical Exam   GENERAL: healthy, alert and no distress  NECK: no adenopathy, no asymmetry, masses, or scars and thyroid normal to palpation  RESP: lungs clear to auscultation - no rales, rhonchi or wheezes  CV: regular rate and rhythm, normal S1 S2, no S3 or S4, no murmur, click or rub, no peripheral edema and peripheral pulses strong  MS: no gross musculoskeletal defects noted, no edema

## 2021-10-22 NOTE — PATIENT INSTRUCTIONS
Patient Education     Controlling High Blood Pressure   High blood pressure (hypertension) is often called the silent killer. This is because many people who have it, don t know it. It can be very dangerous. High blood pressure can raise your risk of heart attack, stroke, heart disease, and heart failure. Controlling your blood pressure can decrease your risk of these problems. It's important to know the appropriate blood pressure range and remember to check your blood pressure regularly. Doing so can save your life.   Blood pressure measurements are given as 2 numbers. Systolic blood pressure is the upper number. This is the pressure when the heart contracts. Diastolic blood pressure is the lower number. This is the pressure when the heart relaxes between beats.   Blood pressure is categorized as normal, elevated, or stage 1 or stage 2 high blood pressure:     Normal blood pressure is systolic of less than 120 and diastolic of less than 80 (120/80)    Elevated blood pressure is systolic of 120 to 129 and diastolic less than 80    Stage 1 high blood pressure is systolic of 130 to 139 or diastolic between 80 to 89    Stage 2 high blood pressure is when systolic is 140 or higher or the diastolic is 90 or higher  A heart-healthy lifestyle can help you control your blood pressure without medicines. Here are some things you can do to pursue a heart-healthy lifestyle:     Choose heart-healthy foods     Select low-salt, low-fat foods. Limit sodium intake to 2,400 mg per day or the amount suggested by your healthcare provider.    Limit canned, dried, cured, packaged, and fast foods. These can contain a lot of salt.    Eat 8 to 10 servings of fruits and vegetables every day.    Choose lean meats, fish, or chicken.    Eat whole-grain pasta, brown rice, and beans.    Eat 2 to 3 servings of low-fat or fat-free dairy products.    Ask your doctor about the DASH eating plan. This plan helps reduce blood pressure.    When you  go to a restaurant, ask that your meal be prepared with no added salt.    Stay at a healthy weight     Ask your healthcare provider how many calories to eat a day. Then stick to that number.    Ask your healthcare provider what weight range is healthiest for you. If you are overweight, a weight loss of only 3% to 5% of your body weight can help lower blood pressure. Generally, a good weight loss goal is to lose 10% of your body weight in a year.    Limit snacks and sweets.    Get regular exercise.    Get up and get active     Find activities you enjoy that can be done alone or with friends or family. Such activities might include bicycling, dancing, walking, or jogging.    Park farther away from building entrances to walk more.    Use stairs instead of the elevator.    When you can, walk or bike instead of driving.    [place] leaves, garden, or do household repairs.    Be active at a moderate to vigorous level of physical activity for at least 40 minutes for a minimum of 3 to 4 days a week.     Manage stress     Make time to relax and enjoy life. Find time to laugh.    Communicate your concerns with your loved ones and your healthcare provider.    Visit with family and friends, and keep up with hobbies.    Limit alcohol and quit smoking     Men should have no more than 2 drinks per day.    Women should have no more than 1 drink per day.    Talk with your healthcare provider about quitting smoking. Smoking significantly increases your risk for heart disease and stroke. Ask your healthcare provider about community smoking cessation programs and other options.    Medicines  If lifestyle changes aren t enough, your healthcare provider may prescribe high blood pressure medicine. Take all medicines as prescribed. If you have any questions about your medicines, ask your healthcare provider before stopping or changing them.   [employer] last reviewed this educational content on 6/1/2019 2000-2021 [employer].  All rights reserved. This information is not intended as a substitute for professional medical care. Always follow your healthcare professional's instructions.

## 2021-11-02 ENCOUNTER — VIRTUAL VISIT (OUTPATIENT)
Dept: BEHAVIORAL HEALTH | Facility: CLINIC | Age: 36
End: 2021-11-02
Payer: COMMERCIAL

## 2021-11-02 DIAGNOSIS — F41.1 GENERALIZED ANXIETY DISORDER: Primary | ICD-10-CM

## 2021-11-02 PROCEDURE — 90834 PSYTX W PT 45 MINUTES: CPT | Mod: GT | Performed by: SOCIAL WORKER
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
## 2021-11-05 ENCOUNTER — MYC MEDICAL ADVICE (OUTPATIENT)
Dept: FAMILY MEDICINE | Facility: CLINIC | Age: 36
End: 2021-11-05

## 2021-11-05 ENCOUNTER — OFFICE VISIT (OUTPATIENT)
Dept: URGENT CARE | Facility: URGENT CARE | Age: 36
End: 2021-11-05
Payer: COMMERCIAL

## 2021-11-05 VITALS
SYSTOLIC BLOOD PRESSURE: 145 MMHG | DIASTOLIC BLOOD PRESSURE: 95 MMHG | HEART RATE: 81 BPM | TEMPERATURE: 97.6 F | WEIGHT: 293 LBS | OXYGEN SATURATION: 96 % | RESPIRATION RATE: 16 BRPM | BODY MASS INDEX: 49.17 KG/M2

## 2021-11-05 DIAGNOSIS — M54.9 MID-BACK PAIN, ACUTE: Primary | ICD-10-CM

## 2021-11-05 PROCEDURE — 99213 OFFICE O/P EST LOW 20 MIN: CPT | Performed by: PHYSICIAN ASSISTANT

## 2021-11-05 RX ORDER — CYCLOBENZAPRINE HCL 10 MG
TABLET ORAL
Qty: 12 TABLET | Refills: 0 | Status: SHIPPED | OUTPATIENT
Start: 2021-11-05 | End: 2021-11-19

## 2021-11-06 NOTE — PROGRESS NOTES
Assessment & Plan     1. Mid-back pain, acute    - diclofenac (VOLTAREN) 1 % topical gel; Apply 4 g topically 4 times daily  Dispense: 150 g; Refill: 0  - cyclobenzaprine (FLEXERIL) 10 MG tablet; One tab at night as needed  Dispense: 12 tablet; Refill: 0    Heat, topical analgesic, cyclobenzaprine, stretching exercises. Follow up if not improving at PCP clinic in the next week.           {Provider  Link to [facility] Help Grid :[id number]}      RAFAEL Almazan [facility] JOSEGERRI Chavez is a 36 year old female who presents to clinic today for the following health issues:  Chief Complaint   Patient presents with     Back Pain     Back pain that started last night out of the blue, back spasms consistently off and on all night and it kept her up, the patient does not recall any movements that would injure her back, pain is on the right side below her bra, right-sided flank pain, no urinary sx per pt     HPI    Back pain started last night. Comes in spastic waves lasting 30 minutes. 4 episodes so far. Never had before. 3 days prior started a plank challenge. An abdominal exercise. Had not done that of an intense exercise in a long time. Location right upper. Intensity up to 7/10 but right now zero.           Review of Systems        Objective    BP (!) 145/95 (BP Location: Left arm, Patient Position: Sitting, Cuff Size: Adult Large)   Pulse 81   Temp 97.6  F (36.4  C) (Oral)   Resp 16   Wt (!) 152.1 kg (335 lb 6 oz)   SpO2 96%   Breastfeeding No   BMI 49.17 kg/m    Physical Exam  Musculoskeletal:        Back:

## 2021-11-10 ENCOUNTER — IMMUNIZATION (OUTPATIENT)
Dept: NURSING | Facility: CLINIC | Age: 36
End: 2021-11-10
Payer: COMMERCIAL

## 2021-11-10 PROCEDURE — 0064A COVID-19,PF,MODERNA (18+ YRS BOOSTER .25ML): CPT

## 2021-11-10 PROCEDURE — 91306 COVID-19,PF,MODERNA (18+ YRS BOOSTER .25ML): CPT

## 2021-11-11 ENCOUNTER — OFFICE VISIT (OUTPATIENT)
Dept: FAMILY MEDICINE | Facility: CLINIC | Age: 36
End: 2021-11-11
Payer: COMMERCIAL

## 2021-11-11 ENCOUNTER — ANCILLARY PROCEDURE (OUTPATIENT)
Dept: GENERAL RADIOLOGY | Facility: CLINIC | Age: 36
End: 2021-11-11
Attending: FAMILY MEDICINE
Payer: COMMERCIAL

## 2021-11-11 VITALS
DIASTOLIC BLOOD PRESSURE: 85 MMHG | OXYGEN SATURATION: 94 % | BODY MASS INDEX: 43.4 KG/M2 | HEIGHT: 69 IN | HEART RATE: 103 BPM | SYSTOLIC BLOOD PRESSURE: 131 MMHG | WEIGHT: 293 LBS

## 2021-11-11 DIAGNOSIS — M62.830 BACK MUSCLE SPASM: ICD-10-CM

## 2021-11-11 DIAGNOSIS — M54.9 UPPER BACK PAIN: Primary | ICD-10-CM

## 2021-11-11 DIAGNOSIS — M54.9 UPPER BACK PAIN: ICD-10-CM

## 2021-11-11 PROCEDURE — 72070 X-RAY EXAM THORAC SPINE 2VWS: CPT | Performed by: RADIOLOGY

## 2021-11-11 PROCEDURE — 99214 OFFICE O/P EST MOD 30 MIN: CPT | Performed by: FAMILY MEDICINE

## 2021-11-11 ASSESSMENT — MIFFLIN-ST. JEOR: SCORE: 2282.89

## 2021-11-11 NOTE — PATIENT INSTRUCTIONS
Patient Education     General Neck and Back Pain    Both neck and back pain are usually caused by injury to the muscles or ligaments of the spine. Sometimes the disks that separate each bone of the spine may cause pain by pressing on a nearby nerve. Back and neck pain may appear after a sudden twisting or bending force (such as in a car accident), or sometimes after a simple awkward movement. In either case, muscle spasm is often present and adds to the pain.   Acute neck and back pain usually gets better in 1 to 2 weeks. Pain related to disk disease, arthritis in the spinal joints, or narrowing of the spinal canal (spinal stenosis) can become chronic and last for months or years.   Back and neck pain are common problems. Most people feel better in 1 or 2 weeks, and most of the rest in 1 to 2 months. Most people can remain active.   People have and describe pain differently.    Pain can be sharp, stabbing, shooting, aching, cramping, or burning    Movement, standing, bending, lifting, sitting, or walking may worsen the pain    Pain can be limited to one spot or area, or it can be more generalized    Pain can spread upward, downward, to the front, or go down your arms or legs    Muscle spasm may occur.  Most of the time mechanical problems with the muscles or spine cause the pain. It's usually caused by an injury, whether known or not, to the muscles or ligaments. Pain without an injury is not common. But it can sometimes be caused by a health problem such as kidney stones or an infection. Pain is usually related to physical activity such as sports, exercise, work, or normal activity. Sometimes it can occur without an identifiable cause. This can happen simply by stretching or moving wrong, without noting pain at the time. Other causes include:     Overexertion, lifting, pushing, pulling incorrectly or too aggressively.    Sudden twisting, bending or stretching from an accident (car or fall), or accidental  movement.    Poor posture    Poor conditioning, lack of regular exercise    Spinal disc disease or arthritis    Stress    Pregnancy, or illness like appendicitis, bladder or kidney infection, pelvic infections   Home care    For neck pain: Use a comfortable pillow that supports the head and keeps the spine in a neutral position. The position of the head should not be tilted forward or backward.    When in bed, try to find a position of comfort. A firm mattress is best. Try lying flat on your back with pillows under your knees. You can also try lying on your side with your knees bent up towards your chest and a pillow between your knees.    At first, don't try to stretch out the sore spots. If there is a strain, it's not like the good soreness you get after exercising without an injury. In this case, stretching may make it worse.    Don't sit for long periods, as in long car rides or other travel. This puts more stress on the lower back than standing or walking.    During the first 24 to 72 hours after an injury, apply an ice pack to the painful area for 20 minutes and then remove it for 20 minutes over a period of 60 to 90 minutes or several times a day.     You can alternate ice and heat therapies. Talk with your healthcare provider about the best treatment for your back or neck pain. As a safety precaution, don't use a heating pad at bedtime. Sleeping with a heating pad can lead to skin burns or tissue damage.    Therapeutic massage can help relax the back and neck muscles without stretching them.    Be aware of safe lifting methods and don't lift anything over 15 pounds until all the pain is gone.    Medicines  Talk to your healthcare provider before using medicine, especially if you have other medical problems or are taking other medicines.     You may use over-the-counter medicine to control pain, unless another pain medicine was prescribed. Talk with your doctor first if you have chronic conditions like  diabetes, liver or kidney disease, stomach ulcers, gastrointestinal bleeding, or are taking blood thinner medicines.    Be careful if you are given pain medicines, narcotics, or medicine for muscle spasm. They can cause drowsiness, and can affect your coordination, reflexes, and judgment. Don't drive or operate heavy machinery.  Follow-up care  Follow up with your healthcare provider, or as advised. You may need physical therapy or further tests.   If X-rays were taken, you will be told of any new findings that may affect your care.   Call 911  Call 911 if any of the following occur:     Trouble breathing    Confusion    Very drowsy or trouble awakening    Fainting or loss of consciousness    Rapid or very slow heart rate    Loss of bowel or bladder control  When to seek medical advice  Call your healthcare provider right away if any of these occur:    Pain becomes worse or spreads into your arms or legs    Weakness, numbness or pain in one or both arms or legs    Numbness in the groin area    Trouble walking    Fever of 100.4 F (38 C) or higher, or as directed by your healthcare provider  [employer] last reviewed this educational content on 10/1/2019    8231-3698 [employer]. All rights reserved. This information is not intended as a substitute for professional medical care. Always follow your healthcare professional's instructions.           Patient Education     Relieving Back Pain  Back pain is a common problem. You can strain back muscles by lifting too much weight or just by moving the wrong way. Back strain can be uncomfortable, even painful. And it can take weeks or months to improve. To help yourself feel better and prevent future back strains, try these tips.  Important: Don't give aspirin to children or teens without first discussing it with your child's healthcare provider.  Ice    Ice reduces muscle pain and swelling. It helps most during the first 24 to 48 hours after an injury.    Wrap an  ice pack or a bag of frozen peas in a thin towel. Never put ice directly on your skin.    Place the ice where your back hurts the most.    Don t ice for more than 20 minutes at a time.    You can use ice several times a day.  Medicines  Over-the-counter pain relievers include acetaminophen and anti-inflammatory medicines, which includes aspirin, naproxen, or ibuprofen. They can help ease discomfort. Some also reduce swelling.    Tell your healthcare provider about any medicines you are already taking.    Take medicines only as directed.  Manipulation and massage  Having manipulation by an osteopathic doctor or chiropractor may be helpful. Getting a massage also may help.   Heat  After the first 48 hours, heat can relax sore muscles and improve blood flow.    Try a warm bath or shower. Or use a heating pad set on low. To prevent a burn, keep a cloth between you and the heating pad.    Don t use a heating pad for more than 15 minutes at a time. Never sleep on a heating pad.  [employer] last reviewed this educational content on 6/1/2018 2000-2021 [employer]. All rights reserved. This information is not intended as a substitute for professional medical care. Always follow your healthcare professional's instructions.           Patient Education     Back Spasm (No Trauma)    Spasm of the back muscles can occur after a sudden forceful twisting or bending such as in a car accident. A spasm can also happen after a simple awkward movement, or after lifting something heavy with poor body positioning. In any case, muscle spasm adds to the pain. Sleeping in an awkward position or on a poor quality mattress can also cause this. Some people respond to emotional stress by tensing the muscles of their back.  Pain that continues may need further assessment or other types of treatment such as physical therapy.  You don't always need X-rays for the first assessment of back pain, unless you had a physical injury such as  from a car accident or fall. If your pain continues and doesn't respond to medical treatment, X-rays and other tests may then be done.   Home care    As soon as possible, start sitting or walking again. This will help prevent problems from a long bed rest. These problems include muscle weakness, worsening back stiffness and pain, and blood clots in the legs.    When in bed, try to find a position of comfort. A firm mattress is best. Try lying flat on your back with pillows under your knees. You can also try lying on your side with your knees bent up toward your chest and a pillow between your knees.    Don't sit for long periods. Also limit car rides and travel. This puts more stress on the lower back than standing or walking.     During the first 24 to 72 hours after an injury or flare-up, put an ice pack on the painful area for 20 minutes, then remove it for 20 minutes. Do this over a period of 60 to 90 minutes, or several times a day. This will reduce swelling and pain. Always wrap ice packs in a thin towel.    You can start with ice, then switch to heat. Heat from a hot shower, hot bath, or heating pad reduces pain and works well for muscle spasms. Put heat on the painful area for 20 minutes, then remove it for 20 minutes. Do this over a period of 60 to 90 minutes, or several times a day. Don't sleep on a heating pad. It can burn or damage skin.    Alternate using ice and heat.    Be aware of safe lifting methods. don't lift anything over 15 pounds until all the pain is gone.  Gentle stretching will help your back heal faster. Do this simple routine 2 to 3 times a day until your back is feeling better.    Lie on your back with your knees bent and both feet on the ground.    Slowly raise your left knee to your chest as you flatten your lower back against the floor. Hold for 20 to 30 seconds.    Relax and repeat the exercise with your right knee.    Do 2 to 3 of these exercises for each leg.    Repeat, hugging  both knees to your chest at the same time.    Don't bounce, but use a gentle pull.  Medicines  Talk with your doctor before using medicine, especially if you have other medical problems or are taking other medicines.  You may use over-the-counter medicines such as acetaminophen, ibuprofen, or naprosyn to control pain, unless your healthcare provider prescribed another pain medicine. Talk with your healthcare provider if you have a chronic condition such as diabetes, liver or kidney disease, stomach ulcer, or digestive bleeding, or are taking blood thinners.  Be careful if you are given prescription pain medicine, opioids, or medicine for muscle spasm. They can cause drowsiness, and affect your coordination, reflexes, and judgment. Don't drive or operate heavy machinery when taking these medicines. Take pain medicine only as prescribed by your healthcare provider.  Follow-up care  Follow up with your doctor, or as advised. You may need physical therapy or more tests.  If X-rays were taken, they may be reviewed by a radiologist. You will be told of any new findings that may affect your care.  Call   Call if any of these occur:    Trouble breathing    Confusion    Drowsiness or trouble awakening    Fainting or loss of consciousness    Rapid or very slow heart rate    Loss of bowel or bladder control  When to seek medical advice  Call your healthcare provider right away if any of these occur:    Pain becomes worse or spreads to your legs    Weakness or numbness in one or both legs    Numbness in the groin or genital area    Fever of 100.4 F (38 C) or higher , or as directed by your healthcare provider    Chills    Burning or pain when passing urine  [employer] last reviewed this educational content on 11/1/2018 2000-2021 [employer]. All rights reserved. This information is not intended as a substitute for professional medical care. Always follow your healthcare professional's instructions.

## 2021-11-11 NOTE — PROGRESS NOTES
"  Assessment & Plan     Upper back pain  May have some signs of early arthritis or disc disease in thoracic spine. Aggravated by plank position exercises. Discussed posture and back mechanics. Pain management. Avoid lifting, but has a 1 year old, so has to be careful with this.   - XR Thoracic Spine 2 Views; Future  - [facility] Referral; Future    Back muscle spasm  Change muscle relaxer and follow up with chiropractor or physical therapy.   - tiZANidine (ZANAFLEX) 4 MG tablet; Take 1-2 tablets (4-8 mg) by mouth 3 times daily as needed for muscle spasms  - [facility] Referral; Future             BMI:   Estimated body mass index is 49.11 kg/m  as calculated from the following:    Height as of this encounter: 1.759 m (5' 9.25\").    Weight as of this encounter: 152 kg (335 lb).   Weight management plan: Discussed healthy diet and exercise guidelines    CONSULTATION/REFERRAL to physical therapy   FUTURE APPOINTMENTS:       - Follow-up visit in 2-3 weeks or sooner if any questions or concerns.   See Patient Instructions    No follow-ups on file.    Amina Sarmiento MD  [facility]    Mari Chavez is a 36 year old who presents for the following health issues     History of Present Illness       Back Pain:  She presents for follow up of back pain. Patient's back pain is a new problem.    Original cause of back pain: other  First noticed back pain: in the last week  Patient feels back pain: comes and goesLocation of back pain:  Right middle of back and right side of neck  Description of back pain: cramping, sharp and stabbing  Back pain spreads: right shoulder    Since patient first noticed back pain, pain is: gradually worsening  Does back pain interfere with her job:  Yes  On a scale of 1-10 (10 being the worst), patient describes pain as:  9  What makes back pain worse: bending, coughing, certain positions and twisting  Acupuncture: not tried  Acetaminophen: not " "tried  Activity or exercise: not helpful  Chiropractor:  Not tried  Cold: not tried  Heat: helpful  Massage: helpful  Muscle relaxants: not helpful  NSAIDS: helpful  Opioids: not tried  Physical Therapy: not tried  Rest: helpful  Steroid Injection: not tried  Stretching: not helpful  Surgery: not tried  TENS unit: not tried  Topical pain relievers: not helpful  Other healthcare providers patient is seeing for back pain: None    She eats 4 or more servings of fruits and vegetables daily.She consumes 0 sweetened beverage(s) daily.She exercises with enough effort to increase her heart rate 10 to 19 minutes per day.  She exercises with enough effort to increase her heart rate 3 or less days per week.   She is taking medications regularly.     Ibuprofen 800 mg and Flexeril 10 mg at night.         Review of Systems   Constitutional, HEENT, cardiovascular, pulmonary, gi and gu systems are negative, except as otherwise noted.      Objective    /85 (BP Location: Left arm, Patient Position: Chair, Cuff Size: Adult Large)   Pulse 103   Ht 1.759 m (5' 9.25\")   Wt (!) 152 kg (335 lb)   SpO2 94%   BMI 49.11 kg/m    Body mass index is 49.11 kg/m .  Physical Exam   GENERAL: healthy, alert, well nourished, well hydrated, no distress, obese  SKIN: no suspicious lesions, no rashes  NEURO: strength and tone- normal, sensory exam- grossly normal, mentation- intact, speech- normal, reflexes- symmetric  BACK: no CVA tenderness, no paralumbar tenderness, normal posture. Pain localized in right lower rib area of paraspinal back with some muscle spasms noted. Decreased ROM.   PSYCH: Alert and oriented times 3; speech- coherent , normal rate and volume; able to articulate logical thoughts, able to abstract reason, no tangential thoughts, no hallucinations or delusions, affect- normal     No results found for this or any previous visit (from the past 24 hour(s)).    Some spurring and mild loss of disc space noted in lower thoracic " spine on x ray.

## 2021-11-12 NOTE — RESULT ENCOUNTER NOTE
Dear Scott    Your test results are attached. I am happy to let you know that they are stable.    The back bones have good alignment. The radiologist sees some signs of mild arthritis in your back and this may have been aggravated by the kind of exercise you did, causing continued back pain and spasm. Please follow up with physical therapy or chiropractic care. It may take a while for this to settle down.     Please contact me by hakut if you have any questions about your labs or management. You may also call my office number [phone] for any questions.     Amina Sarmiento MD

## 2021-11-15 ENCOUNTER — THERAPY VISIT (OUTPATIENT)
Dept: PHYSICAL THERAPY | Facility: CLINIC | Age: 36
End: 2021-11-15
Payer: COMMERCIAL

## 2021-11-15 DIAGNOSIS — M54.6 ACUTE RIGHT-SIDED THORACIC BACK PAIN: ICD-10-CM

## 2021-11-15 PROCEDURE — 97161 PT EVAL LOW COMPLEX 20 MIN: CPT | Mod: GP | Performed by: PHYSICAL THERAPIST

## 2021-11-15 PROCEDURE — 97110 THERAPEUTIC EXERCISES: CPT | Mod: GP | Performed by: PHYSICAL THERAPIST

## 2021-11-15 PROCEDURE — 97140 MANUAL THERAPY 1/> REGIONS: CPT | Mod: GP | Performed by: PHYSICAL THERAPIST

## 2021-11-15 NOTE — PROGRESS NOTES
Physical Therapy Initial Evaluation  Subjective:  The history is provided by the patient.   Patient Health History  Scott Dobbs being seen for Back pain.     Problem began: 11/4/2021.   Problem occurred: Unsure, maybe a plank challenge   Pain is reported as 6/10 on pain scale.  General health as reported by patient is good.       Medical allergies: adhesives.       Current medications:  Anti-inflammatory, high blood pressure medication and muscle relaxants.    Current occupation is .   Primary job tasks include:  Computer work, driving, lifting/carrying, prolonged sitting, prolonged standing and pushing/pulling.                  Therapist Generated HPI Evaluation  Problem details: R midback spasms for a few weeks.  Possibly due to starting a plank challenge at home.         Type of problem:  Lumbar.    This is a new condition.  Condition occurred with:  Insidious onset.  Where condition occurred: during recreation/sport (possibly plank challenge?).  Patient reports pain:  Thoracic spine right and lower thoracic spine.  Pain is described as sharp and is constant (constant ache).  Radiates to: up in upper back and into ribs. Pain is the same all the time.    Associated symptoms:  Loss of motion/stiffness and loss of motion. Symptoms are exacerbated by bending (rolling in bed)  and relieved by rest, muscle relaxants and analgesics.  Special tests included:  X-ray (mild degen).    Restrictions due to condition include:  Currently not working due to present treatment (works in [employer] at [employer]).                          Objective:  Standing Alignment:    Cervical/Thoracic:  Normal  Shoulder/UE:  Normal                             Lumbar/SI Evaluation  ROM:    AROM Lumbar:   Flexion:            Hands to shins with pain  Ext:                    Decreased 75%   Side Bend:        Left:  Decreased 25%    Right:  Normal   Rotation:           Left:  Decreased 25%    Right:  Decreased 25%  Side Glide:        Left:      Right:           Lumbar Myotomes:  normal            Lumbar DTR's:  normal          Neural Tension/Mobility:  Lumbar:  Normal (Pt with increased pain when laying supine)  Thoracic:  Normal      Lumbar Palpation:  Palpation (lumbar): R thoracic parapinals and lateral ribs.                                                         General     ROS    Assessment/Plan:    Patient is a 36 year old female with thoracic complaints.    Patient has the following significant findings with corresponding treatment plan.                Diagnosis 1:  R thoracic pain  Pain -  manual therapy, self management, education, directional preference exercise and home program  Decreased ROM/flexibility - manual therapy and therapeutic exercise  Decreased strength - therapeutic exercise and therapeutic activities  Impaired muscle performance - neuro re-education  Decreased function - therapeutic activities    Therapy Evaluation Codes:   1) History comprised of:   Personal factors that impact the plan of care:      None.    Comorbidity factors that impact the plan of care are:      None.     Medications impacting care: Anti-inflammatory, High blood pressure and Muscle relaxant.  2) Examination of Body Systems comprised of:   Body structures and functions that impact the plan of care:      Thoracic Spine.   Activity limitations that impact the plan of care are:      Bending and Laying down.  3) Clinical presentation characteristics are:   Stable/Uncomplicated.  4) Decision-Making    Low complexity using standardized patient assessment instrument and/or measureable assessment of functional outcome.  Cumulative Therapy Evaluation is: Low complexity.    Previous and current functional limitations:  (See Goal Flow Sheet for this information)    Short term and Long term goals: (See Goal Flow Sheet for this information)     Communication ability:  Patient appears to be able to clearly communicate and understand verbal and written communication and  follow directions correctly.  Treatment Explanation - The following has been discussed with the patient:   RX ordered/plan of care  Anticipated outcomes  Possible risks and side effects  This patient would benefit from PT intervention to resume normal activities.   Rehab potential is good.    Frequency:  1 X week, once daily  Duration:  for 6 weeks  Discharge Plan:  Achieve all LTG.  Independent in home treatment program.  Reach maximal therapeutic benefit.    Please refer to the daily flowsheet for treatment today, total treatment time and time spent performing 1:1 timed codes.

## 2021-11-17 ENCOUNTER — VIRTUAL VISIT (OUTPATIENT)
Dept: BEHAVIORAL HEALTH | Facility: CLINIC | Age: 36
End: 2021-11-17
Payer: COMMERCIAL

## 2021-11-17 DIAGNOSIS — F41.1 GENERALIZED ANXIETY DISORDER: Primary | ICD-10-CM

## 2021-11-17 PROCEDURE — 90834 PSYTX W PT 45 MINUTES: CPT | Mod: GT | Performed by: SOCIAL WORKER

## 2021-11-17 ASSESSMENT — PATIENT HEALTH QUESTIONNAIRE - PHQ9
SUM OF ALL RESPONSES TO PHQ QUESTIONS 1-9: 16
10. IF YOU CHECKED OFF ANY PROBLEMS, HOW DIFFICULT HAVE THESE PROBLEMS MADE IT FOR YOU TO DO YOUR WORK, TAKE CARE OF THINGS AT HOME, OR GET ALONG WITH OTHER PEOPLE: SOMEWHAT DIFFICULT
SUM OF ALL RESPONSES TO PHQ QUESTIONS 1-9: 16

## 2021-11-17 ASSESSMENT — ANXIETY QUESTIONNAIRES
6. BECOMING EASILY ANNOYED OR IRRITABLE: NEARLY EVERY DAY
2. NOT BEING ABLE TO STOP OR CONTROL WORRYING: NOT AT ALL
7. FEELING AFRAID AS IF SOMETHING AWFUL MIGHT HAPPEN: NOT AT ALL
3. WORRYING TOO MUCH ABOUT DIFFERENT THINGS: NOT AT ALL
7. FEELING AFRAID AS IF SOMETHING AWFUL MIGHT HAPPEN: NOT AT ALL
4. TROUBLE RELAXING: MORE THAN HALF THE DAYS
GAD7 TOTAL SCORE: 5
8. IF YOU CHECKED OFF ANY PROBLEMS, HOW DIFFICULT HAVE THESE MADE IT FOR YOU TO DO YOUR WORK, TAKE CARE OF THINGS AT HOME, OR GET ALONG WITH OTHER PEOPLE?: SOMEWHAT DIFFICULT
GAD7 TOTAL SCORE: 5
5. BEING SO RESTLESS THAT IT IS HARD TO SIT STILL: NOT AT ALL
1. FEELING NERVOUS, ANXIOUS, OR ON EDGE: NOT AT ALL
GAD7 TOTAL SCORE: 5

## 2021-11-17 NOTE — PROGRESS NOTES
[facility]- Integrated Behavioral Health Services  November 17, 2021    Behavioral Health Clinician Progress Note    Patient Name: Scott Dobbs      Telemedicine Visit: The patient's condition can be safely assessed and treated via synchronous audio and visual telemedicine encounter.      Reason for Telemedicine Visit: Patient has requested telehealth visit and Services only offered telehealth due to COVID-19    Originating Site (Patient Location): Patient's home    Distant Site (Provider Location): [facility] Clinics: [facility]    Consent:  The patient/guardian has verbally consented to: the potential risks and benefits of telemedicine (video visit) versus in person care; bill my insurance or make self-payment for services provided; and responsibility for payment of non-covered services.     Mode of Communication:  Video Conference via Conferensum    As the provider I attest to compliance with applicable laws and regulations related to telemedicine.         Service Type:  Individual     Session Start Time:  3:02  pm  Session End Time:  3: 54  pm      Session Length: 38 - 52      Attendees: Patient    Visit Activities (Refresh list every visit): [facility] [place] Only    Diagnostic Assessment Date: 7/12/21  Treatment Plan Review Date: 11/2/21  See Flowsheets for today's PHQ-9 and BALA-7 results  Previous PHQ-9:   PHQ-9 SCORE 5/21/2021 6/30/2021 11/17/2021   PHQ-9 Total Score MyChart 3 (Minimal depression) 10 (Moderate depression) 16 (Moderately severe depression)   PHQ-9 Total Score 3 10 16     Previous BALA-7:   BALA-7 SCORE 5/21/2021 6/30/2021 11/17/2021   Total Score 6 (mild anxiety) 2 (minimal anxiety) 5 (mild anxiety)   Total Score 6 2 5       ED LEVEL:  No flowsheet data found.    DATA  Extended Session (60+ minutes): No  Interactive Complexity: No  Crisis: No  [facility] Patient: No    Treatment Objective(s) Addressed in This Session:  Target Behavior(s): Mental health  "management    Anxiety: will experience a reduction in anxiety, will develop more effective coping skills to manage anxiety symptoms, will develop healthy cognitive patterns and beliefs and will increase ability to function adaptively    Current Stressors / Issues:  Patient reported that she has been in constant pain over the last couple of weeks. She shared that shortly after the last session, she started to experience back spasms and now constant soreness. Patient stated that she has been seen by urgent care, primary care, and physical therapy, but has found minimal symptom relief. She stated that she has not been able to work, cannot  her daughter, and feels like she is watching the clock until she can take more medication. Patient shared that most activities cause pain and discomfort, and she has noticed a decreased interest and motivation to drink water and care for herself. Patient shared that she is trying to focus on what she can still do with her daughter, but feels like other tasks that could help the household or reaching out to friends can be difficult. Patient discussed recent experience while in clinic where she felt \"adrenaline\" of seeing people.  Validated and normalized patient's experiences, and explored with patient activity scheduling and behavioral activation to provide structure and routine in her day. Identified trying to find balance between rest and activities. Explored with patient how to focus on basic ADLs when without motivation.  Noted PHQ, with patient sharing belief that depressive symptoms began with onset of back spasms and pain.    Progress on Treatment Objective(s) / Homework:  Worsening - CONTEMPLATION (Considering change and yet undecided); Intervened by assessing the negative and positive thinking (ambivalence) about behavior change    Motivational Interviewing    MI Intervention: Expressed Empathy/Understanding, Supported Autonomy, Collaboration, Evocation, Permission to " raise concern or advise, Open-ended questions, Change talk (evoked) and Reframe     Change Talk Expressed by the Patient: Desire to change Ability to change Reasons to change Need to change Committment to change Activation    Provider Response to Change Talk: E - Evoked more info from patient about behavior change, A - Affirmed patient's thoughts, decisions, or attempts at behavior change, R - Reflected patient's change talk and S - Summarized patient's change talk statements     Cognitive Behavioral Therapy: Discussed connection between thoughts, feelings, and behaviors. Taught patient how to identify cognitive distortions, and assisted patient to identify own cognitive distortions. Taught and engaged patient in cognitive restructuring techniques.    Mindfulness-Based Strategies : Discussed skills based on development and application of mindfulness    Also provided psychoeducation about behavioral health condition, symptoms, and treatment options    Care Plan review completed: Yes    Medication Review:  No current psychiatric medications prescribed    Medication Compliance:  NA    Changes in Health Issues:   None reported    Chemical Use Review:   Substance Use: Chemical use reviewed, no active concerns identified      Tobacco Use: No current tobacco use.      Assessment: Current Emotional / Mental Status (status of significant symptoms):  Risk status (Self / Other harm or suicidal ideation)  Patient denies a history of suicidal ideation, suicide attempts, self-injurious behavior, homicidal ideation, homicidal behavior and and other safety concerns  Patient denies current fears or concerns for personal safety.  Patient denies current or recent suicidal ideation or behaviors.  Patient denies current or recent homicidal ideation or behaviors.  Patient denies current or recent self injurious behavior or ideation.  Patient denies other safety concerns.  A safety and risk management plan has not been developed at this  time, however patient was encouraged to call [place] [phone] should there be a change in any of these risk factors.    Appearance:   Appropriate   Eye Contact:   Good   Psychomotor Behavior: Normal   Attitude:   Cooperative   Orientation:   All  Speech   Rate / Production: Normal    Volume:  Normal   Mood:    Normal  Affect:    Appropriate   Thought Content:  Clear   Thought Form:  Coherent  Logical   Insight:    Good     Diagnoses:  1. Generalized anxiety disorder        Collateral Reports Completed:  Not Applicable    Plan: (Homework, other):  Patient was given information about behavioral services and encouraged to schedule a follow up appointment with the clinic [facility] in two weeks. She was also given Cognitive Behavioral Therapy skills to practice when experiencing anxiety and depression , in particular strategies of activity scheduling and behavioral actviation.  CD Recommendations: No indications of CD issues.  GEO Perez, [facility]      ______________________________________________________________________    Integrated Primary Care Behavioral Health Treatment Plan    Patient's Name: Scott Dobbs  YOB: 1985    Date: 11/2/21    DSM-V Diagnoses: 300.02 (F41.1) Generalized Anxiety Disorder  Psychosocial / Contextual Factors:  postpartum within past year, transition back to work in February, coping with pandemic  WHODAS 2.0 Total Score 2/11/2021 5/21/2021   Total Score 19 19   Total Score MyChart 19 19         Referral / Collaboration:  Referral to another professional/service is not indicated at this time..     Anticipated number of session or this episode of care: 10-12      MeasurableTreatment Goal(s) related to diagnosis / functional impairment(s)  Goal 1: Patient will reduce symptoms of anxiety as evidenced by decreased score on BALA 7.     I will know I've met my goal when I'm less irritable and it is easier to stop the spiral      Objective #A (Patient Action)    Patient will  identify three distraction and diversion activities and use those activities to decrease level of anxiety  .  Status: Continued - Date(s): 11/2/21    Intervention(s)  [facility] will teach distress tolerance, mindfulness, and relaxation techniques.    Objective #B  Patient will use cognitive strategies identified in therapy to challenge anxious thoughts.  Status: Continued - Date(s): 11/2/21  Intervention(s)  [facility] will assign homework to practice cognitive restructuring and defusion techniques.    Objective #C  Patient will use relaxation strategies 2-3 times per day to reduce the physical symptoms of anxiety.  Status: Continued - Date(s): 11/2/21    Intervention(s)  [facility] will teach relaxation techniques.    Patient has reviewed and agreed to the above plan.      Dawn Franks, GEO  November 2, 2021

## 2021-11-18 ASSESSMENT — ANXIETY QUESTIONNAIRES: GAD7 TOTAL SCORE: 5

## 2021-11-18 ASSESSMENT — PATIENT HEALTH QUESTIONNAIRE - PHQ9: SUM OF ALL RESPONSES TO PHQ QUESTIONS 1-9: 16

## 2021-11-19 ENCOUNTER — VIRTUAL VISIT (OUTPATIENT)
Dept: FAMILY MEDICINE | Facility: CLINIC | Age: 36
End: 2021-11-19
Payer: COMMERCIAL

## 2021-11-19 DIAGNOSIS — M62.830 BACK MUSCLE SPASM: ICD-10-CM

## 2021-11-19 DIAGNOSIS — M54.6 ACUTE RIGHT-SIDED THORACIC BACK PAIN: Primary | ICD-10-CM

## 2021-11-19 PROCEDURE — 99213 OFFICE O/P EST LOW 20 MIN: CPT | Mod: 95 | Performed by: PHYSICIAN ASSISTANT

## 2021-11-19 RX ORDER — PREDNISONE 20 MG/1
20 TABLET ORAL DAILY
Qty: 10 TABLET | Refills: 0 | Status: ON HOLD | OUTPATIENT
Start: 2021-11-19 | End: 2021-11-30

## 2021-11-19 NOTE — PROGRESS NOTES
"Scott is a 36 year old who is being evaluated via a billable video visit.      How would you like to obtain your AVS? MyChart  If the video visit is dropped, the invitation should be resent by: Text to cell phone: [phone]  Will anyone else be joining your video visit? No      Video Start Time: 11:36 PM    Assessment & Plan     Back muscle spasm  Difficult to fully assess in a video visit. Due to ongoing symptoms, suggest a 5 day course of prednisone as this appears to be musculoskeletal in nature. Also refilled tizanidine. I would like her to continue physical therapy as well. Because of her \"darker urine\" I encouraged increased water intake - but if not improving she should come in for a urinalysis next week - she admits to decreased water intake. Recheck back pain (in clinic) in 2 weeks if not improving. Could consider MRI if not noting improvement. I discussed with the patient risks and benefits of the new medication prescribed including potential side effects.  The patient had opportunity to ask questions and is comfortable with and interested in medications as prescribed.   - predniSONE (DELTASONE) 20 MG tablet; Take 1 tablet (20 mg) by mouth daily for 10 days  - tiZANidine (ZANAFLEX) 4 MG tablet; Take 1-2 tablets (4-8 mg) by mouth 3 times daily as needed for muscle spasms                 Return in about 2 weeks (around 12/3/2021) for worsening symptoms or failure to improve..    RAFAEL [facility] [employer]    Mari Chavez is a 36 year old who presents for the following health issues     HPI     Concern - follow up back  Onset: X2 weeks  Description: Middle of back  Intensity: moderate  Progression of Symptoms:  improving  Accompanying Signs & Symptoms: Aching  Previous history of similar problem: No  Precipitating factors:        Worsened by: Certain movements  Alleviating factors:        Improved by: Taking Zanaflex  Therapies tried and outcome: Medication, PT- has not " helped yet.    Symptoms started around 11/5. She had just started a plank challenge and thinks this might have caused her pain. Right side thoracic pain. Has been woken up in the middle of the night due to spasms. Spasms usually last about 30 minutes. She does feel this is improving. Pain improves with the use of zanaflex, massager chair. Pain also improves with rest. It is worsened by certain movements and lifting.   She has had some darker urine in the last week or so - but unsure if it is because she isn't drinking enough water.   Has had UTI's before that have caused kidney infections - this does not feel the same.         Review of Systems   Constitutional, HEENT, cardiovascular, pulmonary, gi and gu systems are negative, except as otherwise noted.      Objective           Vitals:  No vitals were obtained today due to virtual visit.    Physical Exam   GENERAL: Healthy, alert and no distress  EYES: Eyes grossly normal to inspection.  No discharge or erythema, or obvious scleral/conjunctival abnormalities.  RESP: No audible wheeze, cough, or visible cyanosis.  No visible retractions or increased work of breathing.    SKIN: Visible skin clear. No significant rash, abnormal pigmentation or lesions.  NEURO: Cranial nerves grossly intact.  Mentation and speech appropriate for age.  PSYCH: Mentation appears normal, affect normal/bright, judgement and insight intact, normal speech and appearance well-groomed.     Xray Thoracic Spine  11/11/2021  IMPRESSION: Normal alignment. Vertebral body heights normal. No  fractures. Mild degenerative endplate spurring throughout the thoracic  spine.              Video-Visit Details    Type of service:  Video Visit    Video End Time:11:46 AM    Originating Location (pt. Location): Home    Distant Location (provider location):  [facility]     Platform used for Video Visit: [employer]

## 2021-11-20 ENCOUNTER — MYC MEDICAL ADVICE (OUTPATIENT)
Dept: FAMILY MEDICINE | Facility: CLINIC | Age: 36
End: 2021-11-20
Payer: COMMERCIAL

## 2021-11-22 ENCOUNTER — OFFICE VISIT (OUTPATIENT)
Dept: URGENT CARE | Facility: URGENT CARE | Age: 36
End: 2021-11-22
Payer: COMMERCIAL

## 2021-11-22 VITALS
RESPIRATION RATE: 16 BRPM | HEART RATE: 96 BPM | SYSTOLIC BLOOD PRESSURE: 141 MMHG | TEMPERATURE: 98.4 F | DIASTOLIC BLOOD PRESSURE: 91 MMHG | BODY MASS INDEX: 47.55 KG/M2 | OXYGEN SATURATION: 100 % | WEIGHT: 293 LBS

## 2021-11-22 DIAGNOSIS — H92.01 RIGHT EAR PAIN: Primary | ICD-10-CM

## 2021-11-22 PROCEDURE — 99213 OFFICE O/P EST LOW 20 MIN: CPT | Performed by: STUDENT IN AN ORGANIZED HEALTH CARE EDUCATION/TRAINING PROGRAM

## 2021-11-22 ASSESSMENT — PAIN SCALES - GENERAL: PAINLEVEL: MILD PAIN (2)

## 2021-11-22 NOTE — TELEPHONE ENCOUNTER
"Please advise. Per sig \"Take 1 tablet (20 mg) by mouth daily for 10 days\" only #10 was sent.    Alexa Sung RN  [facility]    "

## 2021-11-23 ENCOUNTER — APPOINTMENT (OUTPATIENT)
Dept: INTERVENTIONAL RADIOLOGY/VASCULAR | Facility: CLINIC | Age: 36
DRG: 871 | End: 2021-11-23
Attending: NURSE PRACTITIONER
Payer: COMMERCIAL

## 2021-11-23 ENCOUNTER — APPOINTMENT (OUTPATIENT)
Dept: GENERAL RADIOLOGY | Facility: CLINIC | Age: 36
DRG: 871 | End: 2021-11-23
Payer: COMMERCIAL

## 2021-11-23 ENCOUNTER — APPOINTMENT (OUTPATIENT)
Dept: GENERAL RADIOLOGY | Facility: CLINIC | Age: 36
DRG: 871 | End: 2021-11-23
Attending: PHYSICIAN ASSISTANT
Payer: COMMERCIAL

## 2021-11-23 ENCOUNTER — HOSPITAL ENCOUNTER (INPATIENT)
Facility: CLINIC | Age: 36
LOS: 7 days | Discharge: HOME | DRG: 871 | End: 2021-11-30
Attending: EMERGENCY MEDICINE | Admitting: NEUROLOGICAL SURGERY
Payer: COMMERCIAL

## 2021-11-23 ENCOUNTER — APPOINTMENT (OUTPATIENT)
Dept: GENERAL RADIOLOGY | Facility: CLINIC | Age: 36
DRG: 871 | End: 2021-11-23
Attending: EMERGENCY MEDICINE
Payer: COMMERCIAL

## 2021-11-23 ENCOUNTER — APPOINTMENT (OUTPATIENT)
Dept: CT IMAGING | Facility: CLINIC | Age: 36
DRG: 871 | End: 2021-11-23
Attending: EMERGENCY MEDICINE
Payer: COMMERCIAL

## 2021-11-23 DIAGNOSIS — J96.01 ACUTE RESPIRATORY FAILURE WITH HYPOXIA (H): ICD-10-CM

## 2021-11-23 DIAGNOSIS — R74.01 TRANSAMINITIS: ICD-10-CM

## 2021-11-23 DIAGNOSIS — J86.9 EMPYEMA (H): Primary | ICD-10-CM

## 2021-11-23 DIAGNOSIS — Z20.822 LAB TEST NEGATIVE FOR COVID-19 VIRUS: ICD-10-CM

## 2021-11-23 DIAGNOSIS — J18.9 MULTIFOCAL PNEUMONIA: ICD-10-CM

## 2021-11-23 LAB
ALBUMIN SERPL-MCNC: 1.9 G/DL (ref 3.4–5)
ALBUMIN UR-MCNC: 70 MG/DL
ALP SERPL-CCNC: 302 U/L (ref 40–150)
ALT SERPL W P-5'-P-CCNC: 206 U/L (ref 0–50)
ANION GAP SERPL CALCULATED.3IONS-SCNC: 10 MMOL/L (ref 3–14)
ANION GAP SERPL CALCULATED.3IONS-SCNC: 7 MMOL/L (ref 3–14)
APPEARANCE UR: CLEAR
AST SERPL W P-5'-P-CCNC: 127 U/L (ref 0–45)
B-HCG SERPL-ACNC: <1 IU/L (ref 0–5)
BASOPHILS # BLD AUTO: 0.1 10E3/UL (ref 0–0.2)
BASOPHILS NFR BLD AUTO: 1 %
BILIRUB SERPL-MCNC: 2.4 MG/DL (ref 0.2–1.3)
BILIRUB UR QL STRIP: ABNORMAL
BUN SERPL-MCNC: 23 MG/DL (ref 7–30)
BUN SERPL-MCNC: 24 MG/DL (ref 7–30)
CALCIUM SERPL-MCNC: 8.7 MG/DL (ref 8.5–10.1)
CALCIUM SERPL-MCNC: 8.8 MG/DL (ref 8.5–10.1)
CHLORIDE BLD-SCNC: 105 MMOL/L (ref 94–109)
CHLORIDE BLD-SCNC: 108 MMOL/L (ref 94–109)
CO2 SERPL-SCNC: 23 MMOL/L (ref 20–32)
CO2 SERPL-SCNC: 23 MMOL/L (ref 20–32)
COLOR UR AUTO: ABNORMAL
CREAT SERPL-MCNC: 0.98 MG/DL (ref 0.52–1.04)
CREAT SERPL-MCNC: 1.28 MG/DL (ref 0.52–1.04)
EOSINOPHIL # BLD AUTO: 0.3 10E3/UL (ref 0–0.7)
EOSINOPHIL NFR BLD AUTO: 1 %
ERYTHROCYTE [DISTWIDTH] IN BLOOD BY AUTOMATED COUNT: 14.5 % (ref 10–15)
FLUAV RNA SPEC QL NAA+PROBE: NEGATIVE
FLUBV RNA RESP QL NAA+PROBE: NEGATIVE
GFR SERPL CREATININE-BSD FRML MDRD: 54 ML/MIN/1.73M2
GFR SERPL CREATININE-BSD FRML MDRD: 75 ML/MIN/1.73M2
GLUCOSE BLD-MCNC: 124 MG/DL (ref 70–99)
GLUCOSE BLD-MCNC: 136 MG/DL (ref 70–99)
GLUCOSE UR STRIP-MCNC: NEGATIVE MG/DL
HCG SERPL QL: NEGATIVE
HCT VFR BLD AUTO: 32.5 % (ref 35–47)
HGB BLD-MCNC: 10.4 G/DL (ref 11.7–15.7)
HGB UR QL STRIP: ABNORMAL
HOLD SPECIMEN: NORMAL
IMM GRANULOCYTES # BLD: 0.8 10E3/UL
IMM GRANULOCYTES NFR BLD: 3 %
INR PPP: 1.29 (ref 0.85–1.15)
KETONES UR STRIP-MCNC: NEGATIVE MG/DL
LACTATE SERPL-SCNC: 1.2 MMOL/L (ref 0.7–2)
LACTATE SERPL-SCNC: 1.7 MMOL/L (ref 0.7–2)
LACTATE SERPL-SCNC: 2.2 MMOL/L (ref 0.7–2)
LEUKOCYTE ESTERASE UR QL STRIP: NEGATIVE
LYMPHOCYTES # BLD AUTO: 2.2 10E3/UL (ref 0.8–5.3)
LYMPHOCYTES NFR BLD AUTO: 9 %
MCH RBC QN AUTO: 26.3 PG (ref 26.5–33)
MCHC RBC AUTO-ENTMCNC: 32 G/DL (ref 31.5–36.5)
MCV RBC AUTO: 82 FL (ref 78–100)
MONOCYTES # BLD AUTO: 1 10E3/UL (ref 0–1.3)
MONOCYTES NFR BLD AUTO: 4 %
MUCOUS THREADS #/AREA URNS LPF: PRESENT /LPF
NEUTROPHILS # BLD AUTO: 19.6 10E3/UL (ref 1.6–8.3)
NEUTROPHILS NFR BLD AUTO: 82 %
NITRATE UR QL: NEGATIVE
NRBC # BLD AUTO: 0 10E3/UL
NRBC BLD AUTO-RTO: 0 /100
NT-PROBNP SERPL-MCNC: 345 PG/ML (ref 0–450)
PH UR STRIP: 6 [PH] (ref 5–7)
PLATELET # BLD AUTO: 610 10E3/UL (ref 150–450)
POTASSIUM BLD-SCNC: 3.7 MMOL/L (ref 3.4–5.3)
POTASSIUM BLD-SCNC: 4.3 MMOL/L (ref 3.4–5.3)
PROT SERPL-MCNC: 8 G/DL (ref 6.8–8.8)
RBC # BLD AUTO: 3.95 10E6/UL (ref 3.8–5.2)
RBC URINE: 88 /HPF
SARS-COV-2 RNA RESP QL NAA+PROBE: NEGATIVE
SODIUM SERPL-SCNC: 138 MMOL/L (ref 133–144)
SODIUM SERPL-SCNC: 138 MMOL/L (ref 133–144)
SP GR UR STRIP: 1.04 (ref 1–1.03)
SQUAMOUS EPITHELIAL: <1 /HPF
TRANSITIONAL EPI: 4 /HPF
TROPONIN I SERPL-MCNC: <0.015 UG/L (ref 0–0.04)
UROBILINOGEN UR STRIP-MCNC: 8 MG/DL
WBC # BLD AUTO: 24 10E3/UL (ref 4–11)
WBC URINE: 2 /HPF

## 2021-11-23 PROCEDURE — 83605 ASSAY OF LACTIC ACID: CPT | Performed by: THORACIC SURGERY (CARDIOTHORACIC VASCULAR SURGERY)

## 2021-11-23 PROCEDURE — 87075 CULTR BACTERIA EXCEPT BLOOD: CPT | Performed by: STUDENT IN AN ORGANIZED HEALTH CARE EDUCATION/TRAINING PROGRAM

## 2021-11-23 PROCEDURE — 93005 ELECTROCARDIOGRAM TRACING: CPT | Performed by: EMERGENCY MEDICINE

## 2021-11-23 PROCEDURE — C1769 GUIDE WIRE: HCPCS

## 2021-11-23 PROCEDURE — 83605 ASSAY OF LACTIC ACID: CPT | Performed by: NURSE PRACTITIONER

## 2021-11-23 PROCEDURE — 258N000003 HC RX IP 258 OP 636: Performed by: FAMILY MEDICINE

## 2021-11-23 PROCEDURE — 81001 URINALYSIS AUTO W/SCOPE: CPT | Performed by: EMERGENCY MEDICINE

## 2021-11-23 PROCEDURE — 93010 ELECTROCARDIOGRAM REPORT: CPT | Performed by: EMERGENCY MEDICINE

## 2021-11-23 PROCEDURE — 84702 CHORIONIC GONADOTROPIN TEST: CPT | Performed by: NURSE PRACTITIONER

## 2021-11-23 PROCEDURE — 36415 COLL VENOUS BLD VENIPUNCTURE: CPT | Performed by: EMERGENCY MEDICINE

## 2021-11-23 PROCEDURE — 85025 COMPLETE CBC W/AUTO DIFF WBC: CPT | Performed by: EMERGENCY MEDICINE

## 2021-11-23 PROCEDURE — 85610 PROTHROMBIN TIME: CPT | Performed by: NURSE PRACTITIONER

## 2021-11-23 PROCEDURE — 272N000502 IR CHEST TUBE PLACEMENT NON-TUNNELED RIGHT

## 2021-11-23 PROCEDURE — 80053 COMPREHEN METABOLIC PANEL: CPT | Performed by: EMERGENCY MEDICINE

## 2021-11-23 PROCEDURE — C9803 HOPD COVID-19 SPEC COLLECT: HCPCS | Performed by: EMERGENCY MEDICINE

## 2021-11-23 PROCEDURE — 36415 COLL VENOUS BLD VENIPUNCTURE: CPT | Performed by: THORACIC SURGERY (CARDIOTHORACIC VASCULAR SURGERY)

## 2021-11-23 PROCEDURE — C1729 CATH, DRAINAGE: HCPCS

## 2021-11-23 PROCEDURE — 87205 SMEAR GRAM STAIN: CPT | Performed by: PHYSICIAN ASSISTANT

## 2021-11-23 PROCEDURE — 36415 COLL VENOUS BLD VENIPUNCTURE: CPT | Performed by: PHYSICIAN ASSISTANT

## 2021-11-23 PROCEDURE — 87636 SARSCOV2 & INF A&B AMP PRB: CPT | Performed by: EMERGENCY MEDICINE

## 2021-11-23 PROCEDURE — 250N000013 HC RX MED GY IP 250 OP 250 PS 637: Performed by: STUDENT IN AN ORGANIZED HEALTH CARE EDUCATION/TRAINING PROGRAM

## 2021-11-23 PROCEDURE — 83605 ASSAY OF LACTIC ACID: CPT | Performed by: EMERGENCY MEDICINE

## 2021-11-23 PROCEDURE — 32557 INSERT CATH PLEURA W/ IMAGE: CPT | Mod: RT | Performed by: RADIOLOGY

## 2021-11-23 PROCEDURE — 999N000065 XR CHEST PORT 1 VIEW

## 2021-11-23 PROCEDURE — 87086 URINE CULTURE/COLONY COUNT: CPT | Performed by: EMERGENCY MEDICINE

## 2021-11-23 PROCEDURE — 87076 CULTURE ANAEROBE IDENT EACH: CPT | Performed by: STUDENT IN AN ORGANIZED HEALTH CARE EDUCATION/TRAINING PROGRAM

## 2021-11-23 PROCEDURE — 250N000013 HC RX MED GY IP 250 OP 250 PS 637: Performed by: FAMILY MEDICINE

## 2021-11-23 PROCEDURE — 272N000192 HC ACCESSORY CR2

## 2021-11-23 PROCEDURE — 83880 ASSAY OF NATRIURETIC PEPTIDE: CPT | Performed by: EMERGENCY MEDICINE

## 2021-11-23 PROCEDURE — 87040 BLOOD CULTURE FOR BACTERIA: CPT | Performed by: PHYSICIAN ASSISTANT

## 2021-11-23 PROCEDURE — 99291 CRITICAL CARE FIRST HOUR: CPT | Mod: 25 | Performed by: EMERGENCY MEDICINE

## 2021-11-23 PROCEDURE — 96365 THER/PROPH/DIAG IV INF INIT: CPT | Mod: 59 | Performed by: EMERGENCY MEDICINE

## 2021-11-23 PROCEDURE — 87040 BLOOD CULTURE FOR BACTERIA: CPT | Performed by: EMERGENCY MEDICINE

## 2021-11-23 PROCEDURE — 120N000003 HC R&B IMCU UMMC

## 2021-11-23 PROCEDURE — 250N000009 HC RX 250: Performed by: RADIOLOGY

## 2021-11-23 PROCEDURE — 84484 ASSAY OF TROPONIN QUANT: CPT | Performed by: EMERGENCY MEDICINE

## 2021-11-23 PROCEDURE — 250N000011 HC RX IP 250 OP 636: Performed by: EMERGENCY MEDICINE

## 2021-11-23 PROCEDURE — 71045 X-RAY EXAM CHEST 1 VIEW: CPT

## 2021-11-23 PROCEDURE — 71045 X-RAY EXAM CHEST 1 VIEW: CPT | Mod: 26 | Performed by: RADIOLOGY

## 2021-11-23 PROCEDURE — 36415 COLL VENOUS BLD VENIPUNCTURE: CPT | Performed by: NURSE PRACTITIONER

## 2021-11-23 PROCEDURE — 250N000009 HC RX 250: Performed by: EMERGENCY MEDICINE

## 2021-11-23 PROCEDURE — 84703 CHORIONIC GONADOTROPIN ASSAY: CPT | Performed by: EMERGENCY MEDICINE

## 2021-11-23 PROCEDURE — 250N000011 HC RX IP 250 OP 636: Performed by: STUDENT IN AN ORGANIZED HEALTH CARE EDUCATION/TRAINING PROGRAM

## 2021-11-23 PROCEDURE — 71275 CT ANGIOGRAPHY CHEST: CPT

## 2021-11-23 PROCEDURE — 250N000013 HC RX MED GY IP 250 OP 250 PS 637

## 2021-11-23 PROCEDURE — 36415 COLL VENOUS BLD VENIPUNCTURE: CPT | Performed by: STUDENT IN AN ORGANIZED HEALTH CARE EDUCATION/TRAINING PROGRAM

## 2021-11-23 PROCEDURE — 258N000003 HC RX IP 258 OP 636: Performed by: EMERGENCY MEDICINE

## 2021-11-23 PROCEDURE — 96361 HYDRATE IV INFUSION ADD-ON: CPT | Performed by: EMERGENCY MEDICINE

## 2021-11-23 PROCEDURE — 71045 X-RAY EXAM CHEST 1 VIEW: CPT | Mod: 77

## 2021-11-23 PROCEDURE — 99285 EMERGENCY DEPT VISIT HI MDM: CPT | Mod: 25 | Performed by: EMERGENCY MEDICINE

## 2021-11-23 RX ORDER — AMOXICILLIN 250 MG
1-2 CAPSULE ORAL 2 TIMES DAILY
Status: DISCONTINUED | OUTPATIENT
Start: 2021-11-23 | End: 2021-11-29

## 2021-11-23 RX ORDER — HYDROMORPHONE HYDROCHLORIDE 1 MG/ML
0.5 INJECTION, SOLUTION INTRAMUSCULAR; INTRAVENOUS; SUBCUTANEOUS ONCE
Status: COMPLETED | OUTPATIENT
Start: 2021-11-23 | End: 2021-11-23

## 2021-11-23 RX ORDER — LIDOCAINE HYDROCHLORIDE 10 MG/ML
1-30 INJECTION, SOLUTION EPIDURAL; INFILTRATION; INTRACAUDAL; PERINEURAL
Status: COMPLETED | OUTPATIENT
Start: 2021-11-23 | End: 2021-11-23

## 2021-11-23 RX ORDER — SODIUM CHLORIDE, SODIUM LACTATE, POTASSIUM CHLORIDE, CALCIUM CHLORIDE 600; 310; 30; 20 MG/100ML; MG/100ML; MG/100ML; MG/100ML
INJECTION, SOLUTION INTRAVENOUS ONCE
Status: COMPLETED | OUTPATIENT
Start: 2021-11-23 | End: 2021-11-23

## 2021-11-23 RX ORDER — PROCHLORPERAZINE 25 MG
25 SUPPOSITORY, RECTAL RECTAL EVERY 12 HOURS PRN
Status: DISCONTINUED | OUTPATIENT
Start: 2021-11-23 | End: 2021-11-30 | Stop reason: HOSPADM

## 2021-11-23 RX ORDER — PIPERACILLIN SODIUM, TAZOBACTAM SODIUM 3; .375 G/15ML; G/15ML
3.38 INJECTION, POWDER, LYOPHILIZED, FOR SOLUTION INTRAVENOUS EVERY 8 HOURS
Status: DISCONTINUED | OUTPATIENT
Start: 2021-11-23 | End: 2021-11-28

## 2021-11-23 RX ORDER — IOPAMIDOL 755 MG/ML
50 INJECTION, SOLUTION INTRAVASCULAR ONCE
Status: COMPLETED | OUTPATIENT
Start: 2021-11-23 | End: 2021-11-23

## 2021-11-23 RX ORDER — POLYETHYLENE GLYCOL 3350 17 G/17G
17 POWDER, FOR SOLUTION ORAL DAILY PRN
Status: DISCONTINUED | OUTPATIENT
Start: 2021-11-23 | End: 2021-11-29

## 2021-11-23 RX ORDER — IOPAMIDOL 755 MG/ML
100 INJECTION, SOLUTION INTRAVASCULAR ONCE
Status: COMPLETED | OUTPATIENT
Start: 2021-11-23 | End: 2021-11-23

## 2021-11-23 RX ORDER — AZITHROMYCIN 500 MG/5ML
500 INJECTION, POWDER, LYOPHILIZED, FOR SOLUTION INTRAVENOUS ONCE
Status: COMPLETED | OUTPATIENT
Start: 2021-11-23 | End: 2021-11-23

## 2021-11-23 RX ORDER — ONDANSETRON 4 MG/1
4 TABLET, ORALLY DISINTEGRATING ORAL EVERY 6 HOURS PRN
Status: DISCONTINUED | OUTPATIENT
Start: 2021-11-23 | End: 2021-11-30 | Stop reason: HOSPADM

## 2021-11-23 RX ORDER — ACETAMINOPHEN 500 MG
1000 TABLET ORAL ONCE
Status: COMPLETED | OUTPATIENT
Start: 2021-11-23 | End: 2021-11-23

## 2021-11-23 RX ORDER — METHOCARBAMOL 750 MG/1
750 TABLET, FILM COATED ORAL 3 TIMES DAILY PRN
Status: DISCONTINUED | OUTPATIENT
Start: 2021-11-23 | End: 2021-11-30 | Stop reason: HOSPADM

## 2021-11-23 RX ORDER — PROCHLORPERAZINE MALEATE 5 MG
10 TABLET ORAL EVERY 6 HOURS PRN
Status: DISCONTINUED | OUTPATIENT
Start: 2021-11-23 | End: 2021-11-30 | Stop reason: HOSPADM

## 2021-11-23 RX ORDER — PIPERACILLIN SODIUM, TAZOBACTAM SODIUM 4; .5 G/20ML; G/20ML
4.5 INJECTION, POWDER, LYOPHILIZED, FOR SOLUTION INTRAVENOUS ONCE
Status: DISCONTINUED | OUTPATIENT
Start: 2021-11-23 | End: 2021-11-23

## 2021-11-23 RX ORDER — HYDROMORPHONE HCL IN WATER/PF 6 MG/30 ML
0.2 PATIENT CONTROLLED ANALGESIA SYRINGE INTRAVENOUS
Status: DISCONTINUED | OUTPATIENT
Start: 2021-11-23 | End: 2021-11-28

## 2021-11-23 RX ORDER — ONDANSETRON 2 MG/ML
4 INJECTION INTRAMUSCULAR; INTRAVENOUS EVERY 6 HOURS PRN
Status: DISCONTINUED | OUTPATIENT
Start: 2021-11-23 | End: 2021-11-30 | Stop reason: HOSPADM

## 2021-11-23 RX ORDER — ACETAMINOPHEN 325 MG/1
975 TABLET ORAL 3 TIMES DAILY
Status: DISCONTINUED | OUTPATIENT
Start: 2021-11-23 | End: 2021-11-30 | Stop reason: HOSPADM

## 2021-11-23 RX ORDER — OXYCODONE HYDROCHLORIDE 5 MG/1
5-10 TABLET ORAL EVERY 4 HOURS PRN
Status: DISCONTINUED | OUTPATIENT
Start: 2021-11-23 | End: 2021-11-30 | Stop reason: HOSPADM

## 2021-11-23 RX ORDER — LIDOCAINE 4 G/G
2 PATCH TOPICAL
Status: DISCONTINUED | OUTPATIENT
Start: 2021-11-24 | End: 2021-11-30 | Stop reason: HOSPADM

## 2021-11-23 RX ORDER — IBUPROFEN 600 MG/1
600 TABLET, FILM COATED ORAL EVERY 6 HOURS PRN
Status: DISCONTINUED | OUTPATIENT
Start: 2021-11-23 | End: 2021-11-30 | Stop reason: HOSPADM

## 2021-11-23 RX ADMIN — HYDROMORPHONE HYDROCHLORIDE 0.2 MG: 0.2 INJECTION, SOLUTION INTRAMUSCULAR; INTRAVENOUS; SUBCUTANEOUS at 17:03

## 2021-11-23 RX ADMIN — PIPERACILLIN AND TAZOBACTAM 3.38 G: 3; .375 INJECTION, POWDER, LYOPHILIZED, FOR SOLUTION INTRAVENOUS at 12:38

## 2021-11-23 RX ADMIN — IOPAMIDOL 100 ML: 755 INJECTION, SOLUTION INTRAVENOUS at 05:20

## 2021-11-23 RX ADMIN — METHOCARBAMOL 750 MG: 750 TABLET ORAL at 17:56

## 2021-11-23 RX ADMIN — IBUPROFEN 600 MG: 200 TABLET, FILM COATED ORAL at 20:34

## 2021-11-23 RX ADMIN — ACETAMINOPHEN 1000 MG: 500 TABLET, FILM COATED ORAL at 14:49

## 2021-11-23 RX ADMIN — SODIUM CHLORIDE, POTASSIUM CHLORIDE, SODIUM LACTATE AND CALCIUM CHLORIDE: 600; 310; 30; 20 INJECTION, SOLUTION INTRAVENOUS at 14:49

## 2021-11-23 RX ADMIN — OXYCODONE HYDROCHLORIDE 5 MG: 5 TABLET ORAL at 20:15

## 2021-11-23 RX ADMIN — AZITHROMYCIN MONOHYDRATE 500 MG: 500 INJECTION, POWDER, LYOPHILIZED, FOR SOLUTION INTRAVENOUS at 06:24

## 2021-11-23 RX ADMIN — SODIUM CHLORIDE 85 ML: 9 INJECTION, SOLUTION INTRAVENOUS at 05:29

## 2021-11-23 RX ADMIN — IOPAMIDOL 25 ML: 755 INJECTION, SOLUTION INTRAVENOUS at 05:29

## 2021-11-23 RX ADMIN — LIDOCAINE HYDROCHLORIDE 5 ML: 10 INJECTION, SOLUTION EPIDURAL; INFILTRATION; INTRACAUDAL; PERINEURAL at 10:56

## 2021-11-23 RX ADMIN — OXYCODONE HYDROCHLORIDE 5 MG: 5 TABLET ORAL at 12:44

## 2021-11-23 RX ADMIN — PIPERACILLIN AND TAZOBACTAM 3.38 G: 3; .375 INJECTION, POWDER, LYOPHILIZED, FOR SOLUTION INTRAVENOUS at 20:16

## 2021-11-23 RX ADMIN — HYDROMORPHONE HYDROCHLORIDE 0.5 MG: 1 INJECTION, SOLUTION INTRAMUSCULAR; INTRAVENOUS; SUBCUTANEOUS at 04:10

## 2021-11-23 RX ADMIN — HYDROMORPHONE HYDROCHLORIDE 0.2 MG: 0.2 INJECTION, SOLUTION INTRAMUSCULAR; INTRAVENOUS; SUBCUTANEOUS at 14:37

## 2021-11-23 ASSESSMENT — ACTIVITIES OF DAILY LIVING (ADL)
ADLS_ACUITY_SCORE: 4
ADLS_ACUITY_SCORE: 8
ADLS_ACUITY_SCORE: 4
ADLS_ACUITY_SCORE: 8
ADLS_ACUITY_SCORE: 4

## 2021-11-23 NOTE — ED PROVIDER NOTES
ED Provider Note  [facility]      History     Chief Complaint   Patient presents with     Cough     Cough for the past 10 days. Tested 2 times  and both negative for covid.     HPI  Scott Dobbs is a 36 year old female who to the ED tonight with a primary complaint of shortness of breath.  She reports that about 2-2 and half weeks ago she developed some right upper back pain.  She states about 10 days ago she started having a cough with some shortness of breath.  No fever with this.  She states she did take the home Covid test, which was negative.  No vomiting or diarrhea.  No abdominal pain.  No cardiac history.  No history of DVT or PE.  No lower extremity pain or swelling.  She reports that today she started coughing up brownish liquid.  She states that her breathing got much worse today and she is feeling generally worse today.  She is a non-smoker.    Past Medical History  Past Medical History:   Diagnosis Date     Anxiety      Hypertension 2020     Obesity (BMI 30-39.9)      Uncomplicated asthma Childhood only     Past Surgical History:   Procedure Laterality Date      SECTION N/A 10/21/2020    Procedure:  SECTION;  Surgeon: Elisabeth Lima MD;  Location: [facility] L[facility]     PE TUBES       ASPIRIN PO  Fexofenadine HCl (ALLEGRA PO)  fluticasone (FLONASE) 50 MCG/ACT nasal spray  ibuprofen (ADVIL/MOTRIN) 200 MG tablet  labetalol (NORMODYNE) 100 MG tablet  predniSONE (DELTASONE) 20 MG tablet  Prenatal Vit-Fe Fumarate-FA (PRENATAL MULTIVITAMIN W/IRON) 27-0.8 MG tablet  tiZANidine (ZANAFLEX) 4 MG tablet  paragard intrauterine copper device      No Known Allergies  Family History  Family History   Problem Relation Age of Onset     Thyroid Disease Mother      Hypertension Mother      Breast Cancer Paternal Grandmother         stage 1 early 50's.      Hypertension Paternal Grandmother      Hypertension Paternal Grandfather      Prostate Cancer Paternal Grandfather       Social History   Social History     Tobacco Use     Smoking status: Former Smoker     Packs/day: 0.00     Years: 10.00     Pack years: 0.00     Types: Cigarettes, Hookah     Quit date: 2020     Years since quittin.7     Smokeless tobacco: Never Used   Substance Use Topics     Alcohol use: Yes     Drug use: Never      Past medical history, past surgical history, medications, allergies, family history, and social history were reviewed with the patient. No additional pertinent items.       Review of Systems  A complete review of systems was performed with pertinent positives and negatives noted in the HPI, and all other systems negative.    Physical Exam   BP: 109/73  Pulse: 94  Temp: 98.1  F (36.7  C)  Resp: 25  SpO2: 91 %  Physical Exam  Constitutional:       General: She is in acute distress (profusely diaphoretic).      Appearance: She is not diaphoretic.   HENT:      Head: Atraumatic.   Eyes:      General: No scleral icterus.     Pupils: Pupils are equal, round, and reactive to light.   Cardiovascular:      Heart sounds: Normal heart sounds.   Pulmonary:      Effort: Respiratory distress (tachypneic, looks mildly distressed) present.      Breath sounds: Normal breath sounds.      Comments: Decreased lung sounds on the right  Abdominal:      Palpations: Abdomen is soft.      Tenderness: There is no abdominal tenderness.   Musculoskeletal:         General: No tenderness.   Skin:     General: Skin is warm.         ED Course      Procedures            EKG Interpretation:      Interpreted by Karlee Luevano MD  Time reviewed: 214  Symptoms at time of EKG: dyspnea   Rhythm: normal sinus   Rate: 94  Axis: Normal  Ectopy: none  Conduction: normal  ST Segments/ T Waves: No ST-T wave changes  Q Waves: none  Comparison to prior: No old EKG available    Clinical Impression: no acute abnormality              Critical Care Addendum    My initial assessment, based on my review of nursing observations, review  of vital signs, focused history, physical exam, discussion with radiology and interpretation of chest CT , established that Scott Dobbs has respiratory insufficiency and and large empyema with compression on inferior IVC and mediastinum, which requires immediate intervention, and therefore she is critically ill.     After the initial assessment, the care team initiated multiple lab tests, initiated medication therapy with zosyn, azithromycin and consulted with thoracic surgery to provide stabilization care. Due to the critical nature of this patient, I reassessed nursing observations, vital signs, physical exam, interpretation of imaging, labs, mental status and respiratory status multiple times prior to her disposition.     Time also spent performing documentation, discussion with consultants and coordination of care.     Critical care time (excluding teaching time and procedures): 45 minutes.        Results for orders placed or performed during the hospital encounter of 11/23/21   XR Chest Port 1 View     Status: None    Narrative    EXAM: XR CHEST PORT 1 VIEW  LOCATION: [facility]  DATE/TIME: 11/23/2021 2:35 AM    INDICATION: sob  COMPARISON: Thoracic spine x-ray performed 11/11/2021        Impression    IMPRESSION: Interval development of large right pleural effusion. No pneumothorax. Cardiac silhouette within normal limits.   CT Chest (PE) Abdomen Pelvis w Contrast     Status: None    Narrative    EXAM: CT CHEST PE ABDOMEN PELVIS W CONTRAST  LOCATION: [facility]  DATE/TIME: 11/23/2021 4:45 AM    INDICATION: Severe shortness of breath and hypoxia. Leukocytosis.  COMPARISON: Portable chest radiograph from 11/23/2021.  TECHNIQUE: CT chest pulmonary angiogram and routine CT abdomen pelvis with IV contrast. Arterial phase through the chest and venous phase through the abdomen and pelvis. Multiplanar reformats and MIP  reconstructions were performed. Dose reduction   techniques were used.   CONTRAST: 125mL isovue-370    FINDINGS:  ANGIOGRAM CHEST: Pulmonary arteries are normal caliber and negative for pulmonary emboli. Thoracic aorta is negative for dissection. No CT evidence of right heart strain.     LUNGS AND PLEURA: Patchy multifocal airspace infiltrates in both lungs. There is atelectasis of the right lower lobe. There is a large loculated pleural effusion posteriorly within the right hemithorax with an air-fluid level which measures roughly 11.5   x 12.7 x 16.9 cm. The size of this collection results in mass effect which compresses the central bronchovascular structures as well as the lower SVC (image 60 series 5).    MEDIASTINUM/AXILLAE: Heart size within normal limits. No pericardial effusion. Multiple mildly enlarged paratracheal lymph nodes.    CORONARY ARTERY CALCIFICATION: None.    HEPATOBILIARY: Normal.    PANCREAS: Normal.    SPLEEN: Motion degraded evaluation. No gross abnormalities.    ADRENAL GLANDS: Normal.    KIDNEYS/BLADDER: Motion degraded evaluation of the left kidney. Question mild heterogeneous enhancement of the right kidney, though likely artifactual given motion and absence of adjacent inflammatory change. Bladder is normal.    BOWEL: Normal caliber large and small bowel. Normal appendix.    LYMPH NODES: Normal.    VASCULATURE: Unremarkable.    PELVIC ORGANS: Intrauterine contraceptive device in place. No significant free fluid.    MUSCULOSKELETAL: No acute osseous findings.      Impression    IMPRESSION:  1.  There is a large empyema in the posterior right hemithorax with collapse of the right lower lobe and mass effect on the central bronchovascular structures including narrowing of the inferior aspect of the superior vena cava.    2.  Evidence of multifocal pneumonia.    3.  No focal inflammatory change in the abdomen or pelvis.    4.  Findings discussed by telephone with Dr. Luevano at [phone] on  11/23/2021.   Symptomatic Influenza A/B & SARS-CoV2 (COVID-19) Virus PCR Multiplex Nasopharyngeal     Status: Normal    Specimen: Nasopharyngeal; Swab   Result Value Ref Range    Influenza A target Negative Negative    Influenza B target Negative Negative    SARS CoV2 PCR Negative Negative    Narrative    Testing was performed using the luis SARS-CoV-2 & Influenza A/B Assay on the luis [place] System. This test should be ordered for the detection of SARS-CoV-2 and influenza viruses in individuals who meet clinical and/or epidemiological criteria. Test performance is unknown in asymptomatic patients. This test is for in vitro diagnostic use under the FDA EUA for laboratories certified under CLIA to perform moderate and/or high complexity testing. This test has not been FDA cleared or approved. A negative result does not rule out the presence of PCR inhibitors in the specimen or target RNA in concentration below the limit of detection for the assay. If only one viral target is positive but coinfection with multiple targets is suspected, the sample should be re-tested with another FDA cleared, approved or authorized test, if coinfection would change clinical management. [facility] are certified under the Clinical Laboratory Improvement Amendments of 1988 (CLIA-88) as  qualified to perform moderate and/or high complexity laboratory testing.   Comprehensive metabolic panel     Status: Abnormal   Result Value Ref Range    Sodium 138 133 - 144 mmol/L    Potassium 3.7 3.4 - 5.3 mmol/L    Chloride 105 94 - 109 mmol/L    Carbon Dioxide (CO2) 23 20 - 32 mmol/L    Anion Gap 10 3 - 14 mmol/L    Urea Nitrogen 24 7 - 30 mg/dL    Creatinine 1.28 (H) 0.52 - 1.04 mg/dL    Calcium 8.7 8.5 - 10.1 mg/dL    Glucose 124 (H) 70 - 99 mg/dL    Alkaline Phosphatase 302 (H) 40 - 150 U/L     (H) 0 - 45 U/L     (H) 0 - 50 U/L    Protein Total 8.0 6.8 - 8.8 g/dL    Albumin 1.9 (L) 3.4 - 5.0 g/dL    Bilirubin Total  2.4 (H) 0.2 - 1.3 mg/dL    GFR Estimate 54 (L) >60 mL/min/1.73m2   Troponin I     Status: Normal   Result Value Ref Range    Troponin I <0.015 0.000 - 0.045 ug/L   HCG qualitative pregnancy (blood)     Status: Normal   Result Value Ref Range    hCG Serum Qualitative Negative Negative   BNP     Status: Normal   Result Value Ref Range    N terminal Pro BNP Inpatient 345 0 - 450 pg/mL   Lactic acid whole blood     Status: Normal   Result Value Ref Range    Lactic Acid 1.2 0.7 - 2.0 mmol/L   CBC with platelets and differential     Status: Abnormal   Result Value Ref Range    WBC Count 24.0 (H) 4.0 - 11.0 10e3/uL    RBC Count 3.95 3.80 - 5.20 10e6/uL    Hemoglobin 10.4 (L) 11.7 - 15.7 g/dL    Hematocrit 32.5 (L) 35.0 - 47.0 %    MCV 82 78 - 100 fL    MCH 26.3 (L) 26.5 - 33.0 pg    MCHC 32.0 31.5 - 36.5 g/dL    RDW 14.5 10.0 - 15.0 %    Platelet Count 610 (H) 150 - 450 10e3/uL    % Neutrophils 82 %    % Lymphocytes 9 %    % Monocytes 4 %    % Eosinophils 1 %    % Basophils 1 %    % Immature Granulocytes 3 %    NRBCs per 100 WBC 0 <1 /100    Absolute Neutrophils 19.6 (H) 1.6 - 8.3 10e3/uL    Absolute Lymphocytes 2.2 0.8 - 5.3 10e3/uL    Absolute Monocytes 1.0 0.0 - 1.3 10e3/uL    Absolute Eosinophils 0.3 0.0 - 0.7 10e3/uL    Absolute Basophils 0.1 0.0 - 0.2 10e3/uL    Absolute Immature Granulocytes 0.8 (H) <=0.0 10e3/uL    Absolute NRBCs 0.0 10e3/uL   Extra Blue Top Tube     Status: None   Result Value Ref Range    Hold Specimen [facility]    CBC with platelets differential     Status: Abnormal    Narrative    The following orders were created for panel order CBC with platelets differential.  Procedure                               Abnormality         Status                     ---------                               -----------         ------                     CBC with platelets and d...[[id number]]  Abnormal            Final result                 Please view results for these tests on the individual orders.   [place] Draw      Status: None    Narrative    The following orders were created for panel order [place] Draw.  Procedure                               Abnormality         Status                     ---------                               -----------         ------                     Extra Blue Top Tube[[id number]]                              Final result                 Please view results for these tests on the individual orders.     Medications   piperacillin-tazobactam (ZOSYN) 4.5 g vial to attach to  mL bag (has no administration in time range)   azithromycin 500 mg (ZITHROMAX) in 0.9% NaCl 250 mL intermittent infusion 500 mg (500 mg Intravenous New Bag 11/23/21 0624)   HYDROmorphone (PF) (DILAUDID) injection 0.5 mg (0.5 mg Intravenous Given 11/23/21 0410)   iopamidol (ISOVUE-370) solution 100 mL (100 mLs Intravenous Given 11/23/21 0520)   iopamidol (ISOVUE-370) solution 50 mL (25 mLs Intravenous Given 11/23/21 0529)   sodium chloride 0.9 % bag 100mL (85 mLs Intravenous Given 11/23/21 0529)        Assessments & Plan (with Medical Decision Making)   Patient appeared in distress as I enter the room, was tachypneic, profusely diaphoretic, had O2 sats at 87% to 89% on room air.  She was immediately placed on oxygen.  She had decreased lung sounds on the right.  Chest x-ray was done very quickly, which showed a near whiteout of the right hemithorax.  EKG was done which did not show acute abnormality.  Basic labs were done, which did show a very high white count of 24.  Given this, she was started on Zosyn and azithromycin.  Covid and influenza did come back negative.  I did send her for CT of the chest and abdomen, as she also had elevated LFTs.  She continues to deny abdominal pain, no abdominal tenderness on repeat exam.  Chest CT showed very large empyema on the right with some compression of the SVC and the mediastinum with the empyema.  She additionally had multifocal pneumonia.  I did speak with thoracic surgery who will  see the patient on the [place] [facility].  She was transferred urgently for urgent evaluation as she will likely need urgent intervention.  The patient did look improved during her stay, but immediately prior to transfer started to appear somewhat more distressed, again becoming diaphoretic, looking more tachypneic.    Dictation Disclaimer: Some of this Note has been completed with voice-recognition dictation software. Although errors are generally corrected real-time, there is the potential for a rare error to be present in the completed chart.      I have reviewed the nursing notes. I have reviewed the findings, diagnosis, plan and need for follow up with the patient.    New Prescriptions    No medications on file       Final diagnoses:   Empyema (H)   Multifocal pneumonia   Transaminitis   Acute respiratory failure with hypoxia (H)       --  Karlee Luevano  [facility] EMERGENCY DEPARTMENT  11/23/2021     Karlee Luevano MD  11/23/21 0714

## 2021-11-23 NOTE — IR NOTE
Patient Name: Scott Dobbs  Medical Record Number: [id number]  Today's Date: 11/23/2021    Procedure: Chest Tube Placement  Proceduralist: Allyssa Reno MD; Sunita Kelsey MD (On-Call Pager #[phone])    Patient in room: 1028  Procedure Start: 1044  Procedure end: 1112  Sedation medications administered: none     Report given to: Donna LOW at 1124  : No  was used for this visit. The patient was able to communicate well in English.    Other Notes: Pt arrived to IR room 6 from ED. Patient arrives alert  and oriented. Consent reviewed. Patient denies any questions or concerns regarding procedure. Pt positioned sitting and monitored per protocol. Pt tolerated procedure well, without noted complications. Pt transferred back to ED.    30 cc pleural fluid sent for labs.

## 2021-11-23 NOTE — ED PROVIDER NOTES
Patient transferred from [place] after being seen by Dr. Luevano.  Patient 36-year-old female with some history of asthma in the past.  Patient had 10 days of cough noted productive sputum with no hemoptysis.  Some shortness of breath increasingly some lightheadedness also but no syncope.  Patient evaluated by Dr. Luevano CT did show right lung empyema with some compression of the SVC.  Patient given IV fluids antibiotic coverage with Zosyn and azithromycin IV.  On high flow O2 feeling better with this oxygen saturations stable mild tachycardia.  Mild tachypnea with speech but otherwise states feel better with oxygen.  Thoracic surgery is aware to see patient ASAP here in the ER.    Patient seen by cardiothoracic surgery here in the ER.  At this point reviewed CT etc.  Recommendations are for admission to their service on intermediate care planning for intervention radiology to place drain to decompress empyema along with therapeutic and diagnostic reasons for this.    Results for orders placed or performed during the hospital encounter of 11/23/21   XR Chest Port 1 View     Status: None    Narrative    EXAM: XR CHEST PORT 1 VIEW  LOCATION: [facility]  DATE/TIME: 11/23/2021 2:35 AM    INDICATION: sob  COMPARISON: Thoracic spine x-ray performed 11/11/2021        Impression    IMPRESSION: Interval development of large right pleural effusion. No pneumothorax. Cardiac silhouette within normal limits.   CT Chest (PE) Abdomen Pelvis w Contrast     Status: None    Narrative    EXAM: CT CHEST PE ABDOMEN PELVIS W CONTRAST  LOCATION: [facility]  DATE/TIME: 11/23/2021 4:45 AM    INDICATION: Severe shortness of breath and hypoxia. Leukocytosis.  COMPARISON: Portable chest radiograph from 11/23/2021.  TECHNIQUE: CT chest pulmonary angiogram and routine CT abdomen pelvis with IV contrast. Arterial phase through the chest and venous phase  through the abdomen and pelvis. Multiplanar reformats and MIP reconstructions were performed. Dose reduction   techniques were used.   CONTRAST: 125mL isovue-370    FINDINGS:  ANGIOGRAM CHEST: Pulmonary arteries are normal caliber and negative for pulmonary emboli. Thoracic aorta is negative for dissection. No CT evidence of right heart strain.     LUNGS AND PLEURA: Patchy multifocal airspace infiltrates in both lungs. There is atelectasis of the right lower lobe. There is a large loculated pleural effusion posteriorly within the right hemithorax with an air-fluid level which measures roughly 11.5   x 12.7 x 16.9 cm. The size of this collection results in mass effect which compresses the central bronchovascular structures as well as the lower SVC (image 60 series 5).    MEDIASTINUM/AXILLAE: Heart size within normal limits. No pericardial effusion. Multiple mildly enlarged paratracheal lymph nodes.    CORONARY ARTERY CALCIFICATION: None.    HEPATOBILIARY: Normal.    PANCREAS: Normal.    SPLEEN: Motion degraded evaluation. No gross abnormalities.    ADRENAL GLANDS: Normal.    KIDNEYS/BLADDER: Motion degraded evaluation of the left kidney. Question mild heterogeneous enhancement of the right kidney, though likely artifactual given motion and absence of adjacent inflammatory change. Bladder is normal.    BOWEL: Normal caliber large and small bowel. Normal appendix.    LYMPH NODES: Normal.    VASCULATURE: Unremarkable.    PELVIC ORGANS: Intrauterine contraceptive device in place. No significant free fluid.    MUSCULOSKELETAL: No acute osseous findings.      Impression    IMPRESSION:  1.  There is a large empyema in the posterior right hemithorax with collapse of the right lower lobe and mass effect on the central bronchovascular structures including narrowing of the inferior aspect of the superior vena cava.    2.  Evidence of multifocal pneumonia.    3.  No focal inflammatory change in the abdomen or pelvis.    4.   Findings discussed by telephone with Dr. Luevano at 0567 on 11/23/2021.   Symptomatic Influenza A/B & SARS-CoV2 (COVID-19) Virus PCR Multiplex Nasopharyngeal     Status: Normal    Specimen: Nasopharyngeal; Swab   Result Value Ref Range    Influenza A target Negative Negative    Influenza B target Negative Negative    SARS CoV2 PCR Negative Negative    Narrative    Testing was performed using the luis SARS-CoV-2 & Influenza A/B Assay on the luis [place] System. This test should be ordered for the detection of SARS-CoV-2 and influenza viruses in individuals who meet clinical and/or epidemiological criteria. Test performance is unknown in asymptomatic patients. This test is for in vitro diagnostic use under the FDA EUA for laboratories certified under CLIA to perform moderate and/or high complexity testing. This test has not been FDA cleared or approved. A negative result does not rule out the presence of PCR inhibitors in the specimen or target RNA in concentration below the limit of detection for the assay. If only one viral target is positive but coinfection with multiple targets is suspected, the sample should be re-tested with another FDA cleared, approved or authorized test, if coinfection would change clinical management. [facility] are certified under the Clinical Laboratory Improvement Amendments of 1988 (CLIA-88) as  qualified to perform moderate and/or high complexity laboratory testing.   Comprehensive metabolic panel     Status: Abnormal   Result Value Ref Range    Sodium 138 133 - 144 mmol/L    Potassium 3.7 3.4 - 5.3 mmol/L    Chloride 105 94 - 109 mmol/L    Carbon Dioxide (CO2) 23 20 - 32 mmol/L    Anion Gap 10 3 - 14 mmol/L    Urea Nitrogen 24 7 - 30 mg/dL    Creatinine 1.28 (H) 0.52 - 1.04 mg/dL    Calcium 8.7 8.5 - 10.1 mg/dL    Glucose 124 (H) 70 - 99 mg/dL    Alkaline Phosphatase 302 (H) 40 - 150 U/L     (H) 0 - 45 U/L     (H) 0 - 50 U/L    Protein Total 8.0 6.8 -  8.8 g/dL    Albumin 1.9 (L) 3.4 - 5.0 g/dL    Bilirubin Total 2.4 (H) 0.2 - 1.3 mg/dL    GFR Estimate 54 (L) >60 mL/min/1.73m2   Troponin I     Status: Normal   Result Value Ref Range    Troponin I <0.015 0.000 - 0.045 ug/L   HCG qualitative pregnancy (blood)     Status: Normal   Result Value Ref Range    hCG Serum Qualitative Negative Negative   BNP     Status: Normal   Result Value Ref Range    N terminal Pro BNP Inpatient 345 0 - 450 pg/mL   Lactic acid whole blood     Status: Normal   Result Value Ref Range    Lactic Acid 1.2 0.7 - 2.0 mmol/L   CBC with platelets and differential     Status: Abnormal   Result Value Ref Range    WBC Count 24.0 (H) 4.0 - 11.0 10e3/uL    RBC Count 3.95 3.80 - 5.20 10e6/uL    Hemoglobin 10.4 (L) 11.7 - 15.7 g/dL    Hematocrit 32.5 (L) 35.0 - 47.0 %    MCV 82 78 - 100 fL    MCH 26.3 (L) 26.5 - 33.0 pg    MCHC 32.0 31.5 - 36.5 g/dL    RDW 14.5 10.0 - 15.0 %    Platelet Count 610 (H) 150 - 450 10e3/uL    % Neutrophils 82 %    % Lymphocytes 9 %    % Monocytes 4 %    % Eosinophils 1 %    % Basophils 1 %    % Immature Granulocytes 3 %    NRBCs per 100 WBC 0 <1 /100    Absolute Neutrophils 19.6 (H) 1.6 - 8.3 10e3/uL    Absolute Lymphocytes 2.2 0.8 - 5.3 10e3/uL    Absolute Monocytes 1.0 0.0 - 1.3 10e3/uL    Absolute Eosinophils 0.3 0.0 - 0.7 10e3/uL    Absolute Basophils 0.1 0.0 - 0.2 10e3/uL    Absolute Immature Granulocytes 0.8 (H) <=0.0 10e3/uL    Absolute NRBCs 0.0 10e3/uL   Extra Blue Top Tube     Status: None   Result Value Ref Range    Hold Specimen JI    CBC with platelets differential     Status: Abnormal    Narrative    The following orders were created for panel order CBC with platelets differential.  Procedure                               Abnormality         Status                     ---------                               -----------         ------                     CBC with platelets and d...[[id number]]  Abnormal            Final result                 Please view  results for these tests on the individual orders.   [place] Draw     Status: None    Narrative    The following orders were created for panel order [place] Draw.  Procedure                               Abnormality         Status                     ---------                               -----------         ------                     Extra Blue Top Tube[[id number]]                              Final result                 Please view results for these tests on the individual orders.     This note was created at least in part by the use of dragon voice dictation system. Inadvertent typographical errors may still exist.  Teo Christensen MD.    Patient evaluated in the emergency department during the COVID-19 pandemic period. Careful attention to patients safety was addressed throughout the evaluation. Evaluation and treatment management was initiated with disposition made efficiently and appropriate as possible to minimize any risk of potential exposure to patient during this evaluation.       Teo Christensen MD  11/23/21 0721       Teo Christensen MD  11/23/21 0805

## 2021-11-23 NOTE — PROGRESS NOTES
Assessment & Plan     Right ear pain  No sign of infection in the ear. The left middle ear has some clear fluid and no fluid in the right middle ear. No lymphadenopathy. Remainder of exam normal. We discussed ENT referral if it persists. Follow up with PCP if symptoms persist or worsen.           No follow-ups on file.    ELIN Thomas CNP  [facility] CARE JOSE Chavez is a 36 year old female who presents to clinic today for the following health issues:  Chief Complaint   Patient presents with     Otitis Media     right ear infection since Friday (have a cough but is here mainly for the ear)     HPI      Pain is fairly constant. Is taking Tylenol and prednisone for back and still having pain in the ear.   No fevers. Infrequent cough. No sinus or nasal congestion.     Review of Systems  Constitutional, HEENT, cardiovascular, pulmonary, gi and gu systems are negative, except as otherwise noted.      Objective    BP (!) 141/91 (BP Location: Right arm, Patient Position: Sitting, Cuff Size: Adult Large)   Pulse 96   Temp 98.4  F (36.9  C) (Tympanic)   Resp 16   Wt 147.1 kg (324 lb 4.8 oz)   SpO2 100%   BMI 47.55 kg/m    Physical Exam   GENERAL: alert and no distress  EYES: Eyes grossly normal to inspection, PERRL and conjunctivae and sclerae normal  HENT: ear canals and TM's normal, tiny pimple in right ear canal that was not tender when pressed on by otoscope, nose and mouth without ulcers or lesions  NECK: no adenopathy, no asymmetry, masses, or scars and thyroid normal to palpation  RESP: lungs clear to auscultation - no rales, rhonchi or wheezes  MS: no gross musculoskeletal defects noted, no edema  PSYCH: mentation appears normal, affect normal/bright

## 2021-11-23 NOTE — ED NOTES
Dr Christensen updated on temp 100.8 and respiratory status. Plan to start LR 125mL/hr and PO tylenol, continue to monitor respiratory status after starting mIVF.

## 2021-11-23 NOTE — PROCEDURES
[facility]    Procedure: IR Procedure Note    Date/Time: 11/23/2021 11:14 AM  Performed by: Douglas Núñez MD  Authorized by: Douglas Núñez MD   IR Fellow Physician: Dr. Kelsey  Radiology Resident Physician: Dr. Núñez  Other(s) attending procedure: Dr. Arizmendi - staff      UNIVERSAL PROTOCOL   Site Marked: Yes  Prior Images Obtained and Reviewed:  Yes  Required items: Required blood products, implants, devices and special equipment available    Patient identity confirmed:  Verbally with patient, arm band, provided demographic data and hospital-assigned identification number  NA - No sedation, light sedation, or local anesthesia  Confirmation Checklist:  Patient's identity using two indicators, relevant allergies, procedure was appropriate and matched the consent or emergent situation and correct equipment/implants were available  Time out: Immediately prior to the procedure a time out was called    Universal Protocol: the Joint Commission Universal Protocol was followed    Preparation: Patient was prepped and draped in usual sterile fashion       ANESTHESIA    Anesthesia: Local infiltration  Local Anesthetic:  Lidocaine 1% without epinephrine      SEDATION    Patient Sedated: No    See dictated procedure note for full details.  Findings: Empyema    Specimens: fluid and/or tissue for gram stain and culture    Complications: None    Condition: Stable    Plan: 16 [place]ish non locking pigtail to 20 mmHg suction.      PROCEDURE    Patient Tolerance:  Patient tolerated the procedure well with no immediate complications  Length of time physician/provider present for 1:1 monitoring during sedation: 20

## 2021-11-23 NOTE — CONSULTS
Interventional Radiology Consult Service Note    Patient is on IR schedule 11/23 for a right sided chest tube placement for hydropneumothorax, 14F vs 16F, send dx labs.   Platelets WNLs, INR pending. Quant serum hCG pending. COVID neg.    No NPO required.  Consent will be done prior to procedure.     Please contact the IR [place] RN at [phone] for estimated time of procedure.     Case discussed with Dr. Mitchell from IR, Dr. Henning and David Jett PA-C from thoracic surgery. This is a 36 year old female with hx of asthma who presented this the ED this AM with shortness of breath over 10 days that is progressively getting worse. Now has new symptoms of productive cough. Afebrile, no chills, or sweats. Leukocytosis to 24. CT chest with right hydropneumothorax concerning for empyema. IR is consulted for chest tube placement for drainage. Ideally a larger bore chest tube would be placed to optimize drainage.     Dx labs per thoracic surgery.     Expected date of discharge: TBD    Vitals:   /78   Pulse 116   Temp 98.6  F (37  C) (Oral)   Resp (!) 40   SpO2 100%   Breastfeeding No     Pertinent Labs:     Lab Results   Component Value Date    WBC 24.0 (H) 11/23/2021    WBC 9.4 10/29/2020    WBC 9.7 10/02/2020    WBC 12.3 (H) 07/01/2020       Lab Results   Component Value Date    HGB 10.4 11/23/2021    HGB 13.0 10/29/2020    HGB 12.1 10/21/2020    HGB 14.3 10/19/2020       Lab Results   Component Value Date     11/23/2021     10/29/2020     10/21/2020     10/19/2020       No results found for: INR, PTT    Lab Results   Component Value Date    POTASSIUM 3.7 11/23/2021        ELIN Gonzalez CNP  Interventional Radiology  Pager: [phone]

## 2021-11-23 NOTE — H&P
Thoracic Surgery   H&P Note     Scott Dobbs  Code Status: No Order  Primary Care Physician: Anushka Canales   : 1985   Age: 36 year old     Date of Service: 2021     Subjective     Chief Complaint: Shortness of Breath    History of present illness:     37 y/o otherwise healthy female who presents with shortness of breath over 10 days that is progressively getting worse. Now has new symptoms of productive cough. Afebrile, no chills, or sweats. Has had worse GERD symptoms recently and has been taking ibuprofen for back spasms. Found to have large right sided empyema at OSH.     Subjective   Review of Systems:   General- denies any fevers, chills, or night sweats  HEENT- denies headache, change in vision or change in hearing, sore throat, or rhinorrhea  Cardiac- denies chest pain or palpitations  Pulmonary- has shortness of breath or cough  GI- denies any abd pain, nausea, vomiting, constipation, or diarrhea, no hematochezia  - denies any dysuria, hematuria, or urinary changes  Skin- denies any skin rashes or skin changes  MSK- denies any muscle pains  Psych- denies any suicidal or homicidal ideation  Neuro- denies dizziness, syncope, or tremors  Endocrine- denies heat or cold intolerance     Patient Active Problem List   Diagnosis     Adjustment insomnia     Overweight (BMI 25.0-29.9)     Situational anxiety     Situational mixed anxiety and depressive disorder     Encounter for insertion of intrauterine contraceptive device     Insufficient lactation     Morbid obesity (H)     Acute right-sided thoracic back pain     Empyema (H)     Transaminitis     Acute respiratory failure with hypoxia (H)     Multifocal pneumonia     Past Medical History:   Diagnosis Date     Anxiety      Hypertension 2020     Obesity (BMI 30-39.9)      Uncomplicated asthma Childhood only     Past Surgical History:   Procedure Laterality Date      SECTION N/A 10/21/2020    Procedure:   SECTION;  Surgeon: Elisabeth Lima MD;  Location: [facility] L[facility]     PE TUBES       Social History     Socioeconomic History     Marital status:      Spouse name: Not on file     Number of children: Not on file     Years of education: Not on file     Highest education level: Not on file   Occupational History     Not on file   Tobacco Use     Smoking status: Former Smoker     Packs/day: 0.00     Years: 10.00     Pack years: 0.00     Types: Cigarettes, Hookah     Quit date: 2020     Years since quittin.7     Smokeless tobacco: Never Used   Substance and Sexual Activity     Alcohol use: Yes     Drug use: Never     Sexual activity: Yes     Partners: Male     Birth control/protection: I.U.D.   Other Topics Concern     Parent/sibling w/ CABG, MI or angioplasty before 65F 55M? No   Social History Narrative     Not on file     Social Determinants of Health     Financial Resource Strain: Not on file   Food Insecurity: Not on file   Transportation Needs: Not on file   Physical Activity: Not on file   Stress: Not on file   Social Connections: Not on file   Intimate Partner Violence: Not At Risk     Fear of Current or Ex-Partner: No     Emotionally Abused: No     Physically Abused: No     Sexually Abused: No   Housing Stability: Not on file     Family History   Problem Relation Age of Onset     Thyroid Disease Mother      Hypertension Mother      Breast Cancer Paternal Grandmother         stage 1 early 50's.      Hypertension Paternal Grandmother      Hypertension Paternal Grandfather      Prostate Cancer Paternal Grandfather      (Not in a hospital admission)    No Known Allergies       Objective   Objective   /82   Pulse (!) 122   Temp 98.6  F (37  C) (Oral)   Resp (!) 40   SpO2 99%   Breastfeeding No     Temp  Min: 98.1  F (36.7  C)  Max: 98.6  F (37  C)  BP  Min: 109/73  Max: 174/94   No intake or output data in the 24 hours ending 21 0752  No intake/output data recorded.     Physical Exam:   Gen-  alert and oriented x3, NAD  HEENT- NC/AT, EOMI  Cardiac- RRR  Pulm- normal respiratory effort on face mask at 8 lpm  Abd- soft, NT/ND, no rebound or guarding  - deferred  Extremities- no peripheral edema  Neuro- gross motor intact  Skin- no obvious rashes, bruises, or cuts     Pertinent Labs: Reviewed.     Arterial Blood Gases   No lab results found in last 7 days.    Complete Blood Count   Recent Labs   Lab 11/23/21 0231   WBC 24.0*   HGB 10.4*   *       Basic Metabolic Panel  Recent Labs   Lab 11/23/21 0231      POTASSIUM 3.7   CHLORIDE 105   CO2 23   BUN 24   CR 1.28*       Liver Function Tests  Recent Labs   Lab 11/23/21 0231   *   *   ALKPHOS 302*   BILITOTAL 2.4*   ALBUMIN 1.9*       Coagulation Profile  No lab results found in last 7 days.       Pertinent Imaging:  Recent Results (from the past 24 hour(s))   XR Chest Port 1 View    Narrative    EXAM: XR CHEST PORT 1 VIEW  LOCATION: [facility]  DATE/TIME: 11/23/2021 2:35 AM    INDICATION: sob  COMPARISON: Thoracic spine x-ray performed 11/11/2021        Impression    IMPRESSION: Interval development of large right pleural effusion. No pneumothorax. Cardiac silhouette within normal limits.   CT Chest (PE) Abdomen Pelvis w Contrast    Narrative    EXAM: CT CHEST PE ABDOMEN PELVIS W CONTRAST  LOCATION: [facility]  DATE/TIME: 11/23/2021 4:45 AM    INDICATION: Severe shortness of breath and hypoxia. Leukocytosis.  COMPARISON: Portable chest radiograph from 11/23/2021.  TECHNIQUE: CT chest pulmonary angiogram and routine CT abdomen pelvis with IV contrast. Arterial phase through the chest and venous phase through the abdomen and pelvis. Multiplanar reformats and MIP reconstructions were performed. Dose reduction   techniques were used.   CONTRAST: 125mL isovue-370    FINDINGS:  ANGIOGRAM CHEST: Pulmonary arteries are normal caliber and negative  for pulmonary emboli. Thoracic aorta is negative for dissection. No CT evidence of right heart strain.     LUNGS AND PLEURA: Patchy multifocal airspace infiltrates in both lungs. There is atelectasis of the right lower lobe. There is a large loculated pleural effusion posteriorly within the right hemithorax with an air-fluid level which measures roughly 11.5   x 12.7 x 16.9 cm. The size of this collection results in mass effect which compresses the central bronchovascular structures as well as the lower SVC (image 60 series 5).    MEDIASTINUM/AXILLAE: Heart size within normal limits. No pericardial effusion. Multiple mildly enlarged paratracheal lymph nodes.    CORONARY ARTERY CALCIFICATION: None.    HEPATOBILIARY: Normal.    PANCREAS: Normal.    SPLEEN: Motion degraded evaluation. No gross abnormalities.    ADRENAL GLANDS: Normal.    KIDNEYS/BLADDER: Motion degraded evaluation of the left kidney. Question mild heterogeneous enhancement of the right kidney, though likely artifactual given motion and absence of adjacent inflammatory change. Bladder is normal.    BOWEL: Normal caliber large and small bowel. Normal appendix.    LYMPH NODES: Normal.    VASCULATURE: Unremarkable.    PELVIC ORGANS: Intrauterine contraceptive device in place. No significant free fluid.    MUSCULOSKELETAL: No acute osseous findings.      Impression    IMPRESSION:  1.  There is a large empyema in the posterior right hemithorax with collapse of the right lower lobe and mass effect on the central bronchovascular structures including narrowing of the inferior aspect of the superior vena cava.    2.  Evidence of multifocal pneumonia.    3.  No focal inflammatory change in the abdomen or pelvis.    4.  Findings discussed by telephone with Dr. Luevano at 0551 on 11/23/2021.          Current Medications     S  C  H  E  D   piperacillin-tazobactam  4.5 g Intravenous Once      G  T  T    P  R  N          Assessment     36 year old female with large  right sided empyema     Plan     - will ask IR to place large bore chest tube under image guidance  - send fluid for cultures, AFB, fungal  - monitor for signs of sepsis  - pulm toilet  - continue antibiotics and narrow based on cultures  - admit to 6B for further monitoring    Plan discussed with Dr. Henning       Signed:    Uvaldo Guillory MD 11/23/2021 at 7:52 AM  Cardiothoracic Surgery Fellow  Pager: [phone]

## 2021-11-24 ENCOUNTER — APPOINTMENT (OUTPATIENT)
Dept: GENERAL RADIOLOGY | Facility: CLINIC | Age: 36
DRG: 871 | End: 2021-11-24
Payer: COMMERCIAL

## 2021-11-24 LAB
ANION GAP SERPL CALCULATED.3IONS-SCNC: 7 MMOL/L (ref 3–14)
BACTERIA UR CULT: NO GROWTH
BUN SERPL-MCNC: 18 MG/DL (ref 7–30)
CALCIUM SERPL-MCNC: 8.9 MG/DL (ref 8.5–10.1)
CHLORIDE BLD-SCNC: 107 MMOL/L (ref 94–109)
CO2 SERPL-SCNC: 23 MMOL/L (ref 20–32)
CREAT SERPL-MCNC: 1.02 MG/DL (ref 0.52–1.04)
ERYTHROCYTE [DISTWIDTH] IN BLOOD BY AUTOMATED COUNT: 14.7 % (ref 10–15)
GFR SERPL CREATININE-BSD FRML MDRD: 71 ML/MIN/1.73M2
GLUCOSE BLD-MCNC: 159 MG/DL (ref 70–99)
HCT VFR BLD AUTO: 31.3 % (ref 35–47)
HGB BLD-MCNC: 9.8 G/DL (ref 11.7–15.7)
LACTATE SERPL-SCNC: 0.7 MMOL/L (ref 0.7–2)
LACTATE SERPL-SCNC: 1.1 MMOL/L (ref 0.7–2)
MAGNESIUM SERPL-MCNC: 2.1 MG/DL (ref 1.6–2.3)
MCH RBC QN AUTO: 26.6 PG (ref 26.5–33)
MCHC RBC AUTO-ENTMCNC: 31.3 G/DL (ref 31.5–36.5)
MCV RBC AUTO: 85 FL (ref 78–100)
MRSA DNA SPEC QL NAA+PROBE: NEGATIVE
PHOSPHATE SERPL-MCNC: 3 MG/DL (ref 2.5–4.5)
PLATELET # BLD AUTO: 541 10E3/UL (ref 150–450)
POTASSIUM BLD-SCNC: 3.5 MMOL/L (ref 3.4–5.3)
RBC # BLD AUTO: 3.68 10E6/UL (ref 3.8–5.2)
SA TARGET DNA: NEGATIVE
SODIUM SERPL-SCNC: 137 MMOL/L (ref 133–144)
WBC # BLD AUTO: 36.8 10E3/UL (ref 4–11)

## 2021-11-24 PROCEDURE — 258N000003 HC RX IP 258 OP 636: Performed by: THORACIC SURGERY (CARDIOTHORACIC VASCULAR SURGERY)

## 2021-11-24 PROCEDURE — 83605 ASSAY OF LACTIC ACID: CPT | Performed by: THORACIC SURGERY (CARDIOTHORACIC VASCULAR SURGERY)

## 2021-11-24 PROCEDURE — 250N000011 HC RX IP 250 OP 636: Performed by: STUDENT IN AN ORGANIZED HEALTH CARE EDUCATION/TRAINING PROGRAM

## 2021-11-24 PROCEDURE — 250N000013 HC RX MED GY IP 250 OP 250 PS 637: Performed by: STUDENT IN AN ORGANIZED HEALTH CARE EDUCATION/TRAINING PROGRAM

## 2021-11-24 PROCEDURE — 258N000003 HC RX IP 258 OP 636: Performed by: STUDENT IN AN ORGANIZED HEALTH CARE EDUCATION/TRAINING PROGRAM

## 2021-11-24 PROCEDURE — 71045 X-RAY EXAM CHEST 1 VIEW: CPT

## 2021-11-24 PROCEDURE — 87641 MR-STAPH DNA AMP PROBE: CPT | Performed by: PHYSICIAN ASSISTANT

## 2021-11-24 PROCEDURE — 83605 ASSAY OF LACTIC ACID: CPT

## 2021-11-24 PROCEDURE — 36415 COLL VENOUS BLD VENIPUNCTURE: CPT | Performed by: STUDENT IN AN ORGANIZED HEALTH CARE EDUCATION/TRAINING PROGRAM

## 2021-11-24 PROCEDURE — 250N000011 HC RX IP 250 OP 636: Performed by: THORACIC SURGERY (CARDIOTHORACIC VASCULAR SURGERY)

## 2021-11-24 PROCEDURE — 83735 ASSAY OF MAGNESIUM: CPT | Performed by: STUDENT IN AN ORGANIZED HEALTH CARE EDUCATION/TRAINING PROGRAM

## 2021-11-24 PROCEDURE — 36415 COLL VENOUS BLD VENIPUNCTURE: CPT | Performed by: THORACIC SURGERY (CARDIOTHORACIC VASCULAR SURGERY)

## 2021-11-24 PROCEDURE — 120N000003 HC R&B IMCU UMMC

## 2021-11-24 PROCEDURE — 80048 BASIC METABOLIC PNL TOTAL CA: CPT | Performed by: STUDENT IN AN ORGANIZED HEALTH CARE EDUCATION/TRAINING PROGRAM

## 2021-11-24 PROCEDURE — 85027 COMPLETE CBC AUTOMATED: CPT | Performed by: STUDENT IN AN ORGANIZED HEALTH CARE EDUCATION/TRAINING PROGRAM

## 2021-11-24 PROCEDURE — 84100 ASSAY OF PHOSPHORUS: CPT | Performed by: STUDENT IN AN ORGANIZED HEALTH CARE EDUCATION/TRAINING PROGRAM

## 2021-11-24 PROCEDURE — 250N000009 HC RX 250: Performed by: STUDENT IN AN ORGANIZED HEALTH CARE EDUCATION/TRAINING PROGRAM

## 2021-11-24 PROCEDURE — 0W9930Z DRAINAGE OF RIGHT PLEURAL CAVITY WITH DRAINAGE DEVICE, PERCUTANEOUS APPROACH: ICD-10-PCS | Performed by: THORACIC SURGERY (CARDIOTHORACIC VASCULAR SURGERY)

## 2021-11-24 PROCEDURE — 250N000013 HC RX MED GY IP 250 OP 250 PS 637

## 2021-11-24 PROCEDURE — 71045 X-RAY EXAM CHEST 1 VIEW: CPT | Mod: 26 | Performed by: RADIOLOGY

## 2021-11-24 RX ORDER — NALOXONE HYDROCHLORIDE 0.4 MG/ML
0.2 INJECTION, SOLUTION INTRAMUSCULAR; INTRAVENOUS; SUBCUTANEOUS
Status: DISCONTINUED | OUTPATIENT
Start: 2021-11-24 | End: 2021-11-30 | Stop reason: HOSPADM

## 2021-11-24 RX ORDER — NALOXONE HYDROCHLORIDE 0.4 MG/ML
0.4 INJECTION, SOLUTION INTRAMUSCULAR; INTRAVENOUS; SUBCUTANEOUS
Status: DISCONTINUED | OUTPATIENT
Start: 2021-11-24 | End: 2021-11-30 | Stop reason: HOSPADM

## 2021-11-24 RX ORDER — VANCOMYCIN HYDROCHLORIDE 1 G/200ML
1000 INJECTION, SOLUTION INTRAVENOUS EVERY 12 HOURS
Status: DISCONTINUED | OUTPATIENT
Start: 2021-11-24 | End: 2021-11-26

## 2021-11-24 RX ADMIN — LIDOCAINE 2 PATCH: 560 PATCH PERCUTANEOUS; TOPICAL; TRANSDERMAL at 08:39

## 2021-11-24 RX ADMIN — Medication 50 ML: at 20:25

## 2021-11-24 RX ADMIN — OXYCODONE HYDROCHLORIDE 5 MG: 5 TABLET ORAL at 08:39

## 2021-11-24 RX ADMIN — OXYCODONE HYDROCHLORIDE 5 MG: 5 TABLET ORAL at 12:54

## 2021-11-24 RX ADMIN — OXYCODONE HYDROCHLORIDE 5 MG: 5 TABLET ORAL at 00:12

## 2021-11-24 RX ADMIN — OXYCODONE HYDROCHLORIDE 5 MG: 5 TABLET ORAL at 04:07

## 2021-11-24 RX ADMIN — OXYCODONE HYDROCHLORIDE 10 MG: 5 TABLET ORAL at 23:37

## 2021-11-24 RX ADMIN — PIPERACILLIN AND TAZOBACTAM 3.38 G: 3; .375 INJECTION, POWDER, LYOPHILIZED, FOR SOLUTION INTRAVENOUS at 20:27

## 2021-11-24 RX ADMIN — PIPERACILLIN AND TAZOBACTAM 3.38 G: 3; .375 INJECTION, POWDER, LYOPHILIZED, FOR SOLUTION INTRAVENOUS at 04:02

## 2021-11-24 RX ADMIN — PIPERACILLIN AND TAZOBACTAM 3.38 G: 3; .375 INJECTION, POWDER, LYOPHILIZED, FOR SOLUTION INTRAVENOUS at 11:32

## 2021-11-24 RX ADMIN — IBUPROFEN 600 MG: 200 TABLET, FILM COATED ORAL at 22:35

## 2021-11-24 RX ADMIN — VANCOMYCIN HYDROCHLORIDE 1250 MG: 100 INJECTION, POWDER, LYOPHILIZED, FOR SOLUTION INTRAVENOUS at 12:41

## 2021-11-24 RX ADMIN — VANCOMYCIN HYDROCHLORIDE 1000 MG: 1 INJECTION, SOLUTION INTRAVENOUS at 23:39

## 2021-11-24 RX ADMIN — IBUPROFEN 600 MG: 200 TABLET, FILM COATED ORAL at 15:49

## 2021-11-24 RX ADMIN — OXYCODONE HYDROCHLORIDE 10 MG: 5 TABLET ORAL at 17:57

## 2021-11-24 ASSESSMENT — ACTIVITIES OF DAILY LIVING (ADL)
ADLS_ACUITY_SCORE: 8
ADLS_ACUITY_SCORE: 10
ADLS_ACUITY_SCORE: 8

## 2021-11-24 NOTE — PROVIDER NOTIFICATION
11/23/21 2200   Call Information   Date of Call 11/23/21   Time of Call 2207   Name of person requesting the team Anjali Nobles   Title of person requesting team RN   RRT Arrival time 2210   Time RRT ended 2220   Reason for call   Type of RRT Adult   Primary reason for call Sepsis suspected   Sepsis Suspected Elevated Lactate level;Temperature <95 or >101;Heart Rate > 100;RR > 20, SaO2 <90% OR increasing O2 need;WBC <4 or >12   Was patient transferred from the ED, ICU, or PACU within last 24 hours prior to RRT call? No   SBAR   Situation LA 2.2   Background This is a 36 year old female with hx of asthma who presented this the ED this AM with shortness of breath over 10 days that is progressively getting worse. Now has new symptoms of productive cough. Afebrile, no chills, or sweats. Leukocytosis to 24. CT chest with right hydropneumothorax concerning for empyema.   Notable History/Conditions   (Asthma)   Assessment A&Ox4, no complaints but reports SOB upon questioning. Temp improved after RN gave ibuprofen. On 5L OxyMask however O2 requirements have decreased today.    Interventions Labs   Patient Outcome   Patient Outcome Stabilized on unit   RRT Team   Attending/Primary/Covering Physician Thoracic   Date Attending Physician notified 11/23/21   Time Attending Physician notified 2207   Physician(s) Candice Almanza PA   Lead RN Annei Nobles   Post RRT Intervention Assessment   Post RRT Assessment Stable/Improved   Date Follow Up Done 11/24/21   Time Follow Up Done 0030

## 2021-11-24 NOTE — PHARMACY-VANCOMYCIN DOSING SERVICE
"Pharmacy Vancomycin Initial Note  Date of Service 2021  Patient's  1985  36 year old, female    Indication: Empyema    Current estimated CrCl = Estimated Creatinine Clearance: 119.1 mL/min (based on SCr of 1.02 mg/dL).    Creatinine for last 3 days  2021:  2:31 AM Creatinine 1.28 mg/dL;  3:50 PM Creatinine 0.98 mg/dL  2021:  5:21 AM Creatinine 1.02 mg/dL    Recent Vancomycin Level(s) for last 3 days  No results found for requested labs within last 72 hours.      Vancomycin IV Administrations (past 72 hours)      No vancomycin orders with administrations in past 72 hours.                Nephrotoxins and other renal medications (From now, onward)    Start     Dose/Rate Route Frequency Ordered Stop    21  ibuprofen (ADVIL/MOTRIN) tablet 600 mg         600 mg Oral EVERY 6 HOURS PRN 21 1210  piperacillin-tazobactam (ZOSYN) 3.375 g vial to attach to  mL bag        Note to Pharmacy: For [facility], [facility] and [facility]: For Zosyn-naive patients, use the \"Zosyn initial dose + extended infusion\" order panel.    3.375 g  over 30 Minutes Intravenous EVERY 8 HOURS 21 1208            Contrast Orders - past 72 hours (72h ago, onward)            Start     Dose/Rate Route Frequency Ordered Stop    21 0440  iopamidol (ISOVUE-370) solution 100 mL         100 mL Intravenous ONCE 21 0439 21 0520    21 0440  iopamidol (ISOVUE-370) solution 50 mL         50 mL Intravenous ONCE 21 0439 21 0529          InsightRX Prediction of Planned Initial Vancomycin Regimen  Loading dose: 1250 mg at 00:00 2021.  Regimen: 1000 mg IV every 12 hours.  Start time: 11:08 on 2021  Exposure target: AUC24 (range)400-600 mg/L.hr   AUC24,ss: 450 mg/L.hr  Probability of AUC24 > 400: 59 %  Ctrough,ss: 12.4 mg/L  Probability of Ctrough,ss > 20: 27 %  Probability of nephrotoxicity (Lodise GIANNI ): 8 %          Plan:  1. Start vancomycin  1250 mg IV once " followed by 1000mg IV every 12 h.   2. Vancomycin monitoring method: AUC  3. Vancomycin therapeutic monitoring goal: 400-600 mg*h/L  4. Pharmacy will check vancomycin levels as appropriate in 1-3 Days.    5. Serum creatinine levels will be ordered daily for the first week of therapy and at least twice weekly for subsequent weeks.      Geovanny Mendez RPH

## 2021-11-24 NOTE — PROGRESS NOTES
THORACIC SURGERY PROGRESS NOTE    S:  Sepsis activation triggered last night - no acute intervention performed. Having fever and chills. Danika holly, lightheadedness, cp, abdominal pain. Dyspnea has improved from prior.     O:  /74 (BP Location: Right arm)   Pulse 107   Temp 98.1  F (36.7  C) (Oral)   Resp 24   Wt 144.7 kg (319 lb 0.1 oz)   SpO2 94%   Breastfeeding No   BMI 46.77 kg/m      I/O last 3 completed shifts:  In: 820 [P.O.:720; IV Piggyback:100]  Out: 1730 [Urine:400; Chest Tube:1330]    Alert, oriented, NAD  Nonlabored breathing on 3 L oxymask   Tachycardic   S NT ND  Distal extremities warm.   Right Chest tube output purulent, no air leak    Labs  Reviewed  Cbc, wbc 36.8 (24), hgb 9.8 (10.4)  Bmp wnl  Lactate 1.1 from 2.2    Imaging  Morning cxr pending    Recent Results (from the past 24 hour(s))   IR Chest Tube Place Non Tunneled Right    Narrative    History: Right-sided empyema. Chest tube placement was requested.    Comparisons: CT CAP 11/23/2021.    Staff: Allyssa Arizmendi MD    Fellow/Resident: MARLA Kelsey MD & Isabel Núñez MD.     Monitoring: Patient remained stable throughout the procedure.     Medications: 8 cc of 1% lidocaine.    Sedation time, face-to-face: Not applicable.    Fluoroscopy time: 0 minutes    Complication: None    Procedure details and findings:     Ultrasound was used to evaluate the right posterior chest wall  demonstrated a complex collection in right pleural space.  The chest  was then prepped and draped. 1% lidocaine was used to anesthetize the  subcutaneous tissues down to the level of the rib. A Yueh needle was  advanced over the top of the rib and into the pleural space. Upon  return of fluid, the Yueh catheter was advanced off the needle. A long  floppy superstiff Amplatz wire was placed through the Yueh catheter  and the tract was dilated to 16 [place]ian. Over the wire an 16 [place]ian  nonlocking Skater catheter was advanced into the pleural space. 60 cc  of odourous  purulent fluid was aspirated.  The catheter was secured in  place with a suture and a sterile bandage was applied.       Impression    IMPRESSION:   1. Successful 16 Maldivian skater nonlocking pigtail chest tube placement  the right sided empyema.  2. 60 cc of purulent fluid was aspirated and sent to the lab.    PLAN:   1. Chest tube to -20 water suction.    XR Chest Port 1 View    Narrative    EXAM: XR CHEST PORT 1 VIEW  11/23/2021 3:43 PM     HISTORY:  s/p chest tube placement       COMPARISON:  Chest x-ray 11/23/2021 at 2:03 AM and CT chest abdomen  and pelvis 11/23/2021    FINDINGS:   Portable frontal radiograph of the chest. Trachea is midline. Cardiac  silhouette size is normal. Interval placement of right basilar chest  tube with decrease in small-to-moderate right pleural effusion. No  appreciable pneumothorax. No left pleural effusion. Patchy opacities  are seen throughout the lungs, predominantly in the left lung base and  perihilar regions. No acute osseous abnormality.      Impression    IMPRESSION:   1. Placement of right basilar chest tube with decreased small to  moderate right pleural effusion.  2. Perihilar and left basilar patchy bilateral airspace opacities,  concerning for infection.    I have personally reviewed the examination and initial interpretation  and I agree with the findings.    ZBIGNIEW ZELAYA MD         SYSTEM ID:  [id number]   XR Chest Port 1 View    Narrative    Exam: XR CHEST PORT 1 VIEW, 11/23/2021 4:40 PM    Indication: chest tube, effusion    Comparison: Chest x-ray same day    Findings:   Portable semiupright AP view of the chest. Pigtail chest tube in the  base of the right lung appears to be slightly retracted.    Trachea is midline. Cardiomediastinal silhouettes within normal  limits. Unchanged right pleural effusion. No left pleural effusion. No  pneumothorax. Patchy opacifications are seen throughout the lungs  predominantly in the perihilar regions and lung bases. No  "acute  osseous abnormalities.      Impression    Impression:   1. Slightly retracted right-sided pigtail chest tube in place with  unchanged right pleural effusion.  2. Stable perihilar and basilar patchy airspace opacifications  concerning for infection.    I have personally reviewed the examination and initial interpretation  and I agree with the findings.    ZBIGNIEW ZELAYA MD         SYSTEM ID:  [id number]        A/P: Scott Dobbs is a 36 year old female with no significant pmh who presented 11/23 with a  Large right sided empyema. Now s/p R IR placed CT on 11/23. On broad abx for now - will narrow as sensitivities and infectious work up continues. WBC rising today.     - diet: regular  - R CT: -20  - aggressive pulm toilet / IS  - f/u infectious work up: MRSA negative, Blx 11/23 negative x2, UClx pending, Aerobic fluid (multiple gram results), Anaerobic fluid culture pending,   - CXR pending, will add lytic therapy today if cxr stable  - abx: zosyn + add vanc  - pplx: scd    Seen with fellow who will discuss with staff    Jayme Kilgore MD  Surgery Resident PGY-1  Please see AMCOM \"Surgery Thoracic/[facility]\" for paging      "

## 2021-11-24 NOTE — PLAN OF CARE
Admission          11/23/2021  1:43 AM  -----------------------------------------------------------  Reason for admission:  Primary team notified of pt arrival.  Admitted from: [facility]      Via: stretcher  Accompanied by: family  Belongings: Placed in closet; valuables sent home with family  Admission Profile: complete  Teaching: orientation to unit and call light- call light within reach, call don't fall, use of console, meal times, when to call for the RN, and enforced importance of safety   Access: PIV  Telemetry:Not ordered  Ht./Wt.: complete  Code Status verified on armband: No code status- team aware  2 RN Skin Assessment Completed By: Julia LOW, Ismael Brown. RN  Med Rec completed: yes  Bed surface reassessed with algorithm and charted: yes  New bed surface ordered: not indicated  Suction/Ambu bag/Flowmeter at bedside: yes  Is patient having diarrhea upon admission- if YES fill out testing algorithm : no    C. Diff Testing Algorithm (MUST be marked YES)   1. 3 or more loose stools in 24 hrs. [] Yes [] No       Additional symptoms:(At least ONE must be marked yes)   1. Abdominal pain/discomfort [] Yes [] No   2. Fever at least 38C (100.4 F) [] Yes [] No   3. Elevated WBC(>11,000) [] Yes [] No       Exclusion Criteria:  (MUST be marked YES)   1. Off laxatives for at least 48 hrs. [] Yes [] No       Pt status:    Temp:  [98.1  F (36.7  C)-100.8  F (38.2  C)] 98.5  F (36.9  C)  Pulse:  [] 111  Resp:  [25-53] 37  BP: (106-174)/() 135/82  SpO2:  [91 %-100 %] 96 %  Neuro: A&Ox4. PERRLA. Other neuros intact  Cardiac: No tele ordered. RRR.  Respiratory: Sating > 92% on 7L oxymask.  GI/: Adequate urine output. BM X1  Diet/appetite: Tolerating reg diet. Eating well.  Activity:  SBA up to bedside commode  Pain: c/o R chest pain 2/2 chest tube  Skin: No new deficits noted.  LDA's: R ch tube -20 mm/Hg; L PIV SL    Plan: Continue with POC. Notify primary team with changes.

## 2021-11-24 NOTE — PROGRESS NOTES
Sepsis Evaluation Progress Note    I was called to see Scott Dobbs due to abnormal vital signs triggering the Sepsis SIRS screening alert. She is known to have an infection.     Physical Exam   Vital Signs:  Temp: 99.3  F (37.4  C) Temp src: Oral BP: 134/82 Pulse: 120   Resp: (!) 32 SpO2: 95 % O2 Device: Oxymask Oxygen Delivery: 5 LPM     General: 36 year old female resting in bed, reports still feeling feverish, no acute distress  Mental Status: AAOx4.  Cardiac: tachycardiac, regular rhythm, no appreciable murmurs, rubs or gallops  Lungs: mildly dyspneic on 5L nasal canula, chest tube in place   Abdomen: tinkering bowel sounds present, soft, non-tender to palpation throughout  Psych: alert, oriented to name, date, hospital and recent events    Data   Lactic Acid   Date Value Ref Range Status   11/23/2021 2.2 (H) 0.7 - 2.0 mmol/L Final   11/23/2021 1.7 0.7 - 2.0 mmol/L Final       Assessment & Plan   Scott Dobbs meets SIRS criteria but does NOT have a lactate >2 or other evidence of acute organ damage.  These vital sign, lab and physical exam findings constitute a diagnosis of SEPSIS.    Ms. Dobbs is a 36 year old female admitted to the Thoracic Surgery service earlier today with sepsis (leukocytosis, tachycardia, hypoxia, fevers and known infection) secondary to a right sided hydropneumothorax and WBC of 24. IR placed a chest tube and Ms. Dobbs reports feeling immediately better after chest tube was placed. Her vital signs triggered the sepsis protocol today and Lactate resulted at 2.2. RRT was called to the bedside. Upon arrival, Ms. Dobbs was resting in bed in no acute distress but a little uncomfortable and  is requiring 4-5L of oxygen,  feeling mildly short of breath of rest, but improved from this morning. She had a fever of 101 an hour ago, that is now better. We reviewed her current plan of care and recommend the following:    Plan:   - Continue with Zosyn and Azithromycin  - Blood cultures  "from 11/23 are positive for several organisms. Will order repeat blood cultures and these should be repeated daily until no further growth.   - Check MRSA swab and if positive, consider adding Vancomycin  - Consider ID consult given multiple sources of infection  - UA checked and without infection  - No fluids needed at this time as patient is eating and drinking well and is not hypotensive  - Check repeat lactate in 4 hours. If continues to elevate, consider small bolus of fluid at that time    Sepsis Time-Zero (time Sepsis diagnosis confirmed): 10:26  11/23/21    Anti-infectives (From now, onward)    Start     Dose/Rate Route Frequency Ordered Stop    11/23/21 1210  piperacillin-tazobactam (ZOSYN) 3.375 g vial to attach to  mL bag        Note to Pharmacy: For [facility], [facility] and [facility]: For Zosyn-naive patients, use the \"Zosyn initial dose + extended infusion\" order panel.    3.375 g  over 30 Minutes Intravenous EVERY 8 HOURS 11/23/21 1208          Current antibiotic coverage is appropriate for source of infection.     Disposition: The patient will remain on the current unit. We will continue to monitor this patient closely..  DELFINA Sarmiento    Sepsis Criteria   Sepsis: 2+ SIRS criteria due to infection  Severe Sepsis: Sepsis AND 1+ new sign of acute organ dysfunction (Note: lactate >2 or acute encephalopathy each qualify as organ dysfunction)  Septic Shock: Sepsis AND hypotension despite volume resuscitation with 30 ml/kg crystalloid or lactate >=4  Note: HYPOTENSION is defined as 2 BP readings measured 3 hrs apart that have a SBP <90, MAP <65, or decrease >40 mmHg, occurring 6 hrs before or after t-zero    "

## 2021-11-24 NOTE — PLAN OF CARE
/72 (BP Location: Right arm)   Pulse 104   Temp 98.1  F (36.7  C) (Oral)   Resp 28   Wt 144.7 kg (319 lb 0.1 oz)   SpO2 95%   Breastfeeding No   BMI 46.77 kg/m    Neuro: A&Ox4.   Cardiac: ST, rates 100s-110s. Pressures 120-150s/70-80s.   Respiratory: Sating >92% on 3L oxymask. Tachypneic, high 20s-low 30s. Reports breathing overall improving. Frequent coughing. Encouraging use of IS.  GI/: Adequate urine output via purewick, UA sent. No BM.  Diet/appetite: Tolerating reg diet. Good PO fluid intake.  Activity:  Assist of 1 up in room. Pt declining much activity at this time d/t coughing and pain.  Pain: Given PRN 5mg oxy q4h with relief.  Skin: No new deficits noted.  LDA's: R posterior chest tube putting out creamy yellow output. X1 PIV.    Code sepsis called on this pt ~2200 d/t lactic of 2.2 after pt triggered sepsis BPA. See RRT note. Lactic recheck ordered this AM. MRSA swab negative.     Plan: Continue with POC. Notify primary team with changes.

## 2021-11-25 ENCOUNTER — APPOINTMENT (OUTPATIENT)
Dept: GENERAL RADIOLOGY | Facility: CLINIC | Age: 36
DRG: 871 | End: 2021-11-25
Payer: COMMERCIAL

## 2021-11-25 LAB
ALBUMIN SERPL-MCNC: 1.5 G/DL (ref 3.4–5)
ALP SERPL-CCNC: 214 U/L (ref 40–150)
ALT SERPL W P-5'-P-CCNC: 98 U/L (ref 0–50)
ANION GAP SERPL CALCULATED.3IONS-SCNC: 5 MMOL/L (ref 3–14)
AST SERPL W P-5'-P-CCNC: 31 U/L (ref 0–45)
BACTERIA ABSC ANAEROBE+AEROBE CULT: ABNORMAL
BACTERIA ABSC ANAEROBE+AEROBE CULT: ABNORMAL
BILIRUB DIRECT SERPL-MCNC: 1.4 MG/DL (ref 0–0.2)
BILIRUB SERPL-MCNC: 1.8 MG/DL (ref 0.2–1.3)
BUN SERPL-MCNC: 18 MG/DL (ref 7–30)
CALCIUM SERPL-MCNC: 8.6 MG/DL (ref 8.5–10.1)
CHLORIDE BLD-SCNC: 105 MMOL/L (ref 94–109)
CO2 SERPL-SCNC: 28 MMOL/L (ref 20–32)
CREAT SERPL-MCNC: 0.98 MG/DL (ref 0.52–1.04)
ERYTHROCYTE [DISTWIDTH] IN BLOOD BY AUTOMATED COUNT: 14.8 % (ref 10–15)
GFR SERPL CREATININE-BSD FRML MDRD: 75 ML/MIN/1.73M2
GLUCOSE BLD-MCNC: 98 MG/DL (ref 70–99)
HCT VFR BLD AUTO: 30.4 % (ref 35–47)
HGB BLD-MCNC: 9.3 G/DL (ref 11.7–15.7)
LACTATE SERPL-SCNC: 0.7 MMOL/L (ref 0.7–2)
MAGNESIUM SERPL-MCNC: 2.2 MG/DL (ref 1.6–2.3)
MCH RBC QN AUTO: 26.6 PG (ref 26.5–33)
MCHC RBC AUTO-ENTMCNC: 30.6 G/DL (ref 31.5–36.5)
MCV RBC AUTO: 87 FL (ref 78–100)
PHOSPHATE SERPL-MCNC: 4 MG/DL (ref 2.5–4.5)
PLATELET # BLD AUTO: 527 10E3/UL (ref 150–450)
POTASSIUM BLD-SCNC: 4.1 MMOL/L (ref 3.4–5.3)
PROT SERPL-MCNC: 7 G/DL (ref 6.8–8.8)
RBC # BLD AUTO: 3.5 10E6/UL (ref 3.8–5.2)
SODIUM SERPL-SCNC: 138 MMOL/L (ref 133–144)
WBC # BLD AUTO: 27.2 10E3/UL (ref 4–11)

## 2021-11-25 PROCEDURE — 83605 ASSAY OF LACTIC ACID: CPT | Performed by: INTERNAL MEDICINE

## 2021-11-25 PROCEDURE — 85027 COMPLETE CBC AUTOMATED: CPT | Performed by: STUDENT IN AN ORGANIZED HEALTH CARE EDUCATION/TRAINING PROGRAM

## 2021-11-25 PROCEDURE — 250N000011 HC RX IP 250 OP 636: Performed by: THORACIC SURGERY (CARDIOTHORACIC VASCULAR SURGERY)

## 2021-11-25 PROCEDURE — 36415 COLL VENOUS BLD VENIPUNCTURE: CPT | Performed by: INTERNAL MEDICINE

## 2021-11-25 PROCEDURE — 80048 BASIC METABOLIC PNL TOTAL CA: CPT | Performed by: STUDENT IN AN ORGANIZED HEALTH CARE EDUCATION/TRAINING PROGRAM

## 2021-11-25 PROCEDURE — 250N000011 HC RX IP 250 OP 636: Performed by: STUDENT IN AN ORGANIZED HEALTH CARE EDUCATION/TRAINING PROGRAM

## 2021-11-25 PROCEDURE — 83735 ASSAY OF MAGNESIUM: CPT | Performed by: STUDENT IN AN ORGANIZED HEALTH CARE EDUCATION/TRAINING PROGRAM

## 2021-11-25 PROCEDURE — 258N000003 HC RX IP 258 OP 636: Performed by: STUDENT IN AN ORGANIZED HEALTH CARE EDUCATION/TRAINING PROGRAM

## 2021-11-25 PROCEDURE — 120N000003 HC R&B IMCU UMMC

## 2021-11-25 PROCEDURE — 36415 COLL VENOUS BLD VENIPUNCTURE: CPT | Performed by: STUDENT IN AN ORGANIZED HEALTH CARE EDUCATION/TRAINING PROGRAM

## 2021-11-25 PROCEDURE — 82248 BILIRUBIN DIRECT: CPT | Performed by: STUDENT IN AN ORGANIZED HEALTH CARE EDUCATION/TRAINING PROGRAM

## 2021-11-25 PROCEDURE — 71045 X-RAY EXAM CHEST 1 VIEW: CPT

## 2021-11-25 PROCEDURE — 71045 X-RAY EXAM CHEST 1 VIEW: CPT | Mod: 26 | Performed by: RADIOLOGY

## 2021-11-25 PROCEDURE — 84100 ASSAY OF PHOSPHORUS: CPT | Performed by: STUDENT IN AN ORGANIZED HEALTH CARE EDUCATION/TRAINING PROGRAM

## 2021-11-25 PROCEDURE — 250N000009 HC RX 250: Performed by: STUDENT IN AN ORGANIZED HEALTH CARE EDUCATION/TRAINING PROGRAM

## 2021-11-25 PROCEDURE — 250N000013 HC RX MED GY IP 250 OP 250 PS 637: Performed by: STUDENT IN AN ORGANIZED HEALTH CARE EDUCATION/TRAINING PROGRAM

## 2021-11-25 RX ADMIN — ENOXAPARIN SODIUM 40 MG: 40 INJECTION SUBCUTANEOUS at 20:03

## 2021-11-25 RX ADMIN — VANCOMYCIN HYDROCHLORIDE 1000 MG: 1 INJECTION, SOLUTION INTRAVENOUS at 23:39

## 2021-11-25 RX ADMIN — OXYCODONE HYDROCHLORIDE 10 MG: 5 TABLET ORAL at 16:30

## 2021-11-25 RX ADMIN — ENOXAPARIN SODIUM 40 MG: 40 INJECTION SUBCUTANEOUS at 10:41

## 2021-11-25 RX ADMIN — PIPERACILLIN AND TAZOBACTAM 3.38 G: 3; .375 INJECTION, POWDER, LYOPHILIZED, FOR SOLUTION INTRAVENOUS at 19:57

## 2021-11-25 RX ADMIN — PIPERACILLIN AND TAZOBACTAM 3.38 G: 3; .375 INJECTION, POWDER, LYOPHILIZED, FOR SOLUTION INTRAVENOUS at 12:19

## 2021-11-25 RX ADMIN — LIDOCAINE 2 PATCH: 560 PATCH PERCUTANEOUS; TOPICAL; TRANSDERMAL at 08:33

## 2021-11-25 RX ADMIN — Medication 50 ML: at 10:11

## 2021-11-25 RX ADMIN — ACETAMINOPHEN 975 MG: 325 TABLET, FILM COATED ORAL at 19:57

## 2021-11-25 RX ADMIN — OXYCODONE HYDROCHLORIDE 10 MG: 5 TABLET ORAL at 12:19

## 2021-11-25 RX ADMIN — OXYCODONE HYDROCHLORIDE 10 MG: 5 TABLET ORAL at 03:57

## 2021-11-25 RX ADMIN — ACETAMINOPHEN 975 MG: 325 TABLET, FILM COATED ORAL at 13:10

## 2021-11-25 RX ADMIN — OXYCODONE HYDROCHLORIDE 10 MG: 5 TABLET ORAL at 20:41

## 2021-11-25 RX ADMIN — Medication 50 ML: at 20:44

## 2021-11-25 RX ADMIN — PIPERACILLIN AND TAZOBACTAM 3.38 G: 3; .375 INJECTION, POWDER, LYOPHILIZED, FOR SOLUTION INTRAVENOUS at 03:47

## 2021-11-25 RX ADMIN — VANCOMYCIN HYDROCHLORIDE 1000 MG: 1 INJECTION, SOLUTION INTRAVENOUS at 11:12

## 2021-11-25 RX ADMIN — OXYCODONE HYDROCHLORIDE 5 MG: 5 TABLET ORAL at 08:33

## 2021-11-25 ASSESSMENT — ACTIVITIES OF DAILY LIVING (ADL)
ADLS_ACUITY_SCORE: 8
ADLS_ACUITY_SCORE: 10
ADLS_ACUITY_SCORE: 10
ADLS_ACUITY_SCORE: 8
ADLS_ACUITY_SCORE: 10
ADLS_ACUITY_SCORE: 8
ADLS_ACUITY_SCORE: 10
ADLS_ACUITY_SCORE: 8
ADLS_ACUITY_SCORE: 10
ADLS_ACUITY_SCORE: 8
ADLS_ACUITY_SCORE: 10
ADLS_ACUITY_SCORE: 10
ADLS_ACUITY_SCORE: 8
ADLS_ACUITY_SCORE: 10
ADLS_ACUITY_SCORE: 8

## 2021-11-25 NOTE — PROGRESS NOTES
THORACIC SURGERY PROGRESS NOTE    S:  Feeling okay, no F/C.  Still with chest pain and SOB after getting lytics.  Still with cough, productive but hard to cough up.      O:  /79 (BP Location: Right arm)   Pulse 86   Temp 98.8  F (37.1  C) (Oral)   Resp 20   Wt 144.7 kg (319 lb 0.1 oz)   SpO2 97%   Breastfeeding No   BMI 46.77 kg/m      I/O last 3 completed shifts:  In: 800 [P.O.:740; Other:60]  Out: 1660 [Urine:1400; Chest Tube:260]    Alert, oriented, NAD  Nonlabored breathing on NC  No cyanosis   Distal extremities warm.   R CT with pink-tinged pus, no air leak     Labs  Reviewed, wbc downtrending     Imaging  Morning cxr stable     Recent Results (from the past 24 hour(s))   XR Chest Port 1 View    Narrative    Exam: XR CHEST PORT 1 VIEW, 11/24/2021 3:35 PM    Indication: chest tube, effusion, daily while monitoring empyema and  CT    Comparison: Chest x-ray 11/23/2021    Findings:   Portable upright AP view of the chest. Stable appearance of pigtail  chest tube in the right lung base.    Trachea is midline. Cardiac silhouette is within normal limits.  Unchanged right pleural effusion. The left pleural effusion. No  pneumothorax. Stable bilateral patchy opacifications predominantly in  the perihilar and lung bases, right greater than left. No acute  osseous abnormalities.      Impression    Impression:   1. Stable appearance of right-sided pleural effusion with stable  placement of right-sided pigtail chest tube.  2. Stable appearance of perihilar and bibasilar patchy airspace  opacifications.    I have personally reviewed the examination and initial interpretation  and I agree with the findings.    ZBIGNIEW ZELAYA MD         SYSTEM ID:  [id number]        A/P: Scott Dobbs is a 36 year old female with no significant pmh who presented 11/23 with a  Large right sided empyema. Now s/p R IR placed CT on 11/23. On broad-spectrum abx for now - will narrow as sensitivities and infectious work up  continues.      - await speciation from pleural cx, continue vancomycin and zosyn   - continue lytics, CT to -20 suction    - regular diet  - SCDs, enoxaparin   - aggressive pulmonary toilet     Seen with fellow who will discuss with staff    Leola Gonzalez MD  Surgery Resident PGY-3  Pg [phone]

## 2021-11-25 NOTE — PLAN OF CARE
Neuro: A&Ox4. Follows commands and able to make needs known.   Cardiac: Heart rate 100s. SBPs 120s-130s VSS.   Respiratory: Sating >95% on 4L NC. Still complains of shortness of breath when lying flat. Chest tube TPA completed this evening.   GI/: Adequate urine output. No BM this shift.  Diet/appetite: Tolerating regular diet. Eating well.  Activity:  Assist of 1. Not out of bed this shift.  Pain: Managed with ibuprofen and oxycodone.    Skin: Small blister near chest tube site.   LDA's:  -L PIV with TKO  -R Chest Tube to -20     Plan: Lab contacted about uncollected stat lactic. Continue with POC. Notify primary team with changes.

## 2021-11-25 NOTE — PLAN OF CARE
Neuro: A&Ox4.   Cardiac: No tele. VSS.   Respiratory: Sating adequately on 4L NC.  GI/: Adequate urine output via purewick.  Diet/appetite: Tolerating regular diet.   Activity:  Assist of SBA.  Pain: At acceptable level on current regimen.   Skin: No new deficits noted.  LDA's: PIV-TKO with antibiotics, Chest tube to -20 suction with moderate amount of thick, creamy, yellow/serosanguinous output.    Plan: Continue with POC. Notify primary team with changes. Pt triggered sepsis this shift, stat lactic obtained and resulted at 0.7.

## 2021-11-26 ENCOUNTER — APPOINTMENT (OUTPATIENT)
Dept: GENERAL RADIOLOGY | Facility: CLINIC | Age: 36
DRG: 871 | End: 2021-11-26
Payer: COMMERCIAL

## 2021-11-26 LAB
ANION GAP SERPL CALCULATED.3IONS-SCNC: 4 MMOL/L (ref 3–14)
BACTERIA PLR CULT: ABNORMAL
BACTERIA PLR CULT: ABNORMAL
BUN SERPL-MCNC: 12 MG/DL (ref 7–30)
CALCIUM SERPL-MCNC: 8.5 MG/DL (ref 8.5–10.1)
CHLORIDE BLD-SCNC: 103 MMOL/L (ref 94–109)
CO2 SERPL-SCNC: 27 MMOL/L (ref 20–32)
CREAT SERPL-MCNC: 0.78 MG/DL (ref 0.52–1.04)
ERYTHROCYTE [DISTWIDTH] IN BLOOD BY AUTOMATED COUNT: 14.7 % (ref 10–15)
GFR SERPL CREATININE-BSD FRML MDRD: >90 ML/MIN/1.73M2
GLUCOSE BLD-MCNC: 107 MG/DL (ref 70–99)
GRAM STAIN RESULT: ABNORMAL
HCT VFR BLD AUTO: 30.5 % (ref 35–47)
HGB BLD-MCNC: 9.4 G/DL (ref 11.7–15.7)
HOLD SPECIMEN: NORMAL
HOLD SPECIMEN: NORMAL
MAGNESIUM SERPL-MCNC: 2 MG/DL (ref 1.6–2.3)
MCH RBC QN AUTO: 26.4 PG (ref 26.5–33)
MCHC RBC AUTO-ENTMCNC: 30.8 G/DL (ref 31.5–36.5)
MCV RBC AUTO: 86 FL (ref 78–100)
PHOSPHATE SERPL-MCNC: 3.5 MG/DL (ref 2.5–4.5)
PLATELET # BLD AUTO: 577 10E3/UL (ref 150–450)
POTASSIUM BLD-SCNC: 3.6 MMOL/L (ref 3.4–5.3)
RBC # BLD AUTO: 3.56 10E6/UL (ref 3.8–5.2)
SODIUM SERPL-SCNC: 134 MMOL/L (ref 133–144)
VANCOMYCIN SERPL-MCNC: 6.8 MG/L
WBC # BLD AUTO: 19.1 10E3/UL (ref 4–11)

## 2021-11-26 PROCEDURE — 71045 X-RAY EXAM CHEST 1 VIEW: CPT

## 2021-11-26 PROCEDURE — 250N000013 HC RX MED GY IP 250 OP 250 PS 637: Performed by: STUDENT IN AN ORGANIZED HEALTH CARE EDUCATION/TRAINING PROGRAM

## 2021-11-26 PROCEDURE — 36415 COLL VENOUS BLD VENIPUNCTURE: CPT | Performed by: THORACIC SURGERY (CARDIOTHORACIC VASCULAR SURGERY)

## 2021-11-26 PROCEDURE — 250N000009 HC RX 250: Performed by: STUDENT IN AN ORGANIZED HEALTH CARE EDUCATION/TRAINING PROGRAM

## 2021-11-26 PROCEDURE — 36415 COLL VENOUS BLD VENIPUNCTURE: CPT | Performed by: STUDENT IN AN ORGANIZED HEALTH CARE EDUCATION/TRAINING PROGRAM

## 2021-11-26 PROCEDURE — 80048 BASIC METABOLIC PNL TOTAL CA: CPT | Performed by: STUDENT IN AN ORGANIZED HEALTH CARE EDUCATION/TRAINING PROGRAM

## 2021-11-26 PROCEDURE — 120N000002 HC R&B MED SURG/OB UMMC

## 2021-11-26 PROCEDURE — 80202 ASSAY OF VANCOMYCIN: CPT | Performed by: THORACIC SURGERY (CARDIOTHORACIC VASCULAR SURGERY)

## 2021-11-26 PROCEDURE — 258N000003 HC RX IP 258 OP 636: Performed by: STUDENT IN AN ORGANIZED HEALTH CARE EDUCATION/TRAINING PROGRAM

## 2021-11-26 PROCEDURE — 258N000003 HC RX IP 258 OP 636: Performed by: THORACIC SURGERY (CARDIOTHORACIC VASCULAR SURGERY)

## 2021-11-26 PROCEDURE — 250N000011 HC RX IP 250 OP 636: Performed by: THORACIC SURGERY (CARDIOTHORACIC VASCULAR SURGERY)

## 2021-11-26 PROCEDURE — 85027 COMPLETE CBC AUTOMATED: CPT | Performed by: STUDENT IN AN ORGANIZED HEALTH CARE EDUCATION/TRAINING PROGRAM

## 2021-11-26 PROCEDURE — 83735 ASSAY OF MAGNESIUM: CPT | Performed by: STUDENT IN AN ORGANIZED HEALTH CARE EDUCATION/TRAINING PROGRAM

## 2021-11-26 PROCEDURE — 84100 ASSAY OF PHOSPHORUS: CPT | Performed by: STUDENT IN AN ORGANIZED HEALTH CARE EDUCATION/TRAINING PROGRAM

## 2021-11-26 PROCEDURE — 250N000011 HC RX IP 250 OP 636: Performed by: STUDENT IN AN ORGANIZED HEALTH CARE EDUCATION/TRAINING PROGRAM

## 2021-11-26 PROCEDURE — 71045 X-RAY EXAM CHEST 1 VIEW: CPT | Mod: 26 | Performed by: RADIOLOGY

## 2021-11-26 RX ADMIN — OXYCODONE HYDROCHLORIDE 10 MG: 5 TABLET ORAL at 19:54

## 2021-11-26 RX ADMIN — PIPERACILLIN AND TAZOBACTAM 3.38 G: 3; .375 INJECTION, POWDER, LYOPHILIZED, FOR SOLUTION INTRAVENOUS at 12:08

## 2021-11-26 RX ADMIN — Medication 50 ML: at 08:20

## 2021-11-26 RX ADMIN — OXYCODONE HYDROCHLORIDE 10 MG: 5 TABLET ORAL at 16:02

## 2021-11-26 RX ADMIN — ACETAMINOPHEN 975 MG: 325 TABLET, FILM COATED ORAL at 19:53

## 2021-11-26 RX ADMIN — HYDROMORPHONE HYDROCHLORIDE 0.2 MG: 0.2 INJECTION, SOLUTION INTRAMUSCULAR; INTRAVENOUS; SUBCUTANEOUS at 10:08

## 2021-11-26 RX ADMIN — PIPERACILLIN AND TAZOBACTAM 3.38 G: 3; .375 INJECTION, POWDER, LYOPHILIZED, FOR SOLUTION INTRAVENOUS at 03:45

## 2021-11-26 RX ADMIN — ENOXAPARIN SODIUM 40 MG: 40 INJECTION SUBCUTANEOUS at 22:02

## 2021-11-26 RX ADMIN — ENOXAPARIN SODIUM 40 MG: 40 INJECTION SUBCUTANEOUS at 08:08

## 2021-11-26 RX ADMIN — PIPERACILLIN AND TAZOBACTAM 3.38 G: 3; .375 INJECTION, POWDER, LYOPHILIZED, FOR SOLUTION INTRAVENOUS at 19:54

## 2021-11-26 RX ADMIN — ACETAMINOPHEN 975 MG: 325 TABLET, FILM COATED ORAL at 08:08

## 2021-11-26 RX ADMIN — OXYCODONE HYDROCHLORIDE 10 MG: 5 TABLET ORAL at 12:08

## 2021-11-26 RX ADMIN — DOCUSATE SODIUM 50 MG AND SENNOSIDES 8.6 MG 1 TABLET: 8.6; 5 TABLET, FILM COATED ORAL at 19:54

## 2021-11-26 RX ADMIN — HYDROMORPHONE HYDROCHLORIDE 0.2 MG: 0.2 INJECTION, SOLUTION INTRAMUSCULAR; INTRAVENOUS; SUBCUTANEOUS at 23:59

## 2021-11-26 RX ADMIN — LIDOCAINE 2 PATCH: 560 PATCH PERCUTANEOUS; TOPICAL; TRANSDERMAL at 08:08

## 2021-11-26 RX ADMIN — OXYCODONE HYDROCHLORIDE 10 MG: 5 TABLET ORAL at 08:07

## 2021-11-26 RX ADMIN — VANCOMYCIN HYDROCHLORIDE 1750 MG: 100 INJECTION, POWDER, LYOPHILIZED, FOR SOLUTION INTRAVENOUS at 11:58

## 2021-11-26 RX ADMIN — OXYCODONE HYDROCHLORIDE 5 MG: 5 TABLET ORAL at 03:45

## 2021-11-26 ASSESSMENT — ACTIVITIES OF DAILY LIVING (ADL)
ADLS_ACUITY_SCORE: 10
ADLS_ACUITY_SCORE: 10
ADLS_ACUITY_SCORE: 8
ADLS_ACUITY_SCORE: 8
ADLS_ACUITY_SCORE: 10
ADLS_ACUITY_SCORE: 10
ADLS_ACUITY_SCORE: 8
ADLS_ACUITY_SCORE: 8
ADLS_ACUITY_SCORE: 10
ADLS_ACUITY_SCORE: 10
ADLS_ACUITY_SCORE: 8
ADLS_ACUITY_SCORE: 10
ADLS_ACUITY_SCORE: 8

## 2021-11-26 NOTE — PHARMACY-VANCOMYCIN DOSING SERVICE
"Pharmacy Vancomycin Note  Date of Service 2021  Patient's  1985   36 year old, female    Indication: Empyema  Day of Therapy: 2  Current vancomycin regimen:  1000 mg IV q12h  Current vancomycin monitoring method: AUC  Current vancomycin therapeutic monitoring goal: 400-600 mg*h/L    InsightRX Prediction of Current Vancomycin Regimen  Loading dose: N/A  Regimen: 1000 mg IV every 12 hours.  Start time: 11:39 on 2021  Exposure target: AUC24 (range)400-600 mg/L.hr   AUC24,ss: 278 mg/L.hr  Probability of AUC24 > 400: 5 %  Ctrough,ss: 4.5 mg/L  Probability of Ctrough,ss > 20: 0 %  Probability of nephrotoxicity (Lodise GIANNI ): 3 %      Current estimated CrCl = Estimated Creatinine Clearance: 155.7 mL/min (based on SCr of 0.78 mg/dL).    Creatinine for last 3 days  2021:  3:50 PM Creatinine 0.98 mg/dL  2021:  5:21 AM Creatinine 1.02 mg/dL  2021:  5:21 AM Creatinine 0.98 mg/dL  2021:  5:12 AM Creatinine 0.78 mg/dL    Recent Vancomycin Levels (past 3 days)  2021:  9:11 AM Vancomycin 6.8 mg/L    Vancomycin IV Administrations (past 72 hours)                   vancomycin (VANCOCIN) 1000 mg in dextrose 5% 200 mL PREMIX (mg) 1,000 mg New Bag 21     1,000 mg New Bag  1112     1,000 mg New Bag 21 233    vancomycin 1250 mg in 0.9% NaCl 250 mL intermittent infusion 1,250 mg (mg) 1,250 mg New Bag 21 1241                Nephrotoxins and other renal medications (From now, onward)    Start     Dose/Rate Route Frequency Ordered Stop    21  vancomycin (VANCOCIN) 1000 mg in dextrose 5% 200 mL PREMIX        \"Followed by\" Linked Group Details    1,000 mg  200 mL/hr over 1 Hours Intravenous EVERY 12 HOURS 21 1109      21  ibuprofen (ADVIL/MOTRIN) tablet 600 mg         600 mg Oral EVERY 6 HOURS PRN 2121 1210  piperacillin-tazobactam (ZOSYN) 3.375 g vial to attach to  mL bag        Note to Pharmacy: For [facility], " "[facility] and [facility]: For Zosyn-naive patients, use the \"Zosyn initial dose + extended infusion\" order panel.    3.375 g  over 30 Minutes Intravenous EVERY 8 HOURS 11/23/21 1208               Contrast Orders - past 72 hours (72h ago, onward)            None          Interpretation of levels and current regimen:  Vancomycin level is reflective of AUC less than 400    Has serum creatinine changed greater than 50% in last 72 hours: Yes    Urine output:  good urine output    Renal Function: Improving    InsightRX Prediction of Planned New Vancomycin Regimen  Loading dose: N/A  Regimen: 1750 mg IV every 12 hours.  Start time: 11:39 on 11/26/2021  Exposure target: AUC24 (range)400-600 mg/L.hr   AUC24,ss: 487 mg/L.hr  Probability of AUC24 > 400: 82 %  Ctrough,ss: 8 mg/L  Probability of Ctrough,ss > 20: 0 %  Probability of nephrotoxicity (Lodise GIANNI 2009): 5 %      Plan:  1. Increase Dose to 1750mg (12mg/kg) IV q 12 h   2. Vancomycin monitoring method: AUC  3. Vancomycin therapeutic monitoring goal: 400-600 mg*h/L  4. Pharmacy will check vancomycin levels as appropriate in 1-3 Days.  5. Serum creatinine levels will be ordered daily for the first week of therapy and at least twice weekly for subsequent weeks.    Geovanny Mendez RPH    "

## 2021-11-26 NOTE — PROGRESS NOTES
THORACIC SURGERY PROGRESS NOTE    S:  Feeling okay, no F/C.  CP and SOB improving.  Pain and breathing feel better.  No nausea. Tolerating PO. Can reach 750 on IS     O:  /79 (BP Location: Right arm)   Pulse 97   Temp 98.2  F (36.8  C) (Oral)   Resp 20   Wt 144.7 kg (319 lb 0.1 oz)   SpO2 96%   Breastfeeding No   BMI 46.77 kg/m      I/O last 3 completed shifts:  In: 540 [P.O.:480; Other:60]  Out: 2770 [Urine:2300; Chest Tube:470]    Alert, oriented, NAD  Nonlabored breathing on NC  No cyanosis   Distal extremities warm  R CT serosanguinous     Labs  Reviewed, wbc downtrending to 19/1 today, hgb stable 9.4, Cr 0.78, LFTs improving    Imaging  Morning cxr pending    No results found for this or any previous visit (from the past 24 hour(s)).     A/P: Scott Dobbs is a 36 year old female with no significant pmh who presented 11/23 with a  Large right sided empyema. Now s/p R IR placed CT on 11/23. On broad-spectrum abx for now - will narrow as sensitivities and infectious work up continues.  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    - await speciation from pleural cx, continue vancomycin and zosyn for now  - continue lytics, CT to -20 suction, will reimage after lytics complete   - aggressive pulmonary toilet, ambulate   - regular diet  - SCDs, enoxaparin     Seen with fellow who will discuss with staff    Leola Gonzalez MD  Surgery Resident PGY-3  Pg [phone]

## 2021-11-26 NOTE — PLAN OF CARE
Neuro: A&Ox4. Afebrile.  Cardiac: SR-ST. No tele orders. Pressures  140s/80s. VSS.  Respiratory: Sating >94% on 3L NC. Frequent and productive cough. C/o SOB and pain when lying flat.   GI/: Adequate urine output. No BM overnight.   Diet/appetite: Tolerating general diet.   Activity:  Assist of 1 to ambulate in room.   Pain: Oxycodone given 2x with relief reported.  Skin: Small blister near chest tube site. Chest tube dressing CDI.  LDA's: PIV, Chest tube to -20 mmHg. Atrium changed.     Triggered sepsis protocol. LA came back at 0.7.    Plan: Continue with POC. Notify primary team with changes.

## 2021-11-26 NOTE — PLAN OF CARE
Neuro: A&Ox4.   Cardiac: SR. VSS.   Respiratory: Sating 95 on RA.  GI/: Adequate urine output. BM X1  Diet/appetite: Tolerating Regular diet. Eating well.  Activity:  Standby assist, up to chair and in halls.  Pain: At acceptable level on current regimen.   Skin: No new deficits noted.  LDA's:    Plan: Continue with POC. Notify primary team with changes.

## 2021-11-27 ENCOUNTER — APPOINTMENT (OUTPATIENT)
Dept: GENERAL RADIOLOGY | Facility: CLINIC | Age: 36
DRG: 871 | End: 2021-11-27
Payer: COMMERCIAL

## 2021-11-27 DIAGNOSIS — I10 ESSENTIAL HYPERTENSION: ICD-10-CM

## 2021-11-27 LAB
ANION GAP SERPL CALCULATED.3IONS-SCNC: 5 MMOL/L (ref 3–14)
BUN SERPL-MCNC: 12 MG/DL (ref 7–30)
CALCIUM SERPL-MCNC: 8.2 MG/DL (ref 8.5–10.1)
CHLORIDE BLD-SCNC: 104 MMOL/L (ref 94–109)
CO2 SERPL-SCNC: 27 MMOL/L (ref 20–32)
CREAT SERPL-MCNC: 0.74 MG/DL (ref 0.52–1.04)
ERYTHROCYTE [DISTWIDTH] IN BLOOD BY AUTOMATED COUNT: 14.6 % (ref 10–15)
GFR SERPL CREATININE-BSD FRML MDRD: >90 ML/MIN/1.73M2
GLUCOSE BLD-MCNC: 116 MG/DL (ref 70–99)
HCT VFR BLD AUTO: 30.3 % (ref 35–47)
HGB BLD-MCNC: 9.4 G/DL (ref 11.7–15.7)
LACTATE SERPL-SCNC: 0.8 MMOL/L (ref 0.7–2)
MAGNESIUM SERPL-MCNC: 2 MG/DL (ref 1.6–2.3)
MCH RBC QN AUTO: 26.7 PG (ref 26.5–33)
MCHC RBC AUTO-ENTMCNC: 31 G/DL (ref 31.5–36.5)
MCV RBC AUTO: 86 FL (ref 78–100)
PHOSPHATE SERPL-MCNC: 3 MG/DL (ref 2.5–4.5)
PLATELET # BLD AUTO: 553 10E3/UL (ref 150–450)
POTASSIUM BLD-SCNC: 3.8 MMOL/L (ref 3.4–5.3)
RBC # BLD AUTO: 3.52 10E6/UL (ref 3.8–5.2)
SODIUM SERPL-SCNC: 136 MMOL/L (ref 133–144)
WBC # BLD AUTO: 17.7 10E3/UL (ref 4–11)

## 2021-11-27 PROCEDURE — 999N000078 HC STATISTIC INTRAPULMONARY PERCUSSIVE VENT

## 2021-11-27 PROCEDURE — 85027 COMPLETE CBC AUTOMATED: CPT | Performed by: STUDENT IN AN ORGANIZED HEALTH CARE EDUCATION/TRAINING PROGRAM

## 2021-11-27 PROCEDURE — 258N000003 HC RX IP 258 OP 636: Performed by: THORACIC SURGERY (CARDIOTHORACIC VASCULAR SURGERY)

## 2021-11-27 PROCEDURE — 80048 BASIC METABOLIC PNL TOTAL CA: CPT | Performed by: STUDENT IN AN ORGANIZED HEALTH CARE EDUCATION/TRAINING PROGRAM

## 2021-11-27 PROCEDURE — 120N000002 HC R&B MED SURG/OB UMMC

## 2021-11-27 PROCEDURE — 71045 X-RAY EXAM CHEST 1 VIEW: CPT | Mod: 26 | Performed by: RADIOLOGY

## 2021-11-27 PROCEDURE — 83605 ASSAY OF LACTIC ACID: CPT | Performed by: THORACIC SURGERY (CARDIOTHORACIC VASCULAR SURGERY)

## 2021-11-27 PROCEDURE — 71045 X-RAY EXAM CHEST 1 VIEW: CPT

## 2021-11-27 PROCEDURE — 250N000011 HC RX IP 250 OP 636: Performed by: THORACIC SURGERY (CARDIOTHORACIC VASCULAR SURGERY)

## 2021-11-27 PROCEDURE — 250N000011 HC RX IP 250 OP 636: Performed by: STUDENT IN AN ORGANIZED HEALTH CARE EDUCATION/TRAINING PROGRAM

## 2021-11-27 PROCEDURE — 999N000157 HC STATISTIC RCP TIME EA 10 MIN

## 2021-11-27 PROCEDURE — 250N000011 HC RX IP 250 OP 636

## 2021-11-27 PROCEDURE — 250N000013 HC RX MED GY IP 250 OP 250 PS 637: Performed by: STUDENT IN AN ORGANIZED HEALTH CARE EDUCATION/TRAINING PROGRAM

## 2021-11-27 PROCEDURE — 84100 ASSAY OF PHOSPHORUS: CPT | Performed by: STUDENT IN AN ORGANIZED HEALTH CARE EDUCATION/TRAINING PROGRAM

## 2021-11-27 PROCEDURE — 83735 ASSAY OF MAGNESIUM: CPT | Performed by: STUDENT IN AN ORGANIZED HEALTH CARE EDUCATION/TRAINING PROGRAM

## 2021-11-27 PROCEDURE — 250N000009 HC RX 250: Performed by: STUDENT IN AN ORGANIZED HEALTH CARE EDUCATION/TRAINING PROGRAM

## 2021-11-27 PROCEDURE — 258N000003 HC RX IP 258 OP 636: Performed by: STUDENT IN AN ORGANIZED HEALTH CARE EDUCATION/TRAINING PROGRAM

## 2021-11-27 PROCEDURE — 250N000013 HC RX MED GY IP 250 OP 250 PS 637

## 2021-11-27 PROCEDURE — 36415 COLL VENOUS BLD VENIPUNCTURE: CPT | Performed by: STUDENT IN AN ORGANIZED HEALTH CARE EDUCATION/TRAINING PROGRAM

## 2021-11-27 RX ORDER — LABETALOL 100 MG/1
100 TABLET, FILM COATED ORAL EVERY 12 HOURS SCHEDULED
Status: DISCONTINUED | OUTPATIENT
Start: 2021-11-27 | End: 2021-11-30 | Stop reason: HOSPADM

## 2021-11-27 RX ORDER — HYDROMORPHONE HCL IN WATER/PF 6 MG/30 ML
0.2 PATIENT CONTROLLED ANALGESIA SYRINGE INTRAVENOUS
Status: COMPLETED | OUTPATIENT
Start: 2021-11-27 | End: 2021-11-27

## 2021-11-27 RX ADMIN — IBUPROFEN 600 MG: 200 TABLET, FILM COATED ORAL at 10:16

## 2021-11-27 RX ADMIN — OXYCODONE HYDROCHLORIDE 10 MG: 5 TABLET ORAL at 18:50

## 2021-11-27 RX ADMIN — LIDOCAINE 2 PATCH: 560 PATCH PERCUTANEOUS; TOPICAL; TRANSDERMAL at 08:29

## 2021-11-27 RX ADMIN — ACETAMINOPHEN 975 MG: 325 TABLET, FILM COATED ORAL at 14:07

## 2021-11-27 RX ADMIN — VANCOMYCIN HYDROCHLORIDE 1750 MG: 100 INJECTION, POWDER, LYOPHILIZED, FOR SOLUTION INTRAVENOUS at 02:29

## 2021-11-27 RX ADMIN — OXYCODONE HYDROCHLORIDE 10 MG: 5 TABLET ORAL at 14:34

## 2021-11-27 RX ADMIN — DOCUSATE SODIUM 50 MG AND SENNOSIDES 8.6 MG 2 TABLET: 8.6; 5 TABLET, FILM COATED ORAL at 08:29

## 2021-11-27 RX ADMIN — OXYCODONE HYDROCHLORIDE 10 MG: 5 TABLET ORAL at 06:29

## 2021-11-27 RX ADMIN — Medication 50 ML: at 10:29

## 2021-11-27 RX ADMIN — ENOXAPARIN SODIUM 40 MG: 40 INJECTION SUBCUTANEOUS at 20:25

## 2021-11-27 RX ADMIN — OXYCODONE HYDROCHLORIDE 5 MG: 5 TABLET ORAL at 02:06

## 2021-11-27 RX ADMIN — ACETAMINOPHEN 975 MG: 325 TABLET, FILM COATED ORAL at 20:25

## 2021-11-27 RX ADMIN — DOCUSATE SODIUM 50 MG AND SENNOSIDES 8.6 MG 2 TABLET: 8.6; 5 TABLET, FILM COATED ORAL at 20:25

## 2021-11-27 RX ADMIN — HYDROMORPHONE HYDROCHLORIDE 0.2 MG: 0.2 INJECTION, SOLUTION INTRAMUSCULAR; INTRAVENOUS; SUBCUTANEOUS at 20:37

## 2021-11-27 RX ADMIN — LABETALOL HYDROCHLORIDE 100 MG: 100 TABLET ORAL at 09:08

## 2021-11-27 RX ADMIN — LABETALOL HYDROCHLORIDE 100 MG: 100 TABLET ORAL at 20:25

## 2021-11-27 RX ADMIN — PIPERACILLIN AND TAZOBACTAM 3.38 G: 3; .375 INJECTION, POWDER, LYOPHILIZED, FOR SOLUTION INTRAVENOUS at 05:11

## 2021-11-27 RX ADMIN — HYDROMORPHONE HYDROCHLORIDE 0.2 MG: 0.2 INJECTION, SOLUTION INTRAMUSCULAR; INTRAVENOUS; SUBCUTANEOUS at 01:25

## 2021-11-27 RX ADMIN — ACETAMINOPHEN 975 MG: 325 TABLET, FILM COATED ORAL at 08:29

## 2021-11-27 RX ADMIN — PIPERACILLIN AND TAZOBACTAM 3.38 G: 3; .375 INJECTION, POWDER, LYOPHILIZED, FOR SOLUTION INTRAVENOUS at 20:25

## 2021-11-27 RX ADMIN — OXYCODONE HYDROCHLORIDE 10 MG: 5 TABLET ORAL at 23:16

## 2021-11-27 RX ADMIN — PIPERACILLIN AND TAZOBACTAM 3.38 G: 3; .375 INJECTION, POWDER, LYOPHILIZED, FOR SOLUTION INTRAVENOUS at 11:59

## 2021-11-27 RX ADMIN — HYDROMORPHONE HYDROCHLORIDE 0.2 MG: 0.2 INJECTION, SOLUTION INTRAMUSCULAR; INTRAVENOUS; SUBCUTANEOUS at 11:37

## 2021-11-27 RX ADMIN — METHOCARBAMOL 750 MG: 750 TABLET ORAL at 10:16

## 2021-11-27 RX ADMIN — HYDROMORPHONE HYDROCHLORIDE 0.2 MG: 0.2 INJECTION, SOLUTION INTRAMUSCULAR; INTRAVENOUS; SUBCUTANEOUS at 08:36

## 2021-11-27 RX ADMIN — Medication 50 ML: at 00:09

## 2021-11-27 RX ADMIN — ENOXAPARIN SODIUM 40 MG: 40 INJECTION SUBCUTANEOUS at 09:08

## 2021-11-27 ASSESSMENT — ACTIVITIES OF DAILY LIVING (ADL)
ADLS_ACUITY_SCORE: 8
ADLS_ACUITY_SCORE: 6
ADLS_ACUITY_SCORE: 6
ADLS_ACUITY_SCORE: 8
ADLS_ACUITY_SCORE: 6
ADLS_ACUITY_SCORE: 8
ADLS_ACUITY_SCORE: 6
ADLS_ACUITY_SCORE: 8
ADLS_ACUITY_SCORE: 6
ADLS_ACUITY_SCORE: 8
ADLS_ACUITY_SCORE: 6
ADLS_ACUITY_SCORE: 8
ADLS_ACUITY_SCORE: 6
ADLS_ACUITY_SCORE: 6
ADLS_ACUITY_SCORE: 8
ADLS_ACUITY_SCORE: 8
ADLS_ACUITY_SCORE: 6
ADLS_ACUITY_SCORE: 8
ADLS_ACUITY_SCORE: 5
ADLS_ACUITY_SCORE: 8
ADLS_ACUITY_SCORE: 6
ADLS_ACUITY_SCORE: 6

## 2021-11-27 NOTE — PROVIDER NOTIFICATION
Time of notification: 2015; 2207  Provider notified: Surgery crosscover  Patient status: Patient C/O severe pain with TPA administration. TPA given TID via R chest tube. Pain r/t increased pressure in thoracic cavity while CT is clamped for 2 hrs following TPA administration and likely irritation due to med. Endorsed pain up to 7/8 in 2 hr window. Patient has oxycodone q4 and dilaudid q2. Per patient, PRNs are not enough to treat pain with TPA administration. Patient is wondering if TPA is necessary, or If it would be possible to increase dosage of pain medication during TPA admin. Held TPA until provider placed orders for pain medication. Transferred patient to , TPA will be administered there.   Temp:  [96.2  F (35.7  C)-99.3  F (37.4  C)] 96.2  F (35.7  C)  Pulse:  [] 99  Resp:  [18-20] 18  BP: (135-143)/(40-87) 142/78  SpO2:  [96 %-97 %] 96 %  Orders received: Per provider, TPA is necessary. Provider ordered conditional dilaudid dose for 1 hr post TPA administration so patient can have 1 PRN dose at start and 1 half-way through TPA course.

## 2021-11-27 NOTE — PROGRESS NOTES
THORACIC SURGERY PROGRESS NOTE    S:  Pain overnight with lytic instillation.  Feels better now. Productive cough. Did not use IS yesterday. Breathing feels better.      O:  BP (!) 152/77 (BP Location: Right arm)   Pulse 94   Temp 99.9  F (37.7  C) (Oral)   Resp 28   Wt 144.7 kg (319 lb 0.1 oz)   SpO2 94%   Breastfeeding No   BMI 46.77 kg/m      I/O last 3 completed shifts:  In: -   Out: 2030 [Urine:1700; Chest Tube:330]    Alert, oriented, NAD  Nonlabored breathing on NC  No cyanosis   Distal extremities warm  R CT serosanguinous and pus, not tidaling    Labs  Reviewed, wbc downtrending to 17.7 today, hgb stable 9.4, Cr 0.74    Imaging  Morning CXR pending    Recent Results (from the past 24 hour(s))   XR Chest Port 1 View    Narrative    Exam: XR CHEST PORT 1 VIEW, 11/26/2021 10:17 AM    Indication: chest tube, effusion, daily while monitoring empyema and  CT    Comparison: Chest x-ray 11/25/2021    Findings:   Portable AP semiupright view of the chest. Right chest tube in stable  position. Mildly enlarged cardiac mediastinal silhouette appears  unchanged. The trachea is midline. Pulmonary vasculature is  indistinct. No appreciable pneumothorax. Stable appearance of the  obscured right costophrenic angle with elevated right hemidiaphragm.  Stable appearance of patchy opacities in the right lung. Left lung  appears clear. No acute osseous abnormalities.      Impression    Impression:   1. Stable right pleural effusion and elevated right hemidiaphragm.  2. Stable appearance of patchy right-sided opacities suggestive of  atelectasis, edema, or infection.    I have personally reviewed the examination and initial interpretation  and I agree with the findings.    ZBIGNIEW ZELAYA MD         SYSTEM ID:  [id number]        A/P: Scott Dobbs is a 36 year old female with no significant pmh who presented 11/23 with a  Large right sided empyema. Now s/p R IR placed CT on 11/23. 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    - discontinue vancomycin, continue zosyn pending repeat imaging   - continue lytics, CT to -20 suction, will reimage with CT after lytics complete - likely tomorrow   - aggressive pulmonary toilet, ambulate  - add nebs  - regular diet  - resumed home labetalol   - SCDs, enoxaparin     Seen with fellow who will discuss with staff    Leola Gonzalez MD  Surgery Resident PGY-3  Pg [phone]

## 2021-11-27 NOTE — PROGRESS NOTES
Admitted/transferred from:   2 RN full   skin assessment completed by Jonathan Wagner, MARANDA and Radha TALLEY RN.  Skin assessment finding: skin intact, no problems   Interventions/actions: None     Will continue to monitor.

## 2021-11-27 NOTE — PLAN OF CARE
Plan of Care Note    Reason for Admission: Empyema, Transaminitis, Acute respiratory failure with hypoxia, Multifocal pneumonia   Procedures: Chest Tube Placement   IV Access: L PIV (infusing TKO between Abx)  Incisions/Drains: Chest Tube to -20 CM H O  VS: BP (!) 144/60 (BP Location: Right arm)   Pulse 96   Temp 100  F (37.8  C) (Oral)   Resp 28   Wt 144.7 kg (319 lb 0.1 oz)   SpO2 97%   Breastfeeding No   BMI 46.77 kg/m    q4 vitals 6L O   Diet: Regular Diet  Activity: SBA  Pain Management: IV Dilaudid and Oxycodone on shift  GI/: Voiding spontaneously (w/purewick currently), -BM since 11/23, +Gas, -Nausea  Neuro: A&Ox4  Team: Thoracic Surgery  Pertinent Labs: Lactic .8 after Sepsis trigger      Shift Summary  Patient transferred from  around 2200. Patient came up without commode and IV pole or pump. Had to order and wait along time due to staffing issues. Vancomycin delayed due to waiting. TPA given in chest tube with IV Dilaudid given before and 1 hour after dose due to increased pain with movement. One more TPA dose needed so team should be paged to give patient another additional dose when administering TPA dose. Chest tube putting out small amount of drainage. Continue with POC.

## 2021-11-27 NOTE — PLAN OF CARE
Neuro: A&Ox4.   Cardiac: SR. VSS.   Respiratory: Sating 95on RA.  GI/: Adequate urine output.  Diet/appetite: Tolerating Regular diet. Eating well.  Activity:  Standby assist, up to chair and in halls.  Pain: At acceptable level on current regimen.   Skin: No new deficits noted.  LDA's:    Plan: Continue with POC. Notify primary team with changes.

## 2021-11-27 NOTE — PLAN OF CARE
Provided care from 1900 to 2230. Patient transferred to .     Transfer  Transferred to:   Via: bed  Reason for transfer:Pt no longer appropriate for 6B- improved patient condition  Family: Aware of transfer  Belongings: Packed and sent with pt  Chart: Delivered with pt to next unit  Medications: Meds sent to new unit with pt  Report given to: ARMANDO Castillo nurse  Pt status: Patient is vitally stable. Sinus tachy with HR in the 100s-110s. A&O x 4. Sating well on 4L NC. Frequent, productive cough. Adequate UO. Regular diet, tolerating well with no nausea/vomiting. Up with SBA. R CT in place, to -20 suction. Moderate purulent/serosanguinous output. L PIV is SL. No skin deficits besides R back chest tube site. See provider notification note regarding TPA dose and pain management.

## 2021-11-27 NOTE — PLAN OF CARE
VSS. A+Ox4.  LS clear, dim at the bases, crackles on Rt side. Sats at 90s on 5liters/NC. Acapella/IS done.   Chest to -20cm H20 wall suction, out is purulent and odorous. TPA instilled x1. Site dressing changed.  +BS, passing flatus, no BM this shift.  UOP per nely(prefers d/t pain with movement). Commode available at bedside.  Good appetite, regular diet.  SBA with activity.  Pain has been fluctuating depending on movement, prn IV dil, oxycodone and sched pain meds given with some relief.  Left AC piv sl'd in between IV abx.  Continue to monitor respiratory status, incision/drains, pain and gen status and continue with POC.

## 2021-11-28 ENCOUNTER — APPOINTMENT (OUTPATIENT)
Dept: CT IMAGING | Facility: CLINIC | Age: 36
DRG: 871 | End: 2021-11-28
Payer: COMMERCIAL

## 2021-11-28 ENCOUNTER — APPOINTMENT (OUTPATIENT)
Dept: GENERAL RADIOLOGY | Facility: CLINIC | Age: 36
DRG: 871 | End: 2021-11-28
Payer: COMMERCIAL

## 2021-11-28 LAB
ANION GAP SERPL CALCULATED.3IONS-SCNC: 6 MMOL/L (ref 3–14)
BACTERIA BLD CULT: NO GROWTH
BACTERIA BLD CULT: NO GROWTH
BUN SERPL-MCNC: 11 MG/DL (ref 7–30)
CALCIUM SERPL-MCNC: 8.7 MG/DL (ref 8.5–10.1)
CHLORIDE BLD-SCNC: 104 MMOL/L (ref 94–109)
CO2 SERPL-SCNC: 28 MMOL/L (ref 20–32)
CREAT SERPL-MCNC: 0.83 MG/DL (ref 0.52–1.04)
ERYTHROCYTE [DISTWIDTH] IN BLOOD BY AUTOMATED COUNT: 14.6 % (ref 10–15)
ERYTHROCYTE [DISTWIDTH] IN BLOOD BY AUTOMATED COUNT: NORMAL %
GFR SERPL CREATININE-BSD FRML MDRD: >90 ML/MIN/1.73M2
GLUCOSE BLD-MCNC: 112 MG/DL (ref 70–99)
HCT VFR BLD AUTO: 31.4 % (ref 35–47)
HCT VFR BLD AUTO: NORMAL %
HGB BLD-MCNC: 9.6 G/DL (ref 11.7–15.7)
HGB BLD-MCNC: NORMAL G/DL
LACTATE SERPL-SCNC: 1 MMOL/L (ref 0.7–2)
MAGNESIUM SERPL-MCNC: 1.9 MG/DL (ref 1.6–2.3)
MCH RBC QN AUTO: 26.5 PG (ref 26.5–33)
MCH RBC QN AUTO: NORMAL PG
MCHC RBC AUTO-ENTMCNC: 30.6 G/DL (ref 31.5–36.5)
MCHC RBC AUTO-ENTMCNC: NORMAL G/DL
MCV RBC AUTO: 87 FL (ref 78–100)
MCV RBC AUTO: NORMAL FL
PHOSPHATE SERPL-MCNC: 3.7 MG/DL (ref 2.5–4.5)
PLATELET # BLD AUTO: 602 10E3/UL (ref 150–450)
PLATELET # BLD AUTO: NORMAL 10*3/UL
POTASSIUM BLD-SCNC: 3.9 MMOL/L (ref 3.4–5.3)
RBC # BLD AUTO: 3.62 10E6/UL (ref 3.8–5.2)
RBC # BLD AUTO: NORMAL 10*6/UL
SODIUM SERPL-SCNC: 138 MMOL/L (ref 133–144)
WBC # BLD AUTO: 15.5 10E3/UL (ref 4–11)
WBC # BLD AUTO: NORMAL 10*3/UL

## 2021-11-28 PROCEDURE — 83605 ASSAY OF LACTIC ACID: CPT | Performed by: THORACIC SURGERY (CARDIOTHORACIC VASCULAR SURGERY)

## 2021-11-28 PROCEDURE — 250N000011 HC RX IP 250 OP 636: Performed by: STUDENT IN AN ORGANIZED HEALTH CARE EDUCATION/TRAINING PROGRAM

## 2021-11-28 PROCEDURE — 120N000002 HC R&B MED SURG/OB UMMC

## 2021-11-28 PROCEDURE — 250N000013 HC RX MED GY IP 250 OP 250 PS 637: Performed by: SURGERY

## 2021-11-28 PROCEDURE — 71260 CT THORAX DX C+: CPT

## 2021-11-28 PROCEDURE — 36416 COLLJ CAPILLARY BLOOD SPEC: CPT | Performed by: THORACIC SURGERY (CARDIOTHORACIC VASCULAR SURGERY)

## 2021-11-28 PROCEDURE — 80048 BASIC METABOLIC PNL TOTAL CA: CPT | Performed by: STUDENT IN AN ORGANIZED HEALTH CARE EDUCATION/TRAINING PROGRAM

## 2021-11-28 PROCEDURE — 250N000011 HC RX IP 250 OP 636: Performed by: THORACIC SURGERY (CARDIOTHORACIC VASCULAR SURGERY)

## 2021-11-28 PROCEDURE — 36415 COLL VENOUS BLD VENIPUNCTURE: CPT | Performed by: THORACIC SURGERY (CARDIOTHORACIC VASCULAR SURGERY)

## 2021-11-28 PROCEDURE — 71260 CT THORAX DX C+: CPT | Mod: 26 | Performed by: RADIOLOGY

## 2021-11-28 PROCEDURE — 84100 ASSAY OF PHOSPHORUS: CPT | Performed by: STUDENT IN AN ORGANIZED HEALTH CARE EDUCATION/TRAINING PROGRAM

## 2021-11-28 PROCEDURE — 250N000011 HC RX IP 250 OP 636

## 2021-11-28 PROCEDURE — 250N000013 HC RX MED GY IP 250 OP 250 PS 637: Performed by: STUDENT IN AN ORGANIZED HEALTH CARE EDUCATION/TRAINING PROGRAM

## 2021-11-28 PROCEDURE — 71045 X-RAY EXAM CHEST 1 VIEW: CPT | Mod: 26 | Performed by: RADIOLOGY

## 2021-11-28 PROCEDURE — 83735 ASSAY OF MAGNESIUM: CPT | Performed by: STUDENT IN AN ORGANIZED HEALTH CARE EDUCATION/TRAINING PROGRAM

## 2021-11-28 PROCEDURE — 85027 COMPLETE CBC AUTOMATED: CPT | Performed by: THORACIC SURGERY (CARDIOTHORACIC VASCULAR SURGERY)

## 2021-11-28 PROCEDURE — 71045 X-RAY EXAM CHEST 1 VIEW: CPT

## 2021-11-28 PROCEDURE — 250N000013 HC RX MED GY IP 250 OP 250 PS 637

## 2021-11-28 PROCEDURE — 85027 COMPLETE CBC AUTOMATED: CPT | Performed by: STUDENT IN AN ORGANIZED HEALTH CARE EDUCATION/TRAINING PROGRAM

## 2021-11-28 RX ORDER — IOPAMIDOL 755 MG/ML
100 INJECTION, SOLUTION INTRAVASCULAR ONCE
Status: COMPLETED | OUTPATIENT
Start: 2021-11-28 | End: 2021-11-28

## 2021-11-28 RX ORDER — HYDROMORPHONE HCL IN WATER/PF 6 MG/30 ML
0.2 PATIENT CONTROLLED ANALGESIA SYRINGE INTRAVENOUS EVERY 4 HOURS PRN
Status: DISCONTINUED | OUTPATIENT
Start: 2021-11-28 | End: 2021-11-30 | Stop reason: HOSPADM

## 2021-11-28 RX ORDER — FUROSEMIDE 10 MG/ML
20 INJECTION INTRAMUSCULAR; INTRAVENOUS ONCE
Status: COMPLETED | OUTPATIENT
Start: 2021-11-28 | End: 2021-11-28

## 2021-11-28 RX ADMIN — ENOXAPARIN SODIUM 40 MG: 40 INJECTION SUBCUTANEOUS at 20:07

## 2021-11-28 RX ADMIN — PIPERACILLIN AND TAZOBACTAM 3.38 G: 3; .375 INJECTION, POWDER, LYOPHILIZED, FOR SOLUTION INTRAVENOUS at 12:20

## 2021-11-28 RX ADMIN — AMOXICILLIN AND CLAVULANATE POTASSIUM 1 TABLET: 875; 125 TABLET, FILM COATED ORAL at 20:05

## 2021-11-28 RX ADMIN — DOCUSATE SODIUM 50 MG AND SENNOSIDES 8.6 MG 2 TABLET: 8.6; 5 TABLET, FILM COATED ORAL at 07:56

## 2021-11-28 RX ADMIN — IOPAMIDOL 100 ML: 755 INJECTION, SOLUTION INTRAVENOUS at 10:45

## 2021-11-28 RX ADMIN — PIPERACILLIN AND TAZOBACTAM 3.38 G: 3; .375 INJECTION, POWDER, LYOPHILIZED, FOR SOLUTION INTRAVENOUS at 03:07

## 2021-11-28 RX ADMIN — OXYCODONE HYDROCHLORIDE 10 MG: 5 TABLET ORAL at 07:57

## 2021-11-28 RX ADMIN — ACETAMINOPHEN 975 MG: 325 TABLET, FILM COATED ORAL at 14:24

## 2021-11-28 RX ADMIN — OXYCODONE HYDROCHLORIDE 10 MG: 5 TABLET ORAL at 16:41

## 2021-11-28 RX ADMIN — IBUPROFEN 600 MG: 200 TABLET, FILM COATED ORAL at 07:57

## 2021-11-28 RX ADMIN — LABETALOL HYDROCHLORIDE 100 MG: 100 TABLET ORAL at 20:04

## 2021-11-28 RX ADMIN — ACETAMINOPHEN 975 MG: 325 TABLET, FILM COATED ORAL at 20:05

## 2021-11-28 RX ADMIN — FUROSEMIDE 20 MG: 10 INJECTION, SOLUTION INTRAVENOUS at 12:20

## 2021-11-28 RX ADMIN — OXYCODONE HYDROCHLORIDE 10 MG: 5 TABLET ORAL at 03:07

## 2021-11-28 RX ADMIN — METHOCARBAMOL 750 MG: 750 TABLET ORAL at 10:33

## 2021-11-28 RX ADMIN — OXYCODONE HYDROCHLORIDE 10 MG: 5 TABLET ORAL at 21:19

## 2021-11-28 RX ADMIN — ENOXAPARIN SODIUM 40 MG: 40 INJECTION SUBCUTANEOUS at 08:00

## 2021-11-28 RX ADMIN — OXYCODONE HYDROCHLORIDE 10 MG: 5 TABLET ORAL at 12:29

## 2021-11-28 RX ADMIN — HYDROMORPHONE HYDROCHLORIDE 0.2 MG: 0.2 INJECTION, SOLUTION INTRAMUSCULAR; INTRAVENOUS; SUBCUTANEOUS at 10:33

## 2021-11-28 RX ADMIN — IBUPROFEN 600 MG: 200 TABLET, FILM COATED ORAL at 14:28

## 2021-11-28 RX ADMIN — LABETALOL HYDROCHLORIDE 100 MG: 100 TABLET ORAL at 07:56

## 2021-11-28 RX ADMIN — DOCUSATE SODIUM 50 MG AND SENNOSIDES 8.6 MG 2 TABLET: 8.6; 5 TABLET, FILM COATED ORAL at 20:04

## 2021-11-28 RX ADMIN — ACETAMINOPHEN 975 MG: 325 TABLET, FILM COATED ORAL at 07:57

## 2021-11-28 RX ADMIN — LIDOCAINE 2 PATCH: 560 PATCH PERCUTANEOUS; TOPICAL; TRANSDERMAL at 07:56

## 2021-11-28 RX ADMIN — METHOCARBAMOL 750 MG: 750 TABLET ORAL at 16:41

## 2021-11-28 ASSESSMENT — ACTIVITIES OF DAILY LIVING (ADL)
ADLS_ACUITY_SCORE: 5
ADLS_ACUITY_SCORE: 6
ADLS_ACUITY_SCORE: 5
ADLS_ACUITY_SCORE: 6
ADLS_ACUITY_SCORE: 5
ADLS_ACUITY_SCORE: 5
ADLS_ACUITY_SCORE: 6
ADLS_ACUITY_SCORE: 5
ADLS_ACUITY_SCORE: 6
ADLS_ACUITY_SCORE: 5
ADLS_ACUITY_SCORE: 5

## 2021-11-28 NOTE — PLAN OF CARE
A&O, tachy with elevated RR, low grade temp. Flagged sepsis, MD paged and declined intervention. Lactic acid pending. Pain managed with PRN oxycodone x2 and IV dilaudid x1. Chest tube site CDI. Tubing patent and draining 65mL creamy fluid, remains at -20 cm suction. Purewick with good output. Bowel sounds active, -BM this shift. IV SL between abx. O2 turned down to 4 LPM, tolerated well but increased back to 5 LPM at time of sepsis notification d/t pt reported SOB. Tolerating PO intake w/o c/o nausea. Pt not ambulating d/t increased pain at chest tube site with movement.

## 2021-11-28 NOTE — PROGRESS NOTES
"THORACIC SURGERY PROGRESS NOTE    S:  NAEO.  Feeling better. Pain is controlled.  Eating and drinking without n/v. Denies fevers, chills, sweats.     O:  /63 (BP Location: Right arm)   Pulse 97   Temp 99.1  F (37.3  C) (Oral)   Resp 20   Wt 144.7 kg (319 lb 0.1 oz)   SpO2 96%   Breastfeeding No   BMI 46.77 kg/m      I/O last 3 completed shifts:  In: 760 [P.O.:600; I.V.:100; Other:60]  Out: 1785 [Urine:1600; Chest Tube:185]    Alert, oriented, NAD  Nonlabored breathing on NC  No cyanosis   Distal extremities warm  R CT serosanguinous and pus, not tidaling    Labs       Lab Results   Component Value Date     11/28/2021    Lab Results   Component Value Date    CHLORIDE 104 11/28/2021    Lab Results   Component Value Date    BUN 12 11/27/2021      Lab Results   Component Value Date    POTASSIUM 3.9 11/28/2021    Lab Results   Component Value Date    CO2 27 11/27/2021    Lab Results   Component Value Date    CR 0.74 11/27/2021    CR 1.03 10/29/2020        Lab Results   Component Value Date    WBC 17.7 (H) 11/27/2021    HGB 9.4 (L) 11/27/2021    HCT 30.3 (L) 11/27/2021    MCV 86 11/27/2021     (H) 11/27/2021       Imaging  No results found for this or any previous visit (from the past 24 hour(s)).   Reviewed cxr this am.      A/P: Scott Dobbs is a 36 year old female with no significant pmh who presented 11/23 with a  Large right sided empyema. Now s/p R IR placed CT on 11/23. 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    - wean o2 as able  - IV 20 lasix today  - continue zosyn   - finished lytics, CT to water seal, CT chest with iv contrast today - may discontinue chest tube pending CT  - aggressive pulmonary toilet, ambulate  - add nebs  - regular diet  - resumed home labetalol   - SCDs, enoxaparin     Seen with fellow who will discuss with staff    Jayme Kilgore MD  Surgery Resident PGY-1  Please see [facility] \"Surgery Thoracic/[facility]\" for paging    "

## 2021-11-29 ENCOUNTER — APPOINTMENT (OUTPATIENT)
Dept: GENERAL RADIOLOGY | Facility: CLINIC | Age: 36
DRG: 871 | End: 2021-11-29
Payer: COMMERCIAL

## 2021-11-29 LAB
ANION GAP SERPL CALCULATED.3IONS-SCNC: 4 MMOL/L (ref 3–14)
BACTERIA BLD CULT: NO GROWTH
BUN SERPL-MCNC: 10 MG/DL (ref 7–30)
CALCIUM SERPL-MCNC: 8.6 MG/DL (ref 8.5–10.1)
CHLORIDE BLD-SCNC: 103 MMOL/L (ref 94–109)
CO2 SERPL-SCNC: 31 MMOL/L (ref 20–32)
CREAT SERPL-MCNC: 0.75 MG/DL (ref 0.52–1.04)
ERYTHROCYTE [DISTWIDTH] IN BLOOD BY AUTOMATED COUNT: 14.7 % (ref 10–15)
GFR SERPL CREATININE-BSD FRML MDRD: >90 ML/MIN/1.73M2
GLUCOSE BLD-MCNC: 110 MG/DL (ref 70–99)
HCT VFR BLD AUTO: 31 % (ref 35–47)
HGB BLD-MCNC: 9.6 G/DL (ref 11.7–15.7)
MAGNESIUM SERPL-MCNC: 2.1 MG/DL (ref 1.6–2.3)
MCH RBC QN AUTO: 26.7 PG (ref 26.5–33)
MCHC RBC AUTO-ENTMCNC: 31 G/DL (ref 31.5–36.5)
MCV RBC AUTO: 86 FL (ref 78–100)
PHOSPHATE SERPL-MCNC: 3.8 MG/DL (ref 2.5–4.5)
PLATELET # BLD AUTO: 572 10E3/UL (ref 150–450)
POTASSIUM BLD-SCNC: 3.6 MMOL/L (ref 3.4–5.3)
RBC # BLD AUTO: 3.59 10E6/UL (ref 3.8–5.2)
SODIUM SERPL-SCNC: 138 MMOL/L (ref 133–144)
WBC # BLD AUTO: 11.2 10E3/UL (ref 4–11)

## 2021-11-29 PROCEDURE — 250N000013 HC RX MED GY IP 250 OP 250 PS 637: Performed by: STUDENT IN AN ORGANIZED HEALTH CARE EDUCATION/TRAINING PROGRAM

## 2021-11-29 PROCEDURE — 84100 ASSAY OF PHOSPHORUS: CPT | Performed by: STUDENT IN AN ORGANIZED HEALTH CARE EDUCATION/TRAINING PROGRAM

## 2021-11-29 PROCEDURE — 71045 X-RAY EXAM CHEST 1 VIEW: CPT | Mod: 26 | Performed by: RADIOLOGY

## 2021-11-29 PROCEDURE — 36415 COLL VENOUS BLD VENIPUNCTURE: CPT | Performed by: STUDENT IN AN ORGANIZED HEALTH CARE EDUCATION/TRAINING PROGRAM

## 2021-11-29 PROCEDURE — 250N000009 HC RX 250: Performed by: STUDENT IN AN ORGANIZED HEALTH CARE EDUCATION/TRAINING PROGRAM

## 2021-11-29 PROCEDURE — 120N000002 HC R&B MED SURG/OB UMMC

## 2021-11-29 PROCEDURE — 250N000009 HC RX 250

## 2021-11-29 PROCEDURE — 250N000013 HC RX MED GY IP 250 OP 250 PS 637: Performed by: SURGERY

## 2021-11-29 PROCEDURE — 250N000011 HC RX IP 250 OP 636: Performed by: STUDENT IN AN ORGANIZED HEALTH CARE EDUCATION/TRAINING PROGRAM

## 2021-11-29 PROCEDURE — 999N000157 HC STATISTIC RCP TIME EA 10 MIN

## 2021-11-29 PROCEDURE — 94640 AIRWAY INHALATION TREATMENT: CPT | Mod: 76

## 2021-11-29 PROCEDURE — 94640 AIRWAY INHALATION TREATMENT: CPT

## 2021-11-29 PROCEDURE — 85014 HEMATOCRIT: CPT | Performed by: STUDENT IN AN ORGANIZED HEALTH CARE EDUCATION/TRAINING PROGRAM

## 2021-11-29 PROCEDURE — 80048 BASIC METABOLIC PNL TOTAL CA: CPT | Performed by: STUDENT IN AN ORGANIZED HEALTH CARE EDUCATION/TRAINING PROGRAM

## 2021-11-29 PROCEDURE — 71045 X-RAY EXAM CHEST 1 VIEW: CPT

## 2021-11-29 PROCEDURE — 83735 ASSAY OF MAGNESIUM: CPT | Performed by: STUDENT IN AN ORGANIZED HEALTH CARE EDUCATION/TRAINING PROGRAM

## 2021-11-29 RX ORDER — BISACODYL 10 MG
10 SUPPOSITORY, RECTAL RECTAL ONCE
Status: DISCONTINUED | OUTPATIENT
Start: 2021-11-29 | End: 2021-11-30 | Stop reason: CLARIF

## 2021-11-29 RX ORDER — POLYETHYLENE GLYCOL 3350 17 G/17G
17 POWDER, FOR SOLUTION ORAL 2 TIMES DAILY
Status: DISCONTINUED | OUTPATIENT
Start: 2021-11-29 | End: 2021-11-30 | Stop reason: HOSPADM

## 2021-11-29 RX ORDER — MAGNESIUM HYDROXIDE 1200 MG/15ML
LIQUID ORAL
Status: COMPLETED
Start: 2021-11-29 | End: 2021-11-29

## 2021-11-29 RX ORDER — AMOXICILLIN 250 MG
2 CAPSULE ORAL 2 TIMES DAILY
Status: DISCONTINUED | OUTPATIENT
Start: 2021-11-29 | End: 2021-11-30 | Stop reason: HOSPADM

## 2021-11-29 RX ORDER — IPRATROPIUM BROMIDE AND ALBUTEROL SULFATE 2.5; .5 MG/3ML; MG/3ML
3 SOLUTION RESPIRATORY (INHALATION)
Status: DISCONTINUED | OUTPATIENT
Start: 2021-11-29 | End: 2021-11-30 | Stop reason: HOSPADM

## 2021-11-29 RX ADMIN — IPRATROPIUM BROMIDE AND ALBUTEROL SULFATE 3 ML: .5; 3 SOLUTION RESPIRATORY (INHALATION) at 17:17

## 2021-11-29 RX ADMIN — ENOXAPARIN SODIUM 40 MG: 40 INJECTION SUBCUTANEOUS at 09:14

## 2021-11-29 RX ADMIN — OXYCODONE HYDROCHLORIDE 10 MG: 5 TABLET ORAL at 06:04

## 2021-11-29 RX ADMIN — DOCUSATE SODIUM 50 MG AND SENNOSIDES 8.6 MG 2 TABLET: 8.6; 5 TABLET, FILM COATED ORAL at 09:18

## 2021-11-29 RX ADMIN — POLYETHYLENE GLYCOL 3350 17 G: 17 POWDER, FOR SOLUTION ORAL at 09:28

## 2021-11-29 RX ADMIN — DOCUSATE SODIUM 50 MG AND SENNOSIDES 8.6 MG 2 TABLET: 8.6; 5 TABLET, FILM COATED ORAL at 20:12

## 2021-11-29 RX ADMIN — OXYCODONE HYDROCHLORIDE 10 MG: 5 TABLET ORAL at 20:15

## 2021-11-29 RX ADMIN — IPRATROPIUM BROMIDE AND ALBUTEROL SULFATE 3 ML: .5; 3 SOLUTION RESPIRATORY (INHALATION) at 20:38

## 2021-11-29 RX ADMIN — AMOXICILLIN AND CLAVULANATE POTASSIUM 1 TABLET: 875; 125 TABLET, FILM COATED ORAL at 09:14

## 2021-11-29 RX ADMIN — IPRATROPIUM BROMIDE AND ALBUTEROL SULFATE 3 ML: .5; 3 SOLUTION RESPIRATORY (INHALATION) at 12:43

## 2021-11-29 RX ADMIN — AMOXICILLIN AND CLAVULANATE POTASSIUM 1 TABLET: 875; 125 TABLET, FILM COATED ORAL at 20:12

## 2021-11-29 RX ADMIN — LABETALOL HYDROCHLORIDE 100 MG: 100 TABLET ORAL at 20:12

## 2021-11-29 RX ADMIN — OXYCODONE HYDROCHLORIDE 10 MG: 5 TABLET ORAL at 01:46

## 2021-11-29 RX ADMIN — ACETAMINOPHEN 975 MG: 325 TABLET, FILM COATED ORAL at 16:16

## 2021-11-29 RX ADMIN — OXYCODONE HYDROCHLORIDE 10 MG: 5 TABLET ORAL at 16:16

## 2021-11-29 RX ADMIN — ACETAMINOPHEN 975 MG: 325 TABLET, FILM COATED ORAL at 09:14

## 2021-11-29 RX ADMIN — ENOXAPARIN SODIUM 40 MG: 40 INJECTION SUBCUTANEOUS at 20:14

## 2021-11-29 RX ADMIN — SODIUM CHLORIDE: 900 IRRIGANT IRRIGATION at 10:45

## 2021-11-29 RX ADMIN — POLYETHYLENE GLYCOL 3350 17 G: 17 POWDER, FOR SOLUTION ORAL at 20:12

## 2021-11-29 RX ADMIN — OXYCODONE HYDROCHLORIDE 5 MG: 5 TABLET ORAL at 11:53

## 2021-11-29 RX ADMIN — LABETALOL HYDROCHLORIDE 100 MG: 100 TABLET ORAL at 09:14

## 2021-11-29 RX ADMIN — LIDOCAINE 2 PATCH: 560 PATCH PERCUTANEOUS; TOPICAL; TRANSDERMAL at 09:14

## 2021-11-29 ASSESSMENT — ACTIVITIES OF DAILY LIVING (ADL)
ADLS_ACUITY_SCORE: 6
ADLS_ACUITY_SCORE: 6
ADLS_ACUITY_SCORE: 8
ADLS_ACUITY_SCORE: 8
ADLS_ACUITY_SCORE: 6
ADLS_ACUITY_SCORE: 6
ADLS_ACUITY_SCORE: 8
ADLS_ACUITY_SCORE: 6
ADLS_ACUITY_SCORE: 8
ADLS_ACUITY_SCORE: 6
ADLS_ACUITY_SCORE: 8
ADLS_ACUITY_SCORE: 6
ADLS_ACUITY_SCORE: 6
ADLS_ACUITY_SCORE: 8

## 2021-11-29 NOTE — PLAN OF CARE
Time: 7908-1450    VS: /65 (BP Location: Right arm)   Pulse 85   Temp 97.6  F (36.4  C) (Temporal)   Resp 18   Wt 144.7 kg (319 lb 0.1 oz)   SpO2 97% on 2 L NC  Breastfeeding No   BMI 46.77 kg/m     Activity: SBA.   Neuros:  A&O x4. Denies numbness or tingling.    Cardiac:  WDL. Denies chest pain   Respiratory:  On 2L NC to maintain 02 sats above 92%. Denies SOB at rest. WOO noted. Productive cough  GI/:  Voiding spontaneously. +BS, passing flatus.   Diet: Regular  Skin: R chest tube in place, dressing CDI.   Lines: L PIV, SL.   Pain/nausea:  Denies nausea. Treated pain with scheduled tylenol and PRN oxycodone x 1 this shift. Lidocaine patches removed.   New changes this shift: Patient resting comfortably between cares.

## 2021-11-29 NOTE — PLAN OF CARE
Plan of Care Note     Reason for Admission: Empyema, Transaminitis, Acute respiratory failure with hypoxia, Multifocal pneumonia   Procedures: Chest Tube Placement   IV Access: L PIV (SL)  Incisions/Drains: Chest Tube to -20 CM H O  VS: /58 (BP Location: Right arm)   Pulse 70   Temp 97.1  F (36.2  C) (Temporal)   Resp 18   Wt 144.7 kg (319 lb 0.1 oz)   SpO2 96%   Breastfeeding No   BMI 46.77 kg/m    2L O   Diet: Regular Diet  Activity: SBA  Pain Management: Oxycodone on shift  GI/: Voiding spontaneously, -BM since 11/23, +Gas, -Nausea  Neuro: A&Ox4  Team: Thoracic Surgery  Pertinent Labs: Lactic 1.0 (triggered in am)        Shift Summary  Patient slept well between cares. Plan for possible discharge today. Continue with POC.

## 2021-11-29 NOTE — PLAN OF CARE
7950-7685:  Afebrile. O2 weaned down to 1 LPM via NC to keep SpO2 >92%.  C/o abdominal and back pain, on schedule Tylenol and given Oxycodone.  Adequate urine output.  Had a BM.  Chest tube flushed by Thoracic Surgery and currently -20 suction.  Respiratory Care IP Consult placed.  Up with SBA.  Possible discharge tomorrow (11/30).  Continue to monitor and with POC.

## 2021-11-29 NOTE — PROGRESS NOTES
THORACIC SURGERY PROGRESS NOTE    S:  NAEO.  Feeling better. Pain is controlled.  Eating and drinking without n/v. Denies fevers, chills, sweats.     O:  /69 (BP Location: Right arm)   Pulse 99   Temp 97.5  F (36.4  C) (Oral)   Resp 14   Wt 144.7 kg (319 lb 0.1 oz)   SpO2 92%   Breastfeeding No   BMI 46.77 kg/m      I/O last 3 completed shifts:  In: 450 [P.O.:450]  Out: 2150 [Urine:2050; Chest Tube:100]    Alert, oriented, NAD  Nonlabored breathing on NC  No cyanosis   Distal extremities warm  R CT serosanguinous and pus, not tidaling    Labs       Lab Results   Component Value Date     11/28/2021    Lab Results   Component Value Date    CHLORIDE 104 11/28/2021    Lab Results   Component Value Date    BUN 12 11/27/2021      Lab Results   Component Value Date    POTASSIUM 3.9 11/28/2021    Lab Results   Component Value Date    CO2 27 11/27/2021    Lab Results   Component Value Date    CR 0.74 11/27/2021    CR 1.03 10/29/2020        Lab Results   Component Value Date    WBC 15.5 (H) 11/28/2021    HGB 9.6 (L) 11/28/2021    HCT 31.4 (L) 11/28/2021    MCV 87 11/28/2021     (H) 11/28/2021       Imaging  Recent Results (from the past 24 hour(s))   XR Chest Port 1 View    Narrative    EXAM: XR CHEST PORT 1 VIEW  11/28/2021 9:46 AM     HISTORY:  chest tube, effusion, daily while monitoring empyema and CT        COMPARISON:  Chest x-ray 11/27/2021, 11/26/2021, 11/25/2021, chest and  pelvis CT 11/23/2021    FINDINGS  Technique: Semiupright AP view of the chest.     Devices: Right chest pleural catheter again noted.    Lungs: Stable appearance of patchy pulmonary opacities, predominantly  in the right lung. No discernible pneumothorax.  Stable size of right  pleural effusion.    Cardiovascular: Cardiac silhouette is not appreciably changed.      Impression    IMPRESSION:   1. Stable right pleural effusion with pleural catheter in place.  2. Stable right greater than left patchy pulmonary opacities  compared  with 11/27/2021.    I have personally reviewed the examination and initial interpretation  and I agree with the findings.    BARRY SMITH MD         SYSTEM ID:  [id number]   CT Chest w Contrast    Narrative    EXAMINATION: CT CHEST W CONTRAST, 11/28/2021 10:54 AM    TECHNIQUE:  Helical CT images from the thoracic inlet through the  symphysis pubis were obtained  with contrast. Contrast dose: 100 mL  [id number]    COMPARISON: Chest x-ray 11/28/2021, chest CT 11/23/2021    HISTORY: Empyema; patient with right empyema now s/p lytic therapy    FINDINGS:    Thorax    Lungs: Small remaining loculated right pleural effusion with  surrounding pleural hyperenhancement and adjacent right lower lobe  pulmonary opacity. Right pleural catheter in place within the  collection. Right sided volume loss with hemidiaphragm elevation.  Segmental groundglass and interstitial opacities in the middle lobe.  Subpleural pulmonary opacity in the posterolateral right upper lobe.  Multifocal bilateral patchy interstitial and groundglass opacities.     Mediastinum: Upper limits of normal heart size, unchanged. No  significant pericardial effusion. Mild enlargement of central lymph  nodes again demonstrated.    Vascular: Nonaneurysmal thoracic aorta.     Chest wall: No suspicious mass or lymphadenopathy.     Lower neck: Visualized portion of the thyroid gland is unremarkable.      Upper abdomen: No evidence of acute pathology or  mass in the  visualized upper abdomen.     Bones/Soft Tissues: Multilevel degenerative changes of the visualized  spine.  No acute or aggressive appearing bone lesion.        Impression    IMPRESSION:   1. Decreased size of the right empyema in comparison with prior CT  with right pleural catheter remaining in place within the collection.  Adjacent atelectasis and volume loss again demonstrated, decreased  from prior CT.  2. Continued bilateral multifocal pulmonary opacities consistent  with  infection.    I have personally reviewed the examination and initial interpretation  and I agree with the findings.    KIRILL AMADO MD         SYSTEM ID:  [id number]      Reviewed cxr this am.      A/P: Scott Dobbs is a 36 year old female with no significant pmh who presented 11/23 with a large right sided empyema. Now s/p R IR placed CT on 11/23. 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    Sepsis present on admission due to Fusobacterium nucleatum and Strep constellatus empyema with associated organ dysfunction - acute kidney injury.  CHAVO and sepsis are resolved.    - continue augmentin   - has completed lytics   - wean NC O2 as able, aggressive pulmonary toilet, nebs   - aggressive bowel regimen   - ambulate, out of bed to chair  - regular diet  - home labetalol   - SCDs, enoxaparin   - CT flushed with 30cc sterile saline, please return to -20 suction for 24 hours    Seen with fellow who will discuss with staff    Leola Gonzalez MD  Surgery Resident PGY-3  Pg [phone]

## 2021-11-29 NOTE — PLAN OF CARE
Progress Note (9978-4135):    AVSS on 1-2 LPM via NC. O2 sats >92%. Reports mild SOB with activity. VS q4h. SBA in room/hallways. Requires encouragement to ambulate regularly. Pain controlled with scheduled tylenol, lidocaine patches & PRN oxy, ibuprofen, robaxin, IV dilaudid. L PIV SL. Chest tube to water seal, total output 65 ml of serosanguinous/purulent, insertion site CDI. Tolerating regular diet w/o nausea or vomiting. Voiding spontaneously. BS active, passing gas, no BM this shift. Chest CT scan completed. Transitioned pt to oral antibiotics. Lovenox teaching completed and pt demonstrated proper technique. Plan to possibly discharge tomorrow. Will continue with POC.

## 2021-11-29 NOTE — PROGRESS NOTES
STAFF ADDENDUM:  I saw and evaluated Ms. Dobbs and agree with the resident s findings and plan of care as documented in the resident s note and edited by me, as applicable.      In summary, Ms. Dobbs is doing well. Her CT shows significant improvement, but today the chest tube does not appear to work. We will flush it with sterile saline, place it on suction, and then decide whether we should try some more lytics or not. I believe that if the tube is non-functioning we may be able to just remove it tomorrow.  The patient had all questions answered and was in agreement with the plan.  Gabriel Zelaya MD

## 2021-11-30 ENCOUNTER — APPOINTMENT (OUTPATIENT)
Dept: GENERAL RADIOLOGY | Facility: CLINIC | Age: 36
DRG: 871 | End: 2021-11-30
Payer: COMMERCIAL

## 2021-11-30 VITALS
SYSTOLIC BLOOD PRESSURE: 126 MMHG | HEART RATE: 96 BPM | OXYGEN SATURATION: 90 % | WEIGHT: 293 LBS | BODY MASS INDEX: 46.77 KG/M2 | RESPIRATION RATE: 16 BRPM | DIASTOLIC BLOOD PRESSURE: 72 MMHG | TEMPERATURE: 95.5 F

## 2021-11-30 DIAGNOSIS — J18.9 MULTIFOCAL PNEUMONIA: Primary | ICD-10-CM

## 2021-11-30 LAB
ANION GAP SERPL CALCULATED.3IONS-SCNC: 6 MMOL/L (ref 3–14)
BUN SERPL-MCNC: 9 MG/DL (ref 7–30)
CALCIUM SERPL-MCNC: 8.7 MG/DL (ref 8.5–10.1)
CHLORIDE BLD-SCNC: 103 MMOL/L (ref 94–109)
CO2 SERPL-SCNC: 30 MMOL/L (ref 20–32)
CREAT SERPL-MCNC: 0.79 MG/DL (ref 0.52–1.04)
ERYTHROCYTE [DISTWIDTH] IN BLOOD BY AUTOMATED COUNT: 14.8 % (ref 10–15)
GFR SERPL CREATININE-BSD FRML MDRD: >90 ML/MIN/1.73M2
GLUCOSE BLD-MCNC: 145 MG/DL (ref 70–99)
HCT VFR BLD AUTO: 32.2 % (ref 35–47)
HGB BLD-MCNC: 9.6 G/DL (ref 11.7–15.7)
MAGNESIUM SERPL-MCNC: 2.2 MG/DL (ref 1.6–2.3)
MCH RBC QN AUTO: 26.3 PG (ref 26.5–33)
MCHC RBC AUTO-ENTMCNC: 29.8 G/DL (ref 31.5–36.5)
MCV RBC AUTO: 88 FL (ref 78–100)
PHOSPHATE SERPL-MCNC: 3.6 MG/DL (ref 2.5–4.5)
PLATELET # BLD AUTO: 599 10E3/UL (ref 150–450)
POTASSIUM BLD-SCNC: 3.6 MMOL/L (ref 3.4–5.3)
RBC # BLD AUTO: 3.65 10E6/UL (ref 3.8–5.2)
SARS-COV-2 RNA RESP QL NAA+PROBE: NEGATIVE
SODIUM SERPL-SCNC: 139 MMOL/L (ref 133–144)
WBC # BLD AUTO: 9.6 10E3/UL (ref 4–11)

## 2021-11-30 PROCEDURE — 83735 ASSAY OF MAGNESIUM: CPT | Performed by: STUDENT IN AN ORGANIZED HEALTH CARE EDUCATION/TRAINING PROGRAM

## 2021-11-30 PROCEDURE — 250N000013 HC RX MED GY IP 250 OP 250 PS 637: Performed by: STUDENT IN AN ORGANIZED HEALTH CARE EDUCATION/TRAINING PROGRAM

## 2021-11-30 PROCEDURE — 250N000013 HC RX MED GY IP 250 OP 250 PS 637: Performed by: SURGERY

## 2021-11-30 PROCEDURE — 71045 X-RAY EXAM CHEST 1 VIEW: CPT | Mod: 26 | Performed by: RADIOLOGY

## 2021-11-30 PROCEDURE — 250N000011 HC RX IP 250 OP 636: Performed by: STUDENT IN AN ORGANIZED HEALTH CARE EDUCATION/TRAINING PROGRAM

## 2021-11-30 PROCEDURE — 36415 COLL VENOUS BLD VENIPUNCTURE: CPT | Performed by: STUDENT IN AN ORGANIZED HEALTH CARE EDUCATION/TRAINING PROGRAM

## 2021-11-30 PROCEDURE — 999N000157 HC STATISTIC RCP TIME EA 10 MIN

## 2021-11-30 PROCEDURE — 71045 X-RAY EXAM CHEST 1 VIEW: CPT | Mod: 76

## 2021-11-30 PROCEDURE — 94640 AIRWAY INHALATION TREATMENT: CPT | Mod: 76

## 2021-11-30 PROCEDURE — 94640 AIRWAY INHALATION TREATMENT: CPT

## 2021-11-30 PROCEDURE — 250N000009 HC RX 250: Performed by: STUDENT IN AN ORGANIZED HEALTH CARE EDUCATION/TRAINING PROGRAM

## 2021-11-30 PROCEDURE — 71045 X-RAY EXAM CHEST 1 VIEW: CPT

## 2021-11-30 PROCEDURE — U0005 INFEC AGEN DETEC AMPLI PROBE: HCPCS | Performed by: STUDENT IN AN ORGANIZED HEALTH CARE EDUCATION/TRAINING PROGRAM

## 2021-11-30 PROCEDURE — 82374 ASSAY BLOOD CARBON DIOXIDE: CPT | Performed by: STUDENT IN AN ORGANIZED HEALTH CARE EDUCATION/TRAINING PROGRAM

## 2021-11-30 PROCEDURE — 84100 ASSAY OF PHOSPHORUS: CPT | Performed by: STUDENT IN AN ORGANIZED HEALTH CARE EDUCATION/TRAINING PROGRAM

## 2021-11-30 PROCEDURE — 85027 COMPLETE CBC AUTOMATED: CPT | Performed by: STUDENT IN AN ORGANIZED HEALTH CARE EDUCATION/TRAINING PROGRAM

## 2021-11-30 RX ORDER — AMOXICILLIN 250 MG
2 CAPSULE ORAL 2 TIMES DAILY
COMMUNITY
Start: 2021-11-30 | End: 2021-12-30

## 2021-11-30 RX ORDER — OXYCODONE HYDROCHLORIDE 5 MG/1
5 TABLET ORAL EVERY 6 HOURS PRN
Qty: 20 TABLET | Refills: 0 | Status: SHIPPED | OUTPATIENT
Start: 2021-11-30 | End: 2021-12-30

## 2021-11-30 RX ORDER — ACETAMINOPHEN 325 MG/1
975 TABLET ORAL EVERY 8 HOURS PRN
COMMUNITY
Start: 2021-11-30

## 2021-11-30 RX ORDER — POLYETHYLENE GLYCOL 3350 17 G/17G
17 POWDER, FOR SOLUTION ORAL DAILY
Qty: 510 G | COMMUNITY
Start: 2021-11-30 | End: 2021-12-30

## 2021-11-30 RX ADMIN — IPRATROPIUM BROMIDE AND ALBUTEROL SULFATE 3 ML: .5; 3 SOLUTION RESPIRATORY (INHALATION) at 09:40

## 2021-11-30 RX ADMIN — OXYCODONE HYDROCHLORIDE 10 MG: 5 TABLET ORAL at 05:11

## 2021-11-30 RX ADMIN — OXYCODONE HYDROCHLORIDE 10 MG: 5 TABLET ORAL at 09:52

## 2021-11-30 RX ADMIN — LABETALOL HYDROCHLORIDE 100 MG: 100 TABLET ORAL at 08:03

## 2021-11-30 RX ADMIN — LIDOCAINE 2 PATCH: 560 PATCH PERCUTANEOUS; TOPICAL; TRANSDERMAL at 08:03

## 2021-11-30 RX ADMIN — ACETAMINOPHEN 975 MG: 325 TABLET, FILM COATED ORAL at 14:06

## 2021-11-30 RX ADMIN — ACETAMINOPHEN 975 MG: 325 TABLET, FILM COATED ORAL at 01:07

## 2021-11-30 RX ADMIN — POLYETHYLENE GLYCOL 3350 17 G: 17 POWDER, FOR SOLUTION ORAL at 08:04

## 2021-11-30 RX ADMIN — ENOXAPARIN SODIUM 40 MG: 40 INJECTION SUBCUTANEOUS at 09:52

## 2021-11-30 RX ADMIN — DOCUSATE SODIUM 50 MG AND SENNOSIDES 8.6 MG 2 TABLET: 8.6; 5 TABLET, FILM COATED ORAL at 08:04

## 2021-11-30 RX ADMIN — IPRATROPIUM BROMIDE AND ALBUTEROL SULFATE 3 ML: .5; 3 SOLUTION RESPIRATORY (INHALATION) at 12:45

## 2021-11-30 RX ADMIN — AMOXICILLIN AND CLAVULANATE POTASSIUM 1 TABLET: 875; 125 TABLET, FILM COATED ORAL at 08:03

## 2021-11-30 RX ADMIN — OXYCODONE HYDROCHLORIDE 10 MG: 5 TABLET ORAL at 01:07

## 2021-11-30 RX ADMIN — ACETAMINOPHEN 975 MG: 325 TABLET, FILM COATED ORAL at 08:04

## 2021-11-30 ASSESSMENT — ACTIVITIES OF DAILY LIVING (ADL)
ADLS_ACUITY_SCORE: 6
ADLS_ACUITY_SCORE: 6
ADLS_ACUITY_SCORE: 8
ADLS_ACUITY_SCORE: 6
ADLS_ACUITY_SCORE: 6
ADLS_ACUITY_SCORE: 8
ADLS_ACUITY_SCORE: 8
ADLS_ACUITY_SCORE: 6
ADLS_ACUITY_SCORE: 8
ADLS_ACUITY_SCORE: 8
ADLS_ACUITY_SCORE: 6
ADLS_ACUITY_SCORE: 8

## 2021-11-30 NOTE — PROGRESS NOTES
Patient has been assessed for Home Oxygen needs. Oxygen readings:    *Pulse oximetry (SpO2) = 93% on room air at rest while awake.    *SpO2 improved to n/a % on n/a liters/minute at rest. (pt didn't require O2 to maintain sats).    *SpO2 = 90% on room air during activity/with exercise.    *SpO2 improved to n/a% on n/a liters/minute during activity/with exercise. (pt didn't require O2 to maintain sats).

## 2021-11-30 NOTE — PROGRESS NOTES
"SPIRITUAL HEALTH SERVICES  [facility] 7C  REFERRAL SOURCE: Length of stay     Pt was pleasant and stated she was hoping to go home today, adding \"I miss my baby.\" Pt shed some tears as she reflected on her time away from family, while also expressing gratitude for for the care pt's parents have given to grand baby.     PLAN: Chaplains remain available per pt/family/staff request.     Rev. Lu Oliver MDiv, [facility]  Staff    Pager [phone]  * [facility] remains available 24/7 for emergent requests/referrals, either by having the switchboard page the on-call  or by entering an ASAP/STAT consult in Epic (this will also page the on-call ).*   "

## 2021-11-30 NOTE — PLAN OF CARE
VSS on RA. A&Ox4. Pt denies nausea; +Gas/BM; Tolerating regular diet. Up with Ax1. Pain is managed with Oxycodone. PIV discontinued. IR drain removed this afternoon and site is covered with primapore. Pt was adequate to discharge.     Discharge instructions and education was given to pt; Pt demonstrated understanding about  discharge papers. Instructed pt to  medication at discharge pharmacy. Pt discharged to home and transported with wheelchair accompanied by her .

## 2021-11-30 NOTE — PLAN OF CARE
Plan of Care Note     Reason for Admission: Empyema, Transaminitis, Acute respiratory failure with hypoxia, Multifocal pneumonia   Procedures: Chest Tube Placement   IV Access: L PIV (SL)  Incisions/Drains: Chest Tube to -20 CM H O  VS: /62 (BP Location: Right arm)   Pulse 93   Temp 96.9  F (36.1  C) (Temporal)   Resp 16   Wt 144.7 kg (319 lb 0.1 oz)   SpO2 96%   Breastfeeding No   BMI 46.77 kg/m   1L O   Diet: Regular Diet  Activity: SBA  Pain Management: Oxycodone and Tylenol on shift  GI/: Voiding spontaneously, +BM (11/29), +Gas, -Nausea  Neuro: A&Ox4  Team: Thoracic Surgery  Pertinent Labs: None        Shift Summary  Patient slept well between cares. Patient up to bathroom on shift. Plan for possible discharge today pending team decision about chest tube output. Continue with POC.

## 2021-11-30 NOTE — PLAN OF CARE
Time: 07:00-15:30      Reason for admit: Empyema, transaminitis, ARF with hypoxia, multi focal pneumonia  Vitals: WNL  Activity: Activity as tolerated, on SBA  Neuro: Alert and oriented x4  Mood/Behavior: Calm and cooperative  Lines/Drains: L PIV, SL  Cardiac: WNL  Resp: Lung sounds clear but slightly diminished  Diet: On regular diet with good appetite  GI/: Continent x2, able to void to ample amounts of urine  Skin: No notable skin issues aside from the surgical incision sites.  Pain: c/o pain on R side of back radiating to the abdomen, PRN oxycodone done, was effective  Labs/Imaging: Labs are unremarkable.     New this shift: Chest tube removed by provider, O2 walk test done, provider informed.      Plan: Will discharge when medically stable

## 2021-11-30 NOTE — PROGRESS NOTES
THORACIC SURGERY PROGRESS NOTE    S:  NAEO. Got nebs, cough seems better, using IS and acapella, can get IS to 1500.  Tolerating PO, had BM.  Pain controlled.  Wants to go home.     O:  /70 (BP Location: Right arm)   Pulse 76   Temp 97.6  F (36.4  C) (Temporal)   Resp 16   Wt 144.7 kg (319 lb 0.1 oz)   SpO2 95%   Breastfeeding No   BMI 46.77 kg/m      I/O last 3 completed shifts:  In: 360 [P.O.:360]  Out: 780 [Urine:700; Chest Tube:80]    Alert, oriented, NAD  Nonlabored breathing on NC  No cyanosis   Distal extremities warm  R CT serosanguinous and pus, not tidaling    Labs       Lab Results   Component Value Date     11/29/2021    Lab Results   Component Value Date    CHLORIDE 103 11/29/2021    Lab Results   Component Value Date    BUN 10 11/29/2021      Lab Results   Component Value Date    POTASSIUM 3.6 11/29/2021    Lab Results   Component Value Date    CO2 31 11/29/2021    Lab Results   Component Value Date    CR 0.75 11/29/2021    CR 1.03 10/29/2020          Lab Results   Component Value Date    WBC 11.2 (H) 11/29/2021    HGB 9.6 (L) 11/29/2021    HCT 31.0 (L) 11/29/2021    MCV 86 11/29/2021     (H) 11/29/2021       Imaging  No results found for this or any previous visit (from the past 24 hour(s)).   Reviewed cxr this am.      A/P: Scott Dobbs is a 36 year old female with no significant pmh who presented 11/23 with a large right sided empyema. Now s/p R IR placed CT on 11/23. 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    Sepsis present on admission due to Fusobacterium nucleatum and Strep constellatus empyema with associated organ dysfunction - acute kidney injury.  CHAVO and sepsis are resolved.    - continue augmentin   - wean NC O2 as able, aggressive pulmonary toilet, nebs   - bowel regimen   - ambulate, out of bed to chair  - regular diet  - home labetalol   - SCDs, enoxaparin   - will discuss CT plan with staff today, removal vs possible  additional lytics     Seen with fellow who will discuss with staff    Leola Gonzalez MD  Surgery Resident PGY-3  Pg [phone]

## 2021-11-30 NOTE — DISCHARGE SUMMARY
NAME: Scott Dobbs   MRN: [id number]   : 1985     DATE OF ADMISSION: 2021     PRE/POSTOPERATIVE DIAGNOSES: right lung empyema    PROCEDURES PERFORMED: IR placed chest tube     PATHOLOGY RESULTS: n/a      CULTURE RESULTS: R lung pleural fluid with Streptococcus constellatus, Fusobacterium nucleatum     INTRAOPERATIVE COMPLICATIONS: N/a    POSTOPERATIVE COMPLICATIONS: N/a    DRAINS/TUBES PRESENT AT DISCHARGE: chest tube dressing    DATE OF DISCHARGE:  21     HOSPITAL COURSE: Scott Dobbs is a 36 year old female who on 2021 was admitted for shortness of breath and found to have a R lung empyema.  She underwent IR chest tube placement, was initiated on broad-spectrum antibiotics, and maintained on supplemental oxygen.  She underwent 6 doses of lytics via chest tube with improvement in empyema. After cultures speciated, antibiotics were narrowed to augmentin.  Sepsis and acute kidney injury present on admission improved.  O2 requirement improved and she did not qualify for home oxygen on discharge.  She was educated on return precautions.  Prior to discharge, her pain was controlled well, she was able to perform ADLs and ambulate independently without difficulty, and had full return of bowel and bladder function.  On , she was discharged to home in stable condition.    DISCHARGE EXAM:   A&O, NAD  Resp non-labored  Distal extremities warm  Pigtail site dressing c/d/i      DISCHARGE INSTRUCTIONS:  Discharge Procedure Orders   Reason for your hospital stay   Order Comments: You were hospitalized for an infection around your right lung.     Adult [facility] Follow-up and recommended labs and tests   Order Comments: Follow up with the thoracic surgery clinic within 1 month for hospital follow- up. The following labs/tests are recommended: chest CT.    Appointments on [place] and/or [facility] (with [facility] or [facility] provider or service). Call [phone] if you haven't heard  "regarding these appointments within 7 days of discharge.     Activity   Order Comments: Your activity upon discharge: activity as tolerated     Order Specific Question Answer Comments   Is discharge order? Yes      Wound care and dressings   Order Comments: THORACIC SURGERY DISCHARGE INSTRUCTIONS    DIET: Regular diet as tolerated    If your plans upon discharge include prolonged periods of sitting (i.e a lengthy car or plane ride), it is highly beneficial to get up and walk at least once per hour to help prevent swelling and blood clots.     You may remove chest tube dressing 48 hours after tube removal and bandage the site at your own discretion thereafter.  Small amounts of leakage are normal for 2-3 days after removal.  Feel free to call with questions.    Take incentive spirometer home for continued frequent use    Stay hydrated. Take over the counter fiber (metamucil or benefiber) and stool softeners (Miralax, docusate or senna) if becoming constipated.     Call for fever greater than 101.5, chills, increased size of incision, red skin around incision, vision changes, muscle strength changes, sensation changes, shortness of breath, or other concerns.    No driving while taking narcotic pain medication.    Transition to ibuprofen or tylenol/acetaminophen for pain control. Do not take tylenol/acetaminophen and acetaminophen containing narcotic (e.g., percocet or vicodin) at the same time. If you have known ulcer problems, or kidney trouble (elevated creatinine) do not take the ibuprofen.    In emergencies, call 911    For other Questions or Concerns;   A.) During weekday working hours (Monday through Friday 8am to 4:30pm)   call [phone] and ask to speak to a clinical nurse specialist.     B.) At nights (after 4:30pm), on weekends, or if urgent call [phone] and   tell the  \"I would like to page job code [phone], the thoracic surgery   fellow on call, please.\"     Diet   Order Comments: " Follow this diet upon discharge: regular     Order Specific Question Answer Comments   Is discharge order? Yes        DISCHARGE MEDICATIONS:   Current Discharge Medication List      START taking these medications    Details   acetaminophen (TYLENOL) 325 MG tablet Take 3 tablets (975 mg) by mouth every 8 hours as needed for mild pain    Associated Diagnoses: Empyema (H)      amoxicillin-clavulanate (AUGMENTIN) 875-125 MG tablet Take 1 tablet by mouth every 12 hours for 7 days  Qty: 14 tablet, Refills: 0    Associated Diagnoses: Empyema (H)      oxyCODONE (ROXICODONE) 5 MG tablet Take 1 tablet (5 mg) by mouth every 6 hours as needed for moderate to severe pain  Qty: 20 tablet, Refills: 0    Associated Diagnoses: Empyema (H)      polyethylene glycol (MIRALAX) 17 GM/Dose powder Take 17 g by mouth daily  Qty: 510 g    Associated Diagnoses: Empyema (H)      senna-docusate (SENOKOT-S/PERICOLACE) 8.6-50 MG tablet Take 2 tablets by mouth 2 times daily    Associated Diagnoses: Empyema (H)         CONTINUE these medications which have NOT CHANGED    Details   ASPIRIN PO Take 81 mg by mouth      Fexofenadine HCl (ALLEGRA PO)       fluticasone (FLONASE) 50 MCG/ACT nasal spray Spray 1 spray into both nostrils daily      ibuprofen (ADVIL/MOTRIN) 200 MG tablet Take 3 tablets (600 mg) by mouth every 6 hours as needed for moderate pain Start after delivery    Associated Diagnoses: Status post  section      labetalol (NORMODYNE) 100 MG tablet Take 1 tablet (100 mg) by mouth 2 times daily  Qty: 90 tablet, Refills: 0    Associated Diagnoses: Essential hypertension      Prenatal Vit-Fe Fumarate-FA (PRENATAL MULTIVITAMIN W/IRON) 27-0.8 MG tablet Take 1 tablet by mouth daily      tiZANidine (ZANAFLEX) 4 MG tablet Take 1-2 tablets (4-8 mg) by mouth 3 times daily as needed for muscle spasms  Qty: 60 tablet, Refills: 1    Associated Diagnoses: Back muscle spasm      paragard intrauterine copper device 1 each by Intrauterine route  once  Qty:      Associated Diagnoses: Encounter for insertion of intrauterine contraceptive device         STOP taking these medications       predniSONE (DELTASONE) 20 MG tablet Comments:   Reason for Stopping:

## 2021-11-30 NOTE — PLAN OF CARE
Assumed care from [id number].   VSS on 1L O2.  Neuro: A&Ox4.  GI/: Denies nausea. +Gas/BM; Voiding spontaneously with adequate urine output.   Diet/appetite: Tolerating regular diet.  Activity: Up with Ax1 and ambulating hallways.   Pain is managed with Tylenol, Oxycodone, Lidocaine patches  Skin/drains/Lines:PIV saline locked; Chest tube set to continuous suction. Total output 40 ml of serosanguinous/purulent drainage for this shift. Continue POC.

## 2021-12-01 RX ORDER — LABETALOL 100 MG/1
TABLET, FILM COATED ORAL
Qty: 180 TABLET | Refills: 0 | Status: SHIPPED | OUTPATIENT
Start: 2021-12-01 | End: 2022-03-07

## 2021-12-02 NOTE — TELEPHONE ENCOUNTER
Prescription approved per [facility] Refill Protocol.  Macy Chamberlain RN

## 2021-12-06 ENCOUNTER — VIRTUAL VISIT (OUTPATIENT)
Dept: BEHAVIORAL HEALTH | Facility: CLINIC | Age: 36
End: 2021-12-06
Payer: COMMERCIAL

## 2021-12-06 ENCOUNTER — TELEPHONE (OUTPATIENT)
Dept: ONCOLOGY | Facility: CLINIC | Age: 36
End: 2021-12-06
Payer: COMMERCIAL

## 2021-12-06 DIAGNOSIS — F41.1 GENERALIZED ANXIETY DISORDER: Primary | ICD-10-CM

## 2021-12-06 PROCEDURE — 90834 PSYTX W PT 45 MINUTES: CPT | Mod: GT | Performed by: SOCIAL WORKER

## 2021-12-06 NOTE — CONFIDENTIAL NOTE
Not having SOB today.   States she gets winded very easily.   Denies fever. Not coughing much up, has a little bit of phlegm but not enough that she has looked at it.   Is it normal to get winded?   Is there anything she should be doing to aid in her recovery?   Wondering when she would be able to get back to her physical demanding job? .   Getting dressed in the morning makes her winded, and she needs to take breaks.   Wondering about recovery period and when she should feel better?   Did not take home the incentive spirometer.   Gave number to clinical nurse specialist [phone].

## 2021-12-06 NOTE — PROGRESS NOTES
[facility]- Integrated Behavioral Health Services  December 6, 2021    Behavioral Health Clinician Progress Note    Patient Name: Scott Dobbs      Telemedicine Visit: The patient's condition can be safely assessed and treated via synchronous audio and visual telemedicine encounter.      Reason for Telemedicine Visit: Patient has requested telehealth visit and Services only offered telehealth due to COVID-19    Originating Site (Patient Location): Patient's home    Distant Site (Provider Location): [facility] Clinics: [facility]    Consent:  The patient/guardian has verbally consented to: the potential risks and benefits of telemedicine (video visit) versus in person care; bill my insurance or make self-payment for services provided; and responsibility for payment of non-covered services.     Mode of Communication:  Video Conference via [employer]    As the provider I attest to compliance with applicable laws and regulations related to telemedicine.         Service Type:  Individual     Session Start Time:  3:00  pm  Session End Time:  3: 52  pm      Session Length: 38 - 52      Attendees: Patient    Visit Activities (Refresh list every visit): [facility] Only    Diagnostic Assessment Date: 7/12/21  Treatment Plan Review Date: 11/2/21  See Flowsheets for today's PHQ-9 and BALA-7 results  Previous PHQ-9:   PHQ-9 SCORE 5/21/2021 6/30/2021 11/17/2021   PHQ-9 Total Score MyChart 3 (Minimal depression) 10 (Moderate depression) 16 (Moderately severe depression)   PHQ-9 Total Score 3 10 16     Previous BALA-7:   BALA-7 SCORE 5/21/2021 6/30/2021 11/17/2021   Total Score 6 (mild anxiety) 2 (minimal anxiety) 5 (mild anxiety)   Total Score 6 2 5       ED LEVEL:  No flowsheet data found.    DATA  Extended Session (60+ minutes): No  Interactive Complexity: No  Crisis: No  [facility] Patient: No    Treatment Objective(s) Addressed in This Session:  Target Behavior(s): Mental health  management    Anxiety: will experience a reduction in anxiety, will develop more effective coping skills to manage anxiety symptoms, will develop healthy cognitive patterns and beliefs and will increase ability to function adaptively    Current Stressors / Issues:  Patient provided update on her health since last visit. Per patient, she started to have more constant chronic and difficulties breathing. She shared that her COVID test was negative, and reflected upon her decision to go to the ED. Patient shared that she was admitted and hospitalized for one week. Patient continues to process through her experiences including what it was like for her to have the pain management that she received, missing Thanksgiving with her daughter, and now her recovery. She discussed sadness as she continues to be able to fully parent and grieves what has been lost parenting in the last month. Patient voiced concern about her recovery, her ability to return, and feeling uncertain about who to follow up with regarding questions she has. Explored with patient questions she has regarding her next steps, and encouraged patient to follow up with her care team.     Progress on Treatment Objective(s) / Homework:  Satisfactory progress - ACTION (Actively working towards change); Intervened by reinforcing change plan / affirming steps taken    Motivational Interviewing    MI Intervention: Expressed Empathy/Understanding, Supported Autonomy, Collaboration, Evocation, Permission to raise concern or advise, Open-ended questions, Change talk (evoked) and Reframe     Change Talk Expressed by the Patient: Desire to change Ability to change Reasons to change Need to change Committment to change Activation    Provider Response to Change Talk: E - Evoked more info from patient about behavior change, A - Affirmed patient's thoughts, decisions, or attempts at behavior change, R - Reflected patient's change talk and S - Summarized patient's change talk  statements     Cognitive Behavioral Therapy: Discussed connection between thoughts, feelings, and behaviors. Taught patient how to identify cognitive distortions, and assisted patient to identify own cognitive distortions. Taught and engaged patient in cognitive restructuring techniques.    Mindfulness-Based Strategies : Discussed skills based on development and application of mindfulness    Also provided psychoeducation about behavioral health condition, symptoms, and treatment options    Care Plan review completed: Yes    Medication Review:  No current psychiatric medications prescribed    Medication Compliance:  NA    Changes in Health Issues:   None reported    Chemical Use Review:   Substance Use: Chemical use reviewed, no active concerns identified      Tobacco Use: No current tobacco use.      Assessment: Current Emotional / Mental Status (status of significant symptoms):  Risk status (Self / Other harm or suicidal ideation)  Patient denies a history of suicidal ideation, suicide attempts, self-injurious behavior, homicidal ideation, homicidal behavior and and other safety concerns  Patient denies current fears or concerns for personal safety.  Patient denies current or recent suicidal ideation or behaviors.  Patient denies current or recent homicidal ideation or behaviors.  Patient denies current or recent self injurious behavior or ideation.  Patient denies other safety concerns.  A safety and risk management plan has not been developed at this time, however patient was encouraged to call [facility] should there be a change in any of these risk factors.    Appearance:   Appropriate   Eye Contact:   Good   Psychomotor Behavior: Normal   Attitude:   Cooperative   Orientation:   All  Speech   Rate / Production: Normal    Volume:  Normal   Mood:    Normal  Affect:    Appropriate   Thought Content:  Clear   Thought Form:  Coherent  Logical   Insight:    Good     Diagnoses:  1. Generalized anxiety  disorder        Collateral Reports Completed:  Not Applicable    Plan: (Homework, other):  Patient was given information about behavioral services and encouraged to schedule a follow up appointment with the clinic [facility] in two weeks. She was also given Cognitive Behavioral Therapy skills to practice when experiencing anxiety and depression. CD Recommendations: No indications of CD issues.  Dawn Franks, [facility], [facility]      ______________________________________________________________________    Integrated Primary Care Behavioral Health Treatment Plan    Patient's Name: Scott Dobbs  YOB: 1985    Date: 11/2/21    DSM-V Diagnoses: 300.02 (F41.1) Generalized Anxiety Disorder  Psychosocial / Contextual Factors:  postpartum within past year, transition back to work in February, coping with pandemic  WHODAS 2.0 Total Score 2/11/2021 5/21/2021   Total Score 19 19   Total Score MyChart 19 19         Referral / Collaboration:  Referral to another professional/service is not indicated at this time..     Anticipated number of session or this episode of care: 10-12      MeasurableTreatment Goal(s) related to diagnosis / functional impairment(s)  Goal 1: Patient will reduce symptoms of anxiety as evidenced by decreased score on BALA 7.     I will know I've met my goal when I'm less irritable and it is easier to stop the spiral      Objective #A (Patient Action)    Patient will identify three distraction and diversion activities and use those activities to decrease level of anxiety  .  Status: Continued - Date(s): 11/2/21    Intervention(s)  [facility] will teach distress tolerance, mindfulness, and relaxation techniques.    Objective #B  Patient will use cognitive strategies identified in therapy to challenge anxious thoughts.  Status: Continued - Date(s): 11/2/21  Intervention(s)  [facility] will assign homework to practice cognitive restructuring and defusion techniques.    Objective #C  Patient will use relaxation  strategies 2-3 times per day to reduce the physical symptoms of anxiety.  Status: Continued - Date(s): 11/2/21    Intervention(s)  [facility] will teach relaxation techniques.    Patient has reviewed and agreed to the above plan.      GEO Perez  November 2, 2021

## 2021-12-07 ENCOUNTER — TELEPHONE (OUTPATIENT)
Dept: SURGERY | Facility: CLINIC | Age: 36
End: 2021-12-07
Payer: COMMERCIAL

## 2021-12-07 ENCOUNTER — TELEPHONE (OUTPATIENT)
Dept: BEHAVIORAL HEALTH | Facility: CLINIC | Age: 36
End: 2021-12-07
Payer: COMMERCIAL

## 2021-12-07 NOTE — TELEPHONE ENCOUNTER
I called Scott to discuss her concerns and questions.  She is scheduled for a repeat chest CT scan on 12/29 and will be seen in our clinic the following day.  She reports being almost done with her antibiotic, has one more day to take them.    She denies fever, chills, has an occasional dry cough.   She has a 1 year old so does keep fairly active with him but gets dyspneic easily.   We discussed how to slowly build her stamina and endurance and I advised her to do some deep breathing/coughing when at home (she was not given an incentive spirometer upon discharge).       She did start working yesterday from home and asked for a work letter with some restrictions.   This was scanned/emailed to her, as requested.   She has my direct #, if further concerns or questions.

## 2021-12-07 NOTE — LETTER
[facility]  [address] [address]  Phone: [phone]  Fax: [phone]    12/07/21    REGARDING:  Scott Dobbs  [address] [address]      To whom it may concern:     Ms. Scott Dobbs has been recuperating at [facility] after a recent hospitalization and procedure.   Effective Monday, 12/6/2021, the following determination has been made:         Ms. Dobbs is released to resume work with restrictions.   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     No heavy lifting of <15 pounds and she is to have low exertion at this time.    She will be re-evaluated on 12/30/2021 and updated work-ability can then be provided.  If further information is needed, please fax your request to our office at [phone].    Sincerely,      ELIN Marquez [facility]  Department of Thoracic and Foregut Surgery  [phone]

## 2021-12-08 DIAGNOSIS — J18.9 MULTIFOCAL PNEUMONIA: Primary | ICD-10-CM

## 2021-12-09 ENCOUNTER — VIRTUAL VISIT (OUTPATIENT)
Dept: PSYCHOLOGY | Facility: CLINIC | Age: 36
End: 2021-12-09
Payer: COMMERCIAL

## 2021-12-09 DIAGNOSIS — F40.10 SOCIAL ANXIETY DISORDER: Primary | ICD-10-CM

## 2021-12-09 DIAGNOSIS — F41.1 GAD (GENERALIZED ANXIETY DISORDER): ICD-10-CM

## 2021-12-09 PROCEDURE — 90834 PSYTX W PT 45 MINUTES: CPT | Mod: GT | Performed by: PSYCHOLOGIST

## 2021-12-09 ASSESSMENT — ANXIETY QUESTIONNAIRES
7. FEELING AFRAID AS IF SOMETHING AWFUL MIGHT HAPPEN: NOT AT ALL
2. NOT BEING ABLE TO STOP OR CONTROL WORRYING: NOT AT ALL
6. BECOMING EASILY ANNOYED OR IRRITABLE: SEVERAL DAYS
3. WORRYING TOO MUCH ABOUT DIFFERENT THINGS: NOT AT ALL
4. TROUBLE RELAXING: NOT AT ALL
GAD7 TOTAL SCORE: 2
5. BEING SO RESTLESS THAT IT IS HARD TO SIT STILL: NOT AT ALL
1. FEELING NERVOUS, ANXIOUS, OR ON EDGE: SEVERAL DAYS

## 2021-12-09 ASSESSMENT — COLUMBIA-SUICIDE SEVERITY RATING SCALE - C-SSRS
TOTAL  NUMBER OF INTERRUPTED ATTEMPTS LIFETIME: NO
REASONS FOR IDEATION LIFETIME: MOSTLY TO END OR STOP THE PAIN (YOU COULDN'T GO ON LIVING WITH THE PAIN OR HOW YOU WERE FEELING)
2. HAVE YOU ACTUALLY HAD ANY THOUGHTS OF KILLING YOURSELF?: YES
6. HAVE YOU EVER DONE ANYTHING, STARTED TO DO ANYTHING, OR PREPARED TO DO ANYTHING TO END YOUR LIFE?: NO
ATTEMPT LIFETIME: NO
1. IN THE PAST MONTH, HAVE YOU WISHED YOU WERE DEAD OR WISHED YOU COULD GO TO SLEEP AND NOT WAKE UP?: NO
TOTAL  NUMBER OF ABORTED OR SELF INTERRUPTED ATTEMPTS LIFETIME: NO
4. HAVE YOU HAD THESE THOUGHTS AND HAD SOME INTENTION OF ACTING ON THEM?: NO
1. HAVE YOU WISHED YOU WERE DEAD OR WISHED YOU COULD GO TO SLEEP AND NOT WAKE UP?: YES
3. HAVE YOU BEEN THINKING ABOUT HOW YOU MIGHT KILL YOURSELF?: NO
2. HAVE YOU ACTUALLY HAD ANY THOUGHTS OF KILLING YOURSELF?: NO
5. HAVE YOU STARTED TO WORK OUT OR WORKED OUT THE DETAILS OF HOW TO KILL YOURSELF? DO YOU INTEND TO CARRY OUT THIS PLAN?: NO

## 2021-12-09 ASSESSMENT — PATIENT HEALTH QUESTIONNAIRE - PHQ9: SUM OF ALL RESPONSES TO PHQ QUESTIONS 1-9: 7

## 2021-12-09 NOTE — PROGRESS NOTES
[facility]   Mental Health & Addiction Services     Progress Note - Initial Visit    Patient  Name:  Scott Dobbs Date: 21         Service Type: Individual     Visit Start Time: 1:03pm Visit End Time: 1:55pm    Visit #: 1 52 minutes    Attendees: Client attended alone    Service Modality:  Video Visit:      Provider verified identity through the following two step process.  Patient provided:  Patient     Telemedicine Visit: The patient's condition can be safely assessed and treated via synchronous audio and visual telemedicine encounter.      Reason for Telemedicine Visit: Services only offered telehealth    Originating Site (Patient Location): Patient's home    Distant Site (Provider Location): Provider Remote Setting- Home Office    Consent:  The patient/guardian has verbally consented to: the potential risks and benefits of telemedicine (video visit) versus in person care; bill my insurance or make self-payment for services provided; and responsibility for payment of non-covered services.     Patient would like the video invitation sent by:  Send to e-mail at: [email or web address]    Mode of Communication:  Video Conference via [employer]    As the provider I attest to compliance with applicable laws and regulations related to telemedicine.       DATA:   Interactive Complexity: No   Crisis: No     Presenting Concerns/  Current Stressors:   Client struggles with taking care of household chores, keeping organized, memory      ASSESSMENT:  Mental Status Assessment:  Appearance:   Appropriate   Eye Contact:   Fair   Psychomotor Behavior: Normal   Attitude:   Cooperative  Pleasant  Orientation:   All  Speech   Rate / Production: Normal/ Responsive   Volume:  Normal   Mood:    Anxious  Sad   Affect:    Appropriate   Thought Content:  Clear   Thought Form:  Coherent   Insight:    Fair       Safety Issues and Plan for Safety and Risk Management:     [place] Suicide Severity  Rating Scale (Lifetime/Recent)  [place] Suicide Severity Rating (Lifetime/Recent) 6/1/2020   1. Wish to be Dead (Lifetime) No   2. Non-Specific Active Suicidal Thoughts (Lifetime) No   Actual Attempt (Lifetime) No   Has subject engaged in non-suicidal self-injurious behavior? (Lifetime) No   Interrupted Attempts (Lifetime) No   Aborted or Self-Interrupted Attempt (Lifetime) No   Preparatory Acts or Behavior (Lifetime) No     Patient denies current fears or concerns for personal safety.  Patient denies current or recent suicidal ideation or behaviors.  Patient denies current or recent homicidal ideation or behaviors.  Patient denies current or recent self injurious behavior or ideation.  Patient denies other safety concerns.  Recommended that patient call 911 or go to the local ED should there be a change in any of these risk factors.  Patient reports there are no firearms in the house.     Diagnostic Criteria:  Social Anxiety Disorder, Marked fear or anxiety about one or more social situations in which the individual is exposed to possible scrutiny by others. Examples include social interactions (e.g., having a conversation, meeting unfamiliar people), being observed (e.g., eating or drinking), and performing in front of others (e.g., giving a speech)., The individual fears that he or she will act in a way or show anxiety symptoms that will be negatively evaluated, The social situations almost always provoke fear or anxiety., The social situations are avoided or endured with intense fear or anxiety., The fear or anxiety is out of proportion to the actual threat posed by the social situation and to the sociocultural context., The fear, anxiety, or avoidance is persistent, typically lasting for 6 months or mo, The fear, anxiety, or avoidance causes clinically significant distress or impairment in social, occupational, or other important areas of functioning., The fear, anxiety, or avoidance is not attributable to the  physiological effects of a substance (e.g., a drug of abuse, a medication) or another medical condition., The fear, anxiety, or avoidance is not better explained by the symptoms of another mental disorder and If another medical condition is present, the fear, anxiety, or avoidance is clearly unrelated or is excessive      DSM5 Diagnoses: (Sustained by DSM5 Criteria Listed Above)  Diagnoses: 300.23 (F40.10) Social Anxiety Disorder  Psychosocial & Contextual Factors: Client is struggling keeping organized at home, taking care of household responsibilities; difficulty with motivation and memory.  WHODAS 2.0 (12 item):   WHODAS 2.0 Total Score 2/11/2021 5/21/2021   Total Score 19 19   Total Score MyChart 19 19     Intervention:   Skills identification and exploring (Bullet Journal and alarms for 'hyperfocusing' and keeping on task); Psychoeducation, validation, normalization, clarifying and open-ended questions; Active listening, empathy, and other rapport building skills; Administered screeners including Safety Check  Collateral Reports Completed:  Not Applicable      PLAN: (Homework, other):  1. Provider will continue Diagnostic Assessment.  Patient was given the following to do until next session:  Return the completed ADHD Questionnaires and MMPI-2.    2. Provider recommended the following referrals: None at this time.      3.  Suicide Risk and Safety Concerns were assessed for Scott Dobbs.    Patient meets the following risk assessment and triage: No Risk Indicated      Mejia Gonzalez  December 9, 2021

## 2021-12-10 ASSESSMENT — ANXIETY QUESTIONNAIRES: GAD7 TOTAL SCORE: 2

## 2021-12-16 ENCOUNTER — VIRTUAL VISIT (OUTPATIENT)
Dept: PSYCHOLOGY | Facility: CLINIC | Age: 36
End: 2021-12-16
Payer: COMMERCIAL

## 2021-12-16 DIAGNOSIS — F41.1 GAD (GENERALIZED ANXIETY DISORDER): ICD-10-CM

## 2021-12-16 DIAGNOSIS — F40.10 SOCIAL ANXIETY DISORDER: Primary | ICD-10-CM

## 2021-12-16 PROCEDURE — 90791 PSYCH DIAGNOSTIC EVALUATION: CPT | Mod: GT | Performed by: PSYCHOLOGIST

## 2021-12-16 NOTE — PROGRESS NOTES
[facility]   Mental Health & Addiction Services     Provider Name:  Mejia Gonzalez PsyD     Credentials:        UNIVERSAL ADULT ADHD/Mental Health DIAGNOSTIC ASSESSMENT      Patient Name:  Scott Dobbs  Date: 21  PREFERRED NAME: Scott  PRONOUNS:  She/her/hers     MRN: [id number]  : 1985  ADDRESS: [address] [place] [id number]  ACCT. NUMBER:  [id number]  PREFERRED PHONE: [phone]  May we leave a program related message: Yes         Service Type: Individual      Session Start Time: 9:01am Session End Time: 9:52am     Session Length: 51 minutes    Session #: 2    Attendees: Client attended alone    Service Modality:  Video Visit:      Provider verified identity through the following two step process.  Patient provided:  Patient photo    Telemedicine Visit: The patient's condition can be safely assessed and treated via synchronous audio and visual telemedicine encounter.      Reason for Telemedicine Visit: Services only offered telehealth    Originating Site (Patient Location): Patient's home    Distant Site (Provider Location): Provider Remote Setting- Home Office    Consent:  The patient/guardian has verbally consented to: the potential risks and benefits of telemedicine (video visit) versus in person care; bill my insurance or make self-payment for services provided; and responsibility for payment of non-covered services.     Patient would like the video invitation sent by:  Send to e-mail at: [email or web address] Acquisition    Mode of Communication:  Video Conference via [facility]    As the provider I attest to compliance with applicable laws and regulations related to telemedicine.    DATA  Interactive Complexity: No  Crisis: No      Identifying Information:  Patient is a 36 year old,  .  The pronoun use throughout this assessment reflects the patient's chosen pronoun.  Patient was referred for an assessment by self.  Patient attended the session  "alone.    Chief Complaint:   The reason for seeking services at this time is: \"ADHD\" \"I didn't struggle with school until college.  I didn't get into trouble for not staying in my chair or disrupting class.  I do procrastinate a lot and I always have. \"  The client uses a bullet journal to remember to do things now and has since 2015.  She will also use an alarm at work to help with hyperfocusing and keeping on track.  She alots herself 25 minute blocks to work.  The problem(s) began 02/17/21.    Patient has attempted to resolve these concerns in the past through therapy and medication.    Social/Family History:  Patient reported they grew up in [place].  They were raised by biological parents; stepfather  .  Parents one or both remarried.  Patient reported that their childhood was \"interesting\".  Patient described their current relationships with family of origin as \"overall, pretty good.\"     Patient described her childhood family environment as \"pretty stable and caring, sometimes a little chaotic.\".  As a child, patient reported that she had problems getting ready for school in the morning, forgot school work or other items between home and school, needed frequent reminders by parents to be motivated or to complete work, displayed argumentative or oppositional behaviors and had problems managing temper with frequent emotional outbursts. Patient reported difficulty with childhood peer relationships.     The patient describes their cultural background as .  Cultural influences and impact on patient's life structure, values, norms, and healthcare: nothing specific.  Contextual influences on patient's health include: None reported.    These factors will be addressed in the Preliminary Treatment plan.  Patient identified their preferred language to be English. Patient reported they does not need the assistance of an  or other support involved in therapy.     Patient " "reported had no significant delays in developmental tasks.   Patient's highest education level was college graduate  . Patient identified the following learning problems: none reported.  Modifications will not be used to assist communication in therapy.  Patient reports they are not able to understand written materials.    Patient did not receive tutoring services during the school years. Patient did not receive special education services. Patient  reported significant behavior and discipline problems including: Sometimes getting talked to about playground behavior. Patient did attend post secondary school.     Patient reported the following relationship history one significant committed relationship.  Patient's current relationship status is  for six.   Patient identified their sexual orientation as bi-sexual.  Patient reported having 1 child(william). Patient identified partner; parents; siblings; friends; therapist as part of their support system.  Patient identified the quality of these relationships as stable and meaningful,  .      Patient's current living/housing situation involves staying in own home/apartment. The immediate members of family and household include Santiago Annelj, 44,Spouse and child and they report that housing is stable    Patient is currently employed fulltime.  Patient reports their finances are obtained through employment. The patient's work history includes: \"When I graduated college in 2008 I got a job in [employer] and worked there for 7 1/2 yrs.  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  Since 2014 she has been with the [employer].  The longest period of employment has been 7 years.  Client has not been terminated from a place of employment.  Patient does identify finances as a current stressor.      Patient reported that theyhave been " "involved with the legal system.  DUI several years ago (<2014). Patient does not being under probation/ parole/ jurisdiction. They are not under any current court jurisdiction. .    Patient has received a 's license.  Patient has received moving violations, including: driving under the influence \"in early twenty-teens.  Before 2014.\" and 1 speeding tickets.  Patient reported the following driving habits: attentive and cautious, gets lost easily and runs through stop signs.  According to client, other people are comfortable riding as passengers when she is driving.     Patient's Strengths and Limitations:  Patient identified the following strengths or resources that will help them succeed in treatment: friends / good social support, family support, insight, intelligence and motivation. Things that may interfere with the patient's success in treatment include: \"I have a hard time remembering things in the heat of the moment, so, I can need someone there who can guide me through the process (problem solve). And anytime I have to establish a bit of a routine.\"    Personal and Family Medical History:  Patient does report a family history of mental health concerns.  Patient reports family history includes Breast Cancer in her paternal grandmother; Hypertension in her mother, paternal grandfather, and paternal grandmother; Prostate Cancer in her paternal grandfather; Thyroid Disease in her mother.    Patient does report Mental Health Diagnosis and/or Treatment.  Patient Patient reported the following previous diagnoses which include(s): an Anxiety Disorder.  Patient reported symptoms began \"in  (social).  General anxiety probably High School.\"  She describes increase in anxiety when her parents got  around age 12.   Patient has received mental health services in the past: therapy with [facility].  Psychiatric Hospitalizations: None.  Patient denies a history of civil commitment.  Patient " "is receiving other mental health services.  These include psychotherapy with [facility].         Patient has had a physical exam to rule out medical causes for current symptoms.  Date of last physical exam was within the past year. Client was encouraged to follow up with PCP if symptoms were to develop. The patient has a [facility] Primary Care Provider, who is named Anushka Canales..  Patient reports the following current medical concerns: high blood pressure (being treated).  Patient reports pain concerns including back and abdomin.  Patient does want help addressing pain concerns..   There are not significant appetite / nutritional concerns / weight changes.   Patient does report a history of head injury / trauma / cognitive impairment (\"I had a couple head concussions when I played roller-derby\").    Patient reports current meds as:   No outpatient medications have been marked as taking for the 12/16/21 encounter (Appointment) with Mejia Gonzalez.       Medication Adherence:  Patient reports taking.  taking prescribed medications as prescribed.    Patient Allergies:  No Known Allergies    Medical History:    Past Medical History:   Diagnosis Date     Anxiety      Hypertension October 2020     Obesity (BMI 30-39.9)      Uncomplicated asthma Childhood only         Current Mental Status Exam:   Appearance:  Appropriate    Eye Contact:  Good   Psychomotor:  Normal       Gait / station:  Could not observe via video session  Attitude / Demeanor: Cooperative  Friendly Pleasant  Speech      Rate / Production: Normal/ Responsive      Volume:  Normal  volume      Language:  intact  Mood:   Normal  Affect:   Appropriate    Thought Content: Clear   Thought Process: Coherent       Associations: No loosening of associations  Insight:   Good   Judgment:  Intact   Orientation:  All  Attention/concentration: Good    Rating Scales:    PHQ9:    PHQ-9 SCORE 6/30/2021 11/17/2021 12/9/2021   PHQ-9 Total Score " "MyChart 10 (Moderate depression) 16 (Moderately severe depression) -   PHQ-9 Total Score 10 16 7       GAD7:    BALA-7 SCORE 6/30/2021 11/17/2021 12/9/2021   Total Score 2 (minimal anxiety) 5 (mild anxiety) -   Total Score 2 5 2     CGI:     First:Considering your total clinical experience with this particular patient population, how severe are the patient's symptoms at this time?: 3 (11/3/2021  8:32 AM)      Most recentCompared to the patient's condition at the START of treatment, this patient's condition is: 2 (11/3/2021  8:32 AM)      Substance Use:  Patient did report a family history of substance use concerns; see medical history section for details.  Patient has not received chemical dependency treatment in the past.  Patient has not ever been to detox.      Patient is not currently receiving any chemical dependency treatment. Patient reported the following problems as a result of their substance use:  DUI and legal issues.    Patient reports using alcohol 2 times per week and has 2 beers at a time. Patient first started drinking at age 19 years.  Patient reported date of last use was \"last Saturday\".  Patient reports heaviest use was \"probably mid-20s\".  Patient denies using tobacco. Quite in Feb. 2020.  Patient denies using cannabis.  Patient reports using caffeine 6 times per day and drinks 1 at a time. Patient started using caffeine at age (coffee in High School).  Patient reports using/abusing the following substance(s). Patient reported no other substance use.     CAGE- AID:    CAGE-AID Total Score 12/9/2021   Total Score 2       Substance Use: blackouts, passing out, vomiting, hangovers and substance related legal problems    Based on the negative CAGE score and clinical interview there  are not indications of drug or alcohol abuse.    Significant Losses / Trauma / Abuse / Neglect Issues:   Patient did not serve in the .  There are indications or report of significant loss, trauma, abuse or " neglect issues related to: are no indications and client denies any losses, trauma, abuse, or neglect concerns.  Concerns for possible neglect are not present.     Safety Assessment:   Current Safety Concerns:  Coffee Suicide Severity Rating Scale (Lifetime/Recent)  Coffee Suicide Severity Rating (Lifetime/Recent) 6/1/2020   1. Wish to be Dead (Lifetime) No   2. Non-Specific Active Suicidal Thoughts (Lifetime) No   Actual Attempt (Lifetime) No   Has subject engaged in non-suicidal self-injurious behavior? (Lifetime) No   Interrupted Attempts (Lifetime) No   Aborted or Self-Interrupted Attempt (Lifetime) No   Preparatory Acts or Behavior (Lifetime) No     Patient denies current homicidal ideation and behaviors.  Patient denies current self-injurious ideation and behaviors.    Patient denied risk behaviors associated with substance use.  Patient denies any high risk behaviors associated with mental health symptoms.  Patient reports the following current concerns for their personal safety: None.  Patient reports there are not firearms in the house.    History of Safety Concerns:  Patient denied a history of homicidal ideation.     Patient denied a history of personal safety concerns.    Patient denied a history of assaultive behaviors.    Patient denied a history of sexual assault behaviors.     Patient denied a history of risk behaviors associated with substance use.  Patient denies any history of high risk behaviors associated with mental health symptoms.  Patient reports the following protective factors: safe and stable environment; regular sleep; regular physical activity; sense of belonging; purpose; secure attachment; help seeking behaviors when distressed; daily obligations; effective problem solving skills; healthy fear of risky behaviors or pain; strong sense of self worth or esteem; access to a variety of clinical interventions and pets    Risk Plan:  See Recommendations for Safety and Risk Management  Plan    Review of Symptoms per patient report:  Depression: Change in sleep, Change in energy level and Change in appetite  Suri:  No Symptoms  Psychosis: No Symptoms  Anxiety: Excessive worry, Nervousness and Irritability  Panic:  No symptoms  Post Traumatic Stress Disorder:  No Symptoms   Eating Disorder: No Symptoms  ADD / ADHD:  Distractibility and Forgetful  Conduct Disorder: No symptoms  Autism Spectrum Disorder: No symptoms  Obsessive Compulsive Disorder: No Symptoms    Patient reports the following compulsive behaviors and treatment history: None reported.      Diagnostic Criteria:   Social Anxiety Disorder, Marked fear or anxiety about one or more social situations in which the individual is exposed to possible scrutiny by others. Examples include social interactions (e.g., having a conversation, meeting unfamiliar people), being observed (e.g., eating or drinking), and performing in front of others (e.g., giving a speech)., The individual fears that he or she will act in a way or show anxiety symptoms that will be negatively evaluated, The social situations almost always provoke fear or anxiety., The social situations are avoided or endured with intense fear or anxiety., The fear or anxiety is out of proportion to the actual threat posed by the social situation and to the sociocultural context., The fear, anxiety, or avoidance is persistent, typically lasting for 6 months or mo, The fear, anxiety, or avoidance causes clinically significant distress or impairment in social, occupational, or other important areas of functioning., The fear, anxiety, or avoidance is not attributable to the physiological effects of a substance (e.g., a drug of abuse, a medication) or another medical condition., The fear, anxiety, or avoidance is not better explained by the symptoms of another mental disorder and If another medical condition is present, the fear, anxiety, or avoidance is clearly unrelated or is  excessive    Functional Status:  Patient reports the following functional impairments: management of the household and or completion of tasks and work / vocational responsibilities.     WHODAS:   WHODAS 2.0 Total Score 2/11/2021 5/21/2021   Total Score 19 19   Total Score MyChart 19 19     Nonprogrammatic care:  Patient is requesting basic services to address current mental health concerns.    Clinical Summary:  1. Reason for assessment: ADHD Assessment.  2. Psychosocial, Cultural and Contextual Factors: Client works from home and lives with her  and young child.  3. Principal DSM5 Diagnoses  (Sustained by DSM5 Criteria Listed Above):   300.23 (F40.10) Social Anxiety Disorder.  4. Other Diagnoses that is relevant to services:   None reported.  5. Provisional Diagnosis:  Attention-Deficit/Hyperactivity Disorder  314.00 (F90.0) Predominantly inattentive presentation as evidenced by getting distracted, procrastination, hyperfocusing.  6. Prognosis: Expect Improvement.  7. Likely consequences of symptoms if not treated: If untreated, the client's symptoms will likely escalate, leading to further issues with work performance and struggles balancing her schedule at home with a young child.  8. Client strengths include:  caring, educated, empathetic, employed, goal-focused, good listener, has a previous history of therapy, insightful, intelligent, motivated and open to learning .     Recommendations:     1. Plan for Safety and Risk Management:   Recommended that patient call 911 or go to the local ED should there be a change in any of these risk factors..          Report to child / adult protection services was NA.     2. Patient's identified No cultural influences to address at this time.     3. Initial Treatment will focus on:    ADHD Testing:  Patient was given self and collaborative rating scales to be completed prior to the next appointment.  Depression and anxiety rating scales were completed.  Copies of  (none) were requested. .     4. Resources/Service Plan:    services are not indicated.   Modifications to assist communication are not indicated.   Additional disability accommodations are not indicated.      5. Collaboration:   Collaboration / coordination of treatment will be initiated with the following  support professionals: None at this time.      6.  Referrals:   The following referral(s) will be initiated: None at this time. Next Scheduled Appointment: January 6, 2022.     A Release of Information has been obtained for the following: None at this time.    7. MICHELL:    MICHELL:  Discussed the general effects of drugs and alcohol on health and well-being. Provider gave patient printed information about the effects of chemical use on their health and well being. Recommendations: Continue with individual therapy and this ADHD assessment.     8. Records:   These were reviewed at time of assessment.   Information in this assessment was obtained from the medical record and  provided by patient who is a good historian.    Patient will have open access to their mental health medical record.      Provider Name/ Credentials:  Mejia Gonzalez PsyD, LP  December 16, 2021

## 2021-12-20 ENCOUNTER — VIRTUAL VISIT (OUTPATIENT)
Dept: BEHAVIORAL HEALTH | Facility: CLINIC | Age: 36
End: 2021-12-20
Payer: COMMERCIAL

## 2021-12-20 DIAGNOSIS — F41.1 GENERALIZED ANXIETY DISORDER: Primary | ICD-10-CM

## 2021-12-20 PROCEDURE — 90834 PSYTX W PT 45 MINUTES: CPT | Mod: GT | Performed by: SOCIAL WORKER

## 2021-12-21 NOTE — PROGRESS NOTES
[facility]- Integrated Behavioral Health Services  December 20, 2021    Behavioral Health Clinician Progress Note    Patient Name: Scott Dobbs      Telemedicine Visit: The patient's condition can be safely assessed and treated via synchronous audio and visual telemedicine encounter.      Reason for Telemedicine Visit: Patient has requested telehealth visit due to COVID-19    Originating Site (Patient Location): Patient's home    Distant Site (Provider Location): Provider Remote Setting- Home Office    Consent:  The patient/guardian has verbally consented to: the potential risks and benefits of telemedicine (video visit) versus in person care; bill my insurance or make self-payment for services provided; and responsibility for payment of non-covered services.     Mode of Communication:  Video Conference via [employer]    As the provider I attest to compliance with applicable laws and regulations related to telemedicine.         Service Type:  Individual     Session Start Time:  3:03  pm  Session End Time:  3: 55  pm      Session Length: 38 - 52      Attendees: Patient    Visit Activities (Refresh list every visit): [facility] Only    Diagnostic Assessment Date: 7/12/21  Treatment Plan Review Date: 11/2/21  See Flowsheets for today's PHQ-9 and BALA-7 results  Previous PHQ-9:   PHQ-9 SCORE 6/30/2021 11/17/2021 12/9/2021   PHQ-9 Total Score MyChart 10 (Moderate depression) 16 (Moderately severe depression) -   PHQ-9 Total Score 10 16 7     Previous BALA-7:   BALA-7 SCORE 6/30/2021 11/17/2021 12/9/2021   Total Score 2 (minimal anxiety) 5 (mild anxiety) -   Total Score 2 5 2       ED LEVEL:  No flowsheet data found.    DATA  Extended Session (60+ minutes): No  Interactive Complexity: No  Crisis: No  [facility] Patient: No    Treatment Objective(s) Addressed in This Session:  Target Behavior(s): Mental health management    Anxiety: will experience a reduction in anxiety, will develop more effective coping  skills to manage anxiety symptoms, will develop healthy cognitive patterns and beliefs and will increase ability to function adaptively    Current Stressors / Issues:  Patient reported improvement in physical health since the last visit. She stated that she is slowly regaining stamina and is able to complete activities of daily living without needing to take a break/feeling shortness of breath. She stated that she has also been able to connect with a provider who provided guidance about her recovery and a return to work. Patient stated that she has been working from home, and endorsed high levels of stress as she works remotely due to high work load, low staffing, and in ability to do many tasks that she normally would be able to do since she is not on site. Patient described difficulties asking for help in moments, and identified the feeling of being helpless connected with her sense of inability to complete tasks. Patient continued to discussed overall sense of insecurity within work and work relationships. She is connecting this anxiety with a past unhealthy work environment/colleague.  Patient expressing interest in continuing to learn how to manage this anxiety at work.     Progress on Treatment Objective(s) / Homework:  Satisfactory progress - ACTION (Actively working towards change); Intervened by reinforcing change plan / affirming steps taken    Motivational Interviewing    MI Intervention: Expressed Empathy/Understanding, Supported Autonomy, Collaboration, Evocation, Permission to raise concern or advise, Open-ended questions, Change talk (evoked) and Reframe     Change Talk Expressed by the Patient: Desire to change Ability to change Reasons to change Need to change Committment to change Activation    Provider Response to Change Talk: E - Evoked more info from patient about behavior change, A - Affirmed patient's thoughts, decisions, or attempts at behavior change, R - Reflected patient's change talk and  S - Summarized patient's change talk statements     Cognitive Behavioral Therapy: Discussed connection between thoughts, feelings, and behaviors. Taught patient how to identify cognitive distortions, and assisted patient to identify own cognitive distortions. Taught and engaged patient in cognitive restructuring techniques.    Mindfulness-Based Strategies : Discussed skills based on development and application of mindfulness    Also provided psychoeducation about behavioral health condition, symptoms, and treatment options    Care Plan review completed: Yes    Medication Review:  No current psychiatric medications prescribed    Medication Compliance:  NA    Changes in Health Issues:   None reported    Chemical Use Review:   Substance Use: Chemical use reviewed, no active concerns identified      Tobacco Use: No current tobacco use.      Assessment: Current Emotional / Mental Status (status of significant symptoms):  Risk status (Self / Other harm or suicidal ideation)  Patient denies a history of suicidal ideation, suicide attempts, self-injurious behavior, homicidal ideation, homicidal behavior and and other safety concerns  Patient denies current fears or concerns for personal safety.  Patient denies current or recent suicidal ideation or behaviors.  Patient denies current or recent homicidal ideation or behaviors.  Patient denies current or recent self injurious behavior or ideation.  Patient denies other safety concerns.  A safety and risk management plan has not been developed at this time, however patient was encouraged to call [facility] should there be a change in any of these risk factors.    Appearance:   Appropriate   Eye Contact:   Good   Psychomotor Behavior: Normal   Attitude:   Cooperative   Orientation:   All  Speech   Rate / Production: Normal    Volume:  Normal   Mood:    Normal  Affect:    Appropriate   Thought Content:  Clear   Thought Form:  Coherent  Logical   Insight:    Good      Diagnoses:  1. Generalized anxiety disorder        Collateral Reports Completed:  Not Applicable    Plan: (Homework, other):  Patient was given information about behavioral services and encouraged to schedule a follow up appointment with the clinic [facility] in two weeks. She was also given Cognitive Behavioral Therapy skills to practice when experiencing anxiety.. CD Recommendations: No indications of CD issues.  Dawn Franks, [facility], [facility]      ______________________________________________________________________    Integrated Primary Care Behavioral Health Treatment Plan    Patient's Name: Scott Dobbs  YOB: 1985    Date: 11/2/21    DSM-V Diagnoses: 300.02 (F41.1) Generalized Anxiety Disorder  Psychosocial / Contextual Factors:  postpartum within past year, transition back to work in February, coping with pandemic  WHODAS 2.0 Total Score 2/11/2021 5/21/2021   Total Score 19 19   Total Score MyChart 19 19         Referral / Collaboration:  Referral to another professional/service is not indicated at this time..     Anticipated number of session or this episode of care: 10-12      MeasurableTreatment Goal(s) related to diagnosis / functional impairment(s)  Goal 1: Patient will reduce symptoms of anxiety as evidenced by decreased score on BALA 7.     I will know I've met my goal when I'm less irritable and it is easier to stop the spiral      Objective #A (Patient Action)    Patient will identify three distraction and diversion activities and use those activities to decrease level of anxiety  .  Status: Continued - Date(s): 11/2/21    Intervention(s)  [facility] will teach distress tolerance, mindfulness, and relaxation techniques.    Objective #B  Patient will use cognitive strategies identified in therapy to challenge anxious thoughts.  Status: Continued - Date(s): 11/2/21  Intervention(s)  [facility] will assign homework to practice cognitive restructuring and defusion techniques.    Objective #C  Patient will  use relaxation strategies 2-3 times per day to reduce the physical symptoms of anxiety.  Status: Continued - Date(s): 11/2/21    Intervention(s)  [facility] will teach relaxation techniques.    Patient has reviewed and agreed to the above plan.      GEO Perez  November 2, 2021

## 2021-12-29 ENCOUNTER — ANCILLARY PROCEDURE (OUTPATIENT)
Dept: CT IMAGING | Facility: CLINIC | Age: 36
End: 2021-12-29
Attending: CLINICAL NURSE SPECIALIST
Payer: COMMERCIAL

## 2021-12-29 ENCOUNTER — E-VISIT (OUTPATIENT)
Dept: URGENT CARE | Facility: URGENT CARE | Age: 36
End: 2021-12-29

## 2021-12-29 DIAGNOSIS — Z20.822 CLOSE EXPOSURE TO 2019 NOVEL CORONAVIRUS: Primary | ICD-10-CM

## 2021-12-29 DIAGNOSIS — J18.9 MULTIFOCAL PNEUMONIA: ICD-10-CM

## 2021-12-29 PROCEDURE — 71260 CT THORAX DX C+: CPT | Mod: GC | Performed by: RADIOLOGY

## 2021-12-29 PROCEDURE — 99421 OL DIG E/M SVC 5-10 MIN: CPT | Performed by: PREVENTIVE MEDICINE

## 2021-12-29 RX ORDER — IOPAMIDOL 755 MG/ML
125 INJECTION, SOLUTION INTRAVASCULAR ONCE
Status: COMPLETED | OUTPATIENT
Start: 2021-12-29 | End: 2021-12-29

## 2021-12-29 RX ADMIN — IOPAMIDOL 125 ML: 755 INJECTION, SOLUTION INTRAVASCULAR at 15:39

## 2021-12-29 NOTE — PATIENT INSTRUCTIONS
Dear Scott,    Based on your exposure to COVID-19 (coronavirus), we would like to test you for this virus. I have placed an order for this test. The best time for testing is 5-7 days after the exposure.    How to schedule:  Go to your Write.my home page and scroll down to the section that says  You have an appointment that needs to be scheduled  and click the large green button that says  Schedule Now  and follow the steps to find the next available opening.     If you are unable to complete these Write.my scheduling steps, please call [phone] to schedule your testing.     Monoclonal antibody treatment after exposure:  Because you have been exposed to COVID-19, you may be able to receive a treatment with monoclonal antibodies. This treatment can lower your risk of severe illness and going to the hospital. It is given through an IV or under your skin (subcutaneous) and must be given at an infusion center.   To be eligible, you must be 12 years or older, at least 88 pounds and:    Are not fully vaccinated against COVID-19, OR    Are immunocompromised     If you would like to sign up to be considered to receive the monoclonal antibody medicine, please complete a participation form through the [facility] here:  MNRAP ([email or web address]). You may also call the [facility] COVID-19 Public Hotline at 1-715.607.6698 (open Mon-Fri: 9am-7pm and Sat: 10am-6pm).     Not all people who are eligible will receive the medicine since supply is limited. You will be contacted in the next 1 to 2 business days only if you are selected. If you do not receive a call, you have not been selected to receive the medicine. If you have any questions about this medication, please contact your primary care provider. For more information, see [email or web address]/[email or web address]    Return to work/school/ guidance:   Please let your workplace manager and  staffing office know when your quarantine ends. We cannot give you an exact date as it depends on the information below. You can calculate this on your own or work with your manager/staffing office to calculate this.    Quarantine Guidelines:  You are considered exposed if you have been within 6 feet of an infected person(s) for 15 minutes or more over a 24-hour period. Quarantine will start after the LAST time you had contact with the infected person while they were contagious (for example, if you saw someone on Monday and Wednesday, your quarantine would start after Wednesday).     If you have NO symptoms (asymptomatic):    Stay home for 14 days (quarantine) after your LAST contact with a person who has COVID-19 (this remains the CDC recommendation for greatest protection against spread of COVID-19), OR    10-day quarantine with no test, OR    Minimum 7-day quarantine with negative RT-PCR test collected on day 5 or later    Quarantine Guideline exceptions:    People who have been fully vaccinated do not need to quarantine unless they have symptoms. You are considered fully vaccinated 2 weeks after your 2nd dose in a 2-dose series or 2 weeks after a single-dose series. This includes vaccinated health care workers.  o Fully vaccinated people should still get tested 5-7 days after exposure, even if they don t have symptoms.   Note: If you have ongoing exposure to the COVID-positive person, this quarantine period may be more than 14 days. For example, if you continue to be exposed to your child who tested positive and cannot isolate from them, then the quarantine of 7-14 days can't start until your child is no longer contagious. This is typically 10 days from onset of the child's symptoms. So, the total duration may be 17-24 days in this case.   Please contact your doctor if you have questions or call the [facility] Public Hotline: 1-976.506.8040 (Mon-Fri: 9am-7pm and Sat: 10am-6pm).     How to Quarantine:     Monitor your  symptoms until 14 days after your last exposure. If you develop signs or symptoms, isolate and get tested (even if you have been tested already).    Stay home and away from others. Don't go to school or anywhere else. Generally, quarantine means staying home from work but there are some exceptions to this. Please contact your workplace. Cover your mouth and nose with a face covering if you must be around other people.     Wash your hands and face often. Use soap and water.    What are the symptoms of COVID-19?  The most common symptoms are cough, fever and trouble breathing. Less common symptoms include headache, body aches, fatigue (feeling very tired), chills, sore throat, stuffy or runny nose, diarrhea (loose poop), loss of taste or smell, belly pain, and nausea or vomiting (feeling sick to your stomach or throwing up).  If you develop symptoms, follow these guidelines:    If you're normally healthy: Please start another eVisit.    If you have a serious health problem (like cancer, heart failure, an organ transplant or kidney disease): Call your specialty clinic. Let them know that you might have COVID-19.    Where can I get more information?    [facility] - About COVID-19: [email or web address]     CDC - What to Do If You're Sick:     www.cdc.gov/coronavirus/2019-ncov/about/steps-when-sick.html    CDC - Ending Home Isolation:  https://www.cdc.gov/coronavirus/2019-ncov/your-health/quarantine-isolation.html    CDC - Caring for Someone:  www.cdc.gov/coronavirus/2019-ncov/if-you-are-sick/care-for-someone.html    [facility] clinical trials (COVID-19 research studies): clinicalaffairs.[facility]/umn-clinical-trials    Below are the COVID-19 hotlines at the [facility] ([facility]). Interpreters are available.  o For health questions: Call 127-380-5904 or 1-993.364.5209 (7 am to 7 pm)  o For questions about schools and childcare: Call 534-949-5780 or [phone] (7 a.m. to 7  p.m.)

## 2021-12-30 ENCOUNTER — VIRTUAL VISIT (OUTPATIENT)
Dept: SURGERY | Facility: CLINIC | Age: 36
End: 2021-12-30
Attending: CLINICAL NURSE SPECIALIST
Payer: COMMERCIAL

## 2021-12-30 DIAGNOSIS — J18.9 MULTIFOCAL PNEUMONIA: Primary | ICD-10-CM

## 2021-12-30 PROCEDURE — 999N001193 HC VIDEO/TELEPHONE VISIT; NO CHARGE

## 2021-12-30 PROCEDURE — 99213 OFFICE O/P EST LOW 20 MIN: CPT | Mod: 95 | Performed by: CLINICAL NURSE SPECIALIST

## 2021-12-30 NOTE — PROGRESS NOTES
Scott is a 36 year old who is being evaluated via a billable video visit.      How would you like to obtain your AVS? MyChart  If the video visit is dropped, the invitation should be resent by: Text to cell phone: [phone]  Will anyone else be joining your video visit? No        THORACIC SURGERY FOLLOW UP VISIT      I saw Mrs. Scott Dobbs in follow-up today via video visit. The clinical summary follows:     PREOP DIAGNOSIS   Right lung empyema  PROCEDURE   Chest tube placement by Interventional Radiology  DATE OF PROCEDURE  11/23/2021    COMPLICATIONS  None    INTERVAL STUDIES  CT chest-persistent and slightly enlarged loculated empyema of the posterior right lung measuring 5.7 x 2.2 cm    ETOH yes  TOB former smoker, 10 pack years, quit 2/17/2020    SUBJECTIVE  Scott is doing well. She does note that her cough had initially gone away but recently she feels her cough has returned a bit more. She denies fevers. Overall she feels fine-she did participate in 3 lab exercises yesterday that required her to don her PAPR. After this, she felt a little more fatigued and a little more work required for breathing. She does notice feeling fatigued after physical activity. She did have a Covid exposure during a family gathering on Edgar Day. She is scheduled for a Covid test tomorrow. She is hoping to return to work (in person) Monday, January 3, 2022.    From a personal perspective, she has a 1 year old daughter.    IMPRESSION (J18.9) Multifocal pneumonia  (primary encounter diagnosis)  Comment: increase in empyema  Plan: CBC with platelets differential            36 year old female who on 11/23/2021 was admitted for shortness of breath and found to have a R lung empyema.  She underwent IR chest tube placement, was initiated on broad-spectrum antibiotics, and maintained on supplemental oxygen.  She underwent 6 doses of lytics via chest tube with improvement in empyema. After cultures speciated, antibiotics were  narrowed to augmentin. O2 requirement improved and she did not qualify for home oxygen on discharge.      I will add a CBC to her lab test for tomorrow. We are in the process of ruling in/out recurrent pneumonia and/or Covid infection. Once we have those results we can make a plan for follow up or intervention (more antibiotics, referral to ID, follow up imaging, etc). For now, her plan to return to work Monday is fine. If her Covid test comes back positive, then she will likely have to quarantine form work.     PLAN  I spent 15 min on the date of the encounter in chart review, patient visit, review of tests, documentation and/or discussion with other providers about the issues documented above. I reviewed the plan as follows:  CBC and Covid test tomorrow. Further plan to be determined pending those results.  Necessary Tests & Appointments: CBC and Covid test  Pain Control Plan: NA  Anticoagulation Plan: NA  Smoking Cessation: NA  All questions were answered and the patient and present family were in agreement with the plan.  I appreciate the opportunity to participate in the care of your patient and will keep you updated.  Sincerely,    Video Start Time: 1007  Video-Visit Details    Type of service:  Video Visit    Video End Time:1023    Originating Location (pt. Location): Home    Distant Location (provider location):  [facility] CANCER [facility]     Platform used for Video Visit: Jasen

## 2021-12-30 NOTE — LETTER
12/30/2021         RE: Scott Dobbs  [address] [address]        Dear Colleague,    Thank you for referring your patient, Scott Dobbs, to the [facility]. Please see a copy of my visit note below.      THORACIC SURGERY FOLLOW UP VISIT  I saw Mrs. Scott Dobbs in follow-up today via video visit. The clinical summary follows:    PREOP DIAGNOSIS   Right lung empyema  PROCEDURE   Chest tube placement by Interventional Radiology  DATE OF PROCEDURE  11/23/2021    COMPLICATIONS  None    INTERVAL STUDIES  CT chest-persistent and slightly enlarged loculated empyema of the posterior right lung measuring 5.7 x 2.2 cm    ETOH yes  TOB former smoker, 10 pack years, quit 2/17/2020    SUBJECTIVE  Scott is doing well. She does note that her cough had initially gone away but recently she feels her cough has returned a bit more. She denies fevers. Overall she feels fine-she did participate in 3 lab exercises yesterday that required her to don her PAPR. After this, she felt a little more fatigued and a little more work required for breathing. She does notice feeling fatigued after physical activity. She did have a Covid exposure during a family gathering on Edgar Day. She is scheduled for a Covid test tomorrow. She is hoping to return to work (in person) Monday, January 3, 2022.    From a personal perspective, she has a 1 year old daughter.    IMPRESSION (J18.9) Multifocal pneumonia  (primary encounter diagnosis)  Comment: increase in empyema  Plan: CBC with platelets differential            36 year old female who on 11/23/2021 was admitted for shortness of breath and found to have a R lung empyema.  She underwent IR chest tube placement, was initiated on broad-spectrum antibiotics, and maintained on supplemental oxygen.  She underwent 6 doses of lytics via chest tube with improvement in empyema. After cultures speciated, antibiotics were narrowed to augmentin.  O2 requirement improved and she did not qualify for home oxygen on discharge.      I will add a CBC to her lab test for tomorrow. We are in the process of ruling in/out recurrent pneumonia and/or Covid infection. Once we have those results we can make a plan for follow up or intervention (more antibiotics, referral to ID, follow up imaging, etc). For now, her plan to return to work Monday is fine. If her Covid test comes back positive, then she will likely have to quarantine form work.     PLAN  I spent 15 min on the date of the encounter in chart review, patient visit, review of tests, documentation and/or discussion with other providers about the issues documented above. I reviewed the plan as follows:  CBC and Covid test tomorrow. Further plan to be determined pending those results.  Necessary Tests & Appointments: CBC and Covid test  Pain Control Plan: NA  Anticoagulation Plan: NA  Smoking Cessation: NA  All questions were answered and the patient and present family were in agreement with the plan.  I appreciate the opportunity to participate in the care of your patient and will keep you updated.      Criselda Parks, ELIN CNS

## 2021-12-31 ENCOUNTER — APPOINTMENT (OUTPATIENT)
Dept: LAB | Facility: CLINIC | Age: 36
End: 2021-12-31
Payer: COMMERCIAL

## 2021-12-31 ENCOUNTER — LAB (OUTPATIENT)
Dept: LAB | Facility: CLINIC | Age: 36
End: 2021-12-31
Attending: PREVENTIVE MEDICINE

## 2021-12-31 DIAGNOSIS — Z20.822 CLOSE EXPOSURE TO 2019 NOVEL CORONAVIRUS: ICD-10-CM

## 2021-12-31 DIAGNOSIS — J18.9 MULTIFOCAL PNEUMONIA: ICD-10-CM

## 2021-12-31 LAB
BASOPHILS # BLD AUTO: 0.1 10E3/UL (ref 0–0.2)
BASOPHILS NFR BLD AUTO: 1 %
EOSINOPHIL # BLD AUTO: 0.5 10E3/UL (ref 0–0.7)
EOSINOPHIL NFR BLD AUTO: 6 %
ERYTHROCYTE [DISTWIDTH] IN BLOOD BY AUTOMATED COUNT: 14.5 % (ref 10–15)
HCT VFR BLD AUTO: 40.6 % (ref 35–47)
HGB BLD-MCNC: 12.9 G/DL (ref 11.7–15.7)
LYMPHOCYTES # BLD AUTO: 2.5 10E3/UL (ref 0.8–5.3)
LYMPHOCYTES NFR BLD AUTO: 28 %
MCH RBC QN AUTO: 27.1 PG (ref 26.5–33)
MCHC RBC AUTO-ENTMCNC: 31.8 G/DL (ref 31.5–36.5)
MCV RBC AUTO: 85 FL (ref 78–100)
MONOCYTES # BLD AUTO: 0.7 10E3/UL (ref 0–1.3)
MONOCYTES NFR BLD AUTO: 8 %
NEUTROPHILS # BLD AUTO: 5.1 10E3/UL (ref 1.6–8.3)
NEUTROPHILS NFR BLD AUTO: 58 %
PLATELET # BLD AUTO: 415 10E3/UL (ref 150–450)
RBC # BLD AUTO: 4.76 10E6/UL (ref 3.8–5.2)
WBC # BLD AUTO: 8.9 10E3/UL (ref 4–11)

## 2021-12-31 PROCEDURE — U0005 INFEC AGEN DETEC AMPLI PROBE: HCPCS

## 2021-12-31 PROCEDURE — 85025 COMPLETE CBC W/AUTO DIFF WBC: CPT

## 2021-12-31 PROCEDURE — U0003 INFECTIOUS AGENT DETECTION BY NUCLEIC ACID (DNA OR RNA); SEVERE ACUTE RESPIRATORY SYNDROME CORONAVIRUS 2 (SARS-COV-2) (CORONAVIRUS DISEASE [COVID-19]), AMPLIFIED PROBE TECHNIQUE, MAKING USE OF HIGH THROUGHPUT TECHNOLOGIES AS DESCRIBED BY CMS-2020-01-R: HCPCS

## 2021-12-31 PROCEDURE — 36415 COLL VENOUS BLD VENIPUNCTURE: CPT

## 2022-01-01 LAB — SARS-COV-2 RNA RESP QL NAA+PROBE: NEGATIVE

## 2022-01-03 ENCOUNTER — APPOINTMENT (OUTPATIENT)
Dept: GENERAL RADIOLOGY | Facility: CLINIC | Age: 37
DRG: 178 | End: 2022-01-03
Attending: EMERGENCY MEDICINE
Payer: COMMERCIAL

## 2022-01-03 ENCOUNTER — HOSPITAL ENCOUNTER (INPATIENT)
Facility: CLINIC | Age: 37
LOS: 5 days | Discharge: HOME | DRG: 178 | End: 2022-01-09
Attending: EMERGENCY MEDICINE | Admitting: THORACIC SURGERY (CARDIOTHORACIC VASCULAR SURGERY)
Payer: COMMERCIAL

## 2022-01-03 DIAGNOSIS — Z20.822 LAB TEST NEGATIVE FOR COVID-19 VIRUS: ICD-10-CM

## 2022-01-03 DIAGNOSIS — J86.9 EMPYEMA, RIGHT (H): Primary | ICD-10-CM

## 2022-01-03 DIAGNOSIS — R07.9 CHEST PAIN, UNSPECIFIED TYPE: ICD-10-CM

## 2022-01-03 LAB
ALBUMIN SERPL-MCNC: 3.5 G/DL (ref 3.4–5)
ALBUMIN UR-MCNC: 10 MG/DL
ALP SERPL-CCNC: 110 U/L (ref 40–150)
ALT SERPL W P-5'-P-CCNC: 69 U/L (ref 0–50)
ANION GAP SERPL CALCULATED.3IONS-SCNC: 6 MMOL/L (ref 3–14)
APPEARANCE UR: CLEAR
AST SERPL W P-5'-P-CCNC: 30 U/L (ref 0–45)
B-HCG SERPL-ACNC: <1 IU/L (ref 0–5)
BASOPHILS # BLD AUTO: 0.1 10E3/UL (ref 0–0.2)
BASOPHILS NFR BLD AUTO: 1 %
BILIRUB SERPL-MCNC: 0.3 MG/DL (ref 0.2–1.3)
BILIRUB UR QL STRIP: NEGATIVE
BUN SERPL-MCNC: 13 MG/DL (ref 7–30)
CALCIUM SERPL-MCNC: 9.6 MG/DL (ref 8.5–10.1)
CHLORIDE BLD-SCNC: 109 MMOL/L (ref 94–109)
CO2 SERPL-SCNC: 24 MMOL/L (ref 20–32)
COLOR UR AUTO: YELLOW
CREAT SERPL-MCNC: 0.84 MG/DL (ref 0.52–1.04)
EOSINOPHIL # BLD AUTO: 0.4 10E3/UL (ref 0–0.7)
EOSINOPHIL NFR BLD AUTO: 4 %
ERYTHROCYTE [DISTWIDTH] IN BLOOD BY AUTOMATED COUNT: 14 % (ref 10–15)
FLUAV RNA SPEC QL NAA+PROBE: NEGATIVE
FLUBV RNA RESP QL NAA+PROBE: NEGATIVE
GFR SERPL CREATININE-BSD FRML MDRD: >90 ML/MIN/1.73M2
GLUCOSE BLD-MCNC: 105 MG/DL (ref 70–99)
GLUCOSE UR STRIP-MCNC: NEGATIVE MG/DL
HCT VFR BLD AUTO: 43.1 % (ref 35–47)
HGB BLD-MCNC: 13.8 G/DL (ref 11.7–15.7)
HGB UR QL STRIP: NEGATIVE
IMM GRANULOCYTES # BLD: 0 10E3/UL
IMM GRANULOCYTES NFR BLD: 0 %
KETONES UR STRIP-MCNC: NEGATIVE MG/DL
LEUKOCYTE ESTERASE UR QL STRIP: NEGATIVE
LYMPHOCYTES # BLD AUTO: 2.3 10E3/UL (ref 0.8–5.3)
LYMPHOCYTES NFR BLD AUTO: 22 %
MCH RBC QN AUTO: 27.4 PG (ref 26.5–33)
MCHC RBC AUTO-ENTMCNC: 32 G/DL (ref 31.5–36.5)
MCV RBC AUTO: 86 FL (ref 78–100)
MONOCYTES # BLD AUTO: 0.8 10E3/UL (ref 0–1.3)
MONOCYTES NFR BLD AUTO: 7 %
MUCOUS THREADS #/AREA URNS LPF: PRESENT /LPF
NEUTROPHILS # BLD AUTO: 7.2 10E3/UL (ref 1.6–8.3)
NEUTROPHILS NFR BLD AUTO: 66 %
NITRATE UR QL: NEGATIVE
NRBC # BLD AUTO: 0 10E3/UL
NRBC BLD AUTO-RTO: 0 /100
PH UR STRIP: 5.5 [PH] (ref 5–7)
PLATELET # BLD AUTO: 447 10E3/UL (ref 150–450)
POTASSIUM BLD-SCNC: 4 MMOL/L (ref 3.4–5.3)
PROT SERPL-MCNC: 8.5 G/DL (ref 6.8–8.8)
RBC # BLD AUTO: 5.03 10E6/UL (ref 3.8–5.2)
RBC URINE: 1 /HPF
RSV RNA SPEC NAA+PROBE: NEGATIVE
SARS-COV-2 RNA RESP QL NAA+PROBE: NEGATIVE
SODIUM SERPL-SCNC: 139 MMOL/L (ref 133–144)
SP GR UR STRIP: 1.02 (ref 1–1.03)
TROPONIN I SERPL HS-MCNC: <3 NG/L
UROBILINOGEN UR STRIP-MCNC: NORMAL MG/DL
WBC # BLD AUTO: 10.8 10E3/UL (ref 4–11)
WBC URINE: <1 /HPF

## 2022-01-03 PROCEDURE — 80053 COMPREHEN METABOLIC PANEL: CPT | Performed by: EMERGENCY MEDICINE

## 2022-01-03 PROCEDURE — 87637 SARSCOV2&INF A&B&RSV AMP PRB: CPT | Performed by: EMERGENCY MEDICINE

## 2022-01-03 PROCEDURE — 82040 ASSAY OF SERUM ALBUMIN: CPT | Performed by: EMERGENCY MEDICINE

## 2022-01-03 PROCEDURE — 81001 URINALYSIS AUTO W/SCOPE: CPT | Performed by: EMERGENCY MEDICINE

## 2022-01-03 PROCEDURE — 99285 EMERGENCY DEPT VISIT HI MDM: CPT | Mod: 25 | Performed by: EMERGENCY MEDICINE

## 2022-01-03 PROCEDURE — 84702 CHORIONIC GONADOTROPIN TEST: CPT | Performed by: EMERGENCY MEDICINE

## 2022-01-03 PROCEDURE — 71046 X-RAY EXAM CHEST 2 VIEWS: CPT

## 2022-01-03 PROCEDURE — 36415 COLL VENOUS BLD VENIPUNCTURE: CPT | Performed by: EMERGENCY MEDICINE

## 2022-01-03 PROCEDURE — C9803 HOPD COVID-19 SPEC COLLECT: HCPCS | Performed by: EMERGENCY MEDICINE

## 2022-01-03 PROCEDURE — 93010 ELECTROCARDIOGRAM REPORT: CPT | Performed by: EMERGENCY MEDICINE

## 2022-01-03 PROCEDURE — 71046 X-RAY EXAM CHEST 2 VIEWS: CPT | Mod: 26 | Performed by: RADIOLOGY

## 2022-01-03 PROCEDURE — 85025 COMPLETE CBC W/AUTO DIFF WBC: CPT | Performed by: EMERGENCY MEDICINE

## 2022-01-03 PROCEDURE — 84484 ASSAY OF TROPONIN QUANT: CPT | Performed by: EMERGENCY MEDICINE

## 2022-01-03 PROCEDURE — 93005 ELECTROCARDIOGRAM TRACING: CPT | Performed by: EMERGENCY MEDICINE

## 2022-01-03 RX ORDER — CETIRIZINE HYDROCHLORIDE 10 MG/1
10 TABLET ORAL DAILY
COMMUNITY

## 2022-01-03 ASSESSMENT — ENCOUNTER SYMPTOMS
WOUND: 0
COLOR CHANGE: 0
CONSTIPATION: 0
LIGHT-HEADEDNESS: 0
NUMBNESS: 0
RHINORRHEA: 0
VOMITING: 0
SHORTNESS OF BREATH: 1
FLANK PAIN: 0
NAUSEA: 0
BACK PAIN: 0
DIARRHEA: 0
CHILLS: 0
COUGH: 0
HEADACHES: 0
NECK PAIN: 0
BRUISES/BLEEDS EASILY: 0
DYSURIA: 0
HEMATURIA: 0
ABDOMINAL PAIN: 0
PALPITATIONS: 0
CONFUSION: 0
WEAKNESS: 0
FATIGUE: 0
FREQUENCY: 0
FEVER: 0

## 2022-01-03 ASSESSMENT — MIFFLIN-ST. JEOR: SCORE: 2222.22

## 2022-01-03 NOTE — ED TRIAGE NOTES
Presents with right side, lower chest pain & WOO for the past 3-4 days. Patient has known empyema, patient was evaluated this past Wednesday- CT showing empyema is larger.

## 2022-01-04 PROBLEM — J86.9 EMPYEMA, RIGHT (H): Status: ACTIVE | Noted: 2022-01-04

## 2022-01-04 PROBLEM — R07.9 CHEST PAIN, UNSPECIFIED TYPE: Status: ACTIVE | Noted: 2022-01-04

## 2022-01-04 LAB
ATRIAL RATE - MUSE: 99 BPM
B-HCG SERPL-ACNC: <1 IU/L (ref 0–5)
DIASTOLIC BLOOD PRESSURE - MUSE: NORMAL MMHG
INR PPP: 1.08 (ref 0.85–1.15)
INTERPRETATION ECG - MUSE: NORMAL
P AXIS - MUSE: 73 DEGREES
PR INTERVAL - MUSE: 130 MS
QRS DURATION - MUSE: 80 MS
QT - MUSE: 344 MS
QTC - MUSE: 441 MS
R AXIS - MUSE: 57 DEGREES
SYSTOLIC BLOOD PRESSURE - MUSE: NORMAL MMHG
T AXIS - MUSE: 39 DEGREES
VENTRICULAR RATE- MUSE: 99 BPM

## 2022-01-04 PROCEDURE — 84702 CHORIONIC GONADOTROPIN TEST: CPT | Performed by: NURSE PRACTITIONER

## 2022-01-04 PROCEDURE — 99222 1ST HOSP IP/OBS MODERATE 55: CPT | Mod: GC | Performed by: THORACIC SURGERY (CARDIOTHORACIC VASCULAR SURGERY)

## 2022-01-04 PROCEDURE — 999N000128 HC STATISTIC PERIPHERAL IV START W/O US GUIDANCE

## 2022-01-04 PROCEDURE — 258N000003 HC RX IP 258 OP 636: Performed by: STUDENT IN AN ORGANIZED HEALTH CARE EDUCATION/TRAINING PROGRAM

## 2022-01-04 PROCEDURE — 85610 PROTHROMBIN TIME: CPT | Performed by: NURSE PRACTITIONER

## 2022-01-04 PROCEDURE — 96361 HYDRATE IV INFUSION ADD-ON: CPT | Performed by: EMERGENCY MEDICINE

## 2022-01-04 PROCEDURE — 36415 COLL VENOUS BLD VENIPUNCTURE: CPT | Performed by: NURSE PRACTITIONER

## 2022-01-04 PROCEDURE — 120N000002 HC R&B MED SURG/OB UMMC

## 2022-01-04 PROCEDURE — 250N000013 HC RX MED GY IP 250 OP 250 PS 637: Performed by: STUDENT IN AN ORGANIZED HEALTH CARE EDUCATION/TRAINING PROGRAM

## 2022-01-04 PROCEDURE — 96360 HYDRATION IV INFUSION INIT: CPT | Performed by: EMERGENCY MEDICINE

## 2022-01-04 RX ORDER — ACETAMINOPHEN 325 MG/1
650 TABLET ORAL EVERY 6 HOURS PRN
Status: DISCONTINUED | OUTPATIENT
Start: 2022-01-04 | End: 2022-01-09 | Stop reason: HOSPADM

## 2022-01-04 RX ORDER — SODIUM CHLORIDE, SODIUM LACTATE, POTASSIUM CHLORIDE, CALCIUM CHLORIDE 600; 310; 30; 20 MG/100ML; MG/100ML; MG/100ML; MG/100ML
INJECTION, SOLUTION INTRAVENOUS CONTINUOUS
Status: DISCONTINUED | OUTPATIENT
Start: 2022-01-04 | End: 2022-01-04

## 2022-01-04 RX ORDER — FLUTICASONE PROPIONATE 50 MCG
1 SPRAY, SUSPENSION (ML) NASAL DAILY
Status: DISCONTINUED | OUTPATIENT
Start: 2022-01-04 | End: 2022-01-09 | Stop reason: HOSPADM

## 2022-01-04 RX ORDER — NALOXONE HYDROCHLORIDE 0.4 MG/ML
0.2 INJECTION, SOLUTION INTRAMUSCULAR; INTRAVENOUS; SUBCUTANEOUS
Status: DISCONTINUED | OUTPATIENT
Start: 2022-01-04 | End: 2022-01-09 | Stop reason: HOSPADM

## 2022-01-04 RX ORDER — ONDANSETRON 4 MG/1
4 TABLET, ORALLY DISINTEGRATING ORAL EVERY 6 HOURS PRN
Status: DISCONTINUED | OUTPATIENT
Start: 2022-01-04 | End: 2022-01-09 | Stop reason: HOSPADM

## 2022-01-04 RX ORDER — LANOLIN ALCOHOL/MO/W.PET/CERES
3 CREAM (GRAM) TOPICAL
Status: DISCONTINUED | OUTPATIENT
Start: 2022-01-04 | End: 2022-01-09 | Stop reason: HOSPADM

## 2022-01-04 RX ORDER — LIDOCAINE 40 MG/G
CREAM TOPICAL
Status: DISCONTINUED | OUTPATIENT
Start: 2022-01-04 | End: 2022-01-04

## 2022-01-04 RX ORDER — CETIRIZINE HYDROCHLORIDE 10 MG/1
10 TABLET ORAL DAILY
Status: DISCONTINUED | OUTPATIENT
Start: 2022-01-04 | End: 2022-01-09 | Stop reason: HOSPADM

## 2022-01-04 RX ORDER — OXYCODONE HYDROCHLORIDE 5 MG/1
5 TABLET ORAL SEE ADMIN INSTRUCTIONS
Status: DISCONTINUED | OUTPATIENT
Start: 2022-01-04 | End: 2022-01-06

## 2022-01-04 RX ORDER — HYDROMORPHONE HCL IN WATER/PF 6 MG/30 ML
0.2 PATIENT CONTROLLED ANALGESIA SYRINGE INTRAVENOUS SEE ADMIN INSTRUCTIONS
Status: COMPLETED | OUTPATIENT
Start: 2022-01-04 | End: 2022-01-05

## 2022-01-04 RX ORDER — NALOXONE HYDROCHLORIDE 0.4 MG/ML
0.4 INJECTION, SOLUTION INTRAMUSCULAR; INTRAVENOUS; SUBCUTANEOUS
Status: DISCONTINUED | OUTPATIENT
Start: 2022-01-04 | End: 2022-01-09 | Stop reason: HOSPADM

## 2022-01-04 RX ORDER — ONDANSETRON 2 MG/ML
4 INJECTION INTRAMUSCULAR; INTRAVENOUS EVERY 6 HOURS PRN
Status: DISCONTINUED | OUTPATIENT
Start: 2022-01-04 | End: 2022-01-09 | Stop reason: HOSPADM

## 2022-01-04 RX ADMIN — Medication 3 MG: at 03:42

## 2022-01-04 RX ADMIN — SODIUM CHLORIDE, POTASSIUM CHLORIDE, SODIUM LACTATE AND CALCIUM CHLORIDE: 600; 310; 30; 20 INJECTION, SOLUTION INTRAVENOUS at 01:59

## 2022-01-04 RX ADMIN — CETIRIZINE HYDROCHLORIDE 10 MG: 10 TABLET, FILM COATED ORAL at 10:47

## 2022-01-04 RX ADMIN — Medication 3 MG: at 21:43

## 2022-01-04 RX ADMIN — FLUTICASONE PROPIONATE 1 SPRAY: 50 SPRAY, METERED NASAL at 10:48

## 2022-01-04 RX ADMIN — ACETAMINOPHEN 650 MG: 325 TABLET, FILM COATED ORAL at 03:42

## 2022-01-04 ASSESSMENT — ACTIVITIES OF DAILY LIVING (ADL)
ADLS_ACUITY_SCORE: 4
DIFFICULTY_COMMUNICATING: NO
DIFFICULTY_EATING/SWALLOWING: NO
ADLS_ACUITY_SCORE: 4
NUMBER_OF_TIMES_PATIENT_HAS_FALLEN_WITHIN_LAST_SIX_MONTHS: 1
ADLS_ACUITY_SCORE: 8
ADLS_ACUITY_SCORE: 4
HEARING_DIFFICULTY_OR_DEAF: NO
ADLS_ACUITY_SCORE: 4
WEAR_GLASSES_OR_BLIND: YES
ADLS_ACUITY_SCORE: 4
ADLS_ACUITY_SCORE: 4
DRESSING/BATHING_DIFFICULTY: NO
ADLS_ACUITY_SCORE: 4
VISION_MANAGEMENT: GLASSES
ADLS_ACUITY_SCORE: 4
ADLS_ACUITY_SCORE: 8
ADLS_ACUITY_SCORE: 4
ADLS_ACUITY_SCORE: 4
DOING_ERRANDS_INDEPENDENTLY_DIFFICULTY: NO
ADLS_ACUITY_SCORE: 4
ADLS_ACUITY_SCORE: 4
TOILETING_ISSUES: NO
FALL_HISTORY_WITHIN_LAST_SIX_MONTHS: YES
ADLS_ACUITY_SCORE: 4
WALKING_OR_CLIMBING_STAIRS_DIFFICULTY: NO
CONCENTRATING,_REMEMBERING_OR_MAKING_DECISIONS_DIFFICULTY: NO

## 2022-01-04 ASSESSMENT — MIFFLIN-ST. JEOR: SCORE: 2189.25

## 2022-01-04 NOTE — CONSULTS
Thoracic Surgery Consultation Note  [facility] Lu Dobbs MRN# [id number]   Age: 36 year old YOB: 1985     Date of Admission:  1/3/2022    Reason for consult: R chest pain with known empyema       Requesting physician: Dr. 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 Completed course of Augmentin. One month follow up chest CT on 12/29 showing slightly enlarged loculated empyema the posterior right lung measuring 5.7 x 2.2 cm. Seen virtually in thoracic clinic 12/30 with plans to obtain CBC (WBC 8.9), and COVID (returned negative) due to recent exposure. Directed to come to the ED due to one-day of increased pain. Afebrile, WBC normal, hemodynamically stable, rates pain 2/10 which resolves with Ibuprofen. Located on anterior right chest, does not radiate, not associated with inspiration, not tender to palpation. Will admit to thoracic for another trial of lytic therapy.     - Admit to thoracic surgery under Dr. Zelaya  - Pain control prn  - NPO for procedure  - IR consult in AM for chest tube placement and initiation of lytic therapy  - No antibiotics at the moment    Seen and discussed with thoracic fellow who will discuss with staff.     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 Completed course of Augmentin. One month follow up chest CT on 12/29 showing slightly enlarged loculated empyema the posterior  "right lung measuring 5.7 x 2.2 cm. Seen virtually in thoracic clinic  with plans to obtain CBC (WBC 8.9), and COVID (returned negative) due to recent exposure. Directed to come to the ED by thoracic team due if increasing pain.    Currently the patient rates her pain a 2/10, more anterior chest that does not radiate. Intermittent, episodes last ~10 minutes. With Ibuprofen it reduces to 1/10 or goes away completely. Denies fevers, chills. Says she was getting more short of breath at home but added \"that might be because I'm chasing my 1-year-old around the house\". Had Edgar day COVID exposure, has tested negative x2 now. Partner and child have tested positive. No other symptoms. Previous chest tube site now closed without erythema or pain on exam.           Past Medical History:     Past Medical History:   Diagnosis Date     Anxiety      Hypertension 2020     Obesity (BMI 30-39.9)      Uncomplicated asthma Childhood only             Past Surgical History:     Past Surgical History:   Procedure Laterality Date      SECTION N/A 10/21/2020    Procedure:  SECTION;  Surgeon: Elisabeth Lima MD;  Location: [facility] L[facility]     IR CHEST TUBE PLACEMENT NON-TUNNELED RIGHT  2021     PE TUBES               Social History:     Social History     Socioeconomic History     Marital status:      Spouse name: Not on file     Number of children: Not on file     Years of education: Not on file     Highest education level: Not on file   Occupational History     Not on file   Tobacco Use     Smoking status: Former Smoker     Packs/day: 0.00     Years: 10.00     Pack years: 0.00     Types: Cigarettes, Hookah     Quit date: 2020     Years since quittin.8     Smokeless tobacco: Never Used   Substance and Sexual Activity     Alcohol use: Yes     Drug use: Never     Sexual activity: Yes     Partners: Male     Birth control/protection: I.U.D.   Other Topics Concern     Parent/sibling w/ CABG, MI or " "angioplasty before 65F 55M? No   Social History Narrative     Not on file     Social Determinants of Health     Financial Resource Strain: Not on file   Food Insecurity: Not on file   Transportation Needs: Not on file   Physical Activity: Not on file   Stress: Not on file   Social Connections: Not on file   Intimate Partner Violence: Not At Risk     Fear of Current or Ex-Partner: No     Emotionally Abused: No     Physically Abused: No     Sexually Abused: No   Housing Stability: Not on file             Family History:     Family History   Problem Relation Age of Onset     Thyroid Disease Mother      Hypertension Mother      Breast Cancer Paternal Grandmother         stage 1 early 50's.      Hypertension Paternal Grandmother      Hypertension Paternal Grandfather      Prostate Cancer Paternal Grandfather              Allergies:    No Known Allergies          Medications:   No current facility-administered medications on file prior to encounter.  ASPIRIN PO, Take 81 mg by mouth  cetirizine (ZYRTEC) 10 MG tablet, Take 10 mg by mouth daily  fluticasone (FLONASE) 50 MCG/ACT nasal spray, Spray 1 spray into both nostrils daily  labetalol (NORMODYNE) 100 MG tablet, TAKE 1 TABLET BY MOUTH TWICE A DAY  Prenatal Vit-Fe Fumarate-FA (PRENATAL MULTIVITAMIN W/IRON) 27-0.8 MG tablet, Take 1 tablet by mouth daily  acetaminophen (TYLENOL) 325 MG tablet, Take 3 tablets (975 mg) by mouth every 8 hours as needed for mild pain  ibuprofen (ADVIL/MOTRIN) 200 MG tablet, Take 3 tablets (600 mg) by mouth every 6 hours as needed for moderate pain Start after delivery  paragard intrauterine copper device, 1 each by Intrauterine route once              Review of Systems:      All other review of systems negative, except for what is mentioned above        Physical Exam:   BP (!) 136/99   Pulse 78   Temp 98.1  F (36.7  C)   Resp 18   Ht 1.778 m (5' 10\")   Wt 144.7 kg (319 lb)   LMP 01/01/2022   SpO2 94%   BMI 45.77 kg/m    General: " comfortable sitting up in bed  Resp: non-labored on room air, no accessory muscle use, denies current shortness of breath  Cardiac: Regular rate and rhythm on tele  Abdomen: Soft, nontender, nondistended  Extremities: No LE edema or obvious joint abnormalities  Skin: Warm and dry, no jaundice or rash  Neuro: A&Ox3, CN 2-12 intact, TREVIZO          Data:     Results for orders placed or performed during the hospital encounter of 01/03/22 (from the past 24 hour(s))   EKG 12 lead   Result Value Ref Range    Systolic Blood Pressure  mmHg    Diastolic Blood Pressure  mmHg    Ventricular Rate 99 BPM    Atrial Rate 99 BPM    CT Interval 130 ms    QRS Duration 80 ms     ms    QTc 441 ms    P Axis 73 degrees    R AXIS 57 degrees    T Axis 39 degrees    Interpretation ECG Sinus rhythm  Normal ECG      CBC with platelets differential    Narrative    The following orders were created for panel order CBC with platelets differential.  Procedure                               Abnormality         Status                     ---------                               -----------         ------                     CBC with platelets and d...[[id number]]                      Final result                 Please view results for these tests on the individual orders.   Comprehensive metabolic panel   Result Value Ref Range    Sodium 139 133 - 144 mmol/L    Potassium 4.0 3.4 - 5.3 mmol/L    Chloride 109 94 - 109 mmol/L    Carbon Dioxide (CO2) 24 20 - 32 mmol/L    Anion Gap 6 3 - 14 mmol/L    Urea Nitrogen 13 7 - 30 mg/dL    Creatinine 0.84 0.52 - 1.04 mg/dL    Calcium 9.6 8.5 - 10.1 mg/dL    Glucose 105 (H) 70 - 99 mg/dL    Alkaline Phosphatase 110 40 - 150 U/L    AST 30 0 - 45 U/L    ALT 69 (H) 0 - 50 U/L    Protein Total 8.5 6.8 - 8.8 g/dL    Albumin 3.5 3.4 - 5.0 g/dL    Bilirubin Total 0.3 0.2 - 1.3 mg/dL    GFR Estimate >90 >60 mL/min/1.73m2   Troponin I   Result Value Ref Range    Troponin I High Sensitivity <3 <54 ng/L   CBC with  platelets and differential   Result Value Ref Range    WBC Count 10.8 4.0 - 11.0 10e3/uL    RBC Count 5.03 3.80 - 5.20 10e6/uL    Hemoglobin 13.8 11.7 - 15.7 g/dL    Hematocrit 43.1 35.0 - 47.0 %    MCV 86 78 - 100 fL    MCH 27.4 26.5 - 33.0 pg    MCHC 32.0 31.5 - 36.5 g/dL    RDW 14.0 10.0 - 15.0 %    Platelet Count 447 150 - 450 10e3/uL    % Neutrophils 66 %    % Lymphocytes 22 %    % Monocytes 7 %    % Eosinophils 4 %    % Basophils 1 %    % Immature Granulocytes 0 %    NRBCs per 100 WBC 0 <1 /100    Absolute Neutrophils 7.2 1.6 - 8.3 10e3/uL    Absolute Lymphocytes 2.3 0.8 - 5.3 10e3/uL    Absolute Monocytes 0.8 0.0 - 1.3 10e3/uL    Absolute Eosinophils 0.4 0.0 - 0.7 10e3/uL    Absolute Basophils 0.1 0.0 - 0.2 10e3/uL    Absolute Immature Granulocytes 0.0 <=0.4 10e3/uL    Absolute NRBCs 0.0 10e3/uL   HCG quantitative pregnancy   Result Value Ref Range    hCG Quantitative <1 0 - 5 IU/L   XR Chest 2 Views    Narrative    EXAM: XR CHEST 2 VW  1/3/2022 2:52 PM     HISTORY:  SOA       COMPARISON:  Chest CT 12/29/2021. Chest radiograph 11/30/2021,  11/29/2021.    TECHNIQUE: 2 views of the chest    FINDINGS:   PA and lateral views of the chest. Trachea is midline. Stable heart  size. Persistent loculated right-sided pleural effusion, not  significantly changed in size when compared to recent CT. No left  sided pleural effusion. Perihilar and bibasilar linear streaky  opacities. No definite pneumothorax. No acute osseous abnormalities.      Impression    IMPRESSION:   1. Stable appearing loculated right-sided fluid collection. This  collection corresponds to empyema best visualized on 12/29/2021 CT.  2. Perihilar and bibasilar linear streaky opacities, likely  atelectasis.     I have personally reviewed the examination and initial interpretation  and I agree with the findings.    ZBIGNIEW ZELAYA MD         SYSTEM ID:  [id number]   UA reflex to Microscopic   Result Value Ref Range    Color Urine Yellow Colorless,  Straw, Light Yellow, Yellow    Appearance Urine Clear Clear    Glucose Urine Negative Negative mg/dL    Bilirubin Urine Negative Negative    Ketones Urine Negative Negative mg/dL    Specific Gravity Urine 1.019 1.003 - 1.035    Blood Urine Negative Negative    pH Urine 5.5 5.0 - 7.0    Protein Albumin Urine 10  (A) Negative mg/dL    Urobilinogen Urine Normal Normal, 2.0 mg/dL    Nitrite Urine Negative Negative    Leukocyte Esterase Urine Negative Negative    RBC Urine 1 <=2 /HPF    WBC Urine <1 <=5 /HPF    Mucus Urine Present (A) None Seen /LPF   Symptomatic; Yes Influenza A/B & SARS-CoV2 (COVID-19) Virus PCR Multiplex Nasopharyngeal    Specimen: Nasopharyngeal; Swab   Result Value Ref Range    Influenza A PCR Negative Negative    Influenza B PCR Negative Negative    RSV PCR Negative Negative    SARS CoV2 PCR Negative Negative, Testing sent to reference lab. Results will be returned via unsolicited result    Narrative    Testing was performed using the Xpert Xpress CoV2/Flu/RSV Assay on the [employer] Instrument. This test should be ordered for the detection of SARS-CoV-2 and influenza viruses in individuals who meet clinical and/or epidemiological criteria. Test performance is unknown in asymptomatic patients. This test is for in vitro diagnostic use under the FDA EUA for laboratories certified under CLIA to perform high or moderate complexity testing. This test has not been FDA cleared or approved. A negative result does not rule out the presence of PCR inhibitors in the specimen or target RNA in concentration below the limit of detection for the assay. If only one viral target is positive but coinfection with multiple targets is suspected, the sample should be re-tested with another FDA cleared, approved, or authorized test, if coinfection would change clinical management. This test was validated by the [facility]. These laboratories are certified under the Clinical  Laboratory  Improvement Amendments of 1988 (CLIA-88) as qualified to perform high complexity laboratory testing.                   STAFF ADDENDUM:  I saw and evaluated Ms. Dobbs and agree with the resident s findings and plan of care as documented in the resident s note and edited by me, as applicable.      In summary, Ms. Dobbs has had an increase in size of her loculated effusion and symptoms. Given these findings, we will try to place a tube and do lytic therapy again with the aim to prevent a decortication at a later stage. I explained that we may still have to operate, but that we want to give her the best possible chance.  I spent a total of 40 minutes with Ms. Dobbs, 35 of which were spent in counseling and coordination of care. The patient had all questions answered and was in agreement with the plan.  Gabriel Zelaya MD

## 2022-01-04 NOTE — PHARMACY-ADMISSION MEDICATION HISTORY
Pharmacy Admission Medication History    Admission medication history interview status for the 1/3/2022 admission is complete. See EPIC admission navigator for allergy information, prior to admission medications and immunization status.     Medication history interview source(s): Patient    Medication history resources (including written lists, pill bottles, clinic record): None    Medication history source reliability: Good    Pneumococcal and influenza vaccine history documented: yes    Actions taken by pharmacist (provider contacted, medication changes, etc):None     Changes made to medication history:No changes made!    Additional medication history information: None    Medication reconciliation/reorder completed by provider prior to medication history? Yes    Time spent in this activity: 15 minutes    Prior to Admission medications    Medication Sig Last Dose Taking? Auth Provider   acetaminophen (TYLENOL) 325 MG tablet Take 3 tablets (975 mg) by mouth every 8 hours as needed for mild pain More than a month at Unknown time Yes George Wade MD   ASPIRIN PO Take 81 mg by mouth daily  1/3/2022 at Unknown time Yes Reported, Patient   cetirizine (ZYRTEC) 10 MG tablet Take 10 mg by mouth daily 1/3/2022 at Unknown time Yes Reported, Patient   fluticasone (FLONASE) 50 MCG/ACT nasal spray Spray 1 spray into both nostrils daily 1/3/2022 at Unknown time Yes Reported, Patient   ibuprofen (ADVIL/MOTRIN) 200 MG tablet Take 3 tablets (600 mg) by mouth every 6 hours as needed for moderate pain Start after delivery More than a month at Unknown time Yes Joanne Lopez MD   labetalol (NORMODYNE) 100 MG tablet TAKE 1 TABLET BY MOUTH TWICE A DAY 1/3/2022 at Unknown time Yes Iesha Arteaga PA-C   Prenatal Vit-Fe Fumarate-FA (PRENATAL MULTIVITAMIN W/IRON) 27-0.8 MG tablet Take 1 tablet by mouth daily 1/3/2022 at Unknown time Yes Reported, Patient   paragard intrauterine copper device 1 each by Intrauterine route once    Alexa Pedroza MD

## 2022-01-04 NOTE — UTILIZATION REVIEW
Admission Status; Secondary Review Determination         Under the authority of the Utilization Management Committee, the utilization review process indicated a secondary review on the above patient.  The review outcome is based on review of the medical records, discussions with staff, and applying clinical experience noted on the date of the review.        (x)      Inpatient Status Appropriate - This patient's medical care is consistent with medical management for inpatient care and reasonable inpatient medical practice.     RATIONALE FOR DETERMINATION   The patient is a 36-year-old female admitted on 1/3/2022.  She came to the emergency room because of increasing dull achy right lower chest pain and shortness of breath.  She was concerned that she had a recurrence of an empyema which she was in the hospital and treated until November 30.  This did require a chest tube.  She did have a repeat CT scan done on 12/29/2021 which demonstrated an increased size of a loculated empyema with dimensions of 5.7x2.2 in the right lower chest region.  The patient is admitted to thoracic surgery for probable interventional chest tube placement and initiation of lytic therapy.  Patient will also be given pain medications.  The empyema and chest tube did culture out Streptococcus constantly this and Fusobacterium nucleated him and she had received 6 doses of lytics and subsequent removal of her chest tube on 11/30/2021.  Based on significant risk with worsening empyema symptoms and need for treatment, agree with inpatient status.    The severity of illness, intensity of service provided, expected LOS and risk for adverse outcome make the care complex, high risk and appropriate for hospital admission.        The information on this document is developed by the utilization review team in order for the business office to ensure compliance.  This only denotes the appropriateness of proper admission status and does not reflect the  quality of care rendered.         The definitions of Inpatient Status and Observation Status used in making the determination above are those provided in the CMS Coverage Manual, Chapter 1 and Chapter 6, section 70.4.      Sincerely,     Jose R Ivory MD  Physician Advisor  Utilization Review/ Case Management  [facility].

## 2022-01-04 NOTE — ED NOTES
"     Emergency Department Patient Sign-out       Brief HPI and ED course:  Patient is a 36 year old female signed out to me by Dr. Deutsch.  See initial ED Provider note for details of the presentation. In brief, patient with recurrent small empyema. Awaiting thoracic surgery consult.    Vitals:   Patient Vitals for the past 24 hrs:   BP Temp Temp src Pulse Resp SpO2 Height Weight   01/04/22 0030 (!) 136/99 -- -- 78 18 94 % -- --   01/04/22 0000 (!) 146/84 98.1  F (36.7  C) -- 80 18 96 % -- --   01/03/22 2315 (!) 143/91 -- -- 81 16 95 % -- --   01/03/22 1819 (!) 177/109 (!) 96.5  F (35.8  C) Oral 83 18 95 % -- --   01/03/22 0957 (!) 137/90 98.3  F (36.8  C) Oral 103 17 97 % 1.778 m (5' 10\") 144.7 kg (319 lb)       Received Sign-out Plan:    Pending studies include: thoracic surgery consult      Plan:   - f/u thoracic surgery consult, likely discharge with antibiotics unless thoracic surgery recommends admission     Events after assuming care:  Thoracic surgery recommended admission to their service.  Patient admitted.     --  Romain Garcia MD   Emergency Medicine       Romain Garcia MD  01/04/22 0145    "

## 2022-01-04 NOTE — PLAN OF CARE
Reason for admission: Empyema.   Admitted from: ED  Report received from: ED nurse  2 RN skin assessment completed by: Spring Call RN; Lu Strickland RN. Rashes generalized throughout the body. Itching  at the time of assessment. Dry skin. No other concerns noted at this assessment.   Bed Algorithm reevaluated: Yes  Was Pulsate ordered?:NO  Care plan (primary problem) and Education initiated: Yes  Detailed Belongings: Clothes, shoes, Cell phone, .

## 2022-01-04 NOTE — PLAN OF CARE
"Vitals:  /68 (BP Location: Right arm)   Pulse 62   Temp 97.1  F (36.2  C) (Oral)   Resp 18   Ht 1.778 m (5' 10\")   Wt 144.7 kg (319 lb)   LMP 01/01/2022   SpO2 94%   BMI 45.77 kg/m      Patient admitted from ED this shift at around 0345  Neuro: A/O x 4. Calm, cooperative.   Cardiac: WDL. Denied for chest pain.   Lungs: No SOB/WOO noted. On RA without labored breathing.  GI/: Continent for B/B. Reported for last BM on 01/03/2022.   Skin: Rash generalized itching. Started 2hr before admission to the floor.   IV/ Drains: L.PIV. SL. Dressing intact.   Pain: 2/10, headache. PRN tylenol administered.   Nutrition: NPO overnight for possible chest tube placement and lytic therapy as per orders.     Activity: Independent.   Plan of care: Continue to follow POC. Update medical team as needed.        "

## 2022-01-04 NOTE — CONSULTS
"    Interventional Radiology Consult Service Note    Patient is on IR schedule 1/5 for a CT guided right chest tube placement for empyema, 14 vs 16 F chest tube with stop cock for lytics.   Platelets WNLs, INR and hcg pending.  Orders for NPO have been entered.   Consent will be done prior to procedure.     Please contact the IR charge RN at [phone] for estimated time of procedure.     Case discussed with Dr. Oseguera from IR and thoracic surgery. 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 Completed course of Augmentin. One month follow up chest CT on 12/29 showing slightly enlarged loculated empyema in the posterior right lung measuring 5.7 x 2.2 cm. Seen virtually in thoracic clinic 12/30 with plans to obtain CBC (WBC 8.9), and COVID (returned negative) due to recent exposure. Directed to come to the ED due to one-day of increased pain. Afebrile, WBC normal, hemodynamically stable, rates pain 2/10 which resolves with Ibuprofen. Thoracic surgery admitted the patient and IR was consulted for chest tube placement for lytics.    Expected date of discharge: TBD    Vitals:   /68 (BP Location: Right arm)   Pulse 62   Temp 97.1  F (36.2  C) (Oral)   Resp 18   Ht 1.778 m (5' 10\")   Wt 144.7 kg (319 lb)   LMP 01/01/2022   SpO2 94%   BMI 45.77 kg/m      Pertinent Labs:     Lab Results   Component Value Date    WBC 10.8 01/03/2022    WBC 8.9 12/31/2021    WBC 9.6 11/30/2021    WBC 9.4 10/29/2020    WBC 9.7 10/02/2020    WBC 12.3 (H) 07/01/2020       Lab Results   Component Value Date    HGB 13.8 01/03/2022    HGB 12.9 12/31/2021    HGB 9.6 11/30/2021    HGB 13.0 10/29/2020    HGB 12.1 10/21/2020    HGB 14.3 10/19/2020       Lab Results   Component Value Date     01/03/2022     12/31/2021     11/30/2021     10/29/2020     " 10/21/2020     10/19/2020       Lab Results   Component Value Date    INR 1.29 (H) 11/23/2021       Lab Results   Component Value Date    POTASSIUM 4.0 01/03/2022        ELIN Gonzalez CNP  Interventional Radiology  Pager: [phone]

## 2022-01-04 NOTE — ED PROVIDER NOTES
ED Provider Note  [facility]      History     Chief Complaint   Patient presents with     Chest Pain     right lower radiating towards back     HPI  Scott Dobbs is a 36 year old female who has recent history of right sided empyema status post chest tube placement in addition, and removal of chest tube.  She was discharged on  on Augmentin, which she finished as prescribed.  Over the past couple of days she has started to experience dull/achy right lower chest pain and mild shortness of breath, concerned for recurrence of her empyema.  She was seen in clinic where this showed small recurrence of her empyema; plan was to monitor, not currently on antibiotics.  However, today she had increase in her chest pain prompting her to come in for further evaluation.  She has had no fevers or chills.  Denies any cough, abdominal pain, nausea, vomiting, or back pain. The patient denies any other physical concerns today.     Past Medical History  Past Medical History:   Diagnosis Date     Anxiety      Hypertension 2020     Obesity (BMI 30-39.9)      Uncomplicated asthma Childhood only     Past Surgical History:   Procedure Laterality Date      SECTION N/A 10/21/2020    Procedure:  SECTION;  Surgeon: Elisabeth Lima MD;  Location: [facility] L[facility]     IR CHEST TUBE PLACEMENT NON-TUNNELED RIGHT  2021     PE TUBES       ASPIRIN PO  cetirizine (ZYRTEC) 10 MG tablet  fluticasone (FLONASE) 50 MCG/ACT nasal spray  labetalol (NORMODYNE) 100 MG tablet  Prenatal Vit-Fe Fumarate-FA (PRENATAL MULTIVITAMIN W/IRON) 27-0.8 MG tablet  acetaminophen (TYLENOL) 325 MG tablet  ibuprofen (ADVIL/MOTRIN) 200 MG tablet  paragard intrauterine copper device      No Known Allergies  Family History  Family History   Problem Relation Age of Onset     Thyroid Disease Mother      Hypertension Mother      Breast Cancer Paternal Grandmother         stage 1 early 50's.      Hypertension Paternal  "Grandmother      Hypertension Paternal Grandfather      Prostate Cancer Paternal Grandfather      Social History   Social History     Tobacco Use     Smoking status: Former Smoker     Packs/day: 0.00     Years: 10.00     Pack years: 0.00     Types: Cigarettes, Hookah     Quit date: 2020     Years since quittin.8     Smokeless tobacco: Never Used   Substance Use Topics     Alcohol use: Yes     Drug use: Never      Past medical history, past surgical history, medications, allergies, family history, and social history were reviewed with the patient. No additional pertinent items.       Review of Systems   Constitutional: Negative for chills, fatigue and fever.   HENT: Negative for congestion, ear pain and rhinorrhea.    Eyes: Negative for visual disturbance.   Respiratory: Positive for shortness of breath. Negative for cough.    Cardiovascular: Positive for chest pain. Negative for palpitations.   Gastrointestinal: Negative for abdominal pain, constipation, diarrhea, nausea and vomiting.   Genitourinary: Negative for dysuria, flank pain, frequency, hematuria, vaginal bleeding and vaginal discharge.   Musculoskeletal: Negative for back pain and neck pain.   Skin: Negative for color change, rash and wound.   Allergic/Immunologic: Negative for immunocompromised state.   Neurological: Negative for syncope, weakness, light-headedness, numbness and headaches.   Hematological: Does not bruise/bleed easily.   Psychiatric/Behavioral: Negative for confusion.   All other systems reviewed and are negative.    A complete review of systems was performed with pertinent positives and negatives noted in the HPI, and all other systems negative.    Physical Exam   BP: (!) 137/90  Pulse: 103  Temp: 98.3  F (36.8  C)  Resp: 17  Height: 177.8 cm (5' 10\")  Weight: 144.7 kg (319 lb)  SpO2: 97 %     Physical Exam  Vitals and nursing note reviewed.   Constitutional:       General: She is not in acute distress.     Appearance: She is " obese. She is not ill-appearing, toxic-appearing or diaphoretic.   HENT:      Head: Normocephalic and atraumatic.      Right Ear: External ear normal.      Left Ear: External ear normal.      Nose: Nose normal.      Mouth/Throat:      Mouth: Mucous membranes are moist.   Eyes:      Extraocular Movements: Extraocular movements intact.      Conjunctiva/sclera: Conjunctivae normal.      Pupils: Pupils are equal, round, and reactive to light.   Cardiovascular:      Rate and Rhythm: Normal rate and regular rhythm.      Pulses: Normal pulses.      Heart sounds: Normal heart sounds. No murmur heard.  No friction rub. No gallop.    Pulmonary:      Effort: Pulmonary effort is normal. No respiratory distress.      Breath sounds: Normal breath sounds. No stridor. No wheezing, rhonchi or rales.   Abdominal:      General: Bowel sounds are normal. There is no distension.      Palpations: Abdomen is soft.      Tenderness: There is no abdominal tenderness. There is no right CVA tenderness, left CVA tenderness, guarding or rebound.   Musculoskeletal:         General: Normal range of motion.      Cervical back: Normal range of motion and neck supple. No rigidity or tenderness.      Right lower leg: No edema.      Left lower leg: No edema.   Skin:     General: Skin is warm.      Capillary Refill: Capillary refill takes less than 2 seconds.   Neurological:      General: No focal deficit present.      Mental Status: She is alert.   Psychiatric:         Mood and Affect: Mood normal.         Behavior: Behavior normal.         ED Course     ED Course as of 01/03/22 2311   Mon Jan 03, 2022   2310 SARS CoV2 PCR: Negative   2310 Resp Syncytial Virus: Negative   2310 Influenza B: Negative   2310 Influenza A: Negative   2310 HCG Quantitative Serum: <1   2310 Troponin I High Sensitivity: <3   2310 WBC Urine: <1   2310 RBC Urine: 1   2310 Leukocyte Esterase Urine: Negative   2310 Nitrite Urine: Negative   2310 Sodium: 139   2310 Potassium: 4.0    2310 Creatinine: 0.84   2310 Glucose(!): 105   2310 ALT(!): 69   2310 AST: 30   2310 Bilirubin Total: 0.3   2310 WBC: 10.8   2310 Hemoglobin: 13.8   2310 Platelet Count: 447   2311 XR Chest 2 Views  1. Stable appearing loculated right-sided fluid collection. This  collection corresponds to empyema best visualized on 12/29/2021 CT.  2. Perihilar and bibasilar linear streaky opacities, likely  atelectasis.      Procedures: none.             EKG Interpretation:      Interpreted by Libra Deutsch MD  Time reviewed: 23:28  Symptoms at time of EKG: Chest pain    Rhythm: normal sinus   Rate: normal  Axis: normal  Ectopy: none  Conduction: normal  ST Segments/ T Waves: No ST elevation or depression; non-specific T wave inversion isolated to V1.   Q Waves: none  Comparison to prior: No old EKG available    Clinical Impression: normal EKG  _______________________________________________     The medical record was reviewed and interpreted.  Current labs reviewed and interpreted.  Previous labs reviewed and interpreted.  Current images reviewed and interpreted: as above.  Previous images reviewed and interpreted: reviewed CT chest from 12/29.  Managed outpatient prescription medications.         Assessments & Plan (with Medical Decision Making)   Nursing notes and vital signs reviewed.     Presentation, exam, and workup is most consistent with right-sided chest pain, empyema.    Please see HPI, ROS, exam, and ED course above for pertinent history and findings. In short, the patient presented to the ED for evaluation of chest pain in the setting of recent empyema.    Of note, patient recently underwent CT chest on 12/29 that showed persistent and slightly enlarged loculated empyema the posterior right lung measuring 5.7 x 2.2 cm; per thoracic surgery note plan was to monitor the patient, get CBC and if she had increased pain or fevers to come into the emergency department. Today she had increased right lower chest pain, prompting  her to come in for further evaluation.    Of note here she is afebrile, no leukocytosis; she denies any fevers at home. Remainder of labs without acute findings other than slight elevation of ALT; on chart review this is downtrending from 206 on November 23.     In regards other possible causes of her chest pain and shortness of breath, EKG without acute ST elevation or depression, high-sensitivity troponin undetectable, less concern for ACS. She recently had CT chest 4 days ago with contrast, no evidence at that time for PE, overall low suspicion at this time given normal vital signs, no tachycardia, no hypoxia. No indication for repeat CT chest with contrast.    Spoke with thoracic surgery given that they recently discharged the patient.  At the time of signout, they are currently discussing with their team, likely discharge with her without antibiotics however final plan is pending at the time of signout.    The patient's care was signed out to Dr. Garcia at the end of my shift; please see their note for final diagnosis and disposition.      Final diagnoses:   Chest pain, unspecified type   Empyema, right (H)       --  Libra Deutsch MD   [facility] EMERGENCY DEPARTMENT  1/3/2022     Libra Deutsch MD  01/04/22 0102

## 2022-01-05 ENCOUNTER — APPOINTMENT (OUTPATIENT)
Dept: INTERVENTIONAL RADIOLOGY/VASCULAR | Facility: CLINIC | Age: 37
DRG: 178 | End: 2022-01-05
Attending: NURSE PRACTITIONER
Payer: COMMERCIAL

## 2022-01-05 LAB
GRAM STAIN RESULT: NORMAL
GRAM STAIN RESULT: NORMAL

## 2022-01-05 PROCEDURE — 87205 SMEAR GRAM STAIN: CPT | Performed by: THORACIC SURGERY (CARDIOTHORACIC VASCULAR SURGERY)

## 2022-01-05 PROCEDURE — 258N000003 HC RX IP 258 OP 636

## 2022-01-05 PROCEDURE — 272N000196 HC ACCESSORY CR5

## 2022-01-05 PROCEDURE — 250N000013 HC RX MED GY IP 250 OP 250 PS 637: Performed by: STUDENT IN AN ORGANIZED HEALTH CARE EDUCATION/TRAINING PROGRAM

## 2022-01-05 PROCEDURE — C1769 GUIDE WIRE: HCPCS

## 2022-01-05 PROCEDURE — 250N000013 HC RX MED GY IP 250 OP 250 PS 637: Performed by: PHYSICIAN ASSISTANT

## 2022-01-05 PROCEDURE — 250N000011 HC RX IP 250 OP 636: Performed by: STUDENT IN AN ORGANIZED HEALTH CARE EDUCATION/TRAINING PROGRAM

## 2022-01-05 PROCEDURE — C1729 CATH, DRAINAGE: HCPCS

## 2022-01-05 PROCEDURE — 250N000009 HC RX 250

## 2022-01-05 PROCEDURE — 32557 INSERT CATH PLEURA W/ IMAGE: CPT | Mod: RT

## 2022-01-05 PROCEDURE — 120N000002 HC R&B MED SURG/OB UMMC

## 2022-01-05 PROCEDURE — 999N000127 HC STATISTIC PERIPHERAL IV START W US GUIDANCE

## 2022-01-05 PROCEDURE — 87070 CULTURE OTHR SPECIMN AEROBIC: CPT | Performed by: THORACIC SURGERY (CARDIOTHORACIC VASCULAR SURGERY)

## 2022-01-05 PROCEDURE — 272N000192 HC ACCESSORY CR2

## 2022-01-05 PROCEDURE — 99152 MOD SED SAME PHYS/QHP 5/>YRS: CPT | Mod: GC | Performed by: RADIOLOGY

## 2022-01-05 PROCEDURE — 250N000011 HC RX IP 250 OP 636: Performed by: PHYSICIAN ASSISTANT

## 2022-01-05 PROCEDURE — 0W9930Z DRAINAGE OF RIGHT PLEURAL CAVITY WITH DRAINAGE DEVICE, PERCUTANEOUS APPROACH: ICD-10-PCS | Performed by: RADIOLOGY

## 2022-01-05 PROCEDURE — 250N000009 HC RX 250: Performed by: STUDENT IN AN ORGANIZED HEALTH CARE EDUCATION/TRAINING PROGRAM

## 2022-01-05 PROCEDURE — 32557 INSERT CATH PLEURA W/ IMAGE: CPT | Mod: RT | Performed by: RADIOLOGY

## 2022-01-05 PROCEDURE — 272N000500 HC NEEDLE CR2

## 2022-01-05 PROCEDURE — 3E0L3GC INTRODUCTION OF OTHER THERAPEUTIC SUBSTANCE INTO PLEURAL CAVITY, PERCUTANEOUS APPROACH: ICD-10-PCS | Performed by: THORACIC SURGERY (CARDIOTHORACIC VASCULAR SURGERY)

## 2022-01-05 PROCEDURE — 250N000011 HC RX IP 250 OP 636

## 2022-01-05 PROCEDURE — 99152 MOD SED SAME PHYS/QHP 5/>YRS: CPT

## 2022-01-05 PROCEDURE — 87075 CULTR BACTERIA EXCEPT BLOOD: CPT | Performed by: THORACIC SURGERY (CARDIOTHORACIC VASCULAR SURGERY)

## 2022-01-05 RX ORDER — FENTANYL CITRATE 50 UG/ML
25-50 INJECTION, SOLUTION INTRAMUSCULAR; INTRAVENOUS EVERY 5 MIN PRN
Status: DISCONTINUED | OUTPATIENT
Start: 2022-01-05 | End: 2022-01-05 | Stop reason: HOSPADM

## 2022-01-05 RX ORDER — NALOXONE HYDROCHLORIDE 0.4 MG/ML
0.2 INJECTION, SOLUTION INTRAMUSCULAR; INTRAVENOUS; SUBCUTANEOUS
Status: DISCONTINUED | OUTPATIENT
Start: 2022-01-05 | End: 2022-01-05 | Stop reason: HOSPADM

## 2022-01-05 RX ORDER — FLUMAZENIL 0.1 MG/ML
0.2 INJECTION, SOLUTION INTRAVENOUS
Status: DISCONTINUED | OUTPATIENT
Start: 2022-01-05 | End: 2022-01-05 | Stop reason: HOSPADM

## 2022-01-05 RX ORDER — NALOXONE HYDROCHLORIDE 0.4 MG/ML
0.4 INJECTION, SOLUTION INTRAMUSCULAR; INTRAVENOUS; SUBCUTANEOUS
Status: DISCONTINUED | OUTPATIENT
Start: 2022-01-05 | End: 2022-01-05 | Stop reason: HOSPADM

## 2022-01-05 RX ADMIN — Medication 50 ML: at 21:53

## 2022-01-05 RX ADMIN — LIDOCAINE HYDROCHLORIDE 6 ML: 10 INJECTION, SOLUTION EPIDURAL; INFILTRATION; INTRACAUDAL; PERINEURAL at 11:24

## 2022-01-05 RX ADMIN — MIDAZOLAM 0.5 MG: 1 INJECTION INTRAMUSCULAR; INTRAVENOUS at 11:26

## 2022-01-05 RX ADMIN — FLUTICASONE PROPIONATE 1 SPRAY: 50 SPRAY, METERED NASAL at 08:05

## 2022-01-05 RX ADMIN — FENTANYL CITRATE 50 MCG: 50 INJECTION INTRAMUSCULAR; INTRAVENOUS at 11:16

## 2022-01-05 RX ADMIN — FENTANYL CITRATE 25 MCG: 50 INJECTION INTRAMUSCULAR; INTRAVENOUS at 11:26

## 2022-01-05 RX ADMIN — HYDROMORPHONE HYDROCHLORIDE 0.2 MG: 0.2 INJECTION, SOLUTION INTRAMUSCULAR; INTRAVENOUS; SUBCUTANEOUS at 22:57

## 2022-01-05 RX ADMIN — MIDAZOLAM 1 MG: 1 INJECTION INTRAMUSCULAR; INTRAVENOUS at 11:16

## 2022-01-05 RX ADMIN — CETIRIZINE HYDROCHLORIDE 10 MG: 10 TABLET, FILM COATED ORAL at 08:05

## 2022-01-05 RX ADMIN — OXYCODONE HYDROCHLORIDE 5 MG: 5 TABLET ORAL at 21:03

## 2022-01-05 RX ADMIN — ACETAMINOPHEN 650 MG: 325 TABLET, FILM COATED ORAL at 12:29

## 2022-01-05 ASSESSMENT — ACTIVITIES OF DAILY LIVING (ADL)
ADLS_ACUITY_SCORE: 4

## 2022-01-05 ASSESSMENT — MIFFLIN-ST. JEOR: SCORE: 2182.31

## 2022-01-05 NOTE — PLAN OF CARE
"BP (!) 151/92 (BP Location: Right arm)   Pulse 75   Temp 97.3  F (36.3  C) (Oral)   Resp 17   Ht 1.778 m (5' 10\")   Wt 140.7 kg (310 lb 3.2 oz)   LMP 01/01/2022   SpO2 98%   BMI 44.51 kg/m      Care from: [id number]      VS & Pain: VSS ex hypertensive, on room air, denied pain    Neuro: A&O x4    Respiratory: dyspnea on exertion, diminished lung sounds in RML and RLL, CT dressing is CDI- total 9 mL bright red net output    Cardiac: WDL    Peripheral neurovascular: WDL    GI/: adequate urine output, BM x1 this shift    Nutrition: good appetite and oral intake    Skin: rash noted on neck, under breasts, back, arms, thighs, and shins- denied itchiness    Musculoskeletal: WDL    Lines: PIV is saline locked    Activity: Up independently    Events this shift: CT was inserted this shift. Call light within reach.    Plan: Continue to follow poc.     "

## 2022-01-05 NOTE — PROCEDURES
[facility]    Procedure: IR Procedure Note    Date/Time: 1/5/2022 11:42 AM  Performed by: Vincenzo Malagon DO  Authorized by: Vincenzo Malagon DO   IR Fellow Physician: Manas      UNIVERSAL PROTOCOL   Site Marked: NA  Prior Images Obtained and Reviewed:  Yes  Required items: Required blood products, implants, devices and special equipment available    Patient identity confirmed:  Verbally with patient, arm band, provided demographic data and hospital-assigned identification number  Patient was reevaluated immediately before administering moderate or deep sedation or anesthesia  Confirmation Checklist:  Patient's identity using two indicators, relevant allergies, procedure was appropriate and matched the consent or emergent situation and correct equipment/implants were available  Time out: Immediately prior to the procedure a time out was called    Universal Protocol: the Joint Commission Universal Protocol was followed    Preparation: Patient was prepped and draped in usual sterile fashion       ANESTHESIA    Anesthesia: Local infiltration  Local Anesthetic:  Lidocaine 1% without epinephrine      SEDATION  Patient Sedated: Yes    Sedation Type:  Moderate (conscious) sedation  Sedation:  Fentanyl and midazolam  Vital signs: Vital signs monitored during sedation    See dictated procedure note for full details.  Findings: Uncomplicated placement of right-sided percutaneous chest tube, 12 Pashto nonlocking J-tipped catheter.  The collection has gotten smaller since last imaging on 12/29/2021.  It was felt a larger tube would be able to be placed appropriately.    Specimens: none    Complications: None    Condition: Stable    Plan: Primary team to manage chest tube and lytic dwelling.      PROCEDURE    Patient Tolerance:  Patient tolerated the procedure well with no immediate complications  Length of time physician/provider present for 1:1 monitoring during  sedation: 25

## 2022-01-05 NOTE — PROGRESS NOTES
[facility]   Mental Health & Addiction Services     Provider Name:  Mejia Gonzalez PsyD     Credentials:  LP      ADHD Assessment Results/Summary      Patient Name:  Scott Dobbs  Date: 1/6/22  YOB: 1985  MRN: [id number]  Date(s) of assessment:     Psychological Testing  Billing/Services Summary     Integration/Report Generation  (Start/Stop), (Date, Day #)  12:29pm/12:59pm            1/4/22 / Day 1             30 minutes  8:44pm/9:45pm                1/5/22 / Day 2             61 minutes  4:07pm/5:12pm                1/9/22 / Day 4             65 minutes     Interactive Feedback Session   (Start/Stop), (Date, Day #)  9:04am/10:02am               1/6/21 / Day 3           58 minutes        Total Time:     214 Minutes (3 hours, 34 minutes)     Total Units:                2 (44595)         2 (10796)         Service Type: Individual      Session #: 3    Attendees: Client attended alone    Service Modality:  Video Visit:      Provider verified identity through the following two step process.  Patient provided:  Patient photo    Telemedicine Visit: The patient's condition can be safely assessed and treated via synchronous audio and visual telemedicine encounter.      Reason for Telemedicine Visit: Services only offered telehealth    Originating Site (Patient Location): Patient's home    Distant Site (Provider Location): Provider Remote Setting- Home Office    Consent:  The patient/guardian has verbally consented to: the potential risks and benefits of telemedicine (video visit) versus in person care; bill my insurance or make self-payment for services provided; and responsibility for payment of non-covered services.     Patient would like the video invitation sent by:  Send to e-mail at: [email or web address]    Mode of Communication:  Video Conference via Priscilla    As the provider I attest to compliance with applicable laws and regulations related to  "telemedicine.    DATA  Interactive Complexity: No  Crisis: No       Information about appointment:  Client attended three sessions to aid in determining client's mental health diagnosis or diagnoses and treatment recommendations that best address client concerns. Client records includingmedical were reviewed. A diagnostic assessment was conducted at the initial appointment. Client completed several rating scales to assist in assessing attention-related and other mental health symptoms that may be causing impairments in functioning. Rating scales were also completed by a collateral contact.    Assessment tools:      [place] Adult ADHD Rating Scale-IV: Self and Other Reports (BAARS-IV), [place] Functional Impairment Scale: Self and Other Reports (BFIS), [place] Deficits in Executive Functioning Scale: Self and Other Reports (BDEFS), Patient Health Questionnaire-9 (PHQ-9), Generalized Anxiety Disorder-7 (BALA-7) and [place] Multiphasic Personality Inventory (MMPI)    Assessment Results:    [place] Adult ADHD Rating Scale-IV: Self and Other Reports (BAARS-IV)  The BAARS-IV assesses for symptoms of ADHD that are experienced in one's daily life. This assessment measure includes self and collateral rating scales designed to provide information regarding current and childhood symptoms of ADHD including inattention, hyperactivity, and impulsivity. Self-report scores are reported as percentiles. Scores at the 76th-83rd percentile are considered marginal, scores at the 84th-92nd percentile are considered borderline, scores at the 93rd-95th percentile are considered mild, scores at the 96th-98th percentile are considered moderate, and those at the 99th percentile are considered severe. Collateral or \"other\" rating scales are reported as number of symptoms observed in comparison to those reported by the client. Norms and percentile scores are not available for collateral reports.     Current Symptoms Scale--Self Report: "   Client completed the self-report inventory of current symptoms. The results indicate that the client's Total ADHD Score was 35 which places her in the 85th percentile for overall ADHD symptoms. In addition, the client endorsed 0/9 (1st-75th percentile) Inattention symptoms, 1/5 (80 th percentile) Hyperactivity symptoms, 4/4 (95th percentile) Impulsivity symptoms, and 5/9 (94th percentile) Sluggish Cognitive Tempo symptoms. Client indicated that the reported symptoms have resulted in impaired functioning in school, home, work and social relationships. Overall, the results suggest the client is expeiencing Borderline ADHD symptoms.    Current Symptoms Scale--Other Report:  Client's spouse completed the collateral report inventory of current symptoms. Based on the collateral contact's observation of symptoms, the client demonstrates 0/9 Inattention symptoms, 0/5 Hyperactivity symptoms, 0/4 Impulsivity symptoms, and 0/9 Sluggish Cognitive Tempo symptoms. The client's Total ADHD Score was 22. The collateral contact indicated the client demonstrates impaired functioning in no significant areas of client's life} The collateral- and self-report scores are significantly different.    Childhood Symptoms Scale--Self-Report:  Client completed the self-report inventory of childhood symptoms. The results indicate that the client's Total ADHD Score was 35 which places her in the 77th percentile for overall ADHD symptoms in childhood. In addition, the client endorsed 2/9 (83rd percentile) Inattention symptoms and  3/9 (87th percentile) Hyperactivity-Impulsivity symptoms. Client indicated that the reported symptoms resulted in impaired functioning in no significant areas of client's life Overall, the results suggest the client experienced  Marginal symptoms of ADHD as a child.     Childhood Symptoms Scale--Other Report:  Client's mother completed the collateral report inventory of childhood symptoms. Based on the collateral  "contact's recollection of client's childhood symptoms, the client demonstrated 0/9 Inattention symptoms and 0/9 Hyperactivity-Impulsivity symptoms. The client's Total ADHD Score was 18. The collateral contact indicated the client demonstrates impaired functioning in no significant areas of client's lifeThe collateral- and self-report scores are significantly different.                          [place] Functional Impairment Scale: Self and Other Reports (BFIS)  The BFIS is used to assess an individuals' psychosocial impairment in major life/daily activities that may be due to a mental health disorder. This assessment measure includes self and collateral rating scales. Self-report scores are reported as percentiles. Scores at the 76th-83rd percentile are considered marginal, scores at the 84th-92nd percentile are considered borderline, scores at the 93rd-95th percentile are considered mild, scores at the 96th-98th percentile are considered moderate, and those at the 99th percentile are considered severe.Collateral or \"other\" rating scales are reported as number of symptoms observed in comparison to those reported by the client. Norms and percentile scores are not available for collateral reports.     Results indicate the client identified impairment (scores at or greater than 93rd percentile) in the following areas: work, driving and self-care routines The client's Mean Impairment Score was 4.5 (75th-84th percentile) indicating the client is reporting Marginal impairment in functioning across domains. Client's spouse completed the collateral rating scale, which indicated discrepant results. The collateral contact's scores were generally lower than the client's report.     [place] Deficits in Executive Functioning Scale (BDEFS)  The BDEFS is a measure used for evaluating dimensions of adult executive functioning in daily life.This assessment measure includes self and collateral rating scales. Self-report scores are " "reported as percentiles. Scores at the 76th-83rd percentile are considered marginal, scores at the 84th-92nd percentile are considered borderline, scores at the 93rd-95th percentile are considered mild, scores at the 96th-98th percentile are considered moderate, and those at the 99th percentile are considered severe.Collateral or \"other\" rating scales are reported as number of symptoms observed in comparison to those reported by the client. Norms and percentile scores are not available for collateral reports.     Results indicate the client's Total Executive Functioning Score was 200  (93rd percentile). The ADHD-Executive Functioning Index score was 28 (96th percentile). These scores suggest the client has Mild deficits in executive functioning. These deficits are not likely to be due to ADHD. Results indicate the client identified significant deficits in the following areas: self-management to time 95th percentile ,  self-restraint 94th percentile and self-motivation 94th percentile. Client's spouse completed the collateral rating scale, which indicated discrepant results. The collateral contact's scores were generally lower than the client's report.      [place] Multiphasic Personality Inventory-2 (MMPI-2):  The Validity Scale Profile suggests someone who answered in a fairly consistent manner.  This profile also suggests someone in significant distress.  They endorse unusual or markedly unconventional thinking and attitudes, as well as, psychological distress.  They likely have identity and self-esteem issues and the person is likely described as moe, changeable, restless, unstable, dissatisfied, talkative, and opinionated but self-deprecating. The psychopathology reflected in the Validity Scale Profile is likely long-standing, ego-syntonic, and non-psychotic in nature.  Others with this profile were candid and willing to admit to common human failings and claim to have no defenses.  Emotional conflicts are " destabilizing.  The Clinical Scale Profiles were deemed to be valid and interpretable.    Others with similar Clinical Scale Profiles tend to act out in repetitive fashion, especially around issues of substance use and sex.  Confessed remorse, whether real or actual, does not change their behavior, which oscillates between total disregard for the consequences of the behavior to excessive concerns and guilt over their behavior.  Their psychology is organized around self-indulgence and impulsivity.  The anxiety associated with this code should be thought of as state anxiety rather than trait anxiety, whereby the patient temporarily feels anxiety due to consequences of their behavior.  These individuals have strong dependence vs independence issues with strong needs for reassurance.  They tend to ruminate over their conflicts. They may spend a great deal of time in daydreams, fantasy, and nonnatural causation (astrology and numerology).    Others with similar subscale scores may present with many physical complaints along with psychological and mental difficulties in areas of concentration and judgment; malaise; weakness; and nervousness.  They may complain of reduced memory and work efficiency.  They feel inferior and have trouble with starting and finishing tasks.  They feel isolated, misunderstood, and unhappy; have been disappointed in love; an unsatisfactory sex life; believe someone has it in for them; and believe they have not been successful because of the actions of others.  They report strange and peculiar experiences, can be quite remorseful but blame others for their problems.  People with similar subscale scores report experiencing family conflict and believe others are against them.  They feel lonely and they got a raw deal out of life.  They also feel despair, depression, and apathy.  They fear they are losing their minds (problems with memory, and concentration).  They can be easily touchy and excited,  like to do something shocking, and fear people will hurt them.  They may work under a great deal of tension, have racing thoughts, and are restless.  These people tend to avoid social situations and escape social events.       Generalized Anxiety Disorder Questionnaire (BALA-7)  This questionnaire is designed to assess for anxiety in adults.  Based on the score, she is experiencing Minimal symptoms of anxiety. Client identified the following symptoms of anxiety: feeling on edge/nervous/anxious and becoming easily annoyed or irritable    Patient Health Questionnaire- 9 (PHQ-9)   This questionnaire is designed to assess for depression in adults.  Based on the score, she is experiencing mild symptoms of depression. Client identified the following symptoms of depression: difficulty with sleep, poor appetite or overeating and restlessness or lethargy.         Summary (based on clinical interview, review of records, test results):  Based on the client's Ref[place]io Questionnaires, BALA-7, PHQ-9, and interview results, the client has been diagnosed with Unspecified Depressive Disorder, Social Anxiety, and Generalized Anxiety Disorder (BALA, by History).    None of the client's and collateral 's BAARS-IV: Childhood Symptoms overall and subscale scores were clinically significant for ADHD symptomology (at or above the 93rd percentile rank).  All the collateral 's scores, overall and subscale, fell Within-Normal-Limits; whereas, the client's Self-report scores for childhood fell within the Marginal or Borderline range of ADHD symptoms.  Similar to the scores for Childhood, all the Collateral 's BAARS-IV: Current Symptoms scores, overall and subscales, also fell in the WNL range for ADHD symptoms.  The client's overall self-report score for the BAARS-IV: Current Symptoms fell within the Borderline range for ADHD symptomology.  Two of the subscales, Impulsivity and Sluggish Cognitive Tempo, both indicated the  "possible presence of Mild ADHD symptom.    There was a significant differences between the client's Self- and Collateral 's scores for the Executive Functioning scales (BDEFS-LF).  Much like for the BAARS-IV: Current and Childhood Symptoms scores, all of the collateral 's scores for the BDEFS-LF scored WNL with the exception of the Self-restraint subscale (Marginal range for ADHD symptomology.  The client's Self-report indicated possible Mild ADHD symptoms overall, as well as, for the Self-management to Time, Self-restraint, and Self-motivation subscales.  In addition, the ADHD-EF Index Score, a strong indicator of ADHD when at or above the 95th percentile, were scored at the 96th percentile for the Self-report and 79th percentile for the Collateral report.  The client's Self-report indicated that the client's symptoms interfere significantly in multiple aspects of the client's daily life (BFIS-LF); however, neither the Self- or Collateral 's overall scores were clinically significant for ADHD. The client's PHQ-9 score of 7 indicated the possible presence of Mild depression and their BALA-7 score of 2 indicated the possible presence of Minimal anxiety.     Based on the results of the ADHD Questionnaires, both the Self- and Collateral 's, the client does not meet criteria for ADHD.  This particularly true for childhood.  Plus, the client's MMPI-2 indicated clinically significant depression and anxiety.  The has often reported significant anxiety and, during the interview, agreed that there may be more depression present then indicated on the PHQ-9.  In addition, the client also discussed that her symptoms were problematic \"off-and-on.\"  The fact that her symptoms are intermittent, that the MMPI-2 indicates clinically significant depression and anxiety, and the ADHD Questionnaires scores are subclinical for ADHD, the client was not diagnosed with ADHD at this time.    Diagnosis:   311 " (F32.9)     Unspecified Depressive Disorder  300.23 (F40.10)  Social Anxiety Disorder (Social Phobia)  300.02 (F41.1)    Generalized Anxiety Disorder (BALA, by History)     Recommendations:    Schedule an appointment with your physician to discuss a medication evaluation., Access resources through websites, books, and articles such as those provided  in the handout. and it is recommended that the client continue to work on her anxiety, and possible depression, with her individual therapist.  If in the future she believes she has been able to manage her depression and anxiety, and continues to struggle with memory, comprehension, and so forth (symptoms of ADHD), it is recommended she make a follow-up appointment with this writer to determine possible future action (cognitive testing, neuropsychology appointment).    Mejia Gonzalez

## 2022-01-05 NOTE — PLAN OF CARE
"Vitals:  BP (!) 141/77 (BP Location: Right arm)   Pulse 78   Temp 97.7  F (36.5  C) (Oral)   Resp 16   Ht 1.778 m (5' 10\")   Wt 141.4 kg (311 lb 11.7 oz)   LMP 01/01/2022   SpO2 95%   BMI 44.73 kg/m      Neuro: A/O x 4. Calm, cooperative. Slept well throughout the night.   Cardiac: WDL. Denied for chest pain.   Lungs: No SOB/WOO noted. On RA without labored breathing.  GI/: Continent for B/B.No BM overnight.   Skin: Rash generalized. No other concerns notes this shift.   IV/ Drains: L.PIV. SL. Dressing intact.   Pain: Denied.   Nutrition: NPO overnight for chest tube placement on 1/5/2021.    Activity: Independent.   Plan : Chest tube placement scheduled for 1030. Continue to follow POC. Update medical team as needed.    "

## 2022-01-05 NOTE — PROGRESS NOTES
Patient Name: Scott Dobbs  Medical Record Number: [id number]  Today's Date: 1/5/2022    Procedure: CT guided chest tube placement  Proceduralist: Dr Jacque SALINAS, Dr Manas SALINAS  Pathology present: N/A    Procedure Start: 1118  Procedure end: 1140  Sedation medications administered: Midazolam 1.5 mg, Fentanyl 75 mcg       Report given to: Noelle LOW   : N/A    Other Notes: Pt arrived to IR room 1 from . Consent reviewed. Pt denies any questions or concerns regarding procedure. Pt positioned prone and monitored per protocol. 12 fr non-tunneled chest tube placed.  10 ml of fluid aspirated and sent for cultures.  Pt tolerated procedure without any noted complications. Pt transferred back to .

## 2022-01-05 NOTE — PROGRESS NOTES
STAFF ADDENDUM:  I saw and evaluated Ms. Dobbs and agree with the resident s findings and plan of care as documented in the resident s note and edited by me, as applicable.      In summary, Ms. Dobbs will have her tube placed today.  The patient had all questions answered and was in agreement with the plan.  Gabriel Zelaya MD

## 2022-01-05 NOTE — PLAN OF CARE
"BP (!) 140/87 (BP Location: Right arm)   Pulse 76   Temp 98.5  F (36.9  C) (Oral)   Resp 18   Ht 1.778 m (5' 10\")   Wt 141.4 kg (311 lb 11.7 oz)   LMP 01/01/2022   SpO2 96%   BMI 44.73 kg/m      Assumed care 0700 to 1930  Pt rounded on hourly  Edward: alert and oriented   Pain: denies  GI/: Denies nausea. Bowel sounds present. Good urine output clear yellow urine Pt had BM 1/3. Pt was NPO since midnight for CT placement. CT not placed today. Pt was able to eat at 1630.   Cardiac: WDL  Respiratory: denies SOB lung sounds clear bilaterally diminished lower lobes  Skin: rash to neck, arms, chest, and back. Provider paged  Lines: PIV infusing LR 75 ml/hr  Assist level: I  Will continue to monitor and follow plan of care.   Plan: CT placement tomorrow at 1030    Call light in reach, Pt able to make needs known, Will continue to monitor and follow plan of care.     "

## 2022-01-05 NOTE — SEDATION DOCUMENTATION
[facility] Procedure Note        Sedation:      Performed by: Vincenzo Malagon DO  Authorized by: Vincenzo Malagon DO    Pre-Procedure Assessment done at 1100.    Expected Level:  Moderate Sedation    Indication:  Sedation is required to allow for Right chest tube placement     Consent obtained from patient after discussing the risks, benefits and alternatives.    PO Intake:  Appropriately NPO for procedure    ASA Class:  Class 2 - MILD SYSTEMIC DISEASE, NO ACUTE PROBLEMS, NO FUNCTIONAL LIMITATIONS.    Mallampati:  Grade 2:  Soft palate, base of uvula, tonsillar pillars, and portion of posterior pharyngeal wall visible    Lungs: Lungs Clear with good breath sounds bilaterally.     Heart: Normal heart sounds and rate    History and physical reviewed and no updates needed. I have reviewed the lab findings, diagnostic data, medications, and the plan for sedation. I have determined this patient to be an appropriate candidate for the planned sedation and procedure and have reassessed the patient IMMEDIATELY PRIOR to sedation and procedure.

## 2022-01-05 NOTE — PROGRESS NOTES
"THORACIC SURGERY PROGRESS NOTE    S:  No acute events overnight, patient is now s/p IR guided chest tube placement with 3/10 chest discomfort at CT site. Patient is tolerating diet, having BMs, voiding adequately.     O:  BP (!) 151/92 (BP Location: Right arm)   Pulse 75   Temp 97.3  F (36.3  C) (Oral)   Resp 17   Ht 1.778 m (5' 10\")   Wt 140.7 kg (310 lb 3.2 oz)   LMP 01/01/2022   SpO2 98%   BMI 44.51 kg/m      No intake/output data recorded.    Alert, oriented, NAD  Nonlabored breathing on RA  RRR   S NT ND  Distal extremities warm.   Chest tube output minimal with no air leak  Incisions c/d/i    Labs       Lab Results   Component Value Date     01/03/2022    Lab Results   Component Value Date    CHLORIDE 109 01/03/2022    Lab Results   Component Value Date    BUN 13 01/03/2022      Lab Results   Component Value Date    POTASSIUM 4.0 01/03/2022    Lab Results   Component Value Date    CO2 24 01/03/2022    Lab Results   Component Value Date    CR 0.84 01/03/2022    CR 1.03 10/29/2020        Lab Results   Component Value Date    WBC 10.8 01/03/2022    HGB 13.8 01/03/2022    HCT 43.1 01/03/2022    MCV 86 01/03/2022     01/03/2022       Imaging  No results found for this or any previous visit (from the past 24 hour(s)).     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

## 2022-01-06 ENCOUNTER — APPOINTMENT (OUTPATIENT)
Dept: GENERAL RADIOLOGY | Facility: CLINIC | Age: 37
DRG: 178 | End: 2022-01-06
Payer: COMMERCIAL

## 2022-01-06 ENCOUNTER — VIRTUAL VISIT (OUTPATIENT)
Dept: PSYCHOLOGY | Facility: CLINIC | Age: 37
End: 2022-01-06
Payer: COMMERCIAL

## 2022-01-06 DIAGNOSIS — F32.A DEPRESSIVE DISORDER: Primary | ICD-10-CM

## 2022-01-06 DIAGNOSIS — F41.1 GENERALIZED ANXIETY DISORDER: ICD-10-CM

## 2022-01-06 DIAGNOSIS — F40.10 SOCIAL ANXIETY DISORDER: ICD-10-CM

## 2022-01-06 PROCEDURE — 250N000011 HC RX IP 250 OP 636

## 2022-01-06 PROCEDURE — 96130 PSYCL TST EVAL PHYS/QHP 1ST: CPT | Mod: GT | Performed by: PSYCHOLOGIST

## 2022-01-06 PROCEDURE — 71045 X-RAY EXAM CHEST 1 VIEW: CPT

## 2022-01-06 PROCEDURE — 99231 SBSQ HOSP IP/OBS SF/LOW 25: CPT | Mod: GC | Performed by: THORACIC SURGERY (CARDIOTHORACIC VASCULAR SURGERY)

## 2022-01-06 PROCEDURE — 250N000011 HC RX IP 250 OP 636: Performed by: SURGERY

## 2022-01-06 PROCEDURE — 71045 X-RAY EXAM CHEST 1 VIEW: CPT | Mod: 26 | Performed by: RADIOLOGY

## 2022-01-06 PROCEDURE — 120N000002 HC R&B MED SURG/OB UMMC

## 2022-01-06 PROCEDURE — 258N000003 HC RX IP 258 OP 636

## 2022-01-06 PROCEDURE — 250N000013 HC RX MED GY IP 250 OP 250 PS 637: Performed by: PHYSICIAN ASSISTANT

## 2022-01-06 PROCEDURE — 96131 PSYCL TST EVAL PHYS/QHP EA: CPT | Mod: GT | Performed by: PSYCHOLOGIST

## 2022-01-06 PROCEDURE — 250N000009 HC RX 250

## 2022-01-06 PROCEDURE — 250N000013 HC RX MED GY IP 250 OP 250 PS 637: Performed by: STUDENT IN AN ORGANIZED HEALTH CARE EDUCATION/TRAINING PROGRAM

## 2022-01-06 RX ORDER — OXYCODONE HYDROCHLORIDE 5 MG/1
5 TABLET ORAL EVERY 4 HOURS PRN
Status: DISCONTINUED | OUTPATIENT
Start: 2022-01-06 | End: 2022-01-09 | Stop reason: HOSPADM

## 2022-01-06 RX ORDER — HYDROMORPHONE HCL IN WATER/PF 6 MG/30 ML
0.2 PATIENT CONTROLLED ANALGESIA SYRINGE INTRAVENOUS SEE ADMIN INSTRUCTIONS
Status: DISCONTINUED | OUTPATIENT
Start: 2022-01-06 | End: 2022-01-06

## 2022-01-06 RX ORDER — HYDROMORPHONE HCL IN WATER/PF 6 MG/30 ML
0.2 PATIENT CONTROLLED ANALGESIA SYRINGE INTRAVENOUS SEE ADMIN INSTRUCTIONS
Status: DISCONTINUED | OUTPATIENT
Start: 2022-01-06 | End: 2022-01-09 | Stop reason: HOSPADM

## 2022-01-06 RX ADMIN — ENOXAPARIN SODIUM 40 MG: 40 INJECTION SUBCUTANEOUS at 22:51

## 2022-01-06 RX ADMIN — Medication 3 MG: at 22:46

## 2022-01-06 RX ADMIN — ENOXAPARIN SODIUM 40 MG: 40 INJECTION SUBCUTANEOUS at 14:39

## 2022-01-06 RX ADMIN — OXYCODONE HYDROCHLORIDE 5 MG: 5 TABLET ORAL at 19:45

## 2022-01-06 RX ADMIN — ACETAMINOPHEN 650 MG: 325 TABLET, FILM COATED ORAL at 17:03

## 2022-01-06 RX ADMIN — OXYCODONE HYDROCHLORIDE 5 MG: 5 TABLET ORAL at 08:31

## 2022-01-06 RX ADMIN — ACETAMINOPHEN 650 MG: 325 TABLET, FILM COATED ORAL at 06:00

## 2022-01-06 RX ADMIN — Medication 50 ML: at 20:41

## 2022-01-06 RX ADMIN — FLUTICASONE PROPIONATE 1 SPRAY: 50 SPRAY, METERED NASAL at 06:50

## 2022-01-06 RX ADMIN — CETIRIZINE HYDROCHLORIDE 10 MG: 10 TABLET, FILM COATED ORAL at 06:50

## 2022-01-06 RX ADMIN — Medication 50 ML: at 09:30

## 2022-01-06 ASSESSMENT — ACTIVITIES OF DAILY LIVING (ADL)
ADLS_ACUITY_SCORE: 4

## 2022-01-06 ASSESSMENT — MIFFLIN-ST. JEOR: SCORE: 2189.25

## 2022-01-06 NOTE — PLAN OF CARE
"BP (!) 149/81 (BP Location: Right arm)   Pulse 83   Temp 97.2  F (36.2  C) (Oral)   Resp 16   Ht 1.778 m (5' 10\")   Wt 140.7 kg (310 lb 3.2 oz)   LMP 01/01/2022   SpO2 97%   BMI 44.51 kg/m      Care from: [id number]      VS & Pain: VSS ex hypertensive, on room air, prn Oxy x1 was given for pre-Alteplase treatment, prn Tylenol x1 was given for R flank pain     Neuro: A&O x4    Respiratory: diminished lung sounds in RML and RLL, changed CT dressing, total net output was 29 mL serosanguinous    Cardiac: WDL    Peripheral neurovascular: WDL    GI/: adequate urine output, BM x1 this shift    Nutrition: good appetite and oral intake    Skin: generalized rash- MD aware    Musculoskeletal: WDL    Lines: PIV is saline locked    Activity: Up independently    Events this shift: Call light within reach.    Plan: Continue to follow poc.   "

## 2022-01-06 NOTE — PLAN OF CARE
"[id number] Nursing Summary of Care    Patient Name: Scott Dobbs MRN# [id number]   Age: 36 year old YOB: 1985   Date of Admission: 1/3/2022    Isolation Precautions: No active isolations    Care delivered on January 5, 2022 from 19:00 to 07:30 on January 6, 2022     Patient calm and cooperative throughout shift, in no acute distress, and resting between cares. Patient tolerating initial instillation of thrombolytic medications well with currently ordered analgesia regimen. Chest tube remains in-place to -20 cm suction, with tidaling present, and no evidence of air leak. Dressing with scant serosanguineous exudate, but otherwise clean, dry, and intact. Dressing change due 24-hours post-procedure, with nursing staff to maintain thereafter. Crepitus present post-administration of lytic therapy, with acoustic conduction noted through tube.     Please refer to Flowsheets for comprehensive assessment data.        Vitals: VSS  o BP (!) 151/92 (BP Location: Right arm)   Pulse 75   Temp 97.3  F (36.3  C) (Oral)   Resp 17   Ht 1.778 m (5' 10\")   Wt 140.7 kg (310 lb 3.2 oz)   LMP 01/01/2022   SpO2 98%   BMI 44.51 kg/m    o /82 (BP Location: Right arm)   Pulse 65   Temp 98.3  F (36.8  C) (Oral)   Resp 18   Ht 1.778 m (5' 10\")   Wt 140.7 kg (310 lb 3.2 oz)   LMP 01/01/2022   SpO2 97%   BMI 44.51 kg/m      Pain: Patient reporting surgical site pain of 3/10 by DVPRS.   o Intervention(s): Pain sufficiently managed with APAP 650 mg PO PRN    Lines/Tubes/Drains:   Peripheral IV 01/05/22 Lower forearm (Active)   Site Assessment WDL 01/05/22 2100   Line Status Saline locked 01/05/22 2100   Dressing Intervention New dressing  01/05/22 1000   Phlebitis Scale 0-->no symptoms 01/05/22 1500   Infiltration Scale 0 01/05/22 1500   Number of days: 1       RN-Managed Electrolyte Labs  Potassium   Date Value Ref Range Status   01/03/2022 4.0 3.4 - 5.3 mmol/L Final     Magnesium   Date Value Ref Range " Status   2021 2.2 1.6 - 2.3 mg/dL Final     Phosphorus   Date Value Ref Range Status   2021 3.6 2.5 - 4.5 mg/dL Final       Current Facility-Administered Medications   Medication     acetaminophen (TYLENOL) tablet 650 mg     alteplase (ACTIVASE) 10 mg, dornase alpha (PULMOZYME) 5 mg in sodium chloride 0.9 % 50 mL for chest tube instillation in syringe     cetirizine (zyrTEC) tablet 10 mg     fluticasone (FLONASE) 50 MCG/ACT spray 1 spray     melatonin tablet 3 mg     naloxone (NARCAN) injection 0.2 mg    Or     naloxone (NARCAN) injection 0.4 mg    Or     naloxone (NARCAN) injection 0.2 mg    Or     naloxone (NARCAN) injection 0.4 mg     ondansetron (ZOFRAN-ODT) ODT tab 4 mg    Or     ondansetron (ZOFRAN) injection 4 mg     oxyCODONE (ROXICODONE) tablet 5 mg     sodium chloride (PF) 0.9% PF flush 3 mL     sodium chloride (PF) 0.9% PF flush 3 mL     Past Medical History:   Diagnosis Date     Anxiety      Hypertension 2020     Obesity (BMI 30-39.9)      Uncomplicated asthma Childhood only     Past Surgical History:   Procedure Laterality Date      SECTION N/A 10/21/2020    Procedure:  SECTION;  Surgeon: Elisabeth Lima MD;  Location: [facility] L[facility]     IR CHEST TUBE PLACEMENT NON-TUNNELED RIGHT  2021     PE TUBES       Medications Prior to Admission   Medication Sig Dispense Refill Last Dose     acetaminophen (TYLENOL) 325 MG tablet Take 3 tablets (975 mg) by mouth every 8 hours as needed for mild pain   More than a month at Unknown time     ASPIRIN PO Take 81 mg by mouth daily    1/3/2022 at Unknown time     cetirizine (ZYRTEC) 10 MG tablet Take 10 mg by mouth daily   1/3/2022 at Unknown time     fluticasone (FLONASE) 50 MCG/ACT nasal spray Spray 1 spray into both nostrils daily   1/3/2022 at Unknown time     ibuprofen (ADVIL/MOTRIN) 200 MG tablet Take 3 tablets (600 mg) by mouth every 6 hours as needed for moderate pain Start after delivery   More than a month at Unknown time      labetalol (NORMODYNE) 100 MG tablet TAKE 1 TABLET BY MOUTH TWICE A  tablet 0 1/3/2022 at Unknown time     Prenatal Vit-Fe Fumarate-FA (PRENATAL MULTIVITAMIN W/IRON) 27-0.8 MG tablet Take 1 tablet by mouth daily   1/3/2022 at Unknown time     paragard intrauterine copper device 1 each by Intrauterine route once        Unresulted Labs Ordered in the Past 30 Days of this Admission       Date and Time Order Name Status Description    1/5/2022 11:51 AM Aspirate Aerobic Bacterial Culture Routine In process     1/5/2022 11:51 AM Anaerobic bacterial culture In process             Juan José Trejo RN  5B Adult General Medicine

## 2022-01-06 NOTE — PROVIDER NOTIFICATION
Adult Thoracic Surgery Notification    Patient Name: Scott Dobbs MRN# [id number]   Age: 36 year old YOB: 1985   Date of Admission: 1/3/2022    MD notified: Dr. Noelle Suarez MD    Reason for notification:   Patient requesting modification to lytic-associated analgesia regimen. Where beginning of instillation of lytics is time zero, patient requesting availability of oxycodone -30 minutes, and availability of hydromorphone IV +30 minutes, and +90 minutes.   Recommendation/request given to MD:   Consider patient request.   MD response:   Current order effectiveness to be assessed prior to modification.       Juan José Trejo RN  5B General Medicine

## 2022-01-06 NOTE — PROGRESS NOTES
"THORACIC SURGERY PROGRESS NOTE    S:  No acute events overnight, patient is now s/p IR guided chest tube placement. Pain well controlled. Tolerating lytic therapy. Some gurgling from chest tube intermittently, not present on interview this AM. Patient is tolerating diet, having BMs, voiding adequately.     O:  /82 (BP Location: Right arm)   Pulse 65   Temp 98.3  F (36.8  C) (Oral)   Resp 18   Ht 1.778 m (5' 10\")   Wt 140.7 kg (310 lb 3.2 oz)   LMP 01/01/2022   SpO2 97%   BMI 44.51 kg/m      I/O last 3 completed shifts:  In: 523 [P.O.:470; I.V.:3; Other:50]  Out: 86 [Chest Tube:86]    Alert, oriented, NAD  Nonlabored breathing on RA  Chest tube in place without appreciable leak  RRR   S NT ND  Distal extremities warm.     Labs       Lab Results   Component Value Date     01/03/2022    Lab Results   Component Value Date    CHLORIDE 109 01/03/2022    Lab Results   Component Value Date    BUN 13 01/03/2022      Lab Results   Component Value Date    POTASSIUM 4.0 01/03/2022    Lab Results   Component Value Date    CO2 24 01/03/2022    Lab Results   Component Value Date    CR 0.84 01/03/2022    CR 1.03 10/29/2020        Lab Results   Component Value Date    WBC 10.8 01/03/2022    HGB 13.8 01/03/2022    HCT 43.1 01/03/2022    MCV 86 01/03/2022     01/03/2022       Imaging  Recent Results (from the past 24 hour(s))   CT Chest Tube with Cath Placement    Narrative    Procedure: 1/5/2022  1. CT-guided placement of drain into right pleural space.    History: Previous empyema with previous chest tube and lytics. Request  for tube replacement..     COMPARISON: Chest CT 12/29/2021     Staff:Danyelle Santillan MD    Fellow: Vincenzo Malagon DO    Monitoring: Patient was placed on continuous monitoring supervised by  the IR nursing staff and IR attending. Patient remained stable  throughout the procedure.    MEDICATIONS:  1. Versed IV: 1.5 mg  2. Fentanyl IV: 75 mcg    Sedation time: 22 minutes    CT " DOSE: 794 mGy    IV contrast: None.    Complications: None.    Consent: Informed written and verbal consent obtained.    Procedure/Findings: A  CT scan of the chest was obtained. This  demonstrated a pleural collection with pleural visceral rind  surrounding it and associated rounded atelectasis. However, the  collection measures quite a bit smaller than previous imaging. This  measures 1.8 x 4.9 cm, previously 3.0 x 6.2 cm in axial dimension  Appropriate window for access.    The appropriate area prepped and draped in usual sterile fashion.  Under CT guidance the appropriate overlying area was identified and  then anesthetized with 10 ml of 1% lidocaine . A small incision was  made over the site with a # 11 blade. A 5 Yemeni Yueh was used under  CT guidance to enter the pleural space. The catheter was advanced, the  needle was removed and a 0.035 superstiff Amplatz wire was advanced  into the cavity. The site was dilated sequentially with with dilators  up to 12. A 12 Yemeni J tip nonlocking drainage catheter was placed  into the site. Approximately 10 cc of grossly purulent material  returned. Labs were sent. The drain was secured with one 2-0 Ethibond  suture and connected to gravity drainage. The site was dressed.  The  procedure was well tolerated.      Impression    Impression:   1. Uncomplicated placement of right-sided 12 Yemeni nonlocking  J-tipped catheter into right pleural space. The cavity has decreased  in size since previous imaging. It was felt a larger tube would not be  easily placed. Lytics therapy can still be performed through 12 Yemeni  catheter.  2. Specimen sent for lab for analysis.    Plan:   Primary team to manage tube and lytics therapy.        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

## 2022-01-06 NOTE — PROVIDER NOTIFICATION
Adult Thoracic Surgery Notification    Patient Name: Scott Dobbs MRN# [id number]   Age: 36 year old YOB: 1985   Date of Admission: 1/3/2022      MD notified: Dr. Noelle Suarez MD      Reason for notification:   o Patient reporting deep pleuritic bubbling sensation after administration of lytic therapy. Associated noise appreciable during peak inspiration and expiration with former greater than latter. No air leak. Tidaling present. Patient in no acute distress, but seeking reassurance.     Recommendation/request given to MD:   o Advise as indicated.     MD response:   o Phenomenon not concerning. Continue to monitor for acute changes.       Juan José Trejo RN  5B General Medicine

## 2022-01-07 ENCOUNTER — APPOINTMENT (OUTPATIENT)
Dept: GENERAL RADIOLOGY | Facility: CLINIC | Age: 37
DRG: 178 | End: 2022-01-07
Payer: COMMERCIAL

## 2022-01-07 LAB
ERYTHROCYTE [DISTWIDTH] IN BLOOD BY AUTOMATED COUNT: 13.6 % (ref 10–15)
HCT VFR BLD AUTO: 40.9 % (ref 35–47)
HGB BLD-MCNC: 12.6 G/DL (ref 11.7–15.7)
HOLD SPECIMEN: NORMAL
MCH RBC QN AUTO: 26.9 PG (ref 26.5–33)
MCHC RBC AUTO-ENTMCNC: 30.8 G/DL (ref 31.5–36.5)
MCV RBC AUTO: 87 FL (ref 78–100)
PLATELET # BLD AUTO: 355 10E3/UL (ref 150–450)
RBC # BLD AUTO: 4.68 10E6/UL (ref 3.8–5.2)
WBC # BLD AUTO: 8.4 10E3/UL (ref 4–11)

## 2022-01-07 PROCEDURE — 250N000009 HC RX 250

## 2022-01-07 PROCEDURE — 36415 COLL VENOUS BLD VENIPUNCTURE: CPT | Performed by: STUDENT IN AN ORGANIZED HEALTH CARE EDUCATION/TRAINING PROGRAM

## 2022-01-07 PROCEDURE — 120N000002 HC R&B MED SURG/OB UMMC

## 2022-01-07 PROCEDURE — 250N000011 HC RX IP 250 OP 636: Performed by: STUDENT IN AN ORGANIZED HEALTH CARE EDUCATION/TRAINING PROGRAM

## 2022-01-07 PROCEDURE — 250N000013 HC RX MED GY IP 250 OP 250 PS 637: Performed by: STUDENT IN AN ORGANIZED HEALTH CARE EDUCATION/TRAINING PROGRAM

## 2022-01-07 PROCEDURE — 71045 X-RAY EXAM CHEST 1 VIEW: CPT

## 2022-01-07 PROCEDURE — 71045 X-RAY EXAM CHEST 1 VIEW: CPT | Mod: 26 | Performed by: RADIOLOGY

## 2022-01-07 PROCEDURE — 250N000013 HC RX MED GY IP 250 OP 250 PS 637: Performed by: PHYSICIAN ASSISTANT

## 2022-01-07 PROCEDURE — 250N000013 HC RX MED GY IP 250 OP 250 PS 637

## 2022-01-07 PROCEDURE — 258N000003 HC RX IP 258 OP 636

## 2022-01-07 PROCEDURE — 85027 COMPLETE CBC AUTOMATED: CPT | Performed by: STUDENT IN AN ORGANIZED HEALTH CARE EDUCATION/TRAINING PROGRAM

## 2022-01-07 PROCEDURE — 250N000011 HC RX IP 250 OP 636

## 2022-01-07 PROCEDURE — 250N000011 HC RX IP 250 OP 636: Performed by: SURGERY

## 2022-01-07 PROCEDURE — 99231 SBSQ HOSP IP/OBS SF/LOW 25: CPT | Performed by: PHYSICIAN ASSISTANT

## 2022-01-07 RX ORDER — ACETAMINOPHEN 325 MG/1
325 TABLET ORAL ONCE
Status: COMPLETED | OUTPATIENT
Start: 2022-01-07 | End: 2022-01-07

## 2022-01-07 RX ORDER — ACETAMINOPHEN 325 MG/1
650 TABLET ORAL ONCE
Status: COMPLETED | OUTPATIENT
Start: 2022-01-07 | End: 2022-01-07

## 2022-01-07 RX ADMIN — ENOXAPARIN SODIUM 40 MG: 40 INJECTION SUBCUTANEOUS at 22:52

## 2022-01-07 RX ADMIN — OXYCODONE HYDROCHLORIDE 5 MG: 5 TABLET ORAL at 14:17

## 2022-01-07 RX ADMIN — ACETAMINOPHEN 650 MG: 325 TABLET, FILM COATED ORAL at 19:27

## 2022-01-07 RX ADMIN — ONDANSETRON 4 MG: 2 INJECTION INTRAMUSCULAR; INTRAVENOUS at 21:22

## 2022-01-07 RX ADMIN — ACETAMINOPHEN 650 MG: 325 TABLET, FILM COATED ORAL at 06:28

## 2022-01-07 RX ADMIN — ACETAMINOPHEN 650 MG: 325 TABLET, FILM COATED ORAL at 14:17

## 2022-01-07 RX ADMIN — ACETAMINOPHEN 650 MG: 325 TABLET, FILM COATED ORAL at 22:49

## 2022-01-07 RX ADMIN — ACETAMINOPHEN 325 MG: 325 TABLET, FILM COATED ORAL at 09:51

## 2022-01-07 RX ADMIN — CETIRIZINE HYDROCHLORIDE 10 MG: 10 TABLET, FILM COATED ORAL at 09:09

## 2022-01-07 RX ADMIN — OXYCODONE HYDROCHLORIDE 5 MG: 5 TABLET ORAL at 06:28

## 2022-01-07 RX ADMIN — FLUTICASONE PROPIONATE 1 SPRAY: 50 SPRAY, METERED NASAL at 14:17

## 2022-01-07 RX ADMIN — Medication 50 ML: at 20:15

## 2022-01-07 RX ADMIN — Medication 3 MG: at 22:52

## 2022-01-07 RX ADMIN — Medication 50 ML: at 08:49

## 2022-01-07 RX ADMIN — OXYCODONE HYDROCHLORIDE 5 MG: 5 TABLET ORAL at 19:28

## 2022-01-07 RX ADMIN — ENOXAPARIN SODIUM 40 MG: 40 INJECTION SUBCUTANEOUS at 10:49

## 2022-01-07 RX ADMIN — HYDROMORPHONE HYDROCHLORIDE 0.2 MG: 0.2 INJECTION, SOLUTION INTRAMUSCULAR; INTRAVENOUS; SUBCUTANEOUS at 21:23

## 2022-01-07 ASSESSMENT — ACTIVITIES OF DAILY LIVING (ADL)
ADLS_ACUITY_SCORE: 4

## 2022-01-07 ASSESSMENT — MIFFLIN-ST. JEOR: SCORE: 2218.25

## 2022-01-07 NOTE — PLAN OF CARE
"Rapid response called on pt at 0900. After instillation of TPA in chest tube, pt developed nausea, an aura seeing \"wiggly lines\", and started crying. When attempting to take a BP, pt was unable to lift her arms most notably on the L side. Also had difficulty moving her L leg. Symptoms of weakness resolved after about 5 minutes but pt still having tunneled vision. 1x dose of Tylenol given. Pt now resting comfortably in bed and feeling better. Will closely monitor.   "

## 2022-01-07 NOTE — PROGRESS NOTES
Rapid Response Team Note    Assessment   In assessment a rapid response was called on Scott Dobbs due to transient L sided weakness. This presentation is likely due to possible developing migraine and worsened by R sided empyema.     Brief Summary of events leading to rapid response:   Patient developed acute onset of left sided weakness immediately after TPA was injected to chest tube. RRT called. Within five minutes, patient symptoms fully resolved. She denies similar symptoms with prior TPA doses. She denies ongoing weakness, numbness, difficulty moving any part of the body. No AMS. Patient has h/o migraine with aura and reports she has an aura of tunnel vision. It is getting better than it was a few minutes ago. She has never had associated weakness with migraines. Last migraine >1 year ago. Writer d/w thoracic surgery who felt as if timing of L sided weakness was a coincidence and not related to localized TPA.     Plan   -  Continue current cares per primary tea,  -  The Thoracic Surgery primary team was able to be reached and they are in agreement with the above plan.  -  Disposition: The patient will remain on the current unit. We will continue to monitor this patient closely.  -  Reassessment and plan follow-up will be performed by the primary team    Miri Simental PA-C  [facility] RRT AMCOM Job Code Contact #[phone]    Hospital Course   Admission Diagnosis:   Empyema, right (H) [J86.9]  Chest pain, unspecified type [R07.9]     Physical Exam   Temp: (!) 95.9  F (35.5  C) Temp  Min: 95.9  F (35.5  C)  Max: 98  F (36.7  C)  Resp: 16 Resp  Min: 16  Max: 17  SpO2: 95 % SpO2  Min: 94 %  Max: 97 %  Pulse: 85 Pulse  Min: 69  Max: 85    No data recorded  BP: (!) 144/91 Systolic (24hrs), Av , Min:126 , Max:174   Diastolic (24hrs), Av, Min:66, Max:99     I/Os: I/O last 3 completed shifts:  In: 600 [P.O.:550; Other:50]  Out: 64 [Chest Tube:64]     Exam:   General: in no acute distress  Mental  Status: AAOx4.  CV: RRR, S1, S2  Resp: CTAB on room air  Abdomen: Soft, non-tender with active bowel sounds  Skin: Chest tube in R back dressing CDI  Neuro: CN II-XII intact. PERRLA. EOMI. Normal coordination of movement. Strength 5/5 in the BUE and BLE. Normal gross sensation. Normal Babinski. No focal deficits noted    Significant Results and Procedures   Lactic Acid:   Recent Labs   Lab Test 11/28/21  0906 11/27/21  0356 11/25/21  2306   LACT 1.0 0.8 0.7     CBC:   Recent Labs   Lab Test 01/07/22  0427 01/03/22  1142 12/31/21  1313   WBC 8.4 10.8 8.9   HGB 12.6 13.8 12.9   HCT 40.9 43.1 40.6    447 415        Sepsis Evaluation   The patient is not known to have an infection.  NO EVIDENCE OF SEPSIS at this time.  Vital sign, physical exam, and lab findings are due to see above.

## 2022-01-07 NOTE — PROGRESS NOTES
STAFF ADDENDUM:  I saw and evaluated Ms. Dobbs and agree with the resident s findings and plan of care as documented in the resident s note and edited by me, as applicable.    Transient event witn dizziness and aura (similar to her migraines)  Self resolved  Continue lytics    Nusrat Ritchie MD

## 2022-01-07 NOTE — PROGRESS NOTES
"THORACIC SURGERY PROGRESS NOTE    S:  No acute events overnight. Isolated event of dizziness and possible migraine aura which resolved without intervention.      O:  BP (!) 144/91   Pulse 85   Temp (!) 95.9  F (35.5  C) (Oral)   Resp 16   Ht 1.778 m (5' 10\")   Wt 141.4 kg (311 lb 11.7 oz)   LMP 01/01/2022   SpO2 95%   BMI 44.73 kg/m      I/O last 3 completed shifts:  In: 600 [P.O.:550; Other:50]  Out: 64 [Chest Tube:64]    Alert, oriented, NAD  Nonlabored breathing on RA  Chest tube in place without appreciable leak  RRR   S NT ND  Distal extremities warm.     Recent Labs   Lab 01/07/22  0427 01/04/22  1605 01/03/22  1142 12/31/21  1313   WBC 8.4  --  10.8 8.9   HGB 12.6  --  13.8 12.9   MCV 87  --  86 85     --  447 415   INR  --  1.08  --   --    NA  --   --  139  --    POTASSIUM  --   --  4.0  --    CHLORIDE  --   --  109  --    CO2  --   --  24  --    BUN  --   --  13  --    CR  --   --  0.84  --    ANIONGAP  --   --  6  --    HAI  --   --  9.6  --    GLC  --   --  105*  --    ALBUMIN  --   --  3.5  --    PROTTOTAL  --   --  8.5  --    BILITOTAL  --   --  0.3  --    ALKPHOS  --   --  110  --    ALT  --   --  69*  --    AST  --   --  30  --         A/P: Scott Dobbs is a 36F with hx of right lung empyema growing Streptococcus constellatus, 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

## 2022-01-07 NOTE — PLAN OF CARE
[facility] cares [id number]    Vitals: VSS on RA ex hypertensive  Pain: PRN oxy and tylenol given for 3/10 pain at chest tube insertion site.  Neuro: A&Ox4. Had an episode of migraine/aura and weakness after lytic therapy (see previous notes) but improved shortly after.   Cardiac: WDL  Respiratory: WDL. R chest tube intact w/ suction at -20.   GI/: Adequate urine output. No BM.  IV/Drains: L PIV SL  Activity: Up independently   Skin: WDL. Chest tube dressing changed.   Labs: WDL    Plan of Care: Continue lytic therapy- watch for adverse reaction. Continue POC.

## 2022-01-07 NOTE — PLAN OF CARE
[id number]: Patient reports 3/10 pain on chest tube incision site. Oxycodone given one hour prior to TPA instillation in chest tube (currently to -20 suction) with good output after unclamping. Patient was able to reposition every 15 minutes during clamping period. Slept well throughout the night after taking melatonin. Will continue to monitor and follow plan of care.

## 2022-01-08 ENCOUNTER — APPOINTMENT (OUTPATIENT)
Dept: GENERAL RADIOLOGY | Facility: CLINIC | Age: 37
DRG: 178 | End: 2022-01-08
Payer: COMMERCIAL

## 2022-01-08 PROCEDURE — 250N000009 HC RX 250

## 2022-01-08 PROCEDURE — 250N000013 HC RX MED GY IP 250 OP 250 PS 637

## 2022-01-08 PROCEDURE — 250N000011 HC RX IP 250 OP 636

## 2022-01-08 PROCEDURE — 250N000011 HC RX IP 250 OP 636: Performed by: SURGERY

## 2022-01-08 PROCEDURE — 250N000013 HC RX MED GY IP 250 OP 250 PS 637: Performed by: PHYSICIAN ASSISTANT

## 2022-01-08 PROCEDURE — 250N000013 HC RX MED GY IP 250 OP 250 PS 637: Performed by: STUDENT IN AN ORGANIZED HEALTH CARE EDUCATION/TRAINING PROGRAM

## 2022-01-08 PROCEDURE — 258N000003 HC RX IP 258 OP 636

## 2022-01-08 PROCEDURE — 120N000002 HC R&B MED SURG/OB UMMC

## 2022-01-08 PROCEDURE — 99232 SBSQ HOSP IP/OBS MODERATE 35: CPT | Mod: GC | Performed by: THORACIC SURGERY (CARDIOTHORACIC VASCULAR SURGERY)

## 2022-01-08 PROCEDURE — 71045 X-RAY EXAM CHEST 1 VIEW: CPT

## 2022-01-08 PROCEDURE — 71045 X-RAY EXAM CHEST 1 VIEW: CPT | Mod: 26 | Performed by: RADIOLOGY

## 2022-01-08 RX ORDER — GABAPENTIN 100 MG/1
100 CAPSULE ORAL ONCE
Status: COMPLETED | OUTPATIENT
Start: 2022-01-08 | End: 2022-01-08

## 2022-01-08 RX ORDER — OXYCODONE HYDROCHLORIDE 5 MG/1
5 TABLET ORAL EVERY 6 HOURS PRN
Qty: 10 TABLET | Refills: 0 | Status: SHIPPED | OUTPATIENT
Start: 2022-01-08 | End: 2022-05-25

## 2022-01-08 RX ADMIN — ENOXAPARIN SODIUM 40 MG: 40 INJECTION SUBCUTANEOUS at 22:35

## 2022-01-08 RX ADMIN — ACETAMINOPHEN 650 MG: 325 TABLET, FILM COATED ORAL at 09:04

## 2022-01-08 RX ADMIN — ACETAMINOPHEN 650 MG: 325 TABLET, FILM COATED ORAL at 22:35

## 2022-01-08 RX ADMIN — CETIRIZINE HYDROCHLORIDE 10 MG: 10 TABLET, FILM COATED ORAL at 08:48

## 2022-01-08 RX ADMIN — Medication 50 ML: at 10:06

## 2022-01-08 RX ADMIN — OXYCODONE HYDROCHLORIDE 5 MG: 5 TABLET ORAL at 22:35

## 2022-01-08 RX ADMIN — GABAPENTIN 100 MG: 100 CAPSULE ORAL at 09:04

## 2022-01-08 RX ADMIN — OXYCODONE HYDROCHLORIDE 5 MG: 5 TABLET ORAL at 09:04

## 2022-01-08 RX ADMIN — Medication 3 MG: at 22:35

## 2022-01-08 RX ADMIN — FLUTICASONE PROPIONATE 1 SPRAY: 50 SPRAY, METERED NASAL at 08:48

## 2022-01-08 RX ADMIN — ENOXAPARIN SODIUM 40 MG: 40 INJECTION SUBCUTANEOUS at 11:34

## 2022-01-08 ASSESSMENT — ACTIVITIES OF DAILY LIVING (ADL)
ADLS_ACUITY_SCORE: 4

## 2022-01-08 ASSESSMENT — MIFFLIN-ST. JEOR: SCORE: 2220.25

## 2022-01-08 NOTE — PROVIDER NOTIFICATION
5B 5233-1 JM: c/o headache, has received all PRNs, can I get a one time dose for a pain med? Thank you Candice LOW [phone]

## 2022-01-08 NOTE — PROGRESS NOTES
"THORACIC SURGERY PROGRESS NOTE    S:  No acute events overnight, patient had another episode of migraine/dizziness post lytic therapy that resolved with tylenol. Patient is tolerating diet, having BMs, voiding adequately.     O:  /78 (BP Location: Left arm, Patient Position: Semi-Huston's)   Pulse 77   Temp 97.8  F (36.6  C) (Oral)   Resp 16   Ht 1.778 m (5' 10\")   Wt 144.3 kg (318 lb 2 oz)   LMP 01/01/2022   SpO2 94%   BMI 45.65 kg/m      I/O last 3 completed shifts:  In: 440 [P.O.:440]  Out: 180 [Chest Tube:180]    Alert, oriented, NAD  Nonlabored breathing on RA  RRR   S NT ND  Distal extremities warm.   Chest tube output minimal with no air leak  Incisions c/d/i    Labs       Lab Results   Component Value Date     01/03/2022    Lab Results   Component Value Date    CHLORIDE 109 01/03/2022    Lab Results   Component Value Date    BUN 13 01/03/2022      Lab Results   Component Value Date    POTASSIUM 4.0 01/03/2022    Lab Results   Component Value Date    CO2 24 01/03/2022    Lab Results   Component Value Date    CR 0.84 01/03/2022    CR 1.03 10/29/2020        Lab Results   Component Value Date    WBC 8.4 01/07/2022    HGB 12.6 01/07/2022    HCT 40.9 01/07/2022    MCV 87 01/07/2022     01/07/2022       Imaging  No results found for this or any previous visit (from the past 24 hour(s)).     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

## 2022-01-08 NOTE — PLAN OF CARE
[id number]: A&O x4. Able to call and makes needs known. Independent to the bathroom. R chest tube WDL with serosanguinous fluid, great output. C/O headache at beginning of shift and discomfort from chest tube after alteplase given. After receiving alteplase patient got light headed and saw an aura- BP elevated, MD notified. Slept between cares. Patient stated this morning she would like to wait to take pain meds until before alteplase administration. R PIV saline locked. Will continue to monitor and follow with plan of care.

## 2022-01-08 NOTE — PROVIDER NOTIFICATION
5B 5233-1 JM: received Alteplase- dizziness and aura soon after, /94 second check. Thank you Candice LOW [phone]

## 2022-01-08 NOTE — PLAN OF CARE
Assumed cares [id number]     Vitals: VSS on RA   Pain: PRN oxy and tylenol given for headache and pain at chest tube insertion site.  Neuro: A&Ox4. No episodes of migraine/aura after lytic therapy this AM.   Cardiac: WDL  Respiratory: WDL. R chest tube intact w/ suction at -20.   GI/: WDL  IV/Drains: L PIV SL  Activity: Up independently   Skin: WDL. Chest tube dressing changed.   Labs: WDL     Plan of Care: CT planned for tomorrow. Possible removal of chest tube and discharge per MD note. Continue POC.

## 2022-01-09 ENCOUNTER — APPOINTMENT (OUTPATIENT)
Dept: CT IMAGING | Facility: CLINIC | Age: 37
DRG: 178 | End: 2022-01-09
Payer: COMMERCIAL

## 2022-01-09 ENCOUNTER — APPOINTMENT (OUTPATIENT)
Dept: GENERAL RADIOLOGY | Facility: CLINIC | Age: 37
DRG: 178 | End: 2022-01-09
Payer: COMMERCIAL

## 2022-01-09 VITALS
WEIGHT: 293 LBS | HEART RATE: 91 BPM | HEIGHT: 70 IN | TEMPERATURE: 97.5 F | BODY MASS INDEX: 41.95 KG/M2 | DIASTOLIC BLOOD PRESSURE: 81 MMHG | OXYGEN SATURATION: 95 % | SYSTOLIC BLOOD PRESSURE: 136 MMHG | RESPIRATION RATE: 18 BRPM

## 2022-01-09 PROBLEM — F40.10 SOCIAL ANXIETY DISORDER: Status: ACTIVE | Noted: 2022-01-09

## 2022-01-09 PROCEDURE — 71260 CT THORAX DX C+: CPT | Mod: 26 | Performed by: RADIOLOGY

## 2022-01-09 PROCEDURE — 250N000011 HC RX IP 250 OP 636: Performed by: THORACIC SURGERY (CARDIOTHORACIC VASCULAR SURGERY)

## 2022-01-09 PROCEDURE — 71045 X-RAY EXAM CHEST 1 VIEW: CPT

## 2022-01-09 PROCEDURE — 71045 X-RAY EXAM CHEST 1 VIEW: CPT | Mod: 26 | Performed by: STUDENT IN AN ORGANIZED HEALTH CARE EDUCATION/TRAINING PROGRAM

## 2022-01-09 PROCEDURE — 250N000013 HC RX MED GY IP 250 OP 250 PS 637: Performed by: PHYSICIAN ASSISTANT

## 2022-01-09 PROCEDURE — 250N000013 HC RX MED GY IP 250 OP 250 PS 637: Performed by: STUDENT IN AN ORGANIZED HEALTH CARE EDUCATION/TRAINING PROGRAM

## 2022-01-09 PROCEDURE — 71260 CT THORAX DX C+: CPT

## 2022-01-09 PROCEDURE — 250N000011 HC RX IP 250 OP 636: Performed by: SURGERY

## 2022-01-09 RX ORDER — IOPAMIDOL 755 MG/ML
100 INJECTION, SOLUTION INTRAVASCULAR ONCE
Status: COMPLETED | OUTPATIENT
Start: 2022-01-09 | End: 2022-01-09

## 2022-01-09 RX ADMIN — IOPAMIDOL 100 ML: 755 INJECTION, SOLUTION INTRAVENOUS at 08:24

## 2022-01-09 RX ADMIN — ENOXAPARIN SODIUM 40 MG: 40 INJECTION SUBCUTANEOUS at 11:55

## 2022-01-09 RX ADMIN — ACETAMINOPHEN 650 MG: 325 TABLET, FILM COATED ORAL at 07:14

## 2022-01-09 RX ADMIN — OXYCODONE HYDROCHLORIDE 5 MG: 5 TABLET ORAL at 07:14

## 2022-01-09 RX ADMIN — CETIRIZINE HYDROCHLORIDE 10 MG: 10 TABLET, FILM COATED ORAL at 08:35

## 2022-01-09 RX ADMIN — FLUTICASONE PROPIONATE 1 SPRAY: 50 SPRAY, METERED NASAL at 08:36

## 2022-01-09 ASSESSMENT — ACTIVITIES OF DAILY LIVING (ADL)
ADLS_ACUITY_SCORE: 4

## 2022-01-09 NOTE — DISCHARGE SUMMARY
[facility]  Discharge Summary  Thoracic Surgery     Scott Dobbs MRN# [id number]   YOB: 1985 Age: 36 year old     Date of Admission:  1/3/2022  Date of Discharge::  1/9/2022  3:40 PM  Admitting Physician:  Gabriel Zelaya MD  Discharge Physician:  Holland Fniney  Primary Care Physician:        Anushka Canales          Admission Diagnoses:   Empyema, right (H) [J86.9]  Chest pain, unspecified type [R07.9]  Obesity  Anxiety  Asthma  Hypertension          Discharge Diagnosis:   Empyema, right (H) [J86.9]  Chest pain, unspecified type [R07.9]  Obesity  Anxiety  Asthma  Hypertension         Procedures:   Pigtail chest tube placement 1/5/2022          Non-operative procedures:   None          Consultations:   INTERVENTIONAL RADIOLOGY ADULT/PEDS IP CONSULT  VASCULAR ACCESS CARE ADULT IP CONSULT  VASCULAR ACCESS CARE ADULT IP CONSULT         Imaging Studies:   No studies require specific follow-up  Results for orders placed or performed during the hospital encounter of 01/03/22   XR Chest 2 Views    Narrative    EXAM: XR CHEST 2 VW  1/3/2022 2:52 PM     HISTORY:  SOA       COMPARISON:  Chest CT 12/29/2021. Chest radiograph 11/30/2021,  11/29/2021.    TECHNIQUE: 2 views of the chest    FINDINGS:   PA and lateral views of the chest. Trachea is midline. Stable heart  size. Persistent loculated right-sided pleural effusion, not  significantly changed in size when compared to recent CT. No left  sided pleural effusion. Perihilar and bibasilar linear streaky  opacities. No definite pneumothorax. No acute osseous abnormalities.      Impression    IMPRESSION:   1. Stable appearing loculated right-sided fluid collection. This  collection corresponds to empyema best visualized on 12/29/2021 CT.  2. Perihilar and bibasilar linear streaky opacities, likely  atelectasis.     I have personally reviewed the examination and initial interpretation  and I agree with the  findings.    ZBIGNIEW ZELAYA MD         SYSTEM ID:  [id number]   CT Chest Tube with Cath Placement    Narrative    Procedure: 1/5/2022  1. CT-guided placement of drain into right pleural space.    History: Previous empyema with previous chest tube and lytics. Request  for tube replacement..     COMPARISON: Chest CT 12/29/2021     Staff:Danyelle Santillan MD    Fellow: Vincenzo Malagon,     I, DANYELLE SANTILLAN MD, attest that I was present for all critical  portions of the procedure and was immediately available to provide  guidance and assistance during the remainder of the procedure.    Monitoring: Patient was placed on continuous monitoring supervised by  the IR nursing staff and IR attending. Patient remained stable  throughout the procedure.    MEDICATIONS:  1. Versed IV: 1.5 mg  2. Fentanyl IV: 75 mcg    Sedation time: 22 minutes    CT DOSE: 794 mGy    IV contrast: None.    Complications: None.    Consent: Informed written and verbal consent obtained.    Procedure/Findings: A  CT scan of the chest was obtained. This  demonstrated a pleural collection with pleural visceral rind  surrounding it and associated rounded atelectasis. However, the  collection measures quite a bit smaller than previous imaging. This  measures 1.8 x 4.9 cm, previously 3.0 x 6.2 cm in axial dimension  Appropriate window for access.    The appropriate area prepped and draped in usual sterile fashion.  Under CT guidance the appropriate overlying area was identified and  then anesthetized with 10 ml of 1% lidocaine . A small incision was  made over the site with a # 11 blade. A 5 Swedish Yueh was used under  CT guidance to enter the pleural space. The catheter was advanced, the  needle was removed and a 0.035 superstiff Amplatz wire was advanced  into the cavity. The site was dilated sequentially with with dilators  up to 12. A 12 Swedish J tip nonlocking drainage catheter was placed  into the site. Approximately 10 cc of grossly  purulent material  returned. Labs were sent. The drain was secured with one 2-0 Ethibond  suture and connected to gravity drainage. The site was dressed.  The  procedure was well tolerated.      Impression    Impression:   1. Uncomplicated placement of right-sided 12 Swedish nonlocking  J-tipped catheter into right pleural space. The cavity has decreased  in size since previous imaging. It was felt a larger tube would not be  easily placed. Lytics therapy can still be performed through 12 Swedish  catheter.  2. Specimen sent for lab for analysis.    Plan:   Primary team to manage tube and lytics therapy.    I have personally reviewed the examination and initial interpretation  and I agree with the findings.    ADRIEN PEARSON MD         SYSTEM ID:  [id number]   XR Chest Port 1 View    Narrative    Portable chest    INDICATION: Pleural effusion    COMPARISON: 1/3/2022    FINDINGS: Removal of right chest catheter. Heart size normal. Right  hemidiaphragm elevation with unchanged pleural effusion and associated  basilar atelectasis. No pneumothorax.      Impression    IMPRESSION: Unchanged elevated right hemidiaphragm with pleural  effusion and associated atelectasis post chest catheter removal.    ZBIGNIEW ZELAYA MD         SYSTEM ID:  [id number]   XR Chest Port 1 View    Narrative    EXAM: XR CHEST PORT 1 VIEW  1/7/2022 6:29 AM     HISTORY:  pleural effusion       COMPARISON:  1/6/2022    FINDINGS:   Portable AP upright view of the chest. Stable positioning of a  right-sided chest tube. Midline trachea. Cardiomediastinal silhouette  is within normal limits. Stable small right pleural effusion and right  basilar atelectasis. Persistent elevation of the right hemidiaphragm.  Visualized upper abdomen is unremarkable.       Impression    IMPRESSION:   Stable small right pleural effusion and right basilar atelectasis with  right-sided chest tube in place.    I have personally reviewed the examination and initial  interpretation  and I agree with the findings.    KIRILL AMADO MD         SYSTEM ID:  [id number]   XR Chest Port 1 View    Narrative    XR CHEST PORT 1 VIEW  1/8/2022 8:40 AM      HISTORY: pleural effusion    COMPARISON: Chest x-ray 1/7/2022, 1/6/2022.    FINDINGS:   Portable AP upright chest x-ray. Right basilar chest tube in stable  position.    Trachea is midline. Cardiac silhouette is within normal limits.  Pulmonary vasculature is distinct. Left lung is clear. Increased right  interstitial opacities. Small right pleural effusions unchanged. No  pneumothorax.    Bony structures are intact. Visualized soft tissues are unremarkable.      Impression    IMPRESSION:   1. Stable right basilar chest tube. No pneumothorax.  2. Small right pleural effusion is unchanged.  3. Slightly increased right pulmonary interstitial opacities, likely  edema/atelectasis.    I have personally reviewed the examination and initial interpretation  and I agree with the findings.    KIRILL AMADO MD         SYSTEM ID:  [id number]              Medications Prior to Admission:     Medications Prior to Admission   Medication Sig Dispense Refill Last Dose     acetaminophen (TYLENOL) 325 MG tablet Take 3 tablets (975 mg) by mouth every 8 hours as needed for mild pain   More than a month at Unknown time     ASPIRIN PO Take 81 mg by mouth daily    1/3/2022 at Unknown time     cetirizine (ZYRTEC) 10 MG tablet Take 10 mg by mouth daily   1/3/2022 at Unknown time     fluticasone (FLONASE) 50 MCG/ACT nasal spray Spray 1 spray into both nostrils daily   1/3/2022 at Unknown time     ibuprofen (ADVIL/MOTRIN) 200 MG tablet Take 3 tablets (600 mg) by mouth every 6 hours as needed for moderate pain Start after delivery   More than a month at Unknown time     labetalol (NORMODYNE) 100 MG tablet TAKE 1 TABLET BY MOUTH TWICE A  tablet 0 1/3/2022 at Unknown time     Prenatal Vit-Fe Fumarate-FA (PRENATAL MULTIVITAMIN W/IRON) 27-0.8 MG tablet Take 1 tablet  by mouth daily   1/3/2022 at Unknown time     paragard intrauterine copper device 1 each by Intrauterine route once                 Discharge Medications:     Current Discharge Medication List      START taking these medications    Details   oxyCODONE (ROXICODONE) 5 MG tablet Take 1 tablet (5 mg) by mouth every 6 hours as needed for severe pain  Qty: 10 tablet, Refills: 0    Associated Diagnoses: Empyema, right (H)         CONTINUE these medications which have NOT CHANGED    Details   acetaminophen (TYLENOL) 325 MG tablet Take 3 tablets (975 mg) by mouth every 8 hours as needed for mild pain    Associated Diagnoses: Empyema (H)      ASPIRIN PO Take 81 mg by mouth daily       cetirizine (ZYRTEC) 10 MG tablet Take 10 mg by mouth daily      fluticasone (FLONASE) 50 MCG/ACT nasal spray Spray 1 spray into both nostrils daily      ibuprofen (ADVIL/MOTRIN) 200 MG tablet Take 3 tablets (600 mg) by mouth every 6 hours as needed for moderate pain Start after delivery    Associated Diagnoses: Status post  section      labetalol (NORMODYNE) 100 MG tablet TAKE 1 TABLET BY MOUTH TWICE A DAY  Qty: 180 tablet, Refills: 0    Associated Diagnoses: Essential hypertension      Prenatal Vit-Fe Fumarate-FA (PRENATAL MULTIVITAMIN W/IRON) 27-0.8 MG tablet Take 1 tablet by mouth daily      paragard intrauterine copper device 1 each by Intrauterine route once  Qty:      Associated Diagnoses: Encounter for insertion of intrauterine contraceptive device                     Medications Discontinued or Adjusted During This Hospitalization:   No change           Antibiotics Prescribed at Discharge:   None prescribed              Brief History of Illness:   Scott Dobbs is a 36 year old woman with 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 She was discharged on Augmentin, which she completed. She  was seen in follow-up late December with CT notable for slightly increased loculated effusion on the right. The day after she was seen in clinic she called with newly increasing right chest pain and thus was recommended to come to the ED for evaluation.            Hospital Course:   Scott Dobbs was admitted to the hospital on 1/4/2022. She received IR placed pigtail chest tube on 1/5/2022. Subsequently she received three days of lytic therapy via the IR chest tube 1/4/2022-1/8/2022. She tolerated treatment well and had good OP via chest tube following therapy installation. Cultures of fluid pulled at the time of chest tube placement did not grow any bacteria. Following lytic therapy a repeat chest CT was obtained demonstrating decrease in right sided pleural effusion and no evidence of rim enhancement. Her pain had resolved.    On the day of discharge, she was afebrile, tolerating diet, ambulating without issue, pain was well controlled with PO pain medications, and voiding adequate volumes. Her chest tube was removed on 1/9/2021. Post-pull chest XR did not demonstrate pneumothorax. She was discharged home on 1/9/2022.             Day of Discharge Physical Exam:   Please see progress note on day of discharge for discharge exam.         Final Pathology Result:   No pathology submitted           Discharge Instructions and Follow-Up:     Discharge diet: Regular   Discharge activity: Activity as tolerated   Discharge follow-up: Follow up with Dr. Zelaya in 4 weeks   Tubes/Lines/Drains: None   Wound care: Keep wound clean and dry      Discharge Procedure Orders   Reason for your hospital stay   Order Comments: Pleural effusion     Activity   Order Comments: Your activity upon discharge: activity as tolerated     Order Specific Question Answer Comments   Is discharge order? Yes      Adult [facility] Follow-up and recommended labs and tests   Order Comments: Follow up with Dr. at [address] [place] [address], within weeks   for hospital follow- up. No follow up labs or test are needed.    Appointments on [place] and/or [facility] (with [facility] or [facility] provider or service). Call [phone] if you haven't heard regarding these appointments within 7 days of discharge.     Follow Up and recommended labs and tests   Order Comments: Follow up with primary care provider, Anushka Canales, within 7 days for hospital follow- up.  No follow up labs or test are needed.     Diet   Order Comments: Follow this diet upon discharge: Orders Placed This Encounter      Regular Diet Adult     Order Specific Question Answer Comments   Is discharge order? Yes             Home Health Care:     Not needed           Discharge Disposition:     Discharged to home      Condition at discharge: Stable      Kristen Henning MD  Surgery PGY-3

## 2022-01-09 NOTE — PROGRESS NOTES
"THORACIC SURGERY PROGRESS NOTE    S:  No acute events. Pain better. No other subjective complaints    O:  BP (!) 141/82 (BP Location: Right arm)   Pulse 85   Temp 97.6  F (36.4  C) (Oral)   Resp 16   Ht 1.778 m (5' 10\")   Wt 144.5 kg (318 lb 9 oz)   LMP 01/01/2022   SpO2 95%   BMI 45.71 kg/m      I/O last 3 completed shifts:  In: 480 [P.O.:480]  Out: 70 [Chest Tube:70]    Alert, oriented, NAD  Nonlabored breathing on RA  RRR   S NT ND  Distal extremities warm.   Chest tube output minimal with no air leak  Incisions c/d/i    Labs       Lab Results   Component Value Date     01/03/2022    Lab Results   Component Value Date    CHLORIDE 109 01/03/2022    Lab Results   Component Value Date    BUN 13 01/03/2022      Lab Results   Component Value Date    POTASSIUM 4.0 01/03/2022    Lab Results   Component Value Date    CO2 24 01/03/2022    Lab Results   Component Value Date    CR 0.84 01/03/2022    CR 1.03 10/29/2020        Lab Results   Component Value Date    WBC 8.4 01/07/2022    HGB 12.6 01/07/2022    HCT 40.9 01/07/2022    MCV 87 01/07/2022     01/07/2022       Imaging  Recent Results (from the past 24 hour(s))   XR Chest Port 1 View    Narrative    XR CHEST PORT 1 VIEW  1/8/2022 8:40 AM      HISTORY: pleural effusion    COMPARISON: Chest x-ray 1/7/2022, 1/6/2022.    FINDINGS:   Portable AP upright chest x-ray. Right basilar chest tube in stable  position.    Trachea is midline. Cardiac silhouette is within normal limits.  Pulmonary vasculature is distinct. Left lung is clear. Increased right  interstitial opacities. Small right pleural effusions unchanged. No  pneumothorax.    Bony structures are intact. Visualized soft tissues are unremarkable.      Impression    IMPRESSION:   1. Stable right basilar chest tube. No pneumothorax.  2. Small right pleural effusion is unchanged.  3. Slightly increased right pulmonary interstitial opacities, likely  edema/atelectasis.    I have personally reviewed " the examination and initial interpretation  and I agree with the findings.    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    Kristen Henning MD  Surgery PGY-3

## 2022-01-09 NOTE — PLAN OF CARE
Discharged to: Home  Transportation: Ride from spouse  Time: 1545  Prescriptions: Oxy script sent with pt  Belongings: Packed and sent with pt  PIV/Access: R PIV removed  Care Plan and Education discontinued: yes  Paperwork: Reviewed with pt. No questions or concerns at this time.

## 2022-01-09 NOTE — PLAN OF CARE
[phone]: A&O x4. Able to make needs known and calls appropriately. Up to the bathroom independently. Slept between cares. C/O some pain before bed, meds given per MAR. CT scheduled for 0500, RN was never notified they were ready for the patient. Will continue to monitor and follow with plan of care.

## 2022-01-10 ENCOUNTER — PATIENT OUTREACH (OUTPATIENT)
Dept: CARE COORDINATION | Facility: CLINIC | Age: 37
End: 2022-01-10
Payer: COMMERCIAL

## 2022-01-10 DIAGNOSIS — Z71.89 OTHER SPECIFIED COUNSELING: ICD-10-CM

## 2022-01-10 LAB — BACTERIA ASPIRATE CULT: NO GROWTH

## 2022-01-10 NOTE — PROGRESS NOTES
Clinic Care Coordination Contact  Community Health Worker Initial Outreach      Patient accepts CC: No, Pt declined needs for extra support. CHW offered Pt to assist her to schedule f/u appt with her PCP, Pt stated that she will call clinic later on.      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 Completed course of Augmentin.       ASSESSMENT      Enrollment  Primary Care Care Coordination Status: Declined    Discharge Assessment  How are you doing now that you are home?: feeling better  How are your symptoms? (Red Flag symptoms escalate to triage hotline per guidelines): Improved  Do you feel your condition is stable enough to be safe at home until your provider visit?: Yes  Does the patient have their discharge instructions? : Yes  Does the patient have questions regarding their discharge instructions? : No  Were you started on any new medications or were there changes to any of your previous medications? : No  Does the patient have all of their medications?: Yes  Do you have questions regarding any of your medications? : No  Do you have all of your needed medical supplies or equipment (DME)?  (i.e. oxygen tank, CPAP, cane, etc.): Yes  Discharge follow-up appointment scheduled within 14 calendar days? : No  Is patient agreeable to assistance with scheduling? : No (PT stated she will call clinic)    Post-op (CHW CTA Only)  If the patient had  a surgery or procedure, do they have any questions for a nurse?: No         PLAN                                                      Outpatient Plan:     No future appointments.    1 Follow up with Anushka Canales PA-C (Family Medicine); Follow-up in 2 to 3 days for reevaluation.  2 Follow up with [facility] (EMERGENCY MEDICINE); Return if you have increased pain or you have a shortness of breath, fevers, or any other symptoms that are concerning to you.          For any urgent concerns, please contact our 24 hour nurse triage line: [phone] ([phone])           CHRISTIE Concepcion  [phone]  [facility]  [facility]

## 2022-01-12 LAB — BACTERIA ASPIRATE CULT: NORMAL

## 2022-01-13 ENCOUNTER — VIRTUAL VISIT (OUTPATIENT)
Dept: BEHAVIORAL HEALTH | Facility: CLINIC | Age: 37
End: 2022-01-13
Payer: COMMERCIAL

## 2022-01-13 DIAGNOSIS — F41.1 GENERALIZED ANXIETY DISORDER: Primary | ICD-10-CM

## 2022-01-13 PROCEDURE — 90834 PSYTX W PT 45 MINUTES: CPT | Mod: 95 | Performed by: SOCIAL WORKER

## 2022-01-13 ASSESSMENT — PATIENT HEALTH QUESTIONNAIRE - PHQ9
SUM OF ALL RESPONSES TO PHQ QUESTIONS 1-9: 6
10. IF YOU CHECKED OFF ANY PROBLEMS, HOW DIFFICULT HAVE THESE PROBLEMS MADE IT FOR YOU TO DO YOUR WORK, TAKE CARE OF THINGS AT HOME, OR GET ALONG WITH OTHER PEOPLE: SOMEWHAT DIFFICULT
SUM OF ALL RESPONSES TO PHQ QUESTIONS 1-9: 6

## 2022-01-13 NOTE — PROGRESS NOTES
[facility]- Integrated Behavioral Health Services  January 13, 2022    Behavioral Health Clinician Progress Note    Patient Name: Scott Dobbs      Telemedicine Visit: The patient's condition can be safely assessed and treated via synchronous audio and visual telemedicine encounter.      Reason for Telemedicine Visit: Patient has requested telehealth visit due to COVID-19    Originating Site (Patient Location): Patient's home    Distant Site (Provider Location): Provider Remote Setting- Home Office    Consent:  The patient/guardian has verbally consented to: the potential risks and benefits of telemedicine (video visit) versus in person care; bill my insurance or make self-payment for services provided; and responsibility for payment of non-covered services.     Mode of Communication:  Video Conference via [employer]    As the provider I attest to compliance with applicable laws and regulations related to telemedicine.         Service Type:  Individual     Session Start Time:  9:30  am  Session End Time:  10: 22  am      Session Length: 38 - 52      Attendees: Patient    Visit Activities (Refresh list every visit): [facility] Only    Diagnostic Assessment Date: 7/12/21. Patient recently participated in ADHD eval, when review records and update documentation during next visit.   Treatment Plan Review Date: 11/2/21  See Flowsheets for today's PHQ-9 and BALA-7 results  Previous PHQ-9:   PHQ-9 SCORE 11/17/2021 12/9/2021 1/13/2022   PHQ-9 Total Score MyChart 16 (Moderately severe depression) - 6 (Mild depression)   PHQ-9 Total Score 16 7 6     Previous BALA-7:   BALA-7 SCORE 6/30/2021 11/17/2021 12/9/2021   Total Score 2 (minimal anxiety) 5 (mild anxiety) -   Total Score 2 5 2       ED LEVEL:  No flowsheet data found.    DATA  Extended Session (60+ minutes): No  Interactive Complexity: No  Crisis: No  [facility] Patient: No    Treatment Objective(s) Addressed in This Session:  Target Behavior(s): Mental  health management    Anxiety: will experience a reduction in anxiety, will develop more effective coping skills to manage anxiety symptoms, will develop healthy cognitive patterns and beliefs and will increase ability to function adaptively    Current Stressors / Issues:  Patient reported that since her last visit, she was re-admitted to the hospital for a second round of treatment for the infection in her lung. Patient reflected upon the process of admission, receiving treatment, and being away from her family when both her  and daughter were positive for COVID. She stated that she has now been discharged from the hospital and is transition back to her routine. She stated that she is feeling physically better and is eager to return to contributing both at home and work.  Patient discussed feeling less rushed and some reprieve from constant anxiety about COVID exposures for her daughter, and is anticipating feeling hopeful for consistent childcare now that her daughter and the  provider have both had COVID. Patient recently received results back from ADHD evaluation. She stated that she was not diagnosed with ADHD, but the evaluator was able to highlight that she is experiencing a lot of struggles and challenges. Patient had been hoping that ADHD would be able to describe why executive functioning skills have been a challenge for her, but she does appreciate knowing that her concerns are valid and there are explanations for them. This writer and patient to closely read and review evaluation between now and next visit to determine next steps in her care.     Progress on Treatment Objective(s) / Homework:  Satisfactory progress - ACTION (Actively working towards change); Intervened by reinforcing change plan / affirming steps taken    Motivational Interviewing    MI Intervention: Expressed Empathy/Understanding, Supported Autonomy, Collaboration, Evocation, Permission to raise concern or advise,  Open-ended questions, Change talk (evoked) and Reframe     Change Talk Expressed by the Patient: Desire to change Ability to change Reasons to change Need to change Committment to change Activation    Provider Response to Change Talk: E - Evoked more info from patient about behavior change, A - Affirmed patient's thoughts, decisions, or attempts at behavior change, R - Reflected patient's change talk and S - Summarized patient's change talk statements     Cognitive Behavioral Therapy: Discussed connection between thoughts, feelings, and behaviors. Taught patient how to identify cognitive distortions, and assisted patient to identify own cognitive distortions. Taught and engaged patient in cognitive restructuring techniques.    Mindfulness-Based Strategies : Discussed skills based on development and application of mindfulness    Also provided psychoeducation about behavioral health condition, symptoms, and treatment options    Care Plan review completed: Yes    Medication Review:  No current psychiatric medications prescribed    Medication Compliance:  NA    Changes in Health Issues:   None reported    Chemical Use Review:   Substance Use: Chemical use reviewed, no active concerns identified      Tobacco Use: No current tobacco use.      Assessment: Current Emotional / Mental Status (status of significant symptoms):  Risk status (Self / Other harm or suicidal ideation)  Patient denies a history of suicidal ideation, suicide attempts, self-injurious behavior, homicidal ideation, homicidal behavior and and other safety concerns  Patient denies current fears or concerns for personal safety.  Patient denies current or recent suicidal ideation or behaviors.  Patient denies current or recent homicidal ideation or behaviors.  Patient denies current or recent self injurious behavior or ideation.  Patient denies other safety concerns.  A safety and risk management plan has not been developed at this time, however patient was  encouraged to call [place] [phone] should there be a change in any of these risk factors.    Appearance:   Appropriate   Eye Contact:   Good   Psychomotor Behavior: Normal   Attitude:   Cooperative   Orientation:   All  Speech   Rate / Production: Normal    Volume:  Normal   Mood:    Normal  Affect:    Appropriate   Thought Content:  Clear   Thought Form:  Coherent  Logical   Insight:    Good     Diagnoses:  1. Generalized anxiety disorder        Collateral Reports Completed:  Not Applicable    Plan: (Homework, other):  Patient was given information about behavioral services and encouraged to schedule a follow up appointment with the clinic [facility] in two weeks. She was also given Cognitive Behavioral Therapy skills to practice when experiencing anxiety. Plan for [facility] and patient to review ADHD evaluation and recommendations to help guide next steps in treatment. CD Recommendations: No indications of CD issues.  GEO Perez, [facility]      ______________________________________________________________________    Integrated Primary Care Behavioral Health Treatment Plan    Patient's Name: Scott Dobbs  YOB: 1985    Date: 11/2/21    DSM-V Diagnoses: 300.02 (F41.1) Generalized Anxiety Disorder  Psychosocial / Contextual Factors:  postpartum within past year, transition back to work in February, coping with pandemic  WHODAS 2.0 Total Score 2/11/2021 5/21/2021   Total Score 19 19   Total Score MyChart 19 19         Referral / Collaboration:  Referral to another professional/service is not indicated at this time..     Anticipated number of session or this episode of care: 10-12      MeasurableTreatment Goal(s) related to diagnosis / functional impairment(s)  Goal 1: Patient will reduce symptoms of anxiety as evidenced by decreased score on BALA 7.     I will know I've met my goal when I'm less irritable and it is easier to stop the spiral      Objective #A (Patient Action)    Patient will identify  three distraction and diversion activities and use those activities to decrease level of anxiety  .  Status: Continued - Date(s): 11/2/21    Intervention(s)  [facility] will teach distress tolerance, mindfulness, and relaxation techniques.    Objective #B  Patient will use cognitive strategies identified in therapy to challenge anxious thoughts.  Status: Continued - Date(s): 11/2/21  Intervention(s)  [facility] will assign homework to practice cognitive restructuring and defusion techniques.    Objective #C  Patient will use relaxation strategies 2-3 times per day to reduce the physical symptoms of anxiety.  Status: Continued - Date(s): 11/2/21    Intervention(s)  [facility] will teach relaxation techniques.    Patient has reviewed and agreed to the above plan.      Danw Franks, GEO  November 2, 2021

## 2022-01-14 ENCOUNTER — OFFICE VISIT (OUTPATIENT)
Dept: FAMILY MEDICINE | Facility: CLINIC | Age: 37
End: 2022-01-14
Payer: COMMERCIAL

## 2022-01-14 VITALS
DIASTOLIC BLOOD PRESSURE: 88 MMHG | WEIGHT: 293 LBS | SYSTOLIC BLOOD PRESSURE: 146 MMHG | OXYGEN SATURATION: 94 % | TEMPERATURE: 97.9 F | BODY MASS INDEX: 45.77 KG/M2 | HEART RATE: 97 BPM

## 2022-01-14 DIAGNOSIS — J86.9 EMPYEMA, RIGHT (H): Primary | ICD-10-CM

## 2022-01-14 PROCEDURE — 99215 OFFICE O/P EST HI 40 MIN: CPT | Performed by: PHYSICIAN ASSISTANT

## 2022-01-14 ASSESSMENT — PAIN SCALES - GENERAL: PAINLEVEL: NO PAIN (0)

## 2022-01-14 ASSESSMENT — PATIENT HEALTH QUESTIONNAIRE - PHQ9: SUM OF ALL RESPONSES TO PHQ QUESTIONS 1-9: 6

## 2022-01-14 NOTE — PROGRESS NOTES
Assessment & Plan     Empyema, right (H)  Improving. Reports symptoms are mild at this time. Returning to normal daily activities as tolerated. Plans to schedule follow up with Dr Zelaya early February. She has no additional questions at this time. Encouraged her to let us know if she is having an increase in symptoms.         40 minutes spent on the date of the encounter doing chart review, history and exam, documentation and further activities per the note           Return in about 4 weeks (around 2/11/2022) for Routine preventive.    Iesha Arteaga PA-C  [facility] SAUL Chavez is a 36 year old who presents for the following health issues     HPI     Post Discharge Outreach 1/10/2022   Admission Date 1/3/2022   Reason for Admission Empyema, right (H)Chest pain, unspecified type   Discharge Date 1/9/2022   Discharge Diagnosis Empyema, right (H)Chest pain, unspecified type   How are you doing now that you are home? feeling better   How are your symptoms? (Red Flag symptoms escalate to triage hotline per guidelines) Improved   Do you feel your condition is stable enough to be safe at home until your provider visit? Yes   Does the patient have their discharge instructions?  Yes   Does the patient have questions regarding their discharge instructions?  No   Were you started on any new medications or were there changes to any of your previous medications?  No   Does the patient have all of their medications? Yes   Do you have questions regarding any of your medications?  No   Do you have all of your needed medical supplies or equipment (DME)?  (i.e. oxygen tank, CPAP, cane, etc.) Yes   Discharge follow-up appointment scheduled within 14 calendar days?  No     Hospital Follow-up Visit:    Hospital/Nursing Home/IP Rehab Facility: [facility]  Date of Admission: 1/3/22  Date of Discharge: 1/9/22  Reason(s) for Admission: Empyema right, chest  pain      Was your hospitalization related to COVID-19? No   Problems taking medications regularly:  None  Medication changes since discharge: Yes  Problems adhering to non-medication therapy:  None    Summary of hospitalization:  [facility] discharge summary reviewed  Diagnostic Tests/Treatments reviewed.  Follow up needed: none  Other Healthcare Providers Involved in Patient s Care:         None  Update since discharge: improved.       Post Discharge Medication Reconciliation: discharge medications reconciled, continue medications without change.  Plan of care communicated with patient              Patient has had 2 hospitalizations in the last 3 months for right empyema. 11/23/21 - 11/30/21 and 1/3/22 - 1/9/22.   Recently - had chest tube placed with lytic therapy, no bacteria growth on cultures. Chest CT upon discharge noted improvement.     She is overall doing well. Gets fatigued and mildly short of breath if moving around a lot, but feels improvement every day. Is going back to work soon - shouldn't need any restrictions.     Is supposed to follow up with Dr Zelaya in early February.       Review of Systems   Constitutional, HEENT, cardiovascular, pulmonary, gi and gu systems are negative, except as otherwise noted.      Objective    BP (!) 146/88 (BP Location: Right arm, Patient Position: Chair, Cuff Size: Adult Large)   Pulse 97   Temp 97.9  F (36.6  C) (Oral)   Wt 144.7 kg (319 lb)   LMP 01/01/2022   SpO2 94%   BMI 45.77 kg/m    Body mass index is 45.77 kg/m .  Physical Exam   GENERAL: healthy, alert and no distress  NECK: no adenopathy, no asymmetry, masses, or scars and thyroid normal to palpation  RESP: lungs clear to auscultation - no rales, rhonchi or wheezes. Mildly decreased lung sounds on right.   CV: regular rate and rhythm, normal S1 S2, no S3 or S4, no murmur, click or rub, no peripheral edema and peripheral pulses strong  ABDOMEN: soft, nontender, no hepatosplenomegaly,  no masses and bowel sounds normal  SKIN: healing wounds from chest tube  MS: no gross musculoskeletal defects noted, no edema

## 2022-01-14 NOTE — PATIENT INSTRUCTIONS
Follow up with Dr. Zelaya , at [address] [place] [address], within weeks  for hospital follow- up. No follow up labs or test are needed.     Appointments on [place] and/or [facility] (with [facility] or [facility] provider or service). Call [phone] if you haven't heard regarding these appointments within 7 days of discharge.

## 2022-01-25 DIAGNOSIS — J86.9 EMPYEMA OF LUNG (H): Primary | ICD-10-CM

## 2022-01-26 ENCOUNTER — VIRTUAL VISIT (OUTPATIENT)
Dept: BEHAVIORAL HEALTH | Facility: CLINIC | Age: 37
End: 2022-01-26
Payer: COMMERCIAL

## 2022-01-26 DIAGNOSIS — F33.8 OTHER RECURRENT DEPRESSIVE DISORDERS (H): ICD-10-CM

## 2022-01-26 DIAGNOSIS — F40.10 SOCIAL ANXIETY DISORDER: Primary | ICD-10-CM

## 2022-01-26 PROBLEM — M54.6 ACUTE RIGHT-SIDED THORACIC BACK PAIN: Status: RESOLVED | Noted: 2021-11-15 | Resolved: 2022-01-26

## 2022-01-26 PROCEDURE — 90834 PSYTX W PT 45 MINUTES: CPT | Mod: GT | Performed by: SOCIAL WORKER

## 2022-01-26 NOTE — PROGRESS NOTES
[facility]- Integrated Behavioral Health Services  January 26, 2022    Behavioral Health Clinician Progress Note    Patient Name: Scott Dobbs      Telemedicine Visit: The patient's condition can be safely assessed and treated via synchronous audio and visual telemedicine encounter.      Reason for Telemedicine Visit: Patient has requested telehealth visit due to COVID-19    Originating Site (Patient Location): Patient's home    Distant Site (Provider Location): [facility] Clinics: [facility]    Consent:  The patient/guardian has verbally consented to: the potential risks and benefits of telemedicine (video visit) versus in person care; bill my insurance or make self-payment for services provided; and responsibility for payment of non-covered services.     Mode of Communication:  Video Conference via [employer]    As the provider I attest to compliance with applicable laws and regulations related to telemedicine.         Service Type:  Individual     Session Start Time:  1:04 pm  Session End Time:  1: 56 pm      Session Length: 38 - 52      Attendees: Patient    Visit Activities (Refresh list every visit): [facility] Only    Diagnostic Assessment Date: 12/16/21 by Mejia Gonzalez PsyD, LP  Treatment Plan Review Date: 11/2/21  See Flowsheets for today's PHQ-9 and BALA-7 results  Previous PHQ-9:   PHQ-9 SCORE 11/17/2021 12/9/2021 1/13/2022   PHQ-9 Total Score MyChart 16 (Moderately severe depression) - 6 (Mild depression)   PHQ-9 Total Score 16 7 6     Previous BALA-7:   BALA-7 SCORE 6/30/2021 11/17/2021 12/9/2021   Total Score 2 (minimal anxiety) 5 (mild anxiety) -   Total Score 2 5 2       ED LEVEL:  No flowsheet data found.    DATA  Extended Session (60+ minutes): No  Interactive Complexity: No  Crisis: No  [facility] Patient: No    Treatment Objective(s) Addressed in This Session:  Target Behavior(s): Mental health management    Anxiety: will experience a reduction in  anxiety, will develop more effective coping skills to manage anxiety symptoms, will develop healthy cognitive patterns and beliefs and will increase ability to function adaptively    Current Stressors / Issues:  Patient has returned to work and discussed her experiences with the transition. Reviewed ADHD evaluation with patient, with patient feeling like there her experiences were accurately captured. Discussed how the clarification of diagnosis highlights that traditional screening tools may not accurately capture her symptoms , with noting strong contrasts with what others are observing in her and how it feels for her on a daily basis. Patient discussed desire to focus on learning more tools to help with task initiation, focus, and task completion. Explored if patient is facing difficulties with these tasks versus feeling overwhelmed with multiple tasks. Patient discussed distress associated with not being able to manage tasks in the same ways she could before. She shared that it can feel difficult to tolerate interruptions or start a task if she knows that interruptions are occurring. Explored with patient how to start day at work, with trying to find transition time from home to work to assist with grounding and regulation. Encouraged patient to note cognitive barriers that may be impacting her ability to tolerate interruptions.     Progress on Treatment Objective(s) / Homework:  No improvement - PREPARATION (Decided to change - considering how); Intervened by negotiating a change plan and determining options / strategies for behavior change, identifying triggers, exploring social supports, and working towards setting a date to begin behavior change    Motivational Interviewing    MI Intervention: Expressed Empathy/Understanding, Supported Autonomy, Collaboration, Evocation, Permission to raise concern or advise, Open-ended questions, Change talk (evoked) and Reframe     Change Talk Expressed by the Patient:  Desire to change Ability to change Reasons to change Need to change Committment to change Activation    Provider Response to Change Talk: E - Evoked more info from patient about behavior change, A - Affirmed patient's thoughts, decisions, or attempts at behavior change, R - Reflected patient's change talk and S - Summarized patient's change talk statements     Cognitive Behavioral Therapy: Discussed connection between thoughts, feelings, and behaviors. Taught patient how to identify cognitive distortions, and assisted patient to identify own cognitive distortions. Taught and engaged patient in cognitive restructuring techniques.    Mindfulness-Based Strategies : Discussed skills based on development and application of mindfulness    Also provided psychoeducation about behavioral health condition, symptoms, and treatment options    Care Plan review completed: Yes    Medication Review:  No current psychiatric medications prescribed    Medication Compliance:  NA    Changes in Health Issues:   None reported    Chemical Use Review:   Substance Use: Chemical use reviewed, no active concerns identified      Tobacco Use: No current tobacco use.      Assessment: Current Emotional / Mental Status (status of significant symptoms):  Risk status (Self / Other harm or suicidal ideation)  Patient denies a history of suicidal ideation, suicide attempts, self-injurious behavior, homicidal ideation, homicidal behavior and and other safety concerns  Patient denies current fears or concerns for personal safety.  Patient denies current or recent suicidal ideation or behaviors.  Patient denies current or recent homicidal ideation or behaviors.  Patient denies current or recent self injurious behavior or ideation.  Patient denies other safety concerns.  A safety and risk management plan has not been developed at this time, however patient was encouraged to call [facility] should there be a change in any of these risk  factors.    Appearance:   Appropriate   Eye Contact:   Good   Psychomotor Behavior: Normal   Attitude:   Cooperative   Orientation:   All  Speech   Rate / Production: Normal    Volume:  Normal   Mood:    Normal  Affect:    Appropriate   Thought Content:  Clear   Thought Form:  Coherent  Logical   Insight:    Good     Diagnoses:  1. Social Anxiety Disorder (Social Phobia)    2. Other recurrent depressive disorders (H)        Collateral Reports Completed:  Not Applicable    Plan: (Homework, other):  Patient was given information about behavioral services and encouraged to schedule a follow up appointment with the clinic [facility] in two weeks. She was also given Cognitive Behavioral Therapy skills to practice when experiencing anxiety and depression. Patient to begin to log cognitions that may be impacting ability to tolerate interruptions. Patient to try to regulate and utilize grounding techniques prior to starting work. CD Recommendations: No indications of CD issues.  GEO Perez, [facility]      ______________________________________________________________________    Integrated Primary Care Behavioral Health Treatment Plan    Patient's Name: Scott Dobbs  YOB: 1985    Date: 1/26/22    DSM-V Diagnoses: 311 (F32.8) Other/unspec. Depressive Disorder or 300.23 (F40.10) Social Anxiety Disorder , per updated DA on 12/26/22 by Mejia Gonzalez PsyD, LP  Psychosocial / Contextual Factors:  postpartum within past year, transition back to work in February, coping with pandemic  WHODAS 2.0 Total Score 2/11/2021 5/21/2021   Total Score 19 19   Total Score MyChart 19 19       Referral / Collaboration:  Referral to another professional/service is not indicated at this time..     Anticipated number of session or this episode of care: 10-12      MeasurableTreatment Goal(s) related to diagnosis / functional impairment(s)  Goal 1: Patient will reduce symptoms of anxiety as evidenced by decreased score on BALA 7.      I will know I've met my goal when I worry less in social/work interactions      Objective #A (Patient Action)    Patient will identify three distraction and diversion activities and use those activities to decrease level of anxiety  .  Status: Continued - Date(s): 1/26/22    Intervention(s)  [facility] will teach distress tolerance, mindfulness, and relaxation techniques.    Objective #B  Patient will use cognitive strategies identified in therapy to challenge anxious thoughts.  Status: Continued - Date(s): 1/26/22  Intervention(s)  [facility] will assign homework to practice cognitive restructuring and defusion techniques.    Objective #C  Patient will use relaxation strategies 2-3 times per day to reduce the physical symptoms of anxiety.  Status: Continued - Date(s): 1/26/22    Intervention(s)  [facility] will teach relaxation techniques.      Goal 2: Patient will reduce symptoms of depression as evidenced by decreased scores on PHQ9.    I will know I've met my goal when I feel like I can start and end tasks, find more motivation.      Objective #A (Patient Action)    Status: New - Date: 1/26/22     Patient will Increase interest, engagement, and pleasure in doing things.    Intervention(s)  [facility] will explore strategies to help increase motivation and interest.    Objective #B  Patient will Identify negative self-talk and behaviors: challenge core beliefs, myths, and actions.    Status: New - Date: 1/26/22     Intervention(s)  [facility] will continue to explore automatic unhelpful/unhealthy thoughts and beliefs, and begin to create new helpeful/helpful automatic thoughts and beliefs.      Patient has reviewed and agreed to the above plan.      GEO Perez  January 26, 2022

## 2022-02-07 ENCOUNTER — VIRTUAL VISIT (OUTPATIENT)
Dept: BEHAVIORAL HEALTH | Facility: CLINIC | Age: 37
End: 2022-02-07
Payer: COMMERCIAL

## 2022-02-07 DIAGNOSIS — F33.8 OTHER RECURRENT DEPRESSIVE DISORDERS (H): Primary | ICD-10-CM

## 2022-02-07 DIAGNOSIS — F40.10 SOCIAL ANXIETY DISORDER: ICD-10-CM

## 2022-02-07 DIAGNOSIS — J86.9 EMPYEMA OF LUNG (H): Primary | ICD-10-CM

## 2022-02-07 PROCEDURE — 90834 PSYTX W PT 45 MINUTES: CPT | Mod: GT | Performed by: SOCIAL WORKER

## 2022-02-08 NOTE — PROGRESS NOTES
[facility]- Integrated Behavioral Health Services  February 7, 2022    Behavioral Health Clinician Progress Note    Patient Name: Scott Dobbs      Telemedicine Visit: The patient's condition can be safely assessed and treated via synchronous audio and visual telemedicine encounter.      Reason for Telemedicine Visit: Patient has requested telehealth visit due to COVID-19    Originating Site (Patient Location): Patient's place of employment    Distant Site (Provider Location): [facility]: [facility]    Consent:  The patient/guardian has verbally consented to: the potential risks and benefits of telemedicine (video visit) versus in person care; bill my insurance or make self-payment for services provided; and responsibility for payment of non-covered services.     Mode of Communication:  Video Conference via [employer]    As the provider I attest to compliance with applicable laws and regulations related to telemedicine.         Service Type:  Individual     Session Start Time:  3:39 pm  Session End Time:  4:30 pm     Session Length: 38 - 52      Attendees: Patient    Visit Activities (Refresh list every visit): [facility] Only    Diagnostic Assessment Date: 12/16/21 by Mejia Gonzalez PsyD, LP  Treatment Plan Review Date: 1/26/22  See Flowsheets for today's PHQ-9 and BALA-7 results  Previous PHQ-9:   PHQ-9 SCORE 11/17/2021 12/9/2021 1/13/2022   PHQ-9 Total Score MyChart 16 (Moderately severe depression) - 6 (Mild depression)   PHQ-9 Total Score 16 7 6     Previous BALA-7:   BALA-7 SCORE 6/30/2021 11/17/2021 12/9/2021   Total Score 2 (minimal anxiety) 5 (mild anxiety) -   Total Score 2 5 2       ED LEVEL:  No flowsheet data found.    DATA  Extended Session (60+ minutes): No  Interactive Complexity: No  Crisis: No  [facility] Patient: No    Treatment Objective(s) Addressed in This Session:  Target Behavior(s): Mental health management    Anxiety: will experience a  reduction in anxiety, will develop more effective coping skills to manage anxiety symptoms, will develop healthy cognitive patterns and beliefs and will increase ability to function adaptively    Current Stressors / Issues:  Patient reported that she continues to be able to return to previous routines at home in comparison to pre-hospitalizations and feels positively about this. She shared that she wishes it could be more helpful for her  to have more help and support but she anticipates that his high level of burn out from work and the ongoing implications of the pandemic make it difficult for him to feel the support and benefit. Patient processed through recent relational conflict which included her  expressing resentment towards her because she was invited to participate in an activity when he was not. She reflected upon the process of trying to talk with him, the suggestions she has provided, and feelings that if she participates or note he will have unresolved feelings about it all. Patient shared that she tries to encourage him to be more socially engaged, he does not take the steps, but then she feels badly/guilty when she engages with others outside the home.      Patient reported improvement in productivity these past two weeks. She is unsure what has led to the change, but feels like the demanding schedule and pressure to meet deadlines may have helped. Patient is currently processing through situations at work where she has felt excluded and that her opinion is de-valued. Patient requesting to continue to discuss this at future visits/     Progress on Treatment Objective(s) / Homework:  Satisfactory progress - ACTION (Actively working towards change); Intervened by reinforcing change plan / affirming steps taken    Motivational Interviewing    MI Intervention: Expressed Empathy/Understanding, Supported Autonomy, Collaboration, Evocation, Permission to raise concern or advise, Open-ended  questions, Change talk (evoked) and Reframe     Change Talk Expressed by the Patient: Desire to change Ability to change Reasons to change Need to change Committment to change Activation    Provider Response to Change Talk: E - Evoked more info from patient about behavior change, A - Affirmed patient's thoughts, decisions, or attempts at behavior change, R - Reflected patient's change talk and S - Summarized patient's change talk statements     Cognitive Behavioral Therapy: Discussed connection between thoughts, feelings, and behaviors. Taught patient how to identify cognitive distortions, and assisted patient to identify own cognitive distortions. Taught and engaged patient in cognitive restructuring techniques.    Mindfulness-Based Strategies : Discussed skills based on development and application of mindfulness    Also provided psychoeducation about behavioral health condition, symptoms, and treatment options    Care Plan review completed: Yes    Medication Review:  No current psychiatric medications prescribed    Medication Compliance:  NA    Changes in Health Issues:   None reported    Chemical Use Review:   Substance Use: Chemical use reviewed, no active concerns identified      Tobacco Use: No current tobacco use.      Assessment: Current Emotional / Mental Status (status of significant symptoms):  Risk status (Self / Other harm or suicidal ideation)  Patient denies a history of suicidal ideation, suicide attempts, self-injurious behavior, homicidal ideation, homicidal behavior and and other safety concerns  Patient denies current fears or concerns for personal safety.  Patient denies current or recent suicidal ideation or behaviors.  Patient denies current or recent homicidal ideation or behaviors.  Patient denies current or recent self injurious behavior or ideation.  Patient denies other safety concerns.  A safety and risk management plan has not been developed at this time, however patient was encouraged  to call [facility] should there be a change in any of these risk factors.    Appearance:   Appropriate   Eye Contact:   Good   Psychomotor Behavior: Normal   Attitude:   Cooperative   Orientation:   All  Speech   Rate / Production: Normal    Volume:  Normal   Mood:    Normal  Affect:    Appropriate   Thought Content:  Clear   Thought Form:  Coherent  Logical   Insight:    Good     Diagnoses:  1. Other recurrent depressive disorders (H)    2. Social Anxiety Disorder (Social Phobia)        Collateral Reports Completed:  Not Applicable    Plan: (Homework, other):  Patient was given information about behavioral services and encouraged to schedule a follow up appointment with the clinic [facility] in two weeks. She was also given Cognitive Behavioral Therapy skills to practice when experiencing anxiety and depression. . CD Recommendations: No indications of CD issues.  Dawn Franks, [facility], [facility]      ______________________________________________________________________    Integrated Primary Care Behavioral Health Treatment Plan    Patient's Name: Scott Dobbs  YOB: 1985    Date: 1/26/22    DSM-V Diagnoses: 311 (F32.8) Other/unspec. Depressive Disorder or 300.23 (F40.10) Social Anxiety Disorder , per updated DA on 12/26/22 by Mejia Gonzalez PsyD, LP  Psychosocial / Contextual Factors:  postpartum within past year, transition back to work in February, coping with pandemic  WHODAS 2.0 Total Score 2/11/2021 5/21/2021   Total Score 19 19   Total Score MyChart 19 19       Referral / Collaboration:  Referral to another professional/service is not indicated at this time..     Anticipated number of session or this episode of care: 10-12      MeasurableTreatment Goal(s) related to diagnosis / functional impairment(s)  Goal 1: Patient will reduce symptoms of anxiety as evidenced by decreased score on BALA 7.     I will know I've met my goal when I worry less in social/work interactions      Objective #A  (Patient Action)    Patient will identify three distraction and diversion activities and use those activities to decrease level of anxiety  .  Status: Continued - Date(s): 1/26/22    Intervention(s)  [facility] will teach distress tolerance, mindfulness, and relaxation techniques.    Objective #B  Patient will use cognitive strategies identified in therapy to challenge anxious thoughts.  Status: Continued - Date(s): 1/26/22  Intervention(s)  [facility] will assign homework to practice cognitive restructuring and defusion techniques.    Objective #C  Patient will use relaxation strategies 2-3 times per day to reduce the physical symptoms of anxiety.  Status: Continued - Date(s): 1/26/22    Intervention(s)  [facility] will teach relaxation techniques.      Goal 2: Patient will reduce symptoms of depression as evidenced by decreased scores on PHQ9.    I will know I've met my goal when I feel like I can start and end tasks, find more motivation.      Objective #A (Patient Action)    Status: New - Date: 1/26/22     Patient will Increase interest, engagement, and pleasure in doing things.    Intervention(s)  [facility] will explore strategies to help increase motivation and interest.    Objective #B  Patient will Identify negative self-talk and behaviors: challenge core beliefs, myths, and actions.    Status: New - Date: 1/26/22     Intervention(s)  [facility] will continue to explore automatic unhelpful/unhealthy thoughts and beliefs, and begin to create new helpeful/helpful automatic thoughts and beliefs.      Patient has reviewed and agreed to the above plan.      GEO Perez  January 26, 2022

## 2022-02-14 ENCOUNTER — LAB (OUTPATIENT)
Dept: URGENT CARE | Facility: URGENT CARE | Age: 37
End: 2022-02-14
Attending: FAMILY MEDICINE
Payer: COMMERCIAL

## 2022-02-14 DIAGNOSIS — Z20.822 SUSPECTED 2019 NOVEL CORONAVIRUS INFECTION: ICD-10-CM

## 2022-02-14 LAB — SARS-COV-2 RNA RESP QL NAA+PROBE: NEGATIVE

## 2022-02-14 PROCEDURE — U0003 INFECTIOUS AGENT DETECTION BY NUCLEIC ACID (DNA OR RNA); SEVERE ACUTE RESPIRATORY SYNDROME CORONAVIRUS 2 (SARS-COV-2) (CORONAVIRUS DISEASE [COVID-19]), AMPLIFIED PROBE TECHNIQUE, MAKING USE OF HIGH THROUGHPUT TECHNOLOGIES AS DESCRIBED BY CMS-2020-01-R: HCPCS

## 2022-02-14 PROCEDURE — U0005 INFEC AGEN DETEC AMPLI PROBE: HCPCS

## 2022-02-16 ENCOUNTER — ANCILLARY PROCEDURE (OUTPATIENT)
Dept: CT IMAGING | Facility: CLINIC | Age: 37
End: 2022-02-16
Attending: CLINICAL NURSE SPECIALIST
Payer: COMMERCIAL

## 2022-02-16 ENCOUNTER — OFFICE VISIT (OUTPATIENT)
Dept: SURGERY | Facility: CLINIC | Age: 37
End: 2022-02-16
Attending: THORACIC SURGERY (CARDIOTHORACIC VASCULAR SURGERY)
Payer: COMMERCIAL

## 2022-02-16 VITALS
WEIGHT: 293 LBS | SYSTOLIC BLOOD PRESSURE: 175 MMHG | TEMPERATURE: 97.9 F | DIASTOLIC BLOOD PRESSURE: 95 MMHG | BODY MASS INDEX: 46.46 KG/M2 | HEART RATE: 85 BPM | RESPIRATION RATE: 18 BRPM | OXYGEN SATURATION: 99 %

## 2022-02-16 DIAGNOSIS — J86.9 EMPYEMA OF LUNG (H): ICD-10-CM

## 2022-02-16 DIAGNOSIS — J86.9 EMPYEMA (H): Primary | ICD-10-CM

## 2022-02-16 PROCEDURE — 71260 CT THORAX DX C+: CPT | Mod: GC | Performed by: RADIOLOGY

## 2022-02-16 PROCEDURE — G0463 HOSPITAL OUTPT CLINIC VISIT: HCPCS

## 2022-02-16 RX ORDER — IOPAMIDOL 755 MG/ML
125 INJECTION, SOLUTION INTRAVASCULAR ONCE
Status: COMPLETED | OUTPATIENT
Start: 2022-02-16 | End: 2022-02-16

## 2022-02-16 RX ADMIN — IOPAMIDOL 125 ML: 755 INJECTION, SOLUTION INTRAVASCULAR at 16:02

## 2022-02-16 ASSESSMENT — PAIN SCALES - GENERAL: PAINLEVEL: NO PAIN (0)

## 2022-02-16 NOTE — NURSING NOTE
"Oncology Rooming Note    February 16, 2022 4:57 PM   Scott Dobbs is a 36 year old female who presents for:    Chief Complaint   Patient presents with     Oncology Clinic Visit     Empysema of left lung     Initial Vitals: BP (!) 175/95 (BP Location: Right arm, Patient Position: Sitting, Cuff Size: Adult Small)   Pulse 85   Temp 97.9  F (36.6  C) (Oral)   Resp 18   Wt 146.9 kg (323 lb 12.8 oz)   SpO2 99%   BMI 46.46 kg/m   Estimated body mass index is 46.46 kg/m  as calculated from the following:    Height as of 1/3/22: 1.778 m (5' 10\").    Weight as of this encounter: 146.9 kg (323 lb 12.8 oz). Body surface area is 2.69 meters squared.  No Pain (0) Comment: Data Unavailable   No LMP recorded. (Menstrual status: IUD).  Allergies reviewed: Yes  Medications reviewed: Yes    Medications: Medication refills not needed today.  Pharmacy name entered into Dubb:    [id number] - [place], [place] - [address] AT [facility] [address]  [id number] - [place], [place] - [address] AT [facility] [address]    Clinical concerns: Patient states that she has has cold symptoms since Saturday: congestion, cough with yellow mucous. Patient left clinic before seeing provider today-waited 45 minutes. LPN contacted patient to let her know scheduling will contact her to reschedule.       Salima Lay LPN February 16, 2022 4:58 PM                "

## 2022-02-16 NOTE — LETTER
2/16/2022         RE: Scott Dobbs  [address] [address]        Dear Colleague,    Thank you for referring your patient, Scott Dobbs, to the [facility]. Please see a copy of my visit note below.    I didn't see Mrs. Dobbs. She had to leave because of  issues.    We'll reschedule her.        Again, thank you for allowing me to participate in the care of your patient.        Sincerely,        Gabriel Zelaya MD

## 2022-02-17 ENCOUNTER — DOCUMENTATION ONLY (OUTPATIENT)
Dept: SURGERY | Facility: CLINIC | Age: 37
End: 2022-02-17
Payer: COMMERCIAL

## 2022-02-18 DIAGNOSIS — J86.9 EMPYEMA OF LUNG (H): Primary | ICD-10-CM

## 2022-02-21 ASSESSMENT — PATIENT HEALTH QUESTIONNAIRE - PHQ9: SUM OF ALL RESPONSES TO PHQ QUESTIONS 1-9: 6

## 2022-02-22 ASSESSMENT — PATIENT HEALTH QUESTIONNAIRE - PHQ9: SUM OF ALL RESPONSES TO PHQ QUESTIONS 1-9: 6

## 2022-02-23 ENCOUNTER — VIRTUAL VISIT (OUTPATIENT)
Dept: BEHAVIORAL HEALTH | Facility: CLINIC | Age: 37
End: 2022-02-23
Payer: COMMERCIAL

## 2022-02-23 DIAGNOSIS — F33.8 OTHER RECURRENT DEPRESSIVE DISORDERS (H): Primary | ICD-10-CM

## 2022-02-23 DIAGNOSIS — F40.10 SOCIAL ANXIETY DISORDER: ICD-10-CM

## 2022-02-23 PROCEDURE — 90834 PSYTX W PT 45 MINUTES: CPT | Mod: GT | Performed by: SOCIAL WORKER

## 2022-02-23 NOTE — PROGRESS NOTES
[facility]- Integrated Behavioral Health Services  February 23, 2022    Behavioral Health Clinician Progress Note    Patient Name: Scott Dobbs      Telemedicine Visit: The patient's condition can be safely assessed and treated via synchronous audio and visual telemedicine encounter.      Reason for Telemedicine Visit: Patient has requested telehealth visit due to COVID-19    Originating Site (Patient Location): Patient's place of employment    Distant Site (Provider Location): [facility] Clinics: [facility]    Consent:  The patient/guardian has verbally consented to: the potential risks and benefits of telemedicine (video visit) versus in person care; bill my insurance or make self-payment for services provided; and responsibility for payment of non-covered services.     Mode of Communication:  Video Conference via [employer]    As the provider I attest to compliance with applicable laws and regulations related to telemedicine.         Service Type:  Individual     Session Start Time:  11:02 am  Session End Time:  11: 50 am     Session Length: 38 - 52      Attendees: Patient    Visit Activities (Refresh list every visit): [facility] Only    Diagnostic Assessment Date: 12/16/21 by Mejia Gonzalez PsyD, LP  Treatment Plan Review Date: 1/26/22  See Flowsheets for today's PHQ-9 and BALA-7 results  Previous PHQ-9:   PHQ-9 SCORE 12/9/2021 1/13/2022 2/21/2022   PHQ-9 Total Score MyChart - 6 (Mild depression) 6 (Mild depression)   PHQ-9 Total Score 7 6 6     Previous BALA-7:   BALA-7 SCORE 6/30/2021 11/17/2021 12/9/2021   Total Score 2 (minimal anxiety) 5 (mild anxiety) -   Total Score 2 5 2       ED LEVEL:  No flowsheet data found.    DATA  Extended Session (60+ minutes): No  Interactive Complexity: No  Crisis: No  [facility] Patient: No    Treatment Objective(s) Addressed in This Session:  Target Behavior(s): Mental health management    Anxiety: will experience a reduction in  anxiety, will develop more effective coping skills to manage anxiety symptoms, will develop healthy cognitive patterns and beliefs and will increase ability to function adaptively    Current Stressors / Issues:  Patient reported overall doing well, but heightened anxiety going into a goal setting meeting next week. She reflected upon her anxieties, worries, and fears as she approaches the meeting based on experiences in prior work environment. Patient stated that she has been able to reflect upon and identify feelings of insecurity within her job because of her past, but also knows that the likelihood of being fired is low when she focuses on the facts and evidence around her. Continued to explore how to approach the meeting, how to de-personalize comments that are made, and ways to manage the ruminating thoughts.    Progress on Treatment Objective(s) / Homework:  Satisfactory progress - ACTION (Actively working towards change); Intervened by reinforcing change plan / affirming steps taken    Motivational Interviewing    MI Intervention: Expressed Empathy/Understanding, Supported Autonomy, Collaboration, Evocation, Permission to raise concern or advise, Open-ended questions, Change talk (evoked) and Reframe     Change Talk Expressed by the Patient: Desire to change Ability to change Reasons to change Need to change Committment to change Activation    Provider Response to Change Talk: E - Evoked more info from patient about behavior change, A - Affirmed patient's thoughts, decisions, or attempts at behavior change, R - Reflected patient's change talk and S - Summarized patient's change talk statements     Cognitive Behavioral Therapy: Discussed connection between thoughts, feelings, and behaviors. Taught patient how to identify cognitive distortions, and assisted patient to identify own cognitive distortions. Taught and engaged patient in cognitive restructuring techniques.    Mindfulness-Based Strategies : Discussed  skills based on development and application of mindfulness    Also provided psychoeducation about behavioral health condition, symptoms, and treatment options    Care Plan review completed: Yes    Medication Review:  No current psychiatric medications prescribed    Medication Compliance:  NA    Changes in Health Issues:   None reported    Chemical Use Review:   Substance Use: Chemical use reviewed, no active concerns identified      Tobacco Use: No current tobacco use.      Assessment: Current Emotional / Mental Status (status of significant symptoms):  Risk status (Self / Other harm or suicidal ideation)  Patient denies a history of suicidal ideation, suicide attempts, self-injurious behavior, homicidal ideation, homicidal behavior and and other safety concerns  Patient denies current fears or concerns for personal safety.  Patient denies current or recent suicidal ideation or behaviors.  Patient denies current or recent homicidal ideation or behaviors.  Patient denies current or recent self injurious behavior or ideation.  Patient denies other safety concerns.  A safety and risk management plan has not been developed at this time, however patient was encouraged to call [place] [phone] should there be a change in any of these risk factors.    Appearance:   Appropriate   Eye Contact:   Good   Psychomotor Behavior: Normal   Attitude:   Cooperative   Orientation:   All  Speech   Rate / Production: Normal    Volume:  Normal   Mood:    Normal  Affect:    Appropriate   Thought Content:  Clear   Thought Form:  Coherent  Logical   Insight:    Good     Diagnoses:  1. Other recurrent depressive disorders (H)    2. Social Anxiety Disorder (Social Phobia)        Collateral Reports Completed:  Not Applicable    Plan: (Homework, other):  Patient was given information about behavioral services and encouraged to schedule a follow up appointment with the clinic [facility] in two weeks. She was also given Cognitive Behavioral Therapy  skills to practice when experiencing anxiety and depression. . CD Recommendations: No indications of CD issues.  Dawn Franks, [facility], [facility] [place]      ______________________________________________________________________    Integrated Primary Care Behavioral Health Treatment Plan    Patient's Name: Scott Dobbs  YOB: 1985    Date: 1/26/22    DSM-V Diagnoses: 311 (F32.8) Other/unspec. Depressive Disorder or 300.23 (F40.10) Social Anxiety Disorder , per updated DA on 12/26/22 by Mejia Gonzalez PsyD, LP  Psychosocial / Contextual Factors:  postpartum within past year, transition back to work in February, coping with pandemic  WHODAS 2.0 Total Score 2/11/2021 5/21/2021   Total Score 19 19   Total Score MyChart 19 19       Referral / Collaboration:  Referral to another professional/service is not indicated at this time..     Anticipated number of session or this episode of care: 10-12      MeasurableTreatment Goal(s) related to diagnosis / functional impairment(s)  Goal 1: Patient will reduce symptoms of anxiety as evidenced by decreased score on BALA 7.     I will know I've met my goal when I worry less in social/work interactions      Objective #A (Patient Action)    Patient will identify three distraction and diversion activities and use those activities to decrease level of anxiety  .  Status: Continued - Date(s): 1/26/22    Intervention(s)  [facility] [place] will teach distress tolerance, mindfulness, and relaxation techniques.    Objective #B  Patient will use cognitive strategies identified in therapy to challenge anxious thoughts.  Status: Continued - Date(s): 1/26/22  Intervention(s)  [facility] [place] will assign homework to practice cognitive restructuring and defusion techniques.    Objective #C  Patient will use relaxation strategies 2-3 times per day to reduce the physical symptoms of anxiety.  Status: Continued - Date(s): 1/26/22    Intervention(s)  [facility] [place] will teach relaxation techniques.      Goal 2: Patient will reduce  symptoms of depression as evidenced by decreased scores on PHQ9.    I will know I've met my goal when I feel like I can start and end tasks, find more motivation.      Objective #A (Patient Action)    Status: New - Date: 1/26/22     Patient will Increase interest, engagement, and pleasure in doing things.    Intervention(s)  [facility] will explore strategies to help increase motivation and interest.    Objective #B  Patient will Identify negative self-talk and behaviors: challenge core beliefs, myths, and actions.    Status: New - Date: 1/26/22     Intervention(s)  [facility] will continue to explore automatic unhelpful/unhealthy thoughts and beliefs, and begin to create new helpeful/helpful automatic thoughts and beliefs.      Patient has reviewed and agreed to the above plan.      Dawn Franks, GEO  January 26, 2022

## 2022-03-02 ENCOUNTER — OFFICE VISIT (OUTPATIENT)
Dept: SURGERY | Facility: CLINIC | Age: 37
End: 2022-03-02
Attending: THORACIC SURGERY (CARDIOTHORACIC VASCULAR SURGERY)
Payer: COMMERCIAL

## 2022-03-02 VITALS
HEART RATE: 78 BPM | RESPIRATION RATE: 18 BRPM | TEMPERATURE: 98 F | BODY MASS INDEX: 47.36 KG/M2 | WEIGHT: 293 LBS | DIASTOLIC BLOOD PRESSURE: 85 MMHG | OXYGEN SATURATION: 96 % | SYSTOLIC BLOOD PRESSURE: 141 MMHG

## 2022-03-02 DIAGNOSIS — J86.9 EMPYEMA OF LUNG (H): Primary | ICD-10-CM

## 2022-03-02 PROCEDURE — 99213 OFFICE O/P EST LOW 20 MIN: CPT | Performed by: THORACIC SURGERY (CARDIOTHORACIC VASCULAR SURGERY)

## 2022-03-02 PROCEDURE — G0463 HOSPITAL OUTPT CLINIC VISIT: HCPCS

## 2022-03-02 ASSESSMENT — PAIN SCALES - GENERAL: PAINLEVEL: NO PAIN (0)

## 2022-03-02 NOTE — PROGRESS NOTES
THORACIC SURGERY FOLLOW UP VISIT  I saw Ms. Scott Dobbs in follow-up today. The clinical summary follows:     PREOP DIAGNOSIS   Right lung empyema  PROCEDURE   Chest tube per interventional radiology  DATE OF PROCEDURE  11/23/2021    COMPLICATIONS  N/A    INTERVAL STUDIES  CT chest (2/16/2022) - reduction in right-sided effusion with interval removal of right-sided chest tube. Persistent enhancing opacity within the left lower lobe representing scar/atelectasis.         SUBJECTIVE  Ms. Dobbs has almost completely recovered, she is mostly deconditioned.    OBJECTIVE  BP (!) 141/85   Pulse 78   Temp 98  F (36.7  C) (Oral)   Resp 18   Wt 149.7 kg (330 lb 1.6 oz)   SpO2 96%   BMI 47.36 kg/m    Well healed scars from her previous chest tube sites.    IMPRESSION   Ms. Dobbs is a 36 year old woman with resolved empyema    PLAN  I spent a total of 15 minutes with Ms. Scott Dobbs, more than 50% of which were spent in counseling, coordination of care, and face-to-face time. I reviewed the plan as follows:  1) Follow-up in 6 months with a new CT  2) Respirator use: Ms. Dobbs can use respirators for protection at work without risk to her.    All questions were answered and the patient and present family were in agreement with the plan.  I appreciate the opportunity to participate in the care of your patient and will keep you updated.  Sincerely,

## 2022-03-02 NOTE — NURSING NOTE
"Oncology Rooming Note    March 2, 2022 2:36 PM   Scott Dobbs is a 36 year old female who presents for:    Chief Complaint   Patient presents with     Oncology Clinic Visit     Pt is here for a rtn for Post Op      Initial Vitals: Blood Pressure (Abnormal) 141/85   Pulse 78   Temperature 98  F (36.7  C) (Oral)   Respiration 18   Weight 149.7 kg (330 lb 1.6 oz)   Oxygen Saturation 96%   Body Mass Index 47.36 kg/m   Estimated body mass index is 47.36 kg/m  as calculated from the following:    Height as of 1/3/22: 1.778 m (5' 10\").    Weight as of this encounter: 149.7 kg (330 lb 1.6 oz). Body surface area is 2.72 meters squared.  No Pain (0) Comment: Data Unavailable   No LMP recorded. (Menstrual status: IUD).  Allergies reviewed: Yes  Medications reviewed: Yes    Medications: Medication refills not needed today.  Pharmacy name entered into [email or web address]:    [id number] - [place], [place] - [address] AT [facility] [address]  [id number] - [place], [place] - [address] AT [facility] [address]    Clinical concerns: none       Shavonne Bills MA            "

## 2022-03-02 NOTE — LETTER
3/2/2022         RE: Scott Dobbs  [address] [facility] [address]        Dear Colleague,    Thank you for referring your patient, Scott Dobbs, to the [facility]. Please see a copy of my visit note below.    THORACIC SURGERY FOLLOW UP VISIT  I saw Ms. Scott Dobbs in follow-up today. The clinical summary follows:     PREOP DIAGNOSIS   Right lung empyema  PROCEDURE   Chest tube per interventional radiology  DATE OF PROCEDURE  11/23/2021    COMPLICATIONS  N/A    INTERVAL STUDIES  CT chest (2/16/2022) - reduction in right-sided effusion with interval removal of right-sided chest tube. Persistent enhancing opacity within the left lower lobe representing scar/atelectasis.      SUBJECTIVE  Ms. Dobbs has almost completely recovered, she is mostly deconditioned.    OBJECTIVE  BP (!) 141/85   Pulse 78   Temp 98  F (36.7  C) (Oral)   Resp 18   Wt 149.7 kg (330 lb 1.6 oz)   SpO2 96%   BMI 47.36 kg/m    Well healed scars from her previous chest tube sites.    IMPRESSION   Ms. Dobbs is a 36 year old woman with resolved empyema    PLAN  I spent a total of 15 minutes with Ms. Scott Dobbs, more than 50% of which were spent in counseling, coordination of care, and face-to-face time. I reviewed the plan as follows:  1) Follow-up in 6 months with a new CT  2) Respirator use: Ms. Dobbs can use respirators for protection at work without risk to her.  All questions were answered and the patient and present family were in agreement with the plan.  I appreciate the opportunity to participate in the care of your patient and will keep you updated.    Again, thank you for allowing me to participate in the care of your patient.      Sincerely,    Gabriel Zelaya MD

## 2022-03-03 NOTE — CONFIDENTIAL NOTE
Epic Message sent 2/17/2022 from Gabriel Zelaya MD [place], Gail Sue, ELIN CNS; Radha Bonilla; Criselda Parks APRN CNS; Cora Castaneda, MARANDA    Her CT looks great!   I can see her virtually sometime in the coming month.   No need for conference. I'll repeat a CT in 6 months and if stable we'll stop following her.     Thank you   Rai

## 2022-03-05 DIAGNOSIS — I10 ESSENTIAL HYPERTENSION: ICD-10-CM

## 2022-03-07 RX ORDER — LABETALOL 100 MG/1
TABLET, FILM COATED ORAL
Qty: 180 TABLET | Refills: 0 | Status: SHIPPED | OUTPATIENT
Start: 2022-03-07 | End: 2022-06-03

## 2022-03-07 NOTE — TELEPHONE ENCOUNTER
"Routing refill request to provider for review/approval because:  BP elevated      Requested Prescriptions   Pending Prescriptions Disp Refills     labetalol (NORMODYNE) 100 MG tablet [Pharmacy Med Name: LABETALOL  MG TABLET] 180 tablet 0     Sig: TAKE 1 TABLET BY MOUTH TWICE A DAY       Beta-Blockers Protocol Failed - 3/5/2022 12:18 AM        Failed - Blood pressure under 140/90 in past 12 months     BP Readings from Last 3 Encounters:   03/02/22 (!) 141/85   02/16/22 (!) 175/95   01/14/22 (!) 146/88                 Passed - Patient is age 6 or older        Passed - Recent (12 mo) or future (30 days) visit within the authorizing provider's specialty     Patient has had an office visit with the authorizing provider or a provider within the authorizing providers department within the previous 12 mos or has a future within next 30 days. See \"Patient Info\" tab in inbasket, or \"Choose Columns\" in Meds & Orders section of the refill encounter.              Passed - Medication is active on med list           Franklin Mcgraw RN  [facility]    "

## 2022-03-08 ENCOUNTER — VIRTUAL VISIT (OUTPATIENT)
Dept: BEHAVIORAL HEALTH | Facility: CLINIC | Age: 37
End: 2022-03-08
Payer: COMMERCIAL

## 2022-03-08 DIAGNOSIS — F33.8 OTHER RECURRENT DEPRESSIVE DISORDERS (H): Primary | ICD-10-CM

## 2022-03-08 DIAGNOSIS — F40.10 SOCIAL ANXIETY DISORDER: ICD-10-CM

## 2022-03-08 PROCEDURE — 90834 PSYTX W PT 45 MINUTES: CPT | Mod: 95 | Performed by: SOCIAL WORKER

## 2022-03-08 NOTE — PROGRESS NOTES
[facility]- Integrated Behavioral Health Services  March 8, 2022    Behavioral Health Clinician Progress Note    Patient Name: Scott Dobbs      Telemedicine Visit: The patient's condition can be safely assessed and treated via synchronous audio and visual telemedicine encounter.      Reason for Telemedicine Visit: Patient has requested telehealth visit due to COVID-19    Originating Site (Patient Location): Patient's place of employment    Distant Site (Provider Location): [facility]: [facility]    Consent:  The patient/guardian has verbally consented to: the potential risks and benefits of telemedicine (video visit) versus in person care; bill my insurance or make self-payment for services provided; and responsibility for payment of non-covered services.     Mode of Communication:  Video Conference via [employer]    As the provider I attest to compliance with applicable laws and regulations related to telemedicine.         Service Type:  Individual     Session Start Time:  3:00 pm  Session End Time: 3:52 pm     Session Length: 38 - 52      Attendees: Patient    Visit Activities (Refresh list every visit): [facility] Only    Diagnostic Assessment Date: 12/16/21 by Mejia Gonzalez PsyD, LP  Treatment Plan Review Date: 1/26/22  See Flowsheets for today's PHQ-9 and BALA-7 results  Previous PHQ-9:   PHQ-9 SCORE 12/9/2021 1/13/2022 2/21/2022   PHQ-9 Total Score MyChart - 6 (Mild depression) 6 (Mild depression)   PHQ-9 Total Score 7 6 6     Previous BALA-7:   BALA-7 SCORE 6/30/2021 11/17/2021 12/9/2021   Total Score 2 (minimal anxiety) 5 (mild anxiety) -   Total Score 2 5 2       ED LEVEL:  No flowsheet data found.    DATA  Extended Session (60+ minutes): No  Interactive Complexity: No  Crisis: No  [facility] Patient: No    Treatment Objective(s) Addressed in This Session:  Target Behavior(s): Mental health management    Anxiety: will experience a reduction in  "anxiety, will develop more effective coping skills to manage anxiety symptoms, will develop healthy cognitive patterns and beliefs and will increase ability to function adaptively    Current Stressors / Issues:  Patient reported that her recent work meeting went well and felt like it was overall productive. Patient reflected upon negative feedback that she received and how it triggered anxiety and feelings of insecurity within her job. Patient shared that her management team is not known for providing positive feedback which can be hard when she finds the positive feedback helpful for her. Patient discussed feelings of dread as the weekend ends as she anticipates the return to work. She reflected upon anxieties about making a mistake or forgetting a step. Patient continues to try to focus on strategies to help her track conversations and remember tasks, but challenges with remembering tasks such as using a bullet journal. Explored potential fight or flight responses impacting ability to be present in her tasks. Patient is in agreement that this may be a factor and shared that it can be helpful for her to \"get back in my body\" throughout the day. Continued to explore how to reduce fight or flight and how to try to de-personalize lack of positive feedback from management.    Progress on Treatment Objective(s) / Homework:  Satisfactory progress - ACTION (Actively working towards change); Intervened by reinforcing change plan / affirming steps taken    Motivational Interviewing    MI Intervention: Expressed Empathy/Understanding, Supported Autonomy, Collaboration, Evocation, Permission to raise concern or advise, Open-ended questions, Change talk (evoked) and Reframe     Change Talk Expressed by the Patient: Desire to change Ability to change Reasons to change Need to change Committment to change Activation    Provider Response to Change Talk: E - Evoked more info from patient about behavior change, A - Affirmed " patient's thoughts, decisions, or attempts at behavior change, R - Reflected patient's change talk and S - Summarized patient's change talk statements     Cognitive Behavioral Therapy: Discussed connection between thoughts, feelings, and behaviors. Taught patient how to identify cognitive distortions, and assisted patient to identify own cognitive distortions. Taught and engaged patient in cognitive restructuring techniques.    Mindfulness-Based Strategies : Discussed skills based on development and application of mindfulness    Also provided psychoeducation about behavioral health condition, symptoms, and treatment options    Care Plan review completed: Yes    Medication Review:  No current psychiatric medications prescribed    Medication Compliance:  NA    Changes in Health Issues:   None reported    Chemical Use Review:   Substance Use: Chemical use reviewed, no active concerns identified      Tobacco Use: No current tobacco use.      Assessment: Current Emotional / Mental Status (status of significant symptoms):  Risk status (Self / Other harm or suicidal ideation)  Patient denies a history of suicidal ideation, suicide attempts, self-injurious behavior, homicidal ideation, homicidal behavior and and other safety concerns  Patient denies current fears or concerns for personal safety.  Patient denies current or recent suicidal ideation or behaviors.  Patient denies current or recent homicidal ideation or behaviors.  Patient denies current or recent self injurious behavior or ideation.  Patient denies other safety concerns.  A safety and risk management plan has not been developed at this time, however patient was encouraged to call [facility] should there be a change in any of these risk factors.    Appearance:   Appropriate   Eye Contact:   Good   Psychomotor Behavior: Normal   Attitude:   Cooperative   Orientation:   All  Speech   Rate / Production: Normal    Volume:  Normal    Mood:    Normal  Affect:    Appropriate   Thought Content:  Clear   Thought Form:  Coherent  Logical   Insight:    Good     Diagnoses:  1. Other recurrent depressive disorders (H)    2. Social Anxiety Disorder (Social Phobia)        Collateral Reports Completed:  Not Applicable    Plan: (Homework, other):  Patient was given information about behavioral services and encouraged to schedule a follow up appointment with the clinic [facility] in two weeks. She was also given Cognitive Behavioral Therapy skills to practice when experiencing anxiety and depression. . CD Recommendations: No indications of CD issues.  Dawn Franks, TG, [facility]      ______________________________________________________________________    Integrated Primary Care Behavioral Health Treatment Plan    Patient's Name: Scott Dobbs  YOB: 1985                               Individual Treatment Plan    Date of Creation: 6/9/20  Date Treatment Plan Last Reviewed/Revised: 1/26/22    DSM-V Diagnoses: 311 (F32.8) Other/unspec. Depressive Disorder or 300.23 (F40.10) Social Anxiety Disorder , per updated DA on 12/26/22 by Mejia Gonzalez PsyD, LP  Psychosocial / Contextual Factors:  postpartum within past year, transition back to work in February, coping with pandemic  WHODAS 2.0 Total Score 2/11/2021 5/21/2021   Total Score 19 19   Total Score MyChart 19 19       Referral / Collaboration:  Referral to another professional/service is not indicated at this time..     Anticipated number of session for this episode of care: 12-15  Anticipation frequency of session: Every other week  Anticipated Duration of each session: 38-52 minutes  Treatment plan will be reviewed in 90 days or when goals have been changed.       MeasurableTreatment Goal(s) related to diagnosis / functional impairment(s)  Goal 1: Patient will reduce symptoms of anxiety as evidenced by decreased score on BALA 7.     I will know I've met my goal when I worry less in social/work  interactions      Objective #A (Patient Action)    Patient will identify three distraction and diversion activities and use those activities to decrease level of anxiety  .  Status: Continued - Date(s): 1/26/22    Intervention(s)  [facility] will teach distress tolerance, mindfulness, and relaxation techniques.    Objective #B  Patient will use cognitive strategies identified in therapy to challenge anxious thoughts.  Status: Continued - Date(s): 1/26/22  Intervention(s)  [facility] will assign homework to practice cognitive restructuring and defusion techniques.    Objective #C  Patient will use relaxation strategies 2-3 times per day to reduce the physical symptoms of anxiety.  Status: Continued - Date(s): 1/26/22    Intervention(s)  [facility] will teach relaxation techniques.      Goal 2: Patient will reduce symptoms of depression as evidenced by decreased scores on PHQ9.    I will know I've met my goal when I feel like I can start and end tasks, find more motivation.      Objective #A (Patient Action)    Status: New - Date: 1/26/22     Patient will Increase interest, engagement, and pleasure in doing things.    Intervention(s)  [facility] will explore strategies to help increase motivation and interest.    Objective #B  Patient will Identify negative self-talk and behaviors: challenge core beliefs, myths, and actions.    Status: New - Date: 1/26/22     Intervention(s)  [facility] will continue to explore automatic unhelpful/unhealthy thoughts and beliefs, and begin to create new helpeful/helpful automatic thoughts and beliefs.      Patient has reviewed and agreed to the above plan.      Dawn Franks, GEO  January 26, 2022

## 2022-03-21 ENCOUNTER — VIRTUAL VISIT (OUTPATIENT)
Dept: BEHAVIORAL HEALTH | Facility: CLINIC | Age: 37
End: 2022-03-21
Payer: COMMERCIAL

## 2022-03-21 DIAGNOSIS — F40.10 SOCIAL ANXIETY DISORDER: ICD-10-CM

## 2022-03-21 DIAGNOSIS — F33.8 OTHER RECURRENT DEPRESSIVE DISORDERS (H): Primary | ICD-10-CM

## 2022-03-21 PROCEDURE — 90834 PSYTX W PT 45 MINUTES: CPT | Mod: GT | Performed by: SOCIAL WORKER

## 2022-03-21 NOTE — PROGRESS NOTES
[facility]- Integrated Behavioral Health Services  March 21, 2022    Behavioral Health Clinician Progress Note    Patient Name: Scott Dobbs      Telemedicine Visit: The patient's condition can be safely assessed and treated via synchronous audio and visual telemedicine encounter.      Reason for Telemedicine Visit: Patient has requested telehealth visit due to COVID-19    Originating Site (Patient Location): Patient's place of employment    Distant Site (Provider Location): Provider Remote Setting- Home Office    Consent:  The patient/guardian has verbally consented to: the potential risks and benefits of telemedicine (video visit) versus in person care; bill my insurance or make self-payment for services provided; and responsibility for payment of non-covered services.     Mode of Communication:  Video Conference via [employer]    As the provider I attest to compliance with applicable laws and regulations related to telemedicine.         Service Type:  Individual     Session Start Time:  3:16 pm  Session End Time: 3:55 pm     Session Length: 38 - 52      Attendees: Patient    Visit Activities (Refresh list every visit): [facility] Only    Diagnostic Assessment Date: 12/16/21 by Mejia Gonzalez PsyD, LP  Treatment Plan Review Date: 1/26/22  See Flowsheets for today's PHQ-9 and BALA-7 results  Previous PHQ-9:   PHQ-9 SCORE 12/9/2021 1/13/2022 2/21/2022   PHQ-9 Total Score MyChart - 6 (Mild depression) 6 (Mild depression)   PHQ-9 Total Score 7 6 6     Previous BALA-7:   BALA-7 SCORE 6/30/2021 11/17/2021 12/9/2021   Total Score 2 (minimal anxiety) 5 (mild anxiety) -   Total Score 2 5 2       ED LEVEL:  No flowsheet data found.    DATA  Extended Session (60+ minutes): No  Interactive Complexity: No  Crisis: No  [facility] Patient: No    Treatment Objective(s) Addressed in This Session:  Target Behavior(s): Mental health management    Anxiety: will experience a reduction in anxiety, will develop more  "effective coping skills to manage anxiety symptoms, will develop healthy cognitive patterns and beliefs and will increase ability to function adaptively    Current Stressors / Issues:  Patient reported overall doing well. She reported less dread as she prepares for the start of a work week and shared that she feels like she may be increasing her confidence in managing tasks and feeling more secure in her job.  Patient reflected upon feeling of \"not accomplishing tasks\" today. Upon exploration, she recognized how she feels frustrated that she cannot focus on tasks that she wants to and is spending large amounts of time completing small tasks that do not get as much recognition. Patient continues to feel mismatch between what she appreciates at work (hearing that she is valued and appreciated) and how her management functions. Continued to explore how to re-define success, accomplishments, and ways to view others' behaviors, with patient continuing to recognize need to recognize all that she is doing even if others are not.      Progress on Treatment Objective(s) / Homework:  Satisfactory progress - ACTION (Actively working towards change); Intervened by reinforcing change plan / affirming steps taken    Motivational Interviewing    MI Intervention: Expressed Empathy/Understanding, Supported Autonomy, Collaboration, Evocation, Permission to raise concern or advise, Open-ended questions, Change talk (evoked) and Reframe     Change Talk Expressed by the Patient: Desire to change Ability to change Reasons to change Need to change Committment to change Activation    Provider Response to Change Talk: E - Evoked more info from patient about behavior change, A - Affirmed patient's thoughts, decisions, or attempts at behavior change, R - Reflected patient's change talk and S - Summarized patient's change talk statements     Cognitive Behavioral Therapy: Discussed connection between thoughts, feelings, and behaviors. Taught " patient how to identify cognitive distortions, and assisted patient to identify own cognitive distortions. Taught and engaged patient in cognitive restructuring techniques.    Mindfulness-Based Strategies : Discussed skills based on development and application of mindfulness    Also provided psychoeducation about behavioral health condition, symptoms, and treatment options    Care Plan review completed: Yes    Medication Review:  No current psychiatric medications prescribed    Medication Compliance:  NA    Changes in Health Issues:   None reported    Chemical Use Review:   Substance Use: Chemical use reviewed, no active concerns identified      Tobacco Use: No current tobacco use.      Assessment: Current Emotional / Mental Status (status of significant symptoms):  Risk status (Self / Other harm or suicidal ideation)  Patient denies a history of suicidal ideation, suicide attempts, self-injurious behavior, homicidal ideation, homicidal behavior and and other safety concerns  Patient denies current fears or concerns for personal safety.  Patient denies current or recent suicidal ideation or behaviors.  Patient denies current or recent homicidal ideation or behaviors.  Patient denies current or recent self injurious behavior or ideation.  Patient denies other safety concerns.  A safety and risk management plan has not been developed at this time, however patient was encouraged to call [place] [phone] should there be a change in any of these risk factors.    Appearance:   Appropriate   Eye Contact:   Good   Psychomotor Behavior: Normal   Attitude:   Cooperative   Orientation:   All  Speech   Rate / Production: Normal    Volume:  Normal   Mood:    Normal  Affect:    Appropriate   Thought Content:  Clear   Thought Form:  Coherent  Logical   Insight:    Good     Diagnoses:  1. Other recurrent depressive disorders (H)    2. Social Anxiety Disorder (Social Phobia)        Collateral Reports Completed:  Not  Applicable    Plan: (Homework, other):  Patient was given information about behavioral services and encouraged to schedule a follow up appointment with the clinic [facility] in two weeks. She was also given Cognitive Behavioral Therapy skills to practice when experiencing anxiety and depression. . CD Recommendations: No indications of CD issues.  Dawn Franks, [facility], [facility]      ______________________________________________________________________    Integrated Primary Care Behavioral Health Treatment Plan    Patient's Name: Scott Dobbs  YOB: 1985                               Individual Treatment Plan    Date of Creation: 6/9/20  Date Treatment Plan Last Reviewed/Revised: 1/26/22    DSM-V Diagnoses: 311 (F32.8) Other/unspec. Depressive Disorder or 300.23 (F40.10) Social Anxiety Disorder , per updated DA on 12/26/22 by Mejia Gonzalez PsyD, LP  Psychosocial / Contextual Factors:  postpartum within past year, transition back to work in February, coping with pandemic  WHODAS 2.0 Total Score 2/11/2021 5/21/2021   Total Score 19 19   Total Score MyChart 19 19       Referral / Collaboration:  Referral to another professional/service is not indicated at this time..     Anticipated number of session for this episode of care: 12-15  Anticipation frequency of session: Every other week  Anticipated Duration of each session: 38-52 minutes  Treatment plan will be reviewed in 90 days or when goals have been changed.       MeasurableTreatment Goal(s) related to diagnosis / functional impairment(s)  Goal 1: Patient will reduce symptoms of anxiety as evidenced by decreased score on BALA 7.     I will know I've met my goal when I worry less in social/work interactions      Objective #A (Patient Action)    Patient will identify three distraction and diversion activities and use those activities to decrease level of anxiety  .  Status: Continued - Date(s): 1/26/22    Intervention(s)  [facility] will teach distress tolerance,  mindfulness, and relaxation techniques.    Objective #B  Patient will use cognitive strategies identified in therapy to challenge anxious thoughts.  Status: Continued - Date(s): 1/26/22  Intervention(s)  [facility] will assign homework to practice cognitive restructuring and defusion techniques.    Objective #C  Patient will use relaxation strategies 2-3 times per day to reduce the physical symptoms of anxiety.  Status: Continued - Date(s): 1/26/22    Intervention(s)  [facility] will teach relaxation techniques.      Goal 2: Patient will reduce symptoms of depression as evidenced by decreased scores on PHQ9.    I will know I've met my goal when I feel like I can start and end tasks, find more motivation.      Objective #A (Patient Action)    Status: New - Date: 1/26/22     Patient will Increase interest, engagement, and pleasure in doing things.    Intervention(s)  [facility] will explore strategies to help increase motivation and interest.    Objective #B  Patient will Identify negative self-talk and behaviors: challenge core beliefs, myths, and actions.    Status: New - Date: 1/26/22     Intervention(s)  [facility] will continue to explore automatic unhelpful/unhealthy thoughts and beliefs, and begin to create new helpeful/helpful automatic thoughts and beliefs.      Patient has reviewed and agreed to the above plan.      GEO Perez  January 26, 2022

## 2022-04-10 ENCOUNTER — HEALTH MAINTENANCE LETTER (OUTPATIENT)
Age: 37
End: 2022-04-10

## 2022-04-14 ENCOUNTER — VIRTUAL VISIT (OUTPATIENT)
Dept: BEHAVIORAL HEALTH | Facility: CLINIC | Age: 37
End: 2022-04-14
Payer: COMMERCIAL

## 2022-04-14 DIAGNOSIS — F33.8 OTHER RECURRENT DEPRESSIVE DISORDERS (H): Primary | ICD-10-CM

## 2022-04-14 DIAGNOSIS — F40.10 SOCIAL ANXIETY DISORDER: ICD-10-CM

## 2022-04-14 PROCEDURE — 90834 PSYTX W PT 45 MINUTES: CPT | Mod: 95 | Performed by: SOCIAL WORKER

## 2022-04-14 ASSESSMENT — PATIENT HEALTH QUESTIONNAIRE - PHQ9
SUM OF ALL RESPONSES TO PHQ QUESTIONS 1-9: 4
10. IF YOU CHECKED OFF ANY PROBLEMS, HOW DIFFICULT HAVE THESE PROBLEMS MADE IT FOR YOU TO DO YOUR WORK, TAKE CARE OF THINGS AT HOME, OR GET ALONG WITH OTHER PEOPLE: SOMEWHAT DIFFICULT
SUM OF ALL RESPONSES TO PHQ QUESTIONS 1-9: 4

## 2022-04-14 NOTE — PROGRESS NOTES
[facility]- Integrated Behavioral Health Services  April 14, 2022    Behavioral Health Clinician Progress Note    Patient Name: Scott Dobbs      Telemedicine Visit: The patient's condition can be safely assessed and treated via synchronous audio and visual telemedicine encounter.      Reason for Telemedicine Visit: Patient has requested telehealth visit due to COVID-19    Originating Site (Patient Location): Patient's place of employment    Distant Site (Provider Location): [facility]: [facility]    Consent:  The patient/guardian has verbally consented to: the potential risks and benefits of telemedicine (video visit) versus in person care; bill my insurance or make self-payment for services provided; and responsibility for payment of non-covered services.     Mode of Communication:  Video Conference via Enject    As the provider I attest to compliance with applicable laws and regulations related to telemedicine.         Service Type:  Individual     Session Start Time:  3:16 pm  Session End Time:  4: 05 pm     Session Length: 38 - 52      Attendees: Patient    Visit Activities (Refresh list every visit): [facility] Only    Diagnostic Assessment Date: 12/16/21 by Mejia Gonzalez PsyD, LP  Treatment Plan Review Date: 1/26/22  See Flowsheets for today's PHQ-9 and BALA-7 results  Previous PHQ-9:   PHQ-9 SCORE 1/13/2022 2/21/2022 4/14/2022   PHQ-9 Total Score MyChart 6 (Mild depression) 6 (Mild depression) 4 (Minimal depression)   PHQ-9 Total Score 6 6 4     Previous BALA-7:   BALA-7 SCORE 6/30/2021 11/17/2021 12/9/2021   Total Score 2 (minimal anxiety) 5 (mild anxiety) -   Total Score 2 5 2       ED LEVEL:  No flowsheet data found.    DATA  Extended Session (60+ minutes): No  Interactive Complexity: No  Crisis: No  [facility] Patient: No    Treatment Objective(s) Addressed in This Session:  Target Behavior(s): Mental health management    Anxiety: will  experience a reduction in anxiety, will develop more effective coping skills to manage anxiety symptoms, will develop healthy cognitive patterns and beliefs and will increase ability to function adaptively    Current Stressors / Issues:  Patient reported overall continuing to be doing well.  She stated that she feels like she has been able to recently enjoy getting out of the house more and connecting with her support system. Patient is preparing for the next wave of the pandemic, but finds that it feels easier to cope with given recent connection with others and anticipation of next wave occurring during warmer weather when it is easier to get outside. Patient shared that she feels more comfortable and settled in her work, feels valued and appreciated due to recent raise that she received. Patient is trying to focus on her personal strengths, lean into her strengths versus ruminating and focusing on need to fix weaknesses immediately. Patient is currently navigating relationship dynamics with her partner as he manages his own stress related to the pandemic and feelings of being burnt out.     Progress on Treatment Objective(s) / Homework:  Stable - MAINTENANCE (Working to maintain change, with risk of relapse); Intervened by continuing to positively reinforce healthy behavior choice     Motivational Interviewing    MI Intervention: Expressed Empathy/Understanding, Supported Autonomy, Collaboration, Evocation, Permission to raise concern or advise, Open-ended questions, Change talk (evoked) and Reframe     Change Talk Expressed by the Patient: Desire to change Ability to change Reasons to change Need to change Committment to change Activation    Provider Response to Change Talk: E - Evoked more info from patient about behavior change, A - Affirmed patient's thoughts, decisions, or attempts at behavior change, R - Reflected patient's change talk and S - Summarized patient's change talk statements     Cognitive  Behavioral Therapy: Discussed connection between thoughts, feelings, and behaviors. Taught patient how to identify cognitive distortions, and assisted patient to identify own cognitive distortions. Taught and engaged patient in cognitive restructuring techniques.    Mindfulness-Based Strategies : Discussed skills based on development and application of mindfulness    Also provided psychoeducation about behavioral health condition, symptoms, and treatment options    Care Plan review completed: Yes    Medication Review:  No current psychiatric medications prescribed    Medication Compliance:  NA    Changes in Health Issues:   None reported    Chemical Use Review:   Substance Use: Chemical use reviewed, no active concerns identified      Tobacco Use: No current tobacco use.      Assessment: Current Emotional / Mental Status (status of significant symptoms):  Risk status (Self / Other harm or suicidal ideation)  Patient denies a history of suicidal ideation, suicide attempts, self-injurious behavior, homicidal ideation, homicidal behavior and and other safety concerns  Patient denies current fears or concerns for personal safety.  Patient denies current or recent suicidal ideation or behaviors.  Patient denies current or recent homicidal ideation or behaviors.  Patient denies current or recent self injurious behavior or ideation.  Patient denies other safety concerns.  A safety and risk management plan has not been developed at this time, however patient was encouraged to call [facility] should there be a change in any of these risk factors.    Appearance:   Appropriate   Eye Contact:   Good   Psychomotor Behavior: Normal   Attitude:   Cooperative   Orientation:   All  Speech   Rate / Production: Normal    Volume:  Normal   Mood:    Normal  Affect:    Appropriate   Thought Content:  Clear   Thought Form:  Coherent  Logical   Insight:    Good     Diagnoses:  1. Other recurrent depressive disorders (H)    2.  Social Anxiety Disorder (Social Phobia)        Collateral Reports Completed:  Not Applicable    Plan: (Homework, other):  Patient was given information about behavioral services and encouraged to schedule a follow up appointment with the clinic [facility] in two weeks. She was also given Cognitive Behavioral Therapy skills to practice when experiencing anxiety and depression. . CD Recommendations: No indications of CD issues.  Dawn Franks, [facility], [facility]      ______________________________________________________________________    Integrated Primary Care Behavioral Health Treatment Plan    Patient's Name: Scott Dobbs  YOB: 1985                               Individual Treatment Plan    Date of Creation: 6/9/20  Date Treatment Plan Last Reviewed/Revised: 1/26/22    DSM-V Diagnoses: 311 (F32.8) Other/unspec. Depressive Disorder or 300.23 (F40.10) Social Anxiety Disorder , per updated DA on 12/26/22 by Mejia Gonzalez PsyD, LP  Psychosocial / Contextual Factors:  postpartum within past year, transition back to work in February, coping with pandemic  WHODAS 2.0 Total Score 2/11/2021 5/21/2021   Total Score 19 19   Total Score MyChart 19 19       Referral / Collaboration:  Referral to another professional/service is not indicated at this time..     Anticipated number of session for this episode of care: 12-15  Anticipation frequency of session: Every other week  Anticipated Duration of each session: 38-52 minutes  Treatment plan will be reviewed in 90 days or when goals have been changed.       MeasurableTreatment Goal(s) related to diagnosis / functional impairment(s)  Goal 1: Patient will reduce symptoms of anxiety as evidenced by decreased score on BALA 7.     I will know I've met my goal when I worry less in social/work interactions      Objective #A (Patient Action)    Patient will identify three distraction and diversion activities and use those activities to decrease level of anxiety  .  Status:  Continued - Date(s): 1/26/22    Intervention(s)  [facility] will teach distress tolerance, mindfulness, and relaxation techniques.    Objective #B  Patient will use cognitive strategies identified in therapy to challenge anxious thoughts.  Status: Continued - Date(s): 1/26/22  Intervention(s)  [facility] will assign homework to practice cognitive restructuring and defusion techniques.    Objective #C  Patient will use relaxation strategies 2-3 times per day to reduce the physical symptoms of anxiety.  Status: Continued - Date(s): 1/26/22    Intervention(s)  [facility] will teach relaxation techniques.      Goal 2: Patient will reduce symptoms of depression as evidenced by decreased scores on PHQ9.    I will know I've met my goal when I feel like I can start and end tasks, find more motivation.      Objective #A (Patient Action)    Status: New - Date: 1/26/22     Patient will Increase interest, engagement, and pleasure in doing things.    Intervention(s)  [facility] will explore strategies to help increase motivation and interest.    Objective #B  Patient will Identify negative self-talk and behaviors: challenge core beliefs, myths, and actions.    Status: New - Date: 1/26/22     Intervention(s)  [facility] will continue to explore automatic unhelpful/unhealthy thoughts and beliefs, and begin to create new helpeful/helpful automatic thoughts and beliefs.      Patient has reviewed and agreed to the above plan.      GEO Perez  January 26, 2022

## 2022-04-15 ASSESSMENT — PATIENT HEALTH QUESTIONNAIRE - PHQ9: SUM OF ALL RESPONSES TO PHQ QUESTIONS 1-9: 4

## 2022-05-18 ENCOUNTER — VIRTUAL VISIT (OUTPATIENT)
Dept: BEHAVIORAL HEALTH | Facility: CLINIC | Age: 37
End: 2022-05-18
Payer: COMMERCIAL

## 2022-05-18 DIAGNOSIS — F41.1 GENERALIZED ANXIETY DISORDER: Primary | ICD-10-CM

## 2022-05-18 DIAGNOSIS — F40.10 SOCIAL ANXIETY DISORDER: ICD-10-CM

## 2022-05-18 PROCEDURE — 90834 PSYTX W PT 45 MINUTES: CPT | Mod: GT | Performed by: SOCIAL WORKER

## 2022-05-18 ASSESSMENT — ANXIETY QUESTIONNAIRES
8. IF YOU CHECKED OFF ANY PROBLEMS, HOW DIFFICULT HAVE THESE MADE IT FOR YOU TO DO YOUR WORK, TAKE CARE OF THINGS AT HOME, OR GET ALONG WITH OTHER PEOPLE?: SOMEWHAT DIFFICULT
6. BECOMING EASILY ANNOYED OR IRRITABLE: MORE THAN HALF THE DAYS
2. NOT BEING ABLE TO STOP OR CONTROL WORRYING: MORE THAN HALF THE DAYS
GAD7 TOTAL SCORE: 8
5. BEING SO RESTLESS THAT IT IS HARD TO SIT STILL: NOT AT ALL
GAD7 TOTAL SCORE: 8
3. WORRYING TOO MUCH ABOUT DIFFERENT THINGS: SEVERAL DAYS
4. TROUBLE RELAXING: SEVERAL DAYS
1. FEELING NERVOUS, ANXIOUS, OR ON EDGE: MORE THAN HALF THE DAYS
GAD7 TOTAL SCORE: 8
7. FEELING AFRAID AS IF SOMETHING AWFUL MIGHT HAPPEN: NOT AT ALL
7. FEELING AFRAID AS IF SOMETHING AWFUL MIGHT HAPPEN: NOT AT ALL

## 2022-05-18 ASSESSMENT — PATIENT HEALTH QUESTIONNAIRE - PHQ9
10. IF YOU CHECKED OFF ANY PROBLEMS, HOW DIFFICULT HAVE THESE PROBLEMS MADE IT FOR YOU TO DO YOUR WORK, TAKE CARE OF THINGS AT HOME, OR GET ALONG WITH OTHER PEOPLE: SOMEWHAT DIFFICULT
SUM OF ALL RESPONSES TO PHQ QUESTIONS 1-9: 4
SUM OF ALL RESPONSES TO PHQ QUESTIONS 1-9: 4

## 2022-05-18 NOTE — PROGRESS NOTES
[facility]  May 18, 2022    Behavioral Health Clinician Progress Note    Patient Name: Scott Dobbs      Telemedicine Visit: The patient's condition can be safely assessed and treated via synchronous audio and visual telemedicine encounter.      Reason for Telemedicine Visit: Patient has requested telehealth visit due to COVID-19    Originating Site (Patient Location): Patient's place of employment    Distant Site (Provider Location): [facility] Clin[facility]: [facility]    Consent:  The patient/guardian has verbally consented to: the potential risks and benefits of telemedicine (video visit) versus in person care; bill my insurance or make self-payment for services provided; and responsibility for payment of non-covered services.     Mode of Communication:  Video Conference via Mobile [employer]    As the provider I attest to compliance with applicable laws and regulations related to telemedicine.         Service Type:  Individual     Session Start Time:  3: 04 pm  Session End Time:  3:56 pm     Session Length: 38 - 52      Attendees: Patient    Visit Activities (Refresh list every visit): [facility] Only    Diagnostic Assessment Date: 12/16/21 by Mejia Gonzalez PsyD, LP  Treatment Plan Review Date: 5/18/22  See Flowsheets for today's PHQ-9 and BALA-7 results  Previous PHQ-9:   PHQ-9 SCORE 2/21/2022 4/14/2022 5/18/2022   PHQ-9 Total Score MyChart 6 (Mild depression) 4 (Minimal depression) 4 (Minimal depression)   PHQ-9 Total Score 6 4 4     Previous BALA-7:   BALA-7 SCORE 11/17/2021 12/9/2021 5/18/2022   Total Score 5 (mild anxiety) - 8 (mild anxiety)   Total Score 5 2 8       ED LEVEL:  No flowsheet data found.    DATA  Extended Session (60+ minutes): No  Interactive Complexity: No  Crisis: No  [facility] Patient: No    Treatment Objective(s) Addressed in This Session:  Target Behavior(s): Mental health management    Anxiety: will experience  a reduction in anxiety, will develop more effective coping skills to manage anxiety symptoms, will develop healthy cognitive patterns and beliefs and will increase ability to function adaptively    Current Stressors / Issues:  Patient reported overall doing well. She and her partner continue to navigate managing a home, working, and parenting. Patient discussed recent challenges related to how her behaviors and cognitions can be interpreted by him and the challenges she encounters when she tries to talk to him about how it all. Patient open to continuing to explore avenues to help assist with communication in the home.     Progress on Treatment Objective(s) / Homework:  Stable - MAINTENANCE (Working to maintain change, with risk of relapse); Intervened by continuing to positively reinforce healthy behavior choice     Motivational Interviewing    MI Intervention: Expressed Empathy/Understanding, Supported Autonomy, Collaboration, Evocation, Permission to raise concern or advise, Open-ended questions, Change talk (evoked) and Reframe     Change Talk Expressed by the Patient: Desire to change Ability to change Reasons to change Need to change Committment to change Activation    Provider Response to Change Talk: E - Evoked more info from patient about behavior change, A - Affirmed patient's thoughts, decisions, or attempts at behavior change, R - Reflected patient's change talk and S - Summarized patient's change talk statements     Cognitive Behavioral Therapy: Discussed connection between thoughts, feelings, and behaviors. Taught patient how to identify cognitive distortions, and assisted patient to identify own cognitive distortions. Taught and engaged patient in cognitive restructuring techniques.    Mindfulness-Based Strategies : Discussed skills based on development and application of mindfulness    Also provided psychoeducation about behavioral health condition, symptoms, and treatment options    Care Plan review  completed: Yes    Medication Review:  No current psychiatric medications prescribed    Medication Compliance:  NA    Changes in Health Issues:   None reported    Chemical Use Review:   Substance Use: Chemical use reviewed, no active concerns identified      Tobacco Use: No current tobacco use.      Assessment: Current Emotional / Mental Status (status of significant symptoms):  Risk status (Self / Other harm or suicidal ideation)  Patient denies a history of suicidal ideation, suicide attempts, self-injurious behavior, homicidal ideation, homicidal behavior and and other safety concerns  Patient denies current fears or concerns for personal safety.  Patient denies current or recent suicidal ideation or behaviors.  Patient denies current or recent homicidal ideation or behaviors.  Patient denies current or recent self injurious behavior or ideation.  Patient denies other safety concerns.  A safety and risk management plan has not been developed at this time, however patient was encouraged to call [place] [phone] should there be a change in any of these risk factors.    Appearance:   Appropriate   Eye Contact:   Good   Psychomotor Behavior: Normal   Attitude:   Cooperative   Orientation:   All  Speech   Rate / Production: Normal    Volume:  Normal   Mood:    Normal  Affect:    Appropriate   Thought Content:  Clear   Thought Form:  Coherent  Logical   Insight:    Good     Diagnoses:  1. Generalized anxiety disorder    2. Social Anxiety Disorder (Social Phobia)        Collateral Reports Completed:  Not Applicable    Plan: (Homework, other):  Patient was given information about behavioral services and encouraged to schedule a follow up appointment with the clinic [facility] in two weeks. She was also given Cognitive Behavioral Therapy skills to practice when experiencing anxiety. Patient informed of provider's resignation next month. Patient to explore transfers to [facility] therapists, but may consider referral to community  depending on the wait time.  CD Recommendations: No indications of CD issues.  Dawn Franks, [facility], [facility]      ______________________________________________________________________    Integrated Primary Care Behavioral Health Treatment Plan    Patient's Name: Scott Dobbs  YOB: 1985                               Individual Treatment Plan    Date of Creation: 6/9/20  Date Treatment Plan Last Reviewed/Revised: 5/18/22    DSM-V Diagnoses: 311 (F32.8) Other/unspec. Depressive Disorder or 300.23 (F40.10) Social Anxiety Disorder , per updated DA on 12/26/22 by Mejia Gonzalez PsyD, LP  Psychosocial / Contextual Factors:  postpartum within past year, transition back to work in February, coping with pandemic  WHODAS 2.0 Total Score 2/11/2021 5/21/2021   Total Score 19 19   Total Score MyChart 19 19       Referral / Collaboration:  Referral to another professional/service is not indicated at this time..     Anticipated number of session for this episode of care: 12-15  Anticipation frequency of session: Every other week  Anticipated Duration of each session: 38-52 minutes  Treatment plan will be reviewed in 90 days or when goals have been changed.       MeasurableTreatment Goal(s) related to diagnosis / functional impairment(s)  Goal 1: Patient will reduce symptoms of anxiety as evidenced by decreased score on BALA 7.     I will know I've met my goal when I worry less in social/work interactions      Objective #A (Patient Action)    Patient will identify three distraction and diversion activities and use those activities to decrease level of anxiety  .  Status: Continued - Date(s): 5/18/22    Intervention(s)  [facility] will teach distress tolerance, mindfulness, and relaxation techniques.    Objective #B  Patient will use cognitive strategies identified in therapy to challenge anxious thoughts.  Status: Continued - Date(s): 5/18/22  Intervention(s)  [facility] will assign homework to practice cognitive restructuring and  defusion techniques.    Objective #C  Patient will use relaxation strategies 2-3 times per day to reduce the physical symptoms of anxiety.  Status: Continued - Date(s): 5/18/22    Intervention(s)  [facility] will teach relaxation techniques.      Goal 2: Patient will reduce symptoms of depression as evidenced by decreased scores on PHQ9.    I will know I've met my goal when I feel like I can start and end tasks, find more motivation.      Objective #A (Patient Action)    Status: Completed - Date: 5/18/22     Patient will Increase interest, engagement, and pleasure in doing things.    Intervention(s)  [facility] will explore strategies to help increase motivation and interest.    Objective #B  Patient will Identify negative self-talk and behaviors: challenge core beliefs, myths, and actions.    Status: Completed - Date: 5/18/22     Intervention(s)  [facility] will continue to explore automatic unhelpful/unhealthy thoughts and beliefs, and begin to create new helpeful/helpful automatic thoughts and beliefs.      Patient has reviewed and agreed to the above plan.      Dawn Franks, [facility]  May 18, 2022

## 2022-05-25 ENCOUNTER — E-VISIT (OUTPATIENT)
Dept: URGENT CARE | Facility: CLINIC | Age: 37
End: 2022-05-25
Payer: COMMERCIAL

## 2022-05-25 ENCOUNTER — OFFICE VISIT (OUTPATIENT)
Dept: FAMILY MEDICINE | Facility: CLINIC | Age: 37
End: 2022-05-25

## 2022-05-25 VITALS
TEMPERATURE: 97.5 F | HEIGHT: 70 IN | BODY MASS INDEX: 41.95 KG/M2 | WEIGHT: 293 LBS | SYSTOLIC BLOOD PRESSURE: 133 MMHG | HEART RATE: 75 BPM | OXYGEN SATURATION: 98 % | DIASTOLIC BLOOD PRESSURE: 84 MMHG

## 2022-05-25 DIAGNOSIS — J02.9 SORE THROAT: Primary | ICD-10-CM

## 2022-05-25 PROBLEM — J96.01 ACUTE RESPIRATORY FAILURE WITH HYPOXIA (H): Status: RESOLVED | Noted: 2021-11-23 | Resolved: 2022-05-25

## 2022-05-25 LAB
DEPRECATED S PYO AG THROAT QL EIA: NEGATIVE
GROUP A STREP BY PCR: NOT DETECTED

## 2022-05-25 PROCEDURE — 99213 OFFICE O/P EST LOW 20 MIN: CPT | Performed by: NURSE PRACTITIONER

## 2022-05-25 PROCEDURE — 87651 STREP A DNA AMP PROBE: CPT | Performed by: NURSE PRACTITIONER

## 2022-05-25 PROCEDURE — U0005 INFEC AGEN DETEC AMPLI PROBE: HCPCS | Performed by: NURSE PRACTITIONER

## 2022-05-25 PROCEDURE — U0003 INFECTIOUS AGENT DETECTION BY NUCLEIC ACID (DNA OR RNA); SEVERE ACUTE RESPIRATORY SYNDROME CORONAVIRUS 2 (SARS-COV-2) (CORONAVIRUS DISEASE [COVID-19]), AMPLIFIED PROBE TECHNIQUE, MAKING USE OF HIGH THROUGHPUT TECHNOLOGIES AS DESCRIBED BY CMS-2020-01-R: HCPCS | Performed by: NURSE PRACTITIONER

## 2022-05-25 PROCEDURE — 99207 PR NO CHARGE LOS: CPT | Performed by: PHYSICIAN ASSISTANT

## 2022-05-25 ASSESSMENT — PAIN SCALES - GENERAL: PAINLEVEL: MODERATE PAIN (4)

## 2022-05-25 NOTE — PROGRESS NOTES
"  Assessment & Plan   Problem List Items Addressed This Visit    None     Visit Diagnoses     Sore throat    -  Primary    Relevant Orders    Streptococcus A Rapid Screen w/Reflex to PCR - Clinic Collect    Symptomatic; Yes; 5/25/2022 COVID-19 Virus (Coronavirus) by PCR         20 minutes spent on the date of the encounter doing chart review, history and exam, documentation and further activities per the note       BMI:   Estimated body mass index is 47.85 kg/m  as calculated from the following:    Height as of this encounter: 1.778 m (5' 10\").    Weight as of this encounter: 151.3 kg (333 lb 8 oz).       See Patient Instructions    No follow-ups on file.    ELIN Esparza CNP  M [facility] DANE Chavez is a 36 year old who presents for the following health issues     History of Present Illness       Reason for visit:  Sore throat  Symptom onset:  1-3 days ago  Symptoms include:  Sore throat. didn't look like strep to me  Symptom intensity:  Moderate  Symptom progression:  Staying the same  Had these symptoms before:  Yes  Has tried/received treatment for these symptoms:  Yes  Previous treatment was successful:  Yes  Prior treatment description:  Antibiotics for strep, not sure it's strep this time though  What makes it worse:  Eating  What makes it better:  Drinking, especially warm drinks    She eats 4 or more servings of fruits and vegetables daily.She consumes 0 sweetened beverage(s) daily.She exercises with enough effort to increase her heart rate 20 to 29 minutes per day.  She exercises with enough effort to increase her heart rate 5 days per week.   She is taking medications regularly.           Review of Systems   Constitutional, HEENT, cardiovascular, pulmonary, gi and gu systems are negative, except as otherwise noted.      Objective    /84   Pulse 75   Temp 97.5  F (36.4  C)   Ht 1.778 m (5' 10\")   Wt (!) 151.3 kg (333 lb 8 oz)   LMP 05/19/2022 (Exact " Date)   SpO2 98%   Breastfeeding No   BMI 47.85 kg/m    Body mass index is 47.85 kg/m .  Physical Exam   GENERAL: healthy, alert and no distress  EYES: Eyes grossly normal to inspection, PERRL and conjunctivae and sclerae normal  HENT: normal cephalic/atraumatic, ear canals and TM's normal, nose and mouth without ulcers or lesions, oral mucous membranes moist, oropharxnx crowded and tonsillar hypertrophy  NECK: no adenopathy, no asymmetry, masses, or scars and thyroid normal to palpation  RESP: lungs clear to auscultation - no rales, rhonchi or wheezes  CV: regular rate and rhythm, normal S1 S2, no S3 or S4, no murmur, click or rub, no peripheral edema and peripheral pulses strong  ABDOMEN: soft, nontender, no hepatosplenomegaly, no masses and bowel sounds normal  MS: no gross musculoskeletal defects noted, no edema

## 2022-05-25 NOTE — PATIENT INSTRUCTIONS
Dear Scott Dobbs,    We are sorry you are not feeling well. Based on the responses you provided, it is recommended that you be seen in-person in urgent care so we can better evaluate your symptoms. Please click here to find the nearest urgent care location to you.   You will not be charged for this Visit. Thank you for trusting us with your care.    Sagar Garcia PA-C

## 2022-05-26 LAB — SARS-COV-2 RNA RESP QL NAA+PROBE: NEGATIVE

## 2022-05-31 ENCOUNTER — VIRTUAL VISIT (OUTPATIENT)
Dept: BEHAVIORAL HEALTH | Facility: CLINIC | Age: 37
End: 2022-05-31
Payer: COMMERCIAL

## 2022-05-31 DIAGNOSIS — F40.10 SOCIAL ANXIETY DISORDER: Primary | ICD-10-CM

## 2022-05-31 DIAGNOSIS — F33.8 OTHER RECURRENT DEPRESSIVE DISORDERS (H): ICD-10-CM

## 2022-05-31 PROCEDURE — 90834 PSYTX W PT 45 MINUTES: CPT | Mod: GT | Performed by: SOCIAL WORKER

## 2022-05-31 NOTE — PROGRESS NOTES
[facility]- Integrated Behavioral Health Services  May 31, 2022    Behavioral Health Clinician Progress Note    Patient Name: Scott Dobbs      Telemedicine Visit: The patient's condition can be safely assessed and treated via synchronous audio and visual telemedicine encounter.      Reason for Telemedicine Visit: Patient has requested telehealth visit due to COVID-19    Originating Site (Patient Location): Patient's place of employment    Distant Site (Provider Location): [facility]: [facility]    Consent:  The patient/guardian has verbally consented to: the potential risks and benefits of telemedicine (video visit) versus in person care; bill my insurance or make self-payment for services provided; and responsibility for payment of non-covered services.     Mode of Communication:  Video Conference via DTT    As the provider I attest to compliance with applicable laws and regulations related to telemedicine.         Service Type:  Individual     Session Start Time:  11:32 am  Session End Time:  12:20 pm     Session Length: 38 - 52      Attendees: Patient    Visit Activities (Refresh list every visit): [facility] Only    Diagnostic Assessment Date: 12/16/21 by Mejia Gonzalez PsyD, LP  Treatment Plan Review Date: 5/18/22  See Flowsheets for today's PHQ-9 and BALA-7 results  Previous PHQ-9:   PHQ-9 SCORE 2/21/2022 4/14/2022 5/18/2022   PHQ-9 Total Score MyChart 6 (Mild depression) 4 (Minimal depression) 4 (Minimal depression)   PHQ-9 Total Score 6 4 4     Previous BALA-7:   BALA-7 SCORE 11/17/2021 12/9/2021 5/18/2022   Total Score 5 (mild anxiety) - 8 (mild anxiety)   Total Score 5 2 8       ED LEVEL:  No flowsheet data found.    DATA  Extended Session (60+ minutes): No  Interactive Complexity: No  Crisis: No  [facility] Patient: No    Treatment Objective(s) Addressed in This Session:  Target Behavior(s): Mental health management    Anxiety: will experience  a reduction in anxiety, will develop more effective coping skills to manage anxiety symptoms, will develop healthy cognitive patterns and beliefs and will increase ability to function adaptively    Current Stressors / Issues:  Patient reported overall doing well. Patient reflected upon experiences both at home and at work since the last visit. Patient continues to navigate balancing household chores with parenting, and communicating needs with her partner. Patient reflected upon how she has been processing though receiving feedback at work, in particular prior to her upcoming review. Patient overall feels like focus, concentration, and task completion are going well.     Progress on Treatment Objective(s) / Homework:  Stable - MAINTENANCE (Working to maintain change, with risk of relapse); Intervened by continuing to positively reinforce healthy behavior choice     Motivational Interviewing    MI Intervention: Expressed Empathy/Understanding, Supported Autonomy, Collaboration, Evocation, Permission to raise concern or advise, Open-ended questions, Change talk (evoked) and Reframe     Change Talk Expressed by the Patient: Desire to change Ability to change Reasons to change Need to change Committment to change Activation    Provider Response to Change Talk: E - Evoked more info from patient about behavior change, A - Affirmed patient's thoughts, decisions, or attempts at behavior change, R - Reflected patient's change talk and S - Summarized patient's change talk statements     Cognitive Behavioral Therapy: Discussed connection between thoughts, feelings, and behaviors. Taught patient how to identify cognitive distortions, and assisted patient to identify own cognitive distortions. Taught and engaged patient in cognitive restructuring techniques.    Mindfulness-Based Strategies : Discussed skills based on development and application of mindfulness    Also provided psychoeducation about behavioral health condition,  symptoms, and treatment options    Care Plan review completed: Yes    Medication Review:  No current psychiatric medications prescribed    Medication Compliance:  NA    Changes in Health Issues:   None reported    Chemical Use Review:   Substance Use: Chemical use reviewed, no active concerns identified      Tobacco Use: No current tobacco use.      Assessment: Current Emotional / Mental Status (status of significant symptoms):  Risk status (Self / Other harm or suicidal ideation)  Patient denies a history of suicidal ideation, suicide attempts, self-injurious behavior, homicidal ideation, homicidal behavior and and other safety concerns  Patient denies current fears or concerns for personal safety.  Patient denies current or recent suicidal ideation or behaviors.  Patient denies current or recent homicidal ideation or behaviors.  Patient denies current or recent self injurious behavior or ideation.  Patient denies other safety concerns.  A safety and risk management plan has not been developed at this time, however patient was encouraged to call [place] [phone] should there be a change in any of these risk factors.    Appearance:   Appropriate   Eye Contact:   Good   Psychomotor Behavior: Normal   Attitude:   Cooperative   Orientation:   All  Speech   Rate / Production: Normal    Volume:  Normal   Mood:    Normal  Affect:    Appropriate   Thought Content:  Clear   Thought Form:  Coherent  Logical   Insight:    Good     Diagnoses:  1. Social Anxiety Disorder (Social Phobia)    2. Other recurrent depressive disorders (H)        Collateral Reports Completed:  Not Applicable    Plan: (Homework, other):  Patient was given information about behavioral services and encouraged to schedule a follow up appointment with the clinic [facility] in two weeks. She was also given Cognitive Behavioral Therapy skills to practice when experiencing anxiety. Patient has been informed of provider's resignation next month. Patient to explore  transfers to [facility] therapists, but may consider referral to community depending on the wait time. Open to bridging care with GUSTAVO Scott. CD Recommendations: No indications of CD issues.  GEO Perez, [facility]      ______________________________________________________________________    Integrated Primary Care Behavioral Health Treatment Plan    Patient's Name: Scott Dobbs  YOB: 1985                               Individual Treatment Plan    Date of Creation: 6/9/20  Date Treatment Plan Last Reviewed/Revised: 5/18/22    DSM-V Diagnoses: 311 (F32.8) Other/unspec. Depressive Disorder or 300.23 (F40.10) Social Anxiety Disorder , per updated DA on 12/26/22 by Mejia Gonzalez PsyD, LP  Psychosocial / Contextual Factors:  postpartum within past year, transition back to work in February, coping with pandemic  WHODAS 2.0 Total Score 2/11/2021 5/21/2021   Total Score 19 19   Total Score MyChart 19 19       Referral / Collaboration:  Referral to another professional/service is not indicated at this time..     Anticipated number of session for this episode of care: 12-15  Anticipation frequency of session: Every other week  Anticipated Duration of each session: 38-52 minutes  Treatment plan will be reviewed in 90 days or when goals have been changed.       MeasurableTreatment Goal(s) related to diagnosis / functional impairment(s)  Goal 1: Patient will reduce symptoms of anxiety as evidenced by decreased score on BALA 7.     I will know I've met my goal when I worry less in social/work interactions      Objective #A (Patient Action)    Patient will identify three distraction and diversion activities and use those activities to decrease level of anxiety  .  Status: Continued - Date(s): 5/18/22    Intervention(s)  [facility] will teach distress tolerance, mindfulness, and relaxation techniques.    Objective #B  Patient will use cognitive strategies identified in therapy to challenge anxious  thoughts.  Status: Continued - Date(s): 5/18/22  Intervention(s)  [facility] will assign homework to practice cognitive restructuring and defusion techniques.    Objective #C  Patient will use relaxation strategies 2-3 times per day to reduce the physical symptoms of anxiety.  Status: Continued - Date(s): 5/18/22    Intervention(s)  [facility] will teach relaxation techniques.      Goal 2: Patient will reduce symptoms of depression as evidenced by decreased scores on PHQ9.    I will know I've met my goal when I feel like I can start and end tasks, find more motivation.      Objective #A (Patient Action)    Status: Completed - Date: 5/18/22     Patient will Increase interest, engagement, and pleasure in doing things.    Intervention(s)  [facility] will explore strategies to help increase motivation and interest.    Objective #B  Patient will Identify negative self-talk and behaviors: challenge core beliefs, myths, and actions.    Status: Completed - Date: 5/18/22     Intervention(s)  [facility] will continue to explore automatic unhelpful/unhealthy thoughts and beliefs, and begin to create new helpeful/helpful automatic thoughts and beliefs.      Patient has reviewed and agreed to the above plan.      Dawn Franks, [facility]  May 18, 2022

## 2022-06-13 ENCOUNTER — VIRTUAL VISIT (OUTPATIENT)
Dept: BEHAVIORAL HEALTH | Facility: CLINIC | Age: 37
End: 2022-06-13
Payer: COMMERCIAL

## 2022-06-13 DIAGNOSIS — F40.10 SOCIAL ANXIETY DISORDER: Primary | ICD-10-CM

## 2022-06-13 DIAGNOSIS — F33.8 OTHER RECURRENT DEPRESSIVE DISORDERS (H): ICD-10-CM

## 2022-06-13 PROCEDURE — 90834 PSYTX W PT 45 MINUTES: CPT | Mod: GT | Performed by: SOCIAL WORKER

## 2022-06-13 NOTE — PROGRESS NOTES
[facility]- Integrated Behavioral Health Services  June 13, 2022    Behavioral Health Clinician Progress Note    Patient Name: Scott Dobbs      Telemedicine Visit: The patient's condition can be safely assessed and treated via synchronous audio and visual telemedicine encounter.      Reason for Telemedicine Visit: Patient has requested telehealth visit due to COVID-19    Originating Site (Patient Location): Patient's place of employment    Distant Site (Provider Location): [facility] Clinics: Maternal Fetal Medicine    Consent:  The patient/guardian has verbally consented to: the potential risks and benefits of telemedicine (video visit) versus in person care; bill my insurance or make self-payment for services provided; and responsibility for payment of non-covered services.     Mode of Communication:  Video Conference via Dojo    As the provider I attest to compliance with applicable laws and regulations related to telemedicine.         Service Type:  Individual     Session Start Time:  3:00  pm  Session End Time:  3:52 pm      Session Length: 38 - 52      Attendees: Patient    Visit Activities (Refresh list every visit): [facility] Only    Diagnostic Assessment Date: 12/16/21 by Mejia Gonzalez PsyD, LP  Treatment Plan Review Date: 6/13/22   See Flowsheets for today's PHQ-9 and BALA-7 results  Previous PHQ-9:   PHQ-9 SCORE 2/21/2022 4/14/2022 5/18/2022   PHQ-9 Total Score MyChart 6 (Mild depression) 4 (Minimal depression) 4 (Minimal depression)   PHQ-9 Total Score 6 4 4     Previous BALA-7:   BALA-7 SCORE 11/17/2021 12/9/2021 5/18/2022   Total Score 5 (mild anxiety) - 8 (mild anxiety)   Total Score 5 2 8       ED LEVEL:  No flowsheet data found.    DATA  Extended Session (60+ minutes): No  Interactive Complexity: No  Crisis: No  [facility] Patient: No    Treatment Objective(s) Addressed in This Session:  Target Behavior(s): Mental health management    Anxiety: will experience a  reduction in anxiety, will develop more effective coping skills to manage anxiety symptoms, will develop healthy cognitive patterns and beliefs and will increase ability to function adaptively    Current Stressors / Issues:  Patient reflected upon the two weeks since previous visit. Patient and partner continue to navigate parenting, caring for a home, and working full time. Patient discussed recent relational stress where she felt like her partner wanted her to be able to mind read what he needed and the challenges she can feel because of how he interprets her emotions. Patient stated that she can be more expressive with emotions than he is, and she reflected upon her wishes about how he could support her in those moments. Patient stated her partner can be hesitant to raise concerns to her because of how she has reacted in the past.   Began to explore what is within patient's control in these moments in the event that her  is not able to support her differently. Patient stated that she is unsure of what else she can do, and can feel like she does not always have self-awareness about how she feels.  Began to explore opportunities for patient to learn about her own emotions and to create opportunities to slow down and check-in with herself. Patient admits that she does not take the time to check-in with herself, but was open to beginning to think about how she could incorporate a body-scan or other moments to be more mindful about herself and her own needs.     Progress on Treatment Objective(s) / Homework:  Satisfactory progress - ACTION (Actively working towards change); Intervened by reinforcing change plan / affirming steps taken    Motivational Interviewing    MI Intervention: Expressed Empathy/Understanding, Supported Autonomy, Collaboration, Evocation, Permission to raise concern or advise, Open-ended questions, Change talk (evoked) and Reframe     Change Talk Expressed by the Patient: Desire to change  Ability to change Reasons to change Need to change Committment to change Activation    Provider Response to Change Talk: E - Evoked more info from patient about behavior change, A - Affirmed patient's thoughts, decisions, or attempts at behavior change, R - Reflected patient's change talk and S - Summarized patient's change talk statements     Cognitive Behavioral Therapy: Discussed connection between thoughts, feelings, and behaviors. Taught patient how to identify cognitive distortions, and assisted patient to identify own cognitive distortions. Taught and engaged patient in cognitive restructuring techniques.    Mindfulness-Based Strategies : Discussed skills based on development and application of mindfulness    Also provided psychoeducation about behavioral health condition, symptoms, and treatment options    Care Plan review completed: Yes    Medication Review:  No current psychiatric medications prescribed    Medication Compliance:  NA    Changes in Health Issues:   None reported    Chemical Use Review:   Substance Use: Chemical use reviewed, no active concerns identified      Tobacco Use: No current tobacco use.      Assessment: Current Emotional / Mental Status (status of significant symptoms):  Risk status (Self / Other harm or suicidal ideation)  Patient denies a history of suicidal ideation, suicide attempts, self-injurious behavior, homicidal ideation, homicidal behavior and and other safety concerns  Patient denies current fears or concerns for personal safety.  Patient denies current or recent suicidal ideation or behaviors.  Patient denies current or recent homicidal ideation or behaviors.  Patient denies current or recent self injurious behavior or ideation.  Patient denies other safety concerns.  A safety and risk management plan has not been developed at this time, however patient was encouraged to call [facility] should there be a change in any of these risk  factors.    Appearance:   Appropriate   Eye Contact:   Good   Psychomotor Behavior: Normal   Attitude:   Cooperative   Orientation:   All  Speech   Rate / Production: Normal    Volume:  Normal   Mood:    Normal  Affect:    Appropriate   Thought Content:  Clear   Thought Form:  Coherent  Logical   Insight:    Good     Diagnoses:  1. Social anxiety disorder    2. Other recurrent depressive disorders (H)        Collateral Reports Completed:  Communicated with: Kaylen Arambula LP, [facility] therapist to facilitate transfer in care    Plan: (Homework, other):  Final session with this provider today due to upcoming resignation. Patient scheduled to transfer to Mejia Gonzalez PsyD, LP in early July for ongoing therapy. Patient provided with strategies to help increase self awareness of emotions and information about how to incorproate a body scan into her daily activities/make daily activities more mindful.  CD Recommendations: No indications of CD issues.  [facility]wn [facility], [facility]      ______________________________________________________________________    Integrated Primary Care Behavioral Health Treatment Plan    Patient's Name: Scott Dobbs  YOB: 1985                               Individual Treatment Plan    Date of Creation: 6/9/20  Date Treatment Plan Last Reviewed/Revised:  6/13/22     DSM-V Diagnoses: 311 (F32.8) Other/unspec. Depressive Disorder or 300.23 (F40.10) Social Anxiety Disorder , per updated DA on 12/26/22 by Mejia Gonzalez PsyD, LP  Psychosocial / Contextual Factors:  postpartum within past year, transition back to work in February, coping with pandemic  WHODAS 2.0 Total Score 2/11/2021 5/21/2021   Total Score 19 19   Total Score MyChart 19 19       Referral / Collaboration:  Ending episode of care today. Patient scheduled to transfer to Mejia Gonzalez PsyD, LP for ongoing therapy.     Anticipated number of session for this episode of care: 0  Anticipation frequency of session: Every  other week  Anticipated Duration of each session: 38-52 minutes  Treatment plan will be reviewed in 90 days or when goals have been changed.       MeasurableTreatment Goal(s) related to diagnosis / functional impairment(s)  Goal 1: Patient will reduce symptoms of anxiety as evidenced by decreased score on BALA 7.     I will know I've met my goal when I worry less in social/work interactions      Objective #A (Patient Action)    Patient will identify three distraction and diversion activities and use those activities to decrease level of anxiety  .  Status: Completed - Date: 6/13/22     Intervention(s)  [facility] will teach distress tolerance, mindfulness, and relaxation techniques.    Objective #B  Patient will use cognitive strategies identified in therapy to challenge anxious thoughts.  Status: Completed: 6/13/22   Intervention(s)  [facility] will assign homework to practice cognitive restructuring and defusion techniques.    Objective #C  Patient will use relaxation strategies 2-3 times per day to reduce the physical symptoms of anxiety.  Status: Completed: 6/13/22     Intervention(s)  [facility] will teach relaxation techniques.      Goal 2: Patient will reduce symptoms of depression as evidenced by decreased scores on PHQ9.    I will know I've met my goal when I feel like I can start and end tasks, find more motivation.      Objective #A (Patient Action)    Status: Completed - Date: 5/18/22     Patient will Increase interest, engagement, and pleasure in doing things.    Intervention(s)  [facility] will explore strategies to help increase motivation and interest.    Objective #B  Patient will Identify negative self-talk and behaviors: challenge core beliefs, myths, and actions.    Status: Completed - Date: 5/18/22     Intervention(s)  [facility] will continue to explore automatic unhelpful/unhealthy thoughts and beliefs, and begin to create new helpeful/helpful automatic thoughts and beliefs.      Patient has reviewed and agreed to the above  plan.      Dawn Franks, [facility]  June 13, 2022

## 2022-07-03 ENCOUNTER — LAB (OUTPATIENT)
Dept: LAB | Facility: CLINIC | Age: 37
End: 2022-07-03
Attending: FAMILY MEDICINE
Payer: COMMERCIAL

## 2022-07-03 DIAGNOSIS — Z11.59 NEED FOR HEPATITIS C SCREENING TEST: Primary | ICD-10-CM

## 2022-07-03 DIAGNOSIS — Z20.822 CLOSE EXPOSURE TO 2019 NOVEL CORONAVIRUS: ICD-10-CM

## 2022-07-03 PROCEDURE — U0005 INFEC AGEN DETEC AMPLI PROBE: HCPCS

## 2022-07-03 PROCEDURE — U0003 INFECTIOUS AGENT DETECTION BY NUCLEIC ACID (DNA OR RNA); SEVERE ACUTE RESPIRATORY SYNDROME CORONAVIRUS 2 (SARS-COV-2) (CORONAVIRUS DISEASE [COVID-19]), AMPLIFIED PROBE TECHNIQUE, MAKING USE OF HIGH THROUGHPUT TECHNOLOGIES AS DESCRIBED BY CMS-2020-01-R: HCPCS

## 2022-07-05 LAB — SARS-COV-2 RNA RESP QL NAA+PROBE: NEGATIVE

## 2022-07-07 ENCOUNTER — VIRTUAL VISIT (OUTPATIENT)
Dept: PSYCHOLOGY | Facility: CLINIC | Age: 37
End: 2022-07-07
Payer: COMMERCIAL

## 2022-07-07 DIAGNOSIS — F41.1 GAD (GENERALIZED ANXIETY DISORDER): ICD-10-CM

## 2022-07-07 DIAGNOSIS — F32.A DEPRESSIVE DISORDER: Primary | ICD-10-CM

## 2022-07-07 DIAGNOSIS — F40.10 SOCIAL ANXIETY DISORDER: ICD-10-CM

## 2022-07-07 PROCEDURE — 90834 PSYTX W PT 45 MINUTES: CPT | Mod: GT | Performed by: PSYCHOLOGIST

## 2022-07-07 ASSESSMENT — ANXIETY QUESTIONNAIRES
2. NOT BEING ABLE TO STOP OR CONTROL WORRYING: SEVERAL DAYS
4. TROUBLE RELAXING: SEVERAL DAYS
GAD7 TOTAL SCORE: 9
GAD7 TOTAL SCORE: 9
1. FEELING NERVOUS, ANXIOUS, OR ON EDGE: MORE THAN HALF THE DAYS
7. FEELING AFRAID AS IF SOMETHING AWFUL MIGHT HAPPEN: SEVERAL DAYS
7. FEELING AFRAID AS IF SOMETHING AWFUL MIGHT HAPPEN: SEVERAL DAYS
8. IF YOU CHECKED OFF ANY PROBLEMS, HOW DIFFICULT HAVE THESE MADE IT FOR YOU TO DO YOUR WORK, TAKE CARE OF THINGS AT HOME, OR GET ALONG WITH OTHER PEOPLE?: SOMEWHAT DIFFICULT
3. WORRYING TOO MUCH ABOUT DIFFERENT THINGS: SEVERAL DAYS
5. BEING SO RESTLESS THAT IT IS HARD TO SIT STILL: NOT AT ALL
GAD7 TOTAL SCORE: 9
6. BECOMING EASILY ANNOYED OR IRRITABLE: NEARLY EVERY DAY

## 2022-07-07 ASSESSMENT — PATIENT HEALTH QUESTIONNAIRE - PHQ9
SUM OF ALL RESPONSES TO PHQ QUESTIONS 1-9: 6
SUM OF ALL RESPONSES TO PHQ QUESTIONS 1-9: 6
10. IF YOU CHECKED OFF ANY PROBLEMS, HOW DIFFICULT HAVE THESE PROBLEMS MADE IT FOR YOU TO DO YOUR WORK, TAKE CARE OF THINGS AT HOME, OR GET ALONG WITH OTHER PEOPLE: SOMEWHAT DIFFICULT

## 2022-07-07 NOTE — PROGRESS NOTES
[facility]                                     Progress Note    Patient Name: Scott Dobbs  Date: 7/7/22         Service Type: Individual      Session Start Time: 8:02am Session End Time: 8:54am     Session Length: 52 minutes    Session #: 1 (transfer from Dawn Franks)    Attendees: Client attended alone    Service Modality:  Video Visit:      Provider verified identity through the following two step process.  Patient provided:  Patient photo    Telemedicine Visit: The patient's condition can be safely assessed and treated via synchronous audio and visual telemedicine encounter.      Reason for Telemedicine Visit: Services only offered telehealth    Originating Site (Patient Location): Patient's place of employment    Distant Site (Provider Location): Provider Remote Setting- Home Office    Consent:  The patient/guardian has verbally consented to: the potential risks and benefits of telemedicine (video visit) versus in person care; bill my insurance or make self-payment for services provided; and responsibility for payment of non-covered services.     Patient would like the video invitation sent by:  Send to e-mail at: [email or web address]    Mode of Communication:  Video Conference via [facility]    As the provider I attest to compliance with applicable laws and regulations related to telemedicine.    DATA  Interactive Complexity: No  Crisis: No        Progress Since Last Session (Related to Symptoms / Goals / Homework):   Symptoms: Improving : Stable.  Feeling less anxiety although stressed.    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  \"I used to read a lot.  I don't read anymore.  I used to knit and florencia a lot but that fell off after having a baby.\"  Client's best friend has COVID, so, they don't socialize.  \"I also used to do [employer] [place].\"     Treatment Objective(s) Addressed in This Session:   Review client's Treatment Plan from former therapist; Assign homework; Review information from client's Diagnostic Assessment     Intervention:   CBT: Reviewed and updated Treatment Plan; Pattern recognition; Psychoeducation; Assigned homeowrk; Socratic Questioning  Active listening, validation, empathy, and other rapport building skills; Review of past work with former therapist    Assessments completed prior to visit:  The following assessments were completed by patient for this visit:  PHQ9:   PHQ-9 SCORE 11/17/2021 12/9/2021 1/13/2022 2/21/2022 4/14/2022 5/18/2022 7/7/2022   PHQ-9 Total Score MyChart 16 (Moderately severe depression) - 6 (Mild depression) 6 (Mild depression) 4 (Minimal depression) 4 (Minimal depression) 6 (Mild depression)   PHQ-9 Total Score 16 7 6 6 4 4 6     GAD7:   BALA-7 SCORE 4/14/2021 5/21/2021 6/30/2021 11/17/2021 12/9/2021 5/18/2022 7/7/2022   Total Score 4 (minimal anxiety) 6 (mild anxiety) 2 (minimal anxiety) 5 (mild anxiety) - 8 (mild anxiety) 9 (mild anxiety)   Total Score 4 6 2 5 2 8 9         ASSESSMENT: Current Emotional / Mental Status (status of significant symptoms):   Risk status (Self / Other harm or suicidal ideation)   Patient denies current fears or concerns for personal safety.   Patient denies current or recent " "suicidal ideation or behaviors.   Patient denies current or recent homicidal ideation or behaviors.   Patient denies current or recent self injurious behavior or ideation.   Patient denies other safety concerns.   Patient reports there has been no change in risk factors since their last session.     Patient reports there has been no change in protective factors since their last session.     Recommended that patient call 911 or go to the local ED should there be a change in any of these risk factors.     Appearance:   Appropriate    Eye Contact:   Fair    Psychomotor Behavior: Normal    Attitude:   Cooperative  Friendly Pleasant   Orientation:   All   Speech    Rate / Production: Normal/ Responsive    Volume:  Normal    Mood:    Anxious  Normal   Affect:    Appropriate    Thought Content:  Clear    Thought Form:  Coherent  Logical    Insight:    Good  and Fair      Medication Review:   No current psychiatric medications prescribed     Medication Compliance:   NA     Changes in Health Issues:   None reported     Chemical Use Review:   Substance Use: Chemical use reviewed, no active concerns identified      Tobacco Use: Did not address    Diagnosis:  1. Unspecified Depressive Disorder    2. Social Anxiety Disorder (Social Phobia)    3. Generalized Anxiety Disorder (BALA, by history)        Collateral Reports Completed:   Not Applicable    PLAN: (Patient Tasks / Therapist Tasks / Other)  To help manage depression, the client will read each night for \"about 15 minutes.\"  She made a future appointment for July 25, 2022.        Mejia Gonzalez                                                         ______________________________________________________________________    Individual Treatment Plan    Patient's Name: Scott Dobbs  YOB: 1985    Date of Creation: 7/7/22 (cont'd from previous therapist)  Date Treatment Plan Last Reviewed/Revised: 7/7/22    DSM5 Diagnoses: 311 (F32.9) Unspecified " Depressive Disorder  or 300.23 (F40.10) Social Anxiety Disorder  300.02 (F41.1) Generalized Anxiety Disorder  Psychosocial / Contextual Factors:  postpartum within past year, transition back to work in February, coping with pandemic  PROMIS (reviewed every 90 days): 26 (on 7/7/22)    Referral / Collaboration:  Referral to another professional/service is not indicated at this time..    Anticipated number of session for this episode of care: 24  Anticipation frequency of session: Every other week  Anticipated Duration of each session: 38-52 minutes  Treatment plan will be reviewed in 90 days or when goals have been changed.       MeasurableTreatment Goal(s) related to diagnosis / functional impairment(s)  Goal 1: Patient will reduce symptoms of anxiety as evidenced by decreased score on BALA 7.     I will know I've met my goal when I worry less in social/work interactions       Objective #A (Patient Action)                          Patient will identify three distraction and diversion activities and use those activities to decrease level of anxiety  .  Status: Completed - Date: 6/13/22      Intervention(s)  [facility] [place] will teach distress tolerance, mindfulness, and relaxation techniques.     Objective #B  Patient will use cognitive strategies identified in therapy to challenge anxious thoughts.  Status: Completed: 6/13/22   Intervention(s)  [facility] [place] will assign homework to practice cognitive restructuring and defusion techniques.     Objective #C  Patient will use relaxation strategies 2-3 times per day to reduce the physical symptoms of anxiety.  Status: Completed: 6/13/22      Intervention(s)  [facility] [place] will teach relaxation techniques.        Goal 2: Patient will reduce symptoms of depression as evidenced by decreased scores on PHQ9.    I will know I've met my goal when I feel like I can start and end tasks, find more motivation.       Objective #A (Patient Action)                          Status: Completed - Date: 5/18/22       Patient will Increase interest, engagement, and pleasure in doing things.     Intervention(s)  [facility] will explore strategies to help increase motivation and interest.     Objective #B  Patient will Identify negative self-talk and behaviors: challenge core beliefs, myths, and actions.                  Status: Completed - Date: 5/18/22      Intervention(s)  C will continue to explore automatic unhelpful/unhealthy thoughts and beliefs, and begin to create new helpeful/helpful automatic thoughts and beliefs.        Patient has reviewed and agreed to the above plan.      Mejia Gonzalez  July 7, 2022

## 2022-07-14 ENCOUNTER — OFFICE VISIT (OUTPATIENT)
Dept: FAMILY MEDICINE | Facility: CLINIC | Age: 37
End: 2022-07-14
Payer: COMMERCIAL

## 2022-07-14 VITALS
DIASTOLIC BLOOD PRESSURE: 80 MMHG | TEMPERATURE: 98.1 F | HEART RATE: 84 BPM | BODY MASS INDEX: 41.95 KG/M2 | HEIGHT: 70 IN | WEIGHT: 293 LBS | SYSTOLIC BLOOD PRESSURE: 122 MMHG | OXYGEN SATURATION: 97 %

## 2022-07-14 DIAGNOSIS — Z13.220 SCREENING FOR HYPERLIPIDEMIA: ICD-10-CM

## 2022-07-14 DIAGNOSIS — I10 ESSENTIAL HYPERTENSION: ICD-10-CM

## 2022-07-14 DIAGNOSIS — Z00.00 ROUTINE GENERAL MEDICAL EXAMINATION AT A HEALTH CARE FACILITY: Primary | ICD-10-CM

## 2022-07-14 DIAGNOSIS — E66.01 MORBID OBESITY (H): ICD-10-CM

## 2022-07-14 DIAGNOSIS — J30.89 ENVIRONMENTAL AND SEASONAL ALLERGIES: ICD-10-CM

## 2022-07-14 DIAGNOSIS — R73.9 ELEVATED RANDOM BLOOD GLUCOSE LEVEL: ICD-10-CM

## 2022-07-14 DIAGNOSIS — F43.23 SITUATIONAL MIXED ANXIETY AND DEPRESSIVE DISORDER: ICD-10-CM

## 2022-07-14 DIAGNOSIS — Z11.59 NEED FOR HEPATITIS C SCREENING TEST: ICD-10-CM

## 2022-07-14 LAB
ANION GAP SERPL CALCULATED.3IONS-SCNC: 3 MMOL/L (ref 3–14)
BUN SERPL-MCNC: 12 MG/DL (ref 7–30)
CALCIUM SERPL-MCNC: 9.2 MG/DL (ref 8.5–10.1)
CHLORIDE BLD-SCNC: 111 MMOL/L (ref 94–109)
CHOLEST SERPL-MCNC: 147 MG/DL
CO2 SERPL-SCNC: 27 MMOL/L (ref 20–32)
CREAT SERPL-MCNC: 0.97 MG/DL (ref 0.52–1.04)
ERYTHROCYTE [DISTWIDTH] IN BLOOD BY AUTOMATED COUNT: 13.3 % (ref 10–15)
FASTING STATUS PATIENT QL REPORTED: YES
GFR SERPL CREATININE-BSD FRML MDRD: 77 ML/MIN/1.73M2
GLUCOSE BLD-MCNC: 108 MG/DL (ref 70–99)
HBA1C MFR BLD: 5.5 % (ref 0–5.6)
HCT VFR BLD AUTO: 41.2 % (ref 35–47)
HCV AB SERPL QL IA: NONREACTIVE
HDLC SERPL-MCNC: 41 MG/DL
HGB BLD-MCNC: 13.6 G/DL (ref 11.7–15.7)
LDLC SERPL CALC-MCNC: 94 MG/DL
MCH RBC QN AUTO: 28.2 PG (ref 26.5–33)
MCHC RBC AUTO-ENTMCNC: 33 G/DL (ref 31.5–36.5)
MCV RBC AUTO: 86 FL (ref 78–100)
NONHDLC SERPL-MCNC: 106 MG/DL
PLATELET # BLD AUTO: 325 10E3/UL (ref 150–450)
POTASSIUM BLD-SCNC: 4.5 MMOL/L (ref 3.4–5.3)
RBC # BLD AUTO: 4.82 10E6/UL (ref 3.8–5.2)
SODIUM SERPL-SCNC: 141 MMOL/L (ref 133–144)
TRIGL SERPL-MCNC: 58 MG/DL
WBC # BLD AUTO: 8.3 10E3/UL (ref 4–11)

## 2022-07-14 PROCEDURE — 83036 HEMOGLOBIN GLYCOSYLATED A1C: CPT | Performed by: PHYSICIAN ASSISTANT

## 2022-07-14 PROCEDURE — 86803 HEPATITIS C AB TEST: CPT | Performed by: PHYSICIAN ASSISTANT

## 2022-07-14 PROCEDURE — 80048 BASIC METABOLIC PNL TOTAL CA: CPT | Performed by: PHYSICIAN ASSISTANT

## 2022-07-14 PROCEDURE — 36415 COLL VENOUS BLD VENIPUNCTURE: CPT | Performed by: PHYSICIAN ASSISTANT

## 2022-07-14 PROCEDURE — 80061 LIPID PANEL: CPT | Performed by: PHYSICIAN ASSISTANT

## 2022-07-14 PROCEDURE — 85027 COMPLETE CBC AUTOMATED: CPT | Performed by: PHYSICIAN ASSISTANT

## 2022-07-14 PROCEDURE — 99395 PREV VISIT EST AGE 18-39: CPT | Performed by: PHYSICIAN ASSISTANT

## 2022-07-14 PROCEDURE — 99214 OFFICE O/P EST MOD 30 MIN: CPT | Mod: 25 | Performed by: PHYSICIAN ASSISTANT

## 2022-07-14 RX ORDER — LABETALOL 100 MG/1
TABLET, FILM COATED ORAL
Qty: 180 TABLET | Refills: 3 | Status: SHIPPED | OUTPATIENT
Start: 2022-07-14 | End: 2023-08-31

## 2022-07-14 RX ORDER — ESCITALOPRAM OXALATE 5 MG/1
5 TABLET ORAL DAILY
Qty: 60 TABLET | Refills: 0 | Status: SHIPPED | OUTPATIENT
Start: 2022-07-14 | End: 2022-08-26

## 2022-07-14 ASSESSMENT — PATIENT HEALTH QUESTIONNAIRE - PHQ9
10. IF YOU CHECKED OFF ANY PROBLEMS, HOW DIFFICULT HAVE THESE PROBLEMS MADE IT FOR YOU TO DO YOUR WORK, TAKE CARE OF THINGS AT HOME, OR GET ALONG WITH OTHER PEOPLE: SOMEWHAT DIFFICULT
SUM OF ALL RESPONSES TO PHQ QUESTIONS 1-9: 7
SUM OF ALL RESPONSES TO PHQ QUESTIONS 1-9: 7

## 2022-07-14 ASSESSMENT — ENCOUNTER SYMPTOMS
CONSTIPATION: 0
EYE PAIN: 0
NERVOUS/ANXIOUS: 1
HEADACHES: 0
WEAKNESS: 0
DYSURIA: 0
PALPITATIONS: 0
HEMATURIA: 0
BREAST MASS: 0
JOINT SWELLING: 0
DIZZINESS: 0
SHORTNESS OF BREATH: 0
ARTHRALGIAS: 0
HEARTBURN: 0
FEVER: 0
FREQUENCY: 0
MYALGIAS: 0
DIARRHEA: 0
ABDOMINAL PAIN: 0
COUGH: 0
PARESTHESIAS: 0
NAUSEA: 0
SORE THROAT: 0
HEMATOCHEZIA: 0
CHILLS: 0

## 2022-07-14 NOTE — PROGRESS NOTES
SUBJECTIVE:   CC: Scott Dobbs is an 37 year old woman who presents for preventive health visit.       Patient has been advised of split billing requirements and indicates understanding: Yes  Healthy Habits:     Getting at least 3 servings of Calcium per day:  Yes    Bi-annual eye exam:  NO    Dental care twice a year:  Yes    Sleep apnea or symptoms of sleep apnea:  None    Diet:  Regular (no restrictions)    Frequency of exercise:  2-3 days/week    Duration of exercise:  15-30 minutes    Taking medications regularly:  Yes    Barriers to taking medications:  None    Medication side effects:  Not applicable    PHQ-2 Total Score: 3    Additional concerns today:  Yes    Blood pressure. Thinks cuff is too small. Hasn't been checking at home.     Seasonal allergies. Takes flonase. Would like to see allergist - possibly allergy shots.     Doing therapy for awhile. Did ADHD eval - no ADHD. Did say likely depression. Would like to talk medication.     PHQ 2022   PHQ-9 Total Score 4 6 7   Q9: Thoughts of better off dead/self-harm past 2 weeks Not at all Not at all Not at all           -------------------------------------    Today's PHQ-2 Score:   PHQ-2 (  [employer]) 2022   Q1: Little interest or pleasure in doing things 2   Q2: Feeling down, depressed or hopeless 1   PHQ-2 Score 3   PHQ-2 Total Score (12-17 Years)- Positive if 3 or more points; Administer PHQ-A if positive -   Q1: Little interest or pleasure in doing things More than half the days   Q2: Feeling down, depressed or hopeless Several days   PHQ-2 Score 3       Abuse: Current or Past (Physical, Sexual or Emotional) - No  Do you feel safe in your environment? Yes        Social History     Tobacco Use     Smoking status: Former Smoker     Packs/day: 0.00     Years: 10.00     Pack years: 0.00     Types: Hookah, Cigarettes, Hookah     Quit date: 2020     Years since quittin.4     Smokeless tobacco: Never Used    Substance Use Topics     Alcohol use: Yes         Alcohol Use 7/14/2022   Prescreen: >3 drinks/day or >7 drinks/week? No   Prescreen: >3 drinks/day or >7 drinks/week? -   AUDIT SCORE  -       Reviewed orders with patient.  Reviewed health maintenance and updated orders accordingly - Yes  BP Readings from Last 3 Encounters:   07/14/22 122/80   05/25/22 133/84   03/02/22 (!) 141/85    Wt Readings from Last 3 Encounters:   07/14/22 (!) 151.7 kg (334 lb 6.4 oz)   05/25/22 (!) 151.3 kg (333 lb 8 oz)   03/02/22 149.7 kg (330 lb 1.6 oz)                    Breast Cancer Screening:    FHS-7:   Breast CA Risk Assessment (FHS-7) 10/4/2021 10/4/2021 10/20/2021   Did any of your first-degree relatives have breast or ovarian cancer? No No No   Did any of your relatives have bilateral breast cancer? No No No   Did any man in your family have breast cancer? No No No   Did any woman in your family have breast and ovarian cancer? Yes Yes Yes   Did any woman in your family have breast cancer before age 50 y? Unknown Unknown Unknown   Do you have 2 or more relatives with breast and/or ovarian cancer? No No No   Do you have 2 or more relatives with breast and/or bowel cancer? No No No       Patient under 40 years of age: Routine Mammogram Screening not recommended.   Pertinent mammograms are reviewed under the imaging tab.    History of abnormal Pap smear: NO - age 30-65 PAP every 5 years with negative HPV co-testing recommended  PAP / HPV Latest Ref Rng & Units 3/19/2018   PAP (Historical) - NIL   HPV16 NEG:Negative Negative   HPV18 NEG:Negative Negative   HRHPV NEG:Negative Negative     Reviewed and updated as needed this visit by clinical staff   Tobacco  Allergies  Meds  Problems  Med Hx  Surg Hx  Fam Hx  Soc   Hx          Reviewed and updated as needed this visit by Provider   Tobacco  Allergies  Meds  Problems  Med Hx  Surg Hx  Fam Hx               Review of Systems   Constitutional: Negative for chills and fever.  "  HENT: Negative for congestion, ear pain, hearing loss and sore throat.    Eyes: Negative for pain.   Respiratory: Negative for cough and shortness of breath.    Cardiovascular: Negative for chest pain, palpitations and peripheral edema.   Gastrointestinal: Negative for abdominal pain, constipation, diarrhea, heartburn, hematochezia and nausea.   Breasts:  Negative for tenderness, breast mass and discharge.   Genitourinary: Positive for vaginal bleeding. Negative for dysuria, frequency, genital sores, hematuria, pelvic pain, urgency and vaginal discharge.   Musculoskeletal: Negative for arthralgias, joint swelling and myalgias.   Skin: Negative for rash.   Neurological: Negative for dizziness, weakness, headaches and paresthesias.   Psychiatric/Behavioral: Negative for mood changes. The patient is nervous/anxious.           OBJECTIVE:   /80   Pulse 84   Temp 98.1  F (36.7  C) (Oral)   Ht 1.778 m (5' 10\")   Wt (!) 151.7 kg (334 lb 6.4 oz)   LMP 05/19/2022 (Exact Date)   SpO2 97%   BMI 47.98 kg/m    Physical Exam  GENERAL: healthy, alert and no distress  EYES: Eyes grossly normal to inspection, PERRL and conjunctivae and sclerae normal  HENT: ear canals and TM's normal, nose and mouth without ulcers or lesions  NECK: no adenopathy, no asymmetry, masses, or scars and thyroid normal to palpation  RESP: lungs clear to auscultation - no rales, rhonchi or wheezes  CV: regular rate and rhythm, normal S1 S2, no S3 or S4, no murmur, click or rub, no peripheral edema and peripheral pulses strong  ABDOMEN: soft, nontender, no hepatosplenomegaly, no masses and bowel sounds normal  MS: no gross musculoskeletal defects noted, no edema  SKIN: no suspicious lesions or rashes  NEURO: Normal strength and tone, mentation intact and speech normal  PSYCH: mentation appears normal, affect normal/bright    Diagnostic Test Results:  Labs reviewed in Epic    ASSESSMENT/PLAN:   1. Routine general medical examination at [facility]" "care facility  Well adult.   - CBC with platelets; Future  - CBC with platelets    2. Essential hypertension  Stable. Doing well. Will stay on labetalol.   - labetalol (NORMODYNE) 100 MG tablet; TAKE 1 TABLET BY MOUTH TWICE A DAY  Dispense: 180 tablet; Refill: 3  - Basic metabolic panel  (Ca, Cl, CO2, Creat, Gluc, K, Na, BUN); Future  - Basic metabolic panel  (Ca, Cl, CO2, Creat, Gluc, K, Na, BUN)    3. Morbid obesity (H)  Discussed diet, exercise.     4. Need for hepatitis C screening test  Screen.  - Hepatitis C Screen Reflex to HCV RNA Quant and Genotype; Future  - Hepatitis C Screen Reflex to HCV RNA Quant and Genotype    5. Elevated random blood glucose level  Screen.  - Hemoglobin A1c; Future  - Hemoglobin A1c    6. Situational mixed anxiety and depressive disorder  Would like to start medication. Suggest lexapro. Patient will follow up in 6 weeks. I discussed with the patient risks and benefits of the new medication prescribed including potential side effects.  The patient had opportunity to ask questions and is comfortable with and interested in medications as prescribed.   - escitalopram (LEXAPRO) 5 MG tablet; Take 1 tablet (5 mg) by mouth daily  Dispense: 60 tablet; Refill: 0    7. Screening for hyperlipidemia  Screen.  - Lipid panel reflex to direct LDL Fasting; Future  - Lipid panel reflex to direct LDL Fasting    8. Environmental and seasonal allergies  Would like to see allergy to consider allergy shots.  - Adult Allergy/Asthma Referral; Future      Patient has been advised of split billing requirements and indicates understanding: Yes    COUNSELING:  Special attention given to:        Regular exercise       Healthy diet/nutrition       Contraception       Family planning    Estimated body mass index is 47.98 kg/m  as calculated from the following:    Height as of this encounter: 1.778 m (5' 10\").    Weight as of this encounter: 151.7 kg (334 lb 6.4 oz).    Weight management plan: Discussed healthy " diet and exercise guidelines    She reports that she quit smoking about 2 years ago. Her smoking use included hookah, cigarettes, and hookah. She smoked 0.00 packs per day for 10.00 years. She has never used smokeless tobacco.      Counseling Resources:  ATP IV Guidelines  Pooled Cohorts Equation Calculator  Breast Cancer Risk Calculator  BRCA-Related Cancer Risk Assessment: FHS-7 Tool  FRAX Risk Assessment  ICSI Preventive Guidelines  Dietary Guidelines for Americans, 2010  [employer]'s MyPlate  ASA Prophylaxis  Lung CA Screening    Iesha Arteaga PA-C  [facility] [place]  Answers for HPI/ROS submitted by the patient on 7/14/2022  If you checked off any problems, how difficult have these problems made it for you to do your work, take care of things at home, or get along with other people?: Somewhat difficult  PHQ9 TOTAL SCORE: 7

## 2022-07-19 ENCOUNTER — LAB (OUTPATIENT)
Dept: URGENT CARE | Facility: URGENT CARE | Age: 37
End: 2022-07-19
Attending: FAMILY MEDICINE
Payer: COMMERCIAL

## 2022-07-19 DIAGNOSIS — Z20.822 SUSPECTED 2019 NOVEL CORONAVIRUS INFECTION: ICD-10-CM

## 2022-07-19 PROCEDURE — 99207 PR NO CHARGE LOS: CPT

## 2022-07-19 PROCEDURE — U0003 INFECTIOUS AGENT DETECTION BY NUCLEIC ACID (DNA OR RNA); SEVERE ACUTE RESPIRATORY SYNDROME CORONAVIRUS 2 (SARS-COV-2) (CORONAVIRUS DISEASE [COVID-19]), AMPLIFIED PROBE TECHNIQUE, MAKING USE OF HIGH THROUGHPUT TECHNOLOGIES AS DESCRIBED BY CMS-2020-01-R: HCPCS

## 2022-07-19 PROCEDURE — U0005 INFEC AGEN DETEC AMPLI PROBE: HCPCS

## 2022-07-20 LAB — SARS-COV-2 RNA RESP QL NAA+PROBE: NEGATIVE

## 2022-07-25 ENCOUNTER — VIRTUAL VISIT (OUTPATIENT)
Dept: PSYCHOLOGY | Facility: CLINIC | Age: 37
End: 2022-07-25
Payer: COMMERCIAL

## 2022-07-25 DIAGNOSIS — F32.A DEPRESSIVE DISORDER: Primary | ICD-10-CM

## 2022-07-25 DIAGNOSIS — F40.10 SOCIAL ANXIETY DISORDER: ICD-10-CM

## 2022-07-25 DIAGNOSIS — F41.1 GAD (GENERALIZED ANXIETY DISORDER): ICD-10-CM

## 2022-07-25 PROCEDURE — 90834 PSYTX W PT 45 MINUTES: CPT | Mod: GT | Performed by: PSYCHOLOGIST

## 2022-07-25 NOTE — PROGRESS NOTES
[facility]                                     Progress Note    Patient Name: Scott Dobbs  Date: 7/25/22         Service Type: Individual      Session Start Time: 8:01am Session End Time: 8:53am     Session Length: 52 minutes    Session #: 2 (transfer from Dawn Franks)    Attendees: Client attended alone    Service Modality:  Video Visit:      Provider verified identity through the following two step process.  Patient provided:  Patient photo    Telemedicine Visit: The patient's condition can be safely assessed and treated via synchronous audio and visual telemedicine encounter.      Reason for Telemedicine Visit: Services only offered telehealth    Originating Site (Patient Location): Patient's place of employment    Distant Site (Provider Location): Provider Remote Setting- Home Office    Consent:  The patient/guardian has verbally consented to: the potential risks and benefits of telemedicine (video visit) versus in person care; bill my insurance or make self-payment for services provided; and responsibility for payment of non-covered services.     Patient would like the video invitation sent by:  Send to e-mail at: [email or web address]    Mode of Communication:  Video Conference via [facility]    As the provider I attest to compliance with applicable laws and regulations related to telemedicine.    DATA  Interactive Complexity: No  Crisis: No        Progress Since Last Session (Related to Symptoms / Goals / Homework):   Symptoms: Improving : Stable.  Feeling less anxiety although stressed.    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      Notes on 7/25/22  "Session:  Discussed events since last session (covid in family)  Her and partner in agreement in handling covid  Discussed levels of anxiety since last session  More anxiety during pregnancy in Feb 2020  Cl discussed an adverse experience at her past job and how it affects her now.  \"I'm reading into situations at work a lot.  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  Mostly because I think I've started an anti-depressant.\"  Cl feels less lethargic and sleepy.    HW: \"I've gotten a little further into the book (The [place] Method, by Martha Recio (sp?).\" - Book on dividing the household tasks \"fairly.\"    HW: \"Working on the relationship with my partner and not focusing so much, like you said, on being right.\"       Treatment Objective(s) Addressed in This Session:   Discussed client's history with anxiety, identified skills she uses to manage high anxiety (self-talk, persepctive taking), and explored times when it may be harder to use skills or they aren't as effective.  Discussed client's current level of depression, \"It's been better lately,\" and possible contributors.  Rec skills.  Explored a couple areas of concern and how they have made it more difficult for client to enjoy the book she is reading.  Rec communication skills.  Cl talked about recent events since last session.     Intervention:   CBT: Taught and recommended communication skills; Reviewed and assigned homework; Pattern recognition; Psychoeducation; Socratic Questioning  Active listening, validation, empathy, and other rapport building skills; Review of past work with former therapist    Assessments completed prior to visit:  The following assessments were " completed by patient for this visit:  PHQ9:   PHQ-9 SCORE 12/9/2021 1/13/2022 2/21/2022 4/14/2022 5/18/2022 7/7/2022 7/14/2022   PHQ-9 Total Score MyChart - 6 (Mild depression) 6 (Mild depression) 4 (Minimal depression) 4 (Minimal depression) 6 (Mild depression) 7 (Mild depression)   PHQ-9 Total Score 7 6 6 4 4 6 7     GAD7:   BALA-7 SCORE 4/14/2021 5/21/2021 6/30/2021 11/17/2021 12/9/2021 5/18/2022 7/7/2022   Total Score 4 (minimal anxiety) 6 (mild anxiety) 2 (minimal anxiety) 5 (mild anxiety) - 8 (mild anxiety) 9 (mild anxiety)   Total Score 4 6 2 5 2 8 9         ASSESSMENT: Current Emotional / Mental Status (status of significant symptoms):   Risk status (Self / Other harm or suicidal ideation)   Patient denies current fears or concerns for personal safety.   Patient denies current or recent suicidal ideation or behaviors.   Patient denies current or recent homicidal ideation or behaviors.   Patient denies current or recent self injurious behavior or ideation.   Patient denies other safety concerns.   Patient reports there has been no change in risk factors since their last session.     Patient reports there has been no change in protective factors since their last session.     Recommended that patient call 911 or go to the local ED should there be a change in any of these risk factors.     Appearance:   Appropriate    Eye Contact:   Fair    Psychomotor Behavior: Normal    Attitude:   Cooperative  Friendly Pleasant   Orientation:   All   Speech    Rate / Production: Normal/ Responsive    Volume:  Normal    Mood:    Normal   Affect:    Appropriate  Bright    Thought Content:  Clear    Thought Form:  Coherent  Logical    Insight:    Good  and Fair      Medication Review:   No current psychiatric medications prescribed     Medication Compliance:   NA     Changes in Health Issues:   None reported     Chemical Use Review:   Substance Use: Chemical use reviewed, no active concerns identified      Tobacco Use: Did not  "address    Diagnosis:  1. Unspecified Depressive Disorder    2. Social Anxiety Disorder (Social Phobia)    3. Generalized Anxiety Disorder (BALA, by history)        Collateral Reports Completed:   Not Applicable    PLAN: (Patient Tasks / Therapist Tasks / Other)  Client will \"work on the relationship with my partner and not focusing so much, like you said, on being right.\"   She made a future appointment for August 9, 2022.        Mejia Gonzalez                                                         ______________________________________________________________________    Individual Treatment Plan    Patient's Name: Scott Dobbs  YOB: 1985    Date of Creation: 7/7/22 (cont'd from previous therapist)  Date Treatment Plan Last Reviewed/Revised: 7/7/22    DSM5 Diagnoses: 311 (F32.9) Unspecified Depressive Disorder  or 300.23 (F40.10) Social Anxiety Disorder  300.02 (F41.1) Generalized Anxiety Disorder  Psychosocial / Contextual Factors:  postpartum within past year, transition back to work in February, coping with pandemic  PROMIS (reviewed every 90 days): 26 (on 7/7/22)    Referral / Collaboration:  Referral to another professional/service is not indicated at this time..    Anticipated number of session for this episode of care: 24  Anticipation frequency of session: Every other week  Anticipated Duration of each session: 38-52 minutes  Treatment plan will be reviewed in 90 days or when goals have been changed.       MeasurableTreatment Goal(s) related to diagnosis / functional impairment(s)  Goal 1: Patient will reduce symptoms of anxiety as evidenced by decreased score on BALA 7.     I will know I've met my goal when I worry less in social/work interactions       Objective #A (Patient Action)                          Patient will identify three distraction and diversion activities and use those activities to decrease level of anxiety  .  Status: Completed - Date: 6/13/22 "      Intervention(s)  [facility] will teach distress tolerance, mindfulness, and relaxation techniques.     Objective #B  Patient will use cognitive strategies identified in therapy to challenge anxious thoughts.  Status: Completed: 6/13/22   Intervention(s)  [facility] will assign homework to practice cognitive restructuring and defusion techniques.     Objective #C  Patient will use relaxation strategies 2-3 times per day to reduce the physical symptoms of anxiety.  Status: Completed: 6/13/22      Intervention(s)  [facility] will teach relaxation techniques.        Goal 2: Patient will reduce symptoms of depression as evidenced by decreased scores on PHQ9.    I will know I've met my goal when I feel like I can start and end tasks, find more motivation.       Objective #A (Patient Action)                          Status: Completed - Date: 5/18/22      Patient will Increase interest, engagement, and pleasure in doing things.     Intervention(s)  [facility] will explore strategies to help increase motivation and interest.     Objective #B  Patient will Identify negative self-talk and behaviors: challenge core beliefs, myths, and actions.                  Status: Completed - Date: 5/18/22      Intervention(s)  [facility] will continue to explore automatic unhelpful/unhealthy thoughts and beliefs, and begin to create new helpeful/helpful automatic thoughts and beliefs.        Patient has reviewed and agreed to the above plan.      Mejia Gonzalez  July 25, 2022

## 2022-08-09 ENCOUNTER — VIRTUAL VISIT (OUTPATIENT)
Dept: PSYCHOLOGY | Facility: CLINIC | Age: 37
End: 2022-08-09
Payer: COMMERCIAL

## 2022-08-09 DIAGNOSIS — F32.A DEPRESSIVE DISORDER: Primary | ICD-10-CM

## 2022-08-09 DIAGNOSIS — F41.1 GAD (GENERALIZED ANXIETY DISORDER): ICD-10-CM

## 2022-08-09 DIAGNOSIS — F40.10 SOCIAL ANXIETY DISORDER: ICD-10-CM

## 2022-08-09 PROCEDURE — 90834 PSYTX W PT 45 MINUTES: CPT | Mod: GT | Performed by: PSYCHOLOGIST

## 2022-08-09 ASSESSMENT — PATIENT HEALTH QUESTIONNAIRE - PHQ9
SUM OF ALL RESPONSES TO PHQ QUESTIONS 1-9: 3
10. IF YOU CHECKED OFF ANY PROBLEMS, HOW DIFFICULT HAVE THESE PROBLEMS MADE IT FOR YOU TO DO YOUR WORK, TAKE CARE OF THINGS AT HOME, OR GET ALONG WITH OTHER PEOPLE: SOMEWHAT DIFFICULT
SUM OF ALL RESPONSES TO PHQ QUESTIONS 1-9: 3

## 2022-08-09 NOTE — PROGRESS NOTES
"[facility]                                     Progress Note    Patient Name: Scott Dobbs  Date: 8/9/22         Service Type: Individual      Session Start Time: 3:04pm Session End Time: 3:56pm     Session Length: 52 minutes    Session #: 3 (transfer from Dawn Franks)    Attendees: Client attended alone    Service Modality:  Video Visit:      Provider verified identity through the following two step process.  Patient provided:  Patient photo    Telemedicine Visit: The patient's condition can be safely assessed and treated via synchronous audio and visual telemedicine encounter.      Reason for Telemedicine Visit: Services only offered telehealth    Originating Site (Patient Location): Patient's place of employment    Distant Site (Provider Location): Provider Remote Setting- Home Office    Consent:  The patient/guardian has verbally consented to: the potential risks and benefits of telemedicine (video visit) versus in person care; bill my insurance or make self-payment for services provided; and responsibility for payment of non-covered services.     Patient would like the video invitation sent by:  Send to e-mail at: [email or web address]    Mode of Communication:  Video Conference via well    As the provider I attest to compliance with applicable laws and regulations related to telemedicine.    DATA  Interactive Complexity: No  Crisis: No        Progress Since Last Session (Related to Symptoms / Goals / Homework):   Symptoms: Improving : Stable.  Feeling less anxiety although stressed.    Homework: Completed in session      Episode of Care Goals: Satisfactory progress - ACTION (Actively working towards change); Intervened by reinforcing change plan / affirming steps taken     Current / Ongoing Stressors and Concerns:   Client was busy socially the last two weeks and now with work. Her daughter has been ill the \"last few weeks.\" with volunteer board work and dealing with " "sleep issues with her daughter.      Notes on 8/9/22 Session:  Daughter has \"been recovering from COVID that past few weeks\"  Cl has a busy month ahead, \"but more fun stuff.\"  She \"has a lot to do at work.\"  Cl uses the [employer] (running list of everything that have to get done).  Using Pomodoro method to focus at work: Have scheduled times to work and take breaks.  \"This has helped me complete a task.\"  \"I've got a new cookbook so I've enjoyed finding recipes to try.\"  She shares cooking with her .  HW: \"I've been really thinking about it, not always being right, but I'm not sure I've been able to execute it.\"    Reading a book for leisure.  Finish her daughter's blanket.    HW: \"I've gotten a little further into the book (The [place] Method, by Martha Recio (sp?).\" - Book on dividing the household tasks \"fairly.\"    HW: \"Working on the relationship with my partner and not focusing so much, like you said, on being right.\"       Treatment Objective(s) Addressed in This Session:   Discussed client's history with anxiety, identified skills she uses to manage high anxiety (self-talk, persepctive taking), and explored times when it may be harder to use skills or they aren't as effective.  Discussed client's current level of depression, \"It's been better lately,\" and possible contributors.  Rec skills.  Explored a couple areas of concern and how they have made it more difficult for client to enjoy the book she is reading.  Rec communication skills.  Cl talked about recent events since last session.     Intervention:   CBT: Reviewed skills used for anxiety and symptoms of ADHD; Reviewed and assigned homework; Pattern recognition; Psychoeducation; Socratic Questioning  Active listening, validation, empathy, and other rapport building skills    Assessments completed prior to visit:  The following assessments were completed by patient for this visit:  PHQ9:   PHQ-9 SCORE 1/13/2022 2/21/2022 4/14/2022 5/18/2022 " 7/7/2022 7/14/2022 8/9/2022   PHQ-9 Total Score MyChart 6 (Mild depression) 6 (Mild depression) 4 (Minimal depression) 4 (Minimal depression) 6 (Mild depression) 7 (Mild depression) 3 (Minimal depression)   PHQ-9 Total Score 6 6 4 4 6 7 3     GAD7:   BALA-7 SCORE 4/14/2021 5/21/2021 6/30/2021 11/17/2021 12/9/2021 5/18/2022 7/7/2022   Total Score 4 (minimal anxiety) 6 (mild anxiety) 2 (minimal anxiety) 5 (mild anxiety) - 8 (mild anxiety) 9 (mild anxiety)   Total Score 4 6 2 5 2 8 9         ASSESSMENT: Current Emotional / Mental Status (status of significant symptoms):   Risk status (Self / Other harm or suicidal ideation)   Patient denies current fears or concerns for personal safety.   Patient denies current or recent suicidal ideation or behaviors.   Patient denies current or recent homicidal ideation or behaviors.   Patient denies current or recent self injurious behavior or ideation.   Patient denies other safety concerns.   Patient reports there has been no change in risk factors since their last session.     Patient reports there has been no change in protective factors since their last session.     Recommended that patient call 911 or go to the local ED should there be a change in any of these risk factors.     Appearance:   Appropriate    Eye Contact:   Fair    Psychomotor Behavior: Normal    Attitude:   Cooperative  Friendly Pleasant   Orientation:   All   Speech    Rate / Production: Normal/ Responsive    Volume:  Normal    Mood:    Normal   Affect:    Appropriate  Bright    Thought Content:  Clear    Thought Form:  Coherent  Logical    Insight:    Good  and Fair      Medication Review:   No current psychiatric medications prescribed     Medication Compliance:   NA     Changes in Health Issues:   None reported     Chemical Use Review:   Substance Use: Chemical use reviewed, no active concerns identified      Tobacco Use: Did not address    Diagnosis:  1. Unspecified Depressive Disorder    2. Social Anxiety  "Disorder (Social Phobia)    3. Generalized Anxiety Disorder (BALA, by history)        Collateral Reports Completed:   Not Applicable    PLAN: (Patient Tasks / Therapist Tasks / Other)  Client will continue to \"work on the relationship with my partner and not focusing so much, like you said, on being right.\"  She would also like to continue to read her book for leisure and to finish her daughter's blanket.   She made a future appointment for September 7, 2022.        Mejia Trujillo Lisa                                                         ______________________________________________________________________    Individual Treatment Plan    Patient's Name: Scott Dobbs  YOB: 1985    Date of Creation: 7/7/22 (cont'd from previous therapist)  Date Treatment Plan Last Reviewed/Revised: 7/7/22    DSM5 Diagnoses: 311 (F32.9) Unspecified Depressive Disorder  or 300.23 (F40.10) Social Anxiety Disorder  300.02 (F41.1) Generalized Anxiety Disorder  Psychosocial / Contextual Factors:  postpartum within past year, transition back to work in February, coping with pandemic  PROMIS (reviewed every 90 days): 26 (on 7/7/22)    Referral / Collaboration:  Referral to another professional/service is not indicated at this time..    Anticipated number of session for this episode of care: 24  Anticipation frequency of session: Every other week  Anticipated Duration of each session: 38-52 minutes  Treatment plan will be reviewed in 90 days or when goals have been changed.       MeasurableTreatment Goal(s) related to diagnosis / functional impairment(s)  Goal 1: Patient will reduce symptoms of anxiety as evidenced by decreased score on BALA 7.     I will know I've met my goal when I worry less in social/work interactions       Objective #A (Patient Action)                          Patient will identify three distraction and diversion activities and use those activities to decrease level of anxiety  .  Status: Completed " - Date: 6/13/22      Intervention(s)  [facility] will teach distress tolerance, mindfulness, and relaxation techniques.     Objective #B  Patient will use cognitive strategies identified in therapy to challenge anxious thoughts.  Status: Completed: 6/13/22   Intervention(s)  [facility] will assign homework to practice cognitive restructuring and defusion techniques.     Objective #C  Patient will use relaxation strategies 2-3 times per day to reduce the physical symptoms of anxiety.  Status: Completed: 6/13/22      Intervention(s)  [facility] will teach relaxation techniques.        Goal 2: Patient will reduce symptoms of depression as evidenced by decreased scores on PHQ9.    I will know I've met my goal when I feel like I can start and end tasks, find more motivation.       Objective #A (Patient Action)                          Status: Completed - Date: 5/18/22      Patient will Increase interest, engagement, and pleasure in doing things.     Intervention(s)  [facility] will explore strategies to help increase motivation and interest.     Objective #B  Patient will Identify negative self-talk and behaviors: challenge core beliefs, myths, and actions.                  Status: Completed - Date: 5/18/22      Intervention(s)  [facility] will continue to explore automatic unhelpful/unhealthy thoughts and beliefs, and begin to create new helpeful/helpful automatic thoughts and beliefs.        Patient has reviewed and agreed to the above plan.      Mejia Gonzalez  August 9, 2022

## 2022-08-18 ENCOUNTER — ANCILLARY PROCEDURE (OUTPATIENT)
Dept: CT IMAGING | Facility: CLINIC | Age: 37
End: 2022-08-18
Attending: CLINICAL NURSE SPECIALIST
Payer: COMMERCIAL

## 2022-08-18 DIAGNOSIS — J86.9 EMPYEMA OF LUNG (H): ICD-10-CM

## 2022-08-18 PROCEDURE — 71260 CT THORAX DX C+: CPT | Mod: GC | Performed by: RADIOLOGY

## 2022-08-18 RX ORDER — IOPAMIDOL 755 MG/ML
112 INJECTION, SOLUTION INTRAVASCULAR ONCE
Status: COMPLETED | OUTPATIENT
Start: 2022-08-18 | End: 2022-08-18

## 2022-08-18 RX ADMIN — IOPAMIDOL 112 ML: 755 INJECTION, SOLUTION INTRAVASCULAR at 15:15

## 2022-08-26 ENCOUNTER — VIRTUAL VISIT (OUTPATIENT)
Dept: FAMILY MEDICINE | Facility: CLINIC | Age: 37
End: 2022-08-26
Payer: COMMERCIAL

## 2022-08-26 DIAGNOSIS — F43.23 SITUATIONAL MIXED ANXIETY AND DEPRESSIVE DISORDER: ICD-10-CM

## 2022-08-26 PROCEDURE — 99213 OFFICE O/P EST LOW 20 MIN: CPT | Mod: 95 | Performed by: PHYSICIAN ASSISTANT

## 2022-08-26 PROCEDURE — 96127 BRIEF EMOTIONAL/BEHAV ASSMT: CPT | Performed by: PHYSICIAN ASSISTANT

## 2022-08-26 RX ORDER — ESCITALOPRAM OXALATE 5 MG/1
5 TABLET ORAL DAILY
Qty: 90 TABLET | Refills: 3 | Status: SHIPPED | OUTPATIENT
Start: 2022-08-26 | End: 2023-08-31

## 2022-08-26 ASSESSMENT — ANXIETY QUESTIONNAIRES
GAD7 TOTAL SCORE: 2
1. FEELING NERVOUS, ANXIOUS, OR ON EDGE: SEVERAL DAYS
IF YOU CHECKED OFF ANY PROBLEMS ON THIS QUESTIONNAIRE, HOW DIFFICULT HAVE THESE PROBLEMS MADE IT FOR YOU TO DO YOUR WORK, TAKE CARE OF THINGS AT HOME, OR GET ALONG WITH OTHER PEOPLE: SOMEWHAT DIFFICULT
3. WORRYING TOO MUCH ABOUT DIFFERENT THINGS: SEVERAL DAYS
7. FEELING AFRAID AS IF SOMETHING AWFUL MIGHT HAPPEN: NOT AT ALL
6. BECOMING EASILY ANNOYED OR IRRITABLE: NOT AT ALL
GAD7 TOTAL SCORE: 2
IF YOU CHECKED OFF ANY PROBLEMS ON THIS QUESTIONNAIRE, HOW DIFFICULT HAVE THESE PROBLEMS MADE IT FOR YOU TO DO YOUR WORK, TAKE CARE OF THINGS AT HOME, OR GET ALONG WITH OTHER PEOPLE: NOT DIFFICULT AT ALL
7. FEELING AFRAID AS IF SOMETHING AWFUL MIGHT HAPPEN: NOT AT ALL
6. BECOMING EASILY ANNOYED OR IRRITABLE: NOT AT ALL
2. NOT BEING ABLE TO STOP OR CONTROL WORRYING: NOT AT ALL
2. NOT BEING ABLE TO STOP OR CONTROL WORRYING: NOT AT ALL
5. BEING SO RESTLESS THAT IT IS HARD TO SIT STILL: NOT AT ALL
GAD7 TOTAL SCORE: 2
8. IF YOU CHECKED OFF ANY PROBLEMS, HOW DIFFICULT HAVE THESE MADE IT FOR YOU TO DO YOUR WORK, TAKE CARE OF THINGS AT HOME, OR GET ALONG WITH OTHER PEOPLE?: NOT DIFFICULT AT ALL
3. WORRYING TOO MUCH ABOUT DIFFERENT THINGS: SEVERAL DAYS
5. BEING SO RESTLESS THAT IT IS HARD TO SIT STILL: NOT AT ALL
7. FEELING AFRAID AS IF SOMETHING AWFUL MIGHT HAPPEN: NOT AT ALL
GAD7 TOTAL SCORE: 2
1. FEELING NERVOUS, ANXIOUS, OR ON EDGE: SEVERAL DAYS
4. TROUBLE RELAXING: NOT AT ALL

## 2022-08-26 ASSESSMENT — PATIENT HEALTH QUESTIONNAIRE - PHQ9
SUM OF ALL RESPONSES TO PHQ QUESTIONS 1-9: 2
SUM OF ALL RESPONSES TO PHQ QUESTIONS 1-9: 2
5. POOR APPETITE OR OVEREATING: NOT AT ALL
10. IF YOU CHECKED OFF ANY PROBLEMS, HOW DIFFICULT HAVE THESE PROBLEMS MADE IT FOR YOU TO DO YOUR WORK, TAKE CARE OF THINGS AT HOME, OR GET ALONG WITH OTHER PEOPLE: SOMEWHAT DIFFICULT

## 2022-08-26 NOTE — PROGRESS NOTES
Scott is a 37 year old who is being evaluated via a billable video visit.      How would you like to obtain your AVS? MyChart  If the video visit is dropped, the invitation should be resent by: Text to cell phone: [phone]  Will anyone else be joining your video visit? No          Assessment & Plan     Situational mixed anxiety and depressive disorder  New medication start about 6 weeks ago. Significant improvement in symptoms. No issues with the medication. Will fill for one year - MyChart PHQ9/GAD7 in 6 months. No further questions or concerns today.  - escitalopram (LEXAPRO) 5 MG tablet; Take 1 tablet (5 mg) by mouth daily                 Return in about 11 months (around 2023) for Routine preventive.    [facility]   Scott is a 37 year old, presenting for the following health issues:  Depression      HPI     Depression and Anxiety Follow-Up    How are you doing with your depression since your last visit? Improved     How are you doing with your anxiety since your last visit?  No change    Are you having other symptoms that might be associated with depression or anxiety? No    Have you had a significant life event? No     Do you have any concerns with your use of alcohol or other drugs? No    Started lexapro 5 mg 22. Has been doing really well. Medication seems to have improved both depression and anxiety.   No additional concerns today.    Social History     Tobacco Use     Smoking status: Former Smoker     Packs/day: 0.00     Years: 10.00     Pack years: 0.00     Types: Hookah, Cigarettes, Hookah     Quit date: 2020     Years since quittin.5     Smokeless tobacco: Never Used   Vaping Use     Vaping Use: Never used   Substance Use Topics     Alcohol use: Yes     Drug use: Never     PHQ 2022   PHQ-9 Total Score 3 0 2   Q9: Thoughts of better off dead/self-harm past 2 weeks Not at all Not at all Not at all      BALA-7 SCORE 7/7/2022 8/26/2022 8/26/2022   Total Score 9 (mild anxiety) - 2 (minimal anxiety)   Total Score 9 2 2     Last PHQ-9 8/26/2022   1.  Little interest or pleasure in doing things 1   2.  Feeling down, depressed, or hopeless 0   3.  Trouble falling or staying asleep, or sleeping too much 0   4.  Feeling tired or having little energy 1   5.  Poor appetite or overeating 0   6.  Feeling bad about yourself 0   7.  Trouble concentrating 0   8.  Moving slowly or restless 0   Q9: Thoughts of better off dead/self-harm past 2 weeks 0   PHQ-9 Total Score 2   Difficulty at work, home, or with people -     BALA-7  8/26/2022   1. Feeling nervous, anxious, or on edge 1   2. Not being able to stop or control worrying 0   3. Worrying too much about different things 1   4. Trouble relaxing 0   5. Being so restless that it is hard to sit still 0   6. Becoming easily annoyed or irritable 0   7. Feeling afraid, as if something awful might happen 0   BALA-7 Total Score 2   If you checked any problems, how difficult have they made it for you to do your work, take care of things at home, or get along with other people? Not difficult at all           Review of Systems   Constitutional, HEENT, cardiovascular, pulmonary, gi and gu systems are negative, except as otherwise noted.      Objective           Vitals:  No vitals were obtained today due to virtual visit.    Physical Exam   GENERAL: Healthy, alert and no distress  EYES: Eyes grossly normal to inspection.  No discharge or erythema, or obvious scleral/conjunctival abnormalities.  RESP: No audible wheeze, cough, or visible cyanosis.  No visible retractions or increased work of breathing.    SKIN: Visible skin clear. No significant rash, abnormal pigmentation or lesions.  NEURO: Cranial nerves grossly intact.  Mentation and speech appropriate for age.  PSYCH: Mentation appears normal, affect normal/bright, judgement and insight intact, normal speech and appearance  well-groomed.                Video-Visit Details    Video Start Time: 3:05 PM    Type of service:  Video Visit    Video End Time:3:15 PM    Originating Location (pt. Location): Home    Distant Location (provider location):  [facility]     Platform used for Video Visit: [employer]

## 2022-09-07 ENCOUNTER — VIRTUAL VISIT (OUTPATIENT)
Dept: PSYCHOLOGY | Facility: CLINIC | Age: 37
End: 2022-09-07
Payer: COMMERCIAL

## 2022-09-07 DIAGNOSIS — F40.10 SOCIAL ANXIETY DISORDER: ICD-10-CM

## 2022-09-07 DIAGNOSIS — F32.A DEPRESSIVE DISORDER: Primary | ICD-10-CM

## 2022-09-07 PROCEDURE — 90834 PSYTX W PT 45 MINUTES: CPT | Mod: GT | Performed by: PSYCHOLOGIST

## 2022-09-07 NOTE — PROGRESS NOTES
[facility]                                     Progress Note    Patient Name: Scott Dobbs  Date: 9/7/22         Service Type: Individual      Session Start Time: 3:01pm Session End Time: 3:53pm     Session Length: 52 minutes    Session #: 4 (transfer from Dawn Franks)    Attendees: Client attended alone    Service Modality:  Video Visit:      Provider verified identity through the following two step process.  Patient provided:  Patient photo    Telemedicine Visit: The patient's condition can be safely assessed and treated via synchronous audio and visual telemedicine encounter.      Reason for Telemedicine Visit: Services only offered telehealth    Originating Site (Patient Location): Patient's place of employment    Distant Site (Provider Location): Provider Remote Setting- Home Office    Consent:  The patient/guardian has verbally consented to: the potential risks and benefits of telemedicine (video visit) versus in person care; bill my insurance or make self-payment for services provided; and responsibility for payment of non-covered services.     Patient would like the video invitation sent by:  Send to e-mail at: [email or web address]    Mode of Communication:  Video Conference via [facility]    As the provider I attest to compliance with applicable laws and regulations related to telemedicine.    DATA  Interactive Complexity: No  Crisis: No        Progress Since Last Session (Related to Symptoms / Goals / Homework):   Symptoms: Improving : Stable.  Feeling less anxiety although stressed.    Homework: Completed in session      Episode of Care Goals: Satisfactory progress - ACTION (Actively working towards change); Intervened by reinforcing change plan / affirming steps taken     Current / Ongoing Stressors and Concerns:   Client was busy socially the last month and now with work.      Notes on 9/7/22 Session:  Client talked about being very busy the last two months  Past  "weekend was social.  Raising her child w/  She feels a bit behind at work, \"And that makes it hard to be an engaged parent.\"  Identified emotions related to work stress.  Wind down time between work and being a parent? \"Yes.  While I drive to  my kid from  I listen to a podcast.\"  \"I'm working on a blanket for Dotty (child).\"  Discussed being proactive and reaching out to people if she thinks what she has done (late work) will affect someone else instead of waiting for them to reach out to her and possibly ruminating on it over night.       Treatment Objective(s) Addressed in This Session:   Client discussed being very busy at work at instances in which her anxiety increased; Explored skills used, or to use, proactively and reactively, to help manage anxiety  Explored client's relationship w/her spouse  Cl talked about being social and being very busy in the next month     Intervention:   CBT: Reviewed skills used, and to use, proactively and reactively, for anxiety; Reviewed and assigned homework; Pattern recognition; Psychoeducation; Socratic Questioning  Active listening, validation, empathy, and other rapport building skills    Assessments completed prior to visit:  The following assessments were completed by patient for this visit:  PHQ9:   PHQ-9 SCORE 4/14/2022 5/18/2022 7/7/2022 7/14/2022 8/9/2022 8/26/2022 8/26/2022   PHQ-9 Total Score MyChart 4 (Minimal depression) 4 (Minimal depression) 6 (Mild depression) 7 (Mild depression) 3 (Minimal depression) - 2 (Minimal depression)   PHQ-9 Total Score 4 4 6 7 3 0 2     GAD7:   BALA-7 SCORE 6/30/2021 11/17/2021 12/9/2021 5/18/2022 7/7/2022 8/26/2022 8/26/2022   Total Score 2 (minimal anxiety) 5 (mild anxiety) - 8 (mild anxiety) 9 (mild anxiety) - 2 (minimal anxiety)   Total Score 2 5 2 8 9 2 2         ASSESSMENT: Current Emotional / Mental Status (status of significant symptoms):   Risk status (Self / Other harm or suicidal ideation)   Patient " denies current fears or concerns for personal safety.   Patient denies current or recent suicidal ideation or behaviors.   Patient denies current or recent homicidal ideation or behaviors.   Patient denies current or recent self injurious behavior or ideation.   Patient denies other safety concerns.   Patient reports there has been no change in risk factors since their last session.     Patient reports there has been no change in protective factors since their last session.     Recommended that patient call 911 or go to the local ED should there be a change in any of these risk factors.     Appearance:   Appropriate    Eye Contact:   Fair    Psychomotor Behavior: Normal    Attitude:   Cooperative  Friendly Pleasant   Orientation:   All   Speech    Rate / Production: Normal/ Responsive    Volume:  Normal    Mood:    Normal   Affect:    Appropriate  Bright    Thought Content:  Clear    Thought Form:  Coherent  Logical    Insight:    Good  and Fair      Medication Review:   No current psychiatric medications prescribed     Medication Compliance:   NA     Changes in Health Issues:   None reported     Chemical Use Review:   Substance Use: Chemical use reviewed, no active concerns identified      Tobacco Use: Did not address    Diagnosis:  1. Unspecified Depressive Disorder    2. Social Anxiety Disorder (Social Phobia)        Collateral Reports Completed:   Not Applicable    PLAN: (Patient Tasks / Therapist Tasks / Other)  If it comes up, client will be proactive in resolving any possible interpersonal issues at work if she think she might ruminate over it over night.  She will continue to work on a blanket she is making for her daughter.   She made a future appointment for October 6, 2022.        Mejia Gonzalez                                                         ______________________________________________________________________    Individual Treatment Plan    Patient's Name: Scott Dobbs  Date Of  Birth: 1985    Date of Creation: 7/7/22 (cont'd from previous therapist)  Date Treatment Plan Last Reviewed/Revised: 7/7/22    DSM5 Diagnoses: 311 (F32.9) Unspecified Depressive Disorder  or 300.23 (F40.10) Social Anxiety Disorder  300.02 (F41.1) Generalized Anxiety Disorder  Psychosocial / Contextual Factors:  postpartum within past year, transition back to work in February, coping with pandemic  PROMIS (reviewed every 90 days): 26 (on 7/7/22)    Referral / Collaboration:  Referral to another professional/service is not indicated at this time..    Anticipated number of session for this episode of care: 24  Anticipation frequency of session: Every other week  Anticipated Duration of each session: 38-52 minutes  Treatment plan will be reviewed in 90 days or when goals have been changed.       MeasurableTreatment Goal(s) related to diagnosis / functional impairment(s)  Goal 1: Patient will reduce symptoms of anxiety as evidenced by decreased score on BALA 7.     I will know I've met my goal when I worry less in social/work interactions       Objective #A (Patient Action)                          Patient will identify three distraction and diversion activities and use those activities to decrease level of anxiety  .  Status: Completed - Date: 6/13/22      Intervention(s)  [facility] will teach distress tolerance, mindfulness, and relaxation techniques.     Objective #B  Patient will use cognitive strategies identified in therapy to challenge anxious thoughts.  Status: Completed: 6/13/22   Intervention(s)  [facility] will assign homework to practice cognitive restructuring and defusion techniques.     Objective #C  Patient will use relaxation strategies 2-3 times per day to reduce the physical symptoms of anxiety.  Status: Completed: 6/13/22      Intervention(s)  [facility] will teach relaxation techniques.        Goal 2: Patient will reduce symptoms of depression as evidenced by decreased scores on PHQ9.    I will know I've met my goal  when I feel like I can start and end tasks, find more motivation.       Objective #A (Patient Action)                          Status: Completed - Date: 5/18/22      Patient will Increase interest, engagement, and pleasure in doing things.     Intervention(s)  [facility] will explore strategies to help increase motivation and interest.     Objective #B  Patient will Identify negative self-talk and behaviors: challenge core beliefs, myths, and actions.                  Status: Completed - Date: 5/18/22      Intervention(s)  [facility] will continue to explore automatic unhelpful/unhealthy thoughts and beliefs, and begin to create new helpeful/helpful automatic thoughts and beliefs.        Patient has reviewed and agreed to the above plan.      Mejia Gonzalez  September 7, 2022

## 2022-10-06 ENCOUNTER — VIRTUAL VISIT (OUTPATIENT)
Dept: PSYCHOLOGY | Facility: CLINIC | Age: 37
End: 2022-10-06
Payer: COMMERCIAL

## 2022-10-06 DIAGNOSIS — F32.A DEPRESSIVE DISORDER: Primary | ICD-10-CM

## 2022-10-06 DIAGNOSIS — F40.10 SOCIAL ANXIETY DISORDER: ICD-10-CM

## 2022-10-06 PROCEDURE — 90834 PSYTX W PT 45 MINUTES: CPT | Mod: GT | Performed by: PSYCHOLOGIST

## 2022-10-06 NOTE — PROGRESS NOTES
[facility]                                     Progress Note    Patient Name: Scott Dobbs  Date: 10/6/22         Service Type: Individual      Session Start Time: 8:10am Session End Time: 9:00am     Session Length: 50 minutes    Session #: 5 (transfer from Dawn Franks)    Attendees: Client attended alone    Service Modality:  Video Visit:      Provider verified identity through the following two step process.  Patient provided:  Patient photo    Telemedicine Visit: The patient's condition can be safely assessed and treated via synchronous audio and visual telemedicine encounter.      Reason for Telemedicine Visit: Services only offered telehealth    Originating Site (Patient Location): Patient's place of employment    Distant Site (Provider Location): Provider Remote Setting- Home Office    Consent:  The patient/guardian has verbally consented to: the potential risks and benefits of telemedicine (video visit) versus in person care; bill my insurance or make self-payment for services provided; and responsibility for payment of non-covered services.     Patient would like the video invitation sent by:  Send to e-mail at: [email or web address]    Mode of Communication:  Video Conference via [facility]    As the provider I attest to compliance with applicable laws and regulations related to telemedicine.    DATA  Interactive Complexity: No  Crisis: No        Progress Since Last Session (Related to Symptoms / Goals / Homework):   Symptoms: Improving : Stable.  Trying to manage social anxiety.    Homework: Completed in session      Episode of Care Goals: Satisfactory progress - ACTION (Actively working towards change); Intervened by reinforcing change plan / affirming steps taken     Current / Ongoing Stressors and Concerns:   Client was busy socially the last month and now with work; Had disagreements with       Notes on 10/6/22 Session:  Went to [facility] and felt social  "anxiety  While watching their child, \"We (w/partner) can't socialize as much.\"  They depend on \"non-verbals to communicate 'it's your turn.' \"   Problem solved.  What would you do differently?  \"I think it would help if I could notice this (frustration) before I get frustrated and have a conversation about it.\"    Talked about setting 'rules' with each other to make sure they are on the same page with expectations.    Discussed communication styles and skills, particularly regarding lowering others defenses.     Treatment Objective(s) Addressed in This Session:   identify client's fears / thoughts that contribute to feeling anxious  Explored, in detail, a conflict between client and herself that also lead to some social anxiety  Problem solved how to manage similar conflicts with  in the future     Intervention:   CBT: Reviewed and assigned homework; Pattern recognition; Psychoeducation; Socratic Questioning; Problem solved; Taught on communication skills  Active listening, validation, empathy, and other rapport building skills    Assessments completed prior to visit:  The following assessments were completed by patient for this visit:  PHQ9:   PHQ-9 SCORE 4/14/2022 5/18/2022 7/7/2022 7/14/2022 8/9/2022 8/26/2022 8/26/2022   PHQ-9 Total Score MyChart 4 (Minimal depression) 4 (Minimal depression) 6 (Mild depression) 7 (Mild depression) 3 (Minimal depression) - 2 (Minimal depression)   PHQ-9 Total Score 4 4 6 7 3 0 2     GAD7:   BALA-7 SCORE 6/30/2021 11/17/2021 12/9/2021 5/18/2022 7/7/2022 8/26/2022 8/26/2022   Total Score 2 (minimal anxiety) 5 (mild anxiety) - 8 (mild anxiety) 9 (mild anxiety) - 2 (minimal anxiety)   Total Score 2 5 2 8 9 2 2         ASSESSMENT: Current Emotional / Mental Status (status of significant symptoms):   Risk status (Self / Other harm or suicidal ideation)   Patient denies current fears or concerns for personal safety.   Patient denies current or recent suicidal ideation or " behaviors.   Patient denies current or recent homicidal ideation or behaviors.   Patient denies current or recent self injurious behavior or ideation.   Patient denies other safety concerns.   Patient reports there has been no change in risk factors since their last session.     Patient reports there has been no change in protective factors since their last session.     Recommended that patient call 911 or go to the local ED should there be a change in any of these risk factors.     Appearance:   Appropriate    Eye Contact:   Fair    Psychomotor Behavior: Normal    Attitude:   Cooperative  Friendly Pleasant   Orientation:   All   Speech    Rate / Production: Normal/ Responsive    Volume:  Normal    Mood:    Normal   Affect:    Appropriate  Bright    Thought Content:  Clear    Thought Form:  Coherent  Logical    Insight:    Good  and Fair      Medication Review:   No current psychiatric medications prescribed     Medication Compliance:   NA     Changes in Health Issues:   None reported     Chemical Use Review:   Substance Use: Chemical use reviewed, no active concerns identified      Tobacco Use: Did not address    Diagnosis:  1. Unspecified Depressive Disorder    2. Social Anxiety Disorder (Social Phobia)        Collateral Reports Completed:   Not Applicable    PLAN: (Patient Tasks / Therapist Tasks / Other)  Client will use the communication skills discussed today during session.   She made a future appointment for November 7, 2022.        Mejia Gonzalez                                                         ______________________________________________________________________    Individual Treatment Plan    Patient's Name: Scott Dobbs  YOB: 1985    Date of Creation: 7/7/22 (cont'd from previous therapist)  Date Treatment Plan Last Reviewed/Revised: 7/7/22    DSM5 Diagnoses: 311 (F32.9) Unspecified Depressive Disorder  or 300.23 (F40.10) Social Anxiety Disorder  300.02 (F41.1)  Generalized Anxiety Disorder  Psychosocial / Contextual Factors:  postpartum within past year, transition back to work in February, coping with pandemic  PROMIS (reviewed every 90 days): 26 (on 7/7/22)    Referral / Collaboration:  Referral to another professional/service is not indicated at this time..    Anticipated number of session for this episode of care: 24  Anticipation frequency of session: Every other week  Anticipated Duration of each session: 38-52 minutes  Treatment plan will be reviewed in 90 days or when goals have been changed.       MeasurableTreatment Goal(s) related to diagnosis / functional impairment(s)  Goal 1: Patient will reduce symptoms of anxiety as evidenced by decreased score on BALA 7.     I will know I've met my goal when I worry less in social/work interactions       Objective #A (Patient Action)                          Patient will identify three distraction and diversion activities and use those activities to decrease level of anxiety  .  Status: Completed - Date: 6/13/22      Intervention(s)  [facility] will teach distress tolerance, mindfulness, and relaxation techniques.     Objective #B  Patient will use cognitive strategies identified in therapy to challenge anxious thoughts.  Status: Completed: 6/13/22   Intervention(s)  [facility] will assign homework to practice cognitive restructuring and defusion techniques.     Objective #C  Patient will use relaxation strategies 2-3 times per day to reduce the physical symptoms of anxiety.  Status: Completed: 6/13/22      Intervention(s)  [facility] will teach relaxation techniques.        Goal 2: Patient will reduce symptoms of depression as evidenced by decreased scores on PHQ9.    I will know I've met my goal when I feel like I can start and end tasks, find more motivation.       Objective #A (Patient Action)                          Status: Completed - Date: 5/18/22      Patient will Increase interest, engagement, and pleasure in doing  things.     Intervention(s)  [facility] will explore strategies to help increase motivation and interest.     Objective #B  Patient will Identify negative self-talk and behaviors: challenge core beliefs, myths, and actions.                  Status: Completed - Date: 5/18/22      Intervention(s)  [facility] will continue to explore automatic unhelpful/unhealthy thoughts and beliefs, and begin to create new helpeful/helpful automatic thoughts and beliefs.        Patient has reviewed and agreed to the above plan.      Mejia Gonzalez  October 6, 2022

## 2022-11-07 ENCOUNTER — VIRTUAL VISIT (OUTPATIENT)
Dept: PSYCHOLOGY | Facility: CLINIC | Age: 37
End: 2022-11-07
Payer: COMMERCIAL

## 2022-11-07 DIAGNOSIS — F32.A DEPRESSIVE DISORDER: Primary | ICD-10-CM

## 2022-11-07 DIAGNOSIS — F40.10 SOCIAL ANXIETY DISORDER: ICD-10-CM

## 2022-11-07 PROCEDURE — 90834 PSYTX W PT 45 MINUTES: CPT | Mod: GT | Performed by: PSYCHOLOGIST

## 2022-11-07 NOTE — PROGRESS NOTES
"[facility] Counseling                                     Progress Note    Patient Name: Scott Dobbs  Date: 11/7/22         Service Type: Individual      Session Start Time: 8:01am Session End Time: 8:53am     Session Length: 52 minutes    Session #: 6 (transfer from Dawn Denting)    Attendees: Client attended alone    Service Modality:  Video Visit:      Provider verified identity through the following two step process.  Patient provided:  Patient photo    Telemedicine Visit: The patient's condition can be safely assessed and treated via synchronous audio and visual telemedicine encounter.      Reason for Telemedicine Visit: Services only offered telehealth    Originating Site (Patient Location): Patient's place of employment    Distant Site (Provider Location): Provider Remote Setting- Home Office    Consent:  The patient/guardian has verbally consented to: the potential risks and benefits of telemedicine (video visit) versus in person care; bill my insurance or make self-payment for services provided; and responsibility for payment of non-covered services.     Patient would like the video invitation sent by:  Send to e-mail at: [email or web address]    Mode of Communication:  Video Conference via well    As the provider I attest to compliance with applicable laws and regulations related to telemedicine.    DATA  Interactive Complexity: No  Crisis: No        Progress Since Last Session (Related to Symptoms / Goals / Homework):   Symptoms: Improving : Stable.  Busy.    Homework: Completed in session      Episode of Care Goals: Satisfactory progress - ACTION (Actively working towards change); Intervened by reinforcing change plan / affirming steps taken     Current / Ongoing Stressors and Concerns:   Client was busy and anxiety has escalated      Notes on 11/7/22 Session:  \"I've been busy.\"  \"I've had a rough last couple weeks and I can kind of measure it by my eating habits.  " "Particularly my relationship with meat.\"  Other signs of anxiety: Tense  Does deep breathing.  It helps for anxiety and \"because I have scar tissue in one of my lungs.\"    Cl would like to find more time in the day for exercise (yoga, lifting weights).  Discussed Pros & Cons of doing deep breathing until cl can get workout space in house ready.    HW: Cl would like to go for a walk \"when he (spouse) cooks.\"   Have a conversation with spouse first to get them onboard.       Treatment Objective(s) Addressed in This Session:   Client identified escalating anxiety, triggers to anxiety, and possible skills to help manage anxiety  Client identified that her spouses reaction may be a barrier to getting regular exercise; Problem solved around this and discussed communication skills  Discussed getting a space ready in her home in order to exercise and spending time with her daughter     Intervention:   CBT: Reviewed and assigned homework; Pattern recognition; Psychoeducation; Socratic Questioning; Problem solved; Taught on communication skills  Active listening, validation, empathy, and other rapport building skills; Insight oriented questions    Assessments completed prior to visit:  The following assessments were completed by patient for this visit:  PHQ9:   PHQ-9 SCORE 4/14/2022 5/18/2022 7/7/2022 7/14/2022 8/9/2022 8/26/2022 8/26/2022   PHQ-9 Total Score MyChart 4 (Minimal depression) 4 (Minimal depression) 6 (Mild depression) 7 (Mild depression) 3 (Minimal depression) - 2 (Minimal depression)   PHQ-9 Total Score 4 4 6 7 3 0 2     GAD7:   BALA-7 SCORE 6/30/2021 11/17/2021 12/9/2021 5/18/2022 7/7/2022 8/26/2022 8/26/2022   Total Score 2 (minimal anxiety) 5 (mild anxiety) - 8 (mild anxiety) 9 (mild anxiety) - 2 (minimal anxiety)   Total Score 2 5 2 8 9 2 2         ASSESSMENT: Current Emotional / Mental Status (status of significant symptoms):   Risk status (Self / Other harm or suicidal ideation)   Patient denies current " "fears or concerns for personal safety.   Patient denies current or recent suicidal ideation or behaviors.   Patient denies current or recent homicidal ideation or behaviors.   Patient denies current or recent self injurious behavior or ideation.   Patient denies other safety concerns.   Patient reports there has been no change in risk factors since their last session.     Patient reports there has been no change in protective factors since their last session.     Recommended that patient call 911 or go to the local ED should there be a change in any of these risk factors.     Appearance:   Appropriate    Eye Contact:   Fair    Psychomotor Behavior: Normal    Attitude:   Cooperative  Friendly Pleasant   Orientation:   All   Speech    Rate / Production: Normal/ Responsive    Volume:  Normal    Mood:    Normal   Affect:    Appropriate  Bright    Thought Content:  Clear    Thought Form:  Coherent  Logical    Insight:    Good  and Fair      Medication Review:   No current psychiatric medications prescribed     Medication Compliance:   NA     Changes in Health Issues:   None reported     Chemical Use Review:   Substance Use: Chemical use reviewed, no active concerns identified      Tobacco Use: Did not address    Diagnosis:  1. Unspecified Depressive Disorder    2. Social Anxiety Disorder (Social Phobia)        Collateral Reports Completed:   Not Applicable    PLAN: (Patient Tasks / Therapist Tasks / Other)  Client will use the communication skills discussed today during session to talk to her  about going for a walk with their daughter when it is \"his turn to cook\".   She made a future appointment for November 29, 2022.        Mejia Gonzalez                                                         ______________________________________________________________________    Individual Treatment Plan    Patient's Name: Scott Dobbs  YOB: 1985    Date of Creation: 7/7/22 (cont'd from previous " therapist)  Date Treatment Plan Last Reviewed/Revised: 7/7/22    DSM5 Diagnoses: 311 (F32.9) Unspecified Depressive Disorder  or 300.23 (F40.10) Social Anxiety Disorder  300.02 (F41.1) Generalized Anxiety Disorder  Psychosocial / Contextual Factors:  postpartum within past year, transition back to work in February, coping with pandemic  PROMIS (reviewed every 90 days): 26 (on 7/7/22)    Referral / Collaboration:  Referral to another professional/service is not indicated at this time..    Anticipated number of session for this episode of care: 24  Anticipation frequency of session: Every other week  Anticipated Duration of each session: 38-52 minutes  Treatment plan will be reviewed in 90 days or when goals have been changed.       MeasurableTreatment Goal(s) related to diagnosis / functional impairment(s)  Goal 1: Patient will reduce symptoms of anxiety as evidenced by decreased score on BALA 7.     I will know I've met my goal when I worry less in social/work interactions       Objective #A (Patient Action)                          Patient will identify three distraction and diversion activities and use those activities to decrease level of anxiety  .  Status: Completed - Date: 6/13/22      Intervention(s)  [facility] will teach distress tolerance, mindfulness, and relaxation techniques.     Objective #B  Patient will use cognitive strategies identified in therapy to challenge anxious thoughts.  Status: Completed: 6/13/22   Intervention(s)  [facility] will assign homework to practice cognitive restructuring and defusion techniques.     Objective #C  Patient will use relaxation strategies 2-3 times per day to reduce the physical symptoms of anxiety.  Status: Completed: 6/13/22      Intervention(s)  [facility] will teach relaxation techniques.        Goal 2: Patient will reduce symptoms of depression as evidenced by decreased scores on PHQ9.    I will know I've met my goal when I feel like I can start and end tasks, find more motivation.        Objective #A (Patient Action)                          Status: Completed - Date: 5/18/22      Patient will Increase interest, engagement, and pleasure in doing things.     Intervention(s)  [facility] will explore strategies to help increase motivation and interest.     Objective #B  Patient will Identify negative self-talk and behaviors: challenge core beliefs, myths, and actions.                  Status: Completed - Date: 5/18/22      Intervention(s)  C will continue to explore automatic unhelpful/unhealthy thoughts and beliefs, and begin to create new helpeful/helpful automatic thoughts and beliefs.        Patient has reviewed and agreed to the above plan.      Mejia Gonzalez  November 7, 2022

## 2022-11-29 ENCOUNTER — VIRTUAL VISIT (OUTPATIENT)
Dept: PSYCHOLOGY | Facility: CLINIC | Age: 37
End: 2022-11-29
Payer: COMMERCIAL

## 2022-11-29 DIAGNOSIS — F32.A DEPRESSIVE DISORDER: Primary | ICD-10-CM

## 2022-11-29 DIAGNOSIS — F40.10 SOCIAL ANXIETY DISORDER: ICD-10-CM

## 2022-11-29 PROCEDURE — 90834 PSYTX W PT 45 MINUTES: CPT | Mod: GT | Performed by: PSYCHOLOGIST

## 2022-11-29 NOTE — PROGRESS NOTES
"[facility] Counseling                                     Progress Note    Patient Name: Scott Dobbs  Date: 11/29/22         Service Type: Individual      Session Start Time: 8:01am Session End Time: 8:53am     Session Length: 52 minutes    Session #: 7 (transfer from Dawn Denting)    Attendees: Client attended alone    Service Modality:  Video Visit:      Provider verified identity through the following two step process.  Patient provided:  Patient photo    Telemedicine Visit: The patient's condition can be safely assessed and treated via synchronous audio and visual telemedicine encounter.      Reason for Telemedicine Visit: Services only offered telehealth    Originating Site (Patient Location): Patient's place of employment    Distant Site (Provider Location): Provider Remote Setting- Home Office    Consent:  The patient/guardian has verbally consented to: the potential risks and benefits of telemedicine (video visit) versus in person care; bill my insurance or make self-payment for services provided; and responsibility for payment of non-covered services.     Patient would like the video invitation sent by:  Send to e-mail at: [email or web address]    Mode of Communication:  Video Conference via well    As the provider I attest to compliance with applicable laws and regulations related to telemedicine.    DATA  Interactive Complexity: No  Crisis: No        Progress Since Last Session (Related to Symptoms / Goals / Homework):   Symptoms: Improving : \"It's slightly better than last time we talked.\"    Homework: Completed in session      Episode of Care Goals: Satisfactory progress - ACTION (Actively working towards change); Intervened by reinforcing change plan / affirming steps taken     Current / Ongoing Stressors and Concerns:   Client was busy and anxiety has escalated      Notes on 11/29/22 Session:  \"Things have been slightly better since last time we talked.\"  Her daughter " "fell and was sick the next day (flu).  They went \"on a date\" last week.  Spent Thanksgiving and day after w/parents.  Discussed anxiety  \"I caught myself catastrophizing yesterday.  My daughter hit her head and woke up with a fever.\"  Processed how she challenged catastr. (by gathering facts by obvserving daughter).  Checked in on homework  Cl talked about \"finding a third space\" (outside of work and home) to go to, \"I miss being active.\"  Cl is also learning Cuban and [place]i.  Rec, in time, attending a 'language' immersion group.       Treatment Objective(s) Addressed in This Session:   Client identified \"catastrophizing\" when her daughter was ill and was encouraged to identify how she 'challenged' the thoughts; She explored how her anxiety has been since last session.  Client has been dealing with a sick child; Checked-in on how the holiday was for client; checked in on homework     Intervention:   CBT: Reviewed and assigned homework; Pattern recognition; Psychoeducation; Socratic Questioning; Problem solved; Identified cognitive distortion and how client challenged it  Active listening, validation, empathy, and other rapport building skills; Insight oriented questions    Assessments completed prior to visit:  The following assessments were completed by patient for this visit:  PHQ9:   PHQ-9 SCORE 4/14/2022 5/18/2022 7/7/2022 7/14/2022 8/9/2022 8/26/2022 8/26/2022   PHQ-9 Total Score MyChart 4 (Minimal depression) 4 (Minimal depression) 6 (Mild depression) 7 (Mild depression) 3 (Minimal depression) - 2 (Minimal depression)   PHQ-9 Total Score 4 4 6 7 3 0 2     GAD7:   BALA-7 SCORE 6/30/2021 11/17/2021 12/9/2021 5/18/2022 7/7/2022 8/26/2022 8/26/2022   Total Score 2 (minimal anxiety) 5 (mild anxiety) - 8 (mild anxiety) 9 (mild anxiety) - 2 (minimal anxiety)   Total Score 2 5 2 8 9 2 2         ASSESSMENT: Current Emotional / Mental Status (status of significant symptoms):   Risk status (Self / Other harm or " suicidal ideation)   Patient denies current fears or concerns for personal safety.   Patient denies current or recent suicidal ideation or behaviors.   Patient denies current or recent homicidal ideation or behaviors.   Patient denies current or recent self injurious behavior or ideation.   Patient denies other safety concerns.   Patient reports there has been no change in risk factors since their last session.     Patient reports there has been no change in protective factors since their last session.     Recommended that patient call 911 or go to the local ED should there be a change in any of these risk factors.     Appearance:   Appropriate    Eye Contact:   Fair    Psychomotor Behavior: Normal    Attitude:   Cooperative  Friendly Pleasant   Orientation:   All   Speech    Rate / Production: Normal/ Responsive    Volume:  Normal    Mood:    Normal   Affect:    Appropriate  Bright    Thought Content:  Clear    Thought Form:  Coherent  Logical    Insight:    Good  and Fair      Medication Review:   No current psychiatric medications prescribed     Medication Compliance:   NA     Changes in Health Issues:   None reported     Chemical Use Review:   Substance Use: Chemical use reviewed, no active concerns identified      Tobacco Use: Did not address    Diagnosis:  1. Unspecified Depressive Disorder    2. Social Anxiety Disorder (Social Phobia)        Collateral Reports Completed:   Not Applicable    PLAN: (Patient Tasks / Therapist Tasks / Other)  Client will try to go for a walk with her daughter when it is her 's turn to cook.  She will be mindful to go for the walk as soon as she gets home from picking up her daughter at  (while it is still light outside).   She made a future appointment for December 22, 2022.        Mejia Gonzalez                                                         ______________________________________________________________________    Individual Treatment  Plan    Patient's Name: Scott Dobbs  YOB: 1985    Date of Creation: 7/7/22 (cont'd from previous therapist)  Date Treatment Plan Last Reviewed/Revised: 7/7/22    DSM5 Diagnoses: 311 (F32.9) Unspecified Depressive Disorder  or 300.23 (F40.10) Social Anxiety Disorder  300.02 (F41.1) Generalized Anxiety Disorder  Psychosocial / Contextual Factors:  postpartum within past year, transition back to work in February, coping with pandemic  PROMIS (reviewed every 90 days): 26 (on 7/7/22)    Referral / Collaboration:  Referral to another professional/service is not indicated at this time..    Anticipated number of session for this episode of care: 24  Anticipation frequency of session: Every other week  Anticipated Duration of each session: 38-52 minutes  Treatment plan will be reviewed in 90 days or when goals have been changed.       MeasurableTreatment Goal(s) related to diagnosis / functional impairment(s)  Goal 1: Patient will reduce symptoms of anxiety as evidenced by decreased score on BALA 7.     I will know I've met my goal when I worry less in social/work interactions       Objective #A (Patient Action)                          Patient will identify three distraction and diversion activities and use those activities to decrease level of anxiety  .  Status: Completed - Date: 6/13/22      Intervention(s)  [facility] will teach distress tolerance, mindfulness, and relaxation techniques.     Objective #B  Patient will use cognitive strategies identified in therapy to challenge anxious thoughts.  Status: Completed: 6/13/22   Intervention(s)  [facility] will assign homework to practice cognitive restructuring and defusion techniques.     Objective #C  Patient will use relaxation strategies 2-3 times per day to reduce the physical symptoms of anxiety.  Status: Completed: 6/13/22      Intervention(s)  [facility] will teach relaxation techniques.        Goal 2: Patient will reduce symptoms of depression as evidenced by  decreased scores on PHQ9.    I will know I've met my goal when I feel like I can start and end tasks, find more motivation.       Objective #A (Patient Action)                          Status: Completed - Date: 5/18/22      Patient will Increase interest, engagement, and pleasure in doing things.     Intervention(s)  [facility] will explore strategies to help increase motivation and interest.     Objective #B  Patient will Identify negative self-talk and behaviors: challenge core beliefs, myths, and actions.                  Status: Completed - Date: 5/18/22      Intervention(s)  [facility] will continue to explore automatic unhelpful/unhealthy thoughts and beliefs, and begin to create new helpeful/helpful automatic thoughts and beliefs.        Patient has reviewed and agreed to the above plan.      Mejia Gnozalez  November 29, 2022

## 2022-12-22 ENCOUNTER — VIRTUAL VISIT (OUTPATIENT)
Dept: PSYCHOLOGY | Facility: CLINIC | Age: 37
End: 2022-12-22
Payer: COMMERCIAL

## 2022-12-22 DIAGNOSIS — F32.A DEPRESSIVE DISORDER: Primary | ICD-10-CM

## 2022-12-22 DIAGNOSIS — F40.10 SOCIAL ANXIETY DISORDER: ICD-10-CM

## 2022-12-22 PROCEDURE — 90834 PSYTX W PT 45 MINUTES: CPT | Mod: GT | Performed by: PSYCHOLOGIST

## 2022-12-22 ASSESSMENT — PATIENT HEALTH QUESTIONNAIRE - PHQ9
SUM OF ALL RESPONSES TO PHQ QUESTIONS 1-9: 4
SUM OF ALL RESPONSES TO PHQ QUESTIONS 1-9: 4
10. IF YOU CHECKED OFF ANY PROBLEMS, HOW DIFFICULT HAVE THESE PROBLEMS MADE IT FOR YOU TO DO YOUR WORK, TAKE CARE OF THINGS AT HOME, OR GET ALONG WITH OTHER PEOPLE: SOMEWHAT DIFFICULT

## 2022-12-22 ASSESSMENT — ANXIETY QUESTIONNAIRES
6. BECOMING EASILY ANNOYED OR IRRITABLE: MORE THAN HALF THE DAYS
7. FEELING AFRAID AS IF SOMETHING AWFUL MIGHT HAPPEN: SEVERAL DAYS
4. TROUBLE RELAXING: NOT AT ALL
1. FEELING NERVOUS, ANXIOUS, OR ON EDGE: MORE THAN HALF THE DAYS
7. FEELING AFRAID AS IF SOMETHING AWFUL MIGHT HAPPEN: SEVERAL DAYS
GAD7 TOTAL SCORE: 6
3. WORRYING TOO MUCH ABOUT DIFFERENT THINGS: SEVERAL DAYS
5. BEING SO RESTLESS THAT IT IS HARD TO SIT STILL: NOT AT ALL
GAD7 TOTAL SCORE: 6
2. NOT BEING ABLE TO STOP OR CONTROL WORRYING: NOT AT ALL
8. IF YOU CHECKED OFF ANY PROBLEMS, HOW DIFFICULT HAVE THESE MADE IT FOR YOU TO DO YOUR WORK, TAKE CARE OF THINGS AT HOME, OR GET ALONG WITH OTHER PEOPLE?: SOMEWHAT DIFFICULT
GAD7 TOTAL SCORE: 6
IF YOU CHECKED OFF ANY PROBLEMS ON THIS QUESTIONNAIRE, HOW DIFFICULT HAVE THESE PROBLEMS MADE IT FOR YOU TO DO YOUR WORK, TAKE CARE OF THINGS AT HOME, OR GET ALONG WITH OTHER PEOPLE: SOMEWHAT DIFFICULT

## 2022-12-22 NOTE — PROGRESS NOTES
"[facility]                                     Progress Note    Patient Name: Scott Dobbs  Date: 12/22/22         Service Type: Individual      Session Start Time: 4:02pm Session End Time: 4:54pm     Session Length: 52 minutes    Session #: 8 (transfer from Dawn Denting)    Attendees: Client attended alone    Service Modality:  Video Visit:      Provider verified identity through the following two step process.  Patient provided:  Patient photo    Telemedicine Visit: The patient's condition can be safely assessed and treated via synchronous audio and visual telemedicine encounter.      Reason for Telemedicine Visit: Services only offered telehealth    Originating Site (Patient Location): Patient's place of employment    Distant Site (Provider Location): Provider Remote Setting- Home Office    Consent:  The patient/guardian has verbally consented to: the potential risks and benefits of telemedicine (video visit) versus in person care; bill my insurance or make self-payment for services provided; and responsibility for payment of non-covered services.     Patient would like the video invitation sent by:  Send to e-mail at: [email or web address]    Mode of Communication:  Video Conference via well    As the provider I attest to compliance with applicable laws and regulations related to telemedicine.    DATA  Interactive Complexity: No  Crisis: No        Progress Since Last Session (Related to Symptoms / Goals / Homework):   Symptoms: Improving : \"It's slightly better than last time we talked.\"    Homework: Completed in session      Episode of Care Goals: Satisfactory progress - ACTION (Actively working towards change); Intervened by reinforcing change plan / affirming steps taken     Current / Ongoing Stressors and Concerns:   Client was busy and anxiety has escalated      Notes on 12/22/22 Session:  \"I'm doing pretty good, in general, things seem better.  I seem to have more " "energy these days.  I was only able to get out for one walk since we talked.\"  \"I'm still working on it.  The day I remembered to do it, I set an alarm to remind me and text [] ahead of time.\"    \"We've been a little more active and busy.\"    Cl will talk to her  about expectations regarding visiting his family and overseeing their child over the holidays (usually the cl tends to have more time to attend to their child).    Cl discussed a couple disagreements she had with her .  Cl has a group of friends who all had children the same year (2020) as client or even the same month.  Three of the friends are local, others are online \"from around the world.\" - \"That's a lot of my social outlets.  Along with my mom's friends.  I have another group of friends, too.\"       Treatment Objective(s) Addressed in This Session:   Client talked about feeling less stressed and anxiety  Client will proactively talk to her  about \"expectations when we visit family for the holidays (regarding who watches their child).\"  Reviewed and assigned homework; Explored client's healthy social outlets.     Intervention:   CBT: Reviewed and assigned homework; Pattern recognition; Psychoeducation; Socratic Questioning; Problem solved; Identified skills  Active listening, validation, empathy, and other rapport building skills; Explored client's healthy social outlets    Assessments completed prior to visit:  The following assessments were completed by patient for this visit:  PHQ9:   PHQ-9 SCORE 5/18/2022 7/7/2022 7/14/2022 8/9/2022 8/26/2022 8/26/2022 12/22/2022   PHQ-9 Total Score MyChart 4 (Minimal depression) 6 (Mild depression) 7 (Mild depression) 3 (Minimal depression) - 2 (Minimal depression) 4 (Minimal depression)   PHQ-9 Total Score 4 6 7 3 0 2 4     GAD7:   BALA-7 SCORE 11/17/2021 12/9/2021 5/18/2022 7/7/2022 8/26/2022 8/26/2022 12/22/2022   Total Score 5 (mild anxiety) - 8 (mild anxiety) 9 (mild anxiety) - 2 " (minimal anxiety) 6 (mild anxiety)   Total Score 5 2 8 9 2 2 6         ASSESSMENT: Current Emotional / Mental Status (status of significant symptoms):   Risk status (Self / Other harm or suicidal ideation)   Patient denies current fears or concerns for personal safety.   Patient denies current or recent suicidal ideation or behaviors.   Patient denies current or recent homicidal ideation or behaviors.   Patient denies current or recent self injurious behavior or ideation.   Patient denies other safety concerns.   Patient reports there has been no change in risk factors since their last session.     Patient reports there has been no change in protective factors since their last session.     Recommended that patient call 911 or go to the local ED should there be a change in any of these risk factors.     Appearance:   Appropriate    Eye Contact:   Fair    Psychomotor Behavior: Normal    Attitude:   Cooperative  Friendly Pleasant   Orientation:   All   Speech    Rate / Production: Normal/ Responsive    Volume:  Normal    Mood:    Normal   Affect:    Appropriate  Bright    Thought Content:  Clear    Thought Form:  Coherent  Logical    Insight:    Good  and Fair      Medication Review:   No current psychiatric medications prescribed     Medication Compliance:   NA     Changes in Health Issues:   None reported     Chemical Use Review:   Substance Use: Chemical use reviewed, no active concerns identified      Tobacco Use: Did not address    Diagnosis:  1. Unspecified Depressive Disorder    2. Social Anxiety Disorder (Social Phobia)        Collateral Reports Completed:   Not Applicable    PLAN: (Patient Tasks / Therapist Tasks / Other)  Client will continue to work on going for a walk with her daughter when it is her 's turn to cook.  She will be mindful to go for the walk as soon as she gets home from picking up her daughter at  (while it is still light outside).  Client will have a discussion with her   regarding her holiday expectations with family. She made a future appointment for January 24, 2023.        Mejia Gonzalez                                                         ______________________________________________________________________    Individual Treatment Plan    Patient's Name: Scott Dobbs  YOB: 1985    Date of Creation: 7/7/22 (cont'd from previous therapist)  Date Treatment Plan Last Reviewed/Revised: 7/7/22    DSM5 Diagnoses: 311 (F32.9) Unspecified Depressive Disorder  or 300.23 (F40.10) Social Anxiety Disorder  300.02 (F41.1) Generalized Anxiety Disorder  Psychosocial / Contextual Factors:  postpartum within past year, transition back to work in February, coping with pandemic  PROMIS (reviewed every 90 days): 26 (on 7/7/22)    Referral / Collaboration:  Referral to another professional/service is not indicated at this time..    Anticipated number of session for this episode of care: 24  Anticipation frequency of session: Every other week  Anticipated Duration of each session: 38-52 minutes  Treatment plan will be reviewed in 90 days or when goals have been changed.       MeasurableTreatment Goal(s) related to diagnosis / functional impairment(s)  Goal 1: Patient will reduce symptoms of anxiety as evidenced by decreased score on BALA 7.     I will know I've met my goal when I worry less in social/work interactions       Objective #A (Patient Action)                          Patient will identify three distraction and diversion activities and use those activities to decrease level of anxiety  .  Status: Completed - Date: 6/13/22      Intervention(s)  [facility] will teach distress tolerance, mindfulness, and relaxation techniques.     Objective #B  Patient will use cognitive strategies identified in therapy to challenge anxious thoughts.  Status: Completed: 6/13/22   Intervention(s)  [facility] will assign homework to practice cognitive restructuring and defusion  techniques.     Objective #C  Patient will use relaxation strategies 2-3 times per day to reduce the physical symptoms of anxiety.  Status: Completed: 6/13/22      Intervention(s)  [facility] will teach relaxation techniques.        Goal 2: Patient will reduce symptoms of depression as evidenced by decreased scores on PHQ9.    I will know I've met my goal when I feel like I can start and end tasks, find more motivation.       Objective #A (Patient Action)                          Status: Completed - Date: 5/18/22      Patient will Increase interest, engagement, and pleasure in doing things.     Intervention(s)  [facility] will explore strategies to help increase motivation and interest.     Objective #B  Patient will Identify negative self-talk and behaviors: challenge core beliefs, myths, and actions.                  Status: Completed - Date: 5/18/22      Intervention(s)  [facility] will continue to explore automatic unhelpful/unhealthy thoughts and beliefs, and begin to create new helpeful/helpful automatic thoughts and beliefs.        Patient has reviewed and agreed to the above plan.      Mejia Gonzalez  December 22, 2022

## 2023-01-23 ASSESSMENT — ANXIETY QUESTIONNAIRES
GAD7 TOTAL SCORE: 11
7. FEELING AFRAID AS IF SOMETHING AWFUL MIGHT HAPPEN: MORE THAN HALF THE DAYS
7. FEELING AFRAID AS IF SOMETHING AWFUL MIGHT HAPPEN: MORE THAN HALF THE DAYS
GAD7 TOTAL SCORE: 11
IF YOU CHECKED OFF ANY PROBLEMS ON THIS QUESTIONNAIRE, HOW DIFFICULT HAVE THESE PROBLEMS MADE IT FOR YOU TO DO YOUR WORK, TAKE CARE OF THINGS AT HOME, OR GET ALONG WITH OTHER PEOPLE: SOMEWHAT DIFFICULT
6. BECOMING EASILY ANNOYED OR IRRITABLE: MORE THAN HALF THE DAYS
8. IF YOU CHECKED OFF ANY PROBLEMS, HOW DIFFICULT HAVE THESE MADE IT FOR YOU TO DO YOUR WORK, TAKE CARE OF THINGS AT HOME, OR GET ALONG WITH OTHER PEOPLE?: SOMEWHAT DIFFICULT
GAD7 TOTAL SCORE: 11
2. NOT BEING ABLE TO STOP OR CONTROL WORRYING: MORE THAN HALF THE DAYS
5. BEING SO RESTLESS THAT IT IS HARD TO SIT STILL: NOT AT ALL
1. FEELING NERVOUS, ANXIOUS, OR ON EDGE: MORE THAN HALF THE DAYS
4. TROUBLE RELAXING: MORE THAN HALF THE DAYS
3. WORRYING TOO MUCH ABOUT DIFFERENT THINGS: SEVERAL DAYS

## 2023-01-24 ENCOUNTER — VIRTUAL VISIT (OUTPATIENT)
Dept: PSYCHOLOGY | Facility: CLINIC | Age: 38
End: 2023-01-24
Payer: COMMERCIAL

## 2023-01-24 ENCOUNTER — MYC MEDICAL ADVICE (OUTPATIENT)
Dept: FAMILY MEDICINE | Facility: CLINIC | Age: 38
End: 2023-01-24
Payer: COMMERCIAL

## 2023-01-24 DIAGNOSIS — F40.10 SOCIAL ANXIETY DISORDER: ICD-10-CM

## 2023-01-24 DIAGNOSIS — U07.1 INFECTION DUE TO 2019 NOVEL CORONAVIRUS: Primary | ICD-10-CM

## 2023-01-24 DIAGNOSIS — R05.9 COUGH: ICD-10-CM

## 2023-01-24 DIAGNOSIS — F32.A DEPRESSIVE DISORDER: Primary | ICD-10-CM

## 2023-01-24 PROCEDURE — 90834 PSYTX W PT 45 MINUTES: CPT | Mod: GT | Performed by: PSYCHOLOGIST

## 2023-01-24 NOTE — PROGRESS NOTES
"[facility]                                     Progress Note    Patient Name: Scott Dobbs  Date: 1/24/23         Service Type: Individual      Session Start Time: 8:16am Session End Time: 9:01am     Session Length: 45 minutes    Session #: 9 (transfer from Dawn Denting)    Attendees: Client attended alone    Service Modality:  Video Visit:      Provider verified identity through the following two step process.  Patient provided:  Patient photo    Telemedicine Visit: The patient's condition can be safely assessed and treated via synchronous audio and visual telemedicine encounter.      Reason for Telemedicine Visit: Services only offered telehealth    Originating Site (Patient Location): Patient's place of employment    Distant Site (Provider Location): Provider Remote Setting- Home Office    Consent:  The patient/guardian has verbally consented to: the potential risks and benefits of telemedicine (video visit) versus in person care; bill my insurance or make self-payment for services provided; and responsibility for payment of non-covered services.     Patient would like the video invitation sent by:  Send to e-mail at: [email or web address]    Mode of Communication:  Video Conference via well    As the provider I attest to compliance with applicable laws and regulations related to telemedicine.    DATA  Interactive Complexity: No  Crisis: No        Progress Since Last Session (Related to Symptoms / Goals / Homework):   Symptoms: Improving : \"It's slightly better than last time we talked.\"    Homework: Completed in session      Episode of Care Goals: Satisfactory progress - ACTION (Actively working towards change); Intervened by reinforcing change plan / affirming steps taken     Current / Ongoing Stressors and Concerns:   Client is sick; \"Potty training our daughter.\"      Notes on 1/24/23 Session:  Cl logged in late.  She's anxious about potty training their child    Cl is " "being mindful of talking openly with her , particularly in regards to raising their daughter.    \"Their's some stuff happening at work.\"  They met last week on the issue.  Discussed client role in issue.    Discussed her relationship with her (newer) boss.  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  Talked about problem solving styles (external vs internal)       Treatment Objective(s) Addressed in This Session:   identify clients (potty training our little girl) fears / thoughts that contribute to feeling anxious  Client is talking more openly with her , particularly about potty training their daughter; Also discussed issues at work and the differences between her and her boss  Assigned and reviewed homework; Client is sick     Intervention:   CBT: Reviewed and assigned homework; Pattern recognition; Psychoeducation; Socratic Questioning; Problem solved; Identified skills  Active listening, validation, and other rapport building skills; Discussed different problem solving styles (external - client; poss internal-boss); Updated PHQ-9 and BALA-7 screeners    Assessments completed prior to visit:  The following assessments were completed by patient for this visit:  PHQ9:   PHQ-9 SCORE 7/7/2022 7/14/2022 8/9/2022 8/26/2022 8/26/2022 12/22/2022 1/23/2023   PHQ-9 Total Score MyChart 6 (Mild depression) 7 (Mild depression) 3 (Minimal depression) - 2 (Minimal depression) 4 (Minimal depression) 4 (Minimal depression)   PHQ-9 Total Score 6 7 3 0 2 4 4     GAD7:   BALA-7 SCORE 12/9/2021 5/18/2022 7/7/2022 8/26/2022 8/26/2022 12/22/2022 1/23/2023   Total Score - 8 (mild anxiety) 9 (mild anxiety) - 2 (minimal anxiety) 6 (mild anxiety) 11 (moderate anxiety)   Total Score 2 8 9 2 2 6 11           ASSESSMENT: Current Emotional / Mental Status (status of significant symptoms):   Risk status (Self / Other harm or suicidal ideation)   Patient denies current fears or concerns for personal safety.   Patient denies current or recent " suicidal ideation or behaviors.   Patient denies current or recent homicidal ideation or behaviors.   Patient denies current or recent self injurious behavior or ideation.   Patient denies other safety concerns.   Patient reports there has been no change in risk factors since their last session.     Patient reports there has been no change in protective factors since their last session.     Recommended that patient call 911 or go to the local ED should there be a change in any of these risk factors.     Appearance:   Appropriate    Eye Contact:   Fair    Psychomotor Behavior: Normal    Attitude:   Cooperative  Friendly Pleasant   Orientation:   All   Speech    Rate / Production: Normal/ Responsive    Volume:  Normal    Mood:    Normal   Affect:    Appropriate  Bright    Thought Content:  Clear    Thought Form:  Coherent  Logical    Insight:    Good  and Fair      Medication Review:   No current psychiatric medications prescribed     Medication Compliance:   NA     Changes in Health Issues:   None reported     Chemical Use Review:   Substance Use: Chemical use reviewed, no active concerns identified      Tobacco Use: Did not address    Diagnosis:  1. Unspecified Depressive Disorder    2. Social Anxiety Disorder (Social Phobia)        Collateral Reports Completed:   Not Applicable    PLAN: (Patient Tasks / Therapist Tasks / Other)  Client will continue to communicate openly with her  so emotions to build up. She made a future appointment for March 2, 2023.        Mejia Gonzalez                                                         ______________________________________________________________________    Individual Treatment Plan    Patient's Name: Scott Dobbs  YOB: 1985    Date of Creation: 7/7/22 (cont'd from previous therapist)  Date Treatment Plan Last Reviewed/Revised: 7/7/22    DSM5 Diagnoses: 311 (F32.9) Unspecified Depressive Disorder  or 300.23 (F40.10) Social Anxiety  Disorder  300.02 (F41.1) Generalized Anxiety Disorder  Psychosocial / Contextual Factors:  postpartum within past year, transition back to work in February, coping with pandemic  PROMIS (reviewed every 90 days): 26 (on 7/7/22)    Referral / Collaboration:  Referral to another professional/service is not indicated at this time..    Anticipated number of session for this episode of care: 24  Anticipation frequency of session: Every other week  Anticipated Duration of each session: 38-52 minutes  Treatment plan will be reviewed in 90 days or when goals have been changed.       MeasurableTreatment Goal(s) related to diagnosis / functional impairment(s)  Goal 1: Patient will reduce symptoms of anxiety as evidenced by decreased score on BALA 7.     I will know I've met my goal when I worry less in social/work interactions       Objective #A (Patient Action)                          Patient will identify three distraction and diversion activities and use those activities to decrease level of anxiety  .  Status: Completed - Date: 6/13/22      Intervention(s)  [facility] will teach distress tolerance, mindfulness, and relaxation techniques.     Objective #B  Patient will use cognitive strategies identified in therapy to challenge anxious thoughts.  Status: Completed: 6/13/22   Intervention(s)  [facility] will assign homework to practice cognitive restructuring and defusion techniques.     Objective #C  Patient will use relaxation strategies 2-3 times per day to reduce the physical symptoms of anxiety.  Status: Completed: 6/13/22      Intervention(s)  [facility] will teach relaxation techniques.        Goal 2: Patient will reduce symptoms of depression as evidenced by decreased scores on PHQ9.    I will know I've met my goal when I feel like I can start and end tasks, find more motivation.       Objective #A (Patient Action)                          Status: Completed - Date: 5/18/22      Patient will Increase interest, engagement, and pleasure in  doing things.     Intervention(s)  C will explore strategies to help increase motivation and interest.     Objective #B  Patient will Identify negative self-talk and behaviors: challenge core beliefs, myths, and actions.                  Status: Completed - Date: 5/18/22      Intervention(s)  C will continue to explore automatic unhelpful/unhealthy thoughts and beliefs, and begin to create new helpeful/helpful automatic thoughts and beliefs.        Patient has reviewed and agreed to the above plan.      Mejia Gonzalez  January 24, 2023

## 2023-01-24 NOTE — TELEPHONE ENCOUNTER
Called patient.     RN COVID TREATMENT VISIT  01/24/23    Scott Dobbs  37 year old  Current weight? 334 lbs    Has the patient been seen by a primary care provider at an [facility] or [facility] within the past two years? Yes.   Have you been in close proximity to/do you have a known exposure to a person with a confirmed case of influenza? No.     Date of positive COVID test (PCR or at home)?  01/24/2023    Current COVID symptoms: cough, muscle or body aches and congestion or runny nose    Date COVID symptoms began: 01/24/2023    Do you have any of the following conditions that place you at risk of being very sick from COVID-19? mental health conditions and overweight (BMI>25)    Is patient eligible to continue? Yes, established patient, 12 years or older weighing at least 88.2 lbs, who has COVID symptoms that started in the past 5 days and is at risk for being very sick from COVID-19.       Have you received monoclonal antibodies or oral antiviral medications since testing positive to COVID-19? No    Are you currently hospitalized for COVID-19? No    Do you have a history of hepatitis? No    Are you currently pregnant or nursing? No    Do you have a clinically significant hypersensitivity to nirmatrelvir, ritonavir, or molnupiravir? No    Do you have any history of severe renal impairment (eGFR < 30mL/min)? No    Do you have any history of hepatic impairment or abnormalities (e.g. hepatic panel, ALT, AST, ALK Phos, bilirubin)? No    Have you had a coronary stent placed in the previous 6 months? No    Is patient eligible to continue?   Yes, patient meets all eligibility requirements for the RN COVID treatment (as denoted by all no responses above).     Current Outpatient Medications   Medication Sig Dispense Refill     acetaminophen (TYLENOL) 325 MG tablet Take 3 tablets (975 mg) by mouth every 8 hours as needed for mild pain       ASPIRIN PO Take 81 mg by mouth daily         cetirizine (ZYRTEC) 10 MG tablet Take 10 mg by mouth daily       escitalopram (LEXAPRO) 5 MG tablet Take 1 tablet (5 mg) by mouth daily 90 tablet 3     fluticasone (FLONASE) 50 MCG/ACT nasal spray Spray 1 spray into both nostrils daily       ibuprofen (ADVIL/MOTRIN) 200 MG tablet Take 3 tablets (600 mg) by mouth every 6 hours as needed for moderate pain Start after delivery       labetalol (NORMODYNE) 100 MG tablet TAKE 1 TABLET BY MOUTH TWICE A  tablet 3     paragard intrauterine copper device 1 each by Intrauterine route once       Prenatal Vit-Fe Fumarate-FA (PRENATAL MULTIVITAMIN W/IRON) 27-0.8 MG tablet Take 1 tablet by mouth daily       Medications from List 1 of the standing order (on medications that exclude the use of Paxlovid) that patient is taking: NONE. Is patient taking [place]'s Wort? No  Is patient taking Bobbi's Wort or any meds from List 1? No.   Medications from List 2 of the standing order (on meds that provider needs to adjust) that patient is taking: NONE. Is patient on any of the meds from List 2? No.   Medications from List 3 of standing order (on meds that a RN needs to adjust) that patient is taking: NONE. Is patient on any meds from List 3? No.   In order of efficacy, Paxlovid has an approximate 90% reduction in hospitalization. Paxlovid can possibly cause altered sense of taste, diarrhea (loose, watery stools), high blood pressure, muscle aches.  The other option is molnupiravir which has an approximate 30% reduction in hospitalization. Molnupirarivr can possibly cause diarrhea (loose, watery stools), nausea (feeling sick to your stomach), dizziness, headaches.    Which treatment option does the patient prefer?   Paxlovid.   Lab Results   Component Value Date    GFRESTIMATED 77 07/14/2022       Was last eGFR reduced? No, eGFR 60 or greater/ No Result on record. Patient can receive the normal renal function dose. Paxlovid Rx sent to [facility]     Temporary change to home  medications: None    All medication adjustments (holds, etc) were discussed with the patient and patient was asked to repeat back (teachback) their med adjustment.  Did patient understand med adjustment? No medication adjustments needed.       Reviewed the following instructions with the patient:    Paxlovid (nimatrelvir and ritonavir)    How it works  Two medicines (nirmatrelvir and ritonavir) are taken together. They stop the virus from growing. Less amount of virus is easier for your body to fight.    How to take    Medicine comes in a daily container with both medicine tablets. Take by mouth twice daily (once in the morning, once at night) for 5 days.    The number of tablets to take varies by patient.    Don't chew or break capsules. Swallow whole.    When to take  Take as soon as possible after positive COVID-19 test result, and within 5 days of your first symptoms.    Possible side effects  Can cause altered sense of taste, diarrhea (loose, watery stools), high blood pressure, muscle aches.    Kerline Carr RN

## 2023-03-02 ENCOUNTER — VIRTUAL VISIT (OUTPATIENT)
Dept: PSYCHOLOGY | Facility: CLINIC | Age: 38
End: 2023-03-02
Payer: COMMERCIAL

## 2023-03-02 DIAGNOSIS — F32.A DEPRESSIVE DISORDER: Primary | ICD-10-CM

## 2023-03-02 DIAGNOSIS — F40.10 SOCIAL ANXIETY DISORDER: ICD-10-CM

## 2023-03-02 PROCEDURE — 90834 PSYTX W PT 45 MINUTES: CPT | Mod: VID | Performed by: PSYCHOLOGIST

## 2023-03-02 ASSESSMENT — ANXIETY QUESTIONNAIRES
GAD7 TOTAL SCORE: 10
6. BECOMING EASILY ANNOYED OR IRRITABLE: MORE THAN HALF THE DAYS
1. FEELING NERVOUS, ANXIOUS, OR ON EDGE: MORE THAN HALF THE DAYS
2. NOT BEING ABLE TO STOP OR CONTROL WORRYING: SEVERAL DAYS
IF YOU CHECKED OFF ANY PROBLEMS ON THIS QUESTIONNAIRE, HOW DIFFICULT HAVE THESE PROBLEMS MADE IT FOR YOU TO DO YOUR WORK, TAKE CARE OF THINGS AT HOME, OR GET ALONG WITH OTHER PEOPLE: SOMEWHAT DIFFICULT
5. BEING SO RESTLESS THAT IT IS HARD TO SIT STILL: NOT AT ALL
GAD7 TOTAL SCORE: 10
3. WORRYING TOO MUCH ABOUT DIFFERENT THINGS: NEARLY EVERY DAY
7. FEELING AFRAID AS IF SOMETHING AWFUL MIGHT HAPPEN: SEVERAL DAYS

## 2023-03-02 ASSESSMENT — PATIENT HEALTH QUESTIONNAIRE - PHQ9: 5. POOR APPETITE OR OVEREATING: SEVERAL DAYS

## 2023-03-02 NOTE — PROGRESS NOTES
"[facility]                                     Progress Note    Patient Name: Scott Dobbs  Date: 3/2/23         Service Type: Individual      Session Start Time: 8:03am Session End Time: 8:55am     Session Length: 52 minutes    Session #: 10 (transfer from Dawn Denting)    Attendees: Client attended alone    Service Modality:  Video Visit:      Provider verified identity through the following two step process.  Patient provided:  Patient photo    Telemedicine Visit: The patient's condition can be safely assessed and treated via synchronous audio and visual telemedicine encounter.      Reason for Telemedicine Visit: Services only offered telehealth    Originating Site (Patient Location): Patient's place of employment    Distant Site (Provider Location): Provider Remote Setting- Home Office    Consent:  The patient/guardian has verbally consented to: the potential risks and benefits of telemedicine (video visit) versus in person care; bill my insurance or make self-payment for services provided; and responsibility for payment of non-covered services.     Patient would like the video invitation sent by:  Send to e-mail at: [email or web address]    Mode of Communication:  Video Conference via well    As the provider I attest to compliance with applicable laws and regulations related to telemedicine.    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  \"Probably a bit of all of it.\"    Discussed role of emotions, modulating the behaviors that are a reaction to an emotion.    Still potty training daughter.    Administered BALA-7; Talked about history of BALA-7 and PHQ-9  Reviewed Treatment Plan (keep as is)       Treatment Objective(s) Addressed in This Session:   Sabiha talked about recent stressors in her life; how she bret with stressors; Discussed her listening to podcast on modulating behaviors based on emotions  Assigned and reviewed homework; Reviewed Treatment Plan goals; Administered screeners and discussed results     Intervention:   CBT: Reviewed homework; Pattern recognition (PHQ-9 & BALA-7); Psychoeducation; Socratic Questioning; Identified skills; Reviewed Treatment Plan  Active listening, validation, and other rapport building skills; Role of emotions    Assessments completed prior to visit:  The following assessments were completed by patient for this visit:  PHQ9:   PHQ-9 SCORE 7/14/2022 8/9/2022 8/26/2022 8/26/2022 12/22/2022 1/23/2023 3/1/2023   PHQ-9 Total Score MyChart 7 (Mild depression) 3 (Minimal depression) - 2 (Minimal depression) 4 (Minimal depression) 4 (Minimal depression) 6 (Mild depression)   PHQ-9 Total Score 7 3 0 2 4 4 6     GAD7:   BALA-7 SCORE 12/9/2021 5/18/2022 7/7/2022 8/26/2022 8/26/2022 12/22/2022 1/23/2023   Total Score - 8 (mild anxiety) 9 (mild anxiety) - 2 (minimal anxiety) 6 (mild anxiety) 11 (moderate anxiety)   Total Score 2 8 9 2 2 6 11           ASSESSMENT: Current Emotional / Mental Status (status of significant symptoms):   Risk status (Self / Other harm or suicidal ideation)   Patient denies current fears or concerns for personal safety.   Patient denies current or recent suicidal ideation or behaviors.   Patient denies current or recent homicidal ideation or behaviors.   Patient " denies current or recent self injurious behavior or ideation.   Patient denies other safety concerns.   Patient reports there has been no change in risk factors since their last session.     Patient reports there has been no change in protective factors since their last session.     Recommended that patient call 911 or go to the local ED should there be a change in any of these risk factors.     Appearance:   Appropriate    Eye Contact:   Fair    Psychomotor Behavior: Normal    Attitude:   Cooperative  Friendly Pleasant   Orientation:   All   Speech    Rate / Production: Normal/ Responsive    Volume:  Normal    Mood:    Normal   Affect:    Appropriate  Bright    Thought Content:  Clear    Thought Form:  Coherent  Logical    Insight:    Good  and Fair      Medication Review:   No current psychiatric medications prescribed     Medication Compliance:   NA     Changes in Health Issues:   None reported     Chemical Use Review:   Substance Use: Chemical use reviewed, no active concerns identified      Tobacco Use: Did not address    Diagnosis:  1. Unspecified Depressive Disorder    2. Social Anxiety Disorder (Social Phobia)        Collateral Reports Completed:   Not Applicable    PLAN: (Patient Tasks / Therapist Tasks / Other)  Client will continue to communicate openly with her  so emotions to build up. She made a future appointment for March 2, 2023.        Mejia Gonzalez PsyD                                                         ______________________________________________________________________    Individual Treatment Plan    Patient's Name: Scott Dobbs  YOB: 1985    Date of Creation: 7/7/22 (cont'd from previous therapist)  Date Treatment Plan Last Reviewed/Revised: 7/7/22; 3/2/23    DSM5 Diagnoses: 311 (F32.9) Unspecified Depressive Disorder  or 300.23 (F40.10) Social Anxiety Disorder  300.02 (F41.1) Generalized Anxiety Disorder  Psychosocial / Contextual Factors:  postpartum  within past year, transition back to work in February, coping with pandemic  PROMIS (reviewed every 90 days): 22 (on 1/23/23)    Referral / Collaboration:  Referral to another professional/service is not indicated at this time..    Anticipated number of session for this episode of care: 24  Anticipation frequency of session: Every other week  Anticipated Duration of each session: 38-52 minutes  Treatment plan will be reviewed in 90 days or when goals have been changed.       MeasurableTreatment Goal(s) related to diagnosis / functional impairment(s)  Goal 1: Patient will reduce symptoms of anxiety as evidenced by decreased score on BALA 7.     I will know I've met my goal when I worry less in social/work interactions       Objective #A (Patient Action)                          Patient will identify three distraction and diversion activities and use those activities to decrease level of anxiety  .  Status: Renewed - Date: 3/2/23     Intervention(s)  [facility] will teach distress tolerance, mindfulness, and relaxation techniques.     Objective #B  Patient will use cognitive strategies identified in therapy to challenge anxious thoughts.  Status: Renewed - Date: 3/2/23  Intervention(s)  [facility] will assign homework to practice cognitive restructuring and defusion techniques.     Objective #C  Patient will use relaxation strategies 2-3 times per day to reduce the physical symptoms of anxiety.  Status: Renewed - Date: 3/2/23     Intervention(s)  [facility] will teach relaxation techniques.        Goal 2: Patient will reduce symptoms of depression as evidenced by decreased scores on PHQ9.    I will know I've met my goal when I feel like I can start and end tasks, find more motivation.       Objective #A (Patient Action)                          Status: Renewed - Date: 3/2/23      Patient will Increase interest, engagement, and pleasure in doing things.     Intervention(s)  [facility] will explore strategies to help increase motivation and  interest.     Objective #B  Patient will Identify negative self-talk and behaviors: challenge core beliefs, myths, and actions.                  Status: Renewed - Date: 3/2/23      Intervention(s)  [facility] will continue to explore automatic unhelpful/unhealthy thoughts and beliefs, and begin to create new helpeful/helpful automatic thoughts and beliefs.        Patient has reviewed and agreed to the above plan.      Mejia Gonzalez PsyD  March 2, 2023

## 2023-03-28 ENCOUNTER — VIRTUAL VISIT (OUTPATIENT)
Dept: PSYCHOLOGY | Facility: CLINIC | Age: 38
End: 2023-03-28
Payer: COMMERCIAL

## 2023-03-28 DIAGNOSIS — F41.1 GAD (GENERALIZED ANXIETY DISORDER): ICD-10-CM

## 2023-03-28 DIAGNOSIS — F32.A DEPRESSIVE DISORDER: Primary | ICD-10-CM

## 2023-03-28 DIAGNOSIS — F40.10 SOCIAL ANXIETY DISORDER: ICD-10-CM

## 2023-03-28 PROCEDURE — 90834 PSYTX W PT 45 MINUTES: CPT | Mod: VID | Performed by: PSYCHOLOGIST

## 2023-03-28 ASSESSMENT — ANXIETY QUESTIONNAIRES
GAD7 TOTAL SCORE: 9
GAD7 TOTAL SCORE: 9
5. BEING SO RESTLESS THAT IT IS HARD TO SIT STILL: NOT AT ALL
6. BECOMING EASILY ANNOYED OR IRRITABLE: MORE THAN HALF THE DAYS
8. IF YOU CHECKED OFF ANY PROBLEMS, HOW DIFFICULT HAVE THESE MADE IT FOR YOU TO DO YOUR WORK, TAKE CARE OF THINGS AT HOME, OR GET ALONG WITH OTHER PEOPLE?: SOMEWHAT DIFFICULT
7. FEELING AFRAID AS IF SOMETHING AWFUL MIGHT HAPPEN: SEVERAL DAYS
1. FEELING NERVOUS, ANXIOUS, OR ON EDGE: MORE THAN HALF THE DAYS
4. TROUBLE RELAXING: SEVERAL DAYS
2. NOT BEING ABLE TO STOP OR CONTROL WORRYING: SEVERAL DAYS
7. FEELING AFRAID AS IF SOMETHING AWFUL MIGHT HAPPEN: SEVERAL DAYS
3. WORRYING TOO MUCH ABOUT DIFFERENT THINGS: MORE THAN HALF THE DAYS
IF YOU CHECKED OFF ANY PROBLEMS ON THIS QUESTIONNAIRE, HOW DIFFICULT HAVE THESE PROBLEMS MADE IT FOR YOU TO DO YOUR WORK, TAKE CARE OF THINGS AT HOME, OR GET ALONG WITH OTHER PEOPLE: SOMEWHAT DIFFICULT
GAD7 TOTAL SCORE: 9
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
## 2023-04-11 ENCOUNTER — VIRTUAL VISIT (OUTPATIENT)
Dept: PSYCHOLOGY | Facility: CLINIC | Age: 38
End: 2023-04-11
Payer: COMMERCIAL

## 2023-04-11 DIAGNOSIS — F40.10 SOCIAL ANXIETY DISORDER: ICD-10-CM

## 2023-04-11 DIAGNOSIS — F32.A DEPRESSIVE DISORDER: Primary | ICD-10-CM

## 2023-04-11 PROCEDURE — 90834 PSYTX W PT 45 MINUTES: CPT | Mod: VID | Performed by: PSYCHOLOGIST

## 2023-04-11 ASSESSMENT — ANXIETY QUESTIONNAIRES
8. IF YOU CHECKED OFF ANY PROBLEMS, HOW DIFFICULT HAVE THESE MADE IT FOR YOU TO DO YOUR WORK, TAKE CARE OF THINGS AT HOME, OR GET ALONG WITH OTHER PEOPLE?: VERY DIFFICULT
2. NOT BEING ABLE TO STOP OR CONTROL WORRYING: SEVERAL DAYS
5. BEING SO RESTLESS THAT IT IS HARD TO SIT STILL: NOT AT ALL
GAD7 TOTAL SCORE: 10
7. FEELING AFRAID AS IF SOMETHING AWFUL MIGHT HAPPEN: SEVERAL DAYS
1. FEELING NERVOUS, ANXIOUS, OR ON EDGE: MORE THAN HALF THE DAYS
7. FEELING AFRAID AS IF SOMETHING AWFUL MIGHT HAPPEN: SEVERAL DAYS
4. TROUBLE RELAXING: MORE THAN HALF THE DAYS
6. BECOMING EASILY ANNOYED OR IRRITABLE: MORE THAN HALF THE DAYS
GAD7 TOTAL SCORE: 10
GAD7 TOTAL SCORE: 10
IF YOU CHECKED OFF ANY PROBLEMS ON THIS QUESTIONNAIRE, HOW DIFFICULT HAVE THESE PROBLEMS MADE IT FOR YOU TO DO YOUR WORK, TAKE CARE OF THINGS AT HOME, OR GET ALONG WITH OTHER PEOPLE: VERY DIFFICULT
3. WORRYING TOO MUCH ABOUT DIFFERENT THINGS: MORE THAN HALF THE DAYS

## 2023-04-11 ASSESSMENT — PATIENT HEALTH QUESTIONNAIRE - PHQ9
10. IF YOU CHECKED OFF ANY PROBLEMS, HOW DIFFICULT HAVE THESE PROBLEMS MADE IT FOR YOU TO DO YOUR WORK, TAKE CARE OF THINGS AT HOME, OR GET ALONG WITH OTHER PEOPLE: VERY DIFFICULT
SUM OF ALL RESPONSES TO PHQ QUESTIONS 1-9: 8
SUM OF ALL RESPONSES TO PHQ QUESTIONS 1-9: 8

## 2023-04-11 NOTE — PROGRESS NOTES
"[facility]                                     Progress Note    Patient Name: Scott Dobbs  Date: 4/11/23         Service Type: Individual      Session Start Time: 8:01am Session End Time: 8:53am     Session Length: 52 minutes    Session #: 12 (transfer from Dawn Denting)    Attendees: Client attended alone    Service Modality:  Video Visit:      Provider verified identity through the following two step process.  Patient provided:  Patient photo    Telemedicine Visit: The patient's condition can be safely assessed and treated via synchronous audio and visual telemedicine encounter.      Reason for Telemedicine Visit: Services only offered telehealth    Originating Site (Patient Location): Patient's place of employment    Distant Site (Provider Location): Provider Remote Setting- Home Office    Consent:  The patient/guardian has verbally consented to: the potential risks and benefits of telemedicine (video visit) versus in person care; bill my insurance or make self-payment for services provided; and responsibility for payment of non-covered services.     Patient would like the video invitation sent by:  Send to e-mail at: [email or web address]    Mode of Communication:  Video Conference via well    As the provider I attest to compliance with applicable laws and regulations related to telemedicine.    DATA  Interactive Complexity: No  Crisis: No        Progress Since Last Session (Related to Symptoms / Goals / Homework):   Symptoms: Improving Stable: \"I'm good.\"    Homework: Completed in session      Episode of Care Goals: Satisfactory progress - ACTION (Actively working towards change); Intervened by reinforcing change plan / affirming steps taken     Current / Ongoing Stressors and Concerns:   Work      Notes on 4/11/23 Session:  Updated PHQ-9 & BALA-7    Got her review back from managers.  Talked to client about having discussions with managers when she doesn't agree with " "what they are saying, particularly regarding reviews.  Discussed communication styles.    \"I've been trying to document my process for getting things done.\" - Creating \"Job Aids.\"    Cl will look two months ahead and try start working on processes or tasks at that time.  Break the process down into smaller parts.  If needed, schedule a time to work on a part for 10 minutes and then reevaluate the next tasks.  Cl already checks off tasks that are done.       Treatment Objective(s) Addressed in This Session:   Client engaged in discussion and problem solving in tackling a project at work (creating Job Aids).  Assigned and reviewed homework; Discussed communication styles with her manager; Processed client's work review     Intervention:   CBT: Reviewed and assigned homework; Pattern recognition; Psychoeducation; Socratic Questioning; Identified skills; Problem solving; Role-modeled communication style/made recommendations  Active listening, validation, and other rapport building skills.    Assessments completed prior to visit:  The following assessments were completed by patient for this visit:  PHQ9:       8/9/2022     8:11 AM 8/26/2022     8:41 AM 8/26/2022     3:08 PM 12/22/2022     3:55 PM 1/23/2023    10:05 AM 3/1/2023    10:14 AM 4/11/2023     7:34 AM   PHQ-9 SCORE   PHQ-9 Total Score MyChart 3 (Minimal depression)  2 (Minimal depression) 4 (Minimal depression) 4 (Minimal depression) 6 (Mild depression) 8 (Mild depression)   PHQ-9 Total Score 3 0 2 4 4 6 8     GAD7:       8/26/2022     8:41 AM 8/26/2022     3:08 PM 12/22/2022     3:55 PM 1/23/2023    10:06 AM 3/2/2023     8:48 AM 3/28/2023     7:39 AM 4/11/2023     7:35 AM   BALA-7 SCORE   Total Score  2 (minimal anxiety) 6 (mild anxiety) 11 (moderate anxiety)  9 (mild anxiety) 10 (moderate anxiety)   Total Score 2 2 6 11 10 9 10           ASSESSMENT: Current Emotional / Mental Status (status of significant symptoms):   Risk status (Self / Other harm or suicidal " "ideation)   Patient denies current fears or concerns for personal safety.   Patient denies current or recent suicidal ideation or behaviors.   Patient denies current or recent homicidal ideation or behaviors.   Patient denies current or recent self injurious behavior or ideation.   Patient denies other safety concerns.   Patient reports there has been no change in risk factors since their last session.     Patient reports there has been no change in protective factors since their last session.     Recommended that patient call 911 or go to the local ED should there be a change in any of these risk factors.     Appearance:   Appropriate    Eye Contact:   Fair    Psychomotor Behavior: Normal    Attitude:   Cooperative  Friendly Pleasant   Orientation:   All   Speech    Rate / Production: Normal/ Responsive Talkative    Volume:  Normal    Mood:    Normal   Affect:    Appropriate  Bright    Thought Content:  Clear    Thought Form:  Coherent  Logical    Insight:    Good  and Fair      Medication Review:   No current psychiatric medications prescribed     Medication Compliance:   NA     Changes in Health Issues:   None reported     Chemical Use Review:   Substance Use: Chemical use reviewed, no active concerns identified      Tobacco Use: Did not address    Diagnosis:  1. Unspecified Depressive Disorder    2. Social Anxiety Disorder (Social Phobia)        Collateral Reports Completed:   Not Applicable    PLAN: (Patient Tasks / Therapist Tasks / Other)  Client will \"look two months ahead and try start working on processes or tasks at that time.\"  If she struggles starting a project, she will \"break the process down into smaller parts.\"  If needed, \"schedule a time to work on a part for 10 minutes and then reevaluate the next tasks.\" She made a future appointment for May 23, 2023.        Mejia Gonzalez PsyD                                                     "     ______________________________________________________________________    Individual Treatment Plan    Patient's Name: Scott Dobbs  YOB: 1985    Date of Creation: 7/7/22 (cont'd from previous therapist)  Date Treatment Plan Last Reviewed/Revised: 7/7/22; 3/2/23    DSM5 Diagnoses: 311 (F32.9) Unspecified Depressive Disorder  or 300.23 (F40.10) Social Anxiety Disorder  300.02 (F41.1) Generalized Anxiety Disorder  Psychosocial / Contextual Factors:  postpartum within past year, transition back to work in February, coping with pandemic  PROMIS (reviewed every 90 days): 22 (on 1/23/23)    Referral / Collaboration:  Referral to another professional/service is not indicated at this time..    Anticipated number of session for this episode of care: 24  Anticipation frequency of session: Every other week  Anticipated Duration of each session: 38-52 minutes  Treatment plan will be reviewed in 90 days or when goals have been changed.       MeasurableTreatment Goal(s) related to diagnosis / functional impairment(s)  Goal 1: Patient will reduce symptoms of anxiety as evidenced by decreased score on BALA 7.     I will know I've met my goal when I worry less in social/work interactions       Objective #A (Patient Action)                          Patient will identify three distraction and diversion activities and use those activities to decrease level of anxiety  .  Status: Renewed - Date: 3/2/23     Intervention(s)  [facility] will teach distress tolerance, mindfulness, and relaxation techniques.     Objective #B  Patient will use cognitive strategies identified in therapy to challenge anxious thoughts.  Status: Renewed - Date: 3/2/23  Intervention(s)  [facility] will assign homework to practice cognitive restructuring and defusion techniques.     Objective #C  Patient will use relaxation strategies 2-3 times per day to reduce the physical symptoms of anxiety.  Status: Renewed - Date: 3/2/23     Intervention(s)  [facility]  will teach relaxation techniques.        Goal 2: Patient will reduce symptoms of depression as evidenced by decreased scores on PHQ9.    I will know I've met my goal when I feel like I can start and end tasks, find more motivation.       Objective #A (Patient Action)                          Status: Renewed - Date: 3/2/23      Patient will Increase interest, engagement, and pleasure in doing things.     Intervention(s)  [facility] will explore strategies to help increase motivation and interest.     Objective #B  Patient will Identify negative self-talk and behaviors: challenge core beliefs, myths, and actions.                  Status: Renewed - Date: 3/2/23      Intervention(s)  [facility] will continue to explore automatic unhelpful/unhealthy thoughts and beliefs, and begin to create new helpeful/helpful automatic thoughts and beliefs.        Patient has reviewed and agreed to the above plan.      Mejia Gonzalez PsyD  April 11, 2023

## 2023-05-04 ENCOUNTER — VIRTUAL VISIT (OUTPATIENT)
Dept: PSYCHOLOGY | Facility: CLINIC | Age: 38
End: 2023-05-04
Payer: COMMERCIAL

## 2023-05-04 DIAGNOSIS — F32.A DEPRESSIVE DISORDER: Primary | ICD-10-CM

## 2023-05-04 DIAGNOSIS — F40.10 SOCIAL ANXIETY DISORDER: ICD-10-CM

## 2023-05-04 DIAGNOSIS — F41.1 GAD (GENERALIZED ANXIETY DISORDER): ICD-10-CM

## 2023-05-04 PROCEDURE — 90834 PSYTX W PT 45 MINUTES: CPT | Mod: VID | Performed by: PSYCHOLOGIST

## 2023-05-04 NOTE — PROGRESS NOTES
"    [facility]                                     Progress Note    Patient Name: Scott Dobbs  Date: 5/4/23         Service Type: Individual      Session Start Time: 8:04am Session End Time: 8:56am     Session Length: 52 minutes    Session #: 13 (transfer from Dawn Franks)    Attendees: Client attended alone    Service Modality:  Video Visit:      Provider verified identity through the following two step process.  Patient provided:  Patient photo    Telemedicine Visit: The patient's condition can be safely assessed and treated via synchronous audio and visual telemedicine encounter.      Reason for Telemedicine Visit: Services only offered telehealth    Originating Site (Patient Location): Patient's place of employment    Distant Site (Provider Location): Provider Remote Setting- Home Office    Consent:  The patient/guardian has verbally consented to: the potential risks and benefits of telemedicine (video visit) versus in person care; bill my insurance or make self-payment for services provided; and responsibility for payment of non-covered services.     Patient would like the video invitation sent by:  Send to e-mail at: [email or web address]    Mode of Communication:  Video Conference via well    As the provider I attest to compliance with applicable laws and regulations related to telemedicine.    DATA  Interactive Complexity: No  Crisis: No        Progress Since Last Session (Related to Symptoms / Goals / Homework):   Symptoms: Improving Stable: \"Good. Busy.\"    Homework: Completed in session      Episode of Care Goals: Satisfactory progress - ACTION (Actively working towards change); Intervened by reinforcing change plan / affirming steps taken     Current / Ongoing Stressors and Concerns:   Work; Home life busy      Notes on 5/4/23 Session:  Cl busy at home and at work.  Tries using calendar and reminders.    \"I feel like the last couple weeks have been pretty decent.  " "Last week and this week, both, I've felt a lot more motivated and focused.  Not 100%, the best focused, but more than I usually am.  I'm not sure if it's the better weather, getting outside, or if it's better at work.\"    She has a \"couple deadlines at work this week.  There not do-or-die type things.\" - Problem solve/explored barriers: The client enjoys the first part of the task, not the \"making changes\" phase.    HW: Reviewed homework, discussed how \"The plan seems to make good sense.\"     Cl applied for a new position at the [employer].  Cl believes that getting sun helps her be more productive, \"I told my  I'm solar powered.\"         Treatment Objective(s) Addressed in This Session:   Client discussed and explored possilbe reasons she has felt \"better\" and less anxiety the past two weeks, \"I want to know what it is so I can keep doing it.\"  She feels much more productive at home and at work.  Reviewed and assigned homework     Intervention:   CBT: Reviewed and assigned homework; Pattern recognition; Psychoeducation; Socratic Questioning; Identified possible skills; Problem solved  Active listening, validation, and other rapport building skills; Encouragement; Insight oriented questions    Assessments completed prior to visit:  The following assessments were completed by patient for this visit:  PHQ9:       8/9/2022     8:11 AM 8/26/2022     8:41 AM 8/26/2022     3:08 PM 12/22/2022     3:55 PM 1/23/2023    10:05 AM 3/1/2023    10:14 AM 4/11/2023     7:34 AM   PHQ-9 SCORE   PHQ-9 Total Score MyChart 3 (Minimal depression)  2 (Minimal depression) 4 (Minimal depression) 4 (Minimal depression) 6 (Mild depression) 8 (Mild depression)   PHQ-9 Total Score 3 0 2 4 4 6 8     GAD7:       8/26/2022     8:41 AM 8/26/2022     3:08 PM 12/22/2022     3:55 PM 1/23/2023    10:06 AM 3/2/2023     8:48 AM 3/28/2023     7:39 AM 4/11/2023     7:35 AM   BALA-7 SCORE   Total Score  2 (minimal anxiety) 6 (mild anxiety) 11 (moderate " "anxiety)  9 (mild anxiety) 10 (moderate anxiety)   Total Score 2 2 6 11 10 9 10           ASSESSMENT: Current Emotional / Mental Status (status of significant symptoms):   Risk status (Self / Other harm or suicidal ideation)   Patient denies current fears or concerns for personal safety.   Patient denies current or recent suicidal ideation or behaviors.   Patient denies current or recent homicidal ideation or behaviors.   Patient denies current or recent self injurious behavior or ideation.   Patient denies other safety concerns.   Patient reports there has been no change in risk factors since their last session.     Patient reports there has been no change in protective factors since their last session.     Recommended that patient call 911 or go to the local ED should there be a change in any of these risk factors.     Appearance:   Appropriate    Eye Contact:   Fair    Psychomotor Behavior: Normal    Attitude:   Cooperative  Friendly Pleasant   Orientation:   All   Speech    Rate / Production: Normal/ Responsive Talkative    Volume:  Normal    Mood:    Normal   Affect:    Appropriate  Bright    Thought Content:  Clear    Thought Form:  Coherent  Logical    Insight:    Good  and Fair      Medication Review:   No current psychiatric medications prescribed     Medication Compliance:   NA     Changes in Health Issues:   None reported     Chemical Use Review:   Substance Use: Chemical use reviewed, no active concerns identified      Tobacco Use: Did not address    Diagnosis:  1. Unspecified Depressive Disorder    2. Social Anxiety Disorder (Social Phobia)    3. Generalized Anxiety Disorder (BALA, by history)        Collateral Reports Completed:   Not Applicable    PLAN: (Patient Tasks / Therapist Tasks / Other)  Client will be mindful of getting outside \"in the sun\" with her daughter almost daily to help manage mood.  She made a future appointment for May 23, 2023.        Mejia Gonzalez PsyD                      "                                    ______________________________________________________________________    Individual Treatment Plan    Patient's Name: Scott Dobbs  YOB: 1985    Date of Creation: 7/7/22 (cont'd from previous therapist)  Date Treatment Plan Last Reviewed/Revised: 7/7/22; 3/2/23    DSM5 Diagnoses: 311 (F32.9) Unspecified Depressive Disorder  or 300.23 (F40.10) Social Anxiety Disorder  300.02 (F41.1) Generalized Anxiety Disorder  Psychosocial / Contextual Factors:  postpartum within past year, transition back to work in February, coping with pandemic  PROMIS (reviewed every 90 days): 22 (on 1/23/23)    Referral / Collaboration:  Referral to another professional/service is not indicated at this time..    Anticipated number of session for this episode of care: 24  Anticipation frequency of session: Every other week  Anticipated Duration of each session: 38-52 minutes  Treatment plan will be reviewed in 90 days or when goals have been changed.       MeasurableTreatment Goal(s) related to diagnosis / functional impairment(s)  Goal 1: Patient will reduce symptoms of anxiety as evidenced by decreased score on BALA 7.     I will know I've met my goal when I worry less in social/work interactions       Objective #A (Patient Action)                          Patient will identify three distraction and diversion activities and use those activities to decrease level of anxiety  .  Status: Renewed - Date: 3/2/23     Intervention(s)  [facility] will teach distress tolerance, mindfulness, and relaxation techniques.     Objective #B  Patient will use cognitive strategies identified in therapy to challenge anxious thoughts.  Status: Renewed - Date: 3/2/23  Intervention(s)  [facility] will assign homework to practice cognitive restructuring and defusion techniques.     Objective #C  Patient will use relaxation strategies 2-3 times per day to reduce the physical symptoms of anxiety.  Status: Renewed - Date:  3/2/23     Intervention(s)  [facility] will teach relaxation techniques.        Goal 2: Patient will reduce symptoms of depression as evidenced by decreased scores on PHQ9.    I will know I've met my goal when I feel like I can start and end tasks, find more motivation.       Objective #A (Patient Action)                          Status: Renewed - Date: 3/2/23      Patient will Increase interest, engagement, and pleasure in doing things.     Intervention(s)  [facility] will explore strategies to help increase motivation and interest.     Objective #B  Patient will Identify negative self-talk and behaviors: challenge core beliefs, myths, and actions.                  Status: Renewed - Date: 3/2/23      Intervention(s)  [facility] will continue to explore automatic unhelpful/unhealthy thoughts and beliefs, and begin to create new helpeful/helpful automatic thoughts and beliefs.        Patient has reviewed and agreed to the above plan.      Mejia Gonzalez, Rhiannon  May 4, 2023

## 2023-05-23 ENCOUNTER — VIRTUAL VISIT (OUTPATIENT)
Dept: PSYCHOLOGY | Facility: CLINIC | Age: 38
End: 2023-05-23
Payer: COMMERCIAL

## 2023-05-23 DIAGNOSIS — F41.1 GAD (GENERALIZED ANXIETY DISORDER): ICD-10-CM

## 2023-05-23 DIAGNOSIS — F40.10 SOCIAL ANXIETY DISORDER: ICD-10-CM

## 2023-05-23 DIAGNOSIS — F32.A DEPRESSIVE DISORDER: Primary | ICD-10-CM

## 2023-05-23 PROCEDURE — 90834 PSYTX W PT 45 MINUTES: CPT | Mod: VID | Performed by: PSYCHOLOGIST

## 2023-05-23 ASSESSMENT — ANXIETY QUESTIONNAIRES
IF YOU CHECKED OFF ANY PROBLEMS ON THIS QUESTIONNAIRE, HOW DIFFICULT HAVE THESE PROBLEMS MADE IT FOR YOU TO DO YOUR WORK, TAKE CARE OF THINGS AT HOME, OR GET ALONG WITH OTHER PEOPLE: SOMEWHAT DIFFICULT
2. NOT BEING ABLE TO STOP OR CONTROL WORRYING: MORE THAN HALF THE DAYS
1. FEELING NERVOUS, ANXIOUS, OR ON EDGE: MORE THAN HALF THE DAYS
4. TROUBLE RELAXING: MORE THAN HALF THE DAYS
GAD7 TOTAL SCORE: 10
7. FEELING AFRAID AS IF SOMETHING AWFUL MIGHT HAPPEN: SEVERAL DAYS
7. FEELING AFRAID AS IF SOMETHING AWFUL MIGHT HAPPEN: SEVERAL DAYS
GAD7 TOTAL SCORE: 10
6. BECOMING EASILY ANNOYED OR IRRITABLE: SEVERAL DAYS
8. IF YOU CHECKED OFF ANY PROBLEMS, HOW DIFFICULT HAVE THESE MADE IT FOR YOU TO DO YOUR WORK, TAKE CARE OF THINGS AT HOME, OR GET ALONG WITH OTHER PEOPLE?: SOMEWHAT DIFFICULT
3. WORRYING TOO MUCH ABOUT DIFFERENT THINGS: MORE THAN HALF THE DAYS
5. BEING SO RESTLESS THAT IT IS HARD TO SIT STILL: NOT AT ALL
GAD7 TOTAL SCORE: 10

## 2023-05-23 ASSESSMENT — PATIENT HEALTH QUESTIONNAIRE - PHQ9
SUM OF ALL RESPONSES TO PHQ QUESTIONS 1-9: 7
10. IF YOU CHECKED OFF ANY PROBLEMS, HOW DIFFICULT HAVE THESE PROBLEMS MADE IT FOR YOU TO DO YOUR WORK, TAKE CARE OF THINGS AT HOME, OR GET ALONG WITH OTHER PEOPLE: SOMEWHAT DIFFICULT
SUM OF ALL RESPONSES TO PHQ QUESTIONS 1-9: 7

## 2023-05-23 NOTE — PROGRESS NOTES
"[facility] Counseling                                     Progress Note    Patient Name: Scott Dobbs  Date: 5/23/23         Service Type: Individual      Session Start Time: 3:04pm Session End Time: 3:56pm     Session Length: 52 minutes    Session #: 14 (transfer from Dawn Franks)    Attendees: Client attended alone    Service Modality:  Video Visit:      Provider verified identity through the following two step process.  Patient provided:  Patient photo    Telemedicine Visit: The patient's condition can be safely assessed and treated via synchronous audio and visual telemedicine encounter.      Reason for Telemedicine Visit: Services only offered telehealth    Originating Site (Patient Location): Patient's place of employment    Distant Site (Provider Location): Provider Remote Setting- Home Office    Consent:  The patient/guardian has verbally consented to: the potential risks and benefits of telemedicine (video visit) versus in person care; bill my insurance or make self-payment for services provided; and responsibility for payment of non-covered services.     Patient would like the video invitation sent by:  Send to e-mail at: [email or web address]    Mode of Communication:  Video Conference via well    As the provider I attest to compliance with applicable laws and regulations related to telemedicine.    DATA  Interactive Complexity: No  Crisis: No        Progress Since Last Session (Related to Symptoms / Goals / Homework):   Symptoms: No change : Stable.  \"We've been very busy.\"    Homework: Completed in session      Episode of Care Goals: Satisfactory progress - ACTION (Actively working towards change); Intervened by reinforcing change plan / affirming steps taken     Current / Ongoing Stressors and Concerns:   Work; Home life busy      Notes on 5/23/23 Session:  Client had an offer accepted on a house.  Cl talked about her new house, \"It's exciting and terrifying.\"    Talked " "about the process of moving and breaking tasks down into smaller projects.    Cl talked about some of the issues they face with moving.    Talked about \"good enough\" as far as getting things done.         Treatment Objective(s) Addressed in This Session:   Client identified factors contributing to her anxiety regarding moving; Identified barriers to getting tasks done; Problem solved barriers  Reviewed and assigned homework     Intervention:   CBT: Reviewed and assigned homework; Pattern recognition; Psychoeducation; Socratic Questioning; Identified skills; Problem solved; Taught on concept of \"good enough\" (when perfectionism is barrier) and breaking larger tasks into smaller ones (when getting overwhelmed is barrier or problematic)  Active listening, validation, and other rapport building skills; Encouraged client to talk about her feelings regarding moving    Assessments completed prior to visit:  The following assessments were completed by patient for this visit:  PHQ9:       8/26/2022     8:41 AM 8/26/2022     3:08 PM 12/22/2022     3:55 PM 1/23/2023    10:05 AM 3/1/2023    10:14 AM 4/11/2023     7:34 AM 5/23/2023     2:56 PM   PHQ-9 SCORE   PHQ-9 Total Score MyChart  2 (Minimal depression) 4 (Minimal depression) 4 (Minimal depression) 6 (Mild depression) 8 (Mild depression) 7 (Mild depression)   PHQ-9 Total Score 0 2 4 4 6 8 7     GAD7:       8/26/2022     3:08 PM 12/22/2022     3:55 PM 1/23/2023    10:06 AM 3/2/2023     8:48 AM 3/28/2023     7:39 AM 4/11/2023     7:35 AM 5/23/2023     2:56 PM   BALA-7 SCORE   Total Score 2 (minimal anxiety) 6 (mild anxiety) 11 (moderate anxiety)  9 (mild anxiety) 10 (moderate anxiety) 10 (moderate anxiety)   Total Score 2 6 11 10 9 10 10           ASSESSMENT: Current Emotional / Mental Status (status of significant symptoms):   Risk status (Self / Other harm or suicidal ideation)   Patient denies current fears or concerns for personal safety.   Patient denies current or recent " "suicidal ideation or behaviors.   Patient denies current or recent homicidal ideation or behaviors.   Patient denies current or recent self injurious behavior or ideation.   Patient denies other safety concerns.   Patient reports there has been no change in risk factors since their last session.     Patient reports there has been no change in protective factors since their last session.     Recommended that patient call 911 or go to the local ED should there be a change in any of these risk factors.     Appearance:   Appropriate    Eye Contact:   Fair    Psychomotor Behavior: Normal    Attitude:   Cooperative  Friendly Pleasant   Orientation:   All   Speech    Rate / Production: Normal/ Responsive Talkative    Volume:  Normal    Mood:    Normal   Affect:    Appropriate  Bright    Thought Content:  Clear    Thought Form:  Coherent  Logical    Insight:    Good  and Fair      Medication Review:   No current psychiatric medications prescribed     Medication Compliance:   NA     Changes in Health Issues:   None reported     Chemical Use Review:   Substance Use: Chemical use reviewed, no active concerns identified      Tobacco Use: Did not address    Diagnosis:  1. Unspecified Depressive Disorder    2. Social Anxiety Disorder (Social Phobia)    3. Generalized Anxiety Disorder (BALA, by history)        Collateral Reports Completed:   Not Applicable    PLAN: (Patient Tasks / Therapist Tasks / Other)  Client will break large tasks into smaller, more manageable ones, at work and when preparing to move.  She will also practice \"good enough\" as she prepares to move.  She made a future appointment for June 5, 2023.        Mejia Gonzalez PsyD                                                         ______________________________________________________________________    Individual Treatment Plan    Patient's Name: Scott Dobbs  YOB: 1985    Date of Creation: 7/7/22 (cont'd from previous therapist)  Date " Treatment Plan Last Reviewed/Revised: 7/7/22; 3/2/23    DSM5 Diagnoses: 311 (F32.9) Unspecified Depressive Disorder  or 300.23 (F40.10) Social Anxiety Disorder  300.02 (F41.1) Generalized Anxiety Disorder  Psychosocial / Contextual Factors:  postpartum within past year, transition back to work in February, coping with pandemic  PROMIS (reviewed every 90 days): 22 (on 1/23/23)    Referral / Collaboration:  Referral to another professional/service is not indicated at this time..    Anticipated number of session for this episode of care: 24  Anticipation frequency of session: Every other week  Anticipated Duration of each session: 38-52 minutes  Treatment plan will be reviewed in 90 days or when goals have been changed.       MeasurableTreatment Goal(s) related to diagnosis / functional impairment(s)  Goal 1: Patient will reduce symptoms of anxiety as evidenced by decreased score on BALA 7.     I will know I've met my goal when I worry less in social/work interactions       Objective #A (Patient Action)                          Patient will identify three distraction and diversion activities and use those activities to decrease level of anxiety  .  Status: Renewed - Date: 3/2/23     Intervention(s)  [facility] will teach distress tolerance, mindfulness, and relaxation techniques.     Objective #B  Patient will use cognitive strategies identified in therapy to challenge anxious thoughts.  Status: Renewed - Date: 3/2/23  Intervention(s)  [facility] will assign homework to practice cognitive restructuring and defusion techniques.     Objective #C  Patient will use relaxation strategies 2-3 times per day to reduce the physical symptoms of anxiety.  Status: Renewed - Date: 3/2/23     Intervention(s)  [facility] will teach relaxation techniques.        Goal 2: Patient will reduce symptoms of depression as evidenced by decreased scores on PHQ9.    I will know I've met my goal when I feel like I can start and end tasks, find more motivation.        Objective #A (Patient Action)                          Status: Renewed - Date: 3/2/23      Patient will Increase interest, engagement, and pleasure in doing things.     Intervention(s)  [facility] will explore strategies to help increase motivation and interest.     Objective #B  Patient will Identify negative self-talk and behaviors: challenge core beliefs, myths, and actions.                  Status: Renewed - Date: 3/2/23      Intervention(s)  C will continue to explore automatic unhelpful/unhealthy thoughts and beliefs, and begin to create new helpeful/helpful automatic thoughts and beliefs.        Patient has reviewed and agreed to the above plan.      Mejia Gonzalez PsyD  May 23, 2023

## 2023-06-14 ENCOUNTER — PATIENT OUTREACH (OUTPATIENT)
Dept: CARE COORDINATION | Facility: CLINIC | Age: 38
End: 2023-06-14
Payer: COMMERCIAL

## 2023-06-26 ENCOUNTER — VIRTUAL VISIT (OUTPATIENT)
Dept: PSYCHOLOGY | Facility: CLINIC | Age: 38
End: 2023-06-26
Payer: COMMERCIAL

## 2023-06-26 DIAGNOSIS — F40.10 SOCIAL ANXIETY DISORDER: ICD-10-CM

## 2023-06-26 DIAGNOSIS — F41.1 GAD (GENERALIZED ANXIETY DISORDER): ICD-10-CM

## 2023-06-26 DIAGNOSIS — F32.A DEPRESSIVE DISORDER: Primary | ICD-10-CM

## 2023-06-26 PROCEDURE — 90834 PSYTX W PT 45 MINUTES: CPT | Mod: VID | Performed by: PSYCHOLOGIST

## 2023-06-26 ASSESSMENT — ANXIETY QUESTIONNAIRES
7. FEELING AFRAID AS IF SOMETHING AWFUL MIGHT HAPPEN: MORE THAN HALF THE DAYS
GAD7 TOTAL SCORE: 8
7. FEELING AFRAID AS IF SOMETHING AWFUL MIGHT HAPPEN: MORE THAN HALF THE DAYS
3. WORRYING TOO MUCH ABOUT DIFFERENT THINGS: SEVERAL DAYS
4. TROUBLE RELAXING: SEVERAL DAYS
GAD7 TOTAL SCORE: 8
2. NOT BEING ABLE TO STOP OR CONTROL WORRYING: SEVERAL DAYS
GAD7 TOTAL SCORE: 8
6. BECOMING EASILY ANNOYED OR IRRITABLE: SEVERAL DAYS
1. FEELING NERVOUS, ANXIOUS, OR ON EDGE: MORE THAN HALF THE DAYS
5. BEING SO RESTLESS THAT IT IS HARD TO SIT STILL: NOT AT ALL
IF YOU CHECKED OFF ANY PROBLEMS ON THIS QUESTIONNAIRE, HOW DIFFICULT HAVE THESE PROBLEMS MADE IT FOR YOU TO DO YOUR WORK, TAKE CARE OF THINGS AT HOME, OR GET ALONG WITH OTHER PEOPLE: SOMEWHAT DIFFICULT
8. IF YOU CHECKED OFF ANY PROBLEMS, HOW DIFFICULT HAVE THESE MADE IT FOR YOU TO DO YOUR WORK, TAKE CARE OF THINGS AT HOME, OR GET ALONG WITH OTHER PEOPLE?: SOMEWHAT DIFFICULT

## 2023-06-26 NOTE — PROGRESS NOTES
"[facility] Counseling                                     Progress Note    Patient Name: Scott Dobbs  Date: 6/26/23         Service Type: Individual      Session Start Time: 3:02pm Session End Time: 3:54pm     Session Length: 52 minutes    Session #: 15 (transfer from Dawn Franks)    Attendees: Client attended alone    Service Modality:  Video Visit:      Provider verified identity through the following two step process.  Patient provided:  Patient photo    Telemedicine Visit: The patient's condition can be safely assessed and treated via synchronous audio and visual telemedicine encounter.      Reason for Telemedicine Visit: Services only offered telehealth    Originating Site (Patient Location): Patient's place of employment    Distant Site (Provider Location): Provider Remote Setting- Home Office    Consent:  The patient/guardian has verbally consented to: the potential risks and benefits of telemedicine (video visit) versus in person care; bill my insurance or make self-payment for services provided; and responsibility for payment of non-covered services.     Patient would like the video invitation sent by:  Send to e-mail at: [email or web address]    Mode of Communication:  Video Conference via well    As the provider I attest to compliance with applicable laws and regulations related to telemedicine.    DATA  Interactive Complexity: No  Crisis: No        Progress Since Last Session (Related to Symptoms / Goals / Homework):   Symptoms: No change : Stable.  \"We've been very busy.\"    Homework: Completed in session      Episode of Care Goals: Satisfactory progress - ACTION (Actively working towards change); Intervened by reinforcing change plan / affirming steps taken     Current / Ongoing Stressors and Concerns:   Work; Home life busy      Notes on 6/26/23 Session:  Cl is \"stressed out\" because they are getting ready to move into their new house.  Her \"partner's\" company has had " "some layoffs and her partner's co-worker was let go instead of them.    Cl is planning on preparing their new house.  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    How are handle the stress?  \"Just keeping busy all the time.\"  The biggest thing I'm doing right now is balancing keeping track of things. (Not just what's due 'now' but down the road).      Talked about \"good enough\" as far as getting things done.         Treatment Objective(s) Addressed in This Session:   Client identified factors contributing to her anxiety, such as: Moving into a new house, applying for a new position at work, being informed of new job responsibilities coming up with current job (positive changes), and layoffs at her and her 's work.  Reviewed and assigned homework.  Discussed how client is managing stress.     Intervention:   CBT: Reviewed and assigned homework; Pattern recognition; Psychoeducation; Socratic Questioning; Identified skills; Problem solved; Checked-up on use of concept of \"good enough\" (when perfectionism is barrier to moving) and breaking larger tasks into smaller ones (when getting overwhelmed is barrier or problematic); Praised client for using these skills  Active listening, validation, and other rapport building skills    Assessments completed prior to visit:  The following assessments were completed by patient for this visit:  PHQ9:       12/22/2022     3:55 PM 1/23/2023    10:05 AM 3/1/2023    10:14 AM 4/11/2023     7:34 AM 5/23/2023     2:56 PM 6/5/2023     2:48 PM 6/26/2023     1:57 PM   PHQ-9 SCORE   PHQ-9 Total Score MyChart 4 (Minimal depression) 4 (Minimal depression) 6 (Mild depression) 8 (Mild depression) 7 (Mild depression) 6 (Mild depression) 4 (Minimal depression)   PHQ-9 Total Score 4 4 6 8 7 6 4     GAD7:       12/22/2022     3:55 PM 1/23/2023    10:06 AM 3/2/2023     8:48 AM 3/28/2023     7:39 AM 4/11/2023     7:35 AM 5/23/2023     2:56 PM 6/26/2023     1:58 PM "   BALA-7 SCORE   Total Score 6 (mild anxiety) 11 (moderate anxiety)  9 (mild anxiety) 10 (moderate anxiety) 10 (moderate anxiety) 8 (mild anxiety)   Total Score 6 11 10 9 10 10 8           ASSESSMENT: Current Emotional / Mental Status (status of significant symptoms):   Risk status (Self / Other harm or suicidal ideation)   Patient denies current fears or concerns for personal safety.   Patient denies current or recent suicidal ideation or behaviors.   Patient denies current or recent homicidal ideation or behaviors.   Patient denies current or recent self injurious behavior or ideation.   Patient denies other safety concerns.   Patient reports there has been no change in risk factors since their last session.     Patient reports there has been no change in protective factors since their last session.     Recommended that patient call 911 or go to the local ED should there be a change in any of these risk factors.     Appearance:   Appropriate    Eye Contact:   Fair    Psychomotor Behavior: Normal    Attitude:   Cooperative  Friendly Pleasant   Orientation:   All   Speech    Rate / Production: Normal/ Responsive Talkative    Volume:  Normal    Mood:    Normal   Affect:    Appropriate  Bright    Thought Content:  Clear    Thought Form:  Coherent  Logical    Insight:    Good  and Fair      Medication Review:   No current psychiatric medications prescribed     Medication Compliance:   NA     Changes in Health Issues:   None reported     Chemical Use Review:   Substance Use: Chemical use reviewed, no active concerns identified      Tobacco Use: Did not address    Diagnosis:  1. Unspecified Depressive Disorder    2. Social Anxiety Disorder (Social Phobia)    3. Generalized Anxiety Disorder (BALA, by history)        Collateral Reports Completed:   Not Applicable    PLAN: (Patient Tasks / Therapist Tasks / Other)  Client will continue to break large tasks into smaller, more manageable ones, at work and when preparing to  "move.  She will also continue to practice \"good enough\" as she prepares to move.  She made a future appointment for August 3, 2023.        Mejia Goznalez, PsyD                                                         ______________________________________________________________________    Individual Treatment Plan    Patient's Name: Scott Dobbs  YOB: 1985    Date of Creation: 7/7/22 (cont'd from previous therapist)  Date Treatment Plan Last Reviewed/Revised: 7/7/22; 3/2/23    DSM5 Diagnoses: 311 (F32.9) Unspecified Depressive Disorder  or 300.23 (F40.10) Social Anxiety Disorder  300.02 (F41.1) Generalized Anxiety Disorder  Psychosocial / Contextual Factors:  postpartum within past year, transition back to work in February, coping with pandemic  PROMIS (reviewed every 90 days): 22 (on 1/23/23)    Referral / Collaboration:  Referral to another professional/service is not indicated at this time..    Anticipated number of session for this episode of care: 24  Anticipation frequency of session: Every other week  Anticipated Duration of each session: 38-52 minutes  Treatment plan will be reviewed in 90 days or when goals have been changed.       MeasurableTreatment Goal(s) related to diagnosis / functional impairment(s)  Goal 1: Patient will reduce symptoms of anxiety as evidenced by decreased score on BALA 7.     I will know I've met my goal when I worry less in social/work interactions       Objective #A (Patient Action)                          Patient will identify three distraction and diversion activities and use those activities to decrease level of anxiety  .  Status: Renewed - Date: 3/2/23     Intervention(s)  [facility] will teach distress tolerance, mindfulness, and relaxation techniques.     Objective #B  Patient will use cognitive strategies identified in therapy to challenge anxious thoughts.  Status: Renewed - Date: 3/2/23  Intervention(s)  [facility] will assign homework to practice cognitive " restructuring and defusion techniques.     Objective #C  Patient will use relaxation strategies 2-3 times per day to reduce the physical symptoms of anxiety.  Status: Renewed - Date: 3/2/23     Intervention(s)  [facility] will teach relaxation techniques.        Goal 2: Patient will reduce symptoms of depression as evidenced by decreased scores on PHQ9.    I will know I've met my goal when I feel like I can start and end tasks, find more motivation.       Objective #A (Patient Action)                          Status: Renewed - Date: 3/2/23      Patient will Increase interest, engagement, and pleasure in doing things.     Intervention(s)  [facility] will explore strategies to help increase motivation and interest.     Objective #B  Patient will Identify negative self-talk and behaviors: challenge core beliefs, myths, and actions.                  Status: Renewed - Date: 3/2/23      Intervention(s)  [facility] will continue to explore automatic unhelpful/unhealthy thoughts and beliefs, and begin to create new helpeful/helpful automatic thoughts and beliefs.        Patient has reviewed and agreed to the above plan.      Mejia Gonzalez PsyD  June 26, 2023

## 2023-06-28 ENCOUNTER — PATIENT OUTREACH (OUTPATIENT)
Dept: CARE COORDINATION | Facility: CLINIC | Age: 38
End: 2023-06-28
Payer: COMMERCIAL

## 2023-07-25 ENCOUNTER — MYC MEDICAL ADVICE (OUTPATIENT)
Dept: ONCOLOGY | Facility: CLINIC | Age: 38
End: 2023-07-25

## 2023-07-25 DIAGNOSIS — J86.9 EMPYEMA, RIGHT (H): Primary | ICD-10-CM

## 2023-07-27 ASSESSMENT — ANXIETY QUESTIONNAIRES
6. BECOMING EASILY ANNOYED OR IRRITABLE: MORE THAN HALF THE DAYS
GAD7 TOTAL SCORE: 10
3. WORRYING TOO MUCH ABOUT DIFFERENT THINGS: SEVERAL DAYS
2. NOT BEING ABLE TO STOP OR CONTROL WORRYING: SEVERAL DAYS
GAD7 TOTAL SCORE: 10
1. FEELING NERVOUS, ANXIOUS, OR ON EDGE: MORE THAN HALF THE DAYS
IF YOU CHECKED OFF ANY PROBLEMS ON THIS QUESTIONNAIRE, HOW DIFFICULT HAVE THESE PROBLEMS MADE IT FOR YOU TO DO YOUR WORK, TAKE CARE OF THINGS AT HOME, OR GET ALONG WITH OTHER PEOPLE: SOMEWHAT DIFFICULT
5. BEING SO RESTLESS THAT IT IS HARD TO SIT STILL: SEVERAL DAYS
7. FEELING AFRAID AS IF SOMETHING AWFUL MIGHT HAPPEN: SEVERAL DAYS
4. TROUBLE RELAXING: MORE THAN HALF THE DAYS

## 2023-08-03 ENCOUNTER — VIRTUAL VISIT (OUTPATIENT)
Dept: PSYCHOLOGY | Facility: CLINIC | Age: 38
End: 2023-08-03
Payer: COMMERCIAL

## 2023-08-03 DIAGNOSIS — F32.A DEPRESSIVE DISORDER: ICD-10-CM

## 2023-08-03 DIAGNOSIS — F41.1 GAD (GENERALIZED ANXIETY DISORDER): Primary | ICD-10-CM

## 2023-08-03 DIAGNOSIS — F40.10 SOCIAL ANXIETY DISORDER: ICD-10-CM

## 2023-08-03 PROCEDURE — 90834 PSYTX W PT 45 MINUTES: CPT | Mod: VID | Performed by: PSYCHOLOGIST

## 2023-08-03 ASSESSMENT — PATIENT HEALTH QUESTIONNAIRE - PHQ9
10. IF YOU CHECKED OFF ANY PROBLEMS, HOW DIFFICULT HAVE THESE PROBLEMS MADE IT FOR YOU TO DO YOUR WORK, TAKE CARE OF THINGS AT HOME, OR GET ALONG WITH OTHER PEOPLE: SOMEWHAT DIFFICULT
SUM OF ALL RESPONSES TO PHQ QUESTIONS 1-9: 9
SUM OF ALL RESPONSES TO PHQ QUESTIONS 1-9: 9

## 2023-08-03 NOTE — PROGRESS NOTES
"[facility]                                     Progress Note    Patient Name: Scott Dobbs  Date: 8/3/23         Service Type: Individual      Session Start Time: 8:01am Session End Time: 8:53am     Session Length: 52 minutes    Session #: 16 (transfer from Dawn Denting)    Attendees: Client attended alone    Service Modality:  Video Visit:      Provider verified identity through the following two step process.  Patient provided:  Patient photo    Telemedicine Visit: The patient's condition can be safely assessed and treated via synchronous audio and visual telemedicine encounter.      Reason for Telemedicine Visit: Services only offered telehealth    Originating Site (Patient Location): Patient's place of employment    Distant Site (Provider Location): Provider Remote Setting- Home Office    Consent:  The patient/guardian has verbally consented to: the potential risks and benefits of telemedicine (video visit) versus in person care; bill my insurance or make self-payment for services provided; and responsibility for payment of non-covered services.     Patient would like the video invitation sent by:  Send to e-mail at: [email or web address]    Mode of Communication:  Video Conference via well    As the provider I attest to compliance with applicable laws and regulations related to telemedicine.    DATA  Interactive Complexity: No  Crisis: No        Progress Since Last Session (Related to Symptoms / Goals / Homework):   Symptoms: No change : Stable.  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  Cl working to fix up their new place and " "maintain old place (where they still live) and a toddler.  Reviewed BALA-7 and PHQ-9 and related to Treatment Plan.  Managing anxiety by accepting what they can and cannot do right now.  They have a plan in place they are comfortable with.    Discussed negotiating w/her  on what transfers to the new house.    Reviewed Treatment Plan.  \"I'm very aware of everything I say at work because I'm under a microscope with my boss right now.\"  Social Anxiety - after presenting or addressing others she tends to process the event afterward and critique herself (excessively).    Got her new position this Monday.       Treatment Objective(s) Addressed in This Session:   Client discussed the stressful events she has going on (moving to a new home, raising a toddler, work) and was encouraged to identify skills she is using to manage her anxiety levels.  Updated and discussed progress (or steadiness) of anxiety (according to her recent BALA-7 score).  Reviewed and assigned homework.  Discussed recent PHQ-9 score and any progress managing depression.  Reviewed and updated Treatment Plan.     Intervention:   CBT: Reviewed and assigned homework; Pattern recognition; Psychoeducation; Socratic Questioning; Identified skills; Problem solved; Discussed communication skills with client; Updated Treatment Plan  Active listening, validation, and other rapport building skills; Updated PHQ-9 & BALA-7 and reviewed history of scores    Assessments completed prior to visit:  The following assessments were completed by patient for this visit:  PHQ9:       1/23/2023    10:05 AM 3/1/2023    10:14 AM 4/11/2023     7:34 AM 5/23/2023     2:56 PM 6/5/2023     2:48 PM 6/26/2023     1:57 PM 8/3/2023     7:58 AM   PHQ-9 SCORE   PHQ-9 Total Score MyChart 4 (Minimal depression) 6 (Mild depression) 8 (Mild depression) 7 (Mild depression) 6 (Mild depression) 4 (Minimal depression) 9 (Mild depression)   PHQ-9 Total Score 4 6 8 7 6 4 9     GAD7:       " 1/23/2023    10:06 AM 3/2/2023     8:48 AM 3/28/2023     7:39 AM 4/11/2023     7:35 AM 5/23/2023     2:56 PM 6/26/2023     1:58 PM 7/27/2023    10:48 AM   BALA-7 SCORE   Total Score 11 (moderate anxiety)  9 (mild anxiety) 10 (moderate anxiety) 10 (moderate anxiety) 8 (mild anxiety) 10 (moderate anxiety)   Total Score 11 10 9 10 10 8 10           ASSESSMENT: Current Emotional / Mental Status (status of significant symptoms):   Risk status (Self / Other harm or suicidal ideation)   Patient denies current fears or concerns for personal safety.   Patient denies current or recent suicidal ideation or behaviors.   Patient denies current or recent homicidal ideation or behaviors.   Patient denies current or recent self injurious behavior or ideation.   Patient denies other safety concerns.   Patient reports there has been no change in risk factors since their last session.     Patient reports there has been no change in protective factors since their last session.     Recommended that patient call 911 or go to the local ED should there be a change in any of these risk factors.     Appearance:   Appropriate    Eye Contact:   Fair    Psychomotor Behavior: Normal    Attitude:   Cooperative  Friendly Pleasant   Orientation:   All   Speech    Rate / Production: Normal/ Responsive Talkative    Volume:  Normal    Mood:    Normal   Affect:    Appropriate  Bright    Thought Content:  Clear    Thought Form:  Coherent  Logical    Insight:    Good  and Fair      Medication Review:   No current psychiatric medications prescribed     Medication Compliance:   NA     Changes in Health Issues:   None reported     Chemical Use Review:   Substance Use: Chemical use reviewed, no active concerns identified      Tobacco Use: Did not address    Diagnosis:  1. BALA (generalized anxiety disorder)    2. Depressive disorder    3. Social anxiety disorder          Collateral Reports Completed:   Not Applicable    PLAN: (Patient Tasks / Therapist Tasks /  Other)  Client will talk with her  to make clear up she is ok with him hanging onto certain items when they move to their new home.  She made a future appointment for August 22, 2023.        Mejia Gonzalez, MiguelyD                                                         ______________________________________________________________________    Individual Treatment Plan    Patient's Name: Scott Dobbs  YOB: 1985    Date of Creation: 7/7/22 (cont'd from previous therapist)  Date Treatment Plan Last Reviewed/Revised: 7/7/22; 3/2/23; 8/3/23    DSM5 Diagnoses: 311 (F32.9) Unspecified Depressive Disorder  or 300.23 (F40.10) Social Anxiety Disorder  300.02 (F41.1) Generalized Anxiety Disorder  Psychosocial / Contextual Factors:  postpartum within past year, transition back to work in February, coping with pandemic  PROMIS (reviewed every 90 days): 22 (on 1/23/23)    Referral / Collaboration:  Referral to another professional/service is not indicated at this time..    Anticipated number of session for this episode of care: 24  Anticipation frequency of session: Every other week  Anticipated Duration of each session: 38-52 minutes  Treatment plan will be reviewed in 90 days or when goals have been changed.       MeasurableTreatment Goal(s) related to diagnosis / functional impairment(s)  Goal 1: Patient will reduce symptoms of anxiety as evidenced by decreased score on BALA 7.     I will know I've met my goal when I worry less in social/work interactions       Objective #A (Patient Action)                          Patient will identify three distraction and diversion activities and use those activities to decrease level of anxiety  .  Status: Renewed - Date: 8/3/23     Intervention(s)  [facility] [place] will teach distress tolerance, mindfulness, and relaxation techniques.     Objective #B  Patient will use cognitive strategies identified in therapy to challenge anxious thoughts.  Status: Renewed - Date:  8/3/23  Intervention(s)  [facility] will assign homework to practice cognitive restructuring and defusion techniques.     Objective #C  Patient will use relaxation strategies 2-3 times per day to reduce the physical symptoms of anxiety.  Status: Renewed - Date: 8/3/23     Intervention(s)  [facility] will teach relaxation techniques.        Goal 2: Patient will reduce symptoms of depression as evidenced by decreased scores on PHQ9.    I will know I've met my goal when I feel like I can start and end tasks, find more motivation.       Objective #A (Patient Action)                          Status: Renewed - Date: 8/3/23     Patient will Increase interest, engagement, and pleasure in doing things.     Intervention(s)  [facility] will explore strategies to help increase motivation and interest.     Objective #B  Patient will Identify negative self-talk and behaviors: challenge core beliefs, myths, and actions.                  Status: Renewed - Date: 8/3/23      Intervention(s)  [facility] will continue to explore automatic unhelpful/unhealthy thoughts and beliefs, and begin to create new helpeful/helpful automatic thoughts and beliefs.        Patient has reviewed and agreed to the above plan.      Mejia Gonzalez PsyD  August 3, 2023

## 2023-08-20 ENCOUNTER — HEALTH MAINTENANCE LETTER (OUTPATIENT)
Age: 38
End: 2023-08-20

## 2023-08-21 ENCOUNTER — ANCILLARY PROCEDURE (OUTPATIENT)
Dept: CT IMAGING | Facility: CLINIC | Age: 38
End: 2023-08-21
Attending: CLINICAL NURSE SPECIALIST
Payer: COMMERCIAL

## 2023-08-21 DIAGNOSIS — J86.9 EMPYEMA OF LUNG (H): ICD-10-CM

## 2023-08-21 PROCEDURE — 71250 CT THORAX DX C-: CPT | Mod: GC | Performed by: RADIOLOGY

## 2023-08-22 ENCOUNTER — VIRTUAL VISIT (OUTPATIENT)
Dept: PSYCHOLOGY | Facility: CLINIC | Age: 38
End: 2023-08-22
Payer: COMMERCIAL

## 2023-08-22 DIAGNOSIS — F32.A DEPRESSIVE DISORDER: ICD-10-CM

## 2023-08-22 DIAGNOSIS — F40.10 SOCIAL ANXIETY DISORDER: ICD-10-CM

## 2023-08-22 DIAGNOSIS — F41.1 GAD (GENERALIZED ANXIETY DISORDER): Primary | ICD-10-CM

## 2023-08-22 PROCEDURE — 90834 PSYTX W PT 45 MINUTES: CPT | Mod: VID | Performed by: PSYCHOLOGIST

## 2023-08-22 ASSESSMENT — ANXIETY QUESTIONNAIRES
3. WORRYING TOO MUCH ABOUT DIFFERENT THINGS: MORE THAN HALF THE DAYS
IF YOU CHECKED OFF ANY PROBLEMS ON THIS QUESTIONNAIRE, HOW DIFFICULT HAVE THESE PROBLEMS MADE IT FOR YOU TO DO YOUR WORK, TAKE CARE OF THINGS AT HOME, OR GET ALONG WITH OTHER PEOPLE: SOMEWHAT DIFFICULT
5. BEING SO RESTLESS THAT IT IS HARD TO SIT STILL: SEVERAL DAYS
1. FEELING NERVOUS, ANXIOUS, OR ON EDGE: NEARLY EVERY DAY
GAD7 TOTAL SCORE: 13
4. TROUBLE RELAXING: MORE THAN HALF THE DAYS
2. NOT BEING ABLE TO STOP OR CONTROL WORRYING: MORE THAN HALF THE DAYS
7. FEELING AFRAID AS IF SOMETHING AWFUL MIGHT HAPPEN: SEVERAL DAYS
6. BECOMING EASILY ANNOYED OR IRRITABLE: MORE THAN HALF THE DAYS
GAD7 TOTAL SCORE: 13

## 2023-08-22 ASSESSMENT — PATIENT HEALTH QUESTIONNAIRE - PHQ9
SUM OF ALL RESPONSES TO PHQ QUESTIONS 1-9: 9
SUM OF ALL RESPONSES TO PHQ QUESTIONS 1-9: 9
10. IF YOU CHECKED OFF ANY PROBLEMS, HOW DIFFICULT HAVE THESE PROBLEMS MADE IT FOR YOU TO DO YOUR WORK, TAKE CARE OF THINGS AT HOME, OR GET ALONG WITH OTHER PEOPLE: SOMEWHAT DIFFICULT

## 2023-08-22 NOTE — PROGRESS NOTES
"[facility]                                     Progress Note    Patient Name: Scott Dobbs  Date: 8/22/23         Service Type: Individual      Session Start Time: 3:00pm Session End Time: 3:52pm     Session Length: 52 minutes    Session #: 17 (transfer from Dawn Franks)    Attendees: Client attended alone    Service Modality:  Video Visit:      Provider verified identity through the following two step process.  Patient provided:  Patient photo    Telemedicine Visit: The patient's condition can be safely assessed and treated via synchronous audio and visual telemedicine encounter.      Reason for Telemedicine Visit: Services only offered telehealth    Originating Site (Patient Location): Patient's place of employment    Distant Site (Provider Location): Provider Remote Setting- Home Office    Consent:  The patient/guardian has verbally consented to: the potential risks and benefits of telemedicine (video visit) versus in person care; bill my insurance or make self-payment for services provided; and responsibility for payment of non-covered services.     Patient would like the video invitation sent by:  Send to e-mail at: [email or web address]    Mode of Communication:  Video Conference via well    As the provider I attest to compliance with applicable laws and regulations related to telemedicine.    DATA  Interactive Complexity: No  Crisis: No        Progress Since Last Session (Related to Symptoms / Goals / Homework):   Symptoms: No change : Stable.  \"Tired.\"    Homework: Completed in session      Episode of Care Goals: Satisfactory progress - ACTION (Actively working towards change); Intervened by reinforcing change plan / affirming steps taken     Current / Ongoing Stressors and Concerns:   Work; Home life busy; Moving      Notes on 8/22/23 Session:  Cl has been moving to their new home, \"We have movers coming next Monday, so, that's are deadline.\"  Work is busy, moving, " "and having a young daughter, \"trying to juggle it all.\"  Self-care.    They have someone caring from their daughter starting on Thursday.  Cl a bit frustrated with her  for not always following through on tasks.  Cl having difficulty, with everything going on, meeting her husbands emotional needs at times (he sprained his ankle).    Cl gets frustrated when she is working on a task and she gets interrupted.  Work is very busy, \"I get tired of of always having to reorganize, reprioritize.\"    Maybe set an alarm at night to limit how long client is on her phone at night.  Client described her sleep routine.         Treatment Objective(s) Addressed in This Session:   Client discussed the stressful events she has going on (moving to a new home, raising a toddler, work).  Client talked about trying to do Self-care during this busy time (struggling doing so).  Reviewed and assigned homework.  Discussed how increased anxiety leads to reacting stronger to her family members (less patient).  Discussed differences in how client expresses love vs her .     Intervention:   CBT: Reviewed and assigned homework; Pattern recognition; Psychoeducation; Socratic Questioning; Identified skills; Problem solved; Reframing;   Active listening, validation, and other rapport building skills    Assessments completed prior to visit:  The following assessments were completed by patient for this visit:  PHQ9:       1/23/2023    10:05 AM 3/1/2023    10:14 AM 4/11/2023     7:34 AM 5/23/2023     2:56 PM 6/5/2023     2:48 PM 6/26/2023     1:57 PM 8/3/2023     7:58 AM   PHQ-9 SCORE   PHQ-9 Total Score MyChart 4 (Minimal depression) 6 (Mild depression) 8 (Mild depression) 7 (Mild depression) 6 (Mild depression) 4 (Minimal depression) 9 (Mild depression)   PHQ-9 Total Score 4 6 8 7 6 4 9     GAD7:       1/23/2023    10:06 AM 3/2/2023     8:48 AM 3/28/2023     7:39 AM 4/11/2023     7:35 AM 5/23/2023     2:56 PM 6/26/2023     1:58 PM " 7/27/2023    10:48 AM   BALA-7 SCORE   Total Score 11 (moderate anxiety)  9 (mild anxiety) 10 (moderate anxiety) 10 (moderate anxiety) 8 (mild anxiety) 10 (moderate anxiety)   Total Score 11 10 9 10 10 8 10           ASSESSMENT: Current Emotional / Mental Status (status of significant symptoms):   Risk status (Self / Other harm or suicidal ideation)   Patient denies current fears or concerns for personal safety.   Patient denies current or recent suicidal ideation or behaviors.   Patient denies current or recent homicidal ideation or behaviors.   Patient denies current or recent self injurious behavior or ideation.   Patient denies other safety concerns.   Patient reports there has been no change in risk factors since their last session.     Patient reports there has been no change in protective factors since their last session.     Recommended that patient call 911 or go to the local ED should there be a change in any of these risk factors.     Appearance:   Appropriate    Eye Contact:   Fair    Psychomotor Behavior: Normal    Attitude:   Cooperative  Friendly Pleasant   Orientation:   All   Speech    Rate / Production: Normal/ Responsive Talkative    Volume:  Normal    Mood:    Normal   Affect:    Appropriate  Bright    Thought Content:  Clear    Thought Form:  Coherent  Logical    Insight:    Good  and Fair      Medication Review:   No current psychiatric medications prescribed     Medication Compliance:   NA     Changes in Health Issues:   None reported     Chemical Use Review:   Substance Use: Chemical use reviewed, no active concerns identified      Tobacco Use: Did not address    Diagnosis:  1. BALA (generalized anxiety disorder)    2. Depressive disorder    3. Social anxiety disorder          Collateral Reports Completed:   Not Applicable    PLAN: (Patient Tasks / Therapist Tasks / Other)  Client will do her best to eat healthy meals in the next week while she moves into a new house.  She made a future  appointment for September 19, 2023.        Mejia Ronnie Gonzalez, PsyD                                                         ______________________________________________________________________    Individual Treatment Plan    Patient's Name: Scott Dobbs  YOB: 1985    Date of Creation: 7/7/22 (cont'd from previous therapist)  Date Treatment Plan Last Reviewed/Revised: 7/7/22; 3/2/23; 8/3/23    DSM5 Diagnoses: 311 (F32.9) Unspecified Depressive Disorder  or 300.23 (F40.10) Social Anxiety Disorder  300.02 (F41.1) Generalized Anxiety Disorder  Psychosocial / Contextual Factors:  postpartum within past year, transition back to work in February, coping with pandemic  PROMIS (reviewed every 90 days): 22 (on 1/23/23)    Referral / Collaboration:  Referral to another professional/service is not indicated at this time..    Anticipated number of session for this episode of care: 24  Anticipation frequency of session: Every other week  Anticipated Duration of each session: 38-52 minutes  Treatment plan will be reviewed in 90 days or when goals have been changed.       MeasurableTreatment Goal(s) related to diagnosis / functional impairment(s)  Goal 1: Patient will reduce symptoms of anxiety as evidenced by decreased score on BALA 7.     I will know I've met my goal when I worry less in social/work interactions       Objective #A (Patient Action)                          Patient will identify three distraction and diversion activities and use those activities to decrease level of anxiety  .  Status: Renewed - Date: 8/3/23     Intervention(s)  [facility] will teach distress tolerance, mindfulness, and relaxation techniques.     Objective #B  Patient will use cognitive strategies identified in therapy to challenge anxious thoughts.  Status: Renewed - Date: 8/3/23  Intervention(s)  [facility] will assign homework to practice cognitive restructuring and defusion techniques.     Objective #C  Patient will use relaxation  strategies 2-3 times per day to reduce the physical symptoms of anxiety.  Status: Renewed - Date: 8/3/23     Intervention(s)  [facility] will teach relaxation techniques.        Goal 2: Patient will reduce symptoms of depression as evidenced by decreased scores on PHQ9.    I will know I've met my goal when I feel like I can start and end tasks, find more motivation.       Objective #A (Patient Action)                          Status: Renewed - Date: 8/3/23     Patient will Increase interest, engagement, and pleasure in doing things.     Intervention(s)  [facility] will explore strategies to help increase motivation and interest.     Objective #B  Patient will Identify negative self-talk and behaviors: challenge core beliefs, myths, and actions.                  Status: Renewed - Date: 8/3/23      Intervention(s)  [facility] will continue to explore automatic unhelpful/unhealthy thoughts and beliefs, and begin to create new helpeful/helpful automatic thoughts and beliefs.        Patient has reviewed and agreed to the above plan.      Mejia Gonzalez PsyD  August 22, 2023

## 2023-08-31 DIAGNOSIS — F43.23 SITUATIONAL MIXED ANXIETY AND DEPRESSIVE DISORDER: ICD-10-CM

## 2023-08-31 DIAGNOSIS — I10 ESSENTIAL HYPERTENSION: ICD-10-CM

## 2023-08-31 RX ORDER — LABETALOL 100 MG/1
TABLET, FILM COATED ORAL
Qty: 180 TABLET | Refills: 0 | Status: SHIPPED | OUTPATIENT
Start: 2023-08-31 | End: 2023-12-05

## 2023-08-31 RX ORDER — ESCITALOPRAM OXALATE 5 MG/1
5 TABLET ORAL DAILY
Qty: 90 TABLET | Refills: 0 | Status: SHIPPED | OUTPATIENT
Start: 2023-08-31 | End: 2023-12-05

## 2023-09-13 DIAGNOSIS — J86.9 EMPYEMA, RIGHT (H): Primary | ICD-10-CM

## 2023-09-15 ENCOUNTER — APPOINTMENT (OUTPATIENT)
Dept: ONCOLOGY | Facility: CLINIC | Age: 38
End: 2023-09-15
Attending: CLINICAL NURSE SPECIALIST
Payer: COMMERCIAL

## 2023-09-15 NOTE — TUMOR CONFERENCE
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 There is only one CT where the effusion is completely gone so there is no way to know if this nodule had been there before or not. The recommendation is to get another chest CT in 3 months. If this nodule grows, Interventional Pulmonary can consider a Ju[place]o Bronch as there is an airway to this area. The nodule is not in a good spot for an IR biopsy as this is very close to the diaphragm and would be moving during biopsy attempt.    Documentation / Disclaimer Cancer Tumor Board Note  Cancer tumor board recommendations do not override what is determined to be reasonable care and treatment, which is dependent on the circumstances of a patient's case; the patient's medical, social, and personal concerns; and the clinical judgment of the oncologist [physician].

## 2023-09-19 ENCOUNTER — VIRTUAL VISIT (OUTPATIENT)
Dept: PSYCHOLOGY | Facility: CLINIC | Age: 38
End: 2023-09-19
Payer: COMMERCIAL

## 2023-09-19 DIAGNOSIS — F32.A DEPRESSIVE DISORDER: ICD-10-CM

## 2023-09-19 DIAGNOSIS — F40.10 SOCIAL ANXIETY DISORDER: ICD-10-CM

## 2023-09-19 DIAGNOSIS — F41.1 GAD (GENERALIZED ANXIETY DISORDER): Primary | ICD-10-CM

## 2023-09-19 PROCEDURE — 90834 PSYTX W PT 45 MINUTES: CPT | Mod: VID | Performed by: PSYCHOLOGIST

## 2023-09-19 NOTE — PROGRESS NOTES
"[facility]                                     Progress Note    Patient Name: Scott Dobbs  Date: 9/19/23         Service Type: Individual      Session Start Time: 3:00pm Session End Time: 3:52pm     Session Length: 52 minutes    Session #: 18 (transfer from Dawn Denting)    Attendees: Client attended alone    Service Modality:  Video Visit:      Provider verified identity through the following two step process.  Patient provided:  Patient photo    Telemedicine Visit: The patient's condition can be safely assessed and treated via synchronous audio and visual telemedicine encounter.      Reason for Telemedicine Visit: Services only offered telehealth    Originating Site (Patient Location): Patient's place of employment    Distant Site (Provider Location): Provider Remote Setting- Home Office    Consent:  The patient/guardian has verbally consented to: the potential risks and benefits of telemedicine (video visit) versus in person care; bill my insurance or make self-payment for services provided; and responsibility for payment of non-covered services.     Patient would like the video invitation sent by:  Send to e-mail at: [email or web address]    Mode of Communication:  Video Conference via well    As the provider I attest to compliance with applicable laws and regulations related to telemedicine.    DATA  Interactive Complexity: No  Crisis: No        Progress Since Last Session (Related to Symptoms / Goals / Homework):   Symptoms: No change : Stable.  \"good.\"    Homework: Completed in session      Episode of Care Goals: Satisfactory progress - ACTION (Actively working towards change); Intervened by reinforcing change plan / affirming steps taken     Current / Ongoing Stressors and Concerns:   \"Recovering I guess.  It was really really busy with moving last month.  I'm trying not to take on too much.\"      Notes on 9/19/23 Session:  Client \"recovering\" from the move " "(houses).  \"It didn't go great.  Everybody was pretty stressed out.  We all did the best we could do.\"  Described move.    Enc cl to think long-term with getting things done in the new place and to continue to think about One-Step-At-A-Time, which they have used on their own since June.    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  She can either decide to do more or that she met her goal.    Cl talked about developing a night time routine to help get things done at their new home.    Talked about preferences vs right and wrong.       Treatment Objective(s) Addressed in This Session:   Discussed clients frustrations with her  as they go through a stressful move to a new home.  Taught on approaching differences in doing things around the house as Preferences vs Right & Wrong. Cl also talked about dividing some tasks between them based on each other's strengths.  Discussed how cl and her spouse have been using One-thing-at-a-time to get moved to a new home.   Reviewed and assigned homework.  Problem solved how to \"get more done at home\".     Intervention:   CBT: Reviewed and assigned homework; Pattern recognition; Psychoeducation; Socratic Questioning; Identified skills; Problem solved; Reframing;   Active listening, validation, and other rapport building skills; Preferences vs Right & Wrong; Communication skills    Assessments completed prior to visit:  The following assessments were completed by patient for this visit:  PHQ9:       1/23/2023    10:05 AM 3/1/2023    10:14 AM 4/11/2023     7:34 AM 5/23/2023     2:56 PM 6/5/2023     2:48 PM 6/26/2023     1:57 PM 8/3/2023     7:58 AM   PHQ-9 SCORE   PHQ-9 " Total Score MyChart 4 (Minimal depression) 6 (Mild depression) 8 (Mild depression) 7 (Mild depression) 6 (Mild depression) 4 (Minimal depression) 9 (Mild depression)   PHQ-9 Total Score 4 6 8 7 6 4 9     GAD7:       1/23/2023    10:06 AM 3/2/2023     8:48 AM 3/28/2023     7:39 AM 4/11/2023     7:35 AM 5/23/2023     2:56 PM 6/26/2023     1:58 PM 7/27/2023    10:48 AM   BALA-7 SCORE   Total Score 11 (moderate anxiety)  9 (mild anxiety) 10 (moderate anxiety) 10 (moderate anxiety) 8 (mild anxiety) 10 (moderate anxiety)   Total Score 11 10 9 10 10 8 10           ASSESSMENT: Current Emotional / Mental Status (status of significant symptoms):   Risk status (Self / Other harm or suicidal ideation)   Patient denies current fears or concerns for personal safety.   Patient denies current or recent suicidal ideation or behaviors.   Patient denies current or recent homicidal ideation or behaviors.   Patient denies current or recent self injurious behavior or ideation.   Patient denies other safety concerns.   Patient reports there has been no change in risk factors since their last session.     Patient reports there has been no change in protective factors since their last session.     Recommended that patient call 911 or go to the local ED should there be a change in any of these risk factors.     Appearance:   Appropriate    Eye Contact:   Fair    Psychomotor Behavior: Normal    Attitude:   Cooperative  Friendly Pleasant   Orientation:   All   Speech    Rate / Production: Normal/ Responsive Talkative    Volume:  Normal    Mood:    Normal   Affect:    Appropriate  Bright    Thought Content:  Clear    Thought Form:  Coherent  Logical    Insight:    Good  and Fair      Medication Review:   No current psychiatric medications prescribed     Medication Compliance:   NA     Changes in Health Issues:   None reported     Chemical Use Review:   Substance Use: Chemical use reviewed, no active concerns identified      Tobacco Use: Did not  address    Diagnosis:  1. BALA (generalized anxiety disorder)    2. Depressive disorder    3. Social anxiety disorder          Collateral Reports Completed:   Not Applicable    PLAN: (Patient Tasks / Therapist Tasks / Other)  Client will talk to her spouse about possibly having one of them put their child to bed while the other can clean the house some, then the other would join later.  The client will think about how things are done around the house in terms of Preferences vs Right and Wrong.  She will try to do something to organize the house for 10-20 minutes per night.  She made a future appointment for October 17, 2023.        Mejia Gonzalez, Rhiannon                                                         ______________________________________________________________________    Individual Treatment Plan    Patient's Name: Scott Dobbs  YOB: 1985    Date of Creation: 7/7/22 (cont'd from previous therapist)  Date Treatment Plan Last Reviewed/Revised: 7/7/22; 3/2/23; 8/3/23    DSM5 Diagnoses: 311 (F32.9) Unspecified Depressive Disorder  or 300.23 (F40.10) Social Anxiety Disorder  300.02 (F41.1) Generalized Anxiety Disorder  Psychosocial / Contextual Factors:  postpartum within past year, transition back to work in February, coping with pandemic  PROMIS (reviewed every 90 days): 22 (on 1/23/23)    Referral / Collaboration:  Referral to another professional/service is not indicated at this time..    Anticipated number of session for this episode of care: 24  Anticipation frequency of session: Every other week  Anticipated Duration of each session: 38-52 minutes  Treatment plan will be reviewed in 90 days or when goals have been changed.       MeasurableTreatment Goal(s) related to diagnosis / functional impairment(s)  Goal 1: Patient will reduce symptoms of anxiety as evidenced by decreased score on BALA 7.     I will know I've met my goal when I worry less in social/work interactions        Objective #A (Patient Action)                          Patient will identify three distraction and diversion activities and use those activities to decrease level of anxiety  .  Status: Renewed - Date: 8/3/23     Intervention(s)  [facility] will teach distress tolerance, mindfulness, and relaxation techniques.     Objective #B  Patient will use cognitive strategies identified in therapy to challenge anxious thoughts.  Status: Renewed - Date: 8/3/23  Intervention(s)  [facility] will assign homework to practice cognitive restructuring and defusion techniques.     Objective #C  Patient will use relaxation strategies 2-3 times per day to reduce the physical symptoms of anxiety.  Status: Renewed - Date: 8/3/23     Intervention(s)  [facility] will teach relaxation techniques.        Goal 2: Patient will reduce symptoms of depression as evidenced by decreased scores on PHQ9.    I will know I've met my goal when I feel like I can start and end tasks, find more motivation.       Objective #A (Patient Action)                          Status: Renewed - Date: 8/3/23     Patient will Increase interest, engagement, and pleasure in doing things.     Intervention(s)  [facility] will explore strategies to help increase motivation and interest.     Objective #B  Patient will Identify negative self-talk and behaviors: challenge core beliefs, myths, and actions.                  Status: Renewed - Date: 8/3/23      Intervention(s)  [facility] will continue to explore automatic unhelpful/unhealthy thoughts and beliefs, and begin to create new helpeful/helpful automatic thoughts and beliefs.        Patient has reviewed and agreed to the above plan.      Mejia Gonzalez PsyD  September 19, 2023

## 2023-10-17 ENCOUNTER — VIRTUAL VISIT (OUTPATIENT)
Dept: PSYCHOLOGY | Facility: CLINIC | Age: 38
End: 2023-10-17
Payer: COMMERCIAL

## 2023-10-17 DIAGNOSIS — F40.10 SOCIAL ANXIETY DISORDER: ICD-10-CM

## 2023-10-17 DIAGNOSIS — F32.9 DEPRESSION, REACTIVE: ICD-10-CM

## 2023-10-17 DIAGNOSIS — F41.1 GAD (GENERALIZED ANXIETY DISORDER): Primary | ICD-10-CM

## 2023-10-17 PROCEDURE — 90834 PSYTX W PT 45 MINUTES: CPT | Mod: VID | Performed by: PSYCHOLOGIST

## 2023-10-17 ASSESSMENT — PATIENT HEALTH QUESTIONNAIRE - PHQ9
SUM OF ALL RESPONSES TO PHQ QUESTIONS 1-9: 5
10. IF YOU CHECKED OFF ANY PROBLEMS, HOW DIFFICULT HAVE THESE PROBLEMS MADE IT FOR YOU TO DO YOUR WORK, TAKE CARE OF THINGS AT HOME, OR GET ALONG WITH OTHER PEOPLE: SOMEWHAT DIFFICULT
SUM OF ALL RESPONSES TO PHQ QUESTIONS 1-9: 5

## 2023-10-17 NOTE — PROGRESS NOTES
"[facility]                                     Progress Note    Patient Name: Scott Dobbs  Date: 10/17/23         Service Type: Individual      Session Start Time: 3:01pm Session End Time: 3:53pm     Session Length: 52 minutes    Session #: 19 (transfer from Dawn Franks)    Attendees: Client attended alone    Service Modality:  Video Visit:      Provider verified identity through the following two step process.  Patient provided:  Patient photo    Telemedicine Visit: The patient's condition can be safely assessed and treated via synchronous audio and visual telemedicine encounter.      Reason for Telemedicine Visit: Services only offered telehealth    Originating Site (Patient Location): Patient's place of employment    Distant Site (Provider Location): Provider Remote Setting- Home Office    Consent:  The patient/guardian has verbally consented to: the potential risks and benefits of telemedicine (video visit) versus in person care; bill my insurance or make self-payment for services provided; and responsibility for payment of non-covered services.     Patient would like the video invitation sent by:  Send to e-mail at: [email or web address]    Mode of Communication:  Video Conference via [facility]    As the provider I attest to compliance with applicable laws and regulations related to telemedicine.    DATA  Interactive Complexity: No  Crisis: No        Progress Since Last Session (Related to Symptoms / Goals / Homework):   Symptoms: No change : Stable.  \"good.\"    Homework: Completed in session      Episode of Care Goals: Satisfactory progress - ACTION (Actively working towards change); Intervened by reinforcing change plan / affirming steps taken     Current / Ongoing Stressors and Concerns:   \"Recovering I guess.  It was really really busy with moving last month.  I'm trying not to take on too much.\"      Notes on 10/17/23 Session:  Client has been busy at work.  She is having " "difficulty with motivation at home lately.  Lacking energy in supporting her daughter.    \"I feel like I'm in a spot I was like a year and a half ago.  If I take her (daughter) to the park, I feel guilty because I'm taking time away from him being with her.\"  Cl was able to go to the park with her daughter and  \"a couple days ago.\"  Cl was enc to plan days when all three go outside between getting home from work and having dinner.    Cl believes she does not get enough \"me\" time.  What \"recharges you?\" \"Usually doing something by myself.  My ideal day off, I would watch crappy TV, and work on a [employer] project.  And maybe if I did a small project it would be done by the end of the day.  Or play with my plants.\"  Cl will try to schedule a day (or two) off in which she can do what she wants.    Discussed sleep hygiene with client in order to get another hour of sleep a night.       Treatment Objective(s) Addressed in This Session:   identify client's sleep hygiene practices  Reviewed and assigned homework.  Discussed self-care with client and identified how she \"recharges.\"  Helped problem solved getting outside more with her daughter and \"not feel guilty I'm taking time away from my daughter and .\"     Intervention:   CBT: Reviewed and assigned homework; Pattern recognition; Psychoeducation; Socratic Questioning; Identified and recommended skills; Problem solved; Self-care.   Active listening, validation, and other rapport building skills; Sleep hygiene.    Assessments completed prior to visit:  The following assessments were completed by patient for this visit:  PHQ9:       4/11/2023     7:34 AM 5/23/2023     2:56 PM 6/5/2023     2:48 PM 6/26/2023     1:57 PM 8/3/2023     7:58 AM 8/22/2023    12:24 PM 10/17/2023    12:43 PM   PHQ-9 SCORE   PHQ-9 Total Score [facility] 8 (Mild depression) 7 (Mild depression) 6 (Mild depression) 4 (Minimal depression) 9 (Mild depression) 9 (Mild depression) 5 (Mild " depression)   PHQ-9 Total Score 8 7 6 4 9 9 5     GAD7:       3/2/2023     8:48 AM 3/28/2023     7:39 AM 4/11/2023     7:35 AM 5/23/2023     2:56 PM 6/26/2023     1:58 PM 7/27/2023    10:48 AM 8/22/2023    12:25 PM   BALA-7 SCORE   Total Score  9 (mild anxiety) 10 (moderate anxiety) 10 (moderate anxiety) 8 (mild anxiety) 10 (moderate anxiety) 13 (moderate anxiety)   Total Score 10 9 10 10 8 10 13           ASSESSMENT: Current Emotional / Mental Status (status of significant symptoms):   Risk status (Self / Other harm or suicidal ideation)   Patient denies current fears or concerns for personal safety.   Patient denies current or recent suicidal ideation or behaviors.   Patient denies current or recent homicidal ideation or behaviors.   Patient denies current or recent self injurious behavior or ideation.   Patient denies other safety concerns.   Patient reports there has been no change in risk factors since their last session.     Patient reports there has been no change in protective factors since their last session.     Recommended that patient call 911 or go to the local ED should there be a change in any of these risk factors.     Appearance:   Appropriate    Eye Contact:   Fair    Psychomotor Behavior: Normal    Attitude:   Cooperative  Friendly Pleasant   Orientation:   All   Speech    Rate / Production: Normal/ Responsive Talkative    Volume:  Normal    Mood:    Normal   Affect:    Appropriate  Bright    Thought Content:  Clear    Thought Form:  Coherent  Logical    Insight:    Good  and Fair      Medication Review:   No current psychiatric medications prescribed     Medication Compliance:   NA     Changes in Health Issues:   None reported     Chemical Use Review:   Substance Use: Chemical use reviewed, no active concerns identified      Tobacco Use: Did not address    Diagnosis:  1. Generalized Anxiety Disorder    2. Unspecified Depressive Disorder    3. Social anxiety disorder        Collateral Reports  Completed:   Not Applicable    PLAN: (Patient Tasks / Therapist Tasks / Other)  Client will talk to her spouse about the three of them spending time together at the park (with their daughter) prior to cooking dinner.  The client will also practice self-care and sleep hygiene as discussed today in session.  She made a future appointment for November 14, 2023.        Mejia Gonzalez, PsyD                                                         ______________________________________________________________________    Individual Treatment Plan    Patient's Name: Scott Dobbs  YOB: 1985    Date of Creation: 7/7/22 (cont'd from previous therapist)  Date Treatment Plan Last Reviewed/Revised: 7/7/22; 3/2/23; 8/3/23    DSM5 Diagnoses: 311 (F32.9) Unspecified Depressive Disorder  or 300.23 (F40.10) Social Anxiety Disorder  300.02 (F41.1) Generalized Anxiety Disorder  Psychosocial / Contextual Factors:  postpartum within past year, transition back to work in February, coping with pandemic  PROMIS (reviewed every 90 days): 22 (on 1/23/23)    Referral / Collaboration:  Referral to another professional/service is not indicated at this time..    Anticipated number of session for this episode of care: 24  Anticipation frequency of session: Every other week  Anticipated Duration of each session: 38-52 minutes  Treatment plan will be reviewed in 90 days or when goals have been changed.       MeasurableTreatment Goal(s) related to diagnosis / functional impairment(s)  Goal 1: Patient will reduce symptoms of anxiety as evidenced by decreased score on BALA 7.     I will know I've met my goal when I worry less in social/work interactions       Objective #A (Patient Action)                          Patient will identify three distraction and diversion activities and use those activities to decrease level of anxiety  .  Status: Renewed - Date: 8/3/23     Intervention(s)  [facility] will teach distress tolerance,  mindfulness, and relaxation techniques.     Objective #B  Patient will use cognitive strategies identified in therapy to challenge anxious thoughts.  Status: Renewed - Date: 8/3/23  Intervention(s)  [facility] will assign homework to practice cognitive restructuring and defusion techniques.     Objective #C  Patient will use relaxation strategies 2-3 times per day to reduce the physical symptoms of anxiety.  Status: Renewed - Date: 8/3/23     Intervention(s)  [facility] will teach relaxation techniques.        Goal 2: Patient will reduce symptoms of depression as evidenced by decreased scores on PHQ9.    I will know I've met my goal when I feel like I can start and end tasks, find more motivation.       Objective #A (Patient Action)                          Status: Renewed - Date: 8/3/23     Patient will Increase interest, engagement, and pleasure in doing things.     Intervention(s)  [facility] will explore strategies to help increase motivation and interest.     Objective #B  Patient will Identify negative self-talk and behaviors: challenge core beliefs, myths, and actions.                  Status: Renewed - Date: 8/3/23      Intervention(s)  [facility] will continue to explore automatic unhelpful/unhealthy thoughts and beliefs, and begin to create new helpeful/helpful automatic thoughts and beliefs.        Patient has reviewed and agreed to the above plan.      Mejia Gonzalez PsyD  October 17, 2023

## 2023-11-14 ENCOUNTER — VIRTUAL VISIT (OUTPATIENT)
Dept: PSYCHOLOGY | Facility: CLINIC | Age: 38
End: 2023-11-14
Payer: COMMERCIAL

## 2023-11-14 DIAGNOSIS — F32.9 DEPRESSION, REACTIVE: ICD-10-CM

## 2023-11-14 DIAGNOSIS — F40.10 SOCIAL ANXIETY DISORDER: ICD-10-CM

## 2023-11-14 DIAGNOSIS — F41.1 GAD (GENERALIZED ANXIETY DISORDER): Primary | ICD-10-CM

## 2023-11-14 PROCEDURE — 90834 PSYTX W PT 45 MINUTES: CPT | Mod: VID | Performed by: PSYCHOLOGIST

## 2023-11-14 NOTE — PROGRESS NOTES
"[facility]                                     Progress Note    Patient Name: Scott Dobbs  Date: 11/14/23         Service Type: Individual      Session Start Time: 3:00pm Session End Time: 3:52pm     Session Length: 52 minutes    Session #: 20 (transfer from Dawn Franks)    Attendees: Client attended alone    Service Modality:  Video Visit:      Provider verified identity through the following two step process.  Patient provided:  Patient photo    Telemedicine Visit: The patient's condition can be safely assessed and treated via synchronous audio and visual telemedicine encounter.      Reason for Telemedicine Visit: Services only offered telehealth    Originating Site (Patient Location): Patient's place of employment    Distant Site (Provider Location): Provider Remote Setting- Home Office    Consent:  The patient/guardian has verbally consented to: the potential risks and benefits of telemedicine (video visit) versus in person care; bill my insurance or make self-payment for services provided; and responsibility for payment of non-covered services.     Patient would like the video invitation sent by:  Send to e-mail at: [email or web address]    Mode of Communication:  Video Conference via well    As the provider I attest to compliance with applicable laws and regulations related to telemedicine.    DATA  Interactive Complexity: No  Crisis: No        Progress Since Last Session (Related to Symptoms / Goals / Homework):   Symptoms: No change : Stable.  \"Pretty good.\"    Homework: Completed in session      Episode of Care Goals: Satisfactory progress - ACTION (Actively working towards change); Intervened by reinforcing change plan / affirming steps taken     Current / Ongoing Stressors and Concerns:   Busy at work.  Still renovating their kitchen.      Notes on 11/14/23 Session:  \"Still pretty busy at work and still renovating the kitchen.\"  Cl can feel overwhelmed by all the " "decisions to renovating her kitchen.    Cl will work with the contractor tonight to plan out how to get the kitchen renovation done \"by Edgar.\"  Enc cl to create a To Do List at home, \"I do  it at work,\" in order to giver her direction.  Cl will put her To Do List\" on a format that works better for me (bullet points). - Put it on the White Board.         Treatment Objective(s) Addressed in This Session:   Client talked about stress at home, and in particular, having her kitchen in her new home under renovation since June.  She explored ways to help manage being overwhelmed and frustrated.     Intervention:   CBT: Reviewed and assigned homework; Pattern recognition; Psychoeducation; Socratic Questioning; Identified and recommended skills; Problem solved; Self-care.   Active listening, validation, and other rapport building skills.    Assessments completed prior to visit:  The following assessments were completed by patient for this visit:  PHQ9:       4/11/2023     7:34 AM 5/23/2023     2:56 PM 6/5/2023     2:48 PM 6/26/2023     1:57 PM 8/3/2023     7:58 AM 8/22/2023    12:24 PM 10/17/2023    12:43 PM   PHQ-9 SCORE   PHQ-9 Total Score MyChart 8 (Mild depression) 7 (Mild depression) 6 (Mild depression) 4 (Minimal depression) 9 (Mild depression) 9 (Mild depression) 5 (Mild depression)   PHQ-9 Total Score 8 7 6 4 9 9 5     GAD7:       3/2/2023     8:48 AM 3/28/2023     7:39 AM 4/11/2023     7:35 AM 5/23/2023     2:56 PM 6/26/2023     1:58 PM 7/27/2023    10:48 AM 8/22/2023    12:25 PM   BALA-7 SCORE   Total Score  9 (mild anxiety) 10 (moderate anxiety) 10 (moderate anxiety) 8 (mild anxiety) 10 (moderate anxiety) 13 (moderate anxiety)   Total Score 10 9 10 10 8 10 13           ASSESSMENT: Current Emotional / Mental Status (status of significant symptoms):   Risk status (Self / Other harm or suicidal ideation)   Patient denies current fears or concerns for personal safety.   Patient denies current or recent suicidal " "ideation or behaviors.   Patient denies current or recent homicidal ideation or behaviors.   Patient denies current or recent self injurious behavior or ideation.   Patient denies other safety concerns.   Patient reports there has been no change in risk factors since their last session.     Patient reports there has been no change in protective factors since their last session.     Recommended that patient call 911 or go to the local ED should there be a change in any of these risk factors.     Appearance:   Appropriate    Eye Contact:   Fair    Psychomotor Behavior: Normal    Attitude:   Cooperative  Friendly Pleasant   Orientation:   All   Speech    Rate / Production: Normal/ Responsive Talkative    Volume:  Normal    Mood:    Normal   Affect:    Appropriate  Bright    Thought Content:  Clear    Thought Form:  Coherent  Logical    Insight:    Good  and Fair      Medication Review:   No current psychiatric medications prescribed     Medication Compliance:   NA     Changes in Health Issues:   None reported     Chemical Use Review:   Substance Use: Chemical use reviewed, no active concerns identified      Tobacco Use: Did not address    Diagnosis:  1. Generalized Anxiety Disorder    2. Unspecified Depressive Disorder    3. Social anxiety disorder        Collateral Reports Completed:   Not Applicable    PLAN: (Patient Tasks / Therapist Tasks / Other)  Client will work with the contractor tonight to plan out how to get the kitchen renovation done \"by Edgar.\"  Enc cl to create a To Do List at home, \"I do  it at work,\" in order to giver her direction.  Client will put her To Do List\" on a format that works better for me (bullet points). - Put it on the White Board..  She made a future appointment for December 5, 2023.        Mejia Gonzalez, Rhiannon                                                         ______________________________________________________________________    Individual Treatment Plan    Patient's " Name: Scott Dobbs  YOB: 1985    Date of Creation: 7/7/22 (cont'd from previous therapist)  Date Treatment Plan Last Reviewed/Revised: 7/7/22; 3/2/23; 8/3/23    DSM5 Diagnoses: 311 (F32.9) Unspecified Depressive Disorder  or 300.23 (F40.10) Social Anxiety Disorder  300.02 (F41.1) Generalized Anxiety Disorder  Psychosocial / Contextual Factors:  postpartum within past year, transition back to work in February, coping with pandemic  PROMIS (reviewed every 90 days): 22 (on 1/23/23)    Referral / Collaboration:  Referral to another professional/service is not indicated at this time..    Anticipated number of session for this episode of care: 24  Anticipation frequency of session: Every other week  Anticipated Duration of each session: 38-52 minutes  Treatment plan will be reviewed in 90 days or when goals have been changed.       MeasurableTreatment Goal(s) related to diagnosis / functional impairment(s)  Goal 1: Patient will reduce symptoms of anxiety as evidenced by decreased score on BALA 7.     I will know I've met my goal when I worry less in social/work interactions       Objective #A (Patient Action)                          Patient will identify three distraction and diversion activities and use those activities to decrease level of anxiety  .  Status: Renewed - Date: 8/3/23     Intervention(s)  [facility] will teach distress tolerance, mindfulness, and relaxation techniques.     Objective #B  Patient will use cognitive strategies identified in therapy to challenge anxious thoughts.  Status: Renewed - Date: 8/3/23  Intervention(s)  [facility] will assign homework to practice cognitive restructuring and defusion techniques.     Objective #C  Patient will use relaxation strategies 2-3 times per day to reduce the physical symptoms of anxiety.  Status: Renewed - Date: 8/3/23     Intervention(s)  [facility] will teach relaxation techniques.        Goal 2: Patient will reduce symptoms of depression as evidenced by  decreased scores on PHQ9.    I will know I've met my goal when I feel like I can start and end tasks, find more motivation.       Objective #A (Patient Action)                          Status: Renewed - Date: 8/3/23     Patient will Increase interest, engagement, and pleasure in doing things.     Intervention(s)  [facility] will explore strategies to help increase motivation and interest.     Objective #B  Patient will Identify negative self-talk and behaviors: challenge core beliefs, myths, and actions.                  Status: Renewed - Date: 8/3/23      Intervention(s)  [facility] will continue to explore automatic unhelpful/unhealthy thoughts and beliefs, and begin to create new helpeful/helpful automatic thoughts and beliefs.        Patient has reviewed and agreed to the above plan.      Mejia Gonzalez PsyD  November 14, 2023

## 2023-11-22 ENCOUNTER — ANCILLARY PROCEDURE (OUTPATIENT)
Dept: GENERAL RADIOLOGY | Facility: CLINIC | Age: 38
End: 2023-11-22
Attending: NURSE PRACTITIONER
Payer: COMMERCIAL

## 2023-11-22 ENCOUNTER — OFFICE VISIT (OUTPATIENT)
Dept: FAMILY MEDICINE | Facility: CLINIC | Age: 38
End: 2023-11-22
Payer: COMMERCIAL

## 2023-11-22 VITALS
TEMPERATURE: 99 F | WEIGHT: 293 LBS | SYSTOLIC BLOOD PRESSURE: 133 MMHG | BODY MASS INDEX: 47.35 KG/M2 | OXYGEN SATURATION: 97 % | HEART RATE: 71 BPM | RESPIRATION RATE: 18 BRPM | DIASTOLIC BLOOD PRESSURE: 85 MMHG

## 2023-11-22 DIAGNOSIS — J01.90 ACUTE NON-RECURRENT SINUSITIS, UNSPECIFIED LOCATION: Primary | ICD-10-CM

## 2023-11-22 DIAGNOSIS — R05.1 ACUTE COUGH: ICD-10-CM

## 2023-11-22 PROCEDURE — 99213 OFFICE O/P EST LOW 20 MIN: CPT

## 2023-11-22 PROCEDURE — 71046 X-RAY EXAM CHEST 2 VIEWS: CPT | Mod: TC | Performed by: RADIOLOGY

## 2023-11-22 NOTE — PROGRESS NOTES
Assessment & Plan     Acute non-recurrent sinusitis, unspecified location    - amoxicillin-clavulanate (AUGMENTIN) 875-125 MG tablet; Take 1 tablet by mouth 2 times daily for 7 days    Acute cough    - XR Chest 2 Views; Future  Chest x-ray read per radiology with no change from CT back in September 2023.  This is reassuring.  Will treat for acute sinusitis with Augmentin twice daily.  Discussed red flag symptoms and when to seek treatment in the emergency room.  Patient agrees with plan and was discharged in stable condition         Return in about 1 week (around 11/29/2023), or if symptoms worsen or fail to improve.    [facility]  [facility]    Mari Chavez is a 38 year old, presenting for the following health issues:  Urgent Care (Cough, somewhat productive beige in color, bad taste in mouth x a week -took covid test at home negative /Chest discomfort )      HPI   Scott presents today with a productive cough, bilateral ear pain, foul taste in the back of the throat and some mild chest discomfort on the left.  Has a history of empyema on the right back in 2021 and then again in 2022, and she is concerned about the cough.  No fevers, COVID test have been negative at home.  Is able to do her ADLs without difficulty.  Using Mucinex and Sudafed to help with symptom management      Review of Systems   Constitutional, HEENT, cardiovascular, pulmonary, gi and gu systems are negative, except as otherwise noted.      Objective    /85   Pulse 71   Temp 99  F (37.2  C) (Tympanic)   Resp 18   Wt 149.7 kg (330 lb)   SpO2 97%   BMI 47.35 kg/m    Body mass index is 47.35 kg/m .  Physical Exam   GENERAL: healthy, alert and no distress  EYES: Eyes grossly normal to inspection, PERRL and conjunctivae and sclerae normal  HENT: normal cephalic/atraumatic, ear canals and TM's normal, mouth without ulcers or lesions, edema and injection of  nasal turbinates, oropharynx clear, and oral mucous membranes moist  NECK: no adenopathy  RESP: decreased breath sounds on entire posterior left side, no rales, rhonchi or wheezing.  Decreased breath sounds right posterior lower lobe otherwise clear  CV: regular rates and rhythm, normal S1 S2, no S3 or S4, and no murmur, click or rub    Results for orders placed or performed in visit on 11/22/23   XR Chest 2 Views     Status: None    Narrative    XR CHEST 2 VIEWS 11/22/2023 1:38 PM    HISTORY: r/o pneumonia. Hx of empyema on the Right in 2021 and 2022  with recent CT 09/2023; Acute cough    COMPARISON: Chest CT on 8/21/2023      Impression    IMPRESSION: Stable blunting of the right costophrenic sulcus related  to chronic mild posterolateral pleural thickening. Stable elevated  right hemidiaphragm. No infiltrate, pleural effusion or pneumothorax.  Stable mild enlargement of the cardiac silhouette.    ROSENDO MENDOZA MD         SYSTEM ID:  [id number]

## 2023-11-24 ENCOUNTER — HOSPITAL ENCOUNTER (EMERGENCY)
Facility: CLINIC | Age: 38
Discharge: HOME | End: 2023-11-25
Attending: EMERGENCY MEDICINE | Admitting: EMERGENCY MEDICINE
Payer: COMMERCIAL

## 2023-11-24 ENCOUNTER — APPOINTMENT (OUTPATIENT)
Dept: GENERAL RADIOLOGY | Facility: CLINIC | Age: 38
End: 2023-11-24
Attending: EMERGENCY MEDICINE
Payer: COMMERCIAL

## 2023-11-24 DIAGNOSIS — R07.9 ACUTE CHEST PAIN: ICD-10-CM

## 2023-11-24 LAB
ALBUMIN SERPL BCG-MCNC: 4.1 G/DL (ref 3.5–5.2)
ALP SERPL-CCNC: 89 U/L (ref 40–150)
ALT SERPL W P-5'-P-CCNC: 18 U/L (ref 0–50)
ANION GAP SERPL CALCULATED.3IONS-SCNC: 13 MMOL/L (ref 7–15)
AST SERPL W P-5'-P-CCNC: 23 U/L (ref 0–45)
ATRIAL RATE - MUSE: 65 BPM
BASOPHILS # BLD AUTO: 0.1 10E3/UL (ref 0–0.2)
BASOPHILS NFR BLD AUTO: 1 %
BILIRUB SERPL-MCNC: 0.2 MG/DL
BUN SERPL-MCNC: 11.7 MG/DL (ref 6–20)
CALCIUM SERPL-MCNC: 9.2 MG/DL (ref 8.6–10)
CHLORIDE SERPL-SCNC: 107 MMOL/L (ref 98–107)
CREAT SERPL-MCNC: 1.04 MG/DL (ref 0.51–0.95)
DEPRECATED HCO3 PLAS-SCNC: 22 MMOL/L (ref 22–29)
DIASTOLIC BLOOD PRESSURE - MUSE: NORMAL MMHG
EGFRCR SERPLBLD CKD-EPI 2021: 71 ML/MIN/1.73M2
EOSINOPHIL # BLD AUTO: 0.3 10E3/UL (ref 0–0.7)
EOSINOPHIL NFR BLD AUTO: 3 %
ERYTHROCYTE [DISTWIDTH] IN BLOOD BY AUTOMATED COUNT: 12.5 % (ref 10–15)
FLUAV RNA SPEC QL NAA+PROBE: NEGATIVE
FLUBV RNA RESP QL NAA+PROBE: NEGATIVE
GLUCOSE SERPL-MCNC: 118 MG/DL (ref 70–99)
HCG SERPL QL: NEGATIVE
HCT VFR BLD AUTO: 41.5 % (ref 35–47)
HGB BLD-MCNC: 13.6 G/DL (ref 11.7–15.7)
HOLD SPECIMEN: NORMAL
IMM GRANULOCYTES # BLD: 0 10E3/UL
IMM GRANULOCYTES NFR BLD: 0 %
INTERPRETATION ECG - MUSE: NORMAL
LYMPHOCYTES # BLD AUTO: 3.8 10E3/UL (ref 0.8–5.3)
LYMPHOCYTES NFR BLD AUTO: 36 %
MCH RBC QN AUTO: 28 PG (ref 26.5–33)
MCHC RBC AUTO-ENTMCNC: 32.8 G/DL (ref 31.5–36.5)
MCV RBC AUTO: 86 FL (ref 78–100)
MONOCYTES # BLD AUTO: 0.7 10E3/UL (ref 0–1.3)
MONOCYTES NFR BLD AUTO: 6 %
NEUTROPHILS # BLD AUTO: 5.7 10E3/UL (ref 1.6–8.3)
NEUTROPHILS NFR BLD AUTO: 54 %
NRBC # BLD AUTO: 0 10E3/UL
NRBC BLD AUTO-RTO: 0 /100
P AXIS - MUSE: 61 DEGREES
PLATELET # BLD AUTO: 354 10E3/UL (ref 150–450)
POTASSIUM SERPL-SCNC: 3.8 MMOL/L (ref 3.4–5.3)
PR INTERVAL - MUSE: 136 MS
PROT SERPL-MCNC: 7.4 G/DL (ref 6.4–8.3)
QRS DURATION - MUSE: 82 MS
QT - MUSE: 398 MS
QTC - MUSE: 413 MS
R AXIS - MUSE: 43 DEGREES
RBC # BLD AUTO: 4.85 10E6/UL (ref 3.8–5.2)
RSV RNA SPEC NAA+PROBE: NEGATIVE
SARS-COV-2 RNA RESP QL NAA+PROBE: NEGATIVE
SODIUM SERPL-SCNC: 142 MMOL/L (ref 135–145)
SYSTOLIC BLOOD PRESSURE - MUSE: NORMAL MMHG
T AXIS - MUSE: 47 DEGREES
TROPONIN T SERPL HS-MCNC: <6 NG/L
VENTRICULAR RATE- MUSE: 65 BPM
WBC # BLD AUTO: 10.5 10E3/UL (ref 4–11)

## 2023-11-24 PROCEDURE — 84703 CHORIONIC GONADOTROPIN ASSAY: CPT | Performed by: EMERGENCY MEDICINE

## 2023-11-24 PROCEDURE — 84484 ASSAY OF TROPONIN QUANT: CPT | Performed by: EMERGENCY MEDICINE

## 2023-11-24 PROCEDURE — 99285 EMERGENCY DEPT VISIT HI MDM: CPT | Mod: 25 | Performed by: EMERGENCY MEDICINE

## 2023-11-24 PROCEDURE — 71046 X-RAY EXAM CHEST 2 VIEWS: CPT | Mod: 26 | Performed by: RADIOLOGY

## 2023-11-24 PROCEDURE — 93010 ELECTROCARDIOGRAM REPORT: CPT | Performed by: EMERGENCY MEDICINE

## 2023-11-24 PROCEDURE — 87637 SARSCOV2&INF A&B&RSV AMP PRB: CPT | Performed by: EMERGENCY MEDICINE

## 2023-11-24 PROCEDURE — 85025 COMPLETE CBC W/AUTO DIFF WBC: CPT | Performed by: EMERGENCY MEDICINE

## 2023-11-24 PROCEDURE — 36415 COLL VENOUS BLD VENIPUNCTURE: CPT | Performed by: EMERGENCY MEDICINE

## 2023-11-24 PROCEDURE — 71046 X-RAY EXAM CHEST 2 VIEWS: CPT

## 2023-11-24 PROCEDURE — 80053 COMPREHEN METABOLIC PANEL: CPT | Performed by: EMERGENCY MEDICINE

## 2023-11-24 PROCEDURE — 93005 ELECTROCARDIOGRAM TRACING: CPT | Performed by: EMERGENCY MEDICINE

## 2023-11-24 ASSESSMENT — ACTIVITIES OF DAILY LIVING (ADL): ADLS_ACUITY_SCORE: 33

## 2023-11-25 VITALS
TEMPERATURE: 98.2 F | SYSTOLIC BLOOD PRESSURE: 155 MMHG | OXYGEN SATURATION: 99 % | RESPIRATION RATE: 17 BRPM | DIASTOLIC BLOOD PRESSURE: 86 MMHG | HEART RATE: 82 BPM

## 2023-11-25 RX ORDER — IBUPROFEN 600 MG/1
600 TABLET, FILM COATED ORAL EVERY 6 HOURS PRN
Qty: 30 TABLET | Refills: 0 | Status: SHIPPED | OUTPATIENT
Start: 2023-11-25 | End: 2024-01-24

## 2023-11-25 RX ORDER — CYCLOBENZAPRINE HCL 10 MG
10 TABLET ORAL 3 TIMES DAILY PRN
Qty: 20 TABLET | Refills: 0 | Status: SHIPPED | OUTPATIENT
Start: 2023-11-25 | End: 2023-12-01

## 2023-11-25 NOTE — ED PROVIDER NOTES
"  History     Chief Complaint   Patient presents with    Chest Pain     HPI  Scott Dobbs is a 38 year old female with a past medical history of anxiety (on selective serotonin reuptake inhibitor), HTN (on labetalol BID), obesity, childhood asthma who presents to the emergency department with a chief complaint of chest pain. Started Monday or Tuesday as a \"pressure.\" She went to Urgent Care, was concerned about pneumonia, CXR was negative. She was diagnosed with a sinus infection and was prescribed antibiotics for this, she thinks this is amoxicillin. Now the pain is worse, so she came here. The pain radiates to the left arm.  Described as sharp and shooting in nature. No associated shortness of breath, dizziness, headaches, or vision changes  She has been coughing. She does not think the pain is related to her cough. Cough is productive, but only of a small amount of sputum, beige in color. No sick contacts. Has never had chest pain like this before. No recent leg pain or swelling. No recent travel/long periods of immobilization. Not on hormonal birth control (has copper IUD in place).     Pt has a family history of HTN on her mother's side, and her paternal aunt has CAD (MI in her late 40s/early 50s).     I have reviewed the Medications, Allergies, Past Medical and Surgical History, and Social History in the [facility] system.    Past Medical History:   Diagnosis Date    Anxiety     Hypertension 2020    Obesity (BMI 30-39.9)     Uncomplicated asthma Childhood only     Past Surgical History:   Procedure Laterality Date     SECTION N/A 10/21/2020    Procedure:  SECTION;  Surgeon: Elisabeth Lima MD;  Location: [facility]    IR CHEST TUBE PLACEMENT NON-TUNNELED RIGHT  2021    PE TUBES      THORACIC SURGERY  2021 and 2022     No current facility-administered medications for this encounter.     Current Outpatient Medications   Medication    acetaminophen (TYLENOL) 325 MG " tablet    amoxicillin-clavulanate (AUGMENTIN) 875-125 MG tablet    ASPIRIN PO    cetirizine (ZYRTEC) 10 MG tablet    escitalopram (LEXAPRO) 5 MG tablet    fluticasone (FLONASE) 50 MCG/ACT nasal spray    ibuprofen (ADVIL/MOTRIN) 200 MG tablet    labetalol (NORMODYNE) 100 MG tablet    paragard intrauterine copper device    Prenatal Vit-Fe Fumarate-FA (PRENATAL MULTIVITAMIN W/IRON) 27-0.8 MG tablet     No Known Allergies  Past medical history, past surgical history, medications, and allergies were reviewed with the patient. Additional pertinent items: None    Social History     Socioeconomic History    Marital status:      Spouse name: Not on file    Number of children: Not on file    Years of education: Not on file    Highest education level: Not on file   Occupational History    Not on file   Tobacco Use    Smoking status: Former     Packs/day: 0.00     Years: 10.00     Additional pack years: 0.00     Total pack years: 0.00     Types: Hookah, Cigarettes     Quit date: 2/17/2020     Years since quitting: 3.7    Smokeless tobacco: Never   Vaping Use    Vaping Use: Never used   Substance and Sexual Activity    Alcohol use: Yes    Drug use: Never    Sexual activity: Yes     Partners: Male     Birth control/protection: I.U.D.   Other Topics Concern    Parent/sibling w/ CABG, MI or angioplasty before 65F 55M? No   Social History Narrative    Not on file     Social Determinants of Health     Financial Resource Strain: Not on file   Food Insecurity: Not on file   Transportation Needs: Not on file   Physical Activity: Not on file   Stress: No Stress Concern Present (3/10/2020)    [employer] - Occupational Stress Questionnaire     Feeling of Stress : Not at all   Social Connections: Not on file   Interpersonal Safety: Not At Risk (3/10/2020)    Humiliation, Afraid, Rape, and Kick questionnaire     Fear of Current or Ex-Partner: No     Emotionally Abused: No     Physically Abused: No      Sexually Abused: No   Housing Stability: Not on file     Social history was reviewed with the patient. Additional pertinent items: None    Review of Systems  A medically appropriate review of systems was performed with pertinent positives and negatives noted in the HPI, and all other systems negative.    Physical Exam   BP: (!) 166/91  Pulse: 80  Temp: 98  F (36.7  C)  Resp: 17  SpO2: 98 %      General: Well nourished, well developed, NAD  HEENT: EOMI, anicteric. NCAT, MMM  Neck: no jugular venous distension, supple, nl ROM  Cardiac: Regular rate and rhythm. No murmurs, rubs, or gallops. Normal S1, S2.  Intact peripheral pulses  Pulm: CTAB, no stridor, wheezes, rales, rhonchi  Abd: Soft, nontender, nondistended.  No masses palpated.    Skin: Warm and dry to the touch.  No rash  Extremities: No LE edema, no cyanosis, w/w/p, no posterior calf tenderness  Neuro: A&Ox3, no gross focal deficits    ED Course        Procedures                      EKG Interpretation:      Interpreted by Ayaka Perkins MD  Time reviewed: 2127  Symptoms at time of EKG: chest pain   Rhythm: normal sinus   Rate: normal, 65 bpm  Axis: normal  Ectopy: none  Conduction: normal  ST Segments/ T Waves: No ST-T wave changes  Q Waves: none  Comparison to prior: Unchanged from 1/3/2022    Clinical Impression: normal EKG            Labs Ordered and Resulted from Time of ED Arrival to Time of ED Departure   COMPREHENSIVE METABOLIC PANEL - Abnormal       Result Value    Sodium 142      Potassium 3.8      Carbon Dioxide (CO2) 22      Anion Gap 13      Urea Nitrogen 11.7      Creatinine 1.04 (*)     GFR Estimate 71      Calcium 9.2      Chloride 107      Glucose 118 (*)     Alkaline Phosphatase 89      AST 23      ALT 18      Protein Total 7.4      Albumin 4.1      Bilirubin Total 0.2     TROPONIN T, HIGH SENSITIVITY - Normal    Troponin T, High Sensitivity <6     INFLUENZA A/B, RSV, & SARS-COV2 PCR - Normal    Influenza A PCR Negative      Influenza  B PCR Negative      RSV PCR Negative      SARS CoV2 PCR Negative     HCG QUALITATIVE PREGNANCY - Normal    hCG Serum Qualitative Negative     CBC WITH PLATELETS AND DIFFERENTIAL    WBC Count 10.5      RBC Count 4.85      Hemoglobin 13.6      Hematocrit 41.5      MCV 86      MCH 28.0      MCHC 32.8      RDW 12.5      Platelet Count 354      % Neutrophils 54      % Lymphocytes 36      % Monocytes 6      % Eosinophils 3      % Basophils 1      % Immature Granulocytes 0      NRBCs per 100 WBC 0      Absolute Neutrophils 5.7      Absolute Lymphocytes 3.8      Absolute Monocytes 0.7      Absolute Eosinophils 0.3      Absolute Basophils 0.1      Absolute Immature Granulocytes 0.0      Absolute NRBCs 0.0              Results for orders placed or performed during the hospital encounter of 11/24/23 (from the past 24 hour(s))   EKG 12 lead   Result Value Ref Range    Systolic Blood Pressure  mmHg    Diastolic Blood Pressure  mmHg    Ventricular Rate 65 BPM    Atrial Rate 65 BPM    NV Interval 136 ms    QRS Duration 82 ms     ms    QTc 413 ms    P Axis 61 degrees    R AXIS 43 degrees    T Axis 47 degrees    Interpretation ECG       Sinus rhythm  Normal ECG  Unconfirmed report - interpretation of this ECG is computer generated - see medical record for final interpretation  Confirmed by - EMERGENCY ROOM, PHYSICIAN ([id number]),  YAS HUNG ([id number]) on 11/24/2023 9:43:29 PM     XR Chest 2 Views    Narrative    EXAM: XR CHEST 2 VIEWS  LOCATION: [facility]  DATE: 11/24/2023    INDICATION: chest pain  COMPARISON: 11/22/2023      Impression    IMPRESSION: Negative chest. 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   Symptomatic Influenza A/B, RSV, & SARS-CoV2 PCR (COVID-19) Nasopharyngeal    Specimen: Nasopharyngeal; Swab   Result Value Ref Range    Influenza A PCR Negative Negative    Influenza B PCR Negative Negative    RSV PCR Negative Negative    SARS CoV2 PCR  Negative Negative    Narrative    Testing was performed using the Xpert Xpress CoV2/Flu/RSV Assay on the [employer] Gene[employer]pert Instrument. This test should be ordered for the detection of SARS-CoV-2, influenza, and RSV viruses in individuals who meet clinical and/or epidemiological criteria. Test performance is unknown in asymptomatic patients. This test is for in vitro diagnostic use under the FDA EUA for laboratories certified under CLIA to perform high or moderate complexity testing. This test has not been FDA cleared or approved. A negative result does not rule out the presence of PCR inhibitors in the specimen or target RNA in concentration below the limit of detection for the assay. If only one viral target is positive but coinfection with multiple targets is suspected, the sample should be re-tested with another FDA cleared, approved, or authorized test, if coinfection would change clinical management. This test was validated by the [facility]. These laboratories are certified under the Clinical Laboratory Improvement Amendments of 1988 (CLIA-88) as qualified to perform high complexity laboratory testing.   CBC with platelets differential    Narrative    The following orders were created for panel order CBC with platelets differential.  Procedure                               Abnormality         Status                     ---------                               -----------         ------                     CBC with platelets and d...[[id number]]                      Final result                 Please view results for these tests on the individual orders.   Comprehensive metabolic panel   Result Value Ref Range    Sodium 142 135 - 145 mmol/L    Potassium 3.8 3.4 - 5.3 mmol/L    Carbon Dioxide (CO2) 22 22 - 29 mmol/L    Anion Gap 13 7 - 15 mmol/L    Urea Nitrogen 11.7 6.0 - 20.0 mg/dL    Creatinine 1.04 (H) 0.51 - 0.95 mg/dL    GFR Estimate 71 >60 mL/min/1.73m2    Calcium 9.2 8.6 - 10.0 mg/dL     Chloride 107 98 - 107 mmol/L    Glucose 118 (H) 70 - 99 mg/dL    Alkaline Phosphatase 89 40 - 150 U/L    AST 23 0 - 45 U/L    ALT 18 0 - 50 U/L    Protein Total 7.4 6.4 - 8.3 g/dL    Albumin 4.1 3.5 - 5.2 g/dL    Bilirubin Total 0.2 <=1.2 mg/dL   Troponin T, High Sensitivity   Result Value Ref Range    Troponin T, High Sensitivity <6 <=14 ng/L   HCG qualitative Blood   Result Value Ref Range    hCG Serum Qualitative Negative Negative   CBC with platelets and differential   Result Value Ref Range    WBC Count 10.5 4.0 - 11.0 10e3/uL    RBC Count 4.85 3.80 - 5.20 10e6/uL    Hemoglobin 13.6 11.7 - 15.7 g/dL    Hematocrit 41.5 35.0 - 47.0 %    MCV 86 78 - 100 fL    MCH 28.0 26.5 - 33.0 pg    MCHC 32.8 31.5 - 36.5 g/dL    RDW 12.5 10.0 - 15.0 %    Platelet Count 354 150 - 450 10e3/uL    % Neutrophils 54 %    % Lymphocytes 36 %    % Monocytes 6 %    % Eosinophils 3 %    % Basophils 1 %    % Immature Granulocytes 0 %    NRBCs per 100 WBC 0 <1 /100    Absolute Neutrophils 5.7 1.6 - 8.3 10e3/uL    Absolute Lymphocytes 3.8 0.8 - 5.3 10e3/uL    Absolute Monocytes 0.7 0.0 - 1.3 10e3/uL    Absolute Eosinophils 0.3 0.0 - 0.7 10e3/uL    Absolute Basophils 0.1 0.0 - 0.2 10e3/uL    Absolute Immature Granulocytes 0.0 <=0.4 10e3/uL    Absolute NRBCs 0.0 10e3/uL   Extra Tube    Narrative    The following orders were created for panel order Extra Tube.  Procedure                               Abnormality         Status                     ---------                               -----------         ------                     Extra Blue Top Tube[[id number]]                              Final result                 Please view results for these tests on the individual orders.   Extra Blue Top Tube   Result Value Ref Range    Hold Specimen JI        Labs, vital signs, and imaging studies were reviewed by me.    Medications - No data to display    Assessments & Plan (with Medical Decision Making)   Scott Dobbs is a 38 year old  female who presents with chest pain. Differential diagnosis includes ACS, musculoskeletal chest wall pain, CHF, pneumonia, bronchitis, viral syndrome, anxiety. ACS less likely d/t pt's age, but pt does have risk factor including family history and HTN. PE is less likely due to pt is PERC negative.  Labs, EKG, chest x-ray ordered to further evaluate the patient in the emergency department.  Patient Kleine pain medication in the emergency department.    Laboratory workup is remarkable for normal white blood cell count, normal hemoglobin, normal troponin, unremarkable BMP, influenza/RSV/COVID testing negative, pregnancy testing negative.    Chest x-ray shows no acute disease process.    EKG shows normal sinus rhythm    Critical care was not performed.     Medical Decision Making  The patient's presentation was of high complexity (an acute health issue posing potential threat to life or bodily function).    The patient's evaluation involved:  ordering and/or review of 3+ test(s) in this encounter (see separate area of note for details)  independent interpretation of testing performed by another health professional (chest x-ray)    The patient's management necessitated moderate risk (prescription drug management including medications given in the ED).    CXR images were personally reviewed by me, I agree with the radiology reads.    I have reviewed the nursing notes.    I have reviewed the findings, diagnosis, plan and need for follow up with the patient.    Patient to be discharged home. Advised to follow up with PCP within 1 week. To return to ER immediately with any new/worsening symptoms. Plan of care discussed with patient who expresses understanding and agrees with plan of care.    New Prescriptions    CYCLOBENZAPRINE (FLEXERIL) 10 MG TABLET    Take 1 tablet (10 mg) by mouth 3 times daily as needed    IBUPROFEN (ADVIL/MOTRIN) 600 MG TABLET    Take 1 tablet (600 mg) by mouth every 6 hours as needed       Final  diagnoses:   Acute chest pain       MIKALA PERKINS MD  11/24/2023   [facility] ED EMERGENCY DEPARTMENT       Mikala Perkins MD  11/25/23 0017

## 2023-11-25 NOTE — ED NOTES
Pt received discharge paperwork, pt had no further questions for the RN of MD, IV removed and vitals taken, pt ambulated out of the ED, pt stated they were able to get a ride to take them home

## 2023-11-25 NOTE — DISCHARGE INSTRUCTIONS
TODAY'S VISIT:  You were seen today for chest pain   -   - If you had any labs or imaging/radiology tests performed today, you should also discuss these tests with your usual provider.     FOLLOW-UP:  Please make an appointment to follow up with:  - Your Primary Care Provider. If you do not have a PCP, please call the Primary Care Center (phone: [phone] for an appointment    - Have your provider review the results from today's visit with you again to make sure no further follow-up or additional testing is needed based on those results.     RETURN TO THE EMERGENCY DEPARTMENT  Return to the Emergency Department at any time for any new or worsening symptoms or any concerns.

## 2023-11-25 NOTE — ED TRIAGE NOTES
Pt came into the ER due to chest pain that radiates to the left arm  Pt described the pain as sharp shooting pain  Pt denies shortness of breath, dizziness, headaches, and vision changes

## 2023-11-30 DIAGNOSIS — F43.23 SITUATIONAL MIXED ANXIETY AND DEPRESSIVE DISORDER: ICD-10-CM

## 2023-11-30 DIAGNOSIS — I10 ESSENTIAL HYPERTENSION: ICD-10-CM

## 2023-11-30 RX ORDER — LABETALOL 100 MG/1
TABLET, FILM COATED ORAL
Qty: 180 TABLET | Refills: 0 | OUTPATIENT
Start: 2023-11-30

## 2023-11-30 RX ORDER — ESCITALOPRAM OXALATE 5 MG/1
5 TABLET ORAL DAILY
Qty: 90 TABLET | Refills: 0 | OUTPATIENT
Start: 2023-11-30

## 2023-12-01 NOTE — TELEPHONE ENCOUNTER
I tried calling the patient. LVM for patient to call the clinic back regarding her prescription request. If patient calls back she needs to have an appointment before these medications will be refilled.    Debbi [facility]

## 2023-12-05 ENCOUNTER — VIRTUAL VISIT (OUTPATIENT)
Dept: PSYCHOLOGY | Facility: CLINIC | Age: 38
End: 2023-12-05
Payer: COMMERCIAL

## 2023-12-05 DIAGNOSIS — F40.10 SOCIAL ANXIETY DISORDER: ICD-10-CM

## 2023-12-05 DIAGNOSIS — F32.9 DEPRESSION, REACTIVE: ICD-10-CM

## 2023-12-05 DIAGNOSIS — F41.1 GAD (GENERALIZED ANXIETY DISORDER): Primary | ICD-10-CM

## 2023-12-05 PROCEDURE — 90834 PSYTX W PT 45 MINUTES: CPT | Mod: VID | Performed by: PSYCHOLOGIST

## 2023-12-05 ASSESSMENT — ANXIETY QUESTIONNAIRES
GAD7 TOTAL SCORE: 7
2. NOT BEING ABLE TO STOP OR CONTROL WORRYING: SEVERAL DAYS
3. WORRYING TOO MUCH ABOUT DIFFERENT THINGS: NOT AT ALL
6. BECOMING EASILY ANNOYED OR IRRITABLE: MORE THAN HALF THE DAYS
5. BEING SO RESTLESS THAT IT IS HARD TO SIT STILL: NOT AT ALL
4. TROUBLE RELAXING: MORE THAN HALF THE DAYS
7. FEELING AFRAID AS IF SOMETHING AWFUL MIGHT HAPPEN: NOT AT ALL
1. FEELING NERVOUS, ANXIOUS, OR ON EDGE: MORE THAN HALF THE DAYS
IF YOU CHECKED OFF ANY PROBLEMS ON THIS QUESTIONNAIRE, HOW DIFFICULT HAVE THESE PROBLEMS MADE IT FOR YOU TO DO YOUR WORK, TAKE CARE OF THINGS AT HOME, OR GET ALONG WITH OTHER PEOPLE: SOMEWHAT DIFFICULT
GAD7 TOTAL SCORE: 7

## 2023-12-05 ASSESSMENT — PATIENT HEALTH QUESTIONNAIRE - PHQ9
10. IF YOU CHECKED OFF ANY PROBLEMS, HOW DIFFICULT HAVE THESE PROBLEMS MADE IT FOR YOU TO DO YOUR WORK, TAKE CARE OF THINGS AT HOME, OR GET ALONG WITH OTHER PEOPLE: SOMEWHAT DIFFICULT
SUM OF ALL RESPONSES TO PHQ QUESTIONS 1-9: 5
SUM OF ALL RESPONSES TO PHQ QUESTIONS 1-9: 5

## 2023-12-05 NOTE — PROGRESS NOTES
"[facility]                                     Progress Note    Patient Name: Scott Dobbs  Date: 12/5/23         Service Type: Individual      Session Start Time: 3:01pm Session End Time: 3:53pm     Session Length: 52 minutes    Session #: 21 (transfer from Dawn Franks)    Attendees: Client attended alone    Service Modality:  Video Visit:      Provider verified identity through the following two step process.  Patient provided:  Patient photo    Telemedicine Visit: The patient's condition can be safely assessed and treated via synchronous audio and visual telemedicine encounter.      Reason for Telemedicine Visit: Services only offered telehealth    Originating Site (Patient Location): Patient's place of employment    Distant Site (Provider Location): Provider Remote Setting- Home Office    Consent:  The patient/guardian has verbally consented to: the potential risks and benefits of telemedicine (video visit) versus in person care; bill my insurance or make self-payment for services provided; and responsibility for payment of non-covered services.     Patient would like the video invitation sent by:  Send to e-mail at: [email or web address]    Mode of Communication:  Video Conference via well    As the provider I attest to compliance with applicable laws and regulations related to telemedicine.    DATA  Interactive Complexity: No  Crisis: No        Progress Since Last Session (Related to Symptoms / Goals / Homework):   Symptoms: No change : Stable.  \"Good.  Busy..\"    Homework: Completed in session      Episode of Care Goals: Satisfactory progress - ACTION (Actively working towards change); Intervened by reinforcing change plan / affirming steps taken     Current / Ongoing Stressors and Concerns:   Busy at work.  Still renovating their kitchen.      Notes on 12/5/23 Session:  \"Busy.\"  Working on kitchen and met with contractor.  Had friends come over to help.  Cl dealing with " "issues with the house (kitchen, heat, wiring).    Being triggered by my kid is the biggest thing now (4 yrs old) and reacting.  How would you like yourself to respond?  Take deep breathes when putting her to bed.  Also, take time out before putting her to bed to be calm going into it.      Identified early warning signs when cl is getting intense and upset.  \"I don't know.\"  Enc cl to be mindful of her thoughts and feelings.    Discussed skills to help with irritation with some of child's behavior.         Treatment Objective(s) Addressed in This Session:   Client talked about stress at home, and in particular, continuing to work on kitchen (progress has been made) and her child's frustrating behaviors.  Explored self-care, skills to possibly change child's behavior(s), and becoming mindful of feelings and thoughts when getting frustrated.     Intervention:   CBT: Reviewed and assigned homework; Pattern recognition; Psychoeducation; Socratic Questioning; Identified and recommended skills; Problem solved; Self-care; Reframing; Deep breathing - use deep breathing prior to engaging in bedtime with child.   Active listening, validation, and other rapport building skills.    Assessments completed prior to visit:  The following assessments were completed by patient for this visit:  PHQ9:       4/11/2023     7:34 AM 5/23/2023     2:56 PM 6/5/2023     2:48 PM 6/26/2023     1:57 PM 8/3/2023     7:58 AM 8/22/2023    12:24 PM 10/17/2023    12:43 PM   PHQ-9 SCORE   PHQ-9 Total Score MyChart 8 (Mild depression) 7 (Mild depression) 6 (Mild depression) 4 (Minimal depression) 9 (Mild depression) 9 (Mild depression) 5 (Mild depression)   PHQ-9 Total Score 8 7 6 4 9 9 5     GAD7:       3/2/2023     8:48 AM 3/28/2023     7:39 AM 4/11/2023     7:35 AM 5/23/2023     2:56 PM 6/26/2023     1:58 PM 7/27/2023    10:48 AM 8/22/2023    12:25 PM   BALA-7 SCORE   Total Score  9 (mild anxiety) 10 (moderate anxiety) 10 (moderate anxiety) 8 (mild " anxiety) 10 (moderate anxiety) 13 (moderate anxiety)   Total Score 10 9 10 10 8 10 13           ASSESSMENT: Current Emotional / Mental Status (status of significant symptoms):   Risk status (Self / Other harm or suicidal ideation)   Patient denies current fears or concerns for personal safety.   Patient denies current or recent suicidal ideation or behaviors.   Patient denies current or recent homicidal ideation or behaviors.   Patient denies current or recent self injurious behavior or ideation.   Patient denies other safety concerns.   Patient reports there has been no change in risk factors since their last session.     Patient reports there has been no change in protective factors since their last session.     Recommended that patient call 911 or go to the local ED should there be a change in any of these risk factors.     Appearance:   Appropriate    Eye Contact:   Fair    Psychomotor Behavior: Normal    Attitude:   Cooperative  Friendly Pleasant   Orientation:   All   Speech    Rate / Production: Normal/ Responsive Talkative    Volume:  Normal    Mood:    Normal   Affect:    Appropriate  Bright    Thought Content:  Clear    Thought Form:  Coherent  Logical    Insight:    Good  and Fair      Medication Review:   No current psychiatric medications prescribed     Medication Compliance:   NA     Changes in Health Issues:   None reported     Chemical Use Review:   Substance Use: Chemical use reviewed, no active concerns identified      Tobacco Use: Did not address    Diagnosis:  1. Generalized Anxiety Disorder    2. Unspecified Depressive Disorder    3. Social anxiety disorder          Collateral Reports Completed:   Not Applicable    PLAN: (Patient Tasks / Therapist Tasks / Other)  Client will use deep breathing and mindfulness to help manage being irritated by her child while putting her to bed.  She made a future appointment for January 3, 2023.        Mejia Gonzalez PsyD                                                          ______________________________________________________________________    Individual Treatment Plan    Patient's Name: Scott Dobbs  YOB: 1985    Date of Creation: 7/7/22 (cont'd from previous therapist)  Date Treatment Plan Last Reviewed/Revised: 7/7/22; 3/2/23; 8/3/23    DSM5 Diagnoses: 311 (F32.9) Unspecified Depressive Disorder  or 300.23 (F40.10) Social Anxiety Disorder  300.02 (F41.1) Generalized Anxiety Disorder  Psychosocial / Contextual Factors:  postpartum within past year, transition back to work in February, coping with pandemic  PROMIS (reviewed every 90 days): 22 (on 1/23/23)    Referral / Collaboration:  Referral to another professional/service is not indicated at this time..    Anticipated number of session for this episode of care: 24  Anticipation frequency of session: Every other week  Anticipated Duration of each session: 38-52 minutes  Treatment plan will be reviewed in 90 days or when goals have been changed.       MeasurableTreatment Goal(s) related to diagnosis / functional impairment(s)  Goal 1: Patient will reduce symptoms of anxiety as evidenced by decreased score on BALA 7.     I will know I've met my goal when I worry less in social/work interactions       Objective #A (Patient Action)                          Patient will identify three distraction and diversion activities and use those activities to decrease level of anxiety  .  Status: Renewed - Date: 8/3/23     Intervention(s)  [facility] will teach distress tolerance, mindfulness, and relaxation techniques.     Objective #B  Patient will use cognitive strategies identified in therapy to challenge anxious thoughts.  Status: Renewed - Date: 8/3/23  Intervention(s)  [facility] will assign homework to practice cognitive restructuring and defusion techniques.     Objective #C  Patient will use relaxation strategies 2-3 times per day to reduce the physical symptoms of anxiety.  Status: Renewed - Date: 8/3/23      Intervention(s)  [facility] will teach relaxation techniques.        Goal 2: Patient will reduce symptoms of depression as evidenced by decreased scores on PHQ9.    I will know I've met my goal when I feel like I can start and end tasks, find more motivation.       Objective #A (Patient Action)                          Status: Renewed - Date: 8/3/23     Patient will Increase interest, engagement, and pleasure in doing things.     Intervention(s)  [facility] will explore strategies to help increase motivation and interest.     Objective #B  Patient will Identify negative self-talk and behaviors: challenge core beliefs, myths, and actions.                  Status: Renewed - Date: 8/3/23      Intervention(s)  [facility] will continue to explore automatic unhelpful/unhealthy thoughts and beliefs, and begin to create new helpeful/helpful automatic thoughts and beliefs.        Patient has reviewed and agreed to the above plan.      Mejia Gonzalez PsyD  December 5, 2023

## 2023-12-11 ENCOUNTER — MYC REFILL (OUTPATIENT)
Dept: FAMILY MEDICINE | Facility: CLINIC | Age: 38
End: 2023-12-11
Payer: COMMERCIAL

## 2023-12-11 DIAGNOSIS — F43.23 SITUATIONAL MIXED ANXIETY AND DEPRESSIVE DISORDER: ICD-10-CM

## 2023-12-11 DIAGNOSIS — I10 ESSENTIAL HYPERTENSION: ICD-10-CM

## 2023-12-12 RX ORDER — LABETALOL 100 MG/1
TABLET, FILM COATED ORAL
Qty: 60 TABLET | Refills: 0 | Status: SHIPPED | OUTPATIENT
Start: 2023-12-12 | End: 2024-01-03

## 2023-12-12 RX ORDER — ESCITALOPRAM OXALATE 5 MG/1
5 TABLET ORAL DAILY
Qty: 30 TABLET | Refills: 0 | Status: SHIPPED | OUTPATIENT
Start: 2023-12-12 | End: 2024-01-03 | Stop reason: DRUGHIGH

## 2023-12-29 ENCOUNTER — ANCILLARY PROCEDURE (OUTPATIENT)
Dept: CT IMAGING | Facility: CLINIC | Age: 38
End: 2023-12-29
Attending: CLINICAL NURSE SPECIALIST
Payer: COMMERCIAL

## 2023-12-29 DIAGNOSIS — J86.9 EMPYEMA, RIGHT (H): ICD-10-CM

## 2023-12-29 PROCEDURE — 71250 CT THORAX DX C-: CPT | Mod: GC | Performed by: STUDENT IN AN ORGANIZED HEALTH CARE EDUCATION/TRAINING PROGRAM

## 2024-01-03 ENCOUNTER — OFFICE VISIT (OUTPATIENT)
Dept: FAMILY MEDICINE | Facility: CLINIC | Age: 39
End: 2024-01-03
Payer: COMMERCIAL

## 2024-01-03 ENCOUNTER — VIRTUAL VISIT (OUTPATIENT)
Dept: PSYCHOLOGY | Facility: CLINIC | Age: 39
End: 2024-01-03
Payer: COMMERCIAL

## 2024-01-03 VITALS
HEART RATE: 80 BPM | DIASTOLIC BLOOD PRESSURE: 84 MMHG | SYSTOLIC BLOOD PRESSURE: 126 MMHG | TEMPERATURE: 98.7 F | BODY MASS INDEX: 41.95 KG/M2 | WEIGHT: 293 LBS | HEIGHT: 70 IN | RESPIRATION RATE: 19 BRPM | OXYGEN SATURATION: 99 %

## 2024-01-03 DIAGNOSIS — I10 BENIGN ESSENTIAL HYPERTENSION: ICD-10-CM

## 2024-01-03 DIAGNOSIS — F32.9 DEPRESSION, REACTIVE: ICD-10-CM

## 2024-01-03 DIAGNOSIS — F40.10 SOCIAL ANXIETY DISORDER: ICD-10-CM

## 2024-01-03 DIAGNOSIS — F41.8 SITUATIONAL ANXIETY: ICD-10-CM

## 2024-01-03 DIAGNOSIS — Z12.4 CERVICAL CANCER SCREENING: Primary | ICD-10-CM

## 2024-01-03 DIAGNOSIS — L98.9 SKIN LESION: ICD-10-CM

## 2024-01-03 DIAGNOSIS — F41.1 GAD (GENERALIZED ANXIETY DISORDER): Primary | ICD-10-CM

## 2024-01-03 PROBLEM — J86.9 EMPYEMA (H): Status: RESOLVED | Noted: 2021-11-23 | Resolved: 2024-01-03

## 2024-01-03 PROBLEM — R07.9 CHEST PAIN, UNSPECIFIED TYPE: Status: RESOLVED | Noted: 2022-01-04 | Resolved: 2024-01-03

## 2024-01-03 PROBLEM — O92.79 INSUFFICIENT LACTATION: Status: RESOLVED | Noted: 2021-01-20 | Resolved: 2024-01-03

## 2024-01-03 PROBLEM — E66.3 OVERWEIGHT (BMI 25.0-29.9): Status: RESOLVED | Noted: 2017-08-30 | Resolved: 2024-01-03

## 2024-01-03 PROBLEM — R74.01 TRANSAMINITIS: Status: RESOLVED | Noted: 2021-11-23 | Resolved: 2024-01-03

## 2024-01-03 PROBLEM — J86.9 EMPYEMA, RIGHT (H): Status: RESOLVED | Noted: 2022-01-04 | Resolved: 2024-01-03

## 2024-01-03 PROBLEM — Z87.09: Status: ACTIVE | Noted: 2024-01-03

## 2024-01-03 PROBLEM — J18.9 MULTIFOCAL PNEUMONIA: Status: RESOLVED | Noted: 2021-11-23 | Resolved: 2024-01-03

## 2024-01-03 PROCEDURE — 90746 HEPB VACCINE 3 DOSE ADULT IM: CPT | Performed by: PHYSICIAN ASSISTANT

## 2024-01-03 PROCEDURE — 90471 IMMUNIZATION ADMIN: CPT | Performed by: PHYSICIAN ASSISTANT

## 2024-01-03 PROCEDURE — 87624 HPV HI-RISK TYP POOLED RSLT: CPT | Performed by: PHYSICIAN ASSISTANT

## 2024-01-03 PROCEDURE — 90834 PSYTX W PT 45 MINUTES: CPT | Mod: 95 | Performed by: PSYCHOLOGIST

## 2024-01-03 PROCEDURE — 96127 BRIEF EMOTIONAL/BEHAV ASSMT: CPT | Performed by: PHYSICIAN ASSISTANT

## 2024-01-03 PROCEDURE — G0145 SCR C/V CYTO,THINLAYER,RESCR: HCPCS | Performed by: PHYSICIAN ASSISTANT

## 2024-01-03 PROCEDURE — 99214 OFFICE O/P EST MOD 30 MIN: CPT | Mod: 25 | Performed by: PHYSICIAN ASSISTANT

## 2024-01-03 RX ORDER — LABETALOL 100 MG/1
TABLET, FILM COATED ORAL
Qty: 180 TABLET | Refills: 1 | Status: SHIPPED | OUTPATIENT
Start: 2024-01-03 | End: 2024-08-01

## 2024-01-03 RX ORDER — ESCITALOPRAM OXALATE 10 MG/1
10 TABLET ORAL DAILY
Qty: 30 TABLET | Refills: 1 | Status: SHIPPED | OUTPATIENT
Start: 2024-01-03 | End: 2024-02-01

## 2024-01-03 ASSESSMENT — ANXIETY QUESTIONNAIRES
5. BEING SO RESTLESS THAT IT IS HARD TO SIT STILL: NOT AT ALL
1. FEELING NERVOUS, ANXIOUS, OR ON EDGE: NEARLY EVERY DAY
GAD7 TOTAL SCORE: 13
2. NOT BEING ABLE TO STOP OR CONTROL WORRYING: MORE THAN HALF THE DAYS
GAD7 TOTAL SCORE: 13
6. BECOMING EASILY ANNOYED OR IRRITABLE: NEARLY EVERY DAY
IF YOU CHECKED OFF ANY PROBLEMS ON THIS QUESTIONNAIRE, HOW DIFFICULT HAVE THESE PROBLEMS MADE IT FOR YOU TO DO YOUR WORK, TAKE CARE OF THINGS AT HOME, OR GET ALONG WITH OTHER PEOPLE: SOMEWHAT DIFFICULT
7. FEELING AFRAID AS IF SOMETHING AWFUL MIGHT HAPPEN: SEVERAL DAYS
3. WORRYING TOO MUCH ABOUT DIFFERENT THINGS: MORE THAN HALF THE DAYS

## 2024-01-03 ASSESSMENT — PATIENT HEALTH QUESTIONNAIRE - PHQ9
5. POOR APPETITE OR OVEREATING: MORE THAN HALF THE DAYS
SUM OF ALL RESPONSES TO PHQ QUESTIONS 1-9: 7

## 2024-01-03 NOTE — PROGRESS NOTES
"[facility]                                     Progress Note    Patient Name: Scott Dobbs  Date: 1/3/24         Service Type: Individual      Session Start Time: 3:00pm Session End Time: 3:52pm     Session Length: 52 minutes    Session #: 22 (transfer from Dawn Franks)    Attendees: Client attended alone    Service Modality:  Video Visit:      Provider verified identity through the following two step process.  Patient provided:  Patient photo    Telemedicine Visit: The patient's condition can be safely assessed and treated via synchronous audio and visual telemedicine encounter.      Reason for Telemedicine Visit: Services only offered telehealth    Originating Site (Patient Location): Patient's home    Distant Site (Provider Location): Provider Remote Setting- Home Office    Consent:  The patient/guardian has verbally consented to: the potential risks and benefits of telemedicine (video visit) versus in person care; bill my insurance or make self-payment for services provided; and responsibility for payment of non-covered services.     Patient would like the video invitation sent by:  Send to e-mail at: [email or web address]    Mode of Communication:  Video Conference via [employer]    As the provider I attest to compliance with applicable laws and regulations related to telemedicine.    DATA  Interactive Complexity: No  Crisis: No        Progress Since Last Session (Related to Symptoms / Goals / Homework):   Symptoms: No change : Stable.  \"Burning the candle at both ends.\"    Homework: Completed in session      Episode of Care Goals: Satisfactory progress - ACTION (Actively working towards change); Intervened by reinforcing change plan / affirming steps taken Maintenance for depression goal.     Current / Ongoing Stressors and Concerns:   Busy at work.  Still renovating their kitchen.      Notes on 1/2/24 Session:  Cl at home due to no .  Big projects at work due.  Cl \"making " "sure I get the rest I need.\"    Cl working to complete the kitchen (\"They're working on it right now (counters).\"    Cl decided to take a day off \"for myself where I didn't have any responsibilities.\"  Talked about giving permission to not work on something (I.e. - kitchen).    During the holidays, the client took time to herself to nap and have quiet time.  \"I think stuff with Dotty (daughter) is going better, too.\"    Talked about how the client (and her ) manage frustrations by looking long-term vs short-term.    Client ran out of her meds \"for five days.  They were not fun.\"  Client's meds were \"upped to 10mg.\"    Cl uses a Bullet Journal, \"I'm thinking of tracking a place for my mood.\"  Enc cl to track her sleep as well.    Cl is using a [employer] with her daughter.    Updated Treatment Plan.  Depression is in the maintenance phase.  Scheduled next session.       Treatment Objective(s) Addressed in This Session:   Client talked about the holiday seasons, work, and home projects that were or are stressful; ways of coping.  Discussed and updated her Treatment Plan and skills used to manage anxiety and frustration in particular.     Intervention:   CBT: Reviewed and assigned homework; Pattern recognition; Psychoeducation; Socratic Questioning; Identified and recommended skills; Problem solved; Self-care; Use of Timeout & relaxation techniques to manage frustration (part w/her child at bedtime; Mood and sleep tracking; Updated Treatment Plan; Talked of [employer] with daughter.   Active listening, validation, and other rapport building skills; Scheduled next session together.    Assessments completed prior to visit:  The following assessments were completed by patient for this visit:  PHQ9:       6/5/2023     2:48 PM 6/26/2023     1:57 PM 8/3/2023     7:58 AM 8/22/2023    12:24 PM 10/17/2023    12:43 PM 12/5/2023     2:59 PM 1/3/2024     9:27 AM   PHQ-9 SCORE   PHQ-9 Total Score MyChart " 6 (Mild depression) 4 (Minimal depression) 9 (Mild depression) 9 (Mild depression) 5 (Mild depression) 5 (Mild depression)    PHQ-9 Total Score 6 4 9 9 5 5 7     GAD7:       4/11/2023     7:35 AM 5/23/2023     2:56 PM 6/26/2023     1:58 PM 7/27/2023    10:48 AM 8/22/2023    12:25 PM 12/5/2023     3:00 PM 1/3/2024     9:27 AM   BALA-7 SCORE   Total Score 10 (moderate anxiety) 10 (moderate anxiety) 8 (mild anxiety) 10 (moderate anxiety) 13 (moderate anxiety) 7 (mild anxiety)    Total Score 10 10 8 10 13 7 13           ASSESSMENT: Current Emotional / Mental Status (status of significant symptoms):   Risk status (Self / Other harm or suicidal ideation)   Patient denies current fears or concerns for personal safety.   Patient denies current or recent suicidal ideation or behaviors.   Patient denies current or recent homicidal ideation or behaviors.   Patient denies current or recent self injurious behavior or ideation.   Patient denies other safety concerns.   Patient reports there has been no change in risk factors since their last session.     Patient reports there has been no change in protective factors since their last session.     Recommended that patient call 911 or go to the local ED should there be a change in any of these risk factors.     Appearance:   Appropriate    Eye Contact:   Good    Psychomotor Behavior: Normal    Attitude:   Cooperative  Friendly Pleasant   Orientation:   All   Speech    Rate / Production: Normal/ Responsive Talkative    Volume:  Normal    Mood:    Normal   Affect:    Appropriate  Bright    Thought Content:  Clear    Thought Form:  Coherent  Logical    Insight:    Good      Medication Review:   Changes to psychiatric medications, see updated Medication List in EPIC.   Client's Lexapro was upped to 10mg daily.     Medication Compliance:   Yes     Changes in Health Issues:   None reported     Chemical Use Review:   Substance Use: Chemical use reviewed, no active concerns identified  "     Tobacco Use: Same.    Diagnosis:  1. Generalized Anxiety Disorder    2. Unspecified Depressive Disorder    3. Social anxiety disorder        Collateral Reports Completed:   Not Applicable    PLAN: (Patient Tasks / Therapist Tasks / Other)  Client will track mood and sleep on her Bullet Journal to help notice any patterns in mood changes.  She made a future appointment for January 23, 2023.        Mejia Trujillo Lisa, PsyD                                                         ______________________________________________________________________    Individual Treatment Plan    Patient's Name: Scott Dobbs  YOB: 1985    Date of Creation: 7/7/22 (cont'd from previous therapist)  Date Treatment Plan Last Reviewed/Revised: 1/3/24    DSM5 Diagnoses: 311 (F32.9) Unspecified Depressive Disorder  or 300.23 (F40.10) Social Anxiety Disorder  300.02 (F41.1) Generalized Anxiety Disorder  Psychosocial / Contextual Factors:  postpartum within past year, transition back to work in February, coping with pandemic  PROMIS (reviewed every 90 days): 28 (on 1/3/24)    Referral / Collaboration:  Referral to another professional/service is not indicated at this time..    Anticipated number of session for this episode of care: 24  Anticipation frequency of session: Every other week  Anticipated Duration of each session: 38-52 minutes  Treatment plan will be reviewed in 90 days or when goals have been changed.       MeasurableTreatment Goal(s) related to diagnosis / functional impairment(s)  Goal 1: Patient will reduce symptoms of anxiety as evidenced by decreased score on BALA 7.     I will know I've met my goal when I worry less in social/work interactions    \"I think I want to work on the frustration level when I start to get overwhelmed.\" - Stressed used Time Outs and relaxation techniques.     Objective #A (Patient Action)                          Patient will identify three distraction and diversion " "activities and use those activities to decrease level of anxiety  .  Status: Renewed - Date: 1/3/24     Intervention(s)  [facility] will teach distress tolerance, mindfulness, and relaxation techniques.     Objective #B  Patient will use cognitive strategies identified in therapy to challenge anxious thoughts.  Status: Renewed - Date: 1/3/24  Intervention(s)  [facility] will assign homework to practice cognitive restructuring and defusion techniques.     Objective #C  Patient will use relaxation strategies 2-3 times per day to reduce the physical symptoms of anxiety.  \"I would like to step back sooner, or catch it sooner, so I can just step back and use skills and stay engaged with what's frustrating me instead of stepping away (part w/her daughter).\"  Status: Renewed - Date: 1/3/24     Intervention(s)  [facility] will teach relaxation techniques.        Goal 2: Patient will reduce symptoms of depression as evidenced by decreased scores on PHQ9.    I will know I've met my goal when I feel like I can start and end tasks, find more motivation.       This goal was changed to be on a maintenance schedule.    Objective #A (Patient Action)                          Status: Renewed - Date: 1/3/24     Patient will Increase interest, engagement, and pleasure in doing things.     Intervention(s)  [facility] will explore strategies to help increase motivation and interest.     Objective #B  Patient will Identify negative self-talk and behaviors: challenge core beliefs, myths, and actions.                  Status: Renewed - Date: 1/3/24      Intervention(s)  [facility] will continue to explore automatic unhelpful/unhealthy thoughts and beliefs, and begin to create new helpeful/helpful automatic thoughts and beliefs.        Patient has reviewed and agreed to the above plan.      Mejia Gonzalez PsyD  January 3, 2024  "

## 2024-01-03 NOTE — PROGRESS NOTES
"  Assessment & Plan     Cervical cancer screening    - Pap Screen with HPV - recommended age 30 - 65 years    Situational anxiety  Increase lexapro and continue with therapy.  Follow-up in 3 weeks  - escitalopram (LEXAPRO) 10 MG tablet; Take 1 tablet (10 mg) by mouth daily    Benign essential hypertension  Stable.  No changes   - labetalol (NORMODYNE) 100 MG tablet; TAKE 1 TABLET BY MOUTH TWICE A DAY    Skin lesion  Remove next office visit                  BMI:   Estimated body mass index is 48.03 kg/m  as calculated from the following:    Height as of this encounter: 1.765 m (5' 9.5\").    Weight as of this encounter: 149.7 kg (330 lb).   Weight management plan: did not address         Anushka Canales PA-C  [facility] SAUL Chavez is a 38 year old, presenting for the following health issues:  Recheck Medication        1/3/2024     8:02 AM   Additional Questions   Roomed by Heide   Accompanied by self       History of Present Illness       Reason for visit:  Annual checkup    She eats 2-3 servings of fruits and vegetables daily.She consumes 0 sweetened beverage(s) daily.She exercises with enough effort to increase her heart rate 30 to 60 minutes per day.  She exercises with enough effort to increase her heart rate 5 days per week.   She is taking medications regularly.         Hypertension Follow-up    Do you check your blood pressure regularly outside of the clinic? No   Are you following a low salt diet? Yes  Are your blood pressures ever more than 140 on the top number (systolic) OR more   than 90 on the bottom number (diastolic), for example 140/90? Not checking   Sometimes gets light headed.  Happens a few times a month but not overly bothersome.      Depression and Anxiety Follow-Up  How are you doing with your depression since your last visit? No change  How are you doing with your anxiety since your last visit?  Worsened   Are you having other symptoms that might be " associated with depression or anxiety? No  Have you had a significant life event? OTHER: moved     Do you have any concerns with your use of alcohol or other drugs? No    Anxiety is high right now due to life situation.  Lexapro is helping.  Seeing a therapist every month or so.    Social History     Tobacco Use    Smoking status: Former     Packs/day: 0.00     Years: 10.00     Additional pack years: 0.00     Total pack years: 0.00     Types: Hookah, Cigarettes     Quit date: 2/17/2020     Years since quitting: 3.8    Smokeless tobacco: Never   Vaping Use    Vaping Use: Never used   Substance Use Topics    Alcohol use: Yes    Drug use: Never         10/17/2023    12:43 PM 12/5/2023     2:59 PM 1/3/2024     9:27 AM   PHQ   PHQ-9 Total Score 5 5 7   Q9: Thoughts of better off dead/self-harm past 2 weeks Not at all Not at all Not at all         8/22/2023    12:25 PM 12/5/2023     3:00 PM 1/3/2024     9:27 AM   BALA-7 SCORE   Total Score 13 (moderate anxiety) 7 (mild anxiety)    Total Score 13 7 13         1/3/2024     9:27 AM   Last PHQ-9   1.  Little interest or pleasure in doing things 1   2.  Feeling down, depressed, or hopeless 1   3.  Trouble falling or staying asleep, or sleeping too much 0   4.  Feeling tired or having little energy 3   5.  Poor appetite or overeating 1   6.  Feeling bad about yourself 0   7.  Trouble concentrating 1   8.  Moving slowly or restless 0   Q9: Thoughts of better off dead/self-harm past 2 weeks 0   PHQ-9 Total Score 7   Difficulty at work, home, or with people Somewhat difficult         1/3/2024     9:27 AM   BALA-7    1. Feeling nervous, anxious, or on edge 3   2. Not being able to stop or control worrying 2   3. Worrying too much about different things 2   4. Trouble relaxing 2   5. Being so restless that it is hard to sit still 0   6. Becoming easily annoyed or irritable 3   7. Feeling afraid, as if something awful might happen 1   BALA-7 Total Score 13   If you checked any problems,  "how difficult have they made it for you to do your work, take care of things at home, or get along with other people? Somewhat difficult       Has a spot on upper right arm that has changed colored and new in last few years.      Suicide Assessment Five-step Evaluation and Treatment (SAFE-T)          Review of Systems   As above      Objective    /84   Pulse 80   Temp 98.7  F (37.1  C) (Oral)   Resp 19   Ht 1.765 m (5' 9.5\")   Wt 149.7 kg (330 lb)   LMP 12/21/2023   SpO2 99%   BMI 48.03 kg/m    Body mass index is 48.03 kg/m .  Physical Exam  Constitutional:       General: She is not in acute distress.  Cardiovascular:      Rate and Rhythm: Normal rate and regular rhythm.   Pulmonary:      Effort: Pulmonary effort is normal.      Breath sounds: Normal breath sounds.   Genitourinary:     Vagina: Normal.      Cervix: Normal.      Comments: IUD strings visible   Skin:     Comments: Small dark papular lesion on right upper arm with slight indentation in center of lesion    Neurological:      Mental Status: She is alert.   Psychiatric:         Mood and Affect: Mood normal.                              "

## 2024-01-05 LAB
BKR LAB AP GYN ADEQUACY: NORMAL
BKR LAB AP GYN INTERPRETATION: NORMAL
BKR LAB AP HPV REFLEX: NORMAL
BKR LAB AP PREVIOUS ABNORMAL: NORMAL
PATH REPORT.COMMENTS IMP SPEC: NORMAL
PATH REPORT.COMMENTS IMP SPEC: NORMAL
PATH REPORT.RELEVANT HX SPEC: NORMAL

## 2024-01-08 LAB
HUMAN PAPILLOMA VIRUS 16 DNA: NEGATIVE
HUMAN PAPILLOMA VIRUS 18 DNA: NEGATIVE
HUMAN PAPILLOMA VIRUS FINAL DIAGNOSIS: NORMAL
HUMAN PAPILLOMA VIRUS OTHER HR: NEGATIVE

## 2024-01-23 ENCOUNTER — VIRTUAL VISIT (OUTPATIENT)
Dept: PSYCHOLOGY | Facility: CLINIC | Age: 39
End: 2024-01-23
Payer: COMMERCIAL

## 2024-01-23 DIAGNOSIS — F32.9 DEPRESSION, REACTIVE: ICD-10-CM

## 2024-01-23 DIAGNOSIS — F41.1 GAD (GENERALIZED ANXIETY DISORDER): Primary | ICD-10-CM

## 2024-01-23 DIAGNOSIS — F40.10 SOCIAL ANXIETY DISORDER: ICD-10-CM

## 2024-01-23 PROCEDURE — 90834 PSYTX W PT 45 MINUTES: CPT | Mod: 95 | Performed by: PSYCHOLOGIST

## 2024-01-23 ASSESSMENT — ANXIETY QUESTIONNAIRES
5. BEING SO RESTLESS THAT IT IS HARD TO SIT STILL: NOT AT ALL
3. WORRYING TOO MUCH ABOUT DIFFERENT THINGS: NEARLY EVERY DAY
8. IF YOU CHECKED OFF ANY PROBLEMS, HOW DIFFICULT HAVE THESE MADE IT FOR YOU TO DO YOUR WORK, TAKE CARE OF THINGS AT HOME, OR GET ALONG WITH OTHER PEOPLE?: SOMEWHAT DIFFICULT
6. BECOMING EASILY ANNOYED OR IRRITABLE: MORE THAN HALF THE DAYS
1. FEELING NERVOUS, ANXIOUS, OR ON EDGE: NEARLY EVERY DAY
GAD7 TOTAL SCORE: 12
7. FEELING AFRAID AS IF SOMETHING AWFUL MIGHT HAPPEN: NEARLY EVERY DAY
7. FEELING AFRAID AS IF SOMETHING AWFUL MIGHT HAPPEN: NEARLY EVERY DAY
2. NOT BEING ABLE TO STOP OR CONTROL WORRYING: SEVERAL DAYS
GAD7 TOTAL SCORE: 12
4. TROUBLE RELAXING: NOT AT ALL
IF YOU CHECKED OFF ANY PROBLEMS ON THIS QUESTIONNAIRE, HOW DIFFICULT HAVE THESE PROBLEMS MADE IT FOR YOU TO DO YOUR WORK, TAKE CARE OF THINGS AT HOME, OR GET ALONG WITH OTHER PEOPLE: SOMEWHAT DIFFICULT
GAD7 TOTAL SCORE: 12

## 2024-01-24 ENCOUNTER — OFFICE VISIT (OUTPATIENT)
Dept: FAMILY MEDICINE | Facility: CLINIC | Age: 39
End: 2024-01-24
Payer: COMMERCIAL

## 2024-01-24 VITALS
WEIGHT: 293 LBS | HEART RATE: 65 BPM | SYSTOLIC BLOOD PRESSURE: 124 MMHG | BODY MASS INDEX: 41.95 KG/M2 | RESPIRATION RATE: 19 BRPM | HEIGHT: 70 IN | DIASTOLIC BLOOD PRESSURE: 88 MMHG | OXYGEN SATURATION: 100 % | TEMPERATURE: 98.3 F

## 2024-01-24 DIAGNOSIS — D22.9 ATYPICAL MOLE: Primary | ICD-10-CM

## 2024-01-24 DIAGNOSIS — F41.8 SITUATIONAL ANXIETY: ICD-10-CM

## 2024-01-24 DIAGNOSIS — F43.23 SITUATIONAL MIXED ANXIETY AND DEPRESSIVE DISORDER: ICD-10-CM

## 2024-01-24 PROCEDURE — 11400 EXC TR-EXT B9+MARG 0.5 CM<: CPT | Mod: RT | Performed by: PHYSICIAN ASSISTANT

## 2024-01-24 PROCEDURE — 99213 OFFICE O/P EST LOW 20 MIN: CPT | Mod: 25 | Performed by: PHYSICIAN ASSISTANT

## 2024-01-24 PROCEDURE — 88305 TISSUE EXAM BY PATHOLOGIST: CPT | Performed by: PATHOLOGY

## 2024-01-24 ASSESSMENT — PATIENT HEALTH QUESTIONNAIRE - PHQ9: SUM OF ALL RESPONSES TO PHQ QUESTIONS 1-9: 1

## 2024-01-24 NOTE — PROGRESS NOTES
"  Assessment & Plan     Atypical mole  Removed using 5mm punch bx.  Area prepped with alcohol and 1% lido with epi.  One suture placed.  Pt to remove suture in 7 days.  Overall tolerated well.  Sent to pathology.  Pt is aware of risk of infection and will monitor for redness or drainage and she is aware of small risk of scar and potential for further removal if pathology concerning.    - Surgical Pathology Exam    Situational anxiety  Stable.  No changes    Situational mixed anxiety and depressive disorder  As above                  Subjective   Scott is a 38 year old, presenting for the following health issues:  RECHECK (Depression and anxiety )        1/24/2024     3:02 PM   Additional Questions   Roomed by Heide   Accompanied by self     History of Present Illness       Mental Health Follow-up:  Patient presents to follow-up on Depression & Anxiety.Patient's depression since last visit has been:  Better  The patient is not having other symptoms associated with depression.  Patient's anxiety since last visit has been:  No change  The patient is not having other symptoms associated with anxiety.  Any significant life events: No  Patient is feeling anxious or having panic attacks.  Patient has no concerns about alcohol or drug use.    She eats 2-3 servings of fruits and vegetables daily.She consumes 0 sweetened beverage(s) daily.She exercises with enough effort to increase her heart rate 20 to 29 minutes per day.  She exercises with enough effort to increase her heart rate 4 days per week.   She is taking medications regularly.     No side effects from increase on lexapro.  Overall feeling ok.  Anxiety still high.  Mostly due to life situation.      Also plans to have spot on right arm removed.  Has had for a few years.                      Objective    /88   Pulse 65   Temp 98.3  F (36.8  C) (Oral)   Resp 19   Ht 1.765 m (5' 9.5\")   Wt (!) 150.6 kg (332 lb)   LMP 01/18/2024 (Exact Date)   SpO2 100%   " BMI 48.32 kg/m    Body mass index is 48.32 kg/m .  Physical Exam  Constitutional:       General: She is not in acute distress.  Skin:     Comments: 5mm slightly raised brown mole with slightly indented center on right upper arm    Neurological:      Mental Status: She is alert.   Psychiatric:         Mood and Affect: Mood normal.                    Signed Electronically by: Anushka Canales PA-C

## 2024-01-26 LAB
PATH REPORT.COMMENTS IMP SPEC: NORMAL
PATH REPORT.COMMENTS IMP SPEC: NORMAL
PATH REPORT.FINAL DX SPEC: NORMAL
PATH REPORT.GROSS SPEC: NORMAL
PATH REPORT.MICROSCOPIC SPEC OTHER STN: NORMAL
PATH REPORT.RELEVANT HX SPEC: NORMAL
PHOTO IMAGE: NORMAL

## 2024-02-08 ENCOUNTER — E-VISIT (OUTPATIENT)
Dept: URGENT CARE | Facility: CLINIC | Age: 39
End: 2024-02-08
Payer: COMMERCIAL

## 2024-02-08 DIAGNOSIS — J01.90 ACUTE SINUSITIS, RECURRENCE NOT SPECIFIED, UNSPECIFIED LOCATION: Primary | ICD-10-CM

## 2024-02-08 PROCEDURE — 99421 OL DIG E/M SVC 5-10 MIN: CPT | Performed by: PREVENTIVE MEDICINE

## 2024-02-08 NOTE — PATIENT INSTRUCTIONS
Acute Sinusitis: Care Instructions  Overview     Acute sinusitis is an inflammation of the mucous membranes inside the nose and sinuses. Sinuses are the hollow spaces in your skull around the eyes and nose. Acute sinusitis often follows a cold. Acute sinusitis causes thick, discolored mucus that drains from the nose or down the back of the throat. It also can cause pain and pressure in your head and face along with a stuffy or blocked nose.  In most cases, sinusitis gets better on its own in 1 to 2 weeks. But some mild symptoms may last for several weeks. Sometimes antibiotics are needed if there is a bacterial infection.  Follow-up care is a key part of your treatment and safety. Be sure to make and go to all appointments, and call your doctor if you are having problems. It's also a good idea to know your test results and keep a list of the medicines you take.  How can you care for yourself at home?  Use saline (saltwater) nasal washes. This can help keep your nasal passages open and wash out mucus and allergens.  You can buy saline nose washes at a grocery store or drugstore. Follow the instructions on the package.  You can make your own at home. Add 1 teaspoon of non-iodized salt and 1 teaspoon of baking soda to 2 cups of distilled or boiled and cooled water. Fill a squeeze bottle or a nasal cleansing pot (such as a neti pot) with the nasal wash. Then put the tip into your nostril, and lean over the sink. With your mouth open, gently squirt the liquid. Repeat on the other side.  Try a decongestant nasal spray like oxymetazoline (Afrin). Do not use it for more than 3 days in a row. Using it for more than 3 days can make your congestion worse.  If needed, take an over-the-counter pain medicine, such as acetaminophen (Tylenol), ibuprofen (Advil, Motrin), or naproxen (Aleve). Read and follow all instructions on the label.  If the doctor prescribed antibiotics, take them as directed. Do not stop taking them just  "because you feel better. You need to take the full course of antibiotics.  Be careful when taking over-the-counter cold or flu medicines and Tylenol at the same time. Many of these medicines have acetaminophen, which is Tylenol. Read the labels to make sure that you are not taking more than the recommended dose. Too much acetaminophen (Tylenol) can be harmful.  Try a steroid nasal spray. It may help with your symptoms.  Breathe warm, moist air. You can use a steamy shower, a hot bath, or a sink filled with hot water. Avoid cold, dry air. Using a humidifier in your home may help. Follow the directions for cleaning the machine.  When should you call for help?   Call your doctor now or seek immediate medical care if:    You have new or worse swelling, redness, or pain in your face or around one or both of your eyes.     You have double vision or a change in your vision.     You have a high fever.     You have a severe headache and a stiff neck.     You have mental changes, such as feeling confused or much less alert.   Watch closely for changes in your health, and be sure to contact your doctor if:    You are not getting better as expected.   Where can you learn more?  Go to https://www.[employer].net/patiented  Enter I933 in the search box to learn more about \"Acute Sinusitis: Care Instructions.\"  Current as of: February 28, 2023               Content Version: 13.8    3612-5257 [employer].   Care instructions adapted under license by your healthcare professional. If you have questions about a medical condition or this instruction, always ask your healthcare professional. [employer] disclaims any warranty or liability for your use of this information.      You may want to try a nasal lavage (also known as nasal irrigation). You can find over-the-counter products, such as Neti-Pot, at retail locations or make your own at home. Instructions for homemade nasal lavage and more information on the " process are available online at http://www.aafp.org/afp/2009/1115/p1121.html.    Dear Scott Dobbs    After reviewing your responses, I've been able to diagnose you with Acute sinusitis, recurrence not specified, unspecified location.      Based on your responses and diagnosis, I have prescribed   Orders Placed This Encounter   Medications     amoxicillin-clavulanate (AUGMENTIN) 875-125 MG tablet     Sig: Take 1 tablet by mouth 2 times daily for 7 days     Dispense:  14 tablet     Refill:  0      to treat your symptoms. I have sent this to your pharmacy.?     It is also important to stay well hydrated, get lots of rest and take over-the-counter decongestants,?tylenol?or ibuprofen if you?are able to?take those medications per your primary care provider to help relieve discomfort.?     It is important that you take?all of?your prescribed medication even if your symptoms are improving after a few doses.? Taking?all of?your medicine helps prevent the symptoms from returning.?     If your symptoms worsen, you develop severe headache, vomiting, high fever (>102), or are not improving in 7 days, please contact your primary care provider for an appointment or visit any of our convenient Walk-in Care or Urgent Care Centers to be seen which can be found on our website?here.?     Thanks again for choosing?us?as your health care partner,?   ?  Braydon Mariscal MD, MD?   Thank you for choosing us for your care. I have placed an order for a prescription so that you can start treatment. View your full visit summary for details by clicking on the link below. Your pharmacist will able to address any questions you may have about the medication.     If you're not feeling better within 5-7 days, please schedule an appointment.  You can schedule an appointment right here in [facility], or call [phone]  If the visit is for the same symptoms as your eVisit, we'll refund the cost of your eVisit if seen within seven  days.

## 2024-02-16 ENCOUNTER — E-VISIT (OUTPATIENT)
Dept: FAMILY MEDICINE | Facility: CLINIC | Age: 39
End: 2024-02-16
Payer: COMMERCIAL

## 2024-02-16 DIAGNOSIS — G43.909 MIGRAINE WITHOUT STATUS MIGRAINOSUS, NOT INTRACTABLE, UNSPECIFIED MIGRAINE TYPE: Primary | ICD-10-CM

## 2024-02-16 PROCEDURE — 99422 OL DIG E/M SVC 11-20 MIN: CPT | Performed by: PHYSICIAN ASSISTANT

## 2024-02-19 ENCOUNTER — VIRTUAL VISIT (OUTPATIENT)
Dept: PSYCHOLOGY | Facility: CLINIC | Age: 39
End: 2024-02-19
Payer: COMMERCIAL

## 2024-02-19 DIAGNOSIS — F32.9 DEPRESSION, REACTIVE: ICD-10-CM

## 2024-02-19 DIAGNOSIS — F40.10 SOCIAL ANXIETY DISORDER: ICD-10-CM

## 2024-02-19 DIAGNOSIS — F41.1 GAD (GENERALIZED ANXIETY DISORDER): Primary | ICD-10-CM

## 2024-02-19 PROCEDURE — 90834 PSYTX W PT 45 MINUTES: CPT | Mod: 95 | Performed by: PSYCHOLOGIST

## 2024-02-19 RX ORDER — SUMATRIPTAN 50 MG/1
50 TABLET, FILM COATED ORAL
Qty: 20 TABLET | Refills: 3 | Status: SHIPPED | OUTPATIENT
Start: 2024-02-19

## 2024-02-19 ASSESSMENT — PATIENT HEALTH QUESTIONNAIRE - PHQ9
10. IF YOU CHECKED OFF ANY PROBLEMS, HOW DIFFICULT HAVE THESE PROBLEMS MADE IT FOR YOU TO DO YOUR WORK, TAKE CARE OF THINGS AT HOME, OR GET ALONG WITH OTHER PEOPLE: VERY DIFFICULT
SUM OF ALL RESPONSES TO PHQ QUESTIONS 1-9: 6
SUM OF ALL RESPONSES TO PHQ QUESTIONS 1-9: 6

## 2024-02-19 NOTE — PROGRESS NOTES
"[facility]                                     Progress Note    Patient Name: Scott Dobbs  Date: 2/19/24         Service Type: Individual      Session Start Time: 3:03pm Session End Time: 3:55pm     Session Length: 52 minutes    Session #: 24 (transfer from Dawn Franks)    Attendees: Client attended alone    Service Modality:  Video Visit:      Provider verified identity through the following two step process.  Patient provided:  Patient photo    Telemedicine Visit: The patient's condition can be safely assessed and treated via synchronous audio and visual telemedicine encounter.      Reason for Telemedicine Visit: Services only offered telehealth    Originating Site (Patient Location): Patient's home    Distant Site (Provider Location): Provider Remote Setting- Home Office    Consent:  The patient/guardian has verbally consented to: the potential risks and benefits of telemedicine (video visit) versus in person care; bill my insurance or make self-payment for services provided; and responsibility for payment of non-covered services.     Patient would like the video invitation sent by:  Send to e-mail at: [email or web address]    Mode of Communication:  Video Conference via [facility]    As the provider I attest to compliance with applicable laws and regulations related to telemedicine.    DATA  Interactive Complexity: No  Crisis: No        Progress Since Last Session (Related to Symptoms / Goals / Homework):   Symptoms: No change : Stable.  \"I'm doing ok.\"    Homework: Completed in session      Episode of Care Goals: Satisfactory progress - ACTION (Actively working towards change); Intervened by reinforcing change plan / affirming steps taken Maintenance for depression goal.     Current / Ongoing Stressors and Concerns:   Migraines.  Still renovating their kitchen.  Raising their \"toddler.\"      Notes on 2/19/24 Session:  Cl had three migraines in the past month (\"I had an aura.  That " "never happens to me\").  Went over triggers to client.  Maybe stress, maybe face lotion.  She did virtual visit with PCP.    \"Trying to manage the conflict with (her ).\"  \"I should try to fix my sleep schedule.  At the old house, I used to get to bed between 10 & 10:30, now it's more like 11:30.\" - explored/problem solved.  Sleep hygiene.    Cl watches Tik Ponce and Du[employer]ingo before bed.  \"I think it's about interrupting the phone in the first place.\"    Cl talked about a lack of energy for stretches. - Pattern recognition/circadian rhythm.         Treatment Objective(s) Addressed in This Session:   Discussed sleep hygiene and open communication with her : skills to manage each; Client's experience with migraines the past 2 weeks, possible triggers.     Intervention:   CBT: Reviewed and assigned homework; Pattern recognition; Psychoeducation; Socratic Questioning; Identified and recommended skills; Sleep hygiene.   Active listening, validation, and other rapport building skills; Identified possible triggers to migraines; Scheduled next session together.    Assessments completed prior to visit:  The following assessments were completed by patient for this visit:  PHQ9:       8/3/2023     7:58 AM 8/22/2023    12:24 PM 10/17/2023    12:43 PM 12/5/2023     2:59 PM 1/3/2024     9:27 AM 1/24/2024     3:05 PM 2/19/2024     2:57 PM   PHQ-9 SCORE   PHQ-9 Total Score MyChart 9 (Mild depression) 9 (Mild depression) 5 (Mild depression) 5 (Mild depression)   6 (Mild depression)   PHQ-9 Total Score 9 9 5 5 7 1 6     GAD7:       5/23/2023     2:56 PM 6/26/2023     1:58 PM 7/27/2023    10:48 AM 8/22/2023    12:25 PM 12/5/2023     3:00 PM 1/3/2024     9:27 AM 1/23/2024     2:59 PM   BALA-7 SCORE   Total Score 10 (moderate anxiety) 8 (mild anxiety) 10 (moderate anxiety) 13 (moderate anxiety) 7 (mild anxiety)  12 (moderate anxiety)   Total Score 10 8 10 13 7 13 12           ASSESSMENT: Current Emotional / Mental Status " (status of significant symptoms):   Risk status (Self / Other harm or suicidal ideation)   Patient denies current fears or concerns for personal safety.   Patient denies current or recent suicidal ideation or behaviors.   Patient denies current or recent homicidal ideation or behaviors.   Patient denies current or recent self injurious behavior or ideation.   Patient denies other safety concerns.   Patient reports there has been no change in risk factors since their last session.     Patient reports there has been no change in protective factors since their last session.     Recommended that patient call 911 or go to the local ED should there be a change in any of these risk factors.     Appearance:   Appropriate    Eye Contact:   Good    Psychomotor Behavior: Normal    Attitude:   Cooperative  Friendly Pleasant   Orientation:   All   Speech    Rate / Production: Normal/ Responsive Talkative    Volume:  Normal    Mood:    Normal   Affect:    Appropriate  Bright    Thought Content:  Clear    Thought Form:  Coherent  Logical    Insight:    Good      Medication Review:   Changes to psychiatric medications, see updated Medication List in EPIC.   Client's Lexapro was upped to 10mg daily.     Medication Compliance:   Yes     Changes in Health Issues:   None reported     Chemical Use Review:   Substance Use: Chemical use reviewed, no active concerns identified      Tobacco Use: Same.    Diagnosis:  1. Generalized Anxiety Disorder    2. Unspecified Depressive Disorder    3. Social anxiety disorder        Collateral Reports Completed:   Not Applicable    PLAN: (Patient Tasks / Therapist Tasks / Other)  Client will work to develop a sleep routine now that they have settled into their new home.  She made a future appointment for March 12, 2024.        Mejia Gonzalez PsyD                                                         ______________________________________________________________________    Individual Treatment  "Plan    Patient's Name: Scott Dobbs  YOB: 1985    Date of Creation: 7/7/22 (cont'd from previous therapist)  Date Treatment Plan Last Reviewed/Revised: 1/3/24    DSM5 Diagnoses: 311 (F32.9) Unspecified Depressive Disorder  or 300.23 (F40.10) Social Anxiety Disorder  300.02 (F41.1) Generalized Anxiety Disorder  Psychosocial / Contextual Factors:  postpartum within past year, transition back to work in February, coping with pandemic  PROMIS (reviewed every 90 days): 28 (on 1/3/24)    Referral / Collaboration:  Referral to another professional/service is not indicated at this time..    Anticipated number of session for this episode of care: 24  Anticipation frequency of session: Every other week  Anticipated Duration of each session: 38-52 minutes  Treatment plan will be reviewed in 90 days or when goals have been changed.       MeasurableTreatment Goal(s) related to diagnosis / functional impairment(s)  Goal 1: Patient will reduce symptoms of anxiety as evidenced by decreased score on BALA 7.     I will know I've met my goal when I worry less in social/work interactions    \"I think I want to work on the frustration level when I start to get overwhelmed.\" - Stressed used Time Outs and relaxation techniques.     Objective #A (Patient Action)                          Patient will identify three distraction and diversion activities and use those activities to decrease level of anxiety  .  Status: Renewed - Date: 1/3/24     Intervention(s)  [facility] will teach distress tolerance, mindfulness, and relaxation techniques.     Objective #B  Patient will use cognitive strategies identified in therapy to challenge anxious thoughts.  Status: Renewed - Date: 1/3/24  Intervention(s)  [facility] will assign homework to practice cognitive restructuring and defusion techniques.     Objective #C  Patient will use relaxation strategies 2-3 times per day to reduce the physical symptoms of anxiety.  \"I would like to step back " "sooner, or catch it sooner, so I can just step back and use skills and stay engaged with what's frustrating me instead of stepping away (part w/her daughter).\"  Status: Renewed - Date: 1/3/24     Intervention(s)  [facility] will teach relaxation techniques.        Goal 2: Patient will reduce symptoms of depression as evidenced by decreased scores on PHQ9.    I will know I've met my goal when I feel like I can start and end tasks, find more motivation.       This goal was changed to be on a maintenance schedule.    Objective #A (Patient Action)                          Status: Renewed - Date: 1/3/24     Patient will Increase interest, engagement, and pleasure in doing things.     Intervention(s)  [facility] will explore strategies to help increase motivation and interest.     Objective #B  Patient will Identify negative self-talk and behaviors: challenge core beliefs, myths, and actions.                  Status: Renewed - Date: 1/3/24      Intervention(s)  [facility] will continue to explore automatic unhelpful/unhealthy thoughts and beliefs, and begin to create new helpeful/helpful automatic thoughts and beliefs.        Patient has reviewed and agreed to the above plan.      Mejia Gonzalez PsyD  January 23, 2024  "

## 2024-03-12 ENCOUNTER — VIRTUAL VISIT (OUTPATIENT)
Dept: PSYCHOLOGY | Facility: CLINIC | Age: 39
End: 2024-03-12
Payer: COMMERCIAL

## 2024-03-12 DIAGNOSIS — F32.9 DEPRESSION, REACTIVE: ICD-10-CM

## 2024-03-12 DIAGNOSIS — F40.10 SOCIAL ANXIETY DISORDER: ICD-10-CM

## 2024-03-12 DIAGNOSIS — F41.1 GAD (GENERALIZED ANXIETY DISORDER): Primary | ICD-10-CM

## 2024-03-12 PROCEDURE — 90834 PSYTX W PT 45 MINUTES: CPT | Mod: 95 | Performed by: PSYCHOLOGIST

## 2024-03-12 NOTE — PROGRESS NOTES
"[facility]                                     Progress Note    Patient Name: Scott Dobbs  Date: 3/12/24         Service Type: Individual      Session Start Time: 3:00pm Session End Time: 3:52pm     Session Length: 52 minutes    Session #: 25 (transfer from Dawn Franks)    Attendees: Client attended alone    Service Modality:  Video Visit:      Provider verified identity through the following two step process.  Patient provided:  Patient photo    Telemedicine Visit: The patient's condition can be safely assessed and treated via synchronous audio and visual telemedicine encounter.      Reason for Telemedicine Visit: Services only offered telehealth    Originating Site (Patient Location): Patient's home    Distant Site (Provider Location): Provider Remote Setting- Home Office    Consent:  The patient/guardian has verbally consented to: the potential risks and benefits of telemedicine (video visit) versus in person care; bill my insurance or make self-payment for services provided; and responsibility for payment of non-covered services.     Patient would like the video invitation sent by:  Send to e-mail at: [email or web address]    Mode of Communication:  Video Conference via well    As the provider I attest to compliance with applicable laws and regulations related to telemedicine.    DATA  Interactive Complexity: No  Crisis: No        Progress Since Last Session (Related to Symptoms / Goals / Homework):   Symptoms: No change : Stable.  \"ok.\"    Homework: Completed in session      Episode of Care Goals: Satisfactory progress - ACTION (Actively working towards change); Intervened by reinforcing change plan / affirming steps taken Maintenance for depression goal.     Current / Ongoing Stressors and Concerns:   Client and her daughter have been sick the past week.      Notes on 3/12/24 Session:  \"I was getting some good sleep for awhile.  Then I got sick (so did her child).\"  Illness " "disrupted sleep.  Sleep hygiene:  \"Staying on track with my sort of schedule.  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  \"Work is going really well. Now, I have to maintain that.\"  Cl was praised at work for the job she has been doing.  Some stress at work.        Cl talked about finances: Rec finding  (cl would like one).           Treatment Objective(s) Addressed in This Session:   Cl talked about her and her child being sick - how that affected her; Discussed sleep hygiene; Benefits of open communication with her : client currently practicing; Client's work - stress; coping; Scheduled future appt.     Intervention:   CBT: Reviewed and assigned homework; Pattern recognition; Psychoeducation; Socratic Questioning; Client discussed skills she is using; Recommended skills; Reviewed improvements in sleep and Sleep hygiene.   Active listening, validation, and other rapport building skills; Scheduled next session together.    Assessments completed prior to visit:  The following assessments were completed by patient for this visit:  PHQ9:       8/3/2023     7:58 AM 8/22/2023    12:24 PM 10/17/2023    12:43 PM 12/5/2023     2:59 PM 1/3/2024     9:27 AM 1/24/2024     3:05 PM 2/19/2024     2:57 PM   PHQ-9 SCORE   PHQ-9 Total Score MyChart 9 (Mild depression) 9 (Mild depression) 5 (Mild depression) 5 (Mild depression)   6 (Mild depression)   PHQ-9 Total Score 9 9 5 5 7 1 6     GAD7:       5/23/2023     2:56 PM 6/26/2023     1:58 PM 7/27/2023    10:48 AM 8/22/2023    12:25 PM 12/5/2023     3:00 PM 1/3/2024     9:27 AM 1/23/2024     2:59 PM   BALA-7 SCORE   Total Score 10 (moderate anxiety) 8 (mild anxiety) 10 (moderate anxiety) 13 (moderate anxiety) 7 (mild anxiety)  12 (moderate anxiety)   Total Score 10 8 10 13 7 13 12           ASSESSMENT: Current Emotional / Mental Status (status of significant symptoms):   Risk status (Self " / Other harm or suicidal ideation)   Patient denies current fears or concerns for personal safety.   Patient denies current or recent suicidal ideation or behaviors.   Patient denies current or recent homicidal ideation or behaviors.   Patient denies current or recent self injurious behavior or ideation.   Patient denies other safety concerns.   Patient reports there has been no change in risk factors since their last session.     Patient reports there has been no change in protective factors since their last session.     Recommended that patient call 911 or go to the local ED should there be a change in any of these risk factors.     Appearance:   Appropriate    Eye Contact:   Good    Psychomotor Behavior: Normal    Attitude:   Cooperative  Friendly Pleasant   Orientation:   All   Speech    Rate / Production: Normal/ Responsive Talkative    Volume:  Normal    Mood:    Normal   Affect:    Appropriate  Bright    Thought Content:  Clear    Thought Form:  Coherent  Logical    Insight:    Good      Medication Review:   Changes to psychiatric medications, see updated Medication List in EPIC.   Client's Lexapro was upped to 10mg daily.     Medication Compliance:   Yes     Changes in Health Issues:   None reported     Chemical Use Review:   Substance Use: Chemical use reviewed, no active concerns identified      Tobacco Use: Same.    Diagnosis:  1. Generalized Anxiety Disorder    2. Unspecified Depressive Disorder    3. Social anxiety disorder        Collateral Reports Completed:   Not Applicable    PLAN: (Patient Tasks / Therapist Tasks / Other)  Client will continue being mindful of open communication between her and her .  She made a future appointment for April 9, 2024.        Mejia Gonzalez PsyD                                                         ______________________________________________________________________    Individual Treatment Plan    Patient's Name: Scott Dobbs  Date Of  "Birth: 1985    Date of Creation: 7/7/22 (cont'd from previous therapist)  Date Treatment Plan Last Reviewed/Revised: 1/3/24    DSM5 Diagnoses: 311 (F32.9) Unspecified Depressive Disorder  or 300.23 (F40.10) Social Anxiety Disorder  300.02 (F41.1) Generalized Anxiety Disorder  Psychosocial / Contextual Factors:  postpartum within past year, transition back to work in February, coping with pandemic  PROMIS (reviewed every 90 days): 28 (on 1/3/24)    Referral / Collaboration:  Referral to another professional/service is not indicated at this time..    Anticipated number of session for this episode of care: 24  Anticipation frequency of session: Every other week  Anticipated Duration of each session: 38-52 minutes  Treatment plan will be reviewed in 90 days or when goals have been changed.       MeasurableTreatment Goal(s) related to diagnosis / functional impairment(s)  Goal 1: Patient will reduce symptoms of anxiety as evidenced by decreased score on BALA 7.     I will know I've met my goal when I worry less in social/work interactions    \"I think I want to work on the frustration level when I start to get overwhelmed.\" - Stressed used Time Outs and relaxation techniques.     Objective #A (Patient Action)                          Patient will identify three distraction and diversion activities and use those activities to decrease level of anxiety  .  Status: Renewed - Date: 1/3/24     Intervention(s)  [facility] will teach distress tolerance, mindfulness, and relaxation techniques.     Objective #B  Patient will use cognitive strategies identified in therapy to challenge anxious thoughts.  Status: Renewed - Date: 1/3/24  Intervention(s)  [facility] will assign homework to practice cognitive restructuring and defusion techniques.     Objective #C  Patient will use relaxation strategies 2-3 times per day to reduce the physical symptoms of anxiety.  \"I would like to step back sooner, or catch it sooner, so I can just step back and " "use skills and stay engaged with what's frustrating me instead of stepping away (part w/her daughter).\"  Status: Renewed - Date: 1/3/24     Intervention(s)  [facility] will teach relaxation techniques.        Goal 2: Patient will reduce symptoms of depression as evidenced by decreased scores on PHQ9.    I will know I've met my goal when I feel like I can start and end tasks, find more motivation.       This goal was changed to be on a maintenance schedule.    Objective #A (Patient Action)                          Status: Renewed - Date: 1/3/24     Patient will Increase interest, engagement, and pleasure in doing things.     Intervention(s)  [facility] will explore strategies to help increase motivation and interest.     Objective #B  Patient will Identify negative self-talk and behaviors: challenge core beliefs, myths, and actions.                  Status: Renewed - Date: 1/3/24      Intervention(s)  C will continue to explore automatic unhelpful/unhealthy thoughts and beliefs, and begin to create new helpeful/helpful automatic thoughts and beliefs.        Patient has reviewed and agreed to the above plan.      Mejia Gonzalez PsyD  March 12, 2024  "

## 2024-04-08 ASSESSMENT — ANXIETY QUESTIONNAIRES
GAD7 TOTAL SCORE: 7
7. FEELING AFRAID AS IF SOMETHING AWFUL MIGHT HAPPEN: NOT AT ALL
GAD7 TOTAL SCORE: 7
8. IF YOU CHECKED OFF ANY PROBLEMS, HOW DIFFICULT HAVE THESE MADE IT FOR YOU TO DO YOUR WORK, TAKE CARE OF THINGS AT HOME, OR GET ALONG WITH OTHER PEOPLE?: SOMEWHAT DIFFICULT
4. TROUBLE RELAXING: SEVERAL DAYS
6. BECOMING EASILY ANNOYED OR IRRITABLE: MORE THAN HALF THE DAYS
1. FEELING NERVOUS, ANXIOUS, OR ON EDGE: MORE THAN HALF THE DAYS
2. NOT BEING ABLE TO STOP OR CONTROL WORRYING: SEVERAL DAYS
3. WORRYING TOO MUCH ABOUT DIFFERENT THINGS: SEVERAL DAYS
7. FEELING AFRAID AS IF SOMETHING AWFUL MIGHT HAPPEN: NOT AT ALL
IF YOU CHECKED OFF ANY PROBLEMS ON THIS QUESTIONNAIRE, HOW DIFFICULT HAVE THESE PROBLEMS MADE IT FOR YOU TO DO YOUR WORK, TAKE CARE OF THINGS AT HOME, OR GET ALONG WITH OTHER PEOPLE: SOMEWHAT DIFFICULT
GAD7 TOTAL SCORE: 7
5. BEING SO RESTLESS THAT IT IS HARD TO SIT STILL: NOT AT ALL

## 2024-04-09 ENCOUNTER — VIRTUAL VISIT (OUTPATIENT)
Dept: PSYCHOLOGY | Facility: CLINIC | Age: 39
End: 2024-04-09
Payer: COMMERCIAL

## 2024-04-09 DIAGNOSIS — F41.1 GAD (GENERALIZED ANXIETY DISORDER): Primary | ICD-10-CM

## 2024-04-09 DIAGNOSIS — F32.9 DEPRESSION, REACTIVE: ICD-10-CM

## 2024-04-09 DIAGNOSIS — F40.10 SOCIAL ANXIETY DISORDER: ICD-10-CM

## 2024-04-09 PROCEDURE — 90834 PSYTX W PT 45 MINUTES: CPT | Mod: 95 | Performed by: PSYCHOLOGIST

## 2024-04-09 NOTE — PROGRESS NOTES
"[facility]                                     Progress Note    Patient Name: Scott Dobbs  Date: 4/9/24         Service Type: Individual      Session Start Time: 3:00pm Session End Time: 3:52pm     Session Length: 52 minutes    Session #: 26 (transfer from Dawn Franks)    Attendees: Client attended alone    Service Modality:  Video Visit:      Provider verified identity through the following two step process.  Patient provided:  Patient photo    Telemedicine Visit: The patient's condition can be safely assessed and treated via synchronous audio and visual telemedicine encounter.      Reason for Telemedicine Visit: Services only offered telehealth    Originating Site (Patient Location): Patient's home    Distant Site (Provider Location): Provider Remote Setting- Home Office    Consent:  The patient/guardian has verbally consented to: the potential risks and benefits of telemedicine (video visit) versus in person care; bill my insurance or make self-payment for services provided; and responsibility for payment of non-covered services.     Patient would like the video invitation sent by:  Send to e-mail at: [email or web address]    Mode of Communication:  Video Conference via well    As the provider I attest to compliance with applicable laws and regulations related to telemedicine.    DATA  Interactive Complexity: No  Crisis: No        Progress Since Last Session (Related to Symptoms / Goals / Homework):   Symptoms: No change : Stable.  \"I'm doing ok.\"    Homework: Completed in session      Episode of Care Goals: Satisfactory progress - ACTION (Actively working towards change); Intervened by reinforcing change plan / affirming steps taken     Current / Ongoing Stressors and Concerns:   Client and her daughter have been sick the past week.      Notes on 4/9/24 Session:  Client is in a different room with her daughter and cats (work going on in the kitchen).  Cl looking forward to " "gardening this year.    Still socializing on weekend with \"D [employer] D.\"    Her mood has been \"ok.  I think the warmer weather will help.\"    Work is \"pretty good.  Boss is still happy with how things are going.\"  Cl feels she has a good plan to get work done (at work) and \"more bandwidth to put more thought into it (work).\"    Cl read her profile from an grace called \"Pattern.\" - \"It's kind of based on astrology and stuff.\"  What's important to remember from the profile: \"To check in with myself to make sure I'm still feeling the same about the job I'm doing.\"    Sleep hygiene: \"It's better, overall.  Trying to figure things out with the garden and vi, it's easy to get roped into it (staying up later).\"  Talked about setting firmer boundaries regarding going to bed 'on time'.    \"I'm still struggling to be motivated to do stuff at home.\"  Break large tasks into smaller reasonable jobs.  \"I think I struggle the most with things that don't have a home.\"         Treatment Objective(s) Addressed in This Session:   Client talked about getting work done on their home and how she looks forward to gardening this year; She spoke of troubles with motivation and working on projects (problem solved).  Also talked about, and identified barriers, to sleep hygiene.     Intervention:   CBT: Reviewed and assigned homework; Pattern recognition; Psychoeducation; Socratic Questioning;  Reviewed and recommended skills; Reviewed barriers and problems solved for better Sleep hygiene.   Active listening, validation, and other rapport building skills; Scheduled next session together.    Assessments completed prior to visit:  The following assessments were completed by patient for this visit:  PHQ9:       8/22/2023    12:24 PM 10/17/2023    12:43 PM 12/5/2023     2:59 PM 1/3/2024     9:27 AM 1/24/2024     3:05 PM 2/19/2024     2:57 PM 4/8/2024     3:41 PM   PHQ-9 SCORE   PHQ-9 Total Score MyChart 9 (Mild depression) 5 (Mild depression) 5 " (Mild depression)   6 (Mild depression) 5 (Mild depression)   PHQ-9 Total Score 9 5 5 7 1 6 5     GAD7:       6/26/2023     1:58 PM 7/27/2023    10:48 AM 8/22/2023    12:25 PM 12/5/2023     3:00 PM 1/3/2024     9:27 AM 1/23/2024     2:59 PM 4/8/2024     3:42 PM   BALA-7 SCORE   Total Score 8 (mild anxiety) 10 (moderate anxiety) 13 (moderate anxiety) 7 (mild anxiety)  12 (moderate anxiety) 7 (mild anxiety)   Total Score 8 10 13 7 13 12 7           ASSESSMENT: Current Emotional / Mental Status (status of significant symptoms):   Risk status (Self / Other harm or suicidal ideation)   Patient denies current fears or concerns for personal safety.   Patient denies current or recent suicidal ideation or behaviors.   Patient denies current or recent homicidal ideation or behaviors.   Patient denies current or recent self injurious behavior or ideation.   Patient denies other safety concerns.   Patient reports there has been no change in risk factors since their last session.     Patient reports there has been no change in protective factors since their last session.     Recommended that patient call 911 or go to the local ED should there be a change in any of these risk factors.     Appearance:   Appropriate    Eye Contact:   Good    Psychomotor Behavior: Normal    Attitude:   Cooperative  Friendly Pleasant   Orientation:   All   Speech    Rate / Production: Normal/ Responsive Talkative    Volume:  Normal    Mood:    Normal   Affect:    Appropriate  Bright    Thought Content:  Clear    Thought Form:  Coherent  Logical    Insight:    Good      Medication Review:   Changes to psychiatric medications, see updated Medication List in EPIC.   Client's Lexapro was upped to 10mg daily.     Medication Compliance:   Yes     Changes in Health Issues:   None reported     Chemical Use Review:   Substance Use: Chemical use reviewed, no active concerns identified      Tobacco Use: Same.    Diagnosis:  1. Generalized Anxiety Disorder    2.  "Unspecified Depressive Disorder    3. Social anxiety disorder        Collateral Reports Completed:   Not Applicable    PLAN: (Patient Tasks / Therapist Tasks / Other)  Client will continue being mindful of open communication between her and her .  She will work on getting to bed on time for better sleep hygiene.  She will be mindful of breaking larger tasks/jobs down to smaller, more manageable tasks (around the house).  She made a future appointment for May 7, 2024.        Mejia Gonzalez, PsyD                                                         ______________________________________________________________________    Individual Treatment Plan    Patient's Name: Scott Dobbs  YOB: 1985    Date of Creation: 7/7/22 (cont'd from previous therapist)  Date Treatment Plan Last Reviewed/Revised: 1/3/24    DSM5 Diagnoses: 311 (F32.9) Unspecified Depressive Disorder  or 300.23 (F40.10) Social Anxiety Disorder  300.02 (F41.1) Generalized Anxiety Disorder  Psychosocial / Contextual Factors:  postpartum within past year, transition back to work in February, coping with pandemic  PROMIS (reviewed every 90 days): 28 (on 1/3/24)    Referral / Collaboration:  Referral to another professional/service is not indicated at this time..    Anticipated number of session for this episode of care: 24  Anticipation frequency of session: Every other week  Anticipated Duration of each session: 38-52 minutes  Treatment plan will be reviewed in 90 days or when goals have been changed.       MeasurableTreatment Goal(s) related to diagnosis / functional impairment(s)  Goal 1: Patient will reduce symptoms of anxiety as evidenced by decreased score on BALA 7.     I will know I've met my goal when I worry less in social/work interactions    \"I think I want to work on the frustration level when I start to get overwhelmed.\" - Stressed used Time Outs and relaxation techniques.     Objective #A (Patient Action)        " "                  Patient will identify three distraction and diversion activities and use those activities to decrease level of anxiety  .  Status: Renewed - Date: 1/3/24     Intervention(s)  [facility] will teach distress tolerance, mindfulness, and relaxation techniques.     Objective #B  Patient will use cognitive strategies identified in therapy to challenge anxious thoughts.  Status: Renewed - Date: 1/3/24  Intervention(s)  [facility] will assign homework to practice cognitive restructuring and defusion techniques.     Objective #C  Patient will use relaxation strategies 2-3 times per day to reduce the physical symptoms of anxiety.  \"I would like to step back sooner, or catch it sooner, so I can just step back and use skills and stay engaged with what's frustrating me instead of stepping away (part w/her daughter).\"  Status: Renewed - Date: 1/3/24     Intervention(s)  [facility] will teach relaxation techniques.        Goal 2: Patient will reduce symptoms of depression as evidenced by decreased scores on PHQ9.    I will know I've met my goal when I feel like I can start and end tasks, find more motivation.       This goal was changed to be on a maintenance schedule.    Objective #A (Patient Action)                          Status: Renewed - Date: 1/3/24     Patient will Increase interest, engagement, and pleasure in doing things.     Intervention(s)  [facility] will explore strategies to help increase motivation and interest.     Objective #B  Patient will Identify negative self-talk and behaviors: challenge core beliefs, myths, and actions.                  Status: Renewed - Date: 1/3/24      Intervention(s)  [facility] will continue to explore automatic unhelpful/unhealthy thoughts and beliefs, and begin to create new helpeful/helpful automatic thoughts and beliefs.        Patient has reviewed and agreed to the above plan.      Mejia Gonzalez PsyD  April 9, 2024  "

## 2024-05-07 ENCOUNTER — VIRTUAL VISIT (OUTPATIENT)
Dept: PSYCHOLOGY | Facility: CLINIC | Age: 39
End: 2024-05-07
Payer: COMMERCIAL

## 2024-05-07 DIAGNOSIS — F32.9 DEPRESSION, REACTIVE: ICD-10-CM

## 2024-05-07 DIAGNOSIS — F41.1 GAD (GENERALIZED ANXIETY DISORDER): Primary | ICD-10-CM

## 2024-05-07 DIAGNOSIS — F40.10 SOCIAL ANXIETY DISORDER: ICD-10-CM

## 2024-05-07 PROCEDURE — 90834 PSYTX W PT 45 MINUTES: CPT | Mod: 95 | Performed by: PSYCHOLOGIST

## 2024-05-07 NOTE — PROGRESS NOTES
"[facility] Counseling                                     Progress Note    Patient Name: Scott Dobbs  Date: 5/7/24         Service Type: Individual      Session Start Time: 2:04pm Session End Time: 2:54pm     Session Length: 50 minutes    Session #: 27 (transfer from Dawn Franks)    Attendees: Client attended alone    Service Modality:  Video Visit:      Provider verified identity through the following two step process.  Patient provided:  Patient photo    Telemedicine Visit: The patient's condition can be safely assessed and treated via synchronous audio and visual telemedicine encounter.      Reason for Telemedicine Visit: Services only offered telehealth    Originating Site (Patient Location): Patient's home    Distant Site (Provider Location): Provider Remote Setting- Home Office    Consent:  The patient/guardian has verbally consented to: the potential risks and benefits of telemedicine (video visit) versus in person care; bill my insurance or make self-payment for services provided; and responsibility for payment of non-covered services.     Patient would like the video invitation sent by:  Send to e-mail at: [email or web address]    Mode of Communication:  Video Conference via well    As the provider I attest to compliance with applicable laws and regulations related to telemedicine.    DATA  Interactive Complexity: No  Crisis: No        Progress Since Last Session (Related to Symptoms / Goals / Homework):   Symptoms: No change : Stable.  \"I'm doing ok.\"    Homework: Completed in session      Episode of Care Goals: Satisfactory progress - ACTION (Actively working towards change); Intervened by reinforcing change plan / affirming steps taken     Current / Ongoing Stressors and Concerns:   Client and her daughter have been sick the past week.      Notes on 5/7/24 Session:  Cl has been very busy at work, \"I'm tired.\"  She is working on training videos.  Cl spent time with immediate family " "on Sunday and relatives on Saturday.    Client has spent time outside with  and daughter, doing yard work, and working in the garden (w/daughter).  \"We're all in a better mood.\"       Treatment Objective(s) Addressed in This Session:   Client talked how busy work has been, \"I'm tired.\"  Client talked about how the change of season has helped her (family's mood).  Discussed taking advantage of getting outdoors and gardening (with daughter at times) which client enjoys.  Client talked about enjoyable upcoming events and being Mindful of positive events.     Intervention:   CBT: Assigned homework; Pattern recognition (seasonal); Psychoeducation; Socratic Questioning;  Reviewed and recommended skills.   Active listening, validation, and other rapport building skills; Reminded client to be Mindful (of positive events); Scheduled next session together.    Assessments completed prior to visit:  The following assessments were completed by patient for this visit:  PHQ9:       8/22/2023    12:24 PM 10/17/2023    12:43 PM 12/5/2023     2:59 PM 1/3/2024     9:27 AM 1/24/2024     3:05 PM 2/19/2024     2:57 PM 4/8/2024     3:41 PM   PHQ-9 SCORE   PHQ-9 Total Score MyChart 9 (Mild depression) 5 (Mild depression) 5 (Mild depression)   6 (Mild depression) 5 (Mild depression)   PHQ-9 Total Score 9 5 5 7 1 6 5     GAD7:       6/26/2023     1:58 PM 7/27/2023    10:48 AM 8/22/2023    12:25 PM 12/5/2023     3:00 PM 1/3/2024     9:27 AM 1/23/2024     2:59 PM 4/8/2024     3:42 PM   BALA-7 SCORE   Total Score 8 (mild anxiety) 10 (moderate anxiety) 13 (moderate anxiety) 7 (mild anxiety)  12 (moderate anxiety) 7 (mild anxiety)   Total Score 8 10 13 7 13 12 7           ASSESSMENT: Current Emotional / Mental Status (status of significant symptoms):   Risk status (Self / Other harm or suicidal ideation)   Patient denies current fears or concerns for personal safety.   Patient denies current or recent suicidal ideation or " behaviors.   Patient denies current or recent homicidal ideation or behaviors.   Patient denies current or recent self injurious behavior or ideation.   Patient denies other safety concerns.   Patient reports there has been no change in risk factors since their last session.     Patient reports there has been no change in protective factors since their last session.     Recommended that patient call 911 or go to the local ED should there be a change in any of these risk factors.     Appearance:   Appropriate    Eye Contact:   Good    Psychomotor Behavior: Normal    Attitude:   Cooperative  Friendly Pleasant   Orientation:   All   Speech    Rate / Production: Normal/ Responsive Talkative    Volume:  Normal    Mood:    Normal   Affect:    Appropriate  Bright    Thought Content:  Clear    Thought Form:  Coherent  Logical    Insight:    Good      Medication Review:   No changes to current psychiatric medication(s)  Client's Lexapro was upped to 10mg daily.     Medication Compliance:   Yes     Changes in Health Issues:   None reported     Chemical Use Review:   Substance Use: Chemical use reviewed, no active concerns identified      Tobacco Use: Same.    Diagnosis:  1. Generalized Anxiety Disorder    2. Unspecified Depressive Disorder    3. Social anxiety disorder        Collateral Reports Completed:   Not Applicable    PLAN: (Patient Tasks / Therapist Tasks / Other)  Client will mindful of enjoying being outdoors and gardening, particularly with her daughter.  She will also be mindful of enjoying this Friday's event.  She made a future appointment for May 7, 2024.        Mejia Gonzalez PsyD                                              _____________________________________________________________________    Individual Treatment Plan    Patient's Name: Scott Dobbs  YOB: 1985    Date of Creation: 7/7/22 (cont'd from previous therapist)  Date Treatment Plan Last Reviewed/Revised: 1/3/24    DSM5  "Diagnoses: 311 (F32.9) Unspecified Depressive Disorder  or 300.23 (F40.10) Social Anxiety Disorder  300.02 (F41.1) Generalized Anxiety Disorder  Psychosocial / Contextual Factors:  postpartum within past year, transition back to work in February, coping with pandemic  PROMIS (reviewed every 90 days): 28 (on 1/3/24)    Referral / Collaboration:  Referral to another professional/service is not indicated at this time..    Anticipated number of session for this episode of care: 24  Anticipation frequency of session: Every other week  Anticipated Duration of each session: 38-52 minutes  Treatment plan will be reviewed in 90 days or when goals have been changed.       MeasurableTreatment Goal(s) related to diagnosis / functional impairment(s)  Goal 1: Patient will reduce symptoms of anxiety as evidenced by decreased score on BALA 7.     I will know I've met my goal when I worry less in social/work interactions    \"I think I want to work on the frustration level when I start to get overwhelmed.\" - Stressed used Time Outs and relaxation techniques.     Objective #A (Patient Action)                          Patient will identify three distraction and diversion activities and use those activities to decrease level of anxiety  .  Status: Renewed - Date: 1/3/24     Intervention(s)  [facility] will teach distress tolerance, mindfulness, and relaxation techniques.     Objective #B  Patient will use cognitive strategies identified in therapy to challenge anxious thoughts.  Status: Renewed - Date: 1/3/24  Intervention(s)  [facility] will assign homework to practice cognitive restructuring and defusion techniques.     Objective #C  Patient will use relaxation strategies 2-3 times per day to reduce the physical symptoms of anxiety.  \"I would like to step back sooner, or catch it sooner, so I can just step back and use skills and stay engaged with what's frustrating me instead of stepping away (part w/her daughter).\"  Status: Renewed - Date: " 1/3/24     Intervention(s)  [facility] will teach relaxation techniques.        Goal 2: Patient will reduce symptoms of depression as evidenced by decreased scores on PHQ9.    I will know I've met my goal when I feel like I can start and end tasks, find more motivation.       This goal was changed to be on a maintenance schedule.    Objective #A (Patient Action)                          Status: Renewed - Date: 1/3/24     Patient will Increase interest, engagement, and pleasure in doing things.     Intervention(s)  [facility] will explore strategies to help increase motivation and interest.     Objective #B  Patient will Identify negative self-talk and behaviors: challenge core beliefs, myths, and actions.                  Status: Renewed - Date: 1/3/24      Intervention(s)  [facility] will continue to explore automatic unhelpful/unhealthy thoughts and beliefs, and begin to create new helpeful/helpful automatic thoughts and beliefs.        Patient has reviewed and agreed to the above plan.      Mejia Gonzalez PsyD  May 7, 2024

## 2024-06-04 ENCOUNTER — VIRTUAL VISIT (OUTPATIENT)
Dept: PSYCHOLOGY | Facility: CLINIC | Age: 39
End: 2024-06-04
Payer: COMMERCIAL

## 2024-06-04 DIAGNOSIS — F41.1 GAD (GENERALIZED ANXIETY DISORDER): Primary | ICD-10-CM

## 2024-06-04 DIAGNOSIS — F32.9 DEPRESSION, REACTIVE: ICD-10-CM

## 2024-06-04 DIAGNOSIS — F40.10 SOCIAL ANXIETY DISORDER: ICD-10-CM

## 2024-06-04 PROCEDURE — 90834 PSYTX W PT 45 MINUTES: CPT | Mod: 95 | Performed by: PSYCHOLOGIST

## 2024-06-04 NOTE — PROGRESS NOTES
"[facility] Counseling                                     Progress Note    Patient Name: Scott Dobbs  Date: 6/4/24         Service Type: Individual      Session Start Time: 3:00pm Session End Time: 3:52pm     Session Length: 52 minutes    Session #: 28 (transfer from Dawn Franks)    Attendees: Client attended alone    Service Modality:  Video Visit:      Provider verified identity through the following two step process.  Patient provided:  Patient photo    Telemedicine Visit: The patient's condition can be safely assessed and treated via synchronous audio and visual telemedicine encounter.      Reason for Telemedicine Visit: Services only offered telehealth    Originating Site (Patient Location): Patient's home    Distant Site (Provider Location): Provider Remote Setting- Home Office    Consent:  The patient/guardian has verbally consented to: the potential risks and benefits of telemedicine (video visit) versus in person care; bill my insurance or make self-payment for services provided; and responsibility for payment of non-covered services.     Patient would like the video invitation sent by:  Send to e-mail at: [email or web address]    Mode of Communication:  Video Conference via [facility]    As the provider I attest to compliance with applicable laws and regulations related to telemedicine.    DATA  Interactive Complexity: No  Crisis: No        Progress Since Last Session (Related to Symptoms / Goals / Homework):   Symptoms: No change : Stable.  \"I'm tired.\"    Homework: Completed in session      Episode of Care Goals: Satisfactory progress - ACTION (Actively working towards change); Intervened by reinforcing change plan / affirming steps taken     Current / Ongoing Stressors and Concerns:   Client and her daughter have been sick the past week.      Notes on 6/4/24 Session:  Cl tired, not sleeping well.    The client talked about the projects that have been getting done around the house " "(contractors and self).  Cl was \"looking at the projects that have to get done that I can't do with Dotty (daughter).\"  \"My mom took the Dotty over the last weekend.\"    \"Now I've got to clean all the doom piles that are in the livingroom.\"    Cl talked about her birthday last week.    \"One thing I've been working on is getting to bed a little early.\"  \"It's been mixed success.  I've gotten to bed early a couple times.\"    Talked about sleep hygiene.  Reviewed Treatment Plan.  \"I feel less anxious now that I can go outside and work outside.  Then I feel I get more done inside as well.\"    Uses her \"good review from work to help me feel better.\"  Client provided examples of dealing with what used to be frustrating events with her daughter (getting ready for bed).  She makes it more fun for her daughter (and herself).       Treatment Objective(s) Addressed in This Session:   Client talked projects they have been competing at home and also having done.  Client talked about using a review at work to help manage anxiety at work.  Client reviewed her Treatment Plan; Identified skills used to help with frustrations (growth toward goals); Identified areas to improve her sleep hygiene.     Intervention:   CBT: Assigned homework; Pattern recognition; Psychoeducation; Socratic Questioning;  Reviewed and recommended skills; Client identified skills used for work anxiety and frustration with daughter.   Active listening, validation, and other rapport building skills; Scheduled next session together.    Assessments completed prior to visit:  The following assessments were completed by patient for this visit:  PHQ9:       10/17/2023    12:43 PM 12/5/2023     2:59 PM 1/3/2024     9:27 AM 1/24/2024     3:05 PM 2/19/2024     2:57 PM 4/8/2024     3:41 PM 6/4/2024     3:00 PM   PHQ-9 SCORE   PHQ-9 Total Score MyChart 5 (Mild depression) 5 (Mild depression)   6 (Mild depression) 5 (Mild depression) 4 (Minimal depression)   PHQ-9 Total " Score 5 5 7 1 6 5 4     GAD7:       6/26/2023     1:58 PM 7/27/2023    10:48 AM 8/22/2023    12:25 PM 12/5/2023     3:00 PM 1/3/2024     9:27 AM 1/23/2024     2:59 PM 4/8/2024     3:42 PM   BALA-7 SCORE   Total Score 8 (mild anxiety) 10 (moderate anxiety) 13 (moderate anxiety) 7 (mild anxiety)  12 (moderate anxiety) 7 (mild anxiety)   Total Score 8 10 13 7 13 12 7           ASSESSMENT: Current Emotional / Mental Status (status of significant symptoms):   Risk status (Self / Other harm or suicidal ideation)   Patient denies current fears or concerns for personal safety.   Patient denies current or recent suicidal ideation or behaviors.   Patient denies current or recent homicidal ideation or behaviors.   Patient denies current or recent self injurious behavior or ideation.   Patient denies other safety concerns.   Patient reports there has been no change in risk factors since their last session.     Patient reports there has been no change in protective factors since their last session.     Recommended that patient call 911 or go to the local ED should there be a change in any of these risk factors.     Appearance:   Appropriate    Eye Contact:   Good    Psychomotor Behavior: Normal    Attitude:   Cooperative  Friendly Pleasant   Orientation:   All   Speech    Rate / Production: Normal/ Responsive Talkative    Volume:  Normal    Mood:    Normal   Affect:    Appropriate  Bright    Thought Content:  Clear    Thought Form:  Coherent  Logical    Insight:    Good      Medication Review:   No changes to current psychiatric medication(s)  Client's Lexapro was upped to 10mg daily.     Medication Compliance:   Yes     Changes in Health Issues:   None reported     Chemical Use Review:   Substance Use: Chemical use reviewed, no active concerns identified      Tobacco Use: Same.    Diagnosis:  1. Generalized Anxiety Disorder    2. Unspecified Depressive Disorder    3. Social anxiety disorder          Collateral Reports  "Completed:   Not Applicable    PLAN: (Patient Tasks / Therapist Tasks / Other)  Client will be mindful of sleep hygiene and getting to bed \"around 9:30pm\".  She made a future appointment for July 2, 2024.        Mejia Ronnie Lisa, PsyD                                              _____________________________________________________________________    Individual Treatment Plan    Patient's Name: Scott Dobbs  YOB: 1985    Date of Creation: 7/7/22 (cont'd from previous therapist)  Date Treatment Plan Last Reviewed/Revised: 6/4/24    DSM5 Diagnoses: 311 (F32.9) Unspecified Depressive Disorder  or 300.23 (F40.10) Social Anxiety Disorder  300.02 (F41.1) Generalized Anxiety Disorder  Psychosocial / Contextual Factors:  postpartum within past year, transition back to work in February, coping with pandemic  PROMIS (reviewed every 90 days): 27 (on 4/8/24)    Referral / Collaboration:  Referral to another professional/service is not indicated at this time..    Anticipated number of session for this episode of care: 36  Anticipation frequency of session: Monthly  Anticipated Duration of each session: 38-52 minutes  Treatment plan will be reviewed in 90 days or when goals have been changed.       MeasurableTreatment Goal(s) related to diagnosis / functional impairment(s)  Goal 1: Patient will reduce symptoms of anxiety as evidenced by decreased score on BALA 7.     I will know I've met my goal when I worry less in social/work interactions.  - \"I'd like to bring down my anxiety at work still.  I'd also like to lower my times of 'high' anxiety.\"    \"I think I want to work on the frustration level when I start to get overwhelmed.\" - Stressed used Time Outs and relaxation techniques. - Client feels she is in maintenance stage with this goal.     Objective #A  Patient will identify three distraction and diversion activities and use those activities to decrease level of anxiety.                     Status: " "Renewed - Date: 6/4/24     Intervention(s)  [facility] will teach distress tolerance, mindfulness, and relaxation techniques.     Objective #B  Patient will use cognitive strategies identified in therapy to challenge anxious thoughts.  Status: Renewed - Date: 6/4/24  Intervention(s)  [facility] will assign homework to practice cognitive restructuring and defusion techniques.     Objective #C  Patient will use relaxation strategies 2-3 times per day to reduce the physical symptoms of anxiety.  \"I would like to step back sooner, or catch it sooner, so I can just step back and use skills and stay engaged with what's frustrating me instead of stepping away (part w/her daughter).\"  Status: Renewed - Date: 6/4/24     Intervention(s)  [facility] will teach relaxation techniques.        Goal 2: Patient will reduce symptoms of depression as evidenced by decreased scores on PHQ9.    I will know I've met my goal when I feel like I can start and end tasks, find more motivation.       This goal was changed to be on a maintenance schedule.  Client will leave this as a goal until Winter when she tends to struggle more with depression.    Objective #A (Patient Action)                          Status: Renewed - Date: 6/4/24     Patient will Increase interest, engagement, and pleasure in doing things.     Intervention(s)  [facility] will explore strategies to help increase motivation and interest.     Objective #B  Patient will Identify negative self-talk and behaviors: challenge core beliefs, myths, and actions.                  Status: Completed: - Date: 6/4/24      Intervention(s)  [facility] will continue to explore automatic unhelpful/unhealthy thoughts and beliefs, and begin to create new helpeful/helpful automatic thoughts and beliefs.        Patient has reviewed and agreed to the above plan.      Mejia Gonzalez PsyD  June 4, 2024  "

## 2024-07-02 ENCOUNTER — VIRTUAL VISIT (OUTPATIENT)
Dept: PSYCHOLOGY | Facility: CLINIC | Age: 39
End: 2024-07-02
Payer: COMMERCIAL

## 2024-07-02 DIAGNOSIS — F40.10 SOCIAL ANXIETY DISORDER: ICD-10-CM

## 2024-07-02 DIAGNOSIS — F32.9 DEPRESSION, REACTIVE: ICD-10-CM

## 2024-07-02 DIAGNOSIS — F41.1 GAD (GENERALIZED ANXIETY DISORDER): Primary | ICD-10-CM

## 2024-07-02 PROCEDURE — 90834 PSYTX W PT 45 MINUTES: CPT | Mod: 95 | Performed by: PSYCHOLOGIST

## 2024-07-02 NOTE — PROGRESS NOTES
"[facility]                                     Progress Note    Patient Name: Scott Dobbs  Date: 7/2/24         Service Type: Individual      Session Start Time: 3:02pm Session End Time: 3:54pm     Session Length: 52 minutes    Session #: 29 (transfer from Dawn Franks)    Attendees: Client attended alone    Service Modality:  Video Visit:      Provider verified identity through the following two step process.  Patient provided:  Patient photo    Telemedicine Visit: The patient's condition can be safely assessed and treated via synchronous audio and visual telemedicine encounter.      Reason for Telemedicine Visit: Services only offered telehealth    Originating Site (Patient Location): Patient's home    Distant Site (Provider Location): Provider Remote Setting- Home Office    Consent:  The patient/guardian has verbally consented to: the potential risks and benefits of telemedicine (video visit) versus in person care; bill my insurance or make self-payment for services provided; and responsibility for payment of non-covered services.     Patient would like the video invitation sent by:  Send to e-mail at: [email or web address]    Mode of Communication:  Video Conference via well    As the provider I attest to compliance with applicable laws and regulations related to telemedicine.    DATA  Interactive Complexity: No  Crisis: No        Progress Since Last Session (Related to Symptoms / Goals / Homework):   Symptoms: No change : Stable.  Client had work periodically throughout the night last night.    Homework: Completed in session      Episode of Care Goals: Satisfactory progress - ACTION (Actively working towards change); Intervened by reinforcing change plan / affirming steps taken     Current / Ongoing Stressors and Concerns:   Client up all night at work.      Notes on 7/2/24 Session:  Client up all night for work.    \"I feel like I've developed a slightly different sleep " "routine.  My new rule is I don't watch Tik Toks in bed.\"  She will play Duolingo and read prior to going to sleep (while in bed).  \"I've noticed an improvement in my mood and anxiety.\"    \"The other thing I'm trying to do is start everything a bit earlier.\"    Client is also working on preparing salads for lunches over the weekend.  She would like to prepare them on Friday.  Communicating openly with , compromising.    Cl talked about particular meals she likes to cook.    Client's dad is getting .       Treatment Objective(s) Addressed in This Session:   Client talked how she has improved her sleep hygiene. Reviewed tweaks to sleep hygiene routine.  Client also working at eating more healthy by preparing salads for lunches over the weekend (wants to prepare them on Friday); Client talking more openly with .      Intervention:   CBT: Assigned homework; Pattern recognition; Psychoeducation; Socratic Questioning;  Reviewed skills; Reviewed client's sleep hygiene routine.   Active listening, validation, and other rapport building skills.    Assessments completed prior to visit:  The following assessments were completed by patient for this visit:  PHQ9:       10/17/2023    12:43 PM 12/5/2023     2:59 PM 1/3/2024     9:27 AM 1/24/2024     3:05 PM 2/19/2024     2:57 PM 4/8/2024     3:41 PM 6/4/2024     3:00 PM   PHQ-9 SCORE   PHQ-9 Total Score MyChart 5 (Mild depression) 5 (Mild depression)   6 (Mild depression) 5 (Mild depression) 4 (Minimal depression)   PHQ-9 Total Score 5 5 7 1 6 5 4     GAD7:       6/26/2023     1:58 PM 7/27/2023    10:48 AM 8/22/2023    12:25 PM 12/5/2023     3:00 PM 1/3/2024     9:27 AM 1/23/2024     2:59 PM 4/8/2024     3:42 PM   BALA-7 SCORE   Total Score 8 (mild anxiety) 10 (moderate anxiety) 13 (moderate anxiety) 7 (mild anxiety)  12 (moderate anxiety) 7 (mild anxiety)   Total Score 8 10 13 7 13 12 7           ASSESSMENT: Current Emotional / Mental Status (status of " significant symptoms):   Risk status (Self / Other harm or suicidal ideation)   Patient denies current fears or concerns for personal safety.   Patient denies current or recent suicidal ideation or behaviors.   Patient denies current or recent homicidal ideation or behaviors.   Patient denies current or recent self injurious behavior or ideation.   Patient denies other safety concerns.   Patient reports there has been no change in risk factors since their last session.     Patient reports there has been no change in protective factors since their last session.     Recommended that patient call 911 or go to the local ED should there be a change in any of these risk factors.     Appearance:   Appropriate    Eye Contact:   Good    Psychomotor Behavior: Normal    Attitude:   Cooperative  Friendly Pleasant   Orientation:   All   Speech    Rate / Production: Normal/ Responsive Talkative    Volume:  Normal    Mood:    Normal   Affect:    Appropriate  Bright    Thought Content:  Clear    Thought Form:  Coherent  Logical    Insight:    Good      Medication Review:   No changes to current psychiatric medication(s)  Client's Lexapro was upped to 10mg daily.     Medication Compliance:   Yes     Changes in Health Issues:   None reported     Chemical Use Review:   Substance Use: Chemical use reviewed, no active concerns identified      Tobacco Use: Same.    Diagnosis:  1. Generalized Anxiety Disorder    2. Unspecified Depressive Disorder    3. Social anxiety disorder        Collateral Reports Completed:   Not Applicable    PLAN: (Patient Tasks / Therapist Tasks / Other)  Client will continue to tweak and follow her new sleep hygiene schedule.  She will also plan on making salads for lunches on Fridays.  She made a future appointment for August 6, 2024.        Mejia Gonzalez PsyD                                              _____________________________________________________________________    Individual Treatment  "Plan    Patient's Name: Scott Dobbs  YOB: 1985    Date of Creation: 7/7/22 (cont'd from previous therapist)  Date Treatment Plan Last Reviewed/Revised: 6/4/24    DSM5 Diagnoses: 311 (F32.9) Unspecified Depressive Disorder  or 300.23 (F40.10) Social Anxiety Disorder  300.02 (F41.1) Generalized Anxiety Disorder  Psychosocial / Contextual Factors:  postpartum within past year, transition back to work in February, coping with pandemic  PROMIS (reviewed every 90 days): 27 (on 4/8/24)    Referral / Collaboration:  Referral to another professional/service is not indicated at this time..    Anticipated number of session for this episode of care: 36  Anticipation frequency of session: Monthly  Anticipated Duration of each session: 38-52 minutes  Treatment plan will be reviewed in 90 days or when goals have been changed.       MeasurableTreatment Goal(s) related to diagnosis / functional impairment(s)  Goal 1: Patient will reduce symptoms of anxiety as evidenced by decreased score on BALA 7.     I will know I've met my goal when I worry less in social/work interactions.  - \"I'd like to bring down my anxiety at work still.  I'd also like to lower my times of 'high' anxiety.\"    \"I think I want to work on the frustration level when I start to get overwhelmed.\" - Stressed used Time Outs and relaxation techniques. - Client feels she is in maintenance stage with this goal.     Objective #A  Patient will identify three distraction and diversion activities and use those activities to decrease level of anxiety.                     Status: Renewed - Date: 6/4/24     Intervention(s)  [facility] will teach distress tolerance, mindfulness, and relaxation techniques.     Objective #B  Patient will use cognitive strategies identified in therapy to challenge anxious thoughts.  Status: Renewed - Date: 6/4/24  Intervention(s)  [facility] will assign homework to practice cognitive restructuring and defusion techniques.     Objective " "#C  Patient will use relaxation strategies 2-3 times per day to reduce the physical symptoms of anxiety.  \"I would like to step back sooner, or catch it sooner, so I can just step back and use skills and stay engaged with what's frustrating me instead of stepping away (part w/her daughter).\"  Status: Renewed - Date: 6/4/24     Intervention(s)  [facility] will teach relaxation techniques.        Goal 2: Patient will reduce symptoms of depression as evidenced by decreased scores on PHQ9.    I will know I've met my goal when I feel like I can start and end tasks, find more motivation.       This goal was changed to be on a maintenance schedule.  Client will leave this as a goal until Winter when she tends to struggle more with depression.    Objective #A (Patient Action)                          Status: Renewed - Date: 6/4/24     Patient will Increase interest, engagement, and pleasure in doing things.     Intervention(s)  [facility] will explore strategies to help increase motivation and interest.     Objective #B  Patient will Identify negative self-talk and behaviors: challenge core beliefs, myths, and actions.                  Status: Completed: - Date: 6/4/24      Intervention(s)  [facility] will continue to explore automatic unhelpful/unhealthy thoughts and beliefs, and begin to create new helpeful/helpful automatic thoughts and beliefs.        Patient has reviewed and agreed to the above plan.      Mejia Gonzalez PsyD  July 2, 2024  "

## 2024-08-06 ENCOUNTER — VIRTUAL VISIT (OUTPATIENT)
Dept: PSYCHOLOGY | Facility: CLINIC | Age: 39
End: 2024-08-06
Payer: COMMERCIAL

## 2024-08-06 DIAGNOSIS — F40.10 SOCIAL ANXIETY DISORDER: ICD-10-CM

## 2024-08-06 DIAGNOSIS — F32.9 DEPRESSION, REACTIVE: ICD-10-CM

## 2024-08-06 DIAGNOSIS — F41.1 GAD (GENERALIZED ANXIETY DISORDER): Primary | ICD-10-CM

## 2024-08-06 PROCEDURE — 90834 PSYTX W PT 45 MINUTES: CPT | Mod: 95 | Performed by: PSYCHOLOGIST

## 2024-08-06 ASSESSMENT — ANXIETY QUESTIONNAIRES
4. TROUBLE RELAXING: SEVERAL DAYS
3. WORRYING TOO MUCH ABOUT DIFFERENT THINGS: SEVERAL DAYS
2. NOT BEING ABLE TO STOP OR CONTROL WORRYING: MORE THAN HALF THE DAYS
GAD7 TOTAL SCORE: 10
7. FEELING AFRAID AS IF SOMETHING AWFUL MIGHT HAPPEN: SEVERAL DAYS
GAD7 TOTAL SCORE: 10
8. IF YOU CHECKED OFF ANY PROBLEMS, HOW DIFFICULT HAVE THESE MADE IT FOR YOU TO DO YOUR WORK, TAKE CARE OF THINGS AT HOME, OR GET ALONG WITH OTHER PEOPLE?: SOMEWHAT DIFFICULT
7. FEELING AFRAID AS IF SOMETHING AWFUL MIGHT HAPPEN: SEVERAL DAYS
1. FEELING NERVOUS, ANXIOUS, OR ON EDGE: MORE THAN HALF THE DAYS
5. BEING SO RESTLESS THAT IT IS HARD TO SIT STILL: MORE THAN HALF THE DAYS
GAD7 TOTAL SCORE: 10
6. BECOMING EASILY ANNOYED OR IRRITABLE: SEVERAL DAYS
IF YOU CHECKED OFF ANY PROBLEMS ON THIS QUESTIONNAIRE, HOW DIFFICULT HAVE THESE PROBLEMS MADE IT FOR YOU TO DO YOUR WORK, TAKE CARE OF THINGS AT HOME, OR GET ALONG WITH OTHER PEOPLE: SOMEWHAT DIFFICULT

## 2024-08-06 ASSESSMENT — PATIENT HEALTH QUESTIONNAIRE - PHQ9
SUM OF ALL RESPONSES TO PHQ QUESTIONS 1-9: 8
SUM OF ALL RESPONSES TO PHQ QUESTIONS 1-9: 8
10. IF YOU CHECKED OFF ANY PROBLEMS, HOW DIFFICULT HAVE THESE PROBLEMS MADE IT FOR YOU TO DO YOUR WORK, TAKE CARE OF THINGS AT HOME, OR GET ALONG WITH OTHER PEOPLE: SOMEWHAT DIFFICULT

## 2024-08-06 NOTE — PROGRESS NOTES
"[facility]                                     Progress Note    Patient Name: Scott Dobbs  Date: 8/6/24         Service Type: Individual      Session Start Time: 3:02pm Session End Time: 3:54pm     Session Length: 52 minutes    Session #: 30 (transfer from Dawn Franks)    Attendees: Client attended alone    Service Modality:  Video Visit:      Provider verified identity through the following two step process.  Patient provided:  Patient photo    Telemedicine Visit: The patient's condition can be safely assessed and treated via synchronous audio and visual telemedicine encounter.      Reason for Telemedicine Visit: Services only offered telehealth    Originating Site (Patient Location): Patient's home    Distant Site (Provider Location): Provider Remote Setting- Home Office    Consent:  The patient/guardian has verbally consented to: the potential risks and benefits of telemedicine (video visit) versus in person care; bill my insurance or make self-payment for services provided; and responsibility for payment of non-covered services.     Patient would like the video invitation sent by:  Send to e-mail at: [email or web address]    Mode of Communication:  Video Conference via [facility]    As the provider I attest to compliance with applicable laws and regulations related to telemedicine.    DATA  Interactive Complexity: No  Crisis: No        Progress Since Last Session (Related to Symptoms / Goals / Homework):   Symptoms: No change : Stable.  \"I'm hanging in there.\"    Homework: Completed in session      Episode of Care Goals: Satisfactory progress - ACTION (Actively working towards change); Intervened by reinforcing change plan / affirming steps taken     Current / Ongoing Stressors and Concerns:   Very busy at home.      Notes on 8/6/24 Session:  \"I'm feeling very stressed.\"  \"I haven't been sticking to my sleep scheduled and I think that's making it worse.\"  \"It feels like there's not " "enough time to get things done.  Work is really good though.\"    Cl listed tasks she wants to do.  Also listed commitments coming up.  Talked about how to handle all the stress.    One-Thing-At-A-Time.  Maybe needs another planner for home (vs work).  \"I need a schedule that can be flexible.\"    Client uses a pomodoro timer.         Treatment Objective(s) Addressed in This Session:   Client talked about what has lead to being stressed (very busy home life).  She identified and discussed skills used, or to used, to help get tasks done, particularly ones she has low to very low motivation to complete.      Intervention:   CBT: Assigned homework; Pattern recognition; Psychoeducation; Socratic Questioning;  Positive reinforcements to increase motivation; Reviewed skills; Reviewed client's sleep hygiene.   Active listening, validation, and other rapport building skills; Completing tasks when low in motivation.    Assessments completed prior to visit:  The following assessments were completed by patient for this visit:  PHQ9:       12/5/2023     2:59 PM 1/3/2024     9:27 AM 1/24/2024     3:05 PM 2/19/2024     2:57 PM 4/8/2024     3:41 PM 6/4/2024     3:00 PM 8/6/2024    10:33 AM   PHQ-9 SCORE   PHQ-9 Total Score MyChart 5 (Mild depression)   6 (Mild depression) 5 (Mild depression) 4 (Minimal depression) 8 (Mild depression)   PHQ-9 Total Score 5 7 1 6 5 4 8     GAD7:       7/27/2023    10:48 AM 8/22/2023    12:25 PM 12/5/2023     3:00 PM 1/3/2024     9:27 AM 1/23/2024     2:59 PM 4/8/2024     3:42 PM 8/6/2024    10:34 AM   BALA-7 SCORE   Total Score 10 (moderate anxiety) 13 (moderate anxiety) 7 (mild anxiety)  12 (moderate anxiety) 7 (mild anxiety) 10 (moderate anxiety)   Total Score 10 13 7 13 12 7 10           ASSESSMENT: Current Emotional / Mental Status (status of significant symptoms):   Risk status (Self / Other harm or suicidal ideation)   Patient denies current fears or concerns for personal safety.   Patient denies " current or recent suicidal ideation or behaviors.   Patient denies current or recent homicidal ideation or behaviors.   Patient denies current or recent self injurious behavior or ideation.   Patient denies other safety concerns.   Patient reports there has been no change in risk factors since their last session.     Patient reports there has been no change in protective factors since their last session.     Recommended that patient call 911 or go to the local ED should there be a change in any of these risk factors.     Appearance:   Appropriate    Eye Contact:   Good    Psychomotor Behavior: Normal    Attitude:   Cooperative  Friendly Pleasant   Orientation:   All   Speech    Rate / Production: Normal/ Responsive Talkative    Volume:  Normal    Mood:    Normal   Affect:    Appropriate  Bright    Thought Content:  Clear    Thought Form:  Coherent  Logical    Insight:    Good      Medication Review:   No changes to current psychiatric medication(s)  Client's Lexapro was upped to 10mg daily.     Medication Compliance:   Yes     Changes in Health Issues:   None reported     Chemical Use Review:   Substance Use: Chemical use reviewed, no active concerns identified      Tobacco Use: Same.    Diagnosis:  1. Generalized Anxiety Disorder    2. Unspecified Depressive Disorder    3. Social anxiety disorder        Collateral Reports Completed:   Not Applicable    PLAN: (Patient Tasks / Therapist Tasks / Other)  Client will try different skills discussed today to help complete tasks.  She made a future appointment for August 28, 2024.        Mejia Gonzalez PsyD                                              _____________________________________________________________________    Individual Treatment Plan    Patient's Name: Scott Dobbs  YOB: 1985    Date of Creation: 7/7/22 (cont'd from previous therapist)  Date Treatment Plan Last Reviewed/Revised: 6/4/24    DSM5 Diagnoses: 311 (F32.9) Unspecified  "Depressive Disorder  or 300.23 (F40.10) Social Anxiety Disorder  300.02 (F41.1) Generalized Anxiety Disorder  Psychosocial / Contextual Factors:  postpartum within past year, transition back to work in February, coping with pandemic  PROMIS (reviewed every 90 days): 27 (on 4/8/24)    Referral / Collaboration:  Referral to another professional/service is not indicated at this time..    Anticipated number of session for this episode of care: 36  Anticipation frequency of session: Monthly  Anticipated Duration of each session: 38-52 minutes  Treatment plan will be reviewed in 90 days or when goals have been changed.       MeasurableTreatment Goal(s) related to diagnosis / functional impairment(s)  Goal 1: Patient will reduce symptoms of anxiety as evidenced by decreased score on BALA 7.     I will know I've met my goal when I worry less in social/work interactions.  - \"I'd like to bring down my anxiety at work still.  I'd also like to lower my times of 'high' anxiety.\"    \"I think I want to work on the frustration level when I start to get overwhelmed.\" - Stressed used Time Outs and relaxation techniques. - Client feels she is in maintenance stage with this goal.     Objective #A  Patient will identify three distraction and diversion activities and use those activities to decrease level of anxiety.                     Status: Renewed - Date: 6/4/24     Intervention(s)  [facility] will teach distress tolerance, mindfulness, and relaxation techniques.     Objective #B  Patient will use cognitive strategies identified in therapy to challenge anxious thoughts.  Status: Renewed - Date: 6/4/24  Intervention(s)  [facility] will assign homework to practice cognitive restructuring and defusion techniques.     Objective #C  Patient will use relaxation strategies 2-3 times per day to reduce the physical symptoms of anxiety.  \"I would like to step back sooner, or catch it sooner, so I can just step back and use skills and stay engaged with " "what's frustrating me instead of stepping away (part w/her daughter).\"  Status: Renewed - Date: 6/4/24     Intervention(s)  [facility] will teach relaxation techniques.        Goal 2: Patient will reduce symptoms of depression as evidenced by decreased scores on PHQ9.    I will know I've met my goal when I feel like I can start and end tasks, find more motivation.       This goal was changed to be on a maintenance schedule.  Client will leave this as a goal until Winter when she tends to struggle more with depression.    Objective #A (Patient Action)                          Status: Renewed - Date: 6/4/24     Patient will Increase interest, engagement, and pleasure in doing things.     Intervention(s)  [facility] will explore strategies to help increase motivation and interest.     Objective #B  Patient will Identify negative self-talk and behaviors: challenge core beliefs, myths, and actions.                  Status: Completed: - Date: 6/4/24      Intervention(s)  [facility] will continue to explore automatic unhelpful/unhealthy thoughts and beliefs, and begin to create new helpeful/helpful automatic thoughts and beliefs.        Patient has reviewed and agreed to the above plan.      Mejia Gonzalez PsyD  August 6, 2024  "

## 2024-08-28 ENCOUNTER — VIRTUAL VISIT (OUTPATIENT)
Dept: PSYCHOLOGY | Facility: CLINIC | Age: 39
End: 2024-08-28
Payer: COMMERCIAL

## 2024-08-28 DIAGNOSIS — F32.9 DEPRESSION, REACTIVE: ICD-10-CM

## 2024-08-28 DIAGNOSIS — F40.10 SOCIAL ANXIETY DISORDER: ICD-10-CM

## 2024-08-28 DIAGNOSIS — F41.1 GAD (GENERALIZED ANXIETY DISORDER): Primary | ICD-10-CM

## 2024-08-28 PROCEDURE — 90834 PSYTX W PT 45 MINUTES: CPT | Mod: 95 | Performed by: PSYCHOLOGIST

## 2024-08-28 NOTE — PROGRESS NOTES
"[facility]                                     Progress Note    Patient Name: Scott Dobbs  Date: 8/28/24         Service Type: Individual      Session Start Time: 2:02pm Session End Time: 2:54pm     Session Length: 52 minutes    Session #: 31 (transfer from Dawn Franks)    Attendees: Client attended alone    Service Modality:  Video Visit:      Provider verified identity through the following two step process.  Patient provided:  Patient photo    Telemedicine Visit: The patient's condition can be safely assessed and treated via synchronous audio and visual telemedicine encounter.      Reason for Telemedicine Visit: Services only offered telehealth    Originating Site (Patient Location): Patient's home    Distant Site (Provider Location): Provider Remote Setting- Home Office    Consent:  The patient/guardian has verbally consented to: the potential risks and benefits of telemedicine (video visit) versus in person care; bill my insurance or make self-payment for services provided; and responsibility for payment of non-covered services.     Patient would like the video invitation sent by:  Send to e-mail at: [email or web address]    Mode of Communication:  Video Conference via well    As the provider I attest to compliance with applicable laws and regulations related to telemedicine.    DATA  Interactive Complexity: No  Crisis: No        Progress Since Last Session (Related to Symptoms / Goals / Homework):   Symptoms: No change : Stable.  \"I'm ok.\"    Homework: Completed in session      Episode of Care Goals: Satisfactory progress - ACTION (Actively working towards change); Intervened by reinforcing change plan / affirming steps taken     Current / Ongoing Stressors and Concerns:   Very busy at home. Client believes she may struggle with PMDD.      Notes on 8/28/24 Session:  \"I got to bed late last night.\"  Cl was on [employer], researching.    Cl has the house to herself to the " "afternoon, so, she will take advantage of it.  \"We're going camping this weekend.\"  Cl planning what she can do to relax while camping.    \"I may actually have realized in the last week or two that I may have PMDD ([employer] research).\"  Cl felt anxious or on edge or keyed up - client took pepcid A-C (learned on [employer] may help with PMDD symptoms).   Discussed PMDD and possibly tracking symptoms.    Emailed client a website with a PMDD tracker:  [email or web address]/view.         Treatment Objective(s) Addressed in This Session:   Client talked about having the house to herself and possible ways to enjoy the time. Client brought up believing she may have PMDD.  Discussion regarding PMDD and symptoms followed.  Emailed client PMDD Symptom Tracker to consider using.  Scheduled future appointment.      Intervention:   CBT: Assigned homework; Pattern recognition; Psychoeducation; Socratic Questioning.   Active listening, validation, and other rapport building skills; Reviewed, discussed, and explored possible PMDD.  Made plans to track possible symptoms for future diagnosis.    Assessments completed prior to visit:  The following assessments were completed by patient for this visit:  PHQ9:       12/5/2023     2:59 PM 1/3/2024     9:27 AM 1/24/2024     3:05 PM 2/19/2024     2:57 PM 4/8/2024     3:41 PM 6/4/2024     3:00 PM 8/6/2024    10:33 AM   PHQ-9 SCORE   PHQ-9 Total Score MyChart 5 (Mild depression)   6 (Mild depression) 5 (Mild depression) 4 (Minimal depression) 8 (Mild depression)   PHQ-9 Total Score 5 7 1 6 5 4 8     GAD7:       7/27/2023    10:48 AM 8/22/2023    12:25 PM 12/5/2023     3:00 PM 1/3/2024     9:27 AM 1/23/2024     2:59 PM 4/8/2024     3:42 PM 8/6/2024    10:34 AM   BALA-7 SCORE   Total Score 10 (moderate anxiety) 13 (moderate anxiety) 7 (mild anxiety)  12 (moderate anxiety) 7 (mild anxiety) 10 (moderate anxiety)   Total Score 10 13 7 13 12 7 10 "           ASSESSMENT: Current Emotional / Mental Status (status of significant symptoms):   Risk status (Self / Other harm or suicidal ideation)   Patient denies current fears or concerns for personal safety.   Patient denies current or recent suicidal ideation or behaviors.   Patient denies current or recent homicidal ideation or behaviors.   Patient denies current or recent self injurious behavior or ideation.   Patient denies other safety concerns.   Patient reports there has been no change in risk factors since their last session.     Patient reports there has been no change in protective factors since their last session.     Recommended that patient call 911 or go to the local ED should there be a change in any of these risk factors.     Appearance:   Appropriate    Eye Contact:   Good    Psychomotor Behavior: Normal    Attitude:   Cooperative  Friendly Pleasant   Orientation:   All   Speech    Rate / Production: Normal/ Responsive Talkative    Volume:  Normal    Mood:    Normal   Affect:    Appropriate  Bright    Thought Content:  Clear    Thought Form:  Coherent  Logical    Insight:    Good      Medication Review:   No changes to current psychiatric medication(s)     Medication Compliance:   Yes     Changes in Health Issues:   None reported     Chemical Use Review:   Substance Use: Chemical use reviewed, no active concerns identified      Tobacco Use: Same.    Diagnosis:  1. Generalized Anxiety Disorder    2. Unspecified Depressive Disorder    3. Social anxiety disorder        Collateral Reports Completed:   Not Applicable    PLAN: (Patient Tasks / Therapist Tasks / Other)  Client will track symptoms using a tracking sheet or grace.  She made a future appointment for October 8, 2024.  Client is also on the Wait List.        Mejia Gonzalez PsyD                                              _____________________________________________________________________    Individual Treatment Plan    Patient's Name:  "Scott Dobbs  YOB: 1985    Date of Creation: 7/7/22 (cont'd from previous therapist)  Date Treatment Plan Last Reviewed/Revised: 6/4/24    DSM5 Diagnoses: 311 (F32.9) Unspecified Depressive Disorder  or 300.23 (F40.10) Social Anxiety Disorder  300.02 (F41.1) Generalized Anxiety Disorder  Psychosocial / Contextual Factors:  postpartum within past year, transition back to work in February, coping with pandemic  PROMIS (reviewed every 90 days): 27 (on 4/8/24)    Referral / Collaboration:  Referral to another professional/service is not indicated at this time..    Anticipated number of session for this episode of care: 36  Anticipation frequency of session: Monthly  Anticipated Duration of each session: 38-52 minutes  Treatment plan will be reviewed in 90 days or when goals have been changed.       MeasurableTreatment Goal(s) related to diagnosis / functional impairment(s)  Goal 1: Patient will reduce symptoms of anxiety as evidenced by decreased score on BALA 7.     I will know I've met my goal when I worry less in social/work interactions.  - \"I'd like to bring down my anxiety at work still.  I'd also like to lower my times of 'high' anxiety.\"    \"I think I want to work on the frustration level when I start to get overwhelmed.\" - Stressed used Time Outs and relaxation techniques. - Client feels she is in maintenance stage with this goal.     Objective #A  Patient will identify three distraction and diversion activities and use those activities to decrease level of anxiety.                     Status: Renewed - Date: 6/4/24     Intervention(s)  [facility] will teach distress tolerance, mindfulness, and relaxation techniques.     Objective #B  Patient will use cognitive strategies identified in therapy to challenge anxious thoughts.  Status: Renewed - Date: 6/4/24  Intervention(s)  [facility] will assign homework to practice cognitive restructuring and defusion techniques.     Objective #C  Patient will use " "relaxation strategies 2-3 times per day to reduce the physical symptoms of anxiety.  \"I would like to step back sooner, or catch it sooner, so I can just step back and use skills and stay engaged with what's frustrating me instead of stepping away (part w/her daughter).\"  Status: Renewed - Date: 6/4/24     Intervention(s)  [facility] will teach relaxation techniques.        Goal 2: Patient will reduce symptoms of depression as evidenced by decreased scores on PHQ9.    I will know I've met my goal when I feel like I can start and end tasks, find more motivation.       This goal was changed to be on a maintenance schedule.  Client will leave this as a goal until Winter when she tends to struggle more with depression.    Objective #A (Patient Action)                          Status: Renewed - Date: 6/4/24     Patient will Increase interest, engagement, and pleasure in doing things.     Intervention(s)  [facility] will explore strategies to help increase motivation and interest.     Objective #B  Patient will Identify negative self-talk and behaviors: challenge core beliefs, myths, and actions.                  Status: Completed: - Date: 6/4/24      Intervention(s)  [facility] will continue to explore automatic unhelpful/unhealthy thoughts and beliefs, and begin to create new helpeful/helpful automatic thoughts and beliefs.        Patient has reviewed and agreed to the above plan.      Mejia Gonzalez PsyD  August 28, 2024  "

## 2024-09-13 ENCOUNTER — VIRTUAL VISIT (OUTPATIENT)
Dept: FAMILY MEDICINE | Facility: CLINIC | Age: 39
End: 2024-09-13
Payer: COMMERCIAL

## 2024-09-13 DIAGNOSIS — F41.8 SITUATIONAL ANXIETY: Primary | ICD-10-CM

## 2024-09-13 PROCEDURE — G2211 COMPLEX E/M VISIT ADD ON: HCPCS | Mod: 95 | Performed by: PHYSICIAN ASSISTANT

## 2024-09-13 PROCEDURE — 99214 OFFICE O/P EST MOD 30 MIN: CPT | Mod: 95 | Performed by: PHYSICIAN ASSISTANT

## 2024-09-13 RX ORDER — SERTRALINE HYDROCHLORIDE 25 MG/1
25 TABLET, FILM COATED ORAL DAILY
Qty: 30 TABLET | Refills: 1 | Status: SHIPPED | OUTPATIENT
Start: 2024-09-13

## 2024-09-13 ASSESSMENT — ANXIETY QUESTIONNAIRES
GAD7 TOTAL SCORE: 7
GAD7 TOTAL SCORE: 7
8. IF YOU CHECKED OFF ANY PROBLEMS, HOW DIFFICULT HAVE THESE MADE IT FOR YOU TO DO YOUR WORK, TAKE CARE OF THINGS AT HOME, OR GET ALONG WITH OTHER PEOPLE?: VERY DIFFICULT
GAD7 TOTAL SCORE: 7
7. FEELING AFRAID AS IF SOMETHING AWFUL MIGHT HAPPEN: SEVERAL DAYS

## 2024-09-13 ASSESSMENT — PATIENT HEALTH QUESTIONNAIRE - PHQ9: SUM OF ALL RESPONSES TO PHQ QUESTIONS 1-9: 11

## 2024-09-13 NOTE — PROGRESS NOTES
"Scott is a 39 year old who is being evaluated via a billable video visit.    How would you like to obtain your AVS? MyChart  If the video visit is dropped, the invitation should be resent by: Text to cell phone: [phone]  Will anyone else be joining your video visit? No      Assessment & Plan     Situational anxiety  Will switch to Zoloft to see if that helps the sweating and the PMMD.    Follow up in a month   - sertraline (ZOLOFT) 25 MG tablet; Take 1 tablet (25 mg) by mouth daily.          BMI  Estimated body mass index is 48.32 kg/m  as calculated from the following:    Height as of 1/24/24: 1.765 m (5' 9.5\").    Weight as of 1/24/24: 150.6 kg (332 lb).   Weight management plan: not addressed     Depression Screening Follow Up        9/13/2024    12:59 PM   PHQ   PHQ-9 Total Score 11   Q9: Thoughts of better off dead/self-harm past 2 weeks Not at all           Follow Up Actions Taken  Crisis resource information provided in After Visit Summary           Subjective   Scott is a 39 year old, presenting for the following health issues:  Hypertension      9/13/2024     1:01 PM   Additional Questions   Roomed by Heide   Accompanied by self     History of Present Illness       Mental Health Follow-up:  Patient presents to follow-up on Depression & Anxiety.Patient's depression since last visit has been:  Better  The patient is having other symptoms associated with depression.  Patient's anxiety since last visit has been:  Better  The patient is not having other symptoms associated with anxiety.  Any significant life events: No  Patient is not feeling anxious or having panic attacks.  Patient has no concerns about alcohol or drug use.    She eats 4 or more servings of fruits and vegetables daily.She consumes 1 sweetened beverage(s) daily.She exercises with enough effort to increase her heart rate 10 to 19 minutes per day.  She exercises with enough effort to increase her heart rate 5 days per week.   She is taking " medications regularly.     Sweating more.    Feeling overheated   More in face   Strong family hx of thyroid problems but in past has had normal thyroid labs.    Mood is worse during period and her and therapist are exploring PMMD.                  Objective           Vitals:  No vitals were obtained today due to virtual visit.    Physical Exam   GENERAL: alert and no distress  EYES: Eyes grossly normal to inspection.  No discharge or erythema, or obvious scleral/conjunctival abnormalities.  RESP: No audible wheeze, cough, or visible cyanosis.    SKIN: Visible skin clear. No significant rash, abnormal pigmentation or lesions.  NEURO: Cranial nerves grossly intact.  Mentation and speech appropriate for age.  PSYCH: Appropriate affect, tone, and pace of words          Video-Visit Details    Type of service:  Video Visit   Originating Location (pt. Location): Other restaurant     Distant Location (provider location):  On-site  Platform used for Video Visit: Patricio  Signed Electronically by: Anushka Canales PA-C

## 2024-10-08 ENCOUNTER — VIRTUAL VISIT (OUTPATIENT)
Dept: PSYCHOLOGY | Facility: CLINIC | Age: 39
End: 2024-10-08
Payer: COMMERCIAL

## 2024-10-08 DIAGNOSIS — F40.10 SOCIAL ANXIETY DISORDER: ICD-10-CM

## 2024-10-08 DIAGNOSIS — F32.9 DEPRESSION, REACTIVE: ICD-10-CM

## 2024-10-08 DIAGNOSIS — F41.1 GAD (GENERALIZED ANXIETY DISORDER): Primary | ICD-10-CM

## 2024-10-08 PROCEDURE — 90834 PSYTX W PT 45 MINUTES: CPT | Mod: 95 | Performed by: PSYCHOLOGIST

## 2024-10-08 ASSESSMENT — ANXIETY QUESTIONNAIRES
4. TROUBLE RELAXING: SEVERAL DAYS
5. BEING SO RESTLESS THAT IT IS HARD TO SIT STILL: NOT AT ALL
GAD7 TOTAL SCORE: 8
GAD7 TOTAL SCORE: 8
6. BECOMING EASILY ANNOYED OR IRRITABLE: MORE THAN HALF THE DAYS
7. FEELING AFRAID AS IF SOMETHING AWFUL MIGHT HAPPEN: SEVERAL DAYS
1. FEELING NERVOUS, ANXIOUS, OR ON EDGE: MORE THAN HALF THE DAYS
2. NOT BEING ABLE TO STOP OR CONTROL WORRYING: SEVERAL DAYS
8. IF YOU CHECKED OFF ANY PROBLEMS, HOW DIFFICULT HAVE THESE MADE IT FOR YOU TO DO YOUR WORK, TAKE CARE OF THINGS AT HOME, OR GET ALONG WITH OTHER PEOPLE?: SOMEWHAT DIFFICULT
GAD7 TOTAL SCORE: 8
3. WORRYING TOO MUCH ABOUT DIFFERENT THINGS: SEVERAL DAYS
IF YOU CHECKED OFF ANY PROBLEMS ON THIS QUESTIONNAIRE, HOW DIFFICULT HAVE THESE PROBLEMS MADE IT FOR YOU TO DO YOUR WORK, TAKE CARE OF THINGS AT HOME, OR GET ALONG WITH OTHER PEOPLE: SOMEWHAT DIFFICULT
7. FEELING AFRAID AS IF SOMETHING AWFUL MIGHT HAPPEN: SEVERAL DAYS

## 2024-10-08 ASSESSMENT — PATIENT HEALTH QUESTIONNAIRE - PHQ9
SUM OF ALL RESPONSES TO PHQ QUESTIONS 1-9: 11
SUM OF ALL RESPONSES TO PHQ QUESTIONS 1-9: 11
10. IF YOU CHECKED OFF ANY PROBLEMS, HOW DIFFICULT HAVE THESE PROBLEMS MADE IT FOR YOU TO DO YOUR WORK, TAKE CARE OF THINGS AT HOME, OR GET ALONG WITH OTHER PEOPLE: SOMEWHAT DIFFICULT

## 2024-10-08 NOTE — PROGRESS NOTES
"[facility]                                     Progress Note    Patient Name: Scott Dobbs  Date: 10/8/24         Service Type: Individual      Session Start Time: 3:01pm Session End Time: 3:53pm     Session Length: 52 minutes    Session #: 32 (transfer from Dawn Franks)    Attendees: Client attended alone    Service Modality:  Video Visit:      Provider verified identity through the following two step process.  Patient provided:  Patient photo    Telemedicine Visit: The patient's condition can be safely assessed and treated via synchronous audio and visual telemedicine encounter.      Reason for Telemedicine Visit: Services only offered telehealth    Originating Site (Patient Location): Patient's home    Distant Site (Provider Location): Provider Remote Setting- Home Office    Consent:  The patient/guardian has verbally consented to: the potential risks and benefits of telemedicine (video visit) versus in person care; bill my insurance or make self-payment for services provided; and responsibility for payment of non-covered services.     Patient would like the video invitation sent by:  Send to e-mail at: [email or web address]    Mode of Communication:  Video Conference via [facility]    As the provider I attest to compliance with applicable laws and regulations related to telemedicine.    DATA  Interactive Complexity: No  Crisis: No        Progress Since Last Session (Related to Symptoms / Goals / Homework):   Symptoms: No change : Stable.  \"I'm ok.\"    Homework: Completed in session      Episode of Care Goals: Satisfactory progress - ACTION (Actively working towards change); Intervened by reinforcing change plan / affirming steps taken     Current / Ongoing Stressors and Concerns:   Very busy at home. Client believes she may struggle with PMDD.      Notes on 10/8/24 Session:  Client provided a PMDD tracker for the month of September '24 and partial October.  Trend does track, so far, " "with PMDD.  Need another month of tracking before dx.    Having issues with their daughter not going to sleep until 9:00pm.  Getting naps at school.    Also arranging a house warming party in November.  Client has tried to make a list of To Do, so, when she has the energy and motivation to get a task done she can refer to list.    Talked about what part of the day she has more bandwidth and what part less (mornings are better).  \"When I get home it's a whole new ball of wax what I have energy for and what I don't.\"    PMDD: \"I came off of pre-hormonal birth control. I was getting migraines with aura.  I also must have been noticing something where I thought that would help.  When I was pregnant I was feeling pretty good.\"    Client has been tracking mood since the beginning of the year, \"I must feel like something else is going on.\"  \"Thinking forward, too, I don't know how to work in more exercise into my life.  I've talked to Santiago.\"    Working on getting their daughter to sleep earlier.  This will help client with self-care.    Client will track her possible PMDD symptoms for one week post-menstruation         Treatment Objective(s) Addressed in This Session:   Client talked about, and provided data, tracking several symptoms of PMDD over the month of September and the first part of October.  Discussed plans to continue tracking and not taking new medications that have been prescribed for possible PMDD management until all data has been collected (approx. 3 more weeks).  Client discussed difficulties with sleep and with getting her daughter to sleep early. Scheduled future appointment.      Intervention:   CBT: Assigned homework; Pattern recognition; Problem solve; Reviewed tracked data for possible PMDD; Made final plans to continue gathering data; Psychoeducation; Socratic Questioning.   Active listening, validation, and other rapport building skills.    Assessments completed prior to visit:  The following " assessments were completed by patient for this visit:  PHQ9:       1/24/2024     3:05 PM 2/19/2024     2:57 PM 4/8/2024     3:41 PM 6/4/2024     3:00 PM 8/6/2024    10:33 AM 9/13/2024    12:59 PM 10/8/2024     1:56 PM   PHQ-9 SCORE   PHQ-9 Total Score MyChart  6 (Mild depression) 5 (Mild depression) 4 (Minimal depression) 8 (Mild depression)  11 (Moderate depression)   PHQ-9 Total Score 1 6 5 4 8 11 11     GAD7:       12/5/2023     3:00 PM 1/3/2024     9:27 AM 1/23/2024     2:59 PM 4/8/2024     3:42 PM 8/6/2024    10:34 AM 9/13/2024    11:31 AM 10/8/2024     1:57 PM   BALA-7 SCORE   Total Score 7 (mild anxiety)  12 (moderate anxiety) 7 (mild anxiety) 10 (moderate anxiety) 7 (mild anxiety) 8 (mild anxiety)   Total Score 7 13 12 7 10 7 8           ASSESSMENT: Current Emotional / Mental Status (status of significant symptoms):   Risk status (Self / Other harm or suicidal ideation)   Patient denies current fears or concerns for personal safety.   Patient denies current or recent suicidal ideation or behaviors.   Patient denies current or recent homicidal ideation or behaviors.   Patient denies current or recent self injurious behavior or ideation.   Patient denies other safety concerns.   Patient reports there has been no change in risk factors since their last session.     Patient reports there has been no change in protective factors since their last session.     Recommended that patient call 911 or go to the local ED should there be a change in any of these risk factors.     Appearance:   Appropriate    Eye Contact:   Good    Psychomotor Behavior: Normal    Attitude:   Cooperative  Friendly Pleasant   Orientation:   All   Speech    Rate / Production: Normal/ Responsive Talkative    Volume:  Normal    Mood:    Normal   Affect:    Appropriate  Bright    Thought Content:  Clear    Thought Form:  Coherent  Logical    Insight:    Good      Medication Review:   No changes to current psychiatric medication(s)     Medication  "Compliance:   Yes     Changes in Health Issues:   None reported     Chemical Use Review:   Substance Use: Chemical use reviewed, no active concerns identified      Tobacco Use: Same.    Diagnosis:  1. Generalized Anxiety Disorder    2. Unspecified Depressive Disorder    3. Social anxiety disorder        Collateral Reports Completed:   Not Applicable    PLAN: (Patient Tasks / Therapist Tasks / Other)  Client will track symptoms using a tracking sheet or grace.  She made a future appointment for November 18, 2024.  Client is also on the Wait List.        Mejia Gonzalez, PsyD                                              _____________________________________________________________________    Individual Treatment Plan    Patient's Name: Scott Dobbs  YOB: 1985    Date of Creation: 7/7/22 (cont'd from previous therapist)  Date Treatment Plan Last Reviewed/Revised: 6/4/24    DSM5 Diagnoses: 311 (F32.9) Unspecified Depressive Disorder  or 300.23 (F40.10) Social Anxiety Disorder  300.02 (F41.1) Generalized Anxiety Disorder  Psychosocial / Contextual Factors:  postpartum within past year, transition back to work in February, coping with pandemic  PROMIS (reviewed every 90 days): 27 (on 4/8/24)    Referral / Collaboration:  Referral to another professional/service is not indicated at this time..    Anticipated number of session for this episode of care: 36  Anticipation frequency of session: Monthly  Anticipated Duration of each session: 38-52 minutes  Treatment plan will be reviewed in 90 days or when goals have been changed.       MeasurableTreatment Goal(s) related to diagnosis / functional impairment(s)  Goal 1: Patient will reduce symptoms of anxiety as evidenced by decreased score on BALA 7.     I will know I've met my goal when I worry less in social/work interactions.  - \"I'd like to bring down my anxiety at work still.  I'd also like to lower my times of 'high' anxiety.\"    \"I think I want to " "work on the frustration level when I start to get overwhelmed.\" - Stressed used Time Outs and relaxation techniques. - Client feels she is in maintenance stage with this goal.     Objective #A  Patient will identify three distraction and diversion activities and use those activities to decrease level of anxiety.                     Status: Renewed - Date: 6/4/24     Intervention(s)  [facility] will teach distress tolerance, mindfulness, and relaxation techniques.     Objective #B  Patient will use cognitive strategies identified in therapy to challenge anxious thoughts.  Status: Renewed - Date: 6/4/24  Intervention(s)  [facility] will assign homework to practice cognitive restructuring and defusion techniques.     Objective #C  Patient will use relaxation strategies 2-3 times per day to reduce the physical symptoms of anxiety.  \"I would like to step back sooner, or catch it sooner, so I can just step back and use skills and stay engaged with what's frustrating me instead of stepping away (part w/her daughter).\"  Status: Renewed - Date: 6/4/24     Intervention(s)  [facility] will teach relaxation techniques.        Goal 2: Patient will reduce symptoms of depression as evidenced by decreased scores on PHQ9.    I will know I've met my goal when I feel like I can start and end tasks, find more motivation.       This goal was changed to be on a maintenance schedule.  Client will leave this as a goal until Winter when she tends to struggle more with depression.    Objective #A (Patient Action)                          Status: Renewed - Date: 6/4/24     Patient will Increase interest, engagement, and pleasure in doing things.     Intervention(s)  [facility] will explore strategies to help increase motivation and interest.     Objective #B  Patient will Identify negative self-talk and behaviors: challenge core beliefs, myths, and actions.                  Status: Completed: - Date: 6/4/24      Intervention(s)  [facility] will continue to explore automatic " unhelpful/unhealthy thoughts and beliefs, and begin to create new helpeful/helpful automatic thoughts and beliefs.        Patient has reviewed and agreed to the above plan.      Mejia Gonzalez PsyD  October 8, 2024

## 2024-10-13 ENCOUNTER — HEALTH MAINTENANCE LETTER (OUTPATIENT)
Age: 39
End: 2024-10-13

## 2024-10-31 DIAGNOSIS — I10 BENIGN ESSENTIAL HYPERTENSION: ICD-10-CM

## 2024-10-31 RX ORDER — LABETALOL 100 MG/1
TABLET, FILM COATED ORAL
Qty: 180 TABLET | Refills: 0 | Status: SHIPPED | OUTPATIENT
Start: 2024-10-31

## 2024-11-01 DIAGNOSIS — F41.8 SITUATIONAL ANXIETY: ICD-10-CM

## 2024-11-04 RX ORDER — SERTRALINE HYDROCHLORIDE 25 MG/1
25 TABLET, FILM COATED ORAL DAILY
Qty: 30 TABLET | Refills: 1 | Status: SHIPPED | OUTPATIENT
Start: 2024-11-04 | End: 2024-11-11

## 2024-11-04 NOTE — TELEPHONE ENCOUNTER
Called patient and made appointment next month. Nothing else needed.       Kayleigh MARIEE CMA ([facility])

## 2024-11-08 ENCOUNTER — VIRTUAL VISIT (OUTPATIENT)
Dept: PSYCHOLOGY | Facility: CLINIC | Age: 39
End: 2024-11-08
Payer: COMMERCIAL

## 2024-11-08 DIAGNOSIS — F32.9 DEPRESSION, REACTIVE: ICD-10-CM

## 2024-11-08 DIAGNOSIS — F41.1 GAD (GENERALIZED ANXIETY DISORDER): Primary | ICD-10-CM

## 2024-11-08 DIAGNOSIS — F40.10 SOCIAL ANXIETY DISORDER: ICD-10-CM

## 2024-11-08 DIAGNOSIS — N94.3 PMS (PREMENSTRUAL SYNDROME): ICD-10-CM

## 2024-11-08 PROCEDURE — 90834 PSYTX W PT 45 MINUTES: CPT | Mod: 95 | Performed by: PSYCHOLOGIST

## 2024-11-08 NOTE — PROGRESS NOTES
"[facility]                                     Progress Note    Patient Name: Scott Dobbs  Date: 11/8/24         Service Type: Individual      Session Start Time: 8:00am Session End Time: 8:52am     Session Length: 52 minutes    Session #: 33 (transfer from Dawn Franks)    Attendees: Client attended alone    Service Modality:  Video Visit:      Provider verified identity through the following two step process.  Patient provided:  Patient photo    Telemedicine Visit: The patient's condition can be safely assessed and treated via synchronous audio and visual telemedicine encounter.      Reason for Telemedicine Visit: Services only offered telehealth    Originating Site (Patient Location): Patient's home    Distant Site (Provider Location): Provider Remote Setting- Home Office    Consent:  The patient/guardian has verbally consented to: the potential risks and benefits of telemedicine (video visit) versus in person care; bill my insurance or make self-payment for services provided; and responsibility for payment of non-covered services.     Patient would like the video invitation sent by:  Send to e-mail at: [email or web address]    Mode of Communication:  Video Conference via [facility]    As the provider I attest to compliance with applicable laws and regulations related to telemedicine.    DATA  Interactive Complexity: No  Crisis: No        Progress Since Last Session (Related to Symptoms / Goals / Homework):   Symptoms: No change : Stable.  \"I'm ok.\"    Homework: Completed in session      Episode of Care Goals: Satisfactory progress - ACTION (Actively working towards change); Intervened by reinforcing change plan / affirming steps taken     Current / Ongoing Stressors and Concerns:   Very busy at home. Client believes she may struggle with PMDD.      Notes on 11/8/24 Session:  Client sent a second month of tracking on symptoms possibly related to PMDD. Diagnosed PMDD based on " symptoms.    Client explored thoughts/feelings regarding the election results. Discussed skills.         Treatment Objective(s) Addressed in This Session:   Client talked about thoughts/feelings (stress) regarding recent results of the election (president). Processed and discussed skills to manage.  Reviewed the second month of tracking of symptoms for possible PMDD.  Diagnosed PMDD. Discussed pros of knowing about affects of PMDD (can intervene).      Intervention:   CBT: Assigned homework; Pattern recognition; Problem solve; explored stressful event (election results) and identified skills; Reviewed tracked data for possible PMDD and diagnosed PMDD, reviewed some skills to use with PMDD; Psychoeducation; Socratic Questioning.   Active listening, validation, and other rapport building skills.    Assessments completed prior to visit:  The following assessments were completed by patient for this visit:  PHQ9:       2/19/2024     2:57 PM 4/8/2024     3:41 PM 6/4/2024     3:00 PM 8/6/2024    10:33 AM 9/13/2024    12:59 PM 10/8/2024     1:56 PM 11/8/2024     6:44 AM   PHQ-9 SCORE   PHQ-9 Total Score MyChart 6 (Mild depression) 5 (Mild depression) 4 (Minimal depression) 8 (Mild depression)  11 (Moderate depression) 6 (Mild depression)   PHQ-9 Total Score 6 5 4 8 11 11 6        Patient-reported     GAD7:       12/5/2023     3:00 PM 1/3/2024     9:27 AM 1/23/2024     2:59 PM 4/8/2024     3:42 PM 8/6/2024    10:34 AM 9/13/2024    11:31 AM 10/8/2024     1:57 PM   BALA-7 SCORE   Total Score 7 (mild anxiety)  12 (moderate anxiety) 7 (mild anxiety) 10 (moderate anxiety) 7 (mild anxiety) 8 (mild anxiety)   Total Score 7 13 12 7 10 7 8           ASSESSMENT: Current Emotional / Mental Status (status of significant symptoms):   Risk status (Self / Other harm or suicidal ideation)   Patient denies current fears or concerns for personal safety.   Patient denies current or recent suicidal ideation or behaviors.   Patient denies current  or recent homicidal ideation or behaviors.   Patient denies current or recent self injurious behavior or ideation.   Patient denies other safety concerns.   Patient reports there has been no change in risk factors since their last session.     Patient reports there has been no change in protective factors since their last session.     Recommended that patient call 911 or go to the local ED should there be a change in any of these risk factors.     Appearance:   Appropriate    Eye Contact:   Good    Psychomotor Behavior: Normal    Attitude:   Cooperative  Friendly Pleasant   Orientation:   All   Speech    Rate / Production: Normal/ Responsive Talkative    Volume:  Normal    Mood:    Normal   Affect:    Appropriate  Bright    Thought Content:  Clear    Thought Form:  Coherent  Logical    Insight:    Good      Medication Review:   No changes to current psychiatric medication(s)     Medication Compliance:   Yes     Changes in Health Issues:   None reported     Chemical Use Review:   Substance Use: Chemical use reviewed, no active concerns identified      Tobacco Use: Same.    Diagnosis:  1. Generalized Anxiety Disorder    2. Premenstrual Dysphoric Syndrome    3. Unspecified Depressive Disorder    4. Social anxiety disorder        Collateral Reports Completed:   Not Applicable    PLAN: (Patient Tasks / Therapist Tasks / Other)  Client will talk to her prescribing professional regarding her new diagnosis of PMDD.  Client will use skills discussed today to manage thoughts/feelings (possible Worst Case Scenario thinking) about the election results. She made a future appointment for November 18, 2024.        Mejia Gonzalez PsyD                                              _____________________________________________________________________    Individual Treatment Plan    Patient's Name: Scott Dobbs  YOB: 1985    Date of Creation: 7/7/22 (cont'd from previous therapist)  Date Treatment Plan Last  "Reviewed/Revised: 6/4/24    DSM5 Diagnoses: 311 (F32.9) Unspecified Depressive Disorder  or 300.23 (F40.10) Social Anxiety Disorder  300.02 (F41.1) Generalized Anxiety Disorder  Psychosocial / Contextual Factors:  postpartum within past year, transition back to work in February, coping with pandemic  PROMIS (reviewed every 90 days): 27 (on 4/8/24)    Referral / Collaboration:  Referral to another professional/service is not indicated at this time..    Anticipated number of session for this episode of care: 36  Anticipation frequency of session: Monthly  Anticipated Duration of each session: 38-52 minutes  Treatment plan will be reviewed in 90 days or when goals have been changed.       MeasurableTreatment Goal(s) related to diagnosis / functional impairment(s)  Goal 1: Patient will reduce symptoms of anxiety as evidenced by decreased score on BALA 7.     I will know I've met my goal when I worry less in social/work interactions.  - \"I'd like to bring down my anxiety at work still.  I'd also like to lower my times of 'high' anxiety.\"    \"I think I want to work on the frustration level when I start to get overwhelmed.\" - Stressed used Time Outs and relaxation techniques. - Client feels she is in maintenance stage with this goal.     Objective #A  Patient will identify three distraction and diversion activities and use those activities to decrease level of anxiety.                     Status: Renewed - Date: 6/4/24     Intervention(s)  [facility] will teach distress tolerance, mindfulness, and relaxation techniques.     Objective #B  Patient will use cognitive strategies identified in therapy to challenge anxious thoughts.  Status: Renewed - Date: 6/4/24  Intervention(s)  [facility] will assign homework to practice cognitive restructuring and defusion techniques.     Objective #C  Patient will use relaxation strategies 2-3 times per day to reduce the physical symptoms of anxiety.  \"I would like to step back sooner, or catch it " "sooner, so I can just step back and use skills and stay engaged with what's frustrating me instead of stepping away (part w/her daughter).\"  Status: Renewed - Date: 6/4/24     Intervention(s)  [facility] will teach relaxation techniques.        Goal 2: Patient will reduce symptoms of depression as evidenced by decreased scores on PHQ9.    I will know I've met my goal when I feel like I can start and end tasks, find more motivation.       This goal was changed to be on a maintenance schedule.  Client will leave this as a goal until Winter when she tends to struggle more with depression.    Objective #A (Patient Action)                          Status: Renewed - Date: 6/4/24     Patient will Increase interest, engagement, and pleasure in doing things.     Intervention(s)  C will explore strategies to help increase motivation and interest.     Objective #B  Patient will Identify negative self-talk and behaviors: challenge core beliefs, myths, and actions.                  Status: Completed: - Date: 6/4/24      Intervention(s)  [facility] will continue to explore automatic unhelpful/unhealthy thoughts and beliefs, and begin to create new helpeful/helpful automatic thoughts and beliefs.        Patient has reviewed and agreed to the above plan.      Mejia Gonzalez PsyD  November 8, 2024  "

## 2024-11-11 ENCOUNTER — VIRTUAL VISIT (OUTPATIENT)
Dept: FAMILY MEDICINE | Facility: CLINIC | Age: 39
End: 2024-11-11
Payer: COMMERCIAL

## 2024-11-11 DIAGNOSIS — F41.8 SITUATIONAL ANXIETY: ICD-10-CM

## 2024-11-11 DIAGNOSIS — F32.81 PMDD (PREMENSTRUAL DYSPHORIC DISORDER): Primary | ICD-10-CM

## 2024-11-11 PROCEDURE — 99214 OFFICE O/P EST MOD 30 MIN: CPT | Mod: 95 | Performed by: PHYSICIAN ASSISTANT

## 2024-11-11 PROCEDURE — G2211 COMPLEX E/M VISIT ADD ON: HCPCS | Mod: 95 | Performed by: PHYSICIAN ASSISTANT

## 2024-11-11 RX ORDER — FAMOTIDINE 10 MG
10 TABLET ORAL 2 TIMES DAILY
COMMUNITY

## 2024-11-11 RX ORDER — SERTRALINE HYDROCHLORIDE 25 MG/1
25 TABLET, FILM COATED ORAL DAILY
Qty: 60 TABLET | Refills: 1 | Status: SHIPPED | OUTPATIENT
Start: 2024-11-11 | End: 2024-11-12 | Stop reason: DRUGHIGH

## 2024-11-11 NOTE — PROGRESS NOTES
Scott is a 39 year old who is being evaluated via a billable video visit.    How would you like to obtain your AVS? MyChart  If the video visit is dropped, the invitation should be resent by: Text to cell phone: [phone]  Will anyone else be joining your video visit? No      Assessment & Plan     Situational anxiety  Will continue this for now and try increasing the dose the 2 weeks before her period.  Still will consider OCP or changing in IUD.    - sertraline (ZOLOFT) 25 MG tablet; Take 1 tablet (25 mg) by mouth daily. Take 2 for 2 weeks out of the month around period    PMDD (premenstrual dysphoric disorder)  As above    Follow up in a month   The longitudinal plan of care for the diagnosis(es)/condition(s) as documented were addressed during this visit. Due to the added complexity in care, I will continue to support Scott in the subsequent management and with ongoing continuity of care.                Subjective   Scott is a 39 year old, presenting for the following health issues:  Follow Up      11/11/2024     5:28 PM   Additional Questions   Roomed by Heide   Accompanied by self     History of Present Illness       Reason for visit:  PMDD    She eats 2-3 servings of fruits and vegetables daily.She consumes 0 sweetened beverage(s) daily.She exercises with enough effort to increase her heart rate 10 to 19 minutes per day.  She exercises with enough effort to increase her heart rate 5 days per week.   She is taking medications regularly.             PMDD treatment plan   Lot of mood changes around period  Zoloft started this week so not sure if helping yet   LMP last month so is due soon.    Tried pepcid for mood changes and it seems to help a little.                  Objective           Vitals:  No vitals were obtained today due to virtual visit.    Physical Exam   GENERAL: alert and no distress  EYES: Eyes grossly normal to inspection.  No discharge or erythema, or obvious scleral/conjunctival  abnormalities.  RESP: No audible wheeze, cough, or visible cyanosis.    SKIN: Visible skin clear. No significant rash, abnormal pigmentation or lesions.  NEURO: Cranial nerves grossly intact.  Mentation and speech appropriate for age.  PSYCH: Appropriate affect, tone, and pace of words          Video-Visit Details    Type of service:  Video Visit   Originating Location (pt. Location): Home    Distant Location (provider location):  On-site  Platform used for Video Visit: AnnitaWell  Signed Electronically by: Anushka Canales PA-C

## 2024-11-12 ENCOUNTER — E-VISIT (OUTPATIENT)
Dept: URGENT CARE | Facility: CLINIC | Age: 39
End: 2024-11-12
Payer: COMMERCIAL

## 2024-11-12 ENCOUNTER — MYC MEDICAL ADVICE (OUTPATIENT)
Dept: FAMILY MEDICINE | Facility: CLINIC | Age: 39
End: 2024-11-12

## 2024-11-12 ENCOUNTER — TELEPHONE (OUTPATIENT)
Dept: FAMILY MEDICINE | Facility: CLINIC | Age: 39
End: 2024-11-12

## 2024-11-12 DIAGNOSIS — B96.89 ACUTE BACTERIAL SINUSITIS: Primary | ICD-10-CM

## 2024-11-12 DIAGNOSIS — J01.90 ACUTE BACTERIAL SINUSITIS: Primary | ICD-10-CM

## 2024-11-12 DIAGNOSIS — F32.81 PMDD (PREMENSTRUAL DYSPHORIC DISORDER): Primary | ICD-10-CM

## 2024-11-12 NOTE — TELEPHONE ENCOUNTER
Sertraline HCL 25mg. Insurance max is 1 per day. Change to 50mg or complete a PA. Please advise.  Zaynab Burdick CMA

## 2024-11-12 NOTE — PATIENT INSTRUCTIONS
Acute Sinusitis: Care Instructions  Overview     Acute sinusitis is an inflammation of the mucous membranes inside the nose and sinuses. Sinuses are the hollow spaces in your skull around the eyes and nose. Acute sinusitis often follows a cold. Acute sinusitis causes thick, discolored mucus that drains from the nose or down the back of the throat. It also can cause pain and pressure in your head and face along with a stuffy or blocked nose.  In most cases, sinusitis gets better on its own in 1 to 2 weeks. But some mild symptoms may last for several weeks. Sometimes antibiotics are needed if there is a bacterial infection.  Follow-up care is a key part of your treatment and safety. Be sure to make and go to all appointments, and call your doctor if you are having problems. It's also a good idea to know your test results and keep a list of the medicines you take.  How can you care for yourself at home?  Use saline (saltwater) nasal washes. This can help keep your nasal passages open and wash out mucus and allergens.  You can buy saline nose washes at a grocery store or drugstore. Follow the instructions on the package.  You can make your own at home. Add 1 teaspoon of non-iodized salt and 1 teaspoon of baking soda to 2 cups of distilled or boiled and cooled water. Fill a squeeze bottle or a nasal cleansing pot (such as a neti pot) with the nasal wash. Then put the tip into your nostril, and lean over the sink. With your mouth open, gently squirt the liquid. Repeat on the other side.  Try a decongestant nasal spray like oxymetazoline (Afrin). Do not use it for more than 3 days in a row. Using it for more than 3 days can make your congestion worse.  If needed, take an over-the-counter pain medicine, such as acetaminophen (Tylenol), ibuprofen (Advil, Motrin), or naproxen (Aleve). Read and follow all instructions on the label.  If the doctor prescribed antibiotics, take them as directed. Do not stop taking them just  "because you feel better. You need to take the full course of antibiotics.  Be careful when taking over-the-counter cold or flu medicines and Tylenol at the same time. Many of these medicines have acetaminophen, which is Tylenol. Read the labels to make sure that you are not taking more than the recommended dose. Too much acetaminophen (Tylenol) can be harmful.  Try a steroid nasal spray. It may help with your symptoms.  Breathe warm, moist air. You can use a steamy shower, a hot bath, or a sink filled with hot water. Avoid cold, dry air. Using a humidifier in your home may help. Follow the directions for cleaning the machine.  When should you call for help?   Call your doctor now or seek immediate medical care if:    You have new or worse swelling, redness, or pain in your face or around one or both of your eyes.     You have double vision or a change in your vision.     You have a high fever.     You have a severe headache and a stiff neck.     You have mental changes, such as feeling confused or much less alert.   Watch closely for changes in your health, and be sure to contact your doctor if:    You are not getting better as expected.   Where can you learn more?  Go to https://www.RockYou.net/patiented  Enter I933 in the search box to learn more about \"Acute Sinusitis: Care Instructions.\"  Current as of: September 27, 2023  Content Version: 14.2 2024 [facility] [employer].   Care instructions adapted under license by your healthcare professional. If you have questions about a medical condition or this instruction, always ask your healthcare professional. [employer] disclaims any warranty or liability for your use of this information.    Dear Scott Dobbs    Based on your responses and diagnosis, I have prescribed Augmentin  to treat your symptoms. I have sent this to your pharmacy.?     It is also important to stay well hydrated, get lots of rest and take over-the-counter " decongestants,?tylenol?or ibuprofen if you?are able to?take those medications per your primary care provider to help relieve discomfort.?     It is important that you take?all of?your prescribed medication even if your symptoms are improving after a few doses.? Taking?all of?your medicine helps prevent the symptoms from returning.?     If your symptoms worsen, you develop severe headache, vomiting, high fever (>102), or are not improving in 7 days, please contact your primary care provider for an appointment or visit any of our convenient Walk-in Care or Urgent Care Centers to be seen which can be found on our website?here.?     Thanks again for choosing?us?as your health care partner,?   ?  Juan José Mirza MD?   Thank you for choosing us for your care. I have placed an order for a prescription so that you can start treatment. View your full visit summary for details by clicking on the link below. Your pharmacist will able to address any questions you may have about the medication.     If you're not feeling better within 5-7 days, please schedule an appointment.  You can schedule an appointment right here in [facility], or call [phone]  If the visit is for the same symptoms as your eVisit, we'll refund the cost of your eVisit if seen within seven days.

## 2024-11-18 ENCOUNTER — VIRTUAL VISIT (OUTPATIENT)
Dept: PSYCHOLOGY | Facility: CLINIC | Age: 39
End: 2024-11-18
Payer: COMMERCIAL

## 2024-11-18 DIAGNOSIS — N94.3 PMS (PREMENSTRUAL SYNDROME): ICD-10-CM

## 2024-11-18 DIAGNOSIS — F40.10 SOCIAL ANXIETY DISORDER: ICD-10-CM

## 2024-11-18 DIAGNOSIS — F41.1 GAD (GENERALIZED ANXIETY DISORDER): Primary | ICD-10-CM

## 2024-11-18 PROCEDURE — 90834 PSYTX W PT 45 MINUTES: CPT | Mod: 95 | Performed by: PSYCHOLOGIST

## 2024-12-17 ENCOUNTER — VIRTUAL VISIT (OUTPATIENT)
Dept: FAMILY MEDICINE | Facility: CLINIC | Age: 39
End: 2024-12-17
Payer: COMMERCIAL

## 2024-12-17 DIAGNOSIS — I10 BENIGN ESSENTIAL HYPERTENSION: ICD-10-CM

## 2024-12-17 DIAGNOSIS — G43.009 MIGRAINE WITHOUT AURA AND WITHOUT STATUS MIGRAINOSUS, NOT INTRACTABLE: Primary | ICD-10-CM

## 2024-12-17 DIAGNOSIS — F32.81 PMDD (PREMENSTRUAL DYSPHORIC DISORDER): ICD-10-CM

## 2024-12-17 PROCEDURE — G2211 COMPLEX E/M VISIT ADD ON: HCPCS | Mod: 95 | Performed by: PHYSICIAN ASSISTANT

## 2024-12-17 PROCEDURE — 99214 OFFICE O/P EST MOD 30 MIN: CPT | Mod: 95 | Performed by: PHYSICIAN ASSISTANT

## 2024-12-17 RX ORDER — LABETALOL 100 MG/1
TABLET, FILM COATED ORAL
Qty: 180 TABLET | Refills: 0 | Status: SHIPPED | OUTPATIENT
Start: 2024-12-17

## 2024-12-17 RX ORDER — SUMATRIPTAN 50 MG/1
50 TABLET, FILM COATED ORAL
Qty: 20 TABLET | Refills: 3 | Status: SHIPPED | OUTPATIENT
Start: 2024-12-17

## 2024-12-17 RX ORDER — NORGESTIMATE AND ETHINYL ESTRADIOL 0.25-0.035
1 KIT ORAL DAILY
Qty: 84 TABLET | Refills: 3 | Status: SHIPPED | OUTPATIENT
Start: 2024-12-17

## 2024-12-17 ASSESSMENT — ANXIETY QUESTIONNAIRES
1. FEELING NERVOUS, ANXIOUS, OR ON EDGE: SEVERAL DAYS
IF YOU CHECKED OFF ANY PROBLEMS ON THIS QUESTIONNAIRE, HOW DIFFICULT HAVE THESE PROBLEMS MADE IT FOR YOU TO DO YOUR WORK, TAKE CARE OF THINGS AT HOME, OR GET ALONG WITH OTHER PEOPLE: SOMEWHAT DIFFICULT
7. FEELING AFRAID AS IF SOMETHING AWFUL MIGHT HAPPEN: SEVERAL DAYS
2. NOT BEING ABLE TO STOP OR CONTROL WORRYING: SEVERAL DAYS
5. BEING SO RESTLESS THAT IT IS HARD TO SIT STILL: NOT AT ALL
3. WORRYING TOO MUCH ABOUT DIFFERENT THINGS: SEVERAL DAYS
6. BECOMING EASILY ANNOYED OR IRRITABLE: SEVERAL DAYS
GAD7 TOTAL SCORE: 6
GAD7 TOTAL SCORE: 6

## 2024-12-17 ASSESSMENT — PATIENT HEALTH QUESTIONNAIRE - PHQ9
5. POOR APPETITE OR OVEREATING: SEVERAL DAYS
SUM OF ALL RESPONSES TO PHQ QUESTIONS 1-9: 8

## 2024-12-17 NOTE — PROGRESS NOTES
Scott is a 39 year old who is being evaluated via a billable video visit.    How would you like to obtain your AVS? MyChart  If the video visit is dropped, the invitation should be resent by: Text to cell phone: [phone]  Will anyone else be joining your video visit? No      Assessment & Plan     Migraine without aura and without status migrainosus, not intractable  Have been stable.  She will monitor for worsening or changing pain with start of ocp.    - norgestimate-ethinyl estradiol (ORTHO-CYCLEN) 0.25-35 MG-MCG tablet; Take 1 tablet by mouth daily.    PMDD (premenstrual dysphoric disorder)  As above and continue sertraline   - sertraline (ZOLOFT) 50 MG tablet; Take 1 tablet (50 mg) by mouth daily.    Benign essential hypertension  Stable.  Recheck with start of ocp in about a month   - labetalol (NORMODYNE) 100 MG tablet; TAKE 1 TABLET BY MOUTH TWICE A DAY    The longitudinal plan of care for the diagnosis(es)/condition(s) as documented were addressed during this visit. Due to the added complexity in care, I will continue to support Scott in the subsequent management and with ongoing continuity of care.            Subjective   Scott is a 39 year old, presenting for the following health issues:  Recheck Medication      12/17/2024     2:55 PM   Additional Questions   Roomed by Heide   Accompanied by self     History of Present Illness       Reason for visit:  PMDD Med review    She eats 2-3 servings of fruits and vegetables daily.She consumes 1 sweetened beverage(s) daily.She exercises with enough effort to increase her heart rate 9 or less minutes per day.  She exercises with enough effort to increase her heart rate 3 or less days per week. She is missing 1 dose(s) of medications per week.  She is not taking prescribed medications regularly due to other.       Medication Followup of Sertraline   Taking Medication as prescribed: yes  Side Effects:  None  Medication Helping Symptoms:  yes  Dose increase  helped.  25mg daily other than 2 weeks before periods.    Wants to think about hormonal contraception.    No hx of blood clots.    Gets migraines without auras now.  Gets one a month.              Objective           Vitals:  No vitals were obtained today due to virtual visit.    Physical Exam   GENERAL: alert and no distress  EYES: Eyes grossly normal to inspection.  No discharge or erythema, or obvious scleral/conjunctival abnormalities.  RESP: No audible wheeze, cough, or visible cyanosis.    SKIN: Visible skin clear. No significant rash, abnormal pigmentation or lesions.  NEURO: Cranial nerves grossly intact.  Mentation and speech appropriate for age.  PSYCH: Appropriate affect, tone, and pace of words          Video-Visit Details    Type of service:  Video Visit   Originating Location (pt. Location): Home    Distant Location (provider location):  On-site  Platform used for Video Visit: Patricio  Signed Electronically by: Anushka Canales PA-C

## 2025-01-09 ENCOUNTER — VIRTUAL VISIT (OUTPATIENT)
Dept: PSYCHOLOGY | Facility: CLINIC | Age: 40
End: 2025-01-09
Payer: COMMERCIAL

## 2025-01-09 DIAGNOSIS — N94.3 PMS (PREMENSTRUAL SYNDROME): ICD-10-CM

## 2025-01-09 DIAGNOSIS — F41.1 GAD (GENERALIZED ANXIETY DISORDER): Primary | ICD-10-CM

## 2025-01-09 DIAGNOSIS — F40.10 SOCIAL ANXIETY DISORDER: ICD-10-CM

## 2025-01-09 NOTE — PROGRESS NOTES
"[facility]                                     Progress Note    Patient Name: Scott Dobbs  Date: 1/9/25         Service Type: Individual      Session Start Time: 8:00am Session End Time: 8:52am     Session Length: 52 minutes    Session #: 35 (transfer from Dawn Franks)    Attendees: Client attended alone    Service Modality:  Video Visit:      Provider verified identity through the following two step process.  Patient provided:  Patient photo    Telemedicine Visit: The patient's condition can be safely assessed and treated via synchronous audio and visual telemedicine encounter.      Reason for Telemedicine Visit: Services only offered telehealth    Originating Site (Patient Location): Patient's home    Distant Site (Provider Location): Provider Remote Setting- Home Office    Consent:  The patient/guardian has verbally consented to: the potential risks and benefits of telemedicine (video visit) versus in person care; bill my insurance or make self-payment for services provided; and responsibility for payment of non-covered services.     Patient would like the video invitation sent by:  Send to e-mail at: [email or web address]    Mode of Communication:  Video Conference via [facility]    As the provider I attest to compliance with applicable laws and regulations related to telemedicine.    DATA  Interactive Complexity: No  Crisis: No        Progress Since Last Session (Related to Symptoms / Goals / Homework):   Symptoms: No change : Stable.  \"I'm good.\"    Homework: Completed in session      Episode of Care Goals: Satisfactory progress - ACTION (Actively working towards change); Intervened by reinforcing change plan / affirming steps taken     Current / Ongoing Stressors and Concerns:   Holidays and work.      Notes on 1/9/25 Session:  Client talked about having multiple social get-togethers during the holidays plus some one-to-one time with her daughter.    Work is going well.  \"(My " "boss) sent kind of a snarky email.\" Explained.  \"I don't know how to respond.\"  Role-modeled possible response to boss.  Cl talked about what she has done to this point to address the email and issues brought up in the email.  Cl schedules work tasks/duties around her new PMDD diagnosis.    Cl manages symptoms of PMDD through Mindfulness and medications (birth control and antidepressants).  Tracking symptoms still.  Uses 'SILVER: Self Care Pet' application for self care.  Helps build in healthy habits.    Working on sleep routine.  Discussed barriers to getting to bed earlier (What activities do you do instead, thought process?).    HW: \"Sticking with the habits that I've started which is mainly washes the dishes once per week (newly added). Clean for five minutes when I get home.\"       Treatment Objective(s) Addressed in This Session:   Client discussed how she is working on introducing healthy habits to her daily routines. Discussed stress at work and how she is managing it.  Reviewed and assigned homework. Client talked about a new grace that is helping her developing healthy habits ([employer]).  Also discussed how she is managing symptoms of PMDD.      Intervention:   CBT: Reviewed and assigned homework; Pattern recognition; Problem solve; Skills recommendation (Role-model responding to an email from her boss); Psychoeducation; Socratic Questioning; Skill review.   Active listening, validation, and other rapport building skills; Sleep routine; Developing healthy routines.    Assessments completed prior to visit:  The following assessments were completed by patient for this visit:  PHQ9:       8/6/2024    10:33 AM 9/13/2024    12:59 PM 10/8/2024     1:56 PM 11/8/2024     6:44 AM 11/18/2024     2:58 PM 12/17/2024     2:56 PM 1/8/2025     7:34 AM   PHQ-9 SCORE   PHQ-9 Total Score MyChart 8 (Mild depression)  11 (Moderate depression) 6 (Mild depression) 7 (Mild depression)  7 (Mild depression)   PHQ-9 Total Score 8 11 11 " 6  7  8 7        Patient-reported     GAD7:       1/3/2024     9:27 AM 1/23/2024     2:59 PM 4/8/2024     3:42 PM 8/6/2024    10:34 AM 9/13/2024    11:31 AM 10/8/2024     1:57 PM 12/17/2024     2:56 PM   BALA-7 SCORE   Total Score  12 (moderate anxiety) 7 (mild anxiety) 10 (moderate anxiety) 7 (mild anxiety) 8 (mild anxiety)    Total Score 13 12 7 10 7 8 6           ASSESSMENT: Current Emotional / Mental Status (status of significant symptoms):   Risk status (Self / Other harm or suicidal ideation)   Patient denies current fears or concerns for personal safety.   Patient denies current or recent suicidal ideation or behaviors.   Patient denies current or recent homicidal ideation or behaviors.   Patient denies current or recent self injurious behavior or ideation.   Patient denies other safety concerns.   Patient reports there has been no change in risk factors since their last session.     Patient reports there has been no change in protective factors since their last session.     Recommended that patient call 911 or go to the local ED should there be a change in any of these risk factors.     Appearance:   Appropriate    Eye Contact:   Good    Psychomotor Behavior: Normal    Attitude:   Cooperative  Friendly Pleasant   Orientation:   All   Speech    Rate / Production: Normal/ Responsive Talkative    Volume:  Normal    Mood:    Normal   Affect:    Appropriate  Bright    Thought Content:  Clear    Thought Form:  Coherent  Logical    Insight:    Good      Medication Review:   No changes to current psychiatric medication(s)     Medication Compliance:   Yes     Changes in Health Issues:   None reported     Chemical Use Review:   Substance Use: Chemical use reviewed, no active concerns identified      Tobacco Use: Same.    Diagnosis:  1. Generalized Anxiety Disorder    2. Premenstrual Dysphoric Syndrome    3. Social anxiety disorder        Collateral Reports Completed:   Not Applicable    PLAN: (Patient Tasks / Therapist  "Tasks / Other)  Cl will continue to track her mood each day, as well as, \"Sticking with the habits that I've started and to add in washing the dishes once per week. Clean for five minutes when I get home.\" She made a future appointment for February 28, 2025.        Mejia Gonzalez, PsyD                                              _____________________________________________________________________    Individual Treatment Plan    Patient's Name: Scott Dobbs  YOB: 1985    Date of Creation: 7/7/22 (cont'd from previous therapist)  Date Treatment Plan Last Reviewed/Revised: 6/4/24    DSM5 Diagnoses: 311 (F32.9) Unspecified Depressive Disorder  or 300.23 (F40.10) Social Anxiety Disorder  300.02 (F41.1) Generalized Anxiety Disorder  Psychosocial / Contextual Factors:  postpartum within past year, transition back to work in February, coping with pandemic  PROMIS (reviewed every 90 days): 27 (on 4/8/24)    Referral / Collaboration:  Referral to another professional/service is not indicated at this time..    Anticipated number of session for this episode of care: 36  Anticipation frequency of session: Monthly  Anticipated Duration of each session: 38-52 minutes  Treatment plan will be reviewed in 90 days or when goals have been changed.       MeasurableTreatment Goal(s) related to diagnosis / functional impairment(s)  Goal 1: Patient will reduce symptoms of anxiety as evidenced by decreased score on BALA 7.     I will know I've met my goal when I worry less in social/work interactions.  - \"I'd like to bring down my anxiety at work still.  I'd also like to lower my times of 'high' anxiety.\"    \"I think I want to work on the frustration level when I start to get overwhelmed.\" - Stressed used Time Outs and relaxation techniques. - Client feels she is in maintenance stage with this goal.     Objective #A  Patient will identify three distraction and diversion activities and use those activities to " "decrease level of anxiety.                     Status: Renewed - Date: 6/4/24     Intervention(s)  [facility] will teach distress tolerance, mindfulness, and relaxation techniques.     Objective #B  Patient will use cognitive strategies identified in therapy to challenge anxious thoughts.  Status: Renewed - Date: 6/4/24  Intervention(s)  [facility] will assign homework to practice cognitive restructuring and defusion techniques.     Objective #C  Patient will use relaxation strategies 2-3 times per day to reduce the physical symptoms of anxiety.  \"I would like to step back sooner, or catch it sooner, so I can just step back and use skills and stay engaged with what's frustrating me instead of stepping away (part w/her daughter).\"  Status: Renewed - Date: 6/4/24     Intervention(s)  [facility] will teach relaxation techniques.        Goal 2: Patient will reduce symptoms of depression as evidenced by decreased scores on PHQ9.    I will know I've met my goal when I feel like I can start and end tasks, find more motivation.       This goal was changed to be on a maintenance schedule.  Client will leave this as a goal until Winter when she tends to struggle more with depression.    Objective #A (Patient Action)                          Status: Renewed - Date: 6/4/24     Patient will Increase interest, engagement, and pleasure in doing things.     Intervention(s)  [facility] will explore strategies to help increase motivation and interest.     Objective #B  Patient will Identify negative self-talk and behaviors: challenge core beliefs, myths, and actions.                  Status: Completed: - Date: 6/4/24      Intervention(s)  [facility] will continue to explore automatic unhelpful/unhealthy thoughts and beliefs, and begin to create new helpeful/helpful automatic thoughts and beliefs.        Patient has reviewed and agreed to the above plan.      Mejia Gonzalez PsyD  January 9, 2025  "

## 2025-02-28 ENCOUNTER — VIRTUAL VISIT (OUTPATIENT)
Dept: PSYCHOLOGY | Facility: CLINIC | Age: 40
End: 2025-02-28
Payer: COMMERCIAL

## 2025-02-28 DIAGNOSIS — N94.3 PMS (PREMENSTRUAL SYNDROME): ICD-10-CM

## 2025-02-28 DIAGNOSIS — F41.1 GAD (GENERALIZED ANXIETY DISORDER): Primary | ICD-10-CM

## 2025-02-28 DIAGNOSIS — F32.9 DEPRESSION, REACTIVE: ICD-10-CM

## 2025-02-28 DIAGNOSIS — F40.10 SOCIAL ANXIETY DISORDER: ICD-10-CM

## 2025-02-28 PROCEDURE — 90834 PSYTX W PT 45 MINUTES: CPT | Mod: 95 | Performed by: PSYCHOLOGIST
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
## 2025-03-12 ENCOUNTER — LAB (OUTPATIENT)
Dept: LAB | Facility: CLINIC | Age: 40
End: 2025-03-12
Attending: NURSE PRACTITIONER
Payer: COMMERCIAL

## 2025-03-12 ENCOUNTER — VIRTUAL VISIT (OUTPATIENT)
Dept: URGENT CARE | Facility: CLINIC | Age: 40
End: 2025-03-12
Payer: COMMERCIAL

## 2025-03-12 DIAGNOSIS — J02.9 SORE THROAT: Primary | ICD-10-CM

## 2025-03-12 DIAGNOSIS — J02.9 SORE THROAT: ICD-10-CM

## 2025-03-12 LAB
DEPRECATED S PYO AG THROAT QL EIA: NEGATIVE
FLUAV AG SPEC QL IA: NEGATIVE
FLUBV AG SPEC QL IA: NEGATIVE
S PYO DNA THROAT QL NAA+PROBE: NOT DETECTED

## 2025-03-12 PROCEDURE — 98005 SYNCH AUDIO-VIDEO EST LOW 20: CPT

## 2025-03-12 PROCEDURE — 87651 STREP A DNA AMP PROBE: CPT

## 2025-03-12 PROCEDURE — 87804 INFLUENZA ASSAY W/OPTIC: CPT

## 2025-03-12 NOTE — PROGRESS NOTES
"Scott is a 39 year old who is being evaluated via a billable video visit.          Assessment & Plan     Sore throat    - Streptococcus A Rapid Screen w/Reflex to PCR; Future  - Influenza A & B Antigen; Future          BMI  Estimated body mass index is 48.32 kg/m  as calculated from the following:    Height as of 2/14/25: 1.765 m (5' 9.5\").    Weight as of 2/14/25: 150.6 kg (332 lb).         Return in about 1 week (around 3/19/2025), or if symptoms worsen or fail to improve.    Subjective   Scott is a 39 year old, presenting for the following health issues:  No chief complaint on file.    HPI    Sore throat and extreme fatigue started last night, no fever, headache or bodyaches.  COVID test today was negative  No ill contacts.  Has not tried anything for her symptoms        Review of Systems  Constitutional, HEENT, cardiovascular, pulmonary, gi and gu systems are negative, except as otherwise noted.      Objective           Vitals:  No vitals were obtained today due to virtual visit.    Physical Exam   GENERAL: alert and no distress  RESP: No audible wheeze, cough, or visible cyanosis.    PSYCH: Appropriate affect, tone, and pace of words          Video-Visit Details    Type of service:  Video Visit   Originating Location (pt. Location): Home    Distant Location (provider location):  Off-site  Platform used for Video Visit: Patricio  Signed Electronically by: Virtual Urgent Care    "

## 2025-03-20 ENCOUNTER — E-VISIT (OUTPATIENT)
Dept: URGENT CARE | Facility: CLINIC | Age: 40
End: 2025-03-20
Payer: COMMERCIAL

## 2025-03-20 DIAGNOSIS — J01.90 ACUTE NON-RECURRENT SINUSITIS, UNSPECIFIED LOCATION: Primary | ICD-10-CM

## 2025-03-20 NOTE — PATIENT INSTRUCTIONS
Acute Sinusitis: Care Instructions  Overview     Acute sinusitis is an inflammation of the mucous membranes inside the nose and sinuses. Sinuses are the hollow spaces in your skull around the eyes and nose. Acute sinusitis often follows a cold. Acute sinusitis causes thick, discolored mucus that drains from the nose or down the back of the throat. It also can cause pain and pressure in your head and face along with a stuffy or blocked nose.  In most cases, sinusitis gets better on its own in 1 to 2 weeks. But some mild symptoms may last for several weeks. Sometimes antibiotics are needed if there is a bacterial infection.  Follow-up care is a key part of your treatment and safety. Be sure to make and go to all appointments, and call your doctor if you are having problems. It's also a good idea to know your test results and keep a list of the medicines you take.  How can you care for yourself at home?  Use saline (saltwater) nasal washes. This can help keep your nasal passages open and wash out mucus and allergens.  You can buy saline nose washes at a grocery store or drugstore. Follow the instructions on the package.  You can make your own at home. Add 1 teaspoon of non-iodized salt and 1 teaspoon of baking soda to 2 cups of distilled or boiled and cooled water. Fill a squeeze bottle or a nasal cleansing pot (such as a neti pot) with the nasal wash. Then put the tip into your nostril, and lean over the sink. With your mouth open, gently squirt the liquid. Repeat on the other side.  Try a decongestant nasal spray like oxymetazoline (Afrin). Do not use it for more than 3 days in a row. Using it for more than 3 days can make your congestion worse.  If needed, take an over-the-counter pain medicine, such as acetaminophen (Tylenol), ibuprofen (Advil, Motrin), or naproxen (Aleve). Read and follow all instructions on the label.  If the doctor prescribed antibiotics, take them as directed. Do not stop taking them just  "because you feel better. You need to take the full course of antibiotics.  Be careful when taking over-the-counter cold or flu medicines and Tylenol at the same time. Many of these medicines have acetaminophen, which is Tylenol. Read the labels to make sure that you are not taking more than the recommended dose. Too much acetaminophen (Tylenol) can be harmful.  Try a steroid nasal spray. It may help with your symptoms.  Breathe warm, moist air. You can use a steamy shower, a hot bath, or a sink filled with hot water. Avoid cold, dry air. Using a humidifier in your home may help. Follow the directions for cleaning the machine.  When should you call for help?   Call your doctor now or seek immediate medical care if:    You have new or worse swelling, redness, or pain in your face or around one or both of your eyes.     You have double vision or a change in your vision.     You have a high fever.     You have a severe headache and a stiff neck.     You have mental changes, such as feeling confused or much less alert.   Watch closely for changes in your health, and be sure to contact your doctor if:    You are not getting better as expected.   Where can you learn more?  Go to https://www.[employer].net/patiented  Enter I933 in the search box to learn more about \"Acute Sinusitis: Care Instructions.\"  Current as of: October 27, 2024  Content Version: 14.4    3788-8395 [employer].   Care instructions adapted under license by your healthcare professional. If you have questions about a medical condition or this instruction, always ask your healthcare professional. [employer] disclaims any warranty or liability for your use of this information.    Dear Scott Dobbs    After reviewing your responses, I've been able to diagnose you with sinusitis.      Based on your responses and diagnosis, I have prescribed Augmentin to treat your symptoms. I have sent this to your pharmacy.?     It is also " important to stay well hydrated, get lots of rest and take over-the-counter decongestants,?tylenol?or ibuprofen if you?are able to?take those medications per your primary care provider to help relieve discomfort.?     It is important that you take?all of?your prescribed medication even if your symptoms are improving after a few doses.? Taking?all of?your medicine helps prevent the symptoms from returning.?     If your symptoms worsen, you develop severe headache, vomiting, high fever (>102), or are not improving in 7 days, please contact your primary care provider for an appointment or visit any of our convenient Walk-in Care or Urgent Care Centers to be seen which can be found on our website?here.?     Thanks again for choosing?us?as your health care partner,?   ?  Ayaka Marmolejo, DAISY?

## 2025-04-07 ENCOUNTER — VIRTUAL VISIT (OUTPATIENT)
Facility: CLINIC | Age: 40
End: 2025-04-07
Payer: COMMERCIAL

## 2025-04-07 DIAGNOSIS — F41.1 GAD (GENERALIZED ANXIETY DISORDER): Primary | ICD-10-CM

## 2025-04-07 DIAGNOSIS — N94.3 PMS (PREMENSTRUAL SYNDROME): ICD-10-CM

## 2025-04-07 DIAGNOSIS — F32.9 DEPRESSION, REACTIVE: ICD-10-CM

## 2025-04-07 DIAGNOSIS — F40.10 SOCIAL ANXIETY DISORDER: ICD-10-CM

## 2025-04-07 PROCEDURE — 90834 PSYTX W PT 45 MINUTES: CPT | Mod: 95 | Performed by: PSYCHOLOGIST

## 2025-04-07 ASSESSMENT — PATIENT HEALTH QUESTIONNAIRE - PHQ9
SUM OF ALL RESPONSES TO PHQ QUESTIONS 1-9: 6
SUM OF ALL RESPONSES TO PHQ QUESTIONS 1-9: 6
10. IF YOU CHECKED OFF ANY PROBLEMS, HOW DIFFICULT HAVE THESE PROBLEMS MADE IT FOR YOU TO DO YOUR WORK, TAKE CARE OF THINGS AT HOME, OR GET ALONG WITH OTHER PEOPLE: VERY DIFFICULT

## 2025-04-07 NOTE — PROGRESS NOTES
"[facility]                                     Progress Note    Patient Name: Scott Dobbs  Date: 4/7/25         Service Type: Individual      Session Start Time: 9:00am Session End Time: 9:52am     Session Length: 52 minutes    Session #: 37 (transfer from Dawn Franks)    Attendees: Client attended alone    Service Modality:  Video Visit:      Provider verified identity through the following two step process.  Patient provided:  Patient photo    Telemedicine Visit: The patient's condition can be safely assessed and treated via synchronous audio and visual telemedicine encounter.      Reason for Telemedicine Visit: Services only offered telehealth    Originating Site (Patient Location): Patient's home    Distant Site (Provider Location): Provider Remote Setting- Home Office    Consent:  The patient/guardian has verbally consented to: the potential risks and benefits of telemedicine (video visit) versus in person care; bill my insurance or make self-payment for services provided; and responsibility for payment of non-covered services.     Patient would like the video invitation sent by:  Send to e-mail at: [email or web address]    Mode of Communication:  Video Conference via well    As the provider I attest to compliance with applicable laws and regulations related to telemedicine.    DATA  Interactive Complexity: No  Crisis: No        Progress Since Last Session (Related to Symptoms / Goals / Homework):   Symptoms: No change : Stable.  \"Just trying to deal with everything.\"    Homework: Completed in session      Episode of Care Goals: Satisfactory progress - ACTION (Actively working towards change); Intervened by reinforcing change plan / affirming steps taken     Current / Ongoing Stressors and Concerns:   Client not feeling well. Work. Past relationship.      Notes on 4/7/25 Session:  \"I'm tired all the time. I don't know why. I'm trying to focus on getting to bed at a decent " "hour.\"    Instituted a [employer] for their daughter, part for bedtime.  Settling into their new home now.    Developing routines for doing work around the house.  Cl talked about open communication with her .  She talked to him about having her own space.    They are focusing on \"one part of the house\" throughout the week (3 to 4 areas).    HW: \"Work on my bedtime routine (start getting ready at 9:30pm).\" Also work on a bedtime routine over the weekend.         Treatment Objective(s) Addressed in This Session:   Client discussed developing routines in her life and settling into their new home. She is socializing more and working on tasks in the home. Client \"always feels tired.\"  Wants to work on her sleep routine; Reviewed and assigned homework. Client talked about possibly getting back into past enjoyments.      Intervention:   CBT: Reviewed and assigned homework; Pattern recognition; Skills recommendation; Psychoeducation; Socratic Questioning.   Active listening, validation, and other rapport building skills; Sleep routine.    Assessments completed prior to visit:  The following assessments were completed by patient for this visit:  PHQ9:       10/8/2024     1:56 PM 11/8/2024     6:44 AM 11/18/2024     2:58 PM 12/17/2024     2:56 PM 1/8/2025     7:34 AM 2/13/2025    10:22 AM 4/7/2025     8:53 AM   PHQ-9 SCORE   PHQ-9 Total Score MyChart 11 (Moderate depression) 6 (Mild depression) 7 (Mild depression)  7 (Mild depression) 7 (Mild depression) 6 (Mild depression)   PHQ-9 Total Score 11 6  7  8 7  7  6        Patient-reported     GAD7:       1/3/2024     9:27 AM 1/23/2024     2:59 PM 4/8/2024     3:42 PM 8/6/2024    10:34 AM 9/13/2024    11:31 AM 10/8/2024     1:57 PM 12/17/2024     2:56 PM   BALA-7 SCORE   Total Score  12 (moderate anxiety) 7 (mild anxiety) 10 (moderate anxiety) 7 (mild anxiety) 8 (mild anxiety)    Total Score 13 12 7 10 7 8 6           ASSESSMENT: Current Emotional / Mental Status " "(status of significant symptoms):   Risk status (Self / Other harm or suicidal ideation)   Patient denies current fears or concerns for personal safety.   Patient denies current or recent suicidal ideation or behaviors.   Patient denies current or recent homicidal ideation or behaviors.   Patient denies current or recent self injurious behavior or ideation.   Patient denies other safety concerns.   Patient reports there has been no change in risk factors since their last session.     Patient reports there has been no change in protective factors since their last session.     Recommended that patient call 911 or go to the local ED should there be a change in any of these risk factors.     Appearance:   Appropriate    Eye Contact:   Good    Psychomotor Behavior: Normal    Attitude:   Cooperative  Friendly Pleasant   Orientation:   All   Speech    Rate / Production: Normal/ Responsive Talkative    Volume:  Normal    Mood:    Normal   Affect:    Appropriate  Bright    Thought Content:  Clear    Thought Form:  Coherent  Logical    Insight:    Good      Medication Review:   No changes to current psychiatric medication(s)     Medication Compliance:   Yes     Changes in Health Issues:   None reported     Chemical Use Review:   Substance Use: Chemical use reviewed, no active concerns identified      Tobacco Use: Same.    Diagnosis:  1. Generalized Anxiety Disorder    2. Premenstrual Dysphoric Syndrome    3. Social anxiety disorder    4. Unspecified Depressive Disorder      Collateral Reports Completed:   Not Applicable    PLAN: (Patient Tasks / Therapist Tasks / Other)  Cl will \"Work on my bedtime routine (start getting ready at 9:30pm).\" Also work on a bedtime routine over the weekend. She made a future appointment for June 12, 2025. Client was also placed on the Wait List for May.        Mejia Gonzalez PsyD                                              " "_____________________________________________________________________    Individual Treatment Plan    Patient's Name: Scott Dobbs  YOB: 1985    Date of Creation: 7/7/22 (cont'd from previous therapist)  Date Treatment Plan Last Reviewed/Revised: 6/4/24    DSM5 Diagnoses: 311 (F32.9) Unspecified Depressive Disorder  or 300.23 (F40.10) Social Anxiety Disorder  300.02 (F41.1) Generalized Anxiety Disorder  Psychosocial / Contextual Factors:  postpartum within past year, transition back to work in February, coping with pandemic  PROMIS (reviewed every 90 days): 27 (on 4/8/24)    Referral / Collaboration:  Referral to another professional/service is not indicated at this time..    Anticipated number of session for this episode of care: 36  Anticipation frequency of session: Monthly  Anticipated Duration of each session: 38-52 minutes  Treatment plan will be reviewed in 90 days or when goals have been changed.       MeasurableTreatment Goal(s) related to diagnosis / functional impairment(s)  Goal 1: Patient will reduce symptoms of anxiety as evidenced by decreased score on BALA 7.     I will know I've met my goal when I worry less in social/work interactions.  - \"I'd like to bring down my anxiety at work still.  I'd also like to lower my times of 'high' anxiety.\"    \"I think I want to work on the frustration level when I start to get overwhelmed.\" - Stressed used Time Outs and relaxation techniques. - Client feels she is in maintenance stage with this goal.     Objective #A  Patient will identify three distraction and diversion activities and use those activities to decrease level of anxiety.                     Status: Renewed - Date: 6/4/24     Intervention(s)  [facility] will teach distress tolerance, mindfulness, and relaxation techniques.     Objective #B  Patient will use cognitive strategies identified in therapy to challenge anxious thoughts.  Status: Renewed - Date: 6/4/24  Intervention(s)  [facility] will " "assign homework to practice cognitive restructuring and defusion techniques.     Objective #C  Patient will use relaxation strategies 2-3 times per day to reduce the physical symptoms of anxiety.  \"I would like to step back sooner, or catch it sooner, so I can just step back and use skills and stay engaged with what's frustrating me instead of stepping away (part w/her daughter).\"  Status: Renewed - Date: 6/4/24     Intervention(s)  [facility] will teach relaxation techniques.        Goal 2: Patient will reduce symptoms of depression as evidenced by decreased scores on PHQ9.    I will know I've met my goal when I feel like I can start and end tasks, find more motivation.       This goal was changed to be on a maintenance schedule.  Client will leave this as a goal until Winter when she tends to struggle more with depression.    Objective #A (Patient Action)                          Status: Renewed - Date: 6/4/24     Patient will Increase interest, engagement, and pleasure in doing things.     Intervention(s)  [facility] will explore strategies to help increase motivation and interest.     Objective #B  Patient will Identify negative self-talk and behaviors: challenge core beliefs, myths, and actions.                  Status: Completed: - Date: 6/4/24      Intervention(s)  [facility] will continue to explore automatic unhelpful/unhealthy thoughts and beliefs, and begin to create new helpeful/helpful automatic thoughts and beliefs.        Patient has reviewed and agreed to the above plan.      Mejia Gonzalez PsyD  April 7, 2025  "

## 2025-04-25 DIAGNOSIS — G43.009 MIGRAINE WITHOUT AURA AND WITHOUT STATUS MIGRAINOSUS, NOT INTRACTABLE: ICD-10-CM

## 2025-04-25 NOTE — TELEPHONE ENCOUNTER
Please advise  Received fax from pharmacy  Message:  New Rx Sig for Nubia, patient is taking continuously and not taking placebo tablets.  Need updated sig/new Rx to adjust days supply.    Aliya Pabon MA on 4/25/2025 at 2:58 PM

## 2025-04-28 RX ORDER — NORGESTIMATE AND ETHINYL ESTRADIOL 0.25-0.035
1 KIT ORAL DAILY
Qty: 112 TABLET | Refills: 3 | Status: SHIPPED | OUTPATIENT
Start: 2025-04-28

## 2025-05-15 ENCOUNTER — PATIENT OUTREACH (OUTPATIENT)
Dept: CARE COORDINATION | Facility: CLINIC | Age: 40
End: 2025-05-15
Payer: COMMERCIAL

## 2025-05-16 ENCOUNTER — VIRTUAL VISIT (OUTPATIENT)
Dept: PSYCHOLOGY | Facility: CLINIC | Age: 40
End: 2025-05-16
Payer: COMMERCIAL

## 2025-05-16 DIAGNOSIS — F40.10 SOCIAL ANXIETY DISORDER: ICD-10-CM

## 2025-05-16 DIAGNOSIS — N94.3 PMS (PREMENSTRUAL SYNDROME): ICD-10-CM

## 2025-05-16 DIAGNOSIS — F41.1 GAD (GENERALIZED ANXIETY DISORDER): Primary | ICD-10-CM

## 2025-05-16 DIAGNOSIS — F32.9 DEPRESSION, REACTIVE: ICD-10-CM

## 2025-05-16 PROCEDURE — 90834 PSYTX W PT 45 MINUTES: CPT | Mod: 95 | Performed by: PSYCHOLOGIST

## 2025-05-16 ASSESSMENT — ANXIETY QUESTIONNAIRES
4. TROUBLE RELAXING: SEVERAL DAYS
7. FEELING AFRAID AS IF SOMETHING AWFUL MIGHT HAPPEN: MORE THAN HALF THE DAYS
IF YOU CHECKED OFF ANY PROBLEMS ON THIS QUESTIONNAIRE, HOW DIFFICULT HAVE THESE PROBLEMS MADE IT FOR YOU TO DO YOUR WORK, TAKE CARE OF THINGS AT HOME, OR GET ALONG WITH OTHER PEOPLE: SOMEWHAT DIFFICULT
GAD7 TOTAL SCORE: 14
1. FEELING NERVOUS, ANXIOUS, OR ON EDGE: NEARLY EVERY DAY
5. BEING SO RESTLESS THAT IT IS HARD TO SIT STILL: SEVERAL DAYS
8. IF YOU CHECKED OFF ANY PROBLEMS, HOW DIFFICULT HAVE THESE MADE IT FOR YOU TO DO YOUR WORK, TAKE CARE OF THINGS AT HOME, OR GET ALONG WITH OTHER PEOPLE?: SOMEWHAT DIFFICULT
7. FEELING AFRAID AS IF SOMETHING AWFUL MIGHT HAPPEN: MORE THAN HALF THE DAYS
GAD7 TOTAL SCORE: 14
GAD7 TOTAL SCORE: 14
6. BECOMING EASILY ANNOYED OR IRRITABLE: NEARLY EVERY DAY
3. WORRYING TOO MUCH ABOUT DIFFERENT THINGS: NEARLY EVERY DAY
2. NOT BEING ABLE TO STOP OR CONTROL WORRYING: SEVERAL DAYS

## 2025-05-16 ASSESSMENT — PATIENT HEALTH QUESTIONNAIRE - PHQ9
10. IF YOU CHECKED OFF ANY PROBLEMS, HOW DIFFICULT HAVE THESE PROBLEMS MADE IT FOR YOU TO DO YOUR WORK, TAKE CARE OF THINGS AT HOME, OR GET ALONG WITH OTHER PEOPLE: SOMEWHAT DIFFICULT
SUM OF ALL RESPONSES TO PHQ QUESTIONS 1-9: 12
SUM OF ALL RESPONSES TO PHQ QUESTIONS 1-9: 12

## 2025-05-16 NOTE — PROGRESS NOTES
"[facility]                                     Progress Note    Patient Name: cSott Dobbs  Date: 5/16/25         Service Type: Individual      Session Start Time: 9:00am Session End Time: 9:52am     Session Length: 52 minutes    Session #: 38 (transfer from Dawn Franks)    Attendees: Client attended alone    Service Modality:  Video Visit:      Provider verified identity through the following two step process.  Patient provided:  Patient photo    Telemedicine Visit: The patient's condition can be safely assessed and treated via synchronous audio and visual telemedicine encounter.      Reason for Telemedicine Visit: Services only offered telehealth    Originating Site (Patient Location): Patient's home    Distant Site (Provider Location): Provider Remote Setting- Home Office    Consent:  The patient/guardian has verbally consented to: the potential risks and benefits of telemedicine (video visit) versus in person care; bill my insurance or make self-payment for services provided; and responsibility for payment of non-covered services.     Patient would like the video invitation sent by:  Send to e-mail at: [email or web address]    Mode of Communication:  Video Conference via well    As the provider I attest to compliance with applicable laws and regulations related to telemedicine.    DATA  Interactive Complexity: No  Crisis: No        Progress Since Last Session (Related to Symptoms / Goals / Homework):   Symptoms: No change : Stable.  \"It's going.\"    Homework: Completed in session      Episode of Care Goals: Satisfactory progress - ACTION (Actively working towards change); Intervened by reinforcing change plan / affirming steps taken     Current / Ongoing Stressors and Concerns:   Client not feeling well. Work. Past relationship.      Notes on 5/16/25 Session:  \"It's busy.\" Personal and work.  \"You can tell how I'm doing personally by the health of my plants.\"  Metaphor that " "client is \"bouncing back (like some of her plants) mentally.\"  Cl uses pomodoro timer at work and usually does productive activities during breaks (water plants) but \"I find myself scrolling emails or online.\"  Explored.    \"I've been having trouble focusing at work and at home some, too. Some of it is because we are working on our garden at home. We want to have a productive garden and something I enjoy.\"    \"I haven't been in the best mental space the last couple months. It's been a bit routine. We've done our regular D & D get-togethers but that's been about it. Now it's getting better (more variation in day-to-day activities).\"    HW: Sleep routine: \"I've been better at getting to bed at a decent time and I've been able to sleep in in the mornings more. I feel like it helps me be more efficient in the morning. Continue to develop sleep routine.    Reviewed recent PHQ-9 & BALA-7: Both in moderate range, \"Yep? My anxiety is wrapped up in world events now. (Depression may be as well).\" Also, worried about prices of good/produce (hence, the emphasis on their garden this year). Cl limits outlets for getting information and venting or discussing world events.    Discussed, indirectly, BPEs: Client has a couple camping outings planned with the family.  Cl is trying to regular prep meals (salads) for the week.       Treatment Objective(s) Addressed in This Session:   Client discussed doing better then in the past two months (increased anxiety and depression). Discussed sleep routine. Also discussed recent PHQ-9 and BALA-7 (depression and anxiety). Explored incr in dep and anxiety (political climate). BPE's for client - DBT term. Reviewed and assigned homework.      Intervention:   CBT: Reviewed and assigned homework; Sleep hygiene; Pattern recognition; Skills recommendation; Psychoeducation; Socratic Questioning; DBT's BPE: upcoming camping, gardening, socializing.   Active listening, validation, and other rapport " building skills.    Assessments completed prior to visit:  The following assessments were completed by patient for this visit:  PHQ9:       11/8/2024     6:44 AM 11/18/2024     2:58 PM 12/17/2024     2:56 PM 1/8/2025     7:34 AM 2/13/2025    10:22 AM 4/7/2025     8:53 AM 5/16/2025     8:35 AM   PHQ-9 SCORE   PHQ-9 Total Score MyChart 6 (Mild depression) 7 (Mild depression)  7 (Mild depression) 7 (Mild depression) 6 (Mild depression) 12 (Moderate depression)   PHQ-9 Total Score 6  7  8 7  7  6  12        Patient-reported     GAD7:       1/23/2024     2:59 PM 4/8/2024     3:42 PM 8/6/2024    10:34 AM 9/13/2024    11:31 AM 10/8/2024     1:57 PM 12/17/2024     2:56 PM 5/16/2025     8:35 AM   BALA-7 SCORE   Total Score 12 (moderate anxiety) 7 (mild anxiety) 10 (moderate anxiety) 7 (mild anxiety) 8 (mild anxiety)  14 (moderate anxiety)   Total Score 12 7 10 7 8 6 14        Patient-reported           ASSESSMENT: Current Emotional / Mental Status (status of significant symptoms):   Risk status (Self / Other harm or suicidal ideation)   Patient denies current fears or concerns for personal safety.   Patient denies current or recent suicidal ideation or behaviors.   Patient denies current or recent homicidal ideation or behaviors.   Patient denies current or recent self injurious behavior or ideation.   Patient denies other safety concerns.   Patient reports there has been no change in risk factors since their last session.     Patient reports there has been no change in protective factors since their last session.     Recommended that patient call 911 or go to the local ED should there be a change in any of these risk factors.     Appearance:   Appropriate    Eye Contact:   Good    Psychomotor Behavior: Normal    Attitude:   Cooperative  Friendly Pleasant   Orientation:   All   Speech    Rate / Production: Normal/ Responsive Talkative    Volume:  Normal    Mood:    Normal   Affect:    Appropriate  Bright    Thought  Content:  Clear    Thought Form:  Coherent  Logical    Insight:    Good      Medication Review:   No changes to current psychiatric medication(s)     Medication Compliance:   Yes     Changes in Health Issues:   None reported     Chemical Use Review:   Substance Use: Chemical use reviewed, no active concerns identified      Tobacco Use: Same.    Diagnosis:  1. Generalized Anxiety Disorder    2. Premenstrual Dysphoric Syndrome    3. Social anxiety disorder    4. Unspecified Depressive Disorder      Collateral Reports Completed:   Not Applicable    PLAN: (Patient Tasks / Therapist Tasks / Other)  Cl will focus on her sleep routine. She will also focus on getting her garden ready. She made a future appointment for June 12, 2025.        Mejia Gonzalez PsyD                                              _____________________________________________________________________    Individual Treatment Plan    Patient's Name: Scott Dobbs  YOB: 1985    Date of Creation: 7/7/22 (cont'd from previous therapist)  Date Treatment Plan Last Reviewed/Revised: 6/4/24    DSM5 Diagnoses: 311 (F32.9) Unspecified Depressive Disorder  or 300.23 (F40.10) Social Anxiety Disorder  300.02 (F41.1) Generalized Anxiety Disorder  Psychosocial / Contextual Factors:  postpartum within past year, transition back to work in February, coping with pandemic  PROMIS (reviewed every 90 days): 27 (on 4/8/24)    Referral / Collaboration:  Referral to another professional/service is not indicated at this time..    Anticipated number of session for this episode of care: 36  Anticipation frequency of session: Monthly  Anticipated Duration of each session: 38-52 minutes  Treatment plan will be reviewed in 90 days or when goals have been changed.       MeasurableTreatment Goal(s) related to diagnosis / functional impairment(s)  Goal 1: Patient will reduce symptoms of anxiety as evidenced by decreased score on BALA 7.     I will know I've  "met my goal when I worry less in social/work interactions.  - \"I'd like to bring down my anxiety at work still.  I'd also like to lower my times of 'high' anxiety.\"    \"I think I want to work on the frustration level when I start to get overwhelmed.\" - Stressed used Time Outs and relaxation techniques. - Client feels she is in maintenance stage with this goal.     Objective #A  Patient will identify three distraction and diversion activities and use those activities to decrease level of anxiety.                     Status: Renewed - Date: 6/4/24     Intervention(s)  [facility] [place] will teach distress tolerance, mindfulness, and relaxation techniques.     Objective #B  Patient will use cognitive strategies identified in therapy to challenge anxious thoughts.  Status: Renewed - Date: 6/4/24  Intervention(s)  [facility] [place] will assign homework to practice cognitive restructuring and defusion techniques.     Objective #C  Patient will use relaxation strategies 2-3 times per day to reduce the physical symptoms of anxiety.  \"I would like to step back sooner, or catch it sooner, so I can just step back and use skills and stay engaged with what's frustrating me instead of stepping away (part w/her daughter).\"  Status: Renewed - Date: 6/4/24     Intervention(s)  [facility] [place] will teach relaxation techniques.        Goal 2: Patient will reduce symptoms of depression as evidenced by decreased scores on PHQ9.    I will know I've met my goal when I feel like I can start and end tasks, find more motivation.       This goal was changed to be on a maintenance schedule.  Client will leave this as a goal until Winter when she tends to struggle more with depression.    Objective #A (Patient Action)                          Status: Renewed - Date: 6/4/24     Patient will Increase interest, engagement, and pleasure in doing things.     Intervention(s)  [facility] [place] will explore strategies to help increase motivation and interest.     Objective #B  Patient will Identify " negative self-talk and behaviors: challenge core beliefs, myths, and actions.                  Status: Completed: - Date: 6/4/24      Intervention(s)  [facility] will continue to explore automatic unhelpful/unhealthy thoughts and beliefs, and begin to create new helpeful/helpful automatic thoughts and beliefs.        Patient has reviewed and agreed to the above plan.      Mejia Gonzalez PsyD  May 16, 2025

## 2025-05-20 DIAGNOSIS — F32.81 PMDD (PREMENSTRUAL DYSPHORIC DISORDER): ICD-10-CM

## 2025-06-12 ENCOUNTER — VIRTUAL VISIT (OUTPATIENT)
Facility: CLINIC | Age: 40
End: 2025-06-12
Payer: COMMERCIAL

## 2025-06-12 DIAGNOSIS — N94.3 PMS (PREMENSTRUAL SYNDROME): ICD-10-CM

## 2025-06-12 DIAGNOSIS — F32.9 DEPRESSION, REACTIVE: ICD-10-CM

## 2025-06-12 DIAGNOSIS — F41.1 GAD (GENERALIZED ANXIETY DISORDER): Primary | ICD-10-CM

## 2025-06-12 DIAGNOSIS — F40.10 SOCIAL ANXIETY DISORDER: ICD-10-CM

## 2025-06-12 PROCEDURE — 90834 PSYTX W PT 45 MINUTES: CPT | Mod: 95 | Performed by: PSYCHOLOGIST

## 2025-06-12 NOTE — PROGRESS NOTES
"[facility]                                     Progress Note    Patient Name: Scott Dobbs  Date: 6/12/25         Service Type: Individual      Session Start Time: 8:02am Session End Time: 8:52am     Session Length: 50 minutes    Session #: 39 (transfer from Dawn Franks)    Attendees: Client attended alone    Service Modality:  Video Visit:      Provider verified identity through the following two step process.  Patient provided:  Patient photo    Telemedicine Visit: The patient's condition can be safely assessed and treated via synchronous audio and visual telemedicine encounter.      Reason for Telemedicine Visit: Services only offered telehealth    Originating Site (Patient Location): Patient's home    Distant Site (Provider Location): Provider Remote Setting- Home Office    Consent:  The patient/guardian has verbally consented to: the potential risks and benefits of telemedicine (video visit) versus in person care; bill my insurance or make self-payment for services provided; and responsibility for payment of non-covered services.     Patient would like the video invitation sent by:  Send to e-mail at: [email or web address]    Mode of Communication:  Video Conference via well    As the provider I attest to compliance with applicable laws and regulations related to telemedicine.    DATA  Interactive Complexity: No  Crisis: No        Progress Since Last Session (Related to Symptoms / Goals / Homework):   Symptoms: No change : Stable.  \"I'm ok.\"    Homework: Completed in session      Episode of Care Goals: Satisfactory progress - ACTION (Actively working towards change); Intervened by reinforcing change plan / affirming steps taken     Current / Ongoing Stressors and Concerns:   Work. Past relationship.      Notes on 6/12/25 Session:  \"I'm ok.\"    \"I had my end-of-the-year review yesterday. It went ok.\"  Cl reviewed developing routines at work.    Started talking about revising " Treatment Plan.  HW: Reviewed and revised treatment plan between sessions.       Treatment Objective(s) Addressed in This Session:   Client discussed how she is doing at work - progress made ad areas to continue to work on (developing routines); Collaborated to start updating client's Treatment Plan; Reviewed and assigned homework.      Intervention:   CBT: Reviewed and assigned homework; Pattern recognition; Skills recommendation - routine development; Psychoeducation; Socratic Questioning; Collaboration on Treatment Plan.   Active listening, validation, and other rapport building skills.    Assessments completed prior to visit:  The following assessments were completed by patient for this visit:  PHQ9:       11/8/2024     6:44 AM 11/18/2024     2:58 PM 12/17/2024     2:56 PM 1/8/2025     7:34 AM 2/13/2025    10:22 AM 4/7/2025     8:53 AM 5/16/2025     8:35 AM   PHQ-9 SCORE   PHQ-9 Total Score MyChart 6 (Mild depression) 7 (Mild depression)  7 (Mild depression) 7 (Mild depression) 6 (Mild depression) 12 (Moderate depression)   PHQ-9 Total Score 6  7  8 7  7  6  12        Patient-reported     GAD7:       1/23/2024     2:59 PM 4/8/2024     3:42 PM 8/6/2024    10:34 AM 9/13/2024    11:31 AM 10/8/2024     1:57 PM 12/17/2024     2:56 PM 5/16/2025     8:35 AM   BALA-7 SCORE   Total Score 12 (moderate anxiety) 7 (mild anxiety) 10 (moderate anxiety) 7 (mild anxiety) 8 (mild anxiety)  14 (moderate anxiety)   Total Score 12 7 10 7 8 6 14        Patient-reported           ASSESSMENT: Current Emotional / Mental Status (status of significant symptoms):   Risk status (Self / Other harm or suicidal ideation)   Patient denies current fears or concerns for personal safety.   Patient denies current or recent suicidal ideation or behaviors.   Patient denies current or recent homicidal ideation or behaviors.   Patient denies current or recent self injurious behavior or ideation.   Patient denies other safety concerns.   Patient reports  there has been no change in risk factors since their last session.     Patient reports there has been no change in protective factors since their last session.     Recommended that patient call 911 or go to the local ED should there be a change in any of these risk factors.     Appearance:   Appropriate    Eye Contact:   Good    Psychomotor Behavior: Normal    Attitude:   Cooperative  Friendly Pleasant   Orientation:   All   Speech    Rate / Production: Normal/ Responsive Talkative    Volume:  Normal    Mood:    Normal   Affect:    Appropriate  Bright    Thought Content:  Clear    Thought Form:  Coherent  Logical    Insight:    Good      Medication Review:   No changes to current psychiatric medication(s)     Medication Compliance:   Yes     Changes in Health Issues:   None reported     Chemical Use Review:   Substance Use: Chemical use reviewed, no active concerns identified      Tobacco Use: Same.    Diagnosis:  1. Generalized Anxiety Disorder    2. Premenstrual Dysphoric Syndrome    3. Social anxiety disorder    4. Unspecified Depressive Disorder      Collateral Reports Completed:   Not Applicable    PLAN: (Patient Tasks / Therapist Tasks / Other)  Cl will review and update her Treatment Plan (emailed to her) between sessions. She made a future appointment for July 21, 2025.        Mejia Gonzalez PsyD                                              _____________________________________________________________________    Individual Treatment Plan    Patient's Name: Scott Dobbs  YOB: 1985    Date of Creation: 7/7/22 (cont'd from previous therapist)  Date Treatment Plan Last Reviewed/Revised: 6/12/25    DSM5 Diagnoses: 311 (F32.9) Unspecified Depressive Disorder  or 300.23 (F40.10) Social Anxiety Disorder  300.02 (F41.1) Generalized Anxiety Disorder  Psychosocial / Contextual Factors:  postpartum within past year, transition back to work in February, coping with pandemic  PROMIS (reviewed  "every 90 days): 26 (on 4/7/25)    Referral / Collaboration:  Referral to another professional/service is not indicated at this time..    Anticipated number of session for this episode of care: 36  Anticipation frequency of session: Monthly  Anticipated Duration of each session: 38-52 minutes  Treatment plan will be reviewed in 90 days or when goals have been changed.       MeasurableTreatment Goal(s) related to diagnosis / functional impairment(s)  Goal 1: Patient will reduce symptoms of anxiety as evidenced by decreased score on BALA 7.     I will know I've met my goal when I worry less in social/work interactions.  - \"I'd like to bring down my anxiety at work still.  I'd also like to lower my times of 'high' anxiety.\"    Client feels she has made a lot of progress with her work anxiety.  She feels she has also progressed with communication skills.  Mindfulness, overall, I've been working on.  Times of high anxiety. I've been more consistent. Few times of high anxiety may be linked to my PMDD.        \"I think I want to work on the frustration level when I start to get overwhelmed.\" - Stressed using Time Outs and relaxation techniques. - Client feels she is in maintenance stage with this goal.     Objective #A  Patient will identify three distraction and diversion activities and use those activities to decrease level of anxiety.                     Status: Renewed - Date: 6/12/25     Intervention(s)  Provider will teach distress tolerance, mindfulness, and relaxation techniques.     Objective #B  Patient will use cognitive strategies identified in therapy to challenge anxious thoughts.  Status: Renewed - Date: 6/12/25  Intervention(s)  [facility] will assign homework to practice cognitive restructuring and defusion techniques.     Objective #C  Patient will use relaxation strategies 2-3 times per day to reduce the physical symptoms of anxiety.  \"I would like to step back sooner, or catch it sooner, so I can just step back " "and use skills and stay engaged with what's frustrating me instead of stepping away (part w/her daughter).\"  Status: Renewed - Date: 6/12/25     Intervention(s)  [facility] will teach relaxation techniques.        Goal 2: Patient will reduce symptoms of depression as evidenced by decreased scores on PHQ9.    I will know I've met my goal when I feel like I can start and end tasks, find more motivation.       This goal was changed to be on a maintenance schedule.  Client will leave this as a goal until Winter when she tends to struggle more with depression.    Objective #A (Patient Action)                          Status: Renewed - Date: 6/12/25     Patient will Increase interest, engagement, and pleasure in doing things.     Intervention(s)  [facility] will explore strategies to help increase motivation and interest.     Objective #B  Patient will Identify negative self-talk and behaviors: challenge core beliefs, myths, and actions.                  Status: Completed: - Date: 6/12/25      Intervention(s)  [facility] will continue to explore automatic unhelpful/unhealthy thoughts and beliefs, and begin to create new helpeful/helpful automatic thoughts and beliefs.        Patient has reviewed and agreed to the above plan.      Mejia Gonzalez PsyD  June 12, 2025  "

## 2025-07-08 ENCOUNTER — OFFICE VISIT (OUTPATIENT)
Dept: ALLERGY | Facility: CLINIC | Age: 40
End: 2025-07-08
Payer: COMMERCIAL

## 2025-07-08 VITALS — SYSTOLIC BLOOD PRESSURE: 136 MMHG | OXYGEN SATURATION: 100 % | HEART RATE: 75 BPM | DIASTOLIC BLOOD PRESSURE: 88 MMHG

## 2025-07-08 DIAGNOSIS — H10.9 RHINOCONJUNCTIVITIS: Primary | ICD-10-CM

## 2025-07-08 DIAGNOSIS — J31.0 RHINOCONJUNCTIVITIS: Primary | ICD-10-CM

## 2025-07-08 PROCEDURE — 86003 ALLG SPEC IGE CRUDE XTRC EA: CPT | Performed by: ALLERGY & IMMUNOLOGY

## 2025-07-08 PROCEDURE — 99203 OFFICE O/P NEW LOW 30 MIN: CPT | Mod: 25 | Performed by: ALLERGY & IMMUNOLOGY

## 2025-07-08 PROCEDURE — 3075F SYST BP GE 130 - 139MM HG: CPT | Performed by: ALLERGY & IMMUNOLOGY

## 2025-07-08 PROCEDURE — 95004 PERQ TESTS W/ALRGNC XTRCS: CPT | Performed by: ALLERGY & IMMUNOLOGY

## 2025-07-08 PROCEDURE — 3079F DIAST BP 80-89 MM HG: CPT | Performed by: ALLERGY & IMMUNOLOGY

## 2025-07-08 NOTE — PROGRESS NOTES
Scott Dobbs was seen in the Allergy Clinic at [facility].    Scott Dobbs is a 40 year old White female being seen today in consultation for allergies.    Started taking allergy medications 10-15 years ago and they initially worked quite well. Now feels symptoms are not as well controlled with medications. Symptoms include itchy/painful eyes, nasal congestion, sinus pressure, and ear itching. Frequently gets sinus infections - 1 to 2 treated with antibiotics each of the past couple of years. Has symptoms with seasonal changes. Currently taking cetirizine and fluticasone nasal spray. Has tried fexofenadine in the past.       Past Medical History:   Diagnosis Date    Anxiety     Chest pain, unspecified type 2022    Empyema (H) 2021    Empyema, right (H) 2022    Hypertension 10/2020    Insufficient lactation 2021    Multifocal pneumonia 2021    Obesity (BMI 30-39.9)     Uncomplicated asthma Childhood only     Family History   Problem Relation Age of Onset    Thyroid Disease Mother     Hypertension Mother     Breast Cancer Paternal Grandmother         stage 1 early 50's.     Hypertension Paternal Grandmother     Hypertension Paternal Grandfather     Prostate Cancer Paternal Grandfather      Past Surgical History:   Procedure Laterality Date     SECTION N/A 10/21/2020    Procedure:  SECTION;  Surgeon: Elisabeth Lima MD;  Location: [facility] L[facility]    IR CHEST TUBE PLACEMENT NON-TUNNELED RIGHT  2021    PE TUBES      THORACIC SURGERY  2021 and 2022       ENVIRONMENTAL HISTORY:   [place] The home is heated with a forced air. They do have central air conditioning. The patient's bedroom is furnished with feather/wool bedding or pillows, hard vi in bedroom, and fabric window coverings.  Pets inside the house include 2 cat(s). There is no history of cockroach or mice infestation. Do you  smoke cigarettes or other recreational drugs? No Do you vape or use an e-cigarette? No. There is/are 0 smokers living in the house. There is/are 0 who smoke ecigarettes/vape living in the house. The house does have a damp basement.     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 She lives with her spouse and daughter.        Current Outpatient Medications:     acetaminophen (TYLENOL) 325 MG tablet, Take 3 tablets (975 mg) by mouth every 8 hours as needed for mild pain, Disp: , Rfl:     ASPIRIN PO, Take 81 mg by mouth daily , Disp: , Rfl:     cetirizine (ZYRTEC) 10 MG tablet, Take 10 mg by mouth daily, Disp: , Rfl:     famotidine (PEPCID) 10 MG tablet, Take 10 mg by mouth 2 times daily., Disp: , Rfl:     fluticasone (FLONASE) 50 MCG/ACT nasal spray, Spray 1 spray into both nostrils daily, Disp: , Rfl:     ibuprofen (ADVIL/MOTRIN) 200 MG tablet, Take 3 tablets (600 mg) by mouth every 6 hours as needed for moderate pain Start after delivery, Disp: , Rfl:     labetalol (NORMODYNE) 100 MG tablet, TAKE 1 TABLET BY MOUTH TWICE A DAY, Disp: 180 tablet, Rfl: 1    melatonin 5 MG tablet, Take 5 mg by mouth nightly as needed for sleep, Disp: , Rfl:     norgestimate-ethinyl estradiol (ORTHO-CYCLEN) 0.25-35 MG-MCG tablet, Take 1 tablet by mouth daily. Skip placebo tablets, Disp: 112 tablet, Rfl: 3    paragard intrauterine copper device, 1 each by Intrauterine route once, Disp:  , Rfl:     Prenatal Vit-Fe Fumarate-FA (PRENATAL MULTIVITAMIN W/IRON) 27-0.8 MG tablet, Take 1 tablet by mouth daily, Disp: , Rfl:     sertraline (ZOLOFT) 50 MG tablet, TAKE 1 TABLET BY MOUTH EVERY DAY, Disp: 90 tablet, Rfl: 1    SUMAtriptan (IMITREX) 50 MG tablet, Take 1 tablet (50 mg) by mouth at onset of headache for migraine. May repeat in 2 hours. Max 4 tablets/24 hours., Disp: 20 tablet, Rfl: 3  Immunization History   Administered Date(s) Administered    COVID-19 12+ (Pfizer) 10/30/2023    COVID-19 Bivalent 18+ (Moderna)  09/24/2022    COVID-19 MONOVALENT 12+ (Pfizer) 11/14/2024    COVID-19 Monovalent 18+ (Moderna) 01/11/2021, 02/11/2021    COVID-19 Monovalent Booster 18+ (Moderna) 11/10/2021    Hepatitis B, Adult (Energix-B/Recombivax HB) 01/03/2024, 02/14/2025    Influenza Vaccine >6 months,quad, PF 10/03/2016, 09/19/2017, 09/18/2020, 10/22/2021, 09/24/2022    Meningococcal ACWY (Menveo ) 07/25/2015    TDAP Vaccine (Adacel) 04/26/2011, 08/06/2020     No Known Allergies      EXAM:   /88 (BP Location: Left arm, Patient Position: Sitting, Cuff Size: Adult Large)   Pulse 75   SpO2 100%   Physical Exam  Vitals and nursing note reviewed.   Constitutional:       Appearance: Normal appearance.   HENT:      Head: Normocephalic and atraumatic.      Right Ear: External ear normal.      Left Ear: External ear normal.      Nose: No mucosal edema, congestion or rhinorrhea.      Mouth/Throat:      Mouth: Mucous membranes are moist. No oral lesions.      Pharynx: Oropharynx is clear. Uvula midline. No posterior oropharyngeal erythema.   Eyes:      General: Lids are normal.      Extraocular Movements: Extraocular movements intact.      Conjunctiva/sclera: Conjunctivae normal.   Neck:      Comments: No asymmetry, masses, or scars  Cardiovascular:      Rate and Rhythm: Normal rate and regular rhythm.      Heart sounds: S1 normal and S2 normal. No murmur heard.  Pulmonary:      Effort: Pulmonary effort is normal. No respiratory distress.      Breath sounds: Normal breath sounds and air entry.   Musculoskeletal:      Comments: No musculoskeletal defects noted   Skin:     General: Skin is warm and dry.      Findings: No lesion or rash.   Neurological:      General: No focal deficit present.      Mental Status: She is alert.   Psychiatric:         Mood and Affect: Mood and affect normal.           WORKUP: Skin testing  ENVIRONMENTAL PERCUTANEOUS SKIN TESTING: ADULT      7/8/2025     9:00 AM   [facility] [employer]   Consent Y   Ordering Physician  Edmundo   Interpreting Physician Edmundo   Testing Technician Alaina Brown Back   Time start: 09:35   Time End: 09:50   Positive Control: Histatrol*ALK 1 mg/ml 5/13   Negative Control: 50% Glycerin 0   Cat Hair*ALK (10,000 BAU/ml) 0   AP Dog Hair/Dander (1:100 w/v) 0   Dust Mite p. 30,000 AU/ml 0   Dust Mite f. (30,000 AU/ml) 0   Davey (W/F in millimeters) 0   Gregory Grass (100,000 BAU/mL) 0   Red Cedar (W/F in millimeters) 0   Maple/Lincoln (W/F in millimeters) 0   Hackberry (W/F in millimeters) 0   [place]st (W/F in millimeters) 0   [place] *ALK (W/F in millimeters) 0   American Elm (W/F in millimeters) 0   [place] (W/F in millimeters) 0   Black [place] (W/F in millimeters) 0   Birch Mix (W/F in millimeters) 0   [place] (W/F in millimeters) 0   Oak (W/F in millimeters) 0   Cocklebur (W/F in millimeters) 0   [place] (W/F in millimeters) 0   White [place] (W/F in millimeters) 0   Careless (W/F in millimeters) 0   Nettle (W/F in millimeters) 0   English Plantain (W/F in millimeters) 0   Kochia (W/F in millimeters) 0   Lamb's Quarter (W/F in millimeters) 0   Marshelder (W/F in millimeters) 0   Ragweed Mix* ALK (W/F in millimeters) 0   Russian Thistle (W/F in millimeters) 0   Sagebrush/Mugwort (W/F in millimeters) 0   Sheep Sorrel (W/F in millimeters) 0   Feather Mix* ALK (W/F in millimeters) 0   Penicillium Mix (1:10 w/v) 0   Curvularia spicifera (1:10 w/v) n/a   Epicoccum (1:10 w/v) 0   Aspergillus fumigatus (1:10 w/v): 0   Alternaria tenius (1:10 w/v) 0   H. Cladosporium (1:10 w/v) 0   Phoma herbarum (1:10 w/v) 0      Appropriate response to controls, all others negative    ASSESSMENT/PLAN:  Scott Dobbs is a 40 year old female here for evaluation of allergies.    1. Rhinoconjunctivitis (Primary) - History of intermittent/seasonal rhinoconjunctivitis symptoms. Historically has responded well to oral antihistamines and intranasal steroids though medications seem to be less effective over time. Skin  testing today is negative which was unexpected given the clinical history. Discussed pursuing additional evaluation with IgE testing and continuing medications for now. She may benefit from evaluation with ENT should IgE testing also return with negative results.    - ALLERGY SKIN TESTS,ALLERGENS  - Allergen cat epithellium IgE; Future  - Allergen dog epithelium IgE; Future  - Allergen Davey grass IgE; Future  - Allergen lorraine IgE; Future  - Allergen D farinae IgE; Future  - Allergen D pteronyssinus IgE; Future  - Allergen alternaria alternata IgE; Future  - Allergen aspergillus fumigatus IgE; Future  - Allergen cladosporium herbarum IgE; Future  - Allergen Epicoccum purpurascens IgE; Future  - Allergen penicillium notatum IgE; Future  - Allergen barry white IgE; Future  - Allergen Cedar IgE; Future  - Allergen cottonwood IgE; Future  - Allergen elm IgE; Future  - Allergen maple box elder IgE; Future  - Allergen oak white IgE; Future  - Allergen Red [place] IgE; Future  - Allergen silver  birch IgE; Future  - Allergen Tree White [place] IgE; Future  - Allergen [place] Tree; Future  - Allergen white pine IgE; Future  - Allergen English plantain IgE; Future  - Allergen giant ragweed IgE; Future  - Allergen lamb's quarter IgE; Future  - Allergen Mugwort IgE; Future  - Allergen ragweed short IgE; Future  - Allergen Sagebrush [place] IgE; Future  - Allergen Sheep Sorrel IgE; Future  - Allergen thistle Russian IgE; Future  - Allergen Weed Nettle IgE; Future  - Allergen, Kochia/Firebush; Future      Follow-up as needed pending lab results      Thank you for allowing me to participate in the care of Scott Dobbs.      Viktor Wyatt MD, FAAAAI  Allergy/Immunology  [facility]    Consent was obtained from the patient to use an AI documentation tool in the creation of this note.    Chart documentation done in part with Dragon Voice  Recognition Software. Although reviewed after completion, some word and grammatical errors may remain.

## 2025-07-09 LAB
A ALTERNATA IGE QN: <0.1 KU(A)/L
A FUMIGATUS IGE QN: <0.1 KU(A)/L
C HERBARUM IGE QN: <0.1 KU(A)/L
CALIF WALNUT POLN IGE QN: <0.1 KU(A)/L
CAT DANDER IGG QN: <0.1 KU(A)/L
CEDAR IGE QN: <0.1 KU(A)/L
COMMON RAGWEED IGE QN: <0.1 KU(A)/L
COTTONWOOD IGE QN: <0.1 KU(A)/L
D FARINAE IGE QN: <0.1 KU(A)/L
D PTERONYSS IGE QN: <0.1 KU(A)/L
DOG DANDER+EPITH IGE QN: <0.1 KU(A)/L
E PURPURASCENS IGE QN: <0.1 KU(A)/L
EAST WHITE PINE IGE QN: <0.1 KU(A)/L
ENGL PLANTAIN IGE QN: <0.1 KU(A)/L
FIREBUSH IGE QN: <0.1 KU(A)/L
GIANT RAGWEED IGE QN: <0.1 KU(A)/L
GOOSEFOOT IGE QN: <0.1 KU(A)/L
JOHNSON GRASS IGE QN: <0.1 KU(A)/L
MAPLE IGE QN: <0.1 KU(A)/L
MUGWORT IGE QN: <0.1 KU(A)/L
NETTLE IGE QN: <0.1 KU(A)/L
P NOTATUM IGE QN: <0.1 KU(A)/L
RED MULBERRY IGE QN: <0.1 KU(A)/L
SALTWORT IGE QN: <0.1 KU(A)/L
SHEEP SORREL IGE QN: <0.1 KU(A)/L
SILVER BIRCH IGE QN: <0.1 KU(A)/L
TIMOTHY IGE QN: <0.1 KU(A)/L
WHITE ASH IGE QN: <0.1 KU(A)/L
WHITE ELM IGE QN: <0.1 KU(A)/L
WHITE MULBERRY IGE QN: <0.1 KU(A)/L
WHITE OAK IGE QN: <0.1 KU(A)/L
WORMWOOD IGE QN: <0.1 KU(A)/L

## 2025-07-15 ENCOUNTER — PATIENT OUTREACH (OUTPATIENT)
Dept: CARE COORDINATION | Facility: CLINIC | Age: 40
End: 2025-07-15
Payer: COMMERCIAL

## 2025-07-21 ENCOUNTER — VIRTUAL VISIT (OUTPATIENT)
Facility: CLINIC | Age: 40
End: 2025-07-21
Payer: COMMERCIAL

## 2025-07-21 DIAGNOSIS — F32.9 DEPRESSION, REACTIVE: ICD-10-CM

## 2025-07-21 DIAGNOSIS — F40.10 SOCIAL ANXIETY DISORDER: ICD-10-CM

## 2025-07-21 DIAGNOSIS — F41.1 GAD (GENERALIZED ANXIETY DISORDER): Primary | ICD-10-CM

## 2025-07-21 DIAGNOSIS — N94.3 PMS (PREMENSTRUAL SYNDROME): ICD-10-CM

## 2025-07-21 PROCEDURE — 90834 PSYTX W PT 45 MINUTES: CPT | Mod: 95 | Performed by: PSYCHOLOGIST

## 2025-08-05 ENCOUNTER — MYC MEDICAL ADVICE (OUTPATIENT)
Dept: ALLERGY | Facility: CLINIC | Age: 40
End: 2025-08-05
Payer: COMMERCIAL

## 2025-08-05 DIAGNOSIS — J31.0 NONALLERGIC RHINITIS: Primary | ICD-10-CM

## 2025-08-06 ENCOUNTER — E-VISIT (OUTPATIENT)
Dept: URGENT CARE | Facility: CLINIC | Age: 40
End: 2025-08-06
Payer: COMMERCIAL

## 2025-08-06 DIAGNOSIS — J01.90 ACUTE SINUSITIS, RECURRENCE NOT SPECIFIED, UNSPECIFIED LOCATION: Primary | ICD-10-CM

## 2025-08-06 PROCEDURE — 99207 PR NON-BILLABLE SERV PER CHARTING: CPT | Performed by: NURSE PRACTITIONER

## 2025-08-11 ASSESSMENT — ANXIETY QUESTIONNAIRES
3. WORRYING TOO MUCH ABOUT DIFFERENT THINGS: SEVERAL DAYS
4. TROUBLE RELAXING: SEVERAL DAYS
7. FEELING AFRAID AS IF SOMETHING AWFUL MIGHT HAPPEN: SEVERAL DAYS
GAD7 TOTAL SCORE: 8
1. FEELING NERVOUS, ANXIOUS, OR ON EDGE: MORE THAN HALF THE DAYS
6. BECOMING EASILY ANNOYED OR IRRITABLE: MORE THAN HALF THE DAYS
8. IF YOU CHECKED OFF ANY PROBLEMS, HOW DIFFICULT HAVE THESE MADE IT FOR YOU TO DO YOUR WORK, TAKE CARE OF THINGS AT HOME, OR GET ALONG WITH OTHER PEOPLE?: SOMEWHAT DIFFICULT
IF YOU CHECKED OFF ANY PROBLEMS ON THIS QUESTIONNAIRE, HOW DIFFICULT HAVE THESE PROBLEMS MADE IT FOR YOU TO DO YOUR WORK, TAKE CARE OF THINGS AT HOME, OR GET ALONG WITH OTHER PEOPLE: SOMEWHAT DIFFICULT
5. BEING SO RESTLESS THAT IT IS HARD TO SIT STILL: NOT AT ALL
2. NOT BEING ABLE TO STOP OR CONTROL WORRYING: SEVERAL DAYS
7. FEELING AFRAID AS IF SOMETHING AWFUL MIGHT HAPPEN: SEVERAL DAYS
GAD7 TOTAL SCORE: 8
GAD7 TOTAL SCORE: 8

## 2025-08-11 ASSESSMENT — PATIENT HEALTH QUESTIONNAIRE - PHQ9
SUM OF ALL RESPONSES TO PHQ QUESTIONS 1-9: 12
SUM OF ALL RESPONSES TO PHQ QUESTIONS 1-9: 12
10. IF YOU CHECKED OFF ANY PROBLEMS, HOW DIFFICULT HAVE THESE PROBLEMS MADE IT FOR YOU TO DO YOUR WORK, TAKE CARE OF THINGS AT HOME, OR GET ALONG WITH OTHER PEOPLE: SOMEWHAT DIFFICULT

## 2025-08-12 ENCOUNTER — VIRTUAL VISIT (OUTPATIENT)
Facility: CLINIC | Age: 40
End: 2025-08-12
Payer: COMMERCIAL

## 2025-08-12 DIAGNOSIS — F40.10 SOCIAL ANXIETY DISORDER: ICD-10-CM

## 2025-08-12 DIAGNOSIS — N94.3 PMS (PREMENSTRUAL SYNDROME): ICD-10-CM

## 2025-08-12 DIAGNOSIS — F32.9 DEPRESSION, REACTIVE: ICD-10-CM

## 2025-08-12 DIAGNOSIS — F41.1 GAD (GENERALIZED ANXIETY DISORDER): Primary | ICD-10-CM

## 2025-08-12 PROCEDURE — 90834 PSYTX W PT 45 MINUTES: CPT | Mod: 95 | Performed by: PSYCHOLOGIST

## 2025-08-18 ENCOUNTER — VIRTUAL VISIT (OUTPATIENT)
Dept: FAMILY MEDICINE | Facility: CLINIC | Age: 40
End: 2025-08-18
Payer: COMMERCIAL

## 2025-08-18 DIAGNOSIS — F33.1 MODERATE RECURRENT MAJOR DEPRESSION (H): ICD-10-CM

## 2025-08-18 DIAGNOSIS — F32.81 PMDD (PREMENSTRUAL DYSPHORIC DISORDER): Primary | ICD-10-CM

## 2025-08-18 DIAGNOSIS — F43.23 SITUATIONAL MIXED ANXIETY AND DEPRESSIVE DISORDER: ICD-10-CM

## 2025-08-18 PROCEDURE — 98006 SYNCH AUDIO-VIDEO EST MOD 30: CPT | Performed by: PHYSICIAN ASSISTANT

## 2025-08-18 RX ORDER — BUPROPION HYDROCHLORIDE 150 MG/1
150 TABLET ORAL EVERY MORNING
Qty: 30 TABLET | Refills: 1 | Status: SHIPPED | OUTPATIENT
Start: 2025-08-18

## 2025-08-18 RX ORDER — SERTRALINE HYDROCHLORIDE 100 MG/1
100 TABLET, FILM COATED ORAL DAILY
Qty: 90 TABLET | Refills: 1 | Status: SHIPPED | OUTPATIENT
Start: 2025-08-18

## 2025-09-01 ENCOUNTER — PATIENT OUTREACH (OUTPATIENT)
Dept: CARE COORDINATION | Facility: CLINIC | Age: 40
End: 2025-09-01
Payer: COMMERCIAL

## (undated) DEVICE — SOL NACL 0.9% IRRIG 1000ML BOTTLE 07138-09

## (undated) DEVICE — PACK C-SECTION LF PL15OTA83B

## (undated) DEVICE — SOL WATER IRRIG 1000ML BOTTLE 07139-09

## (undated) DEVICE — DRSG STERI STRIP 1/4X3" R1541

## (undated) DEVICE — CATH TRAY FOLEY SURESTEP 16FR WDRAIN BAG STLK LATEX A300316A

## (undated) DEVICE — SUCTION CANISTER MEDIVAC LINER 1500ML W/LID 65651-515

## (undated) DEVICE — BASIN SET MAJOR

## (undated) DEVICE — ESU GROUND PAD UNIVERSAL W/O CORD

## (undated) DEVICE — PREP CHLORAPREP 26ML TINTED ORANGE  260815

## (undated) DEVICE — STRAP KNEE/BODY 31143004

## (undated) DEVICE — STOCKING SLEEVE COMPRESSION CALF LG

## (undated) RX ORDER — MORPHINE SULFATE 1 MG/ML
INJECTION, SOLUTION EPIDURAL; INTRATHECAL; INTRAVENOUS
Status: DISPENSED
Start: 2020-10-21

## (undated) RX ORDER — LIDOCAINE HYDROCHLORIDE 10 MG/ML
INJECTION, SOLUTION EPIDURAL; INFILTRATION; INTRACAUDAL; PERINEURAL
Status: DISPENSED
Start: 2021-11-23

## (undated) RX ORDER — OXYTOCIN/0.9 % SODIUM CHLORIDE 30/500 ML
PLASTIC BAG, INJECTION (ML) INTRAVENOUS
Status: DISPENSED
Start: 2020-10-21

## (undated) RX ORDER — FENTANYL CITRATE 50 UG/ML
INJECTION, SOLUTION INTRAMUSCULAR; INTRAVENOUS
Status: DISPENSED
Start: 2022-01-05

## (undated) RX ORDER — FENTANYL CITRATE 50 UG/ML
INJECTION, SOLUTION INTRAMUSCULAR; INTRAVENOUS
Status: DISPENSED
Start: 2020-10-21

## (undated) RX ORDER — LIDOCAINE HYDROCHLORIDE 10 MG/ML
INJECTION, SOLUTION EPIDURAL; INFILTRATION; INTRACAUDAL; PERINEURAL
Status: DISPENSED
Start: 2022-01-05